# Patient Record
Sex: FEMALE | Race: WHITE | NOT HISPANIC OR LATINO | Employment: OTHER | URBAN - METROPOLITAN AREA
[De-identification: names, ages, dates, MRNs, and addresses within clinical notes are randomized per-mention and may not be internally consistent; named-entity substitution may affect disease eponyms.]

---

## 2017-01-13 ENCOUNTER — GENERIC CONVERSION - ENCOUNTER (OUTPATIENT)
Dept: OTHER | Facility: OTHER | Age: 62
End: 2017-01-13

## 2017-01-20 ENCOUNTER — ALLSCRIPTS OFFICE VISIT (OUTPATIENT)
Dept: OTHER | Facility: OTHER | Age: 62
End: 2017-01-20

## 2017-01-20 DIAGNOSIS — Z12.31 ENCOUNTER FOR SCREENING MAMMOGRAM FOR MALIGNANT NEOPLASM OF BREAST: ICD-10-CM

## 2017-01-23 ENCOUNTER — GENERIC CONVERSION - ENCOUNTER (OUTPATIENT)
Dept: OTHER | Facility: OTHER | Age: 62
End: 2017-01-23

## 2017-02-20 ENCOUNTER — APPOINTMENT (OUTPATIENT)
Dept: PHYSICAL THERAPY | Facility: CLINIC | Age: 62
End: 2017-02-20
Payer: MEDICARE

## 2017-02-20 PROCEDURE — 97140 MANUAL THERAPY 1/> REGIONS: CPT

## 2017-02-20 PROCEDURE — G8991 OTHER PT/OT GOAL STATUS: HCPCS

## 2017-02-20 PROCEDURE — 97162 PT EVAL MOD COMPLEX 30 MIN: CPT

## 2017-02-20 PROCEDURE — G8990 OTHER PT/OT CURRENT STATUS: HCPCS

## 2017-02-22 ENCOUNTER — APPOINTMENT (OUTPATIENT)
Dept: PHYSICAL THERAPY | Facility: CLINIC | Age: 62
End: 2017-02-22
Payer: MEDICARE

## 2017-02-22 PROCEDURE — 97150 GROUP THERAPEUTIC PROCEDURES: CPT

## 2017-02-22 PROCEDURE — 97140 MANUAL THERAPY 1/> REGIONS: CPT

## 2017-02-27 ENCOUNTER — APPOINTMENT (OUTPATIENT)
Dept: PHYSICAL THERAPY | Facility: CLINIC | Age: 62
End: 2017-02-27
Payer: MEDICARE

## 2017-02-27 PROCEDURE — 97110 THERAPEUTIC EXERCISES: CPT

## 2017-02-27 PROCEDURE — 97140 MANUAL THERAPY 1/> REGIONS: CPT

## 2017-03-01 ENCOUNTER — APPOINTMENT (OUTPATIENT)
Dept: PHYSICAL THERAPY | Facility: CLINIC | Age: 62
End: 2017-03-01
Payer: MEDICARE

## 2017-03-06 ENCOUNTER — APPOINTMENT (OUTPATIENT)
Dept: PHYSICAL THERAPY | Facility: CLINIC | Age: 62
End: 2017-03-06
Payer: MEDICARE

## 2017-03-06 PROCEDURE — 97150 GROUP THERAPEUTIC PROCEDURES: CPT

## 2017-03-06 PROCEDURE — 97140 MANUAL THERAPY 1/> REGIONS: CPT

## 2017-03-07 ENCOUNTER — ALLSCRIPTS OFFICE VISIT (OUTPATIENT)
Dept: OTHER | Facility: OTHER | Age: 62
End: 2017-03-07

## 2017-03-07 ENCOUNTER — APPOINTMENT (OUTPATIENT)
Dept: PHYSICAL THERAPY | Facility: CLINIC | Age: 62
End: 2017-03-07
Payer: MEDICARE

## 2017-03-07 PROCEDURE — 97140 MANUAL THERAPY 1/> REGIONS: CPT

## 2017-03-07 PROCEDURE — 97110 THERAPEUTIC EXERCISES: CPT

## 2017-03-13 ENCOUNTER — APPOINTMENT (OUTPATIENT)
Dept: PHYSICAL THERAPY | Facility: CLINIC | Age: 62
End: 2017-03-13
Payer: MEDICARE

## 2017-03-13 PROCEDURE — 97140 MANUAL THERAPY 1/> REGIONS: CPT

## 2017-03-13 PROCEDURE — 97150 GROUP THERAPEUTIC PROCEDURES: CPT

## 2017-03-15 ENCOUNTER — APPOINTMENT (OUTPATIENT)
Dept: PHYSICAL THERAPY | Facility: CLINIC | Age: 62
End: 2017-03-15
Payer: MEDICARE

## 2017-03-17 ENCOUNTER — APPOINTMENT (OUTPATIENT)
Dept: PHYSICAL THERAPY | Facility: CLINIC | Age: 62
End: 2017-03-17
Payer: MEDICARE

## 2017-03-20 ENCOUNTER — APPOINTMENT (OUTPATIENT)
Dept: PHYSICAL THERAPY | Facility: CLINIC | Age: 62
End: 2017-03-20
Payer: MEDICARE

## 2017-03-21 ENCOUNTER — GENERIC CONVERSION - ENCOUNTER (OUTPATIENT)
Dept: OTHER | Facility: OTHER | Age: 62
End: 2017-03-21

## 2017-03-23 ENCOUNTER — GENERIC CONVERSION - ENCOUNTER (OUTPATIENT)
Dept: OTHER | Facility: OTHER | Age: 62
End: 2017-03-23

## 2017-03-24 ENCOUNTER — APPOINTMENT (OUTPATIENT)
Dept: PHYSICAL THERAPY | Facility: CLINIC | Age: 62
End: 2017-03-24
Payer: MEDICARE

## 2017-03-27 ENCOUNTER — APPOINTMENT (OUTPATIENT)
Dept: PHYSICAL THERAPY | Facility: CLINIC | Age: 62
End: 2017-03-27
Payer: MEDICARE

## 2017-03-27 PROCEDURE — 97140 MANUAL THERAPY 1/> REGIONS: CPT

## 2017-03-27 PROCEDURE — 97150 GROUP THERAPEUTIC PROCEDURES: CPT

## 2017-03-28 ENCOUNTER — APPOINTMENT (OUTPATIENT)
Dept: PHYSICAL THERAPY | Facility: CLINIC | Age: 62
End: 2017-03-28
Payer: MEDICARE

## 2017-03-28 PROCEDURE — G8991 OTHER PT/OT GOAL STATUS: HCPCS

## 2017-03-28 PROCEDURE — 97164 PT RE-EVAL EST PLAN CARE: CPT

## 2017-03-28 PROCEDURE — 97140 MANUAL THERAPY 1/> REGIONS: CPT

## 2017-03-28 PROCEDURE — 97110 THERAPEUTIC EXERCISES: CPT

## 2017-03-28 PROCEDURE — G8990 OTHER PT/OT CURRENT STATUS: HCPCS

## 2017-03-30 ENCOUNTER — APPOINTMENT (OUTPATIENT)
Dept: PHYSICAL THERAPY | Facility: CLINIC | Age: 62
End: 2017-03-30
Payer: MEDICARE

## 2017-03-30 PROCEDURE — 97150 GROUP THERAPEUTIC PROCEDURES: CPT

## 2017-03-30 PROCEDURE — 97140 MANUAL THERAPY 1/> REGIONS: CPT

## 2017-04-03 ENCOUNTER — APPOINTMENT (OUTPATIENT)
Dept: PHYSICAL THERAPY | Facility: CLINIC | Age: 62
End: 2017-04-03
Payer: MEDICARE

## 2017-04-03 PROCEDURE — 97140 MANUAL THERAPY 1/> REGIONS: CPT

## 2017-04-03 PROCEDURE — 97150 GROUP THERAPEUTIC PROCEDURES: CPT

## 2017-04-05 ENCOUNTER — APPOINTMENT (OUTPATIENT)
Dept: PHYSICAL THERAPY | Facility: CLINIC | Age: 62
End: 2017-04-05
Payer: MEDICARE

## 2017-04-05 PROCEDURE — 97140 MANUAL THERAPY 1/> REGIONS: CPT

## 2017-04-05 PROCEDURE — 97110 THERAPEUTIC EXERCISES: CPT

## 2017-04-07 ENCOUNTER — APPOINTMENT (OUTPATIENT)
Dept: PHYSICAL THERAPY | Facility: CLINIC | Age: 62
End: 2017-04-07
Payer: MEDICARE

## 2017-04-11 ENCOUNTER — APPOINTMENT (OUTPATIENT)
Dept: PHYSICAL THERAPY | Facility: CLINIC | Age: 62
End: 2017-04-11
Payer: MEDICARE

## 2017-04-12 ENCOUNTER — APPOINTMENT (OUTPATIENT)
Dept: PHYSICAL THERAPY | Facility: CLINIC | Age: 62
End: 2017-04-12
Payer: MEDICARE

## 2017-04-12 PROCEDURE — 97140 MANUAL THERAPY 1/> REGIONS: CPT

## 2017-04-12 PROCEDURE — 97110 THERAPEUTIC EXERCISES: CPT

## 2017-04-14 ENCOUNTER — APPOINTMENT (OUTPATIENT)
Dept: PHYSICAL THERAPY | Facility: CLINIC | Age: 62
End: 2017-04-14
Payer: MEDICARE

## 2017-04-14 PROCEDURE — 97110 THERAPEUTIC EXERCISES: CPT

## 2017-04-14 PROCEDURE — 97140 MANUAL THERAPY 1/> REGIONS: CPT

## 2017-04-17 ENCOUNTER — APPOINTMENT (OUTPATIENT)
Dept: PHYSICAL THERAPY | Facility: CLINIC | Age: 62
End: 2017-04-17
Payer: MEDICARE

## 2017-04-18 ENCOUNTER — GENERIC CONVERSION - ENCOUNTER (OUTPATIENT)
Dept: OTHER | Facility: OTHER | Age: 62
End: 2017-04-18

## 2017-04-21 ENCOUNTER — APPOINTMENT (OUTPATIENT)
Dept: PHYSICAL THERAPY | Facility: CLINIC | Age: 62
End: 2017-04-21
Payer: MEDICARE

## 2017-04-21 ENCOUNTER — ALLSCRIPTS OFFICE VISIT (OUTPATIENT)
Dept: OTHER | Facility: OTHER | Age: 62
End: 2017-04-21

## 2017-04-25 ENCOUNTER — GENERIC CONVERSION - ENCOUNTER (OUTPATIENT)
Dept: OTHER | Facility: OTHER | Age: 62
End: 2017-04-25

## 2017-04-25 ENCOUNTER — APPOINTMENT (OUTPATIENT)
Dept: PHYSICAL THERAPY | Facility: CLINIC | Age: 62
End: 2017-04-25
Payer: MEDICARE

## 2017-05-01 ENCOUNTER — APPOINTMENT (OUTPATIENT)
Dept: PHYSICAL THERAPY | Facility: CLINIC | Age: 62
End: 2017-05-01
Payer: MEDICARE

## 2017-05-01 PROCEDURE — 97140 MANUAL THERAPY 1/> REGIONS: CPT

## 2017-05-01 PROCEDURE — 97150 GROUP THERAPEUTIC PROCEDURES: CPT

## 2017-05-03 ENCOUNTER — APPOINTMENT (OUTPATIENT)
Dept: PHYSICAL THERAPY | Facility: CLINIC | Age: 62
End: 2017-05-03
Payer: MEDICARE

## 2017-05-03 PROCEDURE — 97140 MANUAL THERAPY 1/> REGIONS: CPT

## 2017-05-03 PROCEDURE — 97110 THERAPEUTIC EXERCISES: CPT

## 2017-05-05 ENCOUNTER — APPOINTMENT (OUTPATIENT)
Dept: PHYSICAL THERAPY | Facility: CLINIC | Age: 62
End: 2017-05-05
Payer: MEDICARE

## 2017-05-05 PROCEDURE — 97110 THERAPEUTIC EXERCISES: CPT

## 2017-05-08 ENCOUNTER — APPOINTMENT (OUTPATIENT)
Dept: PHYSICAL THERAPY | Facility: CLINIC | Age: 62
End: 2017-05-08
Payer: MEDICARE

## 2017-05-08 PROCEDURE — 97150 GROUP THERAPEUTIC PROCEDURES: CPT

## 2017-05-10 ENCOUNTER — APPOINTMENT (OUTPATIENT)
Dept: PHYSICAL THERAPY | Facility: CLINIC | Age: 62
End: 2017-05-10
Payer: MEDICARE

## 2017-05-12 ENCOUNTER — APPOINTMENT (OUTPATIENT)
Dept: PHYSICAL THERAPY | Facility: CLINIC | Age: 62
End: 2017-05-12
Payer: MEDICARE

## 2017-05-12 PROCEDURE — 97140 MANUAL THERAPY 1/> REGIONS: CPT

## 2017-05-12 PROCEDURE — 97110 THERAPEUTIC EXERCISES: CPT

## 2017-05-15 ENCOUNTER — ALLSCRIPTS OFFICE VISIT (OUTPATIENT)
Dept: OTHER | Facility: OTHER | Age: 62
End: 2017-05-15

## 2017-05-17 ENCOUNTER — APPOINTMENT (OUTPATIENT)
Dept: PHYSICAL THERAPY | Facility: CLINIC | Age: 62
End: 2017-05-17
Payer: MEDICARE

## 2017-05-17 PROCEDURE — G8991 OTHER PT/OT GOAL STATUS: HCPCS

## 2017-05-17 PROCEDURE — 97164 PT RE-EVAL EST PLAN CARE: CPT

## 2017-05-17 PROCEDURE — G8990 OTHER PT/OT CURRENT STATUS: HCPCS

## 2017-05-17 PROCEDURE — 97140 MANUAL THERAPY 1/> REGIONS: CPT

## 2017-05-17 PROCEDURE — 97150 GROUP THERAPEUTIC PROCEDURES: CPT

## 2017-05-19 ENCOUNTER — APPOINTMENT (OUTPATIENT)
Dept: PHYSICAL THERAPY | Facility: CLINIC | Age: 62
End: 2017-05-19
Payer: MEDICARE

## 2017-05-22 ENCOUNTER — APPOINTMENT (OUTPATIENT)
Dept: PHYSICAL THERAPY | Facility: CLINIC | Age: 62
End: 2017-05-22
Payer: MEDICARE

## 2017-05-22 PROCEDURE — 97110 THERAPEUTIC EXERCISES: CPT

## 2017-05-24 ENCOUNTER — APPOINTMENT (OUTPATIENT)
Dept: PHYSICAL THERAPY | Facility: CLINIC | Age: 62
End: 2017-05-24
Payer: MEDICARE

## 2017-05-25 ENCOUNTER — APPOINTMENT (OUTPATIENT)
Dept: PHYSICAL THERAPY | Facility: CLINIC | Age: 62
End: 2017-05-25
Payer: MEDICARE

## 2017-05-25 PROCEDURE — 97110 THERAPEUTIC EXERCISES: CPT

## 2017-05-25 PROCEDURE — 97140 MANUAL THERAPY 1/> REGIONS: CPT

## 2017-05-26 ENCOUNTER — APPOINTMENT (OUTPATIENT)
Dept: PHYSICAL THERAPY | Facility: CLINIC | Age: 62
End: 2017-05-26
Payer: MEDICARE

## 2017-05-31 ENCOUNTER — APPOINTMENT (OUTPATIENT)
Dept: PHYSICAL THERAPY | Facility: CLINIC | Age: 62
End: 2017-05-31
Payer: MEDICARE

## 2017-06-05 ENCOUNTER — APPOINTMENT (OUTPATIENT)
Dept: PHYSICAL THERAPY | Facility: CLINIC | Age: 62
End: 2017-06-05
Payer: MEDICARE

## 2017-06-06 ENCOUNTER — APPOINTMENT (OUTPATIENT)
Dept: PHYSICAL THERAPY | Facility: CLINIC | Age: 62
End: 2017-06-06
Payer: MEDICARE

## 2017-06-14 ENCOUNTER — APPOINTMENT (OUTPATIENT)
Dept: PHYSICAL THERAPY | Facility: CLINIC | Age: 62
End: 2017-06-14
Payer: MEDICARE

## 2017-06-14 PROCEDURE — 97110 THERAPEUTIC EXERCISES: CPT

## 2017-06-14 PROCEDURE — 97150 GROUP THERAPEUTIC PROCEDURES: CPT

## 2017-06-16 ENCOUNTER — APPOINTMENT (OUTPATIENT)
Dept: PHYSICAL THERAPY | Facility: CLINIC | Age: 62
End: 2017-06-16
Payer: MEDICARE

## 2017-06-26 ENCOUNTER — APPOINTMENT (OUTPATIENT)
Dept: PHYSICAL THERAPY | Facility: CLINIC | Age: 62
End: 2017-06-26
Payer: MEDICARE

## 2017-06-26 PROCEDURE — 97110 THERAPEUTIC EXERCISES: CPT

## 2017-06-28 ENCOUNTER — APPOINTMENT (OUTPATIENT)
Dept: PHYSICAL THERAPY | Facility: CLINIC | Age: 62
End: 2017-06-28
Payer: MEDICARE

## 2017-06-30 ENCOUNTER — APPOINTMENT (OUTPATIENT)
Dept: PHYSICAL THERAPY | Facility: CLINIC | Age: 62
End: 2017-06-30
Payer: MEDICARE

## 2017-07-03 ENCOUNTER — APPOINTMENT (OUTPATIENT)
Dept: PHYSICAL THERAPY | Facility: CLINIC | Age: 62
End: 2017-07-03
Payer: MEDICARE

## 2017-07-03 PROCEDURE — 97150 GROUP THERAPEUTIC PROCEDURES: CPT

## 2017-07-05 ENCOUNTER — APPOINTMENT (OUTPATIENT)
Dept: PHYSICAL THERAPY | Facility: CLINIC | Age: 62
End: 2017-07-05
Payer: MEDICARE

## 2017-08-07 ENCOUNTER — ALLSCRIPTS OFFICE VISIT (OUTPATIENT)
Dept: OTHER | Facility: OTHER | Age: 62
End: 2017-08-07

## 2017-08-07 DIAGNOSIS — Z12.31 ENCOUNTER FOR SCREENING MAMMOGRAM FOR MALIGNANT NEOPLASM OF BREAST: ICD-10-CM

## 2017-09-11 ENCOUNTER — GENERIC CONVERSION - ENCOUNTER (OUTPATIENT)
Dept: OTHER | Facility: OTHER | Age: 62
End: 2017-09-11

## 2017-09-21 ENCOUNTER — ALLSCRIPTS OFFICE VISIT (OUTPATIENT)
Dept: OTHER | Facility: OTHER | Age: 62
End: 2017-09-21

## 2017-10-09 ENCOUNTER — GENERIC CONVERSION - ENCOUNTER (OUTPATIENT)
Dept: OTHER | Facility: OTHER | Age: 62
End: 2017-10-09

## 2017-11-01 ENCOUNTER — ALLSCRIPTS OFFICE VISIT (OUTPATIENT)
Dept: OTHER | Facility: OTHER | Age: 62
End: 2017-11-01

## 2017-11-01 DIAGNOSIS — C82.90 FOLLICULAR LYMPHOMA (HCC): ICD-10-CM

## 2017-11-01 DIAGNOSIS — E78.5 HYPERLIPIDEMIA: ICD-10-CM

## 2017-11-01 DIAGNOSIS — M54.2 CERVICALGIA: ICD-10-CM

## 2017-11-01 DIAGNOSIS — R31.9 HEMATURIA: ICD-10-CM

## 2017-11-02 NOTE — PROGRESS NOTES
Assessment  1  Hyperlipidemia (272 4) (E78 5)   2  Hematuria (599 70) (R31 9)   3  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804 91) (C82 90)    Plan  Acute pain due to trauma    · Naproxen 500 MG Oral Tablet; TAKE 1 TABLET BY MOUTH EVERY 12 HOURS AS  NEEDED  Cellulitis of forearm, right    · Cephalexin 500 MG Oral Tablet (Cephalexin Monohydrate)  Follicular lymphoma    · (1) CBC/PLT/DIFF; Status:Active; Requested for:01Nov2017; Hematuria    · (1) URINALYSIS (will reflex a microscopy if leukocytes, occult blood, protein or nitrites are  not within normal limits); Status:Active; Requested for:01Nov2017; Herniated lumbar intervertebral disc    · OxyMORphone HCl - 5 MG Oral Tablet; take 1 tablet every 4-6 hours as needed  for pain  Hyperlipidemia    · (1) COMPREHENSIVE METABOLIC PANEL; Status:Active; Requested for:01Nov2017;    · (1) LIPID PANEL, FASTING; Status:Active; Requested for:01Nov2017;    · (1) TSH WITH FT4 REFLEX; Status:Active; Requested for:01Nov2017;   Peripheral neuropathy    · Lyrica 75 MG Oral Capsule; TAKE 1 CAPSULE BY MOUTH 3 TIMES A DAY -MAY  CAUSE DIZZINESS -MAY CAUSEDROWSINESS    Chief Complaint  FOLLOW UP   Patient is here today for follow up of chronic conditions described in HPI  History of Present Illness  She is going to PT for rectal prolapse    has ongoing pain in the low back  She had neurosurgery in December of 2016  She did PT and is seeing chiropractics  She saw pain management and had injections  She is taking Lyrica with minimal benefit  She gets good results from oxymorphone  She has no side effects  She has a signed treatment agreement in the chart  There is no indication of misuse, abuse, or diversion  has scleroderma (likely CREST) and sees rheumatology on a regular basis  She has chronic arthritic pain in the small joints, especially the hands  She is taking Naproxen  She has chronic dry eyes and mouth and is suffering dental issues because of it            Review of Systems    Constitutional: No fever, no chills, feels well, no tiredness, no recent weight gain or weight loss  Eyes: No complaints of eye pain, no red eyes, no eyesight problems, no discharge, no dry eyes, no itching of eyes  ENT: no complaints of earache, no loss of hearing, no nose bleeds, no nasal discharge, no sore throat, no hoarseness  Cardiovascular: No complaints of slow heart rate, no fast heart rate, no chest pain, no palpitations, no leg claudication, no lower extremity edema  Respiratory: No complaints of shortness of breath, no wheezing, no cough, no SOB on exertion, no orthopnea, no PND  Gastrointestinal: No complaints of abdominal pain, no constipation, no nausea or vomiting, no diarrhea, no bloody stools  Genitourinary: No complaints of dysuria, no incontinence, no pelvic pain, no dysmenorrhea, no vaginal discharge or bleeding  Musculoskeletal: No complaints of arthralgias, no myalgias, no joint swelling or stiffness, no limb pain or swelling  Integumentary: No complaints of skin rash or lesions, no itching, no skin wounds, no breast pain or lump  Neurological: No complaints of headache, no confusion, no convulsions, no numbness, no dizziness or fainting, no tingling, no limb weakness, no difficulty walking  Psychiatric: Not suicidal, no sleep disturbance, no anxiety or depression, no change in personality, no emotional problems  Endocrine: No complaints of proptosis, no hot flashes, no muscle weakness, no deepening of the voice, no feelings of weakness  Hematologic/Lymphatic: No complaints of swollen glands, no swollen glands in the neck, does not bleed easily, does not bruise easily  Active Problems  1  Abdominal pain, RUQ (789 01) (R10 11)   2  Abnormal EKG (794 31) (R94 31)   3  Acute pain due to trauma (338 11) (G89 11)   4  Allergic rhinitis (477 9) (J30 9)   5  Bartholin gland cyst (616 2) (N75 0)   6  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)   7  Cellulitis of forearm, right (682 3) (L03 113)   8  Chest pain (786 50) (R07 9)   9  Dental disorder (525 9) (K08 9)   10  Depression (311) (F32 9)   11  Depression screen (V79 0) (Z13 89)   12  Encounter for removal of sutures (V58 32) (Z48 02)   13  Encounter for screening for malignant neoplasm of colon (V76 51) (Z12 11)   14  Encounter for screening mammogram for malignant neoplasm of breast (V76 12)    (Z12 31)   15  Esophageal reflux disease (530 81) (K21 9)   16  Eye drainage (379 93) (H57 8)   17  Eye dryness (375 15) (H04 129)   18  Fatigue (780 79) (R53 83)   19  Fecal soiling (787 62) (R15 1)   20  Fibromyalgia (729 1) (M79 7)   21  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513 74) (C82 90)   22  Hearing Loss (389 9)   23  Heel spur (726 73) (M77 30)   24  Hematuria (599 70) (R31 9)   25  Herniated lumbar intervertebral disc (722 10) (M51 26)   26  Hip pain, left (719 45) (M25 552)   27  Hyperlipidemia (272 4) (E78 5)   28  Insomnia (780 52) (G47 00)   29  Joint pain, knee (719 46) (M25 569)   30  Lichen planus (291 9) (L43 9)   31  Long term use of drug (V58 69) (Z79 899)   32  Low back pain (724 2) (M54 5)   33  Neck pain (723 1) (M54 2)   34  Neck Strain (847 0)   35  Need for influenza vaccination (V04 81) (Z23)   36  Need for prophylactic vaccination and inoculation against influenza (V04 81) (Z23)   37  Osteoarthritis of both knees (715 96) (M17 0)   38  Osteopenia (733 90) (M85 80)   39  Ovarian cyst (620 2) (N83 20)   40  Pain in joint of left shoulder (719 41) (M25 512)   41  Peripheral neuropathy (356 9) (G62 9)   42  Post concussion syndrome (310 2) (F07 81)   43  Scleroderma (710 1) (M34 9)   44  Spasm (781 0) (R25 2)    Past Medical History  1  History of Acute frontal sinusitis (461 1) (J01 10)   2  History of Cellulitis (682 9) (L03 90)   3  History of Cellulitis of foot (682 7) (L03 119)   4  History of Change in mental status (780 97) (R41 82)   5  History of PLAZA (dyspnea on exertion) (786 09) (R06 09)   6  Encounter for removal of sutures (V58 32) (Z48 02)   7  History of acute sinusitis (V12 69) (Z87 09)   8  History of acute sinusitis (V12 69) (Z87 09)   9  History of conjunctivitis (V12 49) (Z86 69)   10  History of folliculitis (X32 6) (M27 3)   11  History of insect bite (V15 59) (Z87 828)   12  History of urinary tract infection (V13 02) (Z87 440)   13  History of urinary tract infection (V13 02) (Z87 440)   14  History of Laceration (879 8)   15  History of Laceration Of Eyelid (870 8)   16  History of Nausea (787 02) (R11 0)   17  Need for prophylactic vaccination and inoculation against influenza (V04 81) (Z23)   18  History of Preop examination (V72 84) (Z01 818)   19  History of Skin rash (782 1) (R21)    Surgical History  1  History of Bladder Surgery   2  History of Hemorrhoidectomy   3  History of Hysterectomy   4  History of Nasal Septal Deviation Repair   5  History of Rotator Cuff Repair   6  History of Tonsillectomy    Family History  Mother    1  Family history of Arthritis (V17 7)   2  Family history of Diabetes Mellitus (V18 0)  Father    3  Family history of Coronary Artery Disease (V17 49)  Sister    4  Family history of Arthritis (V17 7)   5  Family history of Asthma (V17 5)   6  Family history of Crohn's Disease  Brother    7  Family history of Myocardial Ischemia    Social History   · Former smoker (B12 20) (M44 830)    Current Meds   1  Amphetamine-Dextroamphet ER 30 MG Oral Capsule Extended Release 24 Hour; TAKE   1 CAPSULE DAILY; Therapy: 16PMH0582 to (Evaluate:22Nov2017); Last Rx:23Oct2017 Ordered   2  Amphetamine-Dextroamphetamine 20 MG Oral Tablet; TAKE 2 TABLETS DAILY; Therapy: 62RMZ7904 to (Evaluate:22Nov2017); Last Rx:23Oct2017 Ordered   3  BuPROPion HCl ER (XL) 300 MG Oral Tablet Extended Release 24 Hour; take 1 tablet by   mouth every day; Therapy: 89BPR8460 to (Evaluate:10Dkc5121)  Requested for: 56Pwd4772; Last   Rx:79Fpb9874 Ordered   4   Cephalexin 500 MG Oral Tablet; TAKE 1 TABLET 3 TIMES DAILY; Therapy: 21VOS6649 to (Algis Rout)  Requested for: 29RCS0802; Last   Rx:22Xtv5674 Ordered   5  Fluticasone Propionate 50 MCG/ACT Nasal Suspension; USE 2 SPRAYS IN EACH   NOSTRIL ONCE DAILY; Therapy: 89Bvz3068 to (Last Rx:23Apr2015)  Requested for: 02Aam1386 Ordered   6  Lyrica 75 MG Oral Capsule; TAKE 1 CAPSULE BY MOUTH 3 TIMES A DAY -MAY CAUSE   DIZZINESS -MAY CAUSEDROWSINESS; Therapy: 64YMH5740 to (Evaluate:15Oct2017)  Requested for: 18Apr2017; Last   Rx:87Mxn3520 Ordered   7  Naproxen 500 MG Oral Tablet; TAKE 1 TABLET BY MOUTH EVERY 12 HOURS AS   NEEDED; Therapy: 87VGS5598 to (Evaluate:52Feu3825)  Requested for: 99Tnh3795; Last   Rx:92Seq5385 Ordered   8  OxyMORphone HCl - 5 MG Oral Tablet; take 1 tablet every 4-6 hours as needed for pain; Therapy: 96FPK4367 to (Last Rx:23Oct2017) Ordered   9  Plaquenil 200 MG Oral Tablet; Therapy: 02SXL7541 to Recorded   10  Protonix 40 MG Oral Tablet Delayed Release; Therapy: 02OBZ2573 to Recorded   11  Simvastatin 40 MG Oral Tablet; take 1 tablet by mouth every day; Therapy: 11RSN0419 to (Evaluate:36Cbv7803)  Requested for: 64Nnj2453; Last    Rx:19Sep2017 Ordered   12  TraZODone HCl - 150 MG Oral Tablet; TAKE 1 TABLET AT BEDTIME; Therapy: 33AXO5096 to (Evaluate:20Sep2017)  Requested for: 03Bmx1307; Last    Rx:73Tum9551 Ordered   13  Triamcinolone Acetonide 0 1 % External Ointment; APPLY AND GENTLY MASSAGE INTO    AFFECTED AREA(S) TWICE DAILY; Therapy: 12Gco0244 to (Last Rx:07Aug2017)  Requested for: 82Dhy3397 Ordered    Allergies  1  Bactrim TABS   2  Cymbalta CPEP    Vitals  Vital Signs    Recorded: 01HUE7848 08:07AM   Heart Rate 97   Respiration 14   Systolic 325   Diastolic 68   Height 5 ft 2 in   Weight 115 lb 4 oz   BMI Calculated 21 08   BSA Calculated 1 51   O2 Saturation 97     Physical Exam    Constitutional   General appearance: No acute distress, well appearing and well nourished      Pulmonary   Respiratory effort: No increased work of breathing or signs of respiratory distress  Auscultation of lungs: Clear to auscultation  Cardiovascular   Auscultation of heart: Normal rate and rhythm, normal S1 and S2, without murmurs  Examination of extremities for edema and/or varicosities: Normal     Abdomen   Abdomen: Non-tender, no masses  Liver and spleen: No hepatomegaly or splenomegaly           Signatures   Electronically signed by : Leni Chávez MD; Nov 1 2017  8:48AM EST                       (Author)

## 2017-11-30 ENCOUNTER — GENERIC CONVERSION - ENCOUNTER (OUTPATIENT)
Dept: FAMILY MEDICINE CLINIC | Facility: CLINIC | Age: 62
End: 2017-11-30

## 2017-12-04 ENCOUNTER — GENERIC CONVERSION - ENCOUNTER (OUTPATIENT)
Dept: FAMILY MEDICINE CLINIC | Facility: CLINIC | Age: 62
End: 2017-12-04

## 2018-01-09 ENCOUNTER — GENERIC CONVERSION - ENCOUNTER (OUTPATIENT)
Dept: OTHER | Facility: OTHER | Age: 63
End: 2018-01-09

## 2018-01-13 VITALS
RESPIRATION RATE: 15 BRPM | HEIGHT: 62 IN | OXYGEN SATURATION: 98 % | SYSTOLIC BLOOD PRESSURE: 116 MMHG | HEART RATE: 84 BPM | WEIGHT: 116.5 LBS | DIASTOLIC BLOOD PRESSURE: 60 MMHG | BODY MASS INDEX: 21.44 KG/M2

## 2018-01-14 VITALS
BODY MASS INDEX: 21.42 KG/M2 | WEIGHT: 116.38 LBS | SYSTOLIC BLOOD PRESSURE: 112 MMHG | HEART RATE: 99 BPM | OXYGEN SATURATION: 96 % | RESPIRATION RATE: 15 BRPM | DIASTOLIC BLOOD PRESSURE: 66 MMHG | HEIGHT: 62 IN

## 2018-01-14 VITALS
RESPIRATION RATE: 15 BRPM | WEIGHT: 114.5 LBS | DIASTOLIC BLOOD PRESSURE: 60 MMHG | BODY MASS INDEX: 21.07 KG/M2 | HEIGHT: 62 IN | HEART RATE: 89 BPM | OXYGEN SATURATION: 97 % | SYSTOLIC BLOOD PRESSURE: 118 MMHG

## 2018-01-14 VITALS
SYSTOLIC BLOOD PRESSURE: 110 MMHG | RESPIRATION RATE: 15 BRPM | BODY MASS INDEX: 21.39 KG/M2 | WEIGHT: 116.25 LBS | HEART RATE: 80 BPM | HEIGHT: 62 IN | DIASTOLIC BLOOD PRESSURE: 58 MMHG | OXYGEN SATURATION: 97 %

## 2018-01-14 VITALS
RESPIRATION RATE: 14 BRPM | HEART RATE: 97 BPM | HEIGHT: 62 IN | WEIGHT: 115.25 LBS | OXYGEN SATURATION: 97 % | SYSTOLIC BLOOD PRESSURE: 112 MMHG | BODY MASS INDEX: 21.21 KG/M2 | DIASTOLIC BLOOD PRESSURE: 68 MMHG

## 2018-01-14 VITALS
HEART RATE: 83 BPM | RESPIRATION RATE: 14 BRPM | SYSTOLIC BLOOD PRESSURE: 112 MMHG | DIASTOLIC BLOOD PRESSURE: 60 MMHG | OXYGEN SATURATION: 97 %

## 2018-01-15 VITALS
HEIGHT: 62 IN | SYSTOLIC BLOOD PRESSURE: 112 MMHG | WEIGHT: 116 LBS | BODY MASS INDEX: 21.35 KG/M2 | DIASTOLIC BLOOD PRESSURE: 66 MMHG | OXYGEN SATURATION: 97 % | HEART RATE: 77 BPM | RESPIRATION RATE: 15 BRPM

## 2018-01-15 NOTE — MISCELLANEOUS
Assessment    1  Low back pain (724 2) (M54 5)   2  Depression (311) (F32 9)    Plan  Depression    · Avoid alcoholic beverages ; Status:Complete;   Done: 46YAO5933   Ordered; For:Depression; Ordered By:Nati Stevenson;   · Begin or continue regular aerobic exercise  Gradually work up to at least 3 sessions of  30 minutes of exercise a week ; Status:Complete;   Done: 40GNM0245   Ordered; For:Depression; Ordered By:Nati Stevenson;   · Continue with our present treatment plan ; Status:Complete;   Done: 90YDA4081   Ordered; For:Depression; Ordered By:Nati Stevenson;   · Please bring all medicines, vitamins, and herbal supplements with you when you come  to the office ; Status:Complete;   Done: 08YDY6324   Ordered; For:Depression; Ordered By:Nati Stevenson;   · There are ways to decrease your stress and improve your sense of well-being  We  encourage you to keep active and exercise regularly  Make time to take care of yourself  and participate in activities that you enjoy  Stay connected to friends and family that can  support and comfort you  If at any time you have thoughts of harming yourself or  someone else, contact us immediately ; Status:Active; Requested for:31Nyv6778;    Ordered; For:Depression; Ordered By:Nati Stevenson;   · Call [phone] if: The symptoms are not better in 7 days ; Status:Complete;   Done:  69OGO9185   Ordered; For:Depression; Ordered By:Nati Stevenson;   · Call [phone] if: The symptoms seem worse ; Status:Complete;   Done:  40QIQ7386   Ordered; For:Depression; Ordered By:Nati Stevenson;   · Call [phone] if: You are having difficulty sleeping (insomnia) ; Status:Complete;    Done: 21WXZ5407   Ordered; For:Depression; Ordered By:Nati Stevenson;   · Call [phone] if: You are unable to eat a normal amount of food and you do not feel  hungry ; Status:Complete;   Done: 45LMJ8523   Ordered;   For:Depression; Ordered By:Nati Stevenson;   · Call [phone] if: You or your family members notice any confusion or difficulty with  memory ; Status:Complete;   Done: 61OSE5529   Ordered; For:Depression; Ordered By:Nicky Stevenson;   · Call [phone] if: Your depression is worse ; Status:Complete;   Done: 00RTF7738   Ordered; For:Depression; Ordered By:Nicky Stevenson;   · Call [phone] if: Your moods often change suddenly for no reason ;  Status:Complete;   Done: 89HPC5120   Ordered; For:Depression; Ordered By:Nicky Stevenson;   · Call [phone] if: Your symptoms return during treatment ; Status:Complete;    Done: 75UGY8570   Ordered; For:Depression; Ordered By:Nicky Stevenson;   · Call 911 if: You are considering suicide ; Status:Complete;   Done: 94ISY6838   Ordered; For:Depression; Ordered By:Nicky Stevenson;   · Call 911 if: You are thinking about harming yourself or someone else ; Status:Complete;    Done: 86FXF9949   Ordered; For:Depression; Ordered By:Nicky Stevenson;   · Call 911 if: Your muscles are rigid, tight, or stiff and you are running a high fever ;  Status:Complete;   Done: 64OWB0399   Ordered; For:Depression; Ordered By:Nicky Stevenson;   · Seek Immediate Medical Attention if: You get a rash ; Status:Complete;   Done:  70YLM3702   Ordered; For:Depression; Ordered By:Nicky Stevenson;   · Seek Immediate Medical Attention if: You notice the person is very agitated ;  Status:Complete;   Done: 13MQO4264   Ordered; For:Depression; Ordered By:Nicky Stevenson; Low back pain    · Pain Management Referral Other Physician Referral  Consult  Status: Active  Requested  for: 00YAX4731   Ordered;  For: Low back pain; Ordered By: Geovanna Keating Performed:  Due: 32RRR3446; Last Updated By: Lesli Alcantara; 2/2/2016 2:20:10 PM  are Referring to a non- Preferred Provider : Established Patient  Care Summary provided  : Yes    Chief Complaint  Chief Complaint Free Text Note Form: [address]      History of Present Illness  TCM Communication [facility]: The patient is being contacted for follow-up after hospitalization and NURSE CONTACTED OFFICE FOR APPT  Hospital records were not available  She was hospitalized at [facility]  The date of admission: 1/26/16, date of discharge: 1/27/16  Diagnosis: paresthesia  She was discharged to home  Communication performed and completed by   HPI: She had a rectal prolapse in January  She had an episode of paresthesia with HA  She was admitted to r/o CVA/TIA  She had CT head, MRI/MRA  The frontal microbleed seen on her last MRI (December) was not seen on her most recent study  Her medications have not changed  She has significant psycho-social stress  She is tearful in the office today  She would like to see pain management  She saw Angelica Avina in [address] [place] [address] [address] previously and would like to go back to him  She has a surgical consult for her rectal prolapse  Review of Systems  Complete-Female:   Constitutional: No fever, no chills, feels well, no tiredness, no recent weight gain or weight loss  Eyes: No complaints of eye pain, no red eyes, no eyesight problems, no discharge, no dry eyes, no itching of eyes  ENT: no complaints of earache, no loss of hearing, no nose bleeds, no nasal discharge, no sore throat, no hoarseness  Cardiovascular: No complaints of slow heart rate, no fast heart rate, no chest pain, no palpitations, no leg claudication, no lower extremity edema  Respiratory: No complaints of shortness of breath, no wheezing, no cough, no SOB on exertion, no orthopnea, no PND  Gastrointestinal: No complaints of abdominal pain, no constipation, no nausea or vomiting, no diarrhea, no bloody stools  Genitourinary: No complaints of dysuria, no incontinence, no pelvic pain, no dysmenorrhea, no vaginal discharge or bleeding  Musculoskeletal: as noted in HPI  Integumentary: No complaints of skin rash or lesions, no itching, no skin wounds, no breast pain or lump  Neurological: as noted in HPI  Psychiatric: Not suicidal, no sleep disturbance, no anxiety or depression, no change in personality, no emotional problems  Endocrine: No complaints of proptosis, no hot flashes, no muscle weakness, no deepening of the voice, no feelings of weakness  Hematologic/Lymphatic: No complaints of swollen glands, no swollen glands in the neck, does not bleed easily, does not bruise easily  Active Problems    1  Abdominal pain, RUQ (789 01) (R10 11)   2  Abnormal EKG (794 31) (R94 31)   3  Acute frontal sinusitis (461 1) (J01 10)   4  Acute pain due to trauma (338 11) (G89 11)   5  Allergic rhinitis (477 9) (J30 9)   6  Bartholin gland cyst (616 2) (N75 0)   7  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)   8  Cellulitis (682 9) (L03 90)   9  Change in mental status (780 97) (R41 82)   10  Chest pain (786 50) (R07 9)   11  Conjunctivitis (372 30) (H10 9)   12  Dental disorder (525 9) (K08 9)   13  Depression (311) (F32 9)   14  PLAZA (dyspnea on exertion) (786 09) (R06 09)   15  Encounter for removal of sutures (V58 32) (Z48 02)   16  Encounter for screening for malignant neoplasm of colon (V76 51) (Z12 11)   17  Esophageal reflux disease (530 81) (K21 9)   18  Eye drainage (379 93) (H57 8)   19  Eye dryness (375 15) (H04 129)   20  Fatigue (780 79) (R53 83)   21  Fibromyalgia (729 1) (M79 7)   22  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700 89) (C82 90)   23  Folliculitis (623 8) (J38 5)   24  Hearing Loss (389 9)   25  Heel spur (726 73) (M77 30)   26  Hematuria (599 70) (R31 9)   27  Herniated lumbar intervertebral disc (722 10) (M51 26)   28  Hyperlipidemia (272 4) (E78 5)   29  Insect bite (919 4,E906 4) (T14 8,W57  XXXA)   30  Insomnia (780 52) (G47 00)   31  Joint pain, knee (719 46) (M25 569)   32  Laceration (879 8) (T14 8)   33  Laceration Of Eyelid (870 8)   34  Lichen planus (045 8) (L43 9)   35  Long term use of drug (V58 69) (Z79 899)   36  Low back pain (724 2) (M54 5)   37  Nausea (787 02) (R11 0)   38  Neck pain (723 1) (M54 2)   39  Neck Strain (847 0)   40  Need for prophylactic vaccination and inoculation against influenza (V04 81) (Z23)   41  Osteoarthritis of both knees (715 96) (M17 0)   42  Osteopenia (733 90) (M85 80)   43  Ovarian cyst (620 2) (N83 20)   44  Pain in joint of left shoulder (719 41) (M25 512)   45  Peripheral neuropathy (356 9) (G62 9)   46  Post concussion syndrome (310 2) (F07 81)   47  Scleroderma (710 1) (M34 9)   48  Skin rash (782 1) (R21)   49  Spasm (781 0) (R25 2)   50  Urinary tract infection (599 0) (N39 0)   51  Urinary tract infection (599 0) (N39 0)    Past Medical History    1  Acute sinusitis (461 9) (J01 90)   2  Encounter for removal of sutures (V58 32) (Z48 02)   3  History of acute sinusitis (V12 69) (Z87 09)   4  History of acute sinusitis (V12 69) (Z87 09)   5  Need for prophylactic vaccination and inoculation against influenza (V04 81) (Z23)    Surgical History    1  History of Bladder Surgery   2  History of Hemorrhoidectomy   3  History of Hysterectomy   4  History of Nasal Septal Deviation Repair   5  History of Rotator Cuff Repair   6  History of Tonsillectomy    Family History    1  Family history of Arthritis (V17 7)   2  Family history of Diabetes Mellitus (V18 0)    3  Family history of Coronary Artery Disease (V17 49)    4  Family history of Arthritis (V17 7)   5  Family history of Asthma (V17 5)   6  Family history of Crohn's Disease    7  Family history of Myocardial Ischemia    Social History    · Former smoker (X87 06) (F32 337)    Current Meds   1  Amphetamine-Dextroamphet ER 30 MG Oral Capsule Extended Release 24 Hour; TAKE   1 CAPSULE DAILY; Therapy: 85JXY5261 to (Evaluate:92Ndi5697); Last Rx:12Jan2016 Ordered   2  Amphetamine-Dextroamphetamine 20 MG Oral Tablet; TAKE 2 TABLETS DAILY; Therapy: 07JWR5444 to (Evaluate:98Iqc7977); Last Rx:12Jan2016 Ordered   3   BuPROPion HCl ER (XL) 300 MG Oral Tablet Extended Release 24 Hour; take 1 tablet by   mouth every day; Therapy: 45VYK0374 to (Evaluate:90Zua7183)  Requested for: 58VWN3877; Last   Rx:94Mei9562 Ordered   4  Fluticasone Propionate 50 MCG/ACT Nasal Suspension; USE 2 SPRAYS IN EACH   NOSTRIL ONCE DAILY; Therapy: 27Fmx0721 to (Last Rx:19Kvw3877)  Requested for: 80Wry0651 Ordered   5  Librax 5-2 5 MG Oral Capsule; 2 capsules twice daily; Therapy: 42HLC1433 to Recorded   6  Lyrica 75 MG Oral Capsule; TAKE 1 CAPSULE BY MOUTH 3 TIMES A DAY -MAY CAUSE   DIZZINESS -MAY CAUSEDROWSINESS; Therapy: 39AAT0469 to (Evaluate:30Jan2016)  Requested for: 51GBP6282; Last   Rx:19Hwu8618 Ordered   7  NexIUM 40 MG Oral Capsule Delayed Release; TAKE 1 CAPSULE TWICE DAILY; Therapy: 96BGZ2445 to (Evaluate:10Mar2015)  Requested for: 70Xkx6590; Last   Rx:65Sgk4103 Ordered   8  OxyCODONE HCl - 5 MG Oral Tablet; TAKE 1 TABLET EVERY 4 TO 6 HOURS AS   NEEDED FOR PAIN;   Therapy: 21NBR0130 to (Evaluate:17Jan2016); Last Rx:12Jan2016 Ordered   9  Oxymorphone HCl - 5 MG Oral Tablet; take 1 tablet every 4-6 hours as needed for pain; Therapy: 21FAP7641 to (Last Rx:68Xzf5645) Ordered   10  Plaquenil 200 MG Oral Tablet; Therapy: 62EMT2802 to Recorded   11  PredniSONE 5 MG Oral Tablet; take 1 tablet by mouth every day; Therapy: 19NCI7756 to (Evaluate:36Hyc5829)  Requested for: 41VHD5516; Last    Rx:68Frp5763 Ordered   12  Simvastatin 40 MG Oral Tablet; take 1 tablet by mouth every day; Therapy: 92EGH7354 to (Evaluate:98Vhs7831)  Requested for: 05NQZ6611; Last    Rx:15Jun2015 Ordered   13  TraZODone HCl - 150 MG Oral Tablet; TAKE 1 TABLET AT BEDTIME; Therapy: 84SBP3310 to (Hunter Moreno)  Requested for: 06WJJ6156; Last    Rx:85Wof4085 Ordered    Allergies    1  Bactrim TABS   2   Cymbalta CPEP    Vitals  Signs [Data Includes: Current Encounter]   Recorded: 12HPC7539 01:27PM   Heart Rate: 93  Respiration: 12  Systolic: 581  Diastolic: 70  Height: 5 ft 2 in  Weight: 106 lb 8 oz  BMI Calculated: 19 48  BSA Calculated: 1 46  O2 Saturation: 98    Physical Exam    Constitutional   General appearance: No acute distress, well appearing and well nourished  Pulmonary   Respiratory effort: No increased work of breathing or signs of respiratory distress  Auscultation of lungs: Clear to auscultation  Cardiovascular   Auscultation of heart: Normal rate and rhythm, normal S1 and S2, without murmurs  Examination of extremities for edema and/or varicosities: Normal     Abdomen   Abdomen: Non-tender, no masses  Liver and spleen: No hepatomegaly or splenomegaly           Signatures   Electronically signed by : Anup Willis MD; Feb 2 2016  9:00PM EST                       (Author)

## 2018-01-18 NOTE — PROGRESS NOTES
Chief Complaint  PTS SISTER BROUGHT BACK OXYCODONE 5MG #100 tabs, medicine was destroyed      Active Problems     1  Abdominal pain, RUQ (789 01) (R10 11)   2  Abnormal EKG (794 31) (R94 31)   3  Acute pain due to trauma (338 11) (G89 11)   4  Allergic rhinitis (477 9) (J30 9)   5  Bartholin gland cyst (616 2) (N75 0)   6  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)   7  Chest pain (786 50) (R07 9)   8  Dental disorder (525 9) (K08 9)   9  Depression (311) (F32 9)   10  Encounter for removal of sutures (V58 32) (Z48 02)   11  Encounter for screening for malignant neoplasm of colon (V76 51) (Z12 11)   12  Encounter for screening mammogram for malignant neoplasm of breast (V76 12)    (Z12 31)   13  Esophageal reflux disease (530 81) (K21 9)   14  Eye drainage (379 93) (H57 8)   15  Eye dryness (375 15) (H04 129)   16  Fatigue (780 79) (R53 83)   17  Fibromyalgia (729 1) (M79 7)   18  Hearing Loss (389 9)   19  Heel spur (726 73) (M77 30)   20  Hematuria (599 70) (R31 9)   21  Herniated lumbar intervertebral disc (722 10) (M51 26)   22  Hip pain, left (719 45) (M25 552)   23  Hyperlipidemia (272 4) (E78 5)   24  Insomnia (780 52) (G47 00)   25  Joint pain, knee (719 46) (M25 569)   26  Lichen planus (286 9) (L43 9)   27  Long term use of drug (V58 69) (Z79 899)   28  Low back pain (724 2) (M54 5)   29  Neck pain (723 1) (M54 2)   30  Neck Strain (847 0)   31  Need for influenza vaccination (V04 81) (Z23)   32  Need for prophylactic vaccination and inoculation against influenza (V04 81) (Z23)   33  Osteoarthritis of both knees (715 96) (M17 0)   34  Osteopenia (733 90) (M85 80)   35  Ovarian cyst (620 2) (N83 20)   36  Pain in joint of left shoulder (719 41) (M25 512)   37  Peripheral neuropathy (356 9) (G62 9)   38  Post concussion syndrome (310 2) (F07 81)   39   Spasm (781 0) (R25 2)    Scleroderma (710 1) (V69 8)       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979 20) (W25 66)       Folliculitis (402 9) (Z05 0)       PLAZA (dyspnea on exertion) (786 09) (R06 09)       Change in mental status (780 97) (R41 82)       Preop examination (V72 84) (Z01 818)       Depression screen (V79 0) (Z13 89)          Current Meds   1  Amphetamine-Dextroamphet ER 30 MG Oral Capsule Extended Release 24 Hour; TAKE   1 CAPSULE DAILY; Therapy: 46IFL8734 to (Evaluate:21May2017); Last Rx:21Apr2017 Ordered   2  Amphetamine-Dextroamphetamine 20 MG Oral Tablet; TAKE 2 TABLETS DAILY; Therapy: 02VYU8782 to (Evaluate:21May2017); Last Rx:21Apr2017 Ordered   3  BuPROPion HCl ER (XL) 300 MG Oral Tablet Extended Release 24 Hour; take 1 tablet   by mouth every day; Therapy: 10CUQ4918 to (26 002790)  Requested for: 09Zxu3714; Last   Rx:52Xmq1602 Ordered   4  Fluticasone Propionate 50 MCG/ACT Nasal Suspension; USE 2 SPRAYS IN EACH   NOSTRIL ONCE DAILY; Therapy: 09Xku8680 to (Last Rx:23Apr2015)  Requested for: 23Apr2015 Ordered   5  Lyrica 75 MG Oral Capsule; TAKE 1 CAPSULE BY MOUTH 3 TIMES A DAY -MAY CAUSE   DIZZINESS -MAY CAUSEDROWSINESS; Therapy: 26SSZ6013 to (Evaluate:15Oct2017)  Requested for: 18Apr2017; Last   Rx:18Apr2017 Ordered   6  Naproxen 500 MG Oral Tablet; TAKE 1 TABLET EVERY 12 HOURS AS NEEDED; Therapy: 57ATA6984 to (Evaluate:89Syo9035)  Requested for: 37GPN1189; Last   Rx:08Mar2017 Ordered   7  Nucynta 50 MG Oral Tablet; 1 tablet every 6 hours as needed; Therapy: 15Upi5448 to (Last Rx:21Apr2017) Ordered   8  OxyCODONE HCl - 5 MG Oral Tablet; TAKE 1 TABLET EVERY 4 TO 6 HOURS AS   NEEDED FOR PAIN;   Therapy: 32BYY3400 to (Evaluate:40Yga2370); Last Rx:24Apr2017 Ordered   9  Plaquenil 200 MG Oral Tablet; Therapy: 92RNA1677 to Recorded   10  Protonix 40 MG Oral Tablet Delayed Release; Therapy: 39SCP5408 to Recorded   11  Silver Sulfadiazine 1 % External Cream; APPLY  AND RUB  IN A THIN FILM TO    AFFECTED AREAS TWICE DAILY  (AM AND PM); Therapy: 63DDV8556 to (Last Roselyn Mood)  Requested for: 24Oct2016 Ordered   12   Simvastatin 40 MG Oral Tablet; take 1 tablet by mouth every day; Therapy: 58WKK0146 to (Evaluate:35Hoh5285)  Requested for: 29VPF2565; Last    Rx:15Jun2015 Ordered   13  TraZODone HCl - 150 MG Oral Tablet; TAKE 1 TABLET AT BEDTIME; Therapy: 07QDM8035 to (Evaluate:11Mar2017)  Requested for: 56Som8360; Last    Rx:92Bbv4561 Ordered    Allergies    1  Bactrim TABS   2   Cymbalta CPEP    Future Appointments    Date/Time Provider Specialty Site   08/07/2017 10:00 AM Ruth Arceo MD [address]   Electronically signed by : Kimmie Dill MD; Jun 14 2017  4:19PM EST                       (Author)

## 2018-01-24 VITALS
RESPIRATION RATE: 14 BRPM | DIASTOLIC BLOOD PRESSURE: 66 MMHG | OXYGEN SATURATION: 97 % | HEART RATE: 88 BPM | SYSTOLIC BLOOD PRESSURE: 110 MMHG | BODY MASS INDEX: 20.98 KG/M2 | WEIGHT: 114 LBS | HEIGHT: 62 IN

## 2018-01-25 ENCOUNTER — TELEPHONE (OUTPATIENT)
Dept: FAMILY MEDICINE CLINIC | Facility: CLINIC | Age: 63
End: 2018-01-25

## 2018-01-25 DIAGNOSIS — N30.00 ACUTE CYSTITIS WITHOUT HEMATURIA: Primary | ICD-10-CM

## 2018-01-25 RX ORDER — AMOXICILLIN AND CLAVULANATE POTASSIUM 875; 125 MG/1; MG/1
1 TABLET, FILM COATED ORAL EVERY 12 HOURS SCHEDULED
Qty: 6 TABLET | Refills: 0 | Status: SHIPPED | OUTPATIENT
Start: 2018-01-25 | End: 2018-01-28

## 2018-01-26 ENCOUNTER — TELEPHONE (OUTPATIENT)
Dept: FAMILY MEDICINE CLINIC | Facility: CLINIC | Age: 63
End: 2018-01-26

## 2018-02-09 DIAGNOSIS — F51.01 PRIMARY INSOMNIA: Primary | ICD-10-CM

## 2018-02-09 RX ORDER — TRAZODONE HYDROCHLORIDE 150 MG/1
TABLET ORAL
Qty: 30 TABLET | Refills: 0 | Status: SHIPPED | OUTPATIENT
Start: 2018-02-09 | End: 2018-03-10 | Stop reason: SDUPTHER

## 2018-02-22 DIAGNOSIS — R32 URINARY INCONTINENCE, UNSPECIFIED TYPE: Primary | ICD-10-CM

## 2018-03-10 ENCOUNTER — HOSPITAL ENCOUNTER (OUTPATIENT)
Dept: ULTRASOUND IMAGING | Facility: HOSPITAL | Age: 63
Discharge: HOME | End: 2018-03-10
Attending: FAMILY MEDICINE
Payer: MEDICARE

## 2018-03-10 DIAGNOSIS — F51.01 PRIMARY INSOMNIA: ICD-10-CM

## 2018-03-10 DIAGNOSIS — R32 URINARY INCONTINENCE, UNSPECIFIED TYPE: ICD-10-CM

## 2018-03-10 PROCEDURE — 51798 US URINE CAPACITY MEASURE: CPT

## 2018-03-12 RX ORDER — TRAZODONE HYDROCHLORIDE 150 MG/1
TABLET ORAL
Qty: 30 TABLET | Refills: 0 | Status: SHIPPED | OUTPATIENT
Start: 2018-03-12 | End: 2018-04-09 | Stop reason: SDUPTHER

## 2018-03-30 ENCOUNTER — TELEPHONE (OUTPATIENT)
Dept: FAMILY MEDICINE CLINIC | Facility: CLINIC | Age: 63
End: 2018-03-30

## 2018-04-03 DIAGNOSIS — R52 PAIN: Primary | ICD-10-CM

## 2018-04-03 RX ORDER — OXYMORPHONE HYDROCHLORIDE 5 MG/1
5 TABLET ORAL EVERY 6 HOURS PRN
Qty: 120 TABLET | Refills: 0 | Status: SHIPPED | OUTPATIENT
Start: 2018-04-03 | End: 2018-04-06 | Stop reason: SDUPTHER

## 2018-04-03 RX ORDER — OXYMORPHONE HYDROCHLORIDE 5 MG/1
TABLET ORAL
COMMUNITY
Start: 2018-03-09 | End: 2018-04-03 | Stop reason: SDUPTHER

## 2018-04-06 DIAGNOSIS — M35.1 MCTD (MIXED CONNECTIVE TISSUE DISEASE) (HCC): Primary | ICD-10-CM

## 2018-04-06 DIAGNOSIS — R52 PAIN: ICD-10-CM

## 2018-04-06 RX ORDER — DEXTROAMPHETAMINE SACCHARATE, AMPHETAMINE ASPARTATE MONOHYDRATE, DEXTROAMPHETAMINE SULFATE AND AMPHETAMINE SULFATE 7.5; 7.5; 7.5; 7.5 MG/1; MG/1; MG/1; MG/1
30 CAPSULE, EXTENDED RELEASE ORAL DAILY
Qty: 30 CAPSULE | Refills: 0 | Status: SHIPPED | OUTPATIENT
Start: 2018-04-06 | End: 2018-05-01 | Stop reason: SDUPTHER

## 2018-04-06 RX ORDER — DEXTROAMPHETAMINE SACCHARATE, AMPHETAMINE ASPARTATE, DEXTROAMPHETAMINE SULFATE AND AMPHETAMINE SULFATE 5; 5; 5; 5 MG/1; MG/1; MG/1; MG/1
TABLET ORAL
COMMUNITY
Start: 2018-03-09 | End: 2018-04-06 | Stop reason: SDUPTHER

## 2018-04-06 RX ORDER — OXYMORPHONE HYDROCHLORIDE 5 MG/1
5 TABLET ORAL EVERY 6 HOURS PRN
Qty: 120 TABLET | Refills: 0 | Status: SHIPPED | OUTPATIENT
Start: 2018-04-06 | End: 2018-04-06 | Stop reason: SDUPTHER

## 2018-04-06 RX ORDER — DEXTROAMPHETAMINE SACCHARATE, AMPHETAMINE ASPARTATE, DEXTROAMPHETAMINE SULFATE AND AMPHETAMINE SULFATE 5; 5; 5; 5 MG/1; MG/1; MG/1; MG/1
20 TABLET ORAL
Qty: 60 TABLET | Refills: 0 | Status: SHIPPED | OUTPATIENT
Start: 2018-04-06 | End: 2018-05-01 | Stop reason: SDUPTHER

## 2018-04-06 RX ORDER — DEXTROAMPHETAMINE SACCHARATE, AMPHETAMINE ASPARTATE MONOHYDRATE, DEXTROAMPHETAMINE SULFATE AND AMPHETAMINE SULFATE 7.5; 7.5; 7.5; 7.5 MG/1; MG/1; MG/1; MG/1
1 CAPSULE, EXTENDED RELEASE ORAL DAILY
COMMUNITY
Start: 2012-11-23 | End: 2018-04-06 | Stop reason: SDUPTHER

## 2018-04-06 RX ORDER — OXYMORPHONE HYDROCHLORIDE 5 MG/1
5 TABLET ORAL EVERY 6 HOURS PRN
Qty: 120 TABLET | Refills: 0 | Status: SHIPPED | OUTPATIENT
Start: 2018-04-06 | End: 2018-05-01 | Stop reason: SDUPTHER

## 2018-04-09 DIAGNOSIS — F51.01 PRIMARY INSOMNIA: ICD-10-CM

## 2018-04-10 RX ORDER — TRAZODONE HYDROCHLORIDE 150 MG/1
TABLET ORAL
Qty: 30 TABLET | Refills: 5 | Status: SHIPPED | OUTPATIENT
Start: 2018-04-10 | End: 2018-09-21 | Stop reason: SDUPTHER

## 2018-05-01 ENCOUNTER — OFFICE VISIT (OUTPATIENT)
Dept: FAMILY MEDICINE CLINIC | Facility: CLINIC | Age: 63
End: 2018-05-01
Payer: MEDICARE

## 2018-05-01 VITALS
SYSTOLIC BLOOD PRESSURE: 100 MMHG | OXYGEN SATURATION: 97 % | HEIGHT: 62 IN | HEART RATE: 78 BPM | WEIGHT: 111.6 LBS | DIASTOLIC BLOOD PRESSURE: 58 MMHG | BODY MASS INDEX: 20.54 KG/M2 | RESPIRATION RATE: 14 BRPM

## 2018-05-01 DIAGNOSIS — Z12.39 SCREENING BREAST EXAMINATION: Primary | ICD-10-CM

## 2018-05-01 DIAGNOSIS — M35.1 MCTD (MIXED CONNECTIVE TISSUE DISEASE) (HCC): ICD-10-CM

## 2018-05-01 DIAGNOSIS — M34.9 SCLERODERMA (HCC): ICD-10-CM

## 2018-05-01 DIAGNOSIS — R20.0 BILATERAL HAND NUMBNESS: Primary | ICD-10-CM

## 2018-05-01 DIAGNOSIS — R52 PAIN: ICD-10-CM

## 2018-05-01 DIAGNOSIS — Z79.899 METHOTREXATE, LONG TERM, CURRENT USE: ICD-10-CM

## 2018-05-01 PROBLEM — Z79.631 METHOTREXATE, LONG TERM, CURRENT USE: Status: ACTIVE | Noted: 2018-05-01

## 2018-05-01 PROCEDURE — 99214 OFFICE O/P EST MOD 30 MIN: CPT | Performed by: FAMILY MEDICINE

## 2018-05-01 RX ORDER — FOLIC ACID 1 MG/1
TABLET ORAL DAILY
COMMUNITY
End: 2021-02-05 | Stop reason: SDUPTHER

## 2018-05-01 RX ORDER — LOTEPREDNOL ETABONATE 5 MG/G
GEL OPHTHALMIC
COMMUNITY
End: 2018-09-05 | Stop reason: HOSPADM

## 2018-05-01 RX ORDER — DEXTROAMPHETAMINE SACCHARATE, AMPHETAMINE ASPARTATE MONOHYDRATE, DEXTROAMPHETAMINE SULFATE AND AMPHETAMINE SULFATE 7.5; 7.5; 7.5; 7.5 MG/1; MG/1; MG/1; MG/1
30 CAPSULE, EXTENDED RELEASE ORAL DAILY
Qty: 30 CAPSULE | Refills: 0 | Status: SHIPPED | OUTPATIENT
Start: 2018-05-01 | End: 2018-06-01 | Stop reason: SDUPTHER

## 2018-05-01 RX ORDER — CHLORDIAZEPOXIDE HYDROCHLORIDE 5 MG/1
5 CAPSULE, GELATIN COATED ORAL 2 TIMES DAILY
COMMUNITY
Start: 2018-02-28 | End: 2021-02-05

## 2018-05-01 RX ORDER — MONTELUKAST SODIUM 10 MG/1
10 TABLET ORAL DAILY
COMMUNITY
Start: 2018-02-09 | End: 2018-07-27 | Stop reason: SDUPTHER

## 2018-05-01 RX ORDER — ESOMEPRAZOLE MAGNESIUM 40 MG/1
40 CAPSULE, DELAYED RELEASE ORAL
COMMUNITY
Start: 2018-01-29 | End: 2021-02-05 | Stop reason: SDUPTHER

## 2018-05-01 RX ORDER — SIMVASTATIN 40 MG
1 TABLET ORAL DAILY
COMMUNITY
Start: 2012-12-07 | End: 2018-07-27 | Stop reason: SDUPTHER

## 2018-05-01 RX ORDER — METHOTREXATE 2.5 MG/1
TABLET ORAL WEEKLY
Status: ON HOLD | COMMUNITY
End: 2018-10-04

## 2018-05-01 RX ORDER — HYDROXYCHLOROQUINE SULFATE 200 MG/1
200 TABLET, FILM COATED ORAL 2 TIMES DAILY WITH MEALS
COMMUNITY
Start: 2015-06-15 | End: 2021-02-05 | Stop reason: SDUPTHER

## 2018-05-01 RX ORDER — DEXTROAMPHETAMINE SACCHARATE, AMPHETAMINE ASPARTATE MONOHYDRATE, DEXTROAMPHETAMINE SULFATE AND AMPHETAMINE SULFATE 7.5; 7.5; 7.5; 7.5 MG/1; MG/1; MG/1; MG/1
30 CAPSULE, EXTENDED RELEASE ORAL DAILY
Qty: 30 CAPSULE | Refills: 0 | Status: SHIPPED | OUTPATIENT
Start: 2018-05-01 | End: 2018-05-01 | Stop reason: SDUPTHER

## 2018-05-01 RX ORDER — DEXTROAMPHETAMINE SACCHARATE, AMPHETAMINE ASPARTATE, DEXTROAMPHETAMINE SULFATE AND AMPHETAMINE SULFATE 5; 5; 5; 5 MG/1; MG/1; MG/1; MG/1
20 TABLET ORAL
Qty: 60 TABLET | Refills: 0 | Status: SHIPPED | OUTPATIENT
Start: 2018-05-01 | End: 2018-05-01 | Stop reason: SDUPTHER

## 2018-05-01 RX ORDER — OXYMORPHONE HYDROCHLORIDE 5 MG/1
5 TABLET ORAL EVERY 6 HOURS PRN
Qty: 120 TABLET | Refills: 0 | Status: SHIPPED | OUTPATIENT
Start: 2018-05-01 | End: 2018-06-04 | Stop reason: SDUPTHER

## 2018-05-01 RX ORDER — ASPIRIN 81 MG/1
81 TABLET ORAL DAILY
COMMUNITY
End: 2021-02-04

## 2018-05-01 RX ORDER — DEXTROAMPHETAMINE SACCHARATE, AMPHETAMINE ASPARTATE, DEXTROAMPHETAMINE SULFATE AND AMPHETAMINE SULFATE 5; 5; 5; 5 MG/1; MG/1; MG/1; MG/1
20 TABLET ORAL
Qty: 60 TABLET | Refills: 0 | Status: SHIPPED | OUTPATIENT
Start: 2018-05-01 | End: 2018-06-01 | Stop reason: SDUPTHER

## 2018-05-01 RX ORDER — BUPROPION HYDROCHLORIDE 300 MG/1
300 TABLET ORAL EVERY MORNING
COMMUNITY
End: 2018-11-14 | Stop reason: SDUPTHER

## 2018-05-01 RX ORDER — PREGABALIN 75 MG/1
CAPSULE ORAL 3 TIMES DAILY
COMMUNITY
Start: 2015-06-15 | End: 2021-02-04

## 2018-05-01 NOTE — PROGRESS NOTES
Assessment/Plan:    No problem-specific Assessment & Plan notes found for this encounter  Diagnoses and all orders for this visit:    Bilateral hand numbness  -     EMG 2 Limb Upper Extremity; Future    Pain  -     oxymorphone (OPANA) 5 MG tablet; Take 1 tablet (5 mg total) by mouth every 6 (six) hours as needed for moderate pain Max Daily Amount: 20 mg    Scleroderma (HCC)  -     CBC and differential; Standing  -     Comprehensive metabolic panel; Standing  -     CBC and differential  -     Comprehensive metabolic panel    Methotrexate, long term, current use    MCTD (mixed connective tissue disease) (HCC)  -     Discontinue: amphetamine-dextroamphetamine (ADDERALL XR) 30 MG 24 hr capsule; Take 1 capsule (30 mg total) by mouth daily Max Daily Amount: 30 mg  -     Discontinue: amphetamine-dextroamphetamine (ADDERALL) 20 mg tablet; Take 1 tablet (20 mg total) by mouth 2 (two) times a day Max Daily Amount: 40 mg  -     amphetamine-dextroamphetamine (ADDERALL XR) 30 MG 24 hr capsule; Take 1 capsule (30 mg total) by mouth daily Max Daily Amount: 30 mg  -     amphetamine-dextroamphetamine (ADDERALL) 20 mg tablet; Take 1 tablet (20 mg total) by mouth 2 (two) times a day Max Daily Amount: 40 mg    Other orders  -     buPROPion (WELLBUTRIN XL) 300 mg 24 hr tablet; Take 300 mg by mouth  -     chlordiazePOXIDE (LIBRIUM) 5 mg capsule;   -     esomeprazole (NexIUM) 40 MG capsule;   -     hydroxychloroquine (PLAQUENIL) 200 mg tablet; Take 200 mg by mouth  -     loteprednol etabonate (LOTEMAX) 0 5 % ophth gel; Lotemax 0 5 % eye gel drops  -     pregabalin (LYRICA) 75 mg capsule; 2 (two) times a day  Reported on 11/21/2016   -     montelukast (SINGULAIR) 10 mg tablet;   -     simvastatin (ZOCOR) 40 mg tablet; Take 1 tablet by mouth daily  -     methotrexate 2 5 MG tablet; Take by mouth once a week  -     folic acid (FOLVITE) 1 mg tablet; Take by mouth daily  -     aspirin (ECOTRIN LOW STRENGTH) 81 mg EC tablet;  Take 81 mg by mouth daily          Subjective:      Patient ID: Agnes Ashley is a 58 y o  female  She has scleroderma/CREST  She had recent EGD and dilation of esophagus  She is now on methotrexate through hr rheumatologist           The following portions of the patient's history were reviewed and updated as appropriate:   She  has a past medical history of Cancer ([facility] ); Cellulitis of foot; Change in mental status; Chronic fatigue; PLAZA (dyspnea on exertion); Fibromyalgia; Folliculitis; GERD (gastroesophageal reflux disease); and Systemic sclerosis ([facility] [address] )  She   Patient Active Problem List    Diagnosis Date Noted    Bilateral hand numbness 05/01/2018    Methotrexate, long term, current use 05/01/2018    Pain 05/01/2018    Scleroderma ([facility] ) 05/01/2018     She  has a past surgical history that includes Tonsillectomy; Back surgery; Rotator cuff repair; Hysterectomy; Bone marrow biopsy; Bladder surgery; Hemorrhoid surgery; and Nasal septum surgery  Her family history includes Arthritis in her mother and sister; Asthma in her sister; Coronary artery disease in her father; Crohn's disease in her sister; Diabetes in her mother; Other in her brother  She  reports that she has quit smoking  She does not have any smokeless tobacco history on file  She reports that she does not drink alcohol or use drugs    Current Outpatient Prescriptions   Medication Sig Dispense Refill    amphetamine-dextroamphetamine (ADDERALL XR) 30 MG 24 hr capsule Take 1 capsule (30 mg total) by mouth daily Max Daily Amount: 30 mg 30 capsule 0    amphetamine-dextroamphetamine (ADDERALL) 20 mg tablet Take 1 tablet (20 mg total) by mouth 2 (two) times a day Max Daily Amount: 40 mg 60 tablet 0    aspirin (ECOTRIN LOW STRENGTH) 81 mg EC tablet Take 81 mg by mouth daily      buPROPion (WELLBUTRIN XL) 300 mg 24 hr tablet Take 300 mg by mouth      chlordiazePOXIDE (LIBRIUM) 5 mg capsule       esomeprazole (NexIUM) 40 MG capsule       folic acid (FOLVITE) 1 mg tablet Take by mouth daily      hydroxychloroquine (PLAQUENIL) 200 mg tablet Take 200 mg by mouth      loteprednol etabonate (LOTEMAX) 0 5 % ophth gel Lotemax 0 5 % eye gel drops      methotrexate 2 5 MG tablet Take by mouth once a week      montelukast (SINGULAIR) 10 mg tablet       oxymorphone (OPANA) 5 MG tablet Take 1 tablet (5 mg total) by mouth every 6 (six) hours as needed for moderate pain Max Daily Amount: 20 mg 120 tablet 0    pregabalin (LYRICA) 75 mg capsule 2 (two) times a day  Reported on 11/21/2016       simvastatin (ZOCOR) 40 mg tablet Take 1 tablet by mouth daily      traZODone (DESYREL) 150 mg tablet TAKE 1 TABLET BY MOUTH DAILY AT BEDTIME 30 tablet 5     No current facility-administered medications for this visit  Current Outpatient Prescriptions on File Prior to Visit   Medication Sig    traZODone (DESYREL) 150 mg tablet TAKE 1 TABLET BY MOUTH DAILY AT BEDTIME    [DISCONTINUED] amphetamine-dextroamphetamine (ADDERALL XR) 30 MG 24 hr capsule Take 1 capsule (30 mg total) by mouth daily Max Daily Amount: 30 mg    [DISCONTINUED] amphetamine-dextroamphetamine (ADDERALL) 20 mg tablet Take 1 tablet (20 mg total) by mouth 2 (two) times a day Max Daily Amount: 40 mg    [DISCONTINUED] oxymorphone (OPANA) 5 MG tablet Take 1 tablet (5 mg total) by mouth every 6 (six) hours as needed for moderate pain Max Daily Amount: 20 mg     No current facility-administered medications on file prior to visit  She is allergic to duloxetine; duloxetine hcl; milnacipran; sildenafil; sulfamethoxazole-trimethoprim; and tedizolid       Review of Systems   All other systems reviewed and are negative  Objective:      /58 (BP Location: Right arm, Patient Position: Sitting, Cuff Size: Standard)   Pulse 78   Resp 14   Ht 5' 2" (1 575 m)   Wt 50 6 kg (111 lb 9 6 oz)   SpO2 97%   BMI 20 41 kg/m²          Physical Exam   Constitutional: She is oriented to person, place, and time   She appears well-developed and well-nourished  Neck: Normal range of motion  Neck supple  Cardiovascular: Normal rate, regular rhythm, normal heart sounds and intact distal pulses  Pulmonary/Chest: Effort normal and breath sounds normal    Abdominal: Soft  Bowel sounds are normal    Musculoskeletal: Normal range of motion  Neurological: She is alert and oriented to person, place, and time  She has normal reflexes  Skin: Skin is warm and dry  Psychiatric: She has a normal mood and affect  Her behavior is normal  Judgment and thought content normal    Nursing note and vitals reviewed

## 2018-05-03 ENCOUNTER — TRANSCRIBE ORDERS (OUTPATIENT)
Dept: NEUROLOGY | Facility: HOSPITAL | Age: 63
End: 2018-05-03

## 2018-05-08 ENCOUNTER — HOSPITAL ENCOUNTER (OUTPATIENT)
Dept: MAMMOGRAPHY | Facility: HOSPITAL | Age: 63
Discharge: HOME | End: 2018-05-08
Attending: FAMILY MEDICINE
Payer: MEDICARE

## 2018-05-08 DIAGNOSIS — Z12.39 SCREENING BREAST EXAMINATION: ICD-10-CM

## 2018-05-08 PROCEDURE — 77067 SCR MAMMO BI INCL CAD: CPT

## 2018-05-08 PROCEDURE — 77063 BREAST TOMOSYNTHESIS BI: CPT

## 2018-06-01 DIAGNOSIS — M35.1 MCTD (MIXED CONNECTIVE TISSUE DISEASE) (HCC): ICD-10-CM

## 2018-06-01 RX ORDER — DEXTROAMPHETAMINE SACCHARATE, AMPHETAMINE ASPARTATE, DEXTROAMPHETAMINE SULFATE AND AMPHETAMINE SULFATE 5; 5; 5; 5 MG/1; MG/1; MG/1; MG/1
20 TABLET ORAL
Qty: 60 TABLET | Refills: 0 | Status: SHIPPED | OUTPATIENT
Start: 2018-06-01 | End: 2018-06-25 | Stop reason: SDUPTHER

## 2018-06-01 RX ORDER — DEXTROAMPHETAMINE SACCHARATE, AMPHETAMINE ASPARTATE MONOHYDRATE, DEXTROAMPHETAMINE SULFATE AND AMPHETAMINE SULFATE 7.5; 7.5; 7.5; 7.5 MG/1; MG/1; MG/1; MG/1
30 CAPSULE, EXTENDED RELEASE ORAL DAILY
Qty: 30 CAPSULE | Refills: 0 | Status: SHIPPED | OUTPATIENT
Start: 2018-06-01 | End: 2018-06-25 | Stop reason: SDUPTHER

## 2018-06-04 DIAGNOSIS — R52 PAIN: ICD-10-CM

## 2018-06-04 RX ORDER — OXYMORPHONE HYDROCHLORIDE 5 MG/1
5 TABLET ORAL EVERY 6 HOURS PRN
Qty: 120 TABLET | Refills: 0 | Status: SHIPPED | OUTPATIENT
Start: 2018-06-04 | End: 2018-06-25 | Stop reason: SDUPTHER

## 2018-06-21 ENCOUNTER — TELEPHONE (OUTPATIENT)
Dept: FAMILY MEDICINE CLINIC | Facility: CLINIC | Age: 63
End: 2018-06-21

## 2018-06-21 DIAGNOSIS — J32.9 SINUSITIS, UNSPECIFIED CHRONICITY, UNSPECIFIED LOCATION: Primary | ICD-10-CM

## 2018-06-21 RX ORDER — AMOXICILLIN 500 MG/1
500 CAPSULE ORAL EVERY 8 HOURS SCHEDULED
Qty: 21 CAPSULE | Refills: 0 | Status: SHIPPED | OUTPATIENT
Start: 2018-06-21 | End: 2018-06-28

## 2018-06-25 ENCOUNTER — OFFICE VISIT (OUTPATIENT)
Dept: FAMILY MEDICINE CLINIC | Facility: CLINIC | Age: 63
End: 2018-06-25
Payer: MEDICARE

## 2018-06-25 VITALS
WEIGHT: 109.2 LBS | HEART RATE: 83 BPM | SYSTOLIC BLOOD PRESSURE: 114 MMHG | RESPIRATION RATE: 14 BRPM | BODY MASS INDEX: 20.09 KG/M2 | OXYGEN SATURATION: 98 % | DIASTOLIC BLOOD PRESSURE: 62 MMHG | HEIGHT: 62 IN

## 2018-06-25 DIAGNOSIS — R53.1 WEAKNESS: ICD-10-CM

## 2018-06-25 DIAGNOSIS — E04.1 THYROID NODULE: ICD-10-CM

## 2018-06-25 DIAGNOSIS — R52 PAIN: ICD-10-CM

## 2018-06-25 DIAGNOSIS — R21 RASH: ICD-10-CM

## 2018-06-25 DIAGNOSIS — M35.1 MCTD (MIXED CONNECTIVE TISSUE DISEASE) (HCC): ICD-10-CM

## 2018-06-25 DIAGNOSIS — Z01.818 PREOPERATIVE CLEARANCE: Primary | ICD-10-CM

## 2018-06-25 PROCEDURE — 99214 OFFICE O/P EST MOD 30 MIN: CPT | Performed by: FAMILY MEDICINE

## 2018-06-25 RX ORDER — DEXTROAMPHETAMINE SACCHARATE, AMPHETAMINE ASPARTATE MONOHYDRATE, DEXTROAMPHETAMINE SULFATE AND AMPHETAMINE SULFATE 7.5; 7.5; 7.5; 7.5 MG/1; MG/1; MG/1; MG/1
30 CAPSULE, EXTENDED RELEASE ORAL DAILY
Qty: 30 CAPSULE | Refills: 0 | Status: SHIPPED | OUTPATIENT
Start: 2018-06-25 | End: 2018-07-27 | Stop reason: SDUPTHER

## 2018-06-25 RX ORDER — NAPROXEN 500 MG/1
500 TABLET ORAL 2 TIMES DAILY WITH MEALS
COMMUNITY
Start: 2018-06-11 | End: 2018-10-05 | Stop reason: HOSPADM

## 2018-06-25 RX ORDER — PREDNISONE 1 MG/1
TABLET ORAL
COMMUNITY
Start: 2018-04-04 | End: 2019-01-14 | Stop reason: SDUPTHER

## 2018-06-25 RX ORDER — OXYMORPHONE HYDROCHLORIDE 5 MG/1
5 TABLET ORAL EVERY 6 HOURS PRN
Qty: 120 TABLET | Refills: 0 | Status: SHIPPED | OUTPATIENT
Start: 2018-06-25 | End: 2018-07-27 | Stop reason: SDUPTHER

## 2018-06-25 RX ORDER — DICYCLOMINE HYDROCHLORIDE 10 MG/1
20 CAPSULE ORAL 2 TIMES DAILY
COMMUNITY
End: 2021-07-27 | Stop reason: SDUPTHER

## 2018-06-25 RX ORDER — DEXTROAMPHETAMINE SACCHARATE, AMPHETAMINE ASPARTATE, DEXTROAMPHETAMINE SULFATE AND AMPHETAMINE SULFATE 5; 5; 5; 5 MG/1; MG/1; MG/1; MG/1
20 TABLET ORAL
Qty: 60 TABLET | Refills: 0 | Status: SHIPPED | OUTPATIENT
Start: 2018-06-25 | End: 2018-07-27 | Stop reason: SDUPTHER

## 2018-06-25 RX ORDER — TRIAMCINOLONE ACETONIDE 1 MG/G
CREAM TOPICAL 2 TIMES DAILY
Qty: 30 G | Refills: 0 | Status: SHIPPED | OUTPATIENT
Start: 2018-06-25 | End: 2018-10-05 | Stop reason: HOSPADM

## 2018-06-25 NOTE — PROGRESS NOTES
Assessment/Plan:    No problem-specific Assessment & Plan notes found for this encounter  Diagnoses and all orders for this visit:    Preoperative clearance    Other orders  -     dicyclomine (BENTYL) 10 mg capsule; Take 10 mg by mouth  -     naproxen (NAPROSYN) 500 mg tablet;   -     predniSONE 5 mg tablet; She is cleared for surgery pending the results of her EKG  Subjective:      Patient ID: Funmi Heath is a 58 y o  female With past medical history significant for scleroderma (likely crest syndrome, fibromyalgia, hyperlipidemia, follicular lymphoma, and bilateral carpal tunnel syndrome  She is scheduled for  Bilateral carpal tunnel release by Dr Vanessa Lorenoz  The 1st surgery is scheduled for July 10th (left side 7/10, right side 7/31)  She has never had issues with anesthesia  There is no family history of anesthetic reaction  She is very physically active  She can walk 4 city blocks without chest pain or shortness of breath  She can walk up 2  Flights of stairs without symptomatology  She goes to the gym regularly and cleans her house is without having cardiac symptoms  She has no history of abnormal bleeding  She had an EKG performed about 48 hr ago  Unfortunately I do not yet have the results  HPI    The following portions of the patient's history were reviewed and updated as appropriate:     She  has a past medical history of Cancer ([address] ); Cellulitis of foot; Change in mental status; Chronic fatigue; PLAZA (dyspnea on exertion); Fibromyalgia; Folliculitis; GERD (gastroesophageal reflux disease); and Systemic sclerosis ([address] )  She   Patient Active Problem List    Diagnosis Date Noted    Preoperative clearance 06/25/2018    Bilateral hand numbness 05/01/2018    Methotrexate, long term, current use 05/01/2018    Pain 05/01/2018    Scleroderma ([facility] [facility] ) 05/01/2018     She  has a past surgical history that includes Tonsillectomy; Back surgery;  Rotator cuff repair; Hysterectomy; Bone marrow biopsy; Bladder surgery; Hemorrhoid surgery; and Nasal septum surgery  Her family history includes Arthritis in her mother and sister; Asthma in her sister; Coronary artery disease in her father; Crohn's disease in her sister; Diabetes in her mother; Other in her brother  She  reports that she has quit smoking  She does not have any smokeless tobacco history on file  She reports that she does not drink alcohol or use drugs  Current Outpatient Prescriptions   Medication Sig Dispense Refill    amoxicillin (AMOXIL) 500 mg capsule Take 1 capsule (500 mg total) by mouth every 8 (eight) hours for 7 days 21 capsule 0    amphetamine-dextroamphetamine (ADDERALL XR) 30 MG 24 hr capsule Take 1 capsule (30 mg total) by mouth daily Max Daily Amount: 30 mg 30 capsule 0    amphetamine-dextroamphetamine (ADDERALL) 20 mg tablet Take 1 tablet (20 mg total) by mouth 2 (two) times a day Max Daily Amount: 40 mg 60 tablet 0    aspirin (ECOTRIN LOW STRENGTH) 81 mg EC tablet Take 81 mg by mouth daily      buPROPion (WELLBUTRIN XL) 300 mg 24 hr tablet Take 300 mg by mouth      chlordiazePOXIDE (LIBRIUM) 5 mg capsule Take 5 mg by mouth 2 (two) times a day        esomeprazole (NexIUM) 40 MG capsule Take 40 mg by mouth 2 (two) times a day before meals        folic acid (FOLVITE) 1 mg tablet Take by mouth daily      hydroxychloroquine (PLAQUENIL) 200 mg tablet Take 200 mg by mouth      loteprednol etabonate (LOTEMAX) 0 5 % ophth gel Lotemax 0 5 % eye gel drops      methotrexate 2 5 MG tablet Take by mouth once a week        montelukast (SINGULAIR) 10 mg tablet       naproxen (NAPROSYN) 500 mg tablet       oxymorphone (OPANA) 5 MG tablet Take 1 tablet (5 mg total) by mouth every 6 (six) hours as needed for moderate pain Max Daily Amount: 20 mg 120 tablet 0    predniSONE 5 mg tablet       pregabalin (LYRICA) 75 mg capsule 2 (two) times a day   Reported on 11/21/2016       simvastatin (ZOCOR) 40 mg tablet Take 1 tablet by mouth daily      traZODone (DESYREL) 150 mg tablet TAKE 1 TABLET BY MOUTH DAILY AT BEDTIME 30 tablet 5    dicyclomine (BENTYL) 10 mg capsule Take 10 mg by mouth       No current facility-administered medications for this visit  Current Outpatient Prescriptions on File Prior to Visit   Medication Sig    amoxicillin (AMOXIL) 500 mg capsule Take 1 capsule (500 mg total) by mouth every 8 (eight) hours for 7 days    amphetamine-dextroamphetamine (ADDERALL XR) 30 MG 24 hr capsule Take 1 capsule (30 mg total) by mouth daily Max Daily Amount: 30 mg    amphetamine-dextroamphetamine (ADDERALL) 20 mg tablet Take 1 tablet (20 mg total) by mouth 2 (two) times a day Max Daily Amount: 40 mg    aspirin (ECOTRIN LOW STRENGTH) 81 mg EC tablet Take 81 mg by mouth daily    buPROPion (WELLBUTRIN XL) 300 mg 24 hr tablet Take 300 mg by mouth    chlordiazePOXIDE (LIBRIUM) 5 mg capsule Take 5 mg by mouth 2 (two) times a day      esomeprazole (NexIUM) 40 MG capsule Take 40 mg by mouth 2 (two) times a day before meals      folic acid (FOLVITE) 1 mg tablet Take by mouth daily    hydroxychloroquine (PLAQUENIL) 200 mg tablet Take 200 mg by mouth    loteprednol etabonate (LOTEMAX) 0 5 % ophth gel Lotemax 0 5 % eye gel drops    methotrexate 2 5 MG tablet Take by mouth once a week      montelukast (SINGULAIR) 10 mg tablet     oxymorphone (OPANA) 5 MG tablet Take 1 tablet (5 mg total) by mouth every 6 (six) hours as needed for moderate pain Max Daily Amount: 20 mg    pregabalin (LYRICA) 75 mg capsule 2 (two) times a day  Reported on 11/21/2016     simvastatin (ZOCOR) 40 mg tablet Take 1 tablet by mouth daily    traZODone (DESYREL) 150 mg tablet TAKE 1 TABLET BY MOUTH DAILY AT BEDTIME     No current facility-administered medications on file prior to visit  She is allergic to duloxetine; duloxetine hcl; milnacipran; sildenafil; sulfamethoxazole-trimethoprim; and tedizolid       Review of Systems   All other systems reviewed and are negative  Objective:      /62 (BP Location: Right arm, Patient Position: Sitting, Cuff Size: Standard)   Pulse 83   Resp 14   Ht 5' 2" (1 575 m)   Wt 49 5 kg (109 lb 3 2 oz)   SpO2 98%   BMI 19 97 kg/m²          Physical Exam   Constitutional: She is oriented to person, place, and time  Neck: Normal range of motion  Neck supple  Cardiovascular: Normal rate, regular rhythm, normal heart sounds and intact distal pulses  Pulmonary/Chest: Effort normal and breath sounds normal    Abdominal: Soft  Bowel sounds are normal    Musculoskeletal: Normal range of motion  Neurological: She is alert and oriented to person, place, and time  She has normal reflexes  Skin: Skin is warm and dry  Psychiatric: She has a normal mood and affect  Her behavior is normal  Judgment and thought content normal    Nursing note and vitals reviewed

## 2018-07-03 ENCOUNTER — HOSPITAL ENCOUNTER (OUTPATIENT)
Dept: ULTRASOUND IMAGING | Facility: HOSPITAL | Age: 63
Discharge: HOME | End: 2018-07-03
Attending: FAMILY MEDICINE
Payer: MEDICARE

## 2018-07-03 DIAGNOSIS — R53.1 WEAKNESS: ICD-10-CM

## 2018-07-03 DIAGNOSIS — E04.1 THYROID NODULE: ICD-10-CM

## 2018-07-03 PROCEDURE — 76536 US EXAM OF HEAD AND NECK: CPT

## 2018-07-19 DIAGNOSIS — E04.1 THYROID NODULE: Primary | ICD-10-CM

## 2018-07-20 ENCOUNTER — TELEPHONE (OUTPATIENT)
Dept: FAMILY MEDICINE CLINIC | Facility: CLINIC | Age: 63
End: 2018-07-20

## 2018-07-20 DIAGNOSIS — J01.90 ACUTE SINUSITIS, RECURRENCE NOT SPECIFIED, UNSPECIFIED LOCATION: Primary | ICD-10-CM

## 2018-07-20 RX ORDER — AMOXICILLIN 500 MG/1
500 CAPSULE ORAL EVERY 8 HOURS SCHEDULED
Qty: 21 CAPSULE | Refills: 0 | Status: SHIPPED | OUTPATIENT
Start: 2018-07-20 | End: 2018-07-27

## 2018-07-26 RX ORDER — SODIUM FLUORIDE 5 MG/G
GEL, DENTIFRICE DENTAL
COMMUNITY
End: 2021-07-19 | Stop reason: ALTCHOICE

## 2018-07-26 RX ORDER — OFLOXACIN 3 MG/ML
SOLUTION/ DROPS OPHTHALMIC
COMMUNITY
End: 2018-09-05 | Stop reason: HOSPADM

## 2018-07-26 RX ORDER — ESOMEPRAZOLE MAGNESIUM 40 MG/1
CAPSULE, DELAYED RELEASE ORAL
COMMUNITY
End: 2018-07-27

## 2018-07-26 RX ORDER — CEPHALEXIN 250 MG/1
CAPSULE ORAL
COMMUNITY
Start: 2018-07-09 | End: 2018-07-27

## 2018-07-26 RX ORDER — METHOCARBAMOL 750 MG/1
TABLET, FILM COATED ORAL EVERY 4 HOURS
COMMUNITY
End: 2018-07-27

## 2018-07-26 RX ORDER — DICYCLOMINE HCL 20 MG
TABLET ORAL
COMMUNITY
Start: 2018-07-09 | End: 2018-07-27

## 2018-07-26 RX ORDER — FLUTICASONE PROPIONATE 50 MCG
SPRAY, SUSPENSION (ML) NASAL
COMMUNITY
End: 2018-11-28

## 2018-07-26 RX ORDER — LIDOCAINE AND PRILOCAINE 25; 25 MG/G; MG/G
CREAM TOPICAL
COMMUNITY
Start: 2018-07-09 | End: 2018-09-05 | Stop reason: HOSPADM

## 2018-07-26 RX ORDER — CYCLOSPORINE 0.5 MG/ML
1 EMULSION OPHTHALMIC
COMMUNITY
End: 2018-07-27

## 2018-07-26 RX ORDER — CYCLOSPORINE 0.5 MG/ML
EMULSION OPHTHALMIC
COMMUNITY
End: 2019-08-21

## 2018-07-26 RX ORDER — NALOXONE HYDROCHLORIDE 4 MG/.1ML
SPRAY NASAL
COMMUNITY
End: 2018-09-05 | Stop reason: HOSPADM

## 2018-07-26 RX ORDER — PANTOPRAZOLE SODIUM 40 MG/1
TABLET, DELAYED RELEASE ORAL
COMMUNITY
Start: 2017-03-07 | End: 2018-07-27

## 2018-07-27 ENCOUNTER — OFFICE VISIT (OUTPATIENT)
Dept: FAMILY MEDICINE CLINIC | Facility: CLINIC | Age: 63
End: 2018-07-27
Payer: MEDICARE

## 2018-07-27 VITALS
DIASTOLIC BLOOD PRESSURE: 58 MMHG | HEIGHT: 62 IN | HEART RATE: 74 BPM | WEIGHT: 110.4 LBS | RESPIRATION RATE: 15 BRPM | OXYGEN SATURATION: 97 % | SYSTOLIC BLOOD PRESSURE: 108 MMHG | BODY MASS INDEX: 20.32 KG/M2

## 2018-07-27 DIAGNOSIS — E78.5 HYPERLIPIDEMIA, UNSPECIFIED HYPERLIPIDEMIA TYPE: ICD-10-CM

## 2018-07-27 DIAGNOSIS — M34.9 SCLERODERMA (HCC): ICD-10-CM

## 2018-07-27 DIAGNOSIS — E04.1 THYROID NODULE: ICD-10-CM

## 2018-07-27 DIAGNOSIS — R52 PAIN: ICD-10-CM

## 2018-07-27 DIAGNOSIS — Z88.9 MULTIPLE ALLERGIES: Primary | ICD-10-CM

## 2018-07-27 DIAGNOSIS — M35.1 MCTD (MIXED CONNECTIVE TISSUE DISEASE) (HCC): ICD-10-CM

## 2018-07-27 PROCEDURE — 99214 OFFICE O/P EST MOD 30 MIN: CPT | Performed by: FAMILY MEDICINE

## 2018-07-27 RX ORDER — MONTELUKAST SODIUM 10 MG/1
10 TABLET ORAL DAILY
Qty: 30 TABLET | Refills: 5 | Status: SHIPPED | OUTPATIENT
Start: 2018-07-27 | End: 2019-08-21

## 2018-07-27 RX ORDER — DEXTROAMPHETAMINE SACCHARATE, AMPHETAMINE ASPARTATE, DEXTROAMPHETAMINE SULFATE AND AMPHETAMINE SULFATE 5; 5; 5; 5 MG/1; MG/1; MG/1; MG/1
20 TABLET ORAL
Qty: 60 TABLET | Refills: 0 | Status: SHIPPED | OUTPATIENT
Start: 2018-07-27 | End: 2018-08-27 | Stop reason: SDUPTHER

## 2018-07-27 RX ORDER — DEXTROAMPHETAMINE SACCHARATE, AMPHETAMINE ASPARTATE MONOHYDRATE, DEXTROAMPHETAMINE SULFATE AND AMPHETAMINE SULFATE 7.5; 7.5; 7.5; 7.5 MG/1; MG/1; MG/1; MG/1
30 CAPSULE, EXTENDED RELEASE ORAL DAILY
Qty: 30 CAPSULE | Refills: 0 | Status: SHIPPED | OUTPATIENT
Start: 2018-07-27 | End: 2018-08-27 | Stop reason: SDUPTHER

## 2018-07-27 RX ORDER — OXYMORPHONE HYDROCHLORIDE 5 MG/1
5 TABLET ORAL EVERY 6 HOURS PRN
Qty: 120 TABLET | Refills: 0 | Status: SHIPPED | OUTPATIENT
Start: 2018-07-27 | End: 2018-08-27 | Stop reason: SDUPTHER

## 2018-07-27 RX ORDER — SIMVASTATIN 40 MG
40 TABLET ORAL DAILY
Qty: 30 TABLET | Refills: 5 | Status: SHIPPED | OUTPATIENT
Start: 2018-07-27 | End: 2019-02-04 | Stop reason: SDUPTHER

## 2018-07-27 NOTE — PROGRESS NOTES
Assessment/Plan:    No problem-specific Assessment & Plan notes found for this encounter  Diagnoses and all orders for this visit:    Multiple allergies  -     montelukast (SINGULAIR) 10 mg tablet; Take 1 tablet (10 mg total) by mouth daily    Pain  -     oxymorphone (OPANA) 5 MG tablet; Take 1 tablet (5 mg total) by mouth every 6 (six) hours as needed for moderate pain Max Daily Amount: 20 mg    Hyperlipidemia, unspecified hyperlipidemia type  -     simvastatin (ZOCOR) 40 mg tablet; Take 1 tablet (40 mg total) by mouth daily    Scleroderma (HCC)    Thyroid nodule    Other orders  -     aspirin 81 MG tablet; Take 81 mg by mouth  -     cephalexin (KEFLEX) 250 mg capsule;   -     silver sulfadiazine (SILVADENE,SSD) 1 % cream; silver sulfadiazine 1 % topical cream  -     cycloSPORINE (RESTASIS) 0 05 % ophthalmic emulsion; Restasis 0 05 % eye drops in a dropperette  -     pantoprazole (PROTONIX) 40 mg tablet; Take by mouth  -     SODIUM FLUORIDE, DENTAL GEL, (PREVIDENT) 1 1 % GEL; PreviDent 5000 Dry Mouth 1 1 % gel  -     ofloxacin (OCUFLOX) 0 3 % ophthalmic solution; ofloxacin 0 3 % eye drops  -     Naloxone HCl (NARCAN) 4 MG/0 1ML LIQD; Narcan 4 mg/actuation nasal spray  -     methotrexate 2 5 mg tablet; Take 2 5 mg by mouth  -     methocarbamol (ROBAXIN) 750 mg tablet; every 4 (four) hours  -     psyllium (METAMUCIL) 0 52 g capsule; Metamucil  -     Carboxymethylcell-Hypromellose (GENTEAL OP); GenTeal Gel  -     lidocaine-prilocaine (EMLA) cream;   -     fluticasone (FLONASE) 50 mcg/act nasal spray; fluticasone 50 mcg/actuation nasal spray,suspension  -     esomeprazole (NexIUM) 40 MG capsule; esomeprazole magnesium 40 mg capsule,delayed release  -     dicyclomine (BENTYL) 20 mg tablet;   -     cycloSPORINE (RESTASIS) 0 05 % ophthalmic emulsion; Apply 1 drop to eye          Subjective:      Patient ID: Xiao Maguire is a 58 y o  female      She had a thyroid u/s showing a slightly greater than 1 cm thyroid nodule  TSH and free T4 were normal   Her mother had partial thyroidectomy, but the diagnosis is unclear  She has scleroderma and h/o follicular lymphoma  She is in remission  She had surgical removal but no chemotherapy or radiation  She c/o fatigue  She has fibromyalgia  Her H/H is normal    She has left carpal tunnel release scheduled  She is getting injections for her elbows  The following portions of the patient's history were reviewed and updated as appropriate: She  has a past medical history of Cancer ([facility] [address] ); Cellulitis of foot; Change in mental status; Chronic fatigue; PLAZA (dyspnea on exertion); Fibromyalgia; Folliculitis; GERD (gastroesophageal reflux disease); and Systemic sclerosis ([facility] [address] )       Review of Systems   HENT: Positive for mouth sores  Musculoskeletal: Positive for arthralgias and myalgias  All other systems reviewed and are negative  Objective:      /58 (BP Location: Left arm, Patient Position: Sitting, Cuff Size: Standard)   Pulse 74   Resp 15   Ht 5' 2" (1 575 m)   Wt 50 1 kg (110 lb 6 4 oz)   SpO2 97%   BMI 20 19 kg/m²          Physical Exam   Constitutional: She is oriented to person, place, and time  She appears well-developed and well-nourished  Neck: Normal range of motion  Neck supple  Cardiovascular: Normal rate, regular rhythm, normal heart sounds and intact distal pulses  Pulmonary/Chest: Effort normal and breath sounds normal    Abdominal: Soft  Bowel sounds are normal    Musculoskeletal: Normal range of motion  Neurological: She is alert and oriented to person, place, and time  She has normal reflexes  Skin: Skin is warm and dry  Psychiatric: She has a normal mood and affect  Her behavior is normal  Judgment and thought content normal    Nursing note and vitals reviewed

## 2018-08-06 ENCOUNTER — HOSPITAL ENCOUNTER (OUTPATIENT)
Dept: ULTRASOUND IMAGING | Facility: HOSPITAL | Age: 63
Discharge: HOME | End: 2018-08-06
Attending: FAMILY MEDICINE | Admitting: RADIOLOGY
Payer: MEDICARE

## 2018-08-06 VITALS
HEART RATE: 97 BPM | DIASTOLIC BLOOD PRESSURE: 61 MMHG | OXYGEN SATURATION: 98 % | SYSTOLIC BLOOD PRESSURE: 128 MMHG | RESPIRATION RATE: 18 BRPM

## 2018-08-06 DIAGNOSIS — E04.1 THYROID NODULE: ICD-10-CM

## 2018-08-06 PROCEDURE — 88172 CYTP DX EVAL FNA 1ST EA SITE: CPT | Performed by: PATHOLOGY

## 2018-08-06 PROCEDURE — 60100 BIOPSY OF THYROID: CPT | Performed by: RADIOLOGY

## 2018-08-06 PROCEDURE — 10022 HB FNA W/IMAGE: CPT

## 2018-08-06 PROCEDURE — 88173 CYTOPATH EVAL FNA REPORT: CPT | Performed by: PATHOLOGY

## 2018-08-06 PROCEDURE — 76942 ECHO GUIDE FOR BIOPSY: CPT | Performed by: RADIOLOGY

## 2018-08-06 RX ORDER — LIDOCAINE HYDROCHLORIDE 10 MG/ML
INJECTION, SOLUTION INFILTRATION; PERINEURAL CODE/TRAUMA/SEDATION MEDICATION
Status: COMPLETED | OUTPATIENT
Start: 2018-08-06 | End: 2018-08-06

## 2018-08-06 RX ADMIN — LIDOCAINE HYDROCHLORIDE 10 ML: 10 INJECTION, SOLUTION INFILTRATION; PERINEURAL at 09:42

## 2018-08-06 NOTE — DISCHARGE INSTRUCTIONS
I         POST BIOPSY    Care after your procedure:    1  Limit your activities for 24 hours after your biopsy  2  No driving day of biopsy  3  Return to your normal diet  Small sips of flat soda will help with mild nausea  4  Remove band-aid or dressing 24 hours after procedure  Contact Interventional Radiology at [phone] [place] PATIENTS: Contact Interventional Radiology at [phone]) Alisa Schaffer PATIENTS: Contact Interventional Radiology at [phone]) if:    1  Difficulty breathing, nausea or vomiting  2  Chills or fever above 101 degrees F      3  Pain at biopsy site not relieved by medication  4  Develop any redness, swelling, heat, unusual drainage, heavy bruising or bleeding from biopsy site

## 2018-08-06 NOTE — SEDATION DOCUMENTATION
PROCEDURE COMPLETED, PT TOLERATED WELL  APPROPRIATE SAFETY MEASURES MAINTAINED THROUGHOUT EXAM,  POST-BIOPSY TAKEN BY ULTRASOUND

## 2018-08-06 NOTE — DISCHARGE INSTR - LAB
POST BIOPSY    Care after your procedure:    1  Limit your activities for 24 hours after your biopsy  2  No driving day of biopsy  3  Return to your normal diet  Small sips of flat soda will help with mild nausea  4  Remove band-aid or dressing 24 hours after procedure  Contact Interventional Radiology at [phone] [place] PATIENTS: Contact Interventional Radiology at [phone]) David Washburn PATIENTS: Contact Interventional Radiology at [phone]) if:    1  Difficulty breathing, nausea or vomiting  2  Chills or fever above 101 degrees F      3  Pain at biopsy site not relieved by medication  4  Develop any redness, swelling, heat, unusual drainage, heavy bruising or bleeding from biopsy site

## 2018-08-10 ENCOUNTER — OFFICE VISIT (OUTPATIENT)
Dept: FAMILY MEDICINE CLINIC | Facility: CLINIC | Age: 63
End: 2018-08-10
Payer: MEDICARE

## 2018-08-10 VITALS
DIASTOLIC BLOOD PRESSURE: 64 MMHG | HEIGHT: 62 IN | HEART RATE: 81 BPM | RESPIRATION RATE: 15 BRPM | SYSTOLIC BLOOD PRESSURE: 116 MMHG | BODY MASS INDEX: 19.88 KG/M2 | OXYGEN SATURATION: 98 % | WEIGHT: 108 LBS

## 2018-08-10 DIAGNOSIS — E04.1 THYROID NODULE: Primary | ICD-10-CM

## 2018-08-10 PROBLEM — E07.9 THYROID CONDITION: Status: ACTIVE | Noted: 2018-08-10

## 2018-08-10 PROCEDURE — 99214 OFFICE O/P EST MOD 30 MIN: CPT | Performed by: FAMILY MEDICINE

## 2018-08-10 NOTE — PROGRESS NOTES
Assessment/Plan:    No problem-specific Assessment & Plan notes found for this encounter  Diagnoses and all orders for this visit:    Thyroid nodule          Subjective:      Patient ID: Leonard Del Cid is a 58 y o  female  She has scleroderma and as a consequence developed follicular lymphoma  The follicular lymphoma is in remission  On examination, she was found to have a thyroid nodule  This was found to be just above the 1 cm threshold for fine-needle biopsy  The fine needle biopsy pathology showed atypical cells of undetermined significance  The patient and I discussed the findings today and she is in favor of aggressive treatment  She would like to be referred to surgery for their opinion  I am fully in agreement with this  The following portions of the patient's history were reviewed and updated as appropriate:   She  has a past medical history of Cancer ([facility] [address] ); Cellulitis of foot; Change in mental status; Chronic fatigue; PLAZA (dyspnea on exertion); Fibromyalgia; Folliculitis; GERD (gastroesophageal reflux disease); and Systemic sclerosis ([address] )  She   Patient Active Problem List    Diagnosis Date Noted    Thyroid condition 08/10/2018    Preoperative clearance 06/25/2018    Weakness 06/25/2018    Thyroid nodule 06/25/2018    Bilateral hand numbness 05/01/2018    Methotrexate, long term, current use 05/01/2018    Pain 05/01/2018    Scleroderma ([facility] [address] ) 43/47/6514    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[facility] [address] ) 12/07/2012     She  has a past surgical history that includes Tonsillectomy; Back surgery; Rotator cuff repair; Hysterectomy; Bone marrow biopsy; Bladder surgery; Hemorrhoid surgery; and Nasal septum surgery  Her family history includes Arthritis in her mother and sister; Asthma in her sister; Coronary artery disease in her father; Crohn's disease in her sister; Diabetes in her mother; Other in her brother  She  reports that she has quit smoking   She does not have any smokeless tobacco history on file  She reports that she does not drink alcohol or use drugs    Current Outpatient Prescriptions   Medication Sig Dispense Refill    amphetamine-dextroamphetamine (ADDERALL XR) 30 MG 24 hr capsule Take 1 capsule (30 mg total) by mouth daily Max Daily Amount: 30 mg 30 capsule 0    amphetamine-dextroamphetamine (ADDERALL) 20 mg tablet Take 1 tablet (20 mg total) by mouth 2 (two) times a day Max Daily Amount: 40 mg 60 tablet 0    aspirin (ECOTRIN LOW STRENGTH) 81 mg EC tablet Take 81 mg by mouth daily      buPROPion (WELLBUTRIN XL) 300 mg 24 hr tablet Take 300 mg by mouth      Carboxymethylcell-Hypromellose (GENTEAL OP) GenTeal Gel      chlordiazePOXIDE (LIBRIUM) 5 mg capsule Take 5 mg by mouth 2 (two) times a day        cycloSPORINE (RESTASIS) 0 05 % ophthalmic emulsion Restasis 0 05 % eye drops in a dropperette      dicyclomine (BENTYL) 10 mg capsule Take 10 mg by mouth 2 (two) times a day        esomeprazole (NexIUM) 40 MG capsule Take 40 mg by mouth 2 (two) times a day before meals        fluticasone (FLONASE) 50 mcg/act nasal spray fluticasone 50 mcg/actuation nasal spray,suspension      folic acid (FOLVITE) 1 mg tablet Take by mouth daily      hydroxychloroquine (PLAQUENIL) 200 mg tablet Take 200 mg by mouth 2 (two) times a day with meals        lidocaine-prilocaine (EMLA) cream       loteprednol etabonate (LOTEMAX) 0 5 % ophth gel Lotemax 0 5 % eye gel drops      methotrexate 2 5 MG tablet Take by mouth once a week        montelukast (SINGULAIR) 10 mg tablet Take 1 tablet (10 mg total) by mouth daily 30 tablet 5    Naloxone HCl (NARCAN) 4 MG/0 1ML LIQD Narcan 4 mg/actuation nasal spray      naproxen (NAPROSYN) 500 mg tablet Take 500 mg by mouth 2 (two) times a day with meals        ofloxacin (OCUFLOX) 0 3 % ophthalmic solution ofloxacin 0 3 % eye drops      oxymorphone (OPANA) 5 MG tablet Take 1 tablet (5 mg total) by mouth every 6 (six) hours as needed for moderate pain Max Daily Amount: 20 mg 120 tablet 0    predniSONE 5 mg tablet       pregabalin (LYRICA) 75 mg capsule 2 (two) times a day  Reported on 11/21/2016       psyllium (METAMUCIL) 0 52 g capsule Metamucil      silver sulfadiazine (SILVADENE,SSD) 1 % cream silver sulfadiazine 1 % topical cream      simvastatin (ZOCOR) 40 mg tablet Take 1 tablet (40 mg total) by mouth daily 30 tablet 5    SODIUM FLUORIDE, DENTAL GEL, (PREVIDENT) 1 1 % GEL PreviDent 5000 Dry Mouth 1 1 % gel      traZODone (DESYREL) 150 mg tablet TAKE 1 TABLET BY MOUTH DAILY AT BEDTIME 30 tablet 5    triamcinolone (KENALOG) 0 1 % cream Apply topically 2 (two) times a day 30 g 0     No current facility-administered medications for this visit        Current Outpatient Prescriptions on File Prior to Visit   Medication Sig    amphetamine-dextroamphetamine (ADDERALL XR) 30 MG 24 hr capsule Take 1 capsule (30 mg total) by mouth daily Max Daily Amount: 30 mg    amphetamine-dextroamphetamine (ADDERALL) 20 mg tablet Take 1 tablet (20 mg total) by mouth 2 (two) times a day Max Daily Amount: 40 mg    aspirin (ECOTRIN LOW STRENGTH) 81 mg EC tablet Take 81 mg by mouth daily    buPROPion (WELLBUTRIN XL) 300 mg 24 hr tablet Take 300 mg by mouth    Carboxymethylcell-Hypromellose (GENTEAL OP) GenTeal Gel    chlordiazePOXIDE (LIBRIUM) 5 mg capsule Take 5 mg by mouth 2 (two) times a day      cycloSPORINE (RESTASIS) 0 05 % ophthalmic emulsion Restasis 0 05 % eye drops in a dropperette    dicyclomine (BENTYL) 10 mg capsule Take 10 mg by mouth 2 (two) times a day      esomeprazole (NexIUM) 40 MG capsule Take 40 mg by mouth 2 (two) times a day before meals      fluticasone (FLONASE) 50 mcg/act nasal spray fluticasone 50 mcg/actuation nasal spray,suspension    folic acid (FOLVITE) 1 mg tablet Take by mouth daily    hydroxychloroquine (PLAQUENIL) 200 mg tablet Take 200 mg by mouth 2 (two) times a day with meals      lidocaine-prilocaine (EMLA) cream     loteprednol etabonate (LOTEMAX) 0 5 % ophth gel Lotemax 0 5 % eye gel drops    methotrexate 2 5 MG tablet Take by mouth once a week      montelukast (SINGULAIR) 10 mg tablet Take 1 tablet (10 mg total) by mouth daily    Naloxone HCl (NARCAN) 4 MG/0 1ML LIQD Narcan 4 mg/actuation nasal spray    naproxen (NAPROSYN) 500 mg tablet Take 500 mg by mouth 2 (two) times a day with meals      ofloxacin (OCUFLOX) 0 3 % ophthalmic solution ofloxacin 0 3 % eye drops    oxymorphone (OPANA) 5 MG tablet Take 1 tablet (5 mg total) by mouth every 6 (six) hours as needed for moderate pain Max Daily Amount: 20 mg    predniSONE 5 mg tablet     pregabalin (LYRICA) 75 mg capsule 2 (two) times a day  Reported on 11/21/2016     psyllium (METAMUCIL) 0 52 g capsule Metamucil    silver sulfadiazine (SILVADENE,SSD) 1 % cream silver sulfadiazine 1 % topical cream    simvastatin (ZOCOR) 40 mg tablet Take 1 tablet (40 mg total) by mouth daily    SODIUM FLUORIDE, DENTAL GEL, (PREVIDENT) 1 1 % GEL PreviDent 5000 Dry Mouth 1 1 % gel    traZODone (DESYREL) 150 mg tablet TAKE 1 TABLET BY MOUTH DAILY AT BEDTIME    triamcinolone (KENALOG) 0 1 % cream Apply topically 2 (two) times a day     No current facility-administered medications on file prior to visit  She is allergic to duloxetine; duloxetine hcl; milnacipran; sildenafil; sulfamethoxazole-trimethoprim; and tedizolid       Review of Systems   All other systems reviewed and are negative  Objective:      /64 (BP Location: Left arm, Patient Position: Sitting, Cuff Size: Standard)   Pulse 81   Resp 15   Ht 5' 2" (1 575 m)   Wt 49 kg (108 lb)   SpO2 98%   BMI 19 75 kg/m²          Physical Exam   Constitutional: She is oriented to person, place, and time  She appears well-developed and well-nourished  Neck: Normal range of motion  Neck supple  Cardiovascular: Normal rate, regular rhythm, normal heart sounds and intact distal pulses      Pulmonary/Chest: Effort normal and breath sounds normal    Abdominal: Soft  Bowel sounds are normal    Musculoskeletal: Normal range of motion  Neurological: She is alert and oriented to person, place, and time  She has normal reflexes  Skin: Skin is warm and dry  Psychiatric: She has a normal mood and affect  Her behavior is normal  Judgment and thought content normal    Nursing note and vitals reviewed

## 2018-08-21 ENCOUNTER — TELEPHONE (OUTPATIENT)
Dept: FAMILY MEDICINE CLINIC | Facility: CLINIC | Age: 63
End: 2018-08-21

## 2018-08-27 DIAGNOSIS — M35.1 MCTD (MIXED CONNECTIVE TISSUE DISEASE) (HCC): ICD-10-CM

## 2018-08-27 DIAGNOSIS — R52 PAIN: ICD-10-CM

## 2018-08-27 RX ORDER — OXYMORPHONE HYDROCHLORIDE 5 MG/1
5 TABLET ORAL EVERY 6 HOURS PRN
Qty: 120 TABLET | Refills: 0 | Status: SHIPPED | OUTPATIENT
Start: 2018-08-27 | End: 2018-09-21 | Stop reason: SDUPTHER

## 2018-08-27 RX ORDER — DEXTROAMPHETAMINE SACCHARATE, AMPHETAMINE ASPARTATE, DEXTROAMPHETAMINE SULFATE AND AMPHETAMINE SULFATE 5; 5; 5; 5 MG/1; MG/1; MG/1; MG/1
20 TABLET ORAL
Qty: 60 TABLET | Refills: 0 | Status: SHIPPED | OUTPATIENT
Start: 2018-08-27 | End: 2018-09-21 | Stop reason: SDUPTHER

## 2018-08-27 RX ORDER — DEXTROAMPHETAMINE SACCHARATE, AMPHETAMINE ASPARTATE MONOHYDRATE, DEXTROAMPHETAMINE SULFATE AND AMPHETAMINE SULFATE 7.5; 7.5; 7.5; 7.5 MG/1; MG/1; MG/1; MG/1
30 CAPSULE, EXTENDED RELEASE ORAL DAILY
Qty: 30 CAPSULE | Refills: 0 | Status: SHIPPED | OUTPATIENT
Start: 2018-08-27 | End: 2018-09-21 | Stop reason: SDUPTHER

## 2018-09-05 ENCOUNTER — OFFICE VISIT (OUTPATIENT)
Dept: SURGICAL ONCOLOGY | Facility: HOSPITAL | Age: 63
End: 2018-09-05
Payer: MEDICARE

## 2018-09-05 VITALS
HEART RATE: 74 BPM | SYSTOLIC BLOOD PRESSURE: 130 MMHG | RESPIRATION RATE: 16 BRPM | WEIGHT: 106 LBS | DIASTOLIC BLOOD PRESSURE: 72 MMHG | BODY MASS INDEX: 20.01 KG/M2 | HEIGHT: 61 IN | TEMPERATURE: 98.1 F

## 2018-09-05 DIAGNOSIS — E04.1 THYROID NODULE: Primary | ICD-10-CM

## 2018-09-05 PROCEDURE — 99205 OFFICE O/P NEW HI 60 MIN: CPT | Performed by: SURGERY

## 2018-09-05 RX ORDER — LEVOTHYROXINE SODIUM 112 UG/1
112 TABLET ORAL DAILY
Qty: 30 TABLET | Refills: 3 | Status: SHIPPED | OUTPATIENT
Start: 2018-09-05 | End: 2018-11-28

## 2018-09-05 NOTE — LETTER
September 5, 2018     MD Brianna Leonard 39    Patient: Charly Winters   YOB: 1955   Date of Visit: 9/5/2018       Dear Dr Bruce Luna: Thank you for referring Anupam Michaels to me for evaluation  Below are my notes for this consultation  If you have questions, please do not hesitate to call me  I look forward to following your patient along with you  Sincerely,        Amador Bowman MD        CC: No Recipients  Amador Bowman MD  9/5/2018 10:31 AM  Incomplete               Surgical Oncology Follow Up       [address] [place] [address] SURGICAL ONCOLOGY [facility] [address]  [address]  [facility] [address]    Charly Winters  1955  [id number]  [address] [address]    Chief Complaint   Patient presents with   [facility] Consult     Patient is here for thyroid nodule consult  Assessment/Plan:    No problem-specific Assessment & Plan notes found for this encounter  Diagnoses and all orders for this visit:    Thyroid nodule  -     Case request operating room: LOBECTOMY THYROID; Standing  -     Basic metabolic panel; Future  -     APTT; Future  -     CBC and Platelet; Future  -     Protime-INR; Future  -     EKG 12 lead; Future  -     XR chest pa & lateral; Future  -     Case request operating room: LOBECTOMY THYROID    Other orders  -     Incentive spirometry; Standing  -     Insert and maintain IV line; Standing  -     Void On-Call to O R ; Standing  -     Place sequential compression device; Standing        Advance Care Planning/Advance Directives:  Discussed disease status, cancer treatment plans and/or cancer treatment goals with the patient  No history exists  History of Present Illness:   Patient is a 68-year-old woman with a left-sided thyroid nodule    This found on physical exam by   Dobash  No radiation exposure to the head or neck area  She has difficulty swallowing, this is attributable to Sjogren's disease, for which she undergoes esophageal dilatation on a periodic basis  No personal or family history of endocrine malignancies  Review of Systems   Constitutional: Negative  HENT: Negative  Eyes: Negative  Respiratory: Negative  Cardiovascular: Negative  Gastrointestinal: Negative  Endocrine: Negative for cold intolerance and heat intolerance  Genitourinary: Negative  Musculoskeletal: Negative  Skin: Negative  Allergic/Immunologic: Negative  Neurological: Negative  Hematological: Bruises/bleeds easily  Psychiatric/Behavioral: Negative  Patient Active Problem List   Diagnosis    Bilateral hand numbness    Methotrexate, long term, current use    Pain    Scleroderma ([facility] [address] )    Preoperative clearance    Weakness    Thyroid nodule    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[facility] [address] )    Thyroid condition     Past Medical History:   Diagnosis Date    Cancer ([facility] [address] )     non-Hodg      Cellulitis of foot     Last assessed 10/24/16    Change in mental status     Last assessed 12/01/15    Chronic fatigue     PLAZA (dyspnea on exertion)     Last assessed 12/01/15    Fibromyalgia     Folliculitis     Last assessed 10/06/14    GERD (gastroesophageal reflux disease)     Systemic sclerosis (HCC)      Past Surgical History:   Procedure Laterality Date    BACK SURGERY      BLADDER SURGERY      BONE MARROW BIOPSY      HEMORRHOID SURGERY      HYSTERECTOMY      NASAL SEPTUM SURGERY      ROTATOR CUFF REPAIR      TONSILLECTOMY       Family History   Problem Relation Age of Onset    Arthritis Mother     Diabetes Mother     Coronary artery disease Father     Arthritis Sister     Asthma Sister     Crohn's disease Sister     Other Brother         myocardial ischemia     Social History     Social History    Marital status:      Spouse name: N/A    Number of children: N/A    Years of education: N/A     Occupational History    Not on file       Social History Main Topics    Smoking status: Former Smoker    Smokeless tobacco: Not on file    Alcohol use No    Drug use: No    Sexual activity: Not on file     Other Topics Concern    Not on file     Social History Narrative    No narrative on file       Current Outpatient Prescriptions:     amphetamine-dextroamphetamine (ADDERALL XR) 30 MG 24 hr capsule, Take 1 capsule (30 mg total) by mouth daily Max Daily Amount: 30 mg, Disp: 30 capsule, Rfl: 0    amphetamine-dextroamphetamine (ADDERALL) 20 mg tablet, Take 1 tablet (20 mg total) by mouth 2 (two) times a day Max Daily Amount: 40 mg, Disp: 60 tablet, Rfl: 0    aspirin (ECOTRIN LOW STRENGTH) 81 mg EC tablet, Take 81 mg by mouth daily, Disp: , Rfl:     buPROPion (WELLBUTRIN XL) 300 mg 24 hr tablet, Take 300 mg by mouth, Disp: , Rfl:     chlordiazePOXIDE (LIBRIUM) 5 mg capsule, Take 5 mg by mouth 2 (two) times a day  , Disp: , Rfl:     cycloSPORINE (RESTASIS) 0 05 % ophthalmic emulsion, Restasis 0 05 % eye drops in a dropperette, Disp: , Rfl:     dicyclomine (BENTYL) 10 mg capsule, Take 10 mg by mouth 2 (two) times a day  , Disp: , Rfl:     esomeprazole (NexIUM) 40 MG capsule, Take 40 mg by mouth 2 (two) times a day before meals  , Disp: , Rfl:     fluticasone (FLONASE) 50 mcg/act nasal spray, fluticasone 50 mcg/actuation nasal spray,suspension, Disp: , Rfl:     folic acid (FOLVITE) 1 mg tablet, Take by mouth daily, Disp: , Rfl:     hydroxychloroquine (PLAQUENIL) 200 mg tablet, Take 200 mg by mouth 2 (two) times a day with meals  , Disp: , Rfl:     methotrexate 2 5 MG tablet, Take by mouth once a week  , Disp: , Rfl:     montelukast (SINGULAIR) 10 mg tablet, Take 1 tablet (10 mg total) by mouth daily, Disp: 30 tablet, Rfl: 5    naproxen (NAPROSYN) 500 mg tablet, Take 500 mg by mouth 2 (two) times a day with meals  , Disp: , Rfl:     oxymorphone (OPANA) 5 MG tablet, Take 1 tablet (5 mg total) by mouth every 6 (six) hours as needed for moderate pain Max Daily Amount: 20 mg, Disp: 120 tablet, Rfl: 0    predniSONE 5 mg tablet, , Disp: , Rfl:     pregabalin (LYRICA) 75 mg capsule, 2 (two) times a day  Reported on 11/21/2016 , Disp: , Rfl:     psyllium (METAMUCIL) 0 52 g capsule, Metamucil, Disp: , Rfl:     silver sulfadiazine (SILVADENE,SSD) 1 % cream, silver sulfadiazine 1 % topical cream, Disp: , Rfl:     simvastatin (ZOCOR) 40 mg tablet, Take 1 tablet (40 mg total) by mouth daily, Disp: 30 tablet, Rfl: 5    SODIUM FLUORIDE, DENTAL GEL, (PREVIDENT) 1 1 % GEL, PreviDent 5000 Dry Mouth 1 1 % gel, Disp: , Rfl:     traZODone (DESYREL) 150 mg tablet, TAKE 1 TABLET BY MOUTH DAILY AT BEDTIME, Disp: 30 tablet, Rfl: 5    triamcinolone (KENALOG) 0 1 % cream, Apply topically 2 (two) times a day, Disp: 30 g, Rfl: 0  Allergies   Allergen Reactions    Duloxetine Other (See Comments)     Mental psychosis    Revatio [Sildenafil] Other (See Comments)     mental status changes    Savella [Milnacipran] Other (See Comments)     Feeling out of it    Sulfamethoxazole-Trimethoprim Rash and GI Intolerance    Tedizolid Rash     Vitals:    09/05/18 0947   BP: 130/72   Pulse: 74   Resp: 16   Temp: 98 1 °F (36 7 °C)       Physical Exam   Constitutional: She is oriented to person, place, and time  She appears well-developed  HENT:   Head: Normocephalic and atraumatic  Right Ear: External ear normal    Left Ear: External ear normal    Eyes: Conjunctivae are normal  Pupils are equal, round, and reactive to light  Neck: Normal range of motion  Neck supple  No JVD present  No tracheal deviation present  No thyromegaly present  Cardiovascular: Normal rate, regular rhythm, normal heart sounds and intact distal pulses  Pulmonary/Chest: Effort normal and breath sounds normal  No stridor  Abdominal: Soft  Bowel sounds are normal    Musculoskeletal: Normal range of motion  Lymphadenopathy:     She has no cervical adenopathy  Neurological: She is alert and oriented to person, place, and time  Skin: Skin is warm and dry  Psychiatric: She has a normal mood and affect  Her behavior is normal  Judgment and thought content normal        Pathology:  Case Report   Non-gynecologic Cytology                          Case: [id number]                                   Authorizing Provider: Clara Burr MD         Collected:           08/06/2018 1129               Ordering Location:     [facility] Received:            08/06/2018 1151                                      Ultrasound                                                                    Pathologist:           Kishor Plasencia MD                                                                Specimens:   A) - Thyroid, Left                                                                                   B) - Thyroid, Left                                                                         Final Diagnosis   A,B  Thyroid, Left (ThinPrep and smear preparations): Atypia of Undetermined Significance ([place] Category III) - See note      Satisfactory for evaluation, scant cellularity      Note:  - Few sheets and clusters of cells with dispersed chromatin, few grooves interspersed with non-atypical follicular cells and scant colloid      (1) As reported in the [facility] for Reporting Thyroid Cytopathology*, this diagnostic category has demonstrated anywhere from 10-30% risk of malignancy being found in subsequent resections and/or FNA  This risk of malignancy is expected to change due to the usage of the surgical pathology diagnosis of non-invasive follicular thyroid neoplasm with papillary-like nuclear features (NIFTP)    The anticipated risk of malignancy secondary to NIFTP is 6-18%  The manual reports that the usual management following this diagnosis is repeat FNA, molecular testing, or lobectomy  Ultimately, clinical/imaging correlation for this patient is needed in arriving at the actual management plan      *The [place] System for Reporting Thyroid Cytopathology, Benja [address] Chata Lockett ), 2018 (2nd ed )      Electronically signed by Deanna Garcia MD on 8/8/2018 at  8:41 AM   Note   The treating physician has requested a sample(s) from the above thyroid nodule(s) be sent for analysis by  [facility] Gene Expression  (Afirma GEC), performed by Rachel Hess [place], [place])  [facility] only performs this test on specimen(s) receiving a cytologic diagnosis of [place] Category III or  IV  If such a diagnosis is rendered (above) specimen will be sent to [facility] [place], and a separate report with  results of Afirma GEC will follow directly from [place] (typically taking 14 days)  If a cytologic  interpretation other than [place] category III or IV is rendered, specimen will not be sent for Afirma GEC          Gross Description   A  Thyroid, Left, : 20ml  Bloody, received in CytoLyt     B  Thyroid, Left, : 6 slides received ( 3 diff quik / 3 alcohol fixed )            Clinical Information   History of thyroid nodule to left side      Additional Information   [facility]'s FDA approved ,  and ThinPrep Imaging System are utilized with strict adherence to the 's instruction manual to prepare gynecologic and non-gynecologic cytology specimens for the production of ThinPrep slides as well as for gynecologic ThinPrep imaging  These processes have been validated by our laboratory and/or by the       These tests were developed and their performance characteristics determined by [address] [place] [address] or [facility]  They may not be cleared or approved by the [place]  Food and Drug Administration  The FDA has determined that such clearance or approval is not necessary  These tests are used for clinical purposes   They should not be regarded as investigational or for research  This laboratory has been approved by [facility], designated as a high-complexity laboratory and is qualified to perform these tests  Afirma testing done is suspicious, BRAF positive    Labs:  none    Imaging  Us Guided Thyroid Biopsy    Result Date: 8/8/2018  Narrative: ULTRASOUND-GUIDED THYROID BIOPSY HISTORY: 58year-old with history of scleroderma  COMPARISON: Thyroid ultrasound from 7/3/2018 FINDINGS: Prior ultrasound images were reviewed  Thyroid not visible on any older studies The procedure was explained to the patient, including risks of hemorrhage, infection and local injury  Informed consent was freely obtained  The patient verbalized expressed understanding of the above risks and wished to proceed with the procedure  Final standard "time-out" procedure performed  PROCEDURE: The neck was prepped and draped in normal sterile fashion  Under real-time ultrasound guidance and local anesthesia, 5 passes with a 27 gauge needle were made through the left, posterior, mid  The patient tolerated the procedure well  Postprocedure instructions were provided for the patient  I asked the patient to call us with any questions, concerns, or acute problems  The patient expressed understanding of the above  Ultrasound was used to evaluate the nodule as a potential biopsy site  Static and real-time images of needle entry were obtained  Impression: Status post successful ultrasound-guided thyroid biopsy  Workstation performed: [id number]     I reviewed the above laboratory and imaging data  Discussion/Summary:  Left-sided thyroid nodule, suspicious on recent thyroid biopsy using a firm a testing  Treatment options discussed with patient  Recommendation is for left hemithyroidectomy  Rationale for this along with risks and benefits of surgery including infection, bleeding, parathyroid and recurrent nerve injury, discussed with patient    She understands the plan and wishes to proceed with surgery as outlined

## 2018-09-05 NOTE — PROGRESS NOTES
Surgical Oncology Follow Up       [address] [place] [address] SURGICAL ONCOLOGY [facility] [address]  [facility] [id number]    Jose Eduardo Elizabeth  1955  [id number]  [place] [address] [place] [facility] [id number]    Chief Complaint   Patient presents with   [facility] Consult     Patient is here for thyroid nodule consult  Assessment/Plan:    No problem-specific Assessment & Plan notes found for this encounter  Diagnoses and all orders for this visit:    Thyroid nodule  -     Case request operating room: THYROIDECTOMY; Standing  -     Basic metabolic panel; Future  -     APTT; Future  -     CBC and Platelet; Future  -     Protime-INR; Future  -     EKG 12 lead; Future  -     XR chest pa & lateral; Future  -     Case request operating room: TOTAL THYROIDECTOMY    Other orders  -     Incentive spirometry; Standing  -     Insert and maintain IV line; Standing  -     Void On-Call to O R ; Standing  -     Place sequential compression device; Standing        Advance Care Planning/Advance Directives:  Discussed disease status, cancer treatment plans and/or cancer treatment goals with the patient  No history exists  History of Present Illness:   Patient is a 78-year-old woman with a left-sided thyroid nodule  This found on physical exam by Dr Lana Davies  No radiation exposure to the head or neck area  She has difficulty swallowing, this is attributable to Sjogren's disease, for which she undergoes esophageal dilatation on a periodic basis  No personal or family history of endocrine malignancies  Review of Systems   Constitutional: Negative  HENT: Negative  Eyes: Negative  Respiratory: Negative  Cardiovascular: Negative  Gastrointestinal: Negative  Endocrine: Negative for cold intolerance and heat intolerance  Genitourinary: Negative  Musculoskeletal: Negative  Skin: Negative  Allergic/Immunologic: Negative  Neurological: Negative  Hematological: Bruises/bleeds easily  Psychiatric/Behavioral: Negative  Patient Active Problem List   Diagnosis    Bilateral hand numbness    Methotrexate, long term, current use    Pain    [facility]    Preoperative clearance    Weakness    Thyroid nodule    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[facility] )    Thyroid condition     Past Medical History:   Diagnosis Date    Cancer ([facility] )     non-Hodg   Cellulitis of foot     Last assessed 10/24/16    Change in mental status     Last assessed 12/01/15    Chronic fatigue     PLAZA (dyspnea on exertion)     Last assessed 12/01/15    Fibromyalgia     Folliculitis     Last assessed 10/06/14    GERD (gastroesophageal reflux disease)     Systemic sclerosis (HCC)      Past Surgical History:   Procedure Laterality Date    BACK SURGERY      BLADDER SURGERY      BONE MARROW BIOPSY      HEMORRHOID SURGERY      HYSTERECTOMY      NASAL SEPTUM SURGERY      ROTATOR CUFF REPAIR      TONSILLECTOMY       Family History   Problem Relation Age of Onset    Arthritis Mother     Diabetes Mother     Coronary artery disease Father     Arthritis Sister     Asthma Sister     Crohn's disease Sister     Other Brother         myocardial ischemia     Social History     Social History    Marital status:      Spouse name: N/A    Number of children: N/A    Years of education: N/A     Occupational History    Not on file       Social History Main Topics    Smoking status: Former Smoker    Smokeless tobacco: Not on file    Alcohol use No    Drug use: No    Sexual activity: Not on file     Other Topics Concern    Not on file     Social History Narrative    No narrative on file       Current Outpatient Prescriptions:     amphetamine-dextroamphetamine (ADDERALL XR) 30 MG 24 hr capsule, Take 1 capsule (30 mg total) by mouth daily Max Daily Amount: 30 mg, Disp: 30 capsule, Rfl: 0    amphetamine-dextroamphetamine (ADDERALL) 20 mg tablet, Take 1 tablet (20 mg total) by mouth 2 (two) times a day Max Daily Amount: 40 mg, Disp: 60 tablet, Rfl: 0    aspirin (ECOTRIN LOW STRENGTH) 81 mg EC tablet, Take 81 mg by mouth daily, Disp: , Rfl:     buPROPion (WELLBUTRIN XL) 300 mg 24 hr tablet, Take 300 mg by mouth, Disp: , Rfl:     chlordiazePOXIDE (LIBRIUM) 5 mg capsule, Take 5 mg by mouth 2 (two) times a day  , Disp: , Rfl:     cycloSPORINE (RESTASIS) 0 05 % ophthalmic emulsion, Restasis 0 05 % eye drops in a dropperette, Disp: , Rfl:     dicyclomine (BENTYL) 10 mg capsule, Take 10 mg by mouth 2 (two) times a day  , Disp: , Rfl:     esomeprazole (NexIUM) 40 MG capsule, Take 40 mg by mouth 2 (two) times a day before meals  , Disp: , Rfl:     fluticasone (FLONASE) 50 mcg/act nasal spray, fluticasone 50 mcg/actuation nasal spray,suspension, Disp: , Rfl:     folic acid (FOLVITE) 1 mg tablet, Take by mouth daily, Disp: , Rfl:     hydroxychloroquine (PLAQUENIL) 200 mg tablet, Take 200 mg by mouth 2 (two) times a day with meals  , Disp: , Rfl:     methotrexate 2 5 MG tablet, Take by mouth once a week  , Disp: , Rfl:     montelukast (SINGULAIR) 10 mg tablet, Take 1 tablet (10 mg total) by mouth daily, Disp: 30 tablet, Rfl: 5    naproxen (NAPROSYN) 500 mg tablet, Take 500 mg by mouth 2 (two) times a day with meals  , Disp: , Rfl:     oxymorphone (OPANA) 5 MG tablet, Take 1 tablet (5 mg total) by mouth every 6 (six) hours as needed for moderate pain Max Daily Amount: 20 mg, Disp: 120 tablet, Rfl: 0    predniSONE 5 mg tablet, , Disp: , Rfl:     pregabalin (LYRICA) 75 mg capsule, 2 (two) times a day   Reported on 11/21/2016 , Disp: , Rfl:     psyllium (METAMUCIL) 0 52 g capsule, Metamucil, Disp: , Rfl:     silver sulfadiazine (SILVADENE,SSD) 1 % cream, silver sulfadiazine 1 % topical cream, Disp: , Rfl:     simvastatin (ZOCOR) 40 mg tablet, Take 1 tablet (40 mg total) by mouth daily, Disp: 30 tablet, Rfl: 5    SODIUM FLUORIDE, DENTAL GEL, (PREVIDENT) 1 1 % GEL, PreviDent 5000 Dry Mouth 1 1 % gel, Disp: , Rfl:     traZODone (DESYREL) 150 mg tablet, TAKE 1 TABLET BY MOUTH DAILY AT BEDTIME, Disp: 30 tablet, Rfl: 5    triamcinolone (KENALOG) 0 1 % cream, Apply topically 2 (two) times a day, Disp: 30 g, Rfl: 0  Allergies   Allergen Reactions    Duloxetine Other (See Comments)     Mental psychosis    Revatio [Sildenafil] Other (See Comments)     mental status changes    Savella [Milnacipran] Other (See Comments)     Feeling out of it    Sulfamethoxazole-Trimethoprim Rash and GI Intolerance    Tedizolid Rash     Vitals:    09/05/18 0947   BP: 130/72   Pulse: 74   Resp: 16   Temp: 98 1 °F (36 7 °C)       Physical Exam   Constitutional: She is oriented to person, place, and time  She appears well-developed  HENT:   Head: Normocephalic and atraumatic  Right Ear: External ear normal    Left Ear: External ear normal    Eyes: Conjunctivae are normal  Pupils are equal, round, and reactive to light  Neck: Normal range of motion  Neck supple  No JVD present  No tracheal deviation present  No thyromegaly present  Palpable left thyroid nodule   Cardiovascular: Normal rate, regular rhythm, normal heart sounds and intact distal pulses  Pulmonary/Chest: Effort normal and breath sounds normal  No stridor  Abdominal: Soft  Bowel sounds are normal    Musculoskeletal: Normal range of motion  Lymphadenopathy:     She has no cervical adenopathy  Neurological: She is alert and oriented to person, place, and time  Skin: Skin is warm and dry  Psychiatric: She has a normal mood and affect   Her behavior is normal  Judgment and thought content normal        Pathology:  Case Report   Non-gynecologic Cytology                          Case: [id number]                                   Authorizing Provider: Gypsy Leo MD         Collected:           08/06/2018 1129             Ordering Location:     [facility] Received:            08/06/2018 1151                                      Ultrasound                                                                    Pathologist:           Abraham Deleon MD                                                                Specimens:   A) - Thyroid, Left                                                                                   B) - Thyroid, Left                                                                         Final Diagnosis   A,B  Thyroid, Left (ThinPrep and smear preparations): Atypia of Undetermined Significance (Sunshine Category III) - See note      Satisfactory for evaluation, scant cellularity      Note:  - Few sheets and clusters of cells with dispersed chromatin, few grooves interspersed with non-atypical follicular cells and scant colloid      (1) As reported in the [facility] for Reporting Thyroid Cytopathology*, this diagnostic category has demonstrated anywhere from 10-30% risk of malignancy being found in subsequent resections and/or FNA  This risk of malignancy is expected to change due to the usage of the surgical pathology diagnosis of non-invasive follicular thyroid neoplasm with papillary-like nuclear features (NIFTP)    The anticipated risk of malignancy secondary to NIFTP is 6-18%  The manual reports that the usual management following this diagnosis is repeat FNA, molecular testing, or lobectomy   Ultimately, clinical/imaging correlation for this patient is needed in arriving at the actual management plan      *The Sunshine System for Reporting Thyroid Cytopathology, Brandt Lucas, 2018 (2nd ed )      Electronically signed by Abraham Deleon MD on 8/8/2018 at  8:41 AM   Note   The treating physician has requested a sample(s) from the above thyroid nodule(s) be sent for analysis by  [facility] Gene Expression  ([facility] GE), performed by [facility] [place], [place])  [employer] only performs this test on specimen(s) receiving a cytologic diagnosis of [place] Category III or  IV  If such a diagnosis is rendered (above) specimen will be sent to [facility], and a separate report with  results of [facility] will follow directly from [employer] (typically taking 14 days)  If a cytologic  interpretation other than [place] category III or IV is rendered, specimen will not be sent for [facility] GEC          Gross Description   A  Thyroid, Left, : 20ml  Bloody, received in [facility]to[employer]yt     B  Thyroid, Left, : 6 slides received ( 3 diff quik / 3 alcohol fixed )            Clinical Information   History of thyroid nodule to left side      Additional Information   My COI's FDA approved ,  and ThinPrep Imaging System are utilized with strict adherence to the 's instruction manual to prepare gynecologic and non-gynecologic cytology specimens for the production of ThinPrep slides as well as for gynecologic ThinPrep imaging  These processes have been validated by our laboratory and/or by the       These tests were developed and their performance characteristics determined by [facility] or [address] [place], [place] [address]n [place]  They may not be cleared or approved by the [place]  Food and Drug Administration  The FDA has determined that such clearance or approval is not necessary  These tests are used for clinical purposes  They should not be regarded as investigational or for research  This laboratory has been approved by CLIA 88, designated as a high-complexity laboratory and is qualified to perform these tests  Afirma testing done is suspicious, BRAF positive    Labs:  none    Imaging  Us Guided Thyroid Biopsy    Result Date: 8/8/2018  Narrative: ULTRASOUND-GUIDED THYROID BIOPSY HISTORY: 58year-old with history of scleroderma  COMPARISON: Thyroid ultrasound from 7/3/2018 FINDINGS: Prior ultrasound images were reviewed    Thyroid not visible on any older studies The procedure was explained to the patient, including risks of hemorrhage, infection and local injury  Informed consent was freely obtained  The patient verbalized expressed understanding of the above risks and wished to proceed with the procedure  Final standard "time-out" procedure performed  PROCEDURE: The neck was prepped and draped in normal sterile fashion  Under real-time ultrasound guidance and local anesthesia, 5 passes with a 27 gauge needle were made through the left, posterior, mid  The patient tolerated the procedure well  Postprocedure instructions were provided for the patient  I asked the patient to call us with any questions, concerns, or acute problems  The patient expressed understanding of the above  Ultrasound was used to evaluate the nodule as a potential biopsy site  Static and real-time images of needle entry were obtained  Impression: Status post successful ultrasound-guided thyroid biopsy  Workstation performed: [id number]     I reviewed the above laboratory and imaging data  Discussion/Summary:  Left-sided thyroid nodule, suspicious on recent thyroid biopsy using a firm a testing  Treatment options discussed with patient  Recommendation is for left hemithyroidectomy, at the very least, versus total thyroidectomy  Rationale for this along with risks and benefits of surgery including infection, bleeding, parathyroid and recurrent nerve injury, discussed with patient  She understands the plan and wishes to proceed with surgery as outlined  Ultimately, she opted for total thyroidectomy given biopsy results, and her desire to avoid a potential 2nd operation  I believe this is a reasonable treatment plan for her  Will obtain preoperative cervical mapping to evaluate lymph nodes

## 2018-09-07 ENCOUNTER — HOSPITAL ENCOUNTER (OUTPATIENT)
Dept: ULTRASOUND IMAGING | Facility: HOSPITAL | Age: 63
Discharge: HOME | End: 2018-09-07
Attending: SURGERY
Payer: MEDICARE

## 2018-09-07 ENCOUNTER — HOSPITAL ENCOUNTER (OUTPATIENT)
Dept: NON INVASIVE DIAGNOSTICS | Facility: HOSPITAL | Age: 63
Discharge: HOME | End: 2018-09-07
Attending: SURGERY
Payer: MEDICARE

## 2018-09-07 ENCOUNTER — APPOINTMENT (OUTPATIENT)
Dept: LAB | Facility: HOSPITAL | Age: 63
End: 2018-09-07
Attending: SURGERY
Payer: MEDICARE

## 2018-09-07 ENCOUNTER — HOSPITAL ENCOUNTER (OUTPATIENT)
Dept: RADIOLOGY | Facility: HOSPITAL | Age: 63
Discharge: HOME | End: 2018-09-07
Attending: SURGERY
Payer: MEDICARE

## 2018-09-07 DIAGNOSIS — E04.1 THYROID NODULE: ICD-10-CM

## 2018-09-07 LAB
APTT PPP: 25 SECONDS (ref 24–36)
ATRIAL RATE: 72 BPM
INR PPP: 1.03 (ref 0.86–1.17)
P AXIS: 76 DEGREES
PR INTERVAL: 186 MS
PROTHROMBIN TIME: 13 SECONDS (ref 11.8–14.2)
QRS AXIS: -7 DEGREES
QRSD INTERVAL: 88 MS
QT INTERVAL: 362 MS
QTC INTERVAL: 396 MS
T WAVE AXIS: 66 DEGREES
VENTRICULAR RATE: 72 BPM

## 2018-09-07 PROCEDURE — 85730 THROMBOPLASTIN TIME PARTIAL: CPT

## 2018-09-07 PROCEDURE — 85610 PROTHROMBIN TIME: CPT

## 2018-09-07 PROCEDURE — 93010 ELECTROCARDIOGRAM REPORT: CPT | Performed by: INTERNAL MEDICINE

## 2018-09-07 PROCEDURE — 76536 US EXAM OF HEAD AND NECK: CPT

## 2018-09-07 PROCEDURE — 71046 X-RAY EXAM CHEST 2 VIEWS: CPT

## 2018-09-07 PROCEDURE — 93005 ELECTROCARDIOGRAM TRACING: CPT

## 2018-09-11 ENCOUNTER — TELEPHONE (OUTPATIENT)
Dept: FAMILY MEDICINE CLINIC | Facility: CLINIC | Age: 63
End: 2018-09-11

## 2018-09-21 ENCOUNTER — OFFICE VISIT (OUTPATIENT)
Dept: FAMILY MEDICINE CLINIC | Facility: CLINIC | Age: 63
End: 2018-09-21
Payer: MEDICARE

## 2018-09-21 VITALS
BODY MASS INDEX: 20.65 KG/M2 | SYSTOLIC BLOOD PRESSURE: 106 MMHG | OXYGEN SATURATION: 97 % | RESPIRATION RATE: 15 BRPM | HEIGHT: 61 IN | DIASTOLIC BLOOD PRESSURE: 60 MMHG | WEIGHT: 109.4 LBS | HEART RATE: 71 BPM

## 2018-09-21 DIAGNOSIS — M34.9 SCLERODERMA (HCC): ICD-10-CM

## 2018-09-21 DIAGNOSIS — Z15.01 BRCA GENE MUTATION POSITIVE: ICD-10-CM

## 2018-09-21 DIAGNOSIS — M35.1 MCTD (MIXED CONNECTIVE TISSUE DISEASE) (HCC): ICD-10-CM

## 2018-09-21 DIAGNOSIS — E04.1 THYROID NODULE: ICD-10-CM

## 2018-09-21 DIAGNOSIS — Z23 NEED FOR INFLUENZA VACCINATION: Primary | ICD-10-CM

## 2018-09-21 DIAGNOSIS — R52 PAIN: ICD-10-CM

## 2018-09-21 DIAGNOSIS — F51.01 PRIMARY INSOMNIA: ICD-10-CM

## 2018-09-21 DIAGNOSIS — Z15.09 BRCA GENE MUTATION POSITIVE: ICD-10-CM

## 2018-09-21 PROCEDURE — 90682 RIV4 VACC RECOMBINANT DNA IM: CPT

## 2018-09-21 PROCEDURE — G0008 ADMIN INFLUENZA VIRUS VAC: HCPCS

## 2018-09-21 PROCEDURE — 99214 OFFICE O/P EST MOD 30 MIN: CPT | Performed by: FAMILY MEDICINE

## 2018-09-21 RX ORDER — TRAZODONE HYDROCHLORIDE 150 MG/1
150 TABLET ORAL
Qty: 30 TABLET | Refills: 0 | Status: SHIPPED | OUTPATIENT
Start: 2018-09-21 | End: 2018-10-31 | Stop reason: SDUPTHER

## 2018-09-21 RX ORDER — DEXTROAMPHETAMINE SACCHARATE, AMPHETAMINE ASPARTATE MONOHYDRATE, DEXTROAMPHETAMINE SULFATE AND AMPHETAMINE SULFATE 7.5; 7.5; 7.5; 7.5 MG/1; MG/1; MG/1; MG/1
30 CAPSULE, EXTENDED RELEASE ORAL DAILY
Qty: 30 CAPSULE | Refills: 0 | Status: SHIPPED | OUTPATIENT
Start: 2018-09-21 | End: 2018-10-18 | Stop reason: SDUPTHER

## 2018-09-21 RX ORDER — DEXTROAMPHETAMINE SACCHARATE, AMPHETAMINE ASPARTATE, DEXTROAMPHETAMINE SULFATE AND AMPHETAMINE SULFATE 5; 5; 5; 5 MG/1; MG/1; MG/1; MG/1
20 TABLET ORAL
Qty: 60 TABLET | Refills: 0 | Status: SHIPPED | OUTPATIENT
Start: 2018-09-21 | End: 2018-10-18 | Stop reason: SDUPTHER

## 2018-09-21 RX ORDER — OXYMORPHONE HYDROCHLORIDE 5 MG/1
5 TABLET ORAL EVERY 6 HOURS PRN
Qty: 120 TABLET | Refills: 0 | Status: SHIPPED | OUTPATIENT
Start: 2018-09-21 | End: 2018-10-22 | Stop reason: SDUPTHER

## 2018-09-21 NOTE — PROGRESS NOTES
Assessment/Plan:    No problem-specific Assessment & Plan notes found for this encounter  Diagnoses and all orders for this visit:    Need for influenza vaccination  -     influenza vaccine, [id number], quadrivalent, recombinant, PF, 0 5 mL, for patients 18 yr+ (FLUBLOK)    Primary insomnia  -     traZODone (DESYREL) 150 mg tablet; Take 1 tablet (150 mg total) by mouth daily at bedtime    MCTD (mixed connective tissue disease) (HCC)    Pain  -     amphetamine-dextroamphetamine (ADDERALL XR) 30 MG 24 hr capsule; Take 1 capsule (30 mg total) by mouth daily Max Daily Amount: 30 mg  -     amphetamine-dextroamphetamine (ADDERALL) 20 mg tablet; Take 1 tablet (20 mg total) by mouth 2 (two) times a day Max Daily Amount: 40 mg  -     oxymorphone (OPANA) 5 MG tablet; Take 1 tablet (5 mg total) by mouth every 6 (six) hours as needed for moderate pain Max Daily Amount: 20 mg    BRCA gene mutation positive  -     Ambulatory referral to Genetics; Future    Thyroid nodule    Scleroderma ([facility] )        She is cleared for surgery (thyroidectomy)  Subjective:      Patient ID: José Miguel Addison is a 58 y o  female  She had a thyroid nodule and underwent FNA  She is scheduled for thyroidectomy  She has h/o scleroderma and follicular lymphoma  She interested in medical marijuana  She has chronic pain that is multifactorial   She has scleroderma and HNP  She would be an excellent candidate for medical marijuana  She has no CP or SOB  She has no cardiac history  She has no bleeding disorders  She has no issue with anesthesia and there is no family history of difficulty with anesthesia  She can easily walk 2 flights of stairs and 4 city blocks without CP or SOB  The following portions of the patient's history were reviewed and updated as appropriate:   She  has a past medical history of Cancer ([facility] ); Cellulitis of foot; Change in mental status; Chronic fatigue; PLAZA (dyspnea on exertion);  Fibromyalgia; Folliculitis; GERD (gastroesophageal reflux disease); and Systemic sclerosis ([facility] )  She   Patient Active Problem List    Diagnosis Date Noted    BRCA gene mutation positive 09/21/2018    Thyroid condition 08/10/2018    Preoperative clearance 06/25/2018    Weakness 06/25/2018    Thyroid nodule 06/25/2018    Bilateral hand numbness 05/01/2018    Methotrexate, long term, current use 05/01/2018    Pain 05/01/2018    Scleroderma ([facility] ) 11/57/6509    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[place] [address] ) 12/07/2012     She  has a past surgical history that includes Tonsillectomy; Back surgery; Rotator cuff repair; Hysterectomy; Bone marrow biopsy; Bladder surgery; Hemorrhoid surgery; and Nasal septum surgery  Her family history includes Arthritis in her mother and sister; Asthma in her sister; Coronary artery disease in her father; Crohn's disease in her sister; Diabetes in her mother; Other in her brother  She  reports that she has quit smoking  She does not have any smokeless tobacco history on file  She reports that she does not drink alcohol or use drugs    Current Outpatient Prescriptions   Medication Sig Dispense Refill    amphetamine-dextroamphetamine (ADDERALL XR) 30 MG 24 hr capsule Take 1 capsule (30 mg total) by mouth daily Max Daily Amount: 30 mg 30 capsule 0    amphetamine-dextroamphetamine (ADDERALL) 20 mg tablet Take 1 tablet (20 mg total) by mouth 2 (two) times a day Max Daily Amount: 40 mg 60 tablet 0    aspirin (ECOTRIN LOW STRENGTH) 81 mg EC tablet Take 81 mg by mouth daily      buPROPion (WELLBUTRIN XL) 300 mg 24 hr tablet Take 300 mg by mouth      chlordiazePOXIDE (LIBRIUM) 5 mg capsule Take 5 mg by mouth 2 (two) times a day        dicyclomine (BENTYL) 10 mg capsule Take 10 mg by mouth 2 (two) times a day        esomeprazole (NexIUM) 40 MG capsule Take 40 mg by mouth 2 (two) times a day before meals        fluticasone (FLONASE) 50 mcg/act nasal spray fluticasone 50 mcg/actuation nasal spray,suspension      folic acid (FOLVITE) 1 mg tablet Take by mouth daily      hydroxychloroquine (PLAQUENIL) 200 mg tablet Take 200 mg by mouth 2 (two) times a day with meals        montelukast (SINGULAIR) 10 mg tablet Take 1 tablet (10 mg total) by mouth daily 30 tablet 5    naproxen (NAPROSYN) 500 mg tablet Take 500 mg by mouth 2 (two) times a day with meals        oxymorphone (OPANA) 5 MG tablet Take 1 tablet (5 mg total) by mouth every 6 (six) hours as needed for moderate pain Max Daily Amount: 20 mg 120 tablet 0    predniSONE 5 mg tablet       pregabalin (LYRICA) 75 mg capsule 2 (two) times a day  Reported on 11/21/2016       psyllium (METAMUCIL) 0 52 g capsule Metamucil      simvastatin (ZOCOR) 40 mg tablet Take 1 tablet (40 mg total) by mouth daily 30 tablet 5    traZODone (DESYREL) 150 mg tablet Take 1 tablet (150 mg total) by mouth daily at bedtime 30 tablet 0    triamcinolone (KENALOG) 0 1 % cream Apply topically 2 (two) times a day 30 g 0    cycloSPORINE (RESTASIS) 0 05 % ophthalmic emulsion Restasis 0 05 % eye drops in a dropperette      levothyroxine 112 mcg tablet Take 1 tablet (112 mcg total) by mouth daily Start after surgery 30 tablet 3    methotrexate 2 5 MG tablet Take by mouth once a week        silver sulfadiazine (SILVADENE,SSD) 1 % cream silver sulfadiazine 1 % topical cream      SODIUM FLUORIDE, DENTAL GEL, (PREVIDENT) 1 1 % GEL PreviDent 5000 Dry Mouth 1 1 % gel       No current facility-administered medications for this visit        Current Outpatient Prescriptions on File Prior to Visit   Medication Sig    aspirin (ECOTRIN LOW STRENGTH) 81 mg EC tablet Take 81 mg by mouth daily    buPROPion (WELLBUTRIN XL) 300 mg 24 hr tablet Take 300 mg by mouth    chlordiazePOXIDE (LIBRIUM) 5 mg capsule Take 5 mg by mouth 2 (two) times a day      dicyclomine (BENTYL) 10 mg capsule Take 10 mg by mouth 2 (two) times a day      esomeprazole (NexIUM) 40 MG capsule Take 40 mg by mouth 2 (two) times a day before meals      fluticasone (FLONASE) 50 mcg/act nasal spray fluticasone 50 mcg/actuation nasal spray,suspension    folic acid (FOLVITE) 1 mg tablet Take by mouth daily    hydroxychloroquine (PLAQUENIL) 200 mg tablet Take 200 mg by mouth 2 (two) times a day with meals      montelukast (SINGULAIR) 10 mg tablet Take 1 tablet (10 mg total) by mouth daily    naproxen (NAPROSYN) 500 mg tablet Take 500 mg by mouth 2 (two) times a day with meals      predniSONE 5 mg tablet     pregabalin (LYRICA) 75 mg capsule 2 (two) times a day  Reported on 11/21/2016     psyllium (METAMUCIL) 0 52 g capsule Metamucil    simvastatin (ZOCOR) 40 mg tablet Take 1 tablet (40 mg total) by mouth daily    triamcinolone (KENALOG) 0 1 % cream Apply topically 2 (two) times a day    [DISCONTINUED] amphetamine-dextroamphetamine (ADDERALL XR) 30 MG 24 hr capsule Take 1 capsule (30 mg total) by mouth daily Max Daily Amount: 30 mg    [DISCONTINUED] amphetamine-dextroamphetamine (ADDERALL) 20 mg tablet Take 1 tablet (20 mg total) by mouth 2 (two) times a day Max Daily Amount: 40 mg    [DISCONTINUED] oxymorphone (OPANA) 5 MG tablet Take 1 tablet (5 mg total) by mouth every 6 (six) hours as needed for moderate pain Max Daily Amount: 20 mg    [DISCONTINUED] traZODone (DESYREL) 150 mg tablet TAKE 1 TABLET BY MOUTH DAILY AT BEDTIME    cycloSPORINE (RESTASIS) 0 05 % ophthalmic emulsion Restasis 0 05 % eye drops in a dropperette    levothyroxine 112 mcg tablet Take 1 tablet (112 mcg total) by mouth daily Start after surgery    methotrexate 2 5 MG tablet Take by mouth once a week      silver sulfadiazine (SILVADENE,SSD) 1 % cream silver sulfadiazine 1 % topical cream    SODIUM FLUORIDE, DENTAL GEL, (PREVIDENT) 1 1 % GEL PreviDent 5000 Dry Mouth 1 1 % gel     No current facility-administered medications on file prior to visit        She is allergic to duloxetine; revatio [sildenafil]; savella [milnacipran]; sulfamethoxazole-trimethoprim; and tedizolid       Review of Systems   All other systems reviewed and are negative  Objective:      /60 (BP Location: Left arm, Patient Position: Sitting, Cuff Size: Standard)   Pulse 71   Resp 15   Ht 5' 1" (1 549 m)   Wt 49 6 kg (109 lb 6 4 oz)   SpO2 97%   BMI 20 67 kg/m²          Physical Exam   Constitutional: She is oriented to person, place, and time  She appears well-developed and well-nourished  Neck: Normal range of motion  Neck supple  Cardiovascular: Normal rate, regular rhythm, normal heart sounds and intact distal pulses  Pulmonary/Chest: Effort normal and breath sounds normal    Abdominal: Soft  Bowel sounds are normal    Musculoskeletal: Normal range of motion  Neurological: She is alert and oriented to person, place, and time  She has normal reflexes  Skin: Skin is warm and dry  Psychiatric: She has a normal mood and affect  Her behavior is normal  Judgment and thought content normal    Nursing note and vitals reviewed

## 2018-09-28 RX ORDER — MULTIVITAMIN
1 TABLET ORAL DAILY
COMMUNITY

## 2018-09-28 NOTE — PRE-PROCEDURE INSTRUCTIONS
Pre-Surgery Instructions:   Medication Instructions    amphetamine-dextroamphetamine (ADDERALL XR) 30 MG 24 hr capsule Instructed patient per Anesthesia Guidelines   amphetamine-dextroamphetamine (ADDERALL) 20 mg tablet Instructed patient per Anesthesia Guidelines   aspirin (ECOTRIN LOW STRENGTH) 81 mg EC tablet Instructed patient per Anesthesia Guidelines   buPROPion (WELLBUTRIN XL) 300 mg 24 hr tablet Instructed patient per Anesthesia Guidelines   chlordiazePOXIDE (LIBRIUM) 5 mg capsule Instructed patient per Anesthesia Guidelines   cycloSPORINE (RESTASIS) 0 05 % ophthalmic emulsion Instructed patient per Anesthesia Guidelines   dicyclomine (BENTYL) 10 mg capsule Instructed patient per Anesthesia Guidelines   esomeprazole (NexIUM) 40 MG capsule Instructed patient per Anesthesia Guidelines   fluticasone (FLONASE) 50 mcg/act nasal spray Instructed patient per Anesthesia Guidelines   folic acid (FOLVITE) 1 mg tablet Instructed patient per Anesthesia Guidelines   hydroxychloroquine (PLAQUENIL) 200 mg tablet Patient was instructed to contact Physician for medication instruction   methotrexate 2 5 MG tablet Patient was instructed to contact Physician for medication instruction   montelukast (SINGULAIR) 10 mg tablet Instructed patient per Anesthesia Guidelines   Multiple Vitamin (MULTIVITAMIN) tablet Instructed patient per Anesthesia Guidelines   naproxen (NAPROSYN) 500 mg tablet Instructed patient per Anesthesia Guidelines   oxymorphone (OPANA) 5 MG tablet Instructed patient per Anesthesia Guidelines   predniSONE 5 mg tablet Instructed patient per Anesthesia Guidelines   pregabalin (LYRICA) 75 mg capsule Instructed patient per Anesthesia Guidelines   psyllium (METAMUCIL) 0 52 g capsule Instructed patient per Anesthesia Guidelines   silver sulfadiazine (SILVADENE,SSD) 1 % cream Instructed patient per Anesthesia Guidelines      simvastatin (ZOCOR) 40 mg tablet Instructed patient per Anesthesia Guidelines   SODIUM FLUORIDE, DENTAL GEL, (PREVIDENT) 1 1 % GEL Instructed patient per Anesthesia Guidelines   traZODone (DESYREL) 150 mg tablet Instructed patient per Anesthesia Guidelines   triamcinolone (KENALOG) 0 1 % cream Instructed patient per Anesthesia Guidelines  Spoke to pt  Medication list reviewed & instructed  As of 2 68 18 pt to stop folic acid, aspirin, MV, naproxen  Instructed on tylenol only  Pt will be calling prescribing MD, rheumatologist, to discuss methotrexate and plaquenil instructions  Pt denies assistance in doing this  Am DOS pt to take lyrica, prednisone, nexium, librium, dicyclomine, wellbutrin, simvastatin, oxymorphone, singulair with 1-2 sips of water  Pt stated she may not be able to take all with 1-2 sips, instructed at that point to hold simvastatin, singulair  Showering instructions reviewed at time of call  Pt reported she was told she will be staying over 1 night  Confirmed with hospital pharmacy we do not carry oxymorphone  Pt notified to bring in dose from home  All instructions verbally understood by patient  Callback number given  Alpha adrenergic antagonist Med Class     Continue to take this medication on your normal schedule  If this is an oral medication and you take it in the morning, then you may take this medicine with a sip of water  Antidepressant Med Class     Continue to take this medication on your normal schedule  If this is an oral medication and you take it in the morning, then you may take this medicine with a sip of water  Antiepileptic Med Class     Continue to take this medication on your normal schedule  If this is an oral medication and you take it in the morning, then you may take this medicine with a sip of water  Benzodiazepine antagonist Med Class     If this medication is needed please continue to take on your normal schedule    If you take it in the morning, then you may take this medicine with a sip of water  ASA Med Class: Aspirin     Should be discontinued at least one week prior to planned operation, unless specifically stated otherwise by surgical service  Your Surgeon may have patient stop taking aspirin up to a week before surgery if having intracranial, middle ear, posterior eye, spine surgery or prostate surgery  [Patients taking aspirin for coronary stents should be reviewed by an anesthesiologist in the optimization clinic  Please do not discontinue aspirin in patients with coronary stents unless given specific permission to do so by the cardiologist who prescribed medication ]   If your surgeon approves please continue to take this medication on your normal schedule  You may take this medication on the morning of your surgery with a sip of water  Leukotriene Inhibitor Med Class     Continue to take this medication on your normal schedule  If this is an oral medication and you take it in the morning, then you may take this medicine with a sip of water  Methotrexate Med Class     Continue to take this medication on your normal schedule  If this is an oral medication and you take it in the morning, then you may take this medicine with a sip of water  Methylphenidate Med Class     Continue this medication up to the evening before surgery/procedure, but do not take the morning of the day of surgery  NSAID Med Class     Stop taking this medication at least 3 days prior to surgery/procedure  Opioid Med Class     Continue to take this medication on your normal schedule  If this is an oral medication and you take it in the morning, then you may take this medicine with a sip of water  Statin Med Class     Continue to take this medication on your normal schedule  If this is an oral medication and you take it in the morning, then you may take this medicine with a sip of water  Steroids Med Class     Continue to take this medication on your normal schedule    If this is an oral medication and you take it in the morning, then you may take this medicine with a sip of water  Thyroxine Med Class     Continue to take this medication on your normal schedule  If this is an oral medication and you take it in the morning, then you may take this medicine with a sip of water

## 2018-10-03 ENCOUNTER — ANESTHESIA EVENT (OUTPATIENT)
Dept: PERIOP | Facility: HOSPITAL | Age: 63
End: 2018-10-03
Payer: MEDICARE

## 2018-10-04 ENCOUNTER — ANESTHESIA (OUTPATIENT)
Dept: PERIOP | Facility: HOSPITAL | Age: 63
End: 2018-10-04
Payer: MEDICARE

## 2018-10-04 ENCOUNTER — HOSPITAL ENCOUNTER (OUTPATIENT)
Facility: HOSPITAL | Age: 63
Discharge: HOME | End: 2018-10-05
Attending: SURGERY | Admitting: SURGERY
Payer: MEDICARE

## 2018-10-04 DIAGNOSIS — R52 PAIN: Primary | ICD-10-CM

## 2018-10-04 DIAGNOSIS — E04.1 THYROID NODULE: ICD-10-CM

## 2018-10-04 LAB
CALCIUM SERPL-MCNC: 8.2 MG/DL (ref 8.3–10.1)
CALCIUM SERPL-MCNC: 8.5 MG/DL (ref 8.3–10.1)
PLATELET # BLD AUTO: 209 THOUSANDS/UL (ref 149–390)
PMV BLD AUTO: 10.3 FL (ref 8.9–12.7)

## 2018-10-04 PROCEDURE — 88307 TISSUE EXAM BY PATHOLOGIST: CPT | Performed by: PATHOLOGY

## 2018-10-04 PROCEDURE — 60240 REMOVAL OF THYROID: CPT | Performed by: SURGERY

## 2018-10-04 PROCEDURE — 85049 AUTOMATED PLATELET COUNT: CPT | Performed by: SURGERY

## 2018-10-04 PROCEDURE — 82310 ASSAY OF CALCIUM: CPT | Performed by: SURGERY

## 2018-10-04 RX ORDER — MAGNESIUM HYDROXIDE 1200 MG/15ML
LIQUID ORAL AS NEEDED
Status: DISCONTINUED | OUTPATIENT
Start: 2018-10-04 | End: 2018-10-04 | Stop reason: HOSPADM

## 2018-10-04 RX ORDER — LEVOTHYROXINE SODIUM 112 UG/1
112 TABLET ORAL
Status: DISCONTINUED | OUTPATIENT
Start: 2018-10-05 | End: 2018-10-05 | Stop reason: HOSPADM

## 2018-10-04 RX ORDER — DICYCLOMINE HCL 20 MG
20 TABLET ORAL 2 TIMES DAILY
Status: DISCONTINUED | OUTPATIENT
Start: 2018-10-04 | End: 2018-10-05 | Stop reason: HOSPADM

## 2018-10-04 RX ORDER — OXYMORPHONE HYDROCHLORIDE 5 MG/1
5 TABLET ORAL EVERY 6 HOURS PRN
Status: DISCONTINUED | OUTPATIENT
Start: 2018-10-04 | End: 2018-10-04

## 2018-10-04 RX ORDER — ACETAMINOPHEN 325 MG/1
650 TABLET ORAL EVERY 6 HOURS PRN
Status: DISCONTINUED | OUTPATIENT
Start: 2018-10-04 | End: 2018-10-05 | Stop reason: HOSPADM

## 2018-10-04 RX ORDER — PROPOFOL 10 MG/ML
INJECTION, EMULSION INTRAVENOUS AS NEEDED
Status: DISCONTINUED | OUTPATIENT
Start: 2018-10-04 | End: 2018-10-04 | Stop reason: SURG

## 2018-10-04 RX ORDER — SUCCINYLCHOLINE CHLORIDE 20 MG/ML
INJECTION INTRAMUSCULAR; INTRAVENOUS AS NEEDED
Status: DISCONTINUED | OUTPATIENT
Start: 2018-10-04 | End: 2018-10-04 | Stop reason: SURG

## 2018-10-04 RX ORDER — HYDROXYCHLOROQUINE SULFATE 200 MG/1
200 TABLET, FILM COATED ORAL 2 TIMES DAILY WITH MEALS
Status: DISCONTINUED | OUTPATIENT
Start: 2018-10-04 | End: 2018-10-05 | Stop reason: HOSPADM

## 2018-10-04 RX ORDER — BUPROPION HYDROCHLORIDE 150 MG/1
300 TABLET ORAL EVERY MORNING
Status: DISCONTINUED | OUTPATIENT
Start: 2018-10-05 | End: 2018-10-05 | Stop reason: HOSPADM

## 2018-10-04 RX ORDER — OXYMORPHONE HYDROCHLORIDE 5 MG/1
5 TABLET ORAL EVERY 6 HOURS PRN
Status: DISCONTINUED | OUTPATIENT
Start: 2018-10-04 | End: 2018-10-05 | Stop reason: HOSPADM

## 2018-10-04 RX ORDER — FENTANYL CITRATE/PF 50 MCG/ML
50 SYRINGE (ML) INJECTION
Status: DISCONTINUED | OUTPATIENT
Start: 2018-10-04 | End: 2018-10-04 | Stop reason: HOSPADM

## 2018-10-04 RX ORDER — METOCLOPRAMIDE HYDROCHLORIDE 5 MG/ML
10 INJECTION INTRAMUSCULAR; INTRAVENOUS ONCE AS NEEDED
Status: DISCONTINUED | OUTPATIENT
Start: 2018-10-04 | End: 2018-10-04 | Stop reason: HOSPADM

## 2018-10-04 RX ORDER — PANTOPRAZOLE SODIUM 40 MG/1
40 TABLET, DELAYED RELEASE ORAL
Status: DISCONTINUED | OUTPATIENT
Start: 2018-10-04 | End: 2018-10-05 | Stop reason: HOSPADM

## 2018-10-04 RX ORDER — PREDNISONE 1 MG/1
5 TABLET ORAL DAILY
Status: DISCONTINUED | OUTPATIENT
Start: 2018-10-05 | End: 2018-10-05 | Stop reason: HOSPADM

## 2018-10-04 RX ORDER — ALBUTEROL SULFATE 2.5 MG/3ML
2.5 SOLUTION RESPIRATORY (INHALATION) ONCE AS NEEDED
Status: DISCONTINUED | OUTPATIENT
Start: 2018-10-04 | End: 2018-10-04 | Stop reason: HOSPADM

## 2018-10-04 RX ORDER — MIDAZOLAM HYDROCHLORIDE 1 MG/ML
INJECTION INTRAMUSCULAR; INTRAVENOUS AS NEEDED
Status: DISCONTINUED | OUTPATIENT
Start: 2018-10-04 | End: 2018-10-04 | Stop reason: SURG

## 2018-10-04 RX ORDER — HYDROMORPHONE HYDROCHLORIDE 2 MG/ML
INJECTION, SOLUTION INTRAMUSCULAR; INTRAVENOUS; SUBCUTANEOUS AS NEEDED
Status: DISCONTINUED | OUTPATIENT
Start: 2018-10-04 | End: 2018-10-04 | Stop reason: SURG

## 2018-10-04 RX ORDER — OXYCODONE HYDROCHLORIDE 5 MG/1
5 TABLET ORAL EVERY 4 HOURS PRN
Status: DISCONTINUED | OUTPATIENT
Start: 2018-10-04 | End: 2018-10-05 | Stop reason: HOSPADM

## 2018-10-04 RX ORDER — MINERAL OIL AND PETROLATUM 150; 830 MG/G; MG/G
OINTMENT OPHTHALMIC
Status: DISCONTINUED | OUTPATIENT
Start: 2018-10-04 | End: 2018-10-05 | Stop reason: HOSPADM

## 2018-10-04 RX ORDER — SODIUM CHLORIDE, SODIUM LACTATE, POTASSIUM CHLORIDE, CALCIUM CHLORIDE 600; 310; 30; 20 MG/100ML; MG/100ML; MG/100ML; MG/100ML
125 INJECTION, SOLUTION INTRAVENOUS CONTINUOUS
Status: DISCONTINUED | OUTPATIENT
Start: 2018-10-04 | End: 2018-10-05 | Stop reason: HOSPADM

## 2018-10-04 RX ORDER — LIDOCAINE HYDROCHLORIDE 10 MG/ML
INJECTION, SOLUTION INFILTRATION; PERINEURAL AS NEEDED
Status: DISCONTINUED | OUTPATIENT
Start: 2018-10-04 | End: 2018-10-04 | Stop reason: SURG

## 2018-10-04 RX ORDER — OXYCODONE HYDROCHLORIDE 5 MG/1
10 TABLET ORAL EVERY 4 HOURS PRN
Status: DISCONTINUED | OUTPATIENT
Start: 2018-10-04 | End: 2018-10-05 | Stop reason: HOSPADM

## 2018-10-04 RX ORDER — ONDANSETRON 2 MG/ML
4 INJECTION INTRAMUSCULAR; INTRAVENOUS EVERY 6 HOURS PRN
Status: DISCONTINUED | OUTPATIENT
Start: 2018-10-04 | End: 2018-10-05 | Stop reason: HOSPADM

## 2018-10-04 RX ORDER — HEPARIN SODIUM 5000 [USP'U]/ML
5000 INJECTION, SOLUTION INTRAVENOUS; SUBCUTANEOUS EVERY 8 HOURS SCHEDULED
Status: DISCONTINUED | OUTPATIENT
Start: 2018-10-04 | End: 2018-10-05 | Stop reason: HOSPADM

## 2018-10-04 RX ORDER — SODIUM CHLORIDE, SODIUM LACTATE, POTASSIUM CHLORIDE, CALCIUM CHLORIDE 600; 310; 30; 20 MG/100ML; MG/100ML; MG/100ML; MG/100ML
50 INJECTION, SOLUTION INTRAVENOUS CONTINUOUS
Status: DISCONTINUED | OUTPATIENT
Start: 2018-10-04 | End: 2018-10-05 | Stop reason: HOSPADM

## 2018-10-04 RX ORDER — MONTELUKAST SODIUM 10 MG/1
10 TABLET ORAL DAILY
Status: DISCONTINUED | OUTPATIENT
Start: 2018-10-05 | End: 2018-10-05 | Stop reason: HOSPADM

## 2018-10-04 RX ORDER — PROMETHAZINE HYDROCHLORIDE 25 MG/ML
12.5 INJECTION, SOLUTION INTRAMUSCULAR; INTRAVENOUS ONCE AS NEEDED
Status: DISCONTINUED | OUTPATIENT
Start: 2018-10-04 | End: 2018-10-04 | Stop reason: HOSPADM

## 2018-10-04 RX ORDER — FENTANYL CITRATE 50 UG/ML
INJECTION, SOLUTION INTRAMUSCULAR; INTRAVENOUS AS NEEDED
Status: DISCONTINUED | OUTPATIENT
Start: 2018-10-04 | End: 2018-10-04 | Stop reason: SURG

## 2018-10-04 RX ORDER — ONDANSETRON 2 MG/ML
INJECTION INTRAMUSCULAR; INTRAVENOUS AS NEEDED
Status: DISCONTINUED | OUTPATIENT
Start: 2018-10-04 | End: 2018-10-04 | Stop reason: SURG

## 2018-10-04 RX ORDER — ROCURONIUM BROMIDE 10 MG/ML
INJECTION, SOLUTION INTRAVENOUS AS NEEDED
Status: DISCONTINUED | OUTPATIENT
Start: 2018-10-04 | End: 2018-10-04 | Stop reason: SURG

## 2018-10-04 RX ORDER — ASPIRIN 81 MG/1
81 TABLET ORAL DAILY
Status: DISCONTINUED | OUTPATIENT
Start: 2018-10-05 | End: 2018-10-05 | Stop reason: HOSPADM

## 2018-10-04 RX ORDER — ONDANSETRON 2 MG/ML
4 INJECTION INTRAMUSCULAR; INTRAVENOUS ONCE AS NEEDED
Status: DISCONTINUED | OUTPATIENT
Start: 2018-10-04 | End: 2018-10-04 | Stop reason: HOSPADM

## 2018-10-04 RX ORDER — PRAVASTATIN SODIUM 80 MG/1
80 TABLET ORAL
Status: DISCONTINUED | OUTPATIENT
Start: 2018-10-04 | End: 2018-10-05 | Stop reason: HOSPADM

## 2018-10-04 RX ORDER — CHLORDIAZEPOXIDE HYDROCHLORIDE 5 MG/1
5 CAPSULE, GELATIN COATED ORAL 2 TIMES DAILY
Status: DISCONTINUED | OUTPATIENT
Start: 2018-10-04 | End: 2018-10-05 | Stop reason: HOSPADM

## 2018-10-04 RX ORDER — HYDROMORPHONE HCL/PF 1 MG/ML
0.4 SYRINGE (ML) INJECTION
Status: COMPLETED | OUTPATIENT
Start: 2018-10-04 | End: 2018-10-04

## 2018-10-04 RX ORDER — PREGABALIN 75 MG/1
75 CAPSULE ORAL 2 TIMES DAILY
Status: DISCONTINUED | OUTPATIENT
Start: 2018-10-04 | End: 2018-10-05 | Stop reason: HOSPADM

## 2018-10-04 RX ORDER — BUPIVACAINE HYDROCHLORIDE 2.5 MG/ML
INJECTION, SOLUTION INFILTRATION; PERINEURAL AS NEEDED
Status: DISCONTINUED | OUTPATIENT
Start: 2018-10-04 | End: 2018-10-04 | Stop reason: HOSPADM

## 2018-10-04 RX ORDER — GLYCOPYRROLATE 0.2 MG/ML
INJECTION INTRAMUSCULAR; INTRAVENOUS AS NEEDED
Status: DISCONTINUED | OUTPATIENT
Start: 2018-10-04 | End: 2018-10-04 | Stop reason: SURG

## 2018-10-04 RX ADMIN — SODIUM CHLORIDE, SODIUM LACTATE, POTASSIUM CHLORIDE, AND CALCIUM CHLORIDE 125 ML/HR: .6; .31; .03; .02 INJECTION, SOLUTION INTRAVENOUS at 12:58

## 2018-10-04 RX ADMIN — DICYCLOMINE HYDROCHLORIDE 20 MG: 20 TABLET ORAL at 17:30

## 2018-10-04 RX ADMIN — LIDOCAINE HYDROCHLORIDE 50 MG: 10 INJECTION, SOLUTION INFILTRATION; PERINEURAL at 10:36

## 2018-10-04 RX ADMIN — NEOSTIGMINE METHYLSULFATE 3 MG: 1 INJECTION, SOLUTION INTRAMUSCULAR; INTRAVENOUS; SUBCUTANEOUS at 10:40

## 2018-10-04 RX ADMIN — ONDANSETRON 4 MG: 2 INJECTION INTRAMUSCULAR; INTRAVENOUS at 10:23

## 2018-10-04 RX ADMIN — SODIUM CHLORIDE, SODIUM LACTATE, POTASSIUM CHLORIDE, AND CALCIUM CHLORIDE: .6; .31; .03; .02 INJECTION, SOLUTION INTRAVENOUS at 10:44

## 2018-10-04 RX ADMIN — CHLORDIAZEPOXIDE HYDROCHLORIDE 5 MG: 5 CAPSULE ORAL at 17:50

## 2018-10-04 RX ADMIN — PRAVASTATIN SODIUM 80 MG: 80 TABLET ORAL at 17:30

## 2018-10-04 RX ADMIN — HYDROMORPHONE HYDROCHLORIDE 0.2 MG: 2 INJECTION, SOLUTION INTRAMUSCULAR; INTRAVENOUS; SUBCUTANEOUS at 09:24

## 2018-10-04 RX ADMIN — TRAZODONE HYDROCHLORIDE 150 MG: 100 TABLET ORAL at 21:39

## 2018-10-04 RX ADMIN — HEPARIN SODIUM 5000 UNITS: 5000 INJECTION, SOLUTION INTRAVENOUS; SUBCUTANEOUS at 21:39

## 2018-10-04 RX ADMIN — GLYCOPYRROLATE 0.6 MG: 0.2 INJECTION, SOLUTION INTRAMUSCULAR; INTRAVENOUS at 10:40

## 2018-10-04 RX ADMIN — SODIUM CHLORIDE, SODIUM LACTATE, POTASSIUM CHLORIDE, AND CALCIUM CHLORIDE 125 ML/HR: .6; .31; .03; .02 INJECTION, SOLUTION INTRAVENOUS at 18:30

## 2018-10-04 RX ADMIN — MIDAZOLAM 2 MG: 1 INJECTION INTRAMUSCULAR; INTRAVENOUS at 08:14

## 2018-10-04 RX ADMIN — HYDROMORPHONE HYDROCHLORIDE 0.4 MG: 1 INJECTION, SOLUTION INTRAMUSCULAR; INTRAVENOUS; SUBCUTANEOUS at 12:33

## 2018-10-04 RX ADMIN — ROCURONIUM BROMIDE 20 MG: 10 INJECTION INTRAVENOUS at 08:41

## 2018-10-04 RX ADMIN — FENTANYL CITRATE 50 MCG: 50 INJECTION, SOLUTION INTRAMUSCULAR; INTRAVENOUS at 08:38

## 2018-10-04 RX ADMIN — PROPOFOL 120 MG: 10 INJECTION, EMULSION INTRAVENOUS at 08:15

## 2018-10-04 RX ADMIN — HYDROMORPHONE HYDROCHLORIDE 0.2 MG: 2 INJECTION, SOLUTION INTRAMUSCULAR; INTRAVENOUS; SUBCUTANEOUS at 09:42

## 2018-10-04 RX ADMIN — DEXTRAN 70 AND HYPROMELLOSE 2910 1 DROP: 1; 3 SOLUTION/ DROPS OPHTHALMIC at 17:24

## 2018-10-04 RX ADMIN — HYDROMORPHONE HYDROCHLORIDE 0.2 MG: 2 INJECTION, SOLUTION INTRAMUSCULAR; INTRAVENOUS; SUBCUTANEOUS at 09:37

## 2018-10-04 RX ADMIN — HYDROMORPHONE HYDROCHLORIDE 0.4 MG: 1 INJECTION, SOLUTION INTRAMUSCULAR; INTRAVENOUS; SUBCUTANEOUS at 12:08

## 2018-10-04 RX ADMIN — SODIUM CHLORIDE, SODIUM LACTATE, POTASSIUM CHLORIDE, AND CALCIUM CHLORIDE 125 ML/HR: .6; .31; .03; .02 INJECTION, SOLUTION INTRAVENOUS at 07:48

## 2018-10-04 RX ADMIN — HYDROMORPHONE HYDROCHLORIDE 0.2 MG: 2 INJECTION, SOLUTION INTRAMUSCULAR; INTRAVENOUS; SUBCUTANEOUS at 09:06

## 2018-10-04 RX ADMIN — HYDROMORPHONE HYDROCHLORIDE 0.2 MG: 2 INJECTION, SOLUTION INTRAMUSCULAR; INTRAVENOUS; SUBCUTANEOUS at 10:09

## 2018-10-04 RX ADMIN — HYDROMORPHONE HYDROCHLORIDE 0.4 MG: 1 INJECTION, SOLUTION INTRAMUSCULAR; INTRAVENOUS; SUBCUTANEOUS at 12:17

## 2018-10-04 RX ADMIN — HYDROXYCHLOROQUINE SULFATE 200 MG: 200 TABLET, FILM COATED ORAL at 17:50

## 2018-10-04 RX ADMIN — HYDROMORPHONE HYDROCHLORIDE 0.4 MG: 1 INJECTION, SOLUTION INTRAMUSCULAR; INTRAVENOUS; SUBCUTANEOUS at 12:01

## 2018-10-04 RX ADMIN — SUCCINYLCHOLINE CHLORIDE 100 MG: 20 INJECTION, SOLUTION INTRAMUSCULAR; INTRAVENOUS at 08:15

## 2018-10-04 RX ADMIN — DEXAMETHASONE SODIUM PHOSPHATE 10 MG: 10 INJECTION INTRAMUSCULAR; INTRAVENOUS at 08:18

## 2018-10-04 RX ADMIN — PANTOPRAZOLE SODIUM 40 MG: 40 TABLET, DELAYED RELEASE ORAL at 17:03

## 2018-10-04 RX ADMIN — FENTANYL CITRATE 50 MCG: 50 INJECTION, SOLUTION INTRAMUSCULAR; INTRAVENOUS at 08:15

## 2018-10-04 RX ADMIN — FENTANYL CITRATE 50 MCG: 50 INJECTION, SOLUTION INTRAMUSCULAR; INTRAVENOUS at 11:43

## 2018-10-04 RX ADMIN — LIDOCAINE HYDROCHLORIDE 50 MG: 10 INJECTION, SOLUTION INFILTRATION; PERINEURAL at 08:15

## 2018-10-04 RX ADMIN — DEXTRAN 70 AND HYPROMELLOSE 2910 1 DROP: 1; 3 SOLUTION/ DROPS OPHTHALMIC at 14:20

## 2018-10-04 RX ADMIN — FENTANYL CITRATE 50 MCG: 50 INJECTION, SOLUTION INTRAMUSCULAR; INTRAVENOUS at 11:23

## 2018-10-04 RX ADMIN — HYDROMORPHONE HYDROCHLORIDE 0.4 MG: 1 INJECTION, SOLUTION INTRAMUSCULAR; INTRAVENOUS; SUBCUTANEOUS at 11:53

## 2018-10-04 RX ADMIN — PREGABALIN 75 MG: 75 CAPSULE ORAL at 17:50

## 2018-10-04 RX ADMIN — PHENYLEPHRINE HYDROCHLORIDE 50 MCG/MIN: 10 INJECTION INTRAVENOUS at 08:42

## 2018-10-04 NOTE — ANESTHESIA PREPROCEDURE EVALUATION
Patient noted to have bilateral conjunctival injection and eye pain  Opthalmology consultation obtained       Taisha Ramos MD

## 2018-10-04 NOTE — PERIOPERATIVE NURSING NOTE
Palomo Fortune will consult with Dr Gill Moody from anesthesia to have patient evaluated with Kindra Mtz in PACU  Pt complains of pain a "10" burning in her eyes  Waiting room called to update family, family not there at this time

## 2018-10-04 NOTE — OP NOTE
OPERATIVE REPORT  PATIENT NAME: Todd Mae    :    MRN: [id number]  Pt Location: [facility] OR ROOM 07    SURGERY DATE: 10/4/2018    Surgeon(s) and Role:     * Jasmina Smith MD - Primary     * Dang Garcia MD - Assisting    Preop Diagnosis:  Thyroid nodule [E04 1]    Post-Op Diagnosis Codes: * Thyroid nodule [E04 1]    Procedure(s) (LRB):  TOTAL THYROIDECTOMY (N/A)    Specimen(s):  ID Type Source Tests Collected by Time Destination   1 : Stitch marks left Tissue Thyroid TISSUE EXAM Jasmina Smith MD 10/4/2018 0845        Estimated Blood Loss:   25 mL    Drains:       Anesthesia Type:   General    Operative Indications:  Thyroid nodule [E04 1]  bilateral    Operative Findings:  Bilateral thyroid nodules    Complications:   None    Procedure and Technique:  The patient was brought into the operating room and identified by a proper timeout  Following this she was intubated by the anesthesia team   She was then positioned with a shoulder roll behind the scapula and the head in the sniffing position  The neck was then prepped and draped in the usual fashion  Following this, the skin at and around the planned cervical incision site was anesthetized with lidocaine  Next, a 4 cm incision was made 2 fingerbreadths above the sternal notch  Cautery was used to dissect through the dermis and subcutaneous fat  The platysma was transected with cautery  We then created flaps superiorly and inferiorly underneath the platysma  Gelpi retractors were then placed for exposure  We then proceeded to look for the strap muscles  We were able to visualize strap muscles and divided them to expose the thyroid  We first focused on the left thyroid lobe  The strap muscles were mobilized them off the anterolateral aspect of the thyroid lobe  Using traction on the superior pole we were able to take down the superior pole vessels with Harmonic Scalpel  We rotated the gland anteromedially    We saw what appeared to be the parathyroid glands which were visualized and preserved by means of close capsular dissection using bipolar device  We visualized recurrent laryngeal nerve as we rolled the small lobe forward  The ligament of berry was then clamped and tied off with a 2-0 vicryl suture  The lobe was then freed from anterior surface of the trachea with cautery  Valsalva was applied in the operative Field inspected for bleeding  None was seen  Surgicel was placed in the operative field  We then focused on the right thyroid lobe  The strap muscles were mobilized them off the anterolateral aspect of the thyroid lobe  Using traction on the superior pole we were able to take down the superior pole vessels with Harmonic Scalpel  We rotated the gland anteromedially  We saw what appeared to be the parathyroid glands which were visualized and preserved by means of close capsular dissection using bipolar device  We visualized recurrent laryngeal nerve as we rolled the small lobe forward  The ligament of berry was then clamped and tied off with a 2-0 vicryl suture  The lobe was then freed from anterior surface of the trachea with cautery and the gland was then sent to pathology for processing  Valsalva was applied in the operative Field inspected for bleeding  None was seen  Surgicel was placed in the operative field  Once we were sure of hemostasis, closure was performed using 3-0 Vicryl to close the strap muscles in the midline, followed by running 3-0 Vicryl to close the platysma, followed by a 4-0 vicryl to close the dermis in interrupted fashion, followed by 5-0 Monocryl placed in running subcuticular fashion to close the skin  Benzoin and steristrips were applied to the incision, followed by gauze and a blue towel  The patient tolerated the procedure well without any difficulties     I was present for the entire procedure    Patient Disposition:  PACU     SIGNATURE: Gary Solitario MD  DATE: October 4, 2018  TIME: 11:07 AM

## 2018-10-04 NOTE — PERIOPERATIVE NURSING NOTE
Escalante lamp performed, no further orders  Dr Danielle Solis ok with patient going to floor at this time

## 2018-10-04 NOTE — PROGRESS NOTES
Patient with c/o of eyes burning bilaterally  Eye red , cold compress on eyes  Dr Kern Spine into see and examine patient

## 2018-10-04 NOTE — ANESTHESIA PREPROCEDURE EVALUATION
Patient examined with wood's lamp and bilateral fluorescein drops to the eyes  No evidence of corneal abrasions bilaterally       Christina Pereyra MD            Anesthesia Plan  ASA Score- 3     Anesthesia Type-     Plan Factors-    Induction-     Postoperative Plan-     Informed Consent-

## 2018-10-04 NOTE — ANESTHESIA POSTPROCEDURE EVALUATION
Post-Op Assessment Note      CV Status:  Stable    Mental Status:  Alert and somnolent    Hydration Status:  Euvolemic    PONV Controlled:  Controlled    Airway Patency:  Patent    Post Op Vitals Reviewed: Yes          Staff: CRNA, Anesthesiologist           BP   118/66   Temp 98 2 °F (36 8 °C) (10/04/18 1100)    Pulse 98 (10/04/18 1100)   Resp 12 (10/04/18 1100)    SpO2   98 on 2L

## 2018-10-04 NOTE — ANESTHESIA PREPROCEDURE EVALUATION
Review of Systems/Medical History  Patient summary reviewed  Chart reviewed  No history of anesthetic complications     Cardiovascular  EKG reviewed, PLAZA,   Comment: Normal sinus rhythm  Normal ECG  When compared with ECG of 01-MAY-2012 13:23,  No significant change was found  Confirmed by [facility] ([id number]) on 9/7/2018 8:36:30 AM    Specimen Collected: 09/07/18 07:42  Last Resulted: 09/07/18 08:36      ,  Pulmonary  Shortness of breath,        GI/Hepatic    GERD ,             Endo/Other  History of thyroid disease ,      GYN       Hematology   Musculoskeletal       Neurology   Psychology           Physical Exam    Airway    Mallampati score: II  TM Distance: >3 FB  Neck ROM: full     Dental   No notable dental hx     Cardiovascular  Cardiovascular exam normal    Pulmonary  Pulmonary exam normal Breath sounds clear to auscultation,     Other Findings        Anesthesia Plan  ASA Score- 2     Anesthesia Type- general with ASA Monitors  Additional Monitors:   Airway Plan: ETT  Plan Factors- Patient instructed to abstain from smoking on day of procedure       Induction- intravenous  Postoperative Plan- Plan for postoperative opioid use  Planned trial extubation    Informed Consent- Anesthetic plan and risks discussed with patient  I personally reviewed this patient with the CRNA  Discussed and agreed on the Anesthesia Plan with the CRNA             Lab Results   Component Value Date    WBC 5 76 09/07/2018    HGB 12 6 09/07/2018    HCT 38 9 09/07/2018    MCV 92 09/07/2018     09/07/2018     Lab Results   Component Value Date    CALCIUM 8 6 09/07/2018     09/07/2018    K 4 2 09/07/2018    CO2 33 (H) 09/07/2018     09/07/2018    BUN 23 09/07/2018    CREATININE 0 76 09/07/2018     Lab Results   Component Value Date    INR 1 03 09/07/2018    PROTIME 13 0 09/07/2018     Lab Results   Component Value Date    PTT 25 09/07/2018           I, Dr Gina Albarran, the attending physician, have personally seen and evaluated the patient prior to anesthetic care  I have reviewed the pre-anesthetic record, and other medical records if appropriate to the anesthetic care  If a CRNA is involved in the case, I have reviewed the CRNA assessment, if present, and agree  The patient is in a suitable condition to proceed with my formulated anesthetic plan

## 2018-10-04 NOTE — RESTORATIVE TECHNICIAN NOTE
Restorative Specialist Mobility Note       Activity: Ambulate in room, Ambulate in kennedy, Bathroom privileges, Dangle, Stand at bedside (Educated/encouraged pt to ambulate with assistance 3-4 x's/day   Pt callbell, phone/tray within reach )     Assistive Device: None    Rocío Caceres BS, Restorative Technician, [employer]

## 2018-10-04 NOTE — H&P (VIEW-ONLY)
Surgical Oncology Follow Up       [address] [place] [address] SURGICAL ONCOLOGY [facility] [address]  [place] [address]  [facility] [address]    Counts include 234 beds at the [facility]  1955  [id number]  [address] [address]    Chief Complaint   Patient presents with   Catalina Goddard Consult     Patient is here for thyroid nodule consult  Assessment/Plan:    No problem-specific Assessment & Plan notes found for this encounter  Diagnoses and all orders for this visit:    Thyroid nodule  -     Case request operating room: THYROIDECTOMY; Standing  -     Basic metabolic panel; Future  -     APTT; Future  -     CBC and Platelet; Future  -     Protime-INR; Future  -     EKG 12 lead; Future  -     XR chest pa & lateral; Future  -     Case request operating room: TOTAL THYROIDECTOMY    Other orders  -     Incentive spirometry; Standing  -     Insert and maintain IV line; Standing  -     Void On-Call to O R ; Standing  -     Place sequential compression device; Standing        Advance Care Planning/Advance Directives:  Discussed disease status, cancer treatment plans and/or cancer treatment goals with the patient  No history exists  History of Present Illness:   Patient is a 41-year-old woman with a left-sided thyroid nodule  This found on physical exam by Dr Windy Morgan  No radiation exposure to the head or neck area  She has difficulty swallowing, this is attributable to Sjogren's disease, for which she undergoes esophageal dilatation on a periodic basis  No personal or family history of endocrine malignancies  Review of Systems   Constitutional: Negative  HENT: Negative  Eyes: Negative  Respiratory: Negative  Cardiovascular: Negative  Gastrointestinal: Negative  Endocrine: Negative for cold intolerance and heat intolerance  Genitourinary: Negative  Musculoskeletal: Negative  Skin: Negative  Allergic/Immunologic: Negative  Neurological: Negative  Hematological: Bruises/bleeds easily  Psychiatric/Behavioral: Negative  Patient Active Problem List   Diagnosis    Bilateral hand numbness    Methotrexate, long term, current use    Pain    [facility]    Preoperative clearance    Weakness    Thyroid nodule    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[facility] )    Thyroid condition     Past Medical History:   Diagnosis Date    Cancer ([facility] )     non-Hodg   Cellulitis of foot     Last assessed 10/24/16    Change in mental status     Last assessed 12/01/15    Chronic fatigue     PLAZA (dyspnea on exertion)     Last assessed 12/01/15    Fibromyalgia     Folliculitis     Last assessed 10/06/14    GERD (gastroesophageal reflux disease)     Systemic sclerosis (HCC)      Past Surgical History:   Procedure Laterality Date    BACK SURGERY      BLADDER SURGERY      BONE MARROW BIOPSY      HEMORRHOID SURGERY      HYSTERECTOMY      NASAL SEPTUM SURGERY      ROTATOR CUFF REPAIR      TONSILLECTOMY       Family History   Problem Relation Age of Onset    Arthritis Mother     Diabetes Mother     Coronary artery disease Father     Arthritis Sister     Asthma Sister     Crohn's disease Sister     Other Brother         myocardial ischemia     Social History     Social History    Marital status:      Spouse name: N/A    Number of children: N/A    Years of education: N/A     Occupational History    Not on file       Social History Main Topics    Smoking status: Former Smoker    Smokeless tobacco: Not on file    Alcohol use No    Drug use: No    Sexual activity: Not on file     Other Topics Concern    Not on file     Social History Narrative    No narrative on file       Current Outpatient Prescriptions:     amphetamine-dextroamphetamine (ADDERALL XR) 30 MG 24 hr capsule, Take 1 capsule (30 mg total) by mouth daily Max Daily Amount: 30 mg, Disp: 30 capsule, Rfl: 0    amphetamine-dextroamphetamine (ADDERALL) 20 mg tablet, Take 1 tablet (20 mg total) by mouth 2 (two) times a day Max Daily Amount: 40 mg, Disp: 60 tablet, Rfl: 0    aspirin (ECOTRIN LOW STRENGTH) 81 mg EC tablet, Take 81 mg by mouth daily, Disp: , Rfl:     buPROPion (WELLBUTRIN XL) 300 mg 24 hr tablet, Take 300 mg by mouth, Disp: , Rfl:     chlordiazePOXIDE (LIBRIUM) 5 mg capsule, Take 5 mg by mouth 2 (two) times a day  , Disp: , Rfl:     cycloSPORINE (RESTASIS) 0 05 % ophthalmic emulsion, Restasis 0 05 % eye drops in a dropperette, Disp: , Rfl:     dicyclomine (BENTYL) 10 mg capsule, Take 10 mg by mouth 2 (two) times a day  , Disp: , Rfl:     esomeprazole (NexIUM) 40 MG capsule, Take 40 mg by mouth 2 (two) times a day before meals  , Disp: , Rfl:     fluticasone (FLONASE) 50 mcg/act nasal spray, fluticasone 50 mcg/actuation nasal spray,suspension, Disp: , Rfl:     folic acid (FOLVITE) 1 mg tablet, Take by mouth daily, Disp: , Rfl:     hydroxychloroquine (PLAQUENIL) 200 mg tablet, Take 200 mg by mouth 2 (two) times a day with meals  , Disp: , Rfl:     methotrexate 2 5 MG tablet, Take by mouth once a week  , Disp: , Rfl:     montelukast (SINGULAIR) 10 mg tablet, Take 1 tablet (10 mg total) by mouth daily, Disp: 30 tablet, Rfl: 5    naproxen (NAPROSYN) 500 mg tablet, Take 500 mg by mouth 2 (two) times a day with meals  , Disp: , Rfl:     oxymorphone (OPANA) 5 MG tablet, Take 1 tablet (5 mg total) by mouth every 6 (six) hours as needed for moderate pain Max Daily Amount: 20 mg, Disp: 120 tablet, Rfl: 0    predniSONE 5 mg tablet, , Disp: , Rfl:     pregabalin (LYRICA) 75 mg capsule, 2 (two) times a day   Reported on 11/21/2016 , Disp: , Rfl:     psyllium (METAMUCIL) 0 52 g capsule, Metamucil, Disp: , Rfl:     silver sulfadiazine (SILVADENE,SSD) 1 % cream, silver sulfadiazine 1 % topical cream, Disp: , Rfl:     simvastatin (ZOCOR) 40 mg tablet, Take 1 tablet (40 mg total) by mouth daily, Disp: 30 tablet, Rfl: 5    SODIUM FLUORIDE, DENTAL GEL, (PREVIDENT) 1 1 % GEL, PreviDent 5000 Dry Mouth 1 1 % gel, Disp: , Rfl:     traZODone (DESYREL) 150 mg tablet, TAKE 1 TABLET BY MOUTH DAILY AT BEDTIME, Disp: 30 tablet, Rfl: 5    triamcinolone (KENALOG) 0 1 % cream, Apply topically 2 (two) times a day, Disp: 30 g, Rfl: 0  Allergies   Allergen Reactions    Duloxetine Other (See Comments)     Mental psychosis    Revatio [Sildenafil] Other (See Comments)     mental status changes    Savella [Milnacipran] Other (See Comments)     Feeling out of it    Sulfamethoxazole-Trimethoprim Rash and GI Intolerance    Tedizolid Rash     Vitals:    09/05/18 0947   BP: 130/72   Pulse: 74   Resp: 16   Temp: 98 1 °F (36 7 °C)       Physical Exam   Constitutional: She is oriented to person, place, and time  She appears well-developed  HENT:   Head: Normocephalic and atraumatic  Right Ear: External ear normal    Left Ear: External ear normal    Eyes: Conjunctivae are normal  Pupils are equal, round, and reactive to light  Neck: Normal range of motion  Neck supple  No JVD present  No tracheal deviation present  No thyromegaly present  Palpable left thyroid nodule   Cardiovascular: Normal rate, regular rhythm, normal heart sounds and intact distal pulses  Pulmonary/Chest: Effort normal and breath sounds normal  No stridor  Abdominal: Soft  Bowel sounds are normal    Musculoskeletal: Normal range of motion  Lymphadenopathy:     She has no cervical adenopathy  Neurological: She is alert and oriented to person, place, and time  Skin: Skin is warm and dry  Psychiatric: She has a normal mood and affect   Her behavior is normal  Judgment and thought content normal        Pathology:  Case Report   Non-gynecologic Cytology                          Case: [id number]                                   Authorizing Provider: Caity Underwood MD         Collected:           08/06/2018 1129             Ordering Location:     [facility] Received:            08/06/2018 1151                                      Ultrasound                                                                    Pathologist:           Vanessa Prescott MD                                                                Specimens:   A) - Thyroid, Left                                                                                   B) - Thyroid, Left                                                                         Final Diagnosis   A,B  Thyroid, Left (ThinPrep and smear preparations): Atypia of Undetermined Significance ([place] Category III) - See note      Satisfactory for evaluation, scant cellularity      Note:  - Few sheets and clusters of cells with dispersed chromatin, few grooves interspersed with non-atypical follicular cells and scant colloid      (1) As reported in the [facility] for Reporting Thyroid Cytopathology*, this diagnostic category has demonstrated anywhere from 10-30% risk of malignancy being found in subsequent resections and/or FNA  This risk of malignancy is expected to change due to the usage of the surgical pathology diagnosis of non-invasive follicular thyroid neoplasm with papillary-like nuclear features (NIFTP)    The anticipated risk of malignancy secondary to NIFTP is 6-18%  The manual reports that the usual management following this diagnosis is repeat FNA, molecular testing, or lobectomy   Ultimately, clinical/imaging correlation for this patient is needed in arriving at the actual management plan      *The [place] System for Reporting Thyroid Cytopathology, Denzel Tang ), 2018 (2nd ed )      Electronically signed by Vanessa Prescott MD on 8/8/2018 at  8:41 AM   Note   The treating physician has requested a sample(s) from the above thyroid nodule(s) be sent for analysis by  [facility] Gene Expression  ([facility] GE), performed by [facility] [place], [place])  [employer] only performs this test on specimen(s) receiving a cytologic diagnosis of [place] Category III or  IV  If such a diagnosis is rendered (above) specimen will be sent to [facility], and a separate report with  results of [facility] will follow directly from [employer] (typically taking 14 days)  If a cytologic  interpretation other than [place] category III or IV is rendered, specimen will not be sent for [facility]          Gross Description   A  Thyroid, Left, : 20ml  Bloody, received in CytoLyt     B  Thyroid, Left, : 6 slides received ( 3 diff quik / 3 alcohol fixed )            Clinical Information   History of thyroid nodule to left side      Additional Information   [employer]'s FDA approved ,  and ThinPrep Imaging System are utilized with strict adherence to the 's instruction manual to prepare gynecologic and non-gynecologic cytology specimens for the production of ThinPrep slides as well as for gynecologic ThinPrep imaging  These processes have been validated by our laboratory and/or by the       These tests were developed and their performance characteristics determined by [facility] or [facility]  They may not be cleared or approved by the [place]  Food and Drug Administration  The FDA has determined that such clearance or approval is not necessary  These tests are used for clinical purposes  They should not be regarded as investigational or for research  This laboratory has been approved by CLIA 88, designated as a high-complexity laboratory and is qualified to perform these tests  Afirma testing done is suspicious, BRAF positive    Labs:  none    Imaging  Us Guided Thyroid Biopsy    Result Date: 8/8/2018  Narrative: ULTRASOUND-GUIDED THYROID BIOPSY HISTORY: 58year-old with history of scleroderma  COMPARISON: Thyroid ultrasound from 7/3/2018 FINDINGS: Prior ultrasound images were reviewed    Thyroid not visible on any older studies The procedure was explained to the patient, including risks of hemorrhage, infection and local injury  Informed consent was freely obtained  The patient verbalized expressed understanding of the above risks and wished to proceed with the procedure  Final standard "time-out" procedure performed  PROCEDURE: The neck was prepped and draped in normal sterile fashion  Under real-time ultrasound guidance and local anesthesia, 5 passes with a 27 gauge needle were made through the left, posterior, mid  The patient tolerated the procedure well  Postprocedure instructions were provided for the patient  I asked the patient to call us with any questions, concerns, or acute problems  The patient expressed understanding of the above  Ultrasound was used to evaluate the nodule as a potential biopsy site  Static and real-time images of needle entry were obtained  Impression: Status post successful ultrasound-guided thyroid biopsy  Workstation performed: [id number]     I reviewed the above laboratory and imaging data  Discussion/Summary:  Left-sided thyroid nodule, suspicious on recent thyroid biopsy using a firm a testing  Treatment options discussed with patient  Recommendation is for left hemithyroidectomy, at the very least, versus total thyroidectomy  Rationale for this along with risks and benefits of surgery including infection, bleeding, parathyroid and recurrent nerve injury, discussed with patient  She understands the plan and wishes to proceed with surgery as outlined  Ultimately, she opted for total thyroidectomy given biopsy results, and her desire to avoid a potential 2nd operation  I believe this is a reasonable treatment plan for her  Will obtain preoperative cervical mapping to evaluate lymph nodes

## 2018-10-05 VITALS
HEIGHT: 61 IN | HEART RATE: 81 BPM | TEMPERATURE: 98.6 F | WEIGHT: 108.47 LBS | BODY MASS INDEX: 20.48 KG/M2 | SYSTOLIC BLOOD PRESSURE: 120 MMHG | OXYGEN SATURATION: 97 % | RESPIRATION RATE: 14 BRPM | DIASTOLIC BLOOD PRESSURE: 65 MMHG

## 2018-10-05 LAB — CALCIUM SERPL-MCNC: 8 MG/DL (ref 8.3–10.1)

## 2018-10-05 PROCEDURE — 82310 ASSAY OF CALCIUM: CPT | Performed by: SURGERY

## 2018-10-05 RX ORDER — CALCIUM CARBONATE 200(500)MG
2 TABLET,CHEWABLE ORAL DAILY
Qty: 60 TABLET | Refills: 0
Start: 2018-10-05 | End: 2021-02-04

## 2018-10-05 RX ADMIN — ASPIRIN 81 MG: 81 TABLET, COATED ORAL at 09:57

## 2018-10-05 RX ADMIN — CHLORDIAZEPOXIDE HYDROCHLORIDE 5 MG: 5 CAPSULE ORAL at 09:57

## 2018-10-05 RX ADMIN — MONTELUKAST SODIUM 10 MG: 10 TABLET, FILM COATED ORAL at 09:57

## 2018-10-05 RX ADMIN — HEPARIN SODIUM 5000 UNITS: 5000 INJECTION, SOLUTION INTRAVENOUS; SUBCUTANEOUS at 06:01

## 2018-10-05 RX ADMIN — PREDNISONE 5 MG: 5 TABLET ORAL at 09:57

## 2018-10-05 RX ADMIN — HYDROXYCHLOROQUINE SULFATE 200 MG: 200 TABLET, FILM COATED ORAL at 07:33

## 2018-10-05 RX ADMIN — PANTOPRAZOLE SODIUM 40 MG: 40 TABLET, DELAYED RELEASE ORAL at 06:01

## 2018-10-05 RX ADMIN — OXYMORPHONE HYDROCHLORIDE 5 MG: 5 TABLET ORAL at 07:32

## 2018-10-05 RX ADMIN — PREGABALIN 75 MG: 75 CAPSULE ORAL at 09:57

## 2018-10-05 RX ADMIN — OXYMORPHONE HYDROCHLORIDE 5 MG: 5 TABLET ORAL at 02:24

## 2018-10-05 RX ADMIN — DICYCLOMINE HYDROCHLORIDE 20 MG: 20 TABLET ORAL at 09:58

## 2018-10-05 RX ADMIN — LEVOTHYROXINE SODIUM 112 MCG: 112 TABLET ORAL at 06:01

## 2018-10-05 RX ADMIN — DEXTRAN 70 AND HYPROMELLOSE 2910 1 DROP: 1; 3 SOLUTION/ DROPS OPHTHALMIC at 10:08

## 2018-10-05 NOTE — SOCIAL WORK
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, and while she is IP, her brother and sister and providing care  PTA pt was independent with ADL's and did not require the use of DME for ambulation  Pt reports no hx of VNA/STR services  Pt is able to drive and uses [facility] in [place]  Pt sees PCP Dr Zan Albrecht  Pt reports no hx of d/a rehabilitation or mental health issues  Pt's primary contact is he friend Mariella Esteves [phone]  Hernando Maguire will be providing pt with transportation home  CM reviewed d/c planning process including the following: identifying help at home, patient preference for d/c planning needs, Discharge Lounge, [facility] Meds to Bed program, availability of treatment team to discuss questions or concerns patient and/or family may have regarding understanding medications and recognizing signs and symptoms once discharged  CM also encouraged patient to follow up with all recommended appointments after discharge  Patient advised of importance for patient and family to participate in managing patients medical well being  Patient/caregiver received discharge checklist   Content reviewed  Patient/caregiver encouraged to participate in discharge plan of care prior to discharge home

## 2018-10-05 NOTE — PLAN OF CARE
GASTROINTESTINAL - ADULT     Minimal or absence of nausea and/or vomiting Adequate for Discharge     Maintains adequate nutritional intake Adequate for Discharge        GENITOURINARY - ADULT     Absence of urinary retention Adequate for Discharge        PAIN - ADULT     Verbalizes/displays adequate comfort level or baseline comfort level Adequate for Discharge        RESPIRATORY - ADULT     Achieves optimal ventilation and oxygenation Adequate for Discharge        SKIN/TISSUE INTEGRITY - ADULT     Incision(s), wounds(s) or drain site(s) healing without S/S of infection Adequate for Discharge            DISCHARGE PLANNING     Discharge to home or other facility with appropriate resources Progressing        Knowledge Deficit     Patient/family/caregiver demonstrates understanding of disease process, treatment plan, medications, and discharge instructions Progressing

## 2018-10-05 NOTE — CONSULTS
This 59-year-old woman is status post general anesthesia today  She reports that she had eye pain in both eyes on awakening from anesthesia  This has resolved  The patient has a history of Sjogren syndrome and is taking Plaquenil  She recently saw her regular ophthalmologist and no problems were detected  On examination with correction at near the visual acuity is 20/50 both eyes  Pupils are equal round and reactive to light with no afferent defect  The anterior segment examination is unremarkable with no corneal staining  Finger tensions are soft  There is a bright red reflex both eyes  Impression:  Sjogren's syndrome, dry eye  Corneal abrasion both eyes, resolved  Plan:  Continue lubrication drops and ointment as needed  I am available for consultation if symptoms arise while the patient is in-house

## 2018-10-05 NOTE — DISCHARGE SUMMARY
Discharge Summary - Surgical Oncology   Thexavi Ash 58 y o  female MRN: [id number]  Unit/Bed#: [facility] 846-78 Encounter: [id number]    Admission Date: 10/4/2018     Admitting Diagnosis: Thyroid nodule [E04 1]    HPI: Tameka Sams is a 13-year-old woman with a left-sided thyroid nodule  This found on physical exam by Dr Kristopher Saul  No radiation exposure to the head or neck area  She has difficulty swallowing, this is attributable to Sjogren's disease, for which she undergoes esophageal dilatation on a periodic basis  No personal or family history of endocrine malignancies  A fine needle aspiration revealed atypia [place] Category III    Procedures Performed: 10/4/18 Total thyroidectomy - Dr Bartolo Caballero Course: Patient has done well post operatively  She has some hoarseness of her voice  There is no perioral numbness or tingling  She is tolerating a house diet  Her pain has been controlled  Her anterior neck incision is clean and dry, no apparent hematoma, no edema  Patient was started on Synthroid  Her calcium levels were 8 5, 8 2, 8 0 post operatively  Patient will be discharged home today  She has pain medication at home for her chronic pain  She was already given a script for Synthroid which she filled  Patient will be sent home on Tums BID for now  Discharge instructions were given to the patient regarding any perioral numbness or tingling  Pathology pending    Complications: None    Discharge Diagnosis: Thyroid nodule    Discharge Date: 10/5/18    Condition at Discharge: Good     Discharge instructions/Information to patient and family:   See after visit summary for information provided to patient and family  Provisions for Follow-Up Care:  See after visit summary for information related to follow-up care and any pertinent home health orders  Disposition: Home    Planned Readmission: No    Discharge Statement   I spent 30 minutes discharging the patient   This time was spent on the day of discharge  I had direct contact with the patient on the day of discharge  Additional documentation is required if more than 30 minutes were spent on discharge  Discharge Medications:  See after visit summary for reconciled discharge medications provided to patient and family

## 2018-10-05 NOTE — CASE MANAGEMENT
Initial Clinical Review    Age/Sex: 58 y o  female    Surgery Date: 10/4/2018    Procedure: TOTAL THYROIDECTOMY (N/A)  Operative Findings:  Bilateral thyroid nodules  Anesthesia:  General     Admission Orders: Date/Time/Statement:  10/04/18 1207  Outpatient No Charge Bed      Transfer Service: Oncology-Surgical       Question: Admitting Physician Answer: Cindy Nelson              Vital Signs: /65 (BP Location: Left arm)   Pulse 81   Temp 98 6 °F (37 °C) (Oral)   Resp 14   Ht 5' 1" (1 549 m)   Wt 49 2 kg (108 lb 7 5 oz)   SpO2 97%   BMI 20 49 kg/m²       Progress note - 10/4 12:10 pm - Patient with c/o of eyes burning bilaterally  Eye red , cold compress on eyes  Labs - Calcium   - 8 6-8 5-8 2-8 0  Scheduled Meds:  Current Facility-Administered Medications:  acetaminophen 650 mg Oral Q6H PRN    artificial tear  Both Eyes HS PRN    aspirin 81 mg Oral Daily    buPROPion 300 mg Oral QAM    chlordiazePOXIDE 5 mg Oral BID    dextran 70-hypromellose 1 drop Both Eyes Q3H PRN    dicyclomine 20 mg Oral BID    heparin (porcine) 5,000 Units Subcutaneous Q8H [address]    hydroxychloroquine 200 mg Oral BID With Meals    levothyroxine 112 mcg Oral Early Morning    montelukast 10 mg Oral Daily    ondansetron 4 mg Intravenous Q6H PRN    oxyCODONE 10 mg Oral Q4H PRN    oxyCODONE 5 mg Oral Q4H PRN    oxymorphone 5 mg Oral Q6H PRN 10/5 x 2    pantoprazole 40 mg Oral BID AC    pravastatin 80 mg Oral Daily With Dinner    predniSONE 5 mg Oral Daily    pregabalin 75 mg Oral BID    traZODone 150 mg Oral HS      Continuous Infusions:  lactated ringers 125 mL/hr   lactated ringers 50 mL/hr           Nursing orders -  VS q 4 - up as tolerated - SCD's to le's- incentive spirometry - Diet regular house     Opthamology - S/p total thyroidectomy now complaining of post operative bilateral eye pain  Bilateral conjunctival injection noted   Rule out corneal abrasions - Now resolved -  continue lubrication drops & ointment

## 2018-10-05 NOTE — PLAN OF CARE
GASTROINTESTINAL - ADULT     Minimal or absence of nausea and/or vomiting Progressing     Maintains adequate nutritional intake Progressing        GENITOURINARY - ADULT     Absence of urinary retention Progressing        PAIN - ADULT     Verbalizes/displays adequate comfort level or baseline comfort level Progressing        RESPIRATORY - ADULT     Achieves optimal ventilation and oxygenation Progressing        SKIN/TISSUE INTEGRITY - ADULT     Incision(s), wounds(s) or drain site(s) healing without S/S of infection Progressing

## 2018-10-05 NOTE — PROGRESS NOTES
Progress Note - Surgical Oncology   German Smith 58 y o  female MRN: [id number]  [facility] Encounter: [id number]    Assessment/Plan:  58 y o  female s/p total thyroidectomy     PACU calcium was 8 5   8:00 p m  calcium was 8 2  Continue regular diet  Discontinue IV fluids  Follow-up a m  calcium  Out of bed/ambulate  Anticipate discharge home      Subjective/Objective     Subjective:  No acute events overnight  Patient states that her pain is well controlled  Tolerating her diet  Denies weakness, numbness/tingling  Denies fever chills, nausea vomiting, chest pain, shortness of breath  Objective:     Vitals: Blood pressure 120/65, pulse 81, temperature 98 6 °F (37 °C), temperature source Oral, resp  rate 14, height 5' 1" (1 549 m), weight 49 2 kg (108 lb 7 5 oz), SpO2 97 %  ,Body mass index is 20 49 kg/m²  I/O       10/03 0701 - 10/04 0700 10/04 0701 - 10/05 0700    P  O   440    I V  (mL/kg)  1787 5 (36 3)    Total Intake(mL/kg)  2227 5 (45 3)    Urine (mL/kg/hr)  3660 (3 1)    Stool  0    Blood  25    Total Output   3685    Net   -1457 5                Physical Exam:  GEN: NAD  HEENT: MMM  Surgical incision clean dry intact  No hematoma  CV: RRR  Lung: Normal effort  Ab: Soft, NT/ND  Extrem: No CCE  Neuro:  A+Ox3    Lab, Imaging and other studies:   Lab Results   Component Value Date    CALCIUM 8 2 (L) 10/04/2018     09/07/2018    K 4 2 09/07/2018    CO2 33 (H) 09/07/2018     09/07/2018    BUN 23 09/07/2018    CREATININE 0 76 09/07/2018     Lab Results   Component Value Date    WBC 5 76 09/07/2018    HGB 12 6 09/07/2018    HCT 38 9 09/07/2018    MCV 92 09/07/2018     10/04/2018       VTE Pharmacologic Prophylaxis: Heparin  VTE Mechanical Prophylaxis: sequential compression device

## 2018-10-17 ENCOUNTER — OFFICE VISIT (OUTPATIENT)
Dept: SURGICAL ONCOLOGY | Facility: HOSPITAL | Age: 63
End: 2018-10-17

## 2018-10-17 VITALS
TEMPERATURE: 98.5 F | RESPIRATION RATE: 16 BRPM | SYSTOLIC BLOOD PRESSURE: 121 MMHG | WEIGHT: 110 LBS | DIASTOLIC BLOOD PRESSURE: 88 MMHG | BODY MASS INDEX: 20.77 KG/M2 | HEART RATE: 87 BPM | HEIGHT: 61 IN

## 2018-10-17 DIAGNOSIS — C73 THYROID CANCER (HCC): Primary | ICD-10-CM

## 2018-10-17 PROBLEM — I73.00 RAYNAUD'S SYNDROME: Status: ACTIVE | Noted: 2018-10-17

## 2018-10-17 PROBLEM — E07.9 THYROID CONDITION: Status: RESOLVED | Noted: 2018-08-10 | Resolved: 2018-10-17

## 2018-10-17 PROBLEM — M34.0: Status: ACTIVE | Noted: 2018-05-01

## 2018-10-17 PROBLEM — E04.1 THYROID NODULE: Status: RESOLVED | Noted: 2018-06-25 | Resolved: 2018-10-17

## 2018-10-17 PROBLEM — R15.1 FECAL SOILING: Status: ACTIVE | Noted: 2017-08-07

## 2018-10-17 PROBLEM — L03.113 CELLULITIS OF FOREARM, RIGHT: Status: ACTIVE | Noted: 2017-09-21

## 2018-10-17 PROBLEM — K62.3 RECTAL PROLAPSE: Status: ACTIVE | Noted: 2018-10-17

## 2018-10-17 PROBLEM — L03.113 CELLULITIS OF FOREARM, RIGHT: Status: RESOLVED | Noted: 2017-09-21 | Resolved: 2018-10-17

## 2018-10-17 PROBLEM — M35.00 SJOGREN'S SYNDROME (HCC): Status: ACTIVE | Noted: 2018-10-17

## 2018-10-17 PROBLEM — R52 PAIN: Status: RESOLVED | Noted: 2018-05-01 | Resolved: 2018-10-17

## 2018-10-17 PROBLEM — R53.83 FATIGUE: Status: ACTIVE | Noted: 2018-06-25

## 2018-10-17 PROBLEM — M19.90 OSTEOARTHRITIS: Status: ACTIVE | Noted: 2018-10-17

## 2018-10-17 PROCEDURE — 99024 POSTOP FOLLOW-UP VISIT: CPT | Performed by: SURGERY

## 2018-10-17 RX ORDER — DICYCLOMINE HCL 20 MG
TABLET ORAL
Refills: 3 | COMMUNITY
Start: 2018-10-15 | End: 2018-11-28

## 2018-10-17 RX ORDER — NAPROXEN 500 MG/1
TABLET ORAL
Refills: 1 | COMMUNITY
Start: 2018-10-15 | End: 2021-07-19 | Stop reason: ALTCHOICE

## 2018-10-17 NOTE — LETTER
October 17, 2018     MD [address]anna [address]    Patient: Dennie Shavers   YOB: 1955   Date of Visit: 10/17/2018       Dear Dr Fabiana Negro: Thank you for referring Nico Yusuf to me for evaluation  Below are my notes for this consultation  If you have questions, please do not hesitate to call me  I look forward to following your patient along with you  Sincerely,        Farhat Simon MD        CC: No Recipients  Farhat Simon MD  10/17/2018  3:44 PM  Sign at close encounter     Surgical Oncology Follow Up       [place]  [address]  [facility] [address]    Dennie Shavers  1955  [id number]  [facility]  [address] [place] [id number]    Chief Complaint   Patient presents with    Post-op     Patient is here for post-op total thyroidectomy  Assessment/Plan:    No problem-specific Assessment & Plan notes found for this encounter  Diagnoses and all orders for this visit:    Thyroid cancer [facility])  -     Ambulatory referral to Endocrinology; Future  -     Thyroglobulin, Quant w/ AB ([place]); Future  -     Thyroid Panel With TSH; Future  -     US head neck lymph node mapping; Future    Other orders  -     dicyclomine (BENTYL) 20 mg tablet;   -     methotrexate 2 5 mg tablet;   -     naproxen (NAPROSYN) 500 mg tablet; Advance Care Planning/Advance Directives:  Discussed disease status, cancer treatment plans and/or cancer treatment goals with the patient  No history exists  History of Present Illness: Patient is here for postop check with no complaints to report  She is status post total thyroidectomy 2 weeks ago   -Interval History:  No issues or complaints to report  Review of Systems:  Review of Systems   Constitutional: Negative      HENT: Negative  Eyes: Negative  Respiratory: Negative  Cardiovascular: Negative  Gastrointestinal: Negative  Endocrine: Negative  Genitourinary: Negative  Musculoskeletal: Negative  Skin: Negative  Allergic/Immunologic: Negative  Neurological: Negative  Hematological: Negative  Psychiatric/Behavioral: Negative  Patient Active Problem List   Diagnosis    Bilateral hand numbness    Methotrexate, long term, current use    Scleroderma progressive (HCC)    Preoperative clearance    Fatigue    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HCC)    BRCA gene mutation positive    Allergic rhinitis    Bartholin gland cyst    Carpal tunnel syndrome of left wrist    Carpal tunnel syndrome of right wrist    Dental disorder    Depression    Esophageal reflux disease    Fecal soiling    Fibromyalgia    Hearing loss    Heel spur    Hematuria    Herniated lumbar intervertebral disc    Hyperlipidemia    Lichen planus    Lumbar disc herniation    Neck sprain and strain    Osteoarthritis    Osteoarthritis of both knees    Osteopenia    Ovarian cyst    Pain in joint of left shoulder    Peripheral neuropathy    Post concussion syndrome    Radiculopathy, lumbar region    Raynaud's syndrome    Rectal prolapse    [facility]ogren's syndrome [facility]    Spasm    Thyroid cancer ([facility] )     Past Medical History:   Diagnosis Date    ADHD     Cancer ([facility] )     non-Hodg      Cellulitis of foot     Last assessed 10/24/16    Change in mental status     Last assessed 12/01/15    Chronic fatigue     PLAZA (dyspnea on exertion)     Last assessed 12/01/15    Fibromyalgia     Folliculitis     Last assessed 10/06/14    GERD (gastroesophageal reflux disease)     Osteopenia     Raynaud's disease     Scleroderma ([facility]     Systemic sclerosis (HCC)      Past Surgical History:   Procedure Laterality Date    BACK SURGERY      BLADDER SURGERY      BONE MARROW BIOPSY      CARDIAC CATHETERIZATION      HEMORRHOID SURGERY      HYSTERECTOMY      KNEE SURGERY      NASAL SEPTUM SURGERY      ROTATOR CUFF REPAIR      THYROIDECTOMY N/A 10/4/2018    Procedure: TOTAL THYROIDECTOMY;  Surgeon: Mariela Silver MD;  [facility];  Service: Surgical Oncology    TONSILLECTOMY       Family History   Problem Relation Age of Onset    Arthritis Mother     Diabetes Mother     Coronary artery disease Father     Arthritis Sister     Asthma Sister     Crohn's disease Sister     Other Brother         myocardial ischemia     Social History     Social History    Marital status:      Spouse name: N/A    Number of children: N/A    Years of education: N/A     Occupational History    Not on file       Social History Main Topics    Smoking status: Former Smoker    Smokeless tobacco: Never Used      Comment: quit 40 years ago     Alcohol use No    Drug use: No    Sexual activity: Not on file     Other Topics Concern    Not on file     Social History Narrative    No narrative on file       Current Outpatient Prescriptions:     amphetamine-dextroamphetamine (ADDERALL XR) 30 MG 24 hr capsule, Take 1 capsule (30 mg total) by mouth daily Max Daily Amount: 30 mg (Patient taking differently: Take 30 mg by mouth every morning  ), Disp: 30 capsule, Rfl: 0    amphetamine-dextroamphetamine (ADDERALL) 20 mg tablet, Take 1 tablet (20 mg total) by mouth 2 (two) times a day Max Daily Amount: 40 mg (Patient taking differently: Take 20 mg by mouth every evening  ), Disp: 60 tablet, Rfl: 0    aspirin (ECOTRIN LOW STRENGTH) 81 mg EC tablet, Take 81 mg by mouth daily, Disp: , Rfl:     buPROPion (WELLBUTRIN XL) 300 mg 24 hr tablet, Take 300 mg by mouth every morning  , Disp: , Rfl:     calcium carbonate (TUMS) 500 mg chewable tablet, Chew 2 tablets (1,000 mg total) daily, Disp: 60 tablet, Rfl: 0    chlordiazePOXIDE (LIBRIUM) 5 mg capsule, Take 5 mg by mouth 2 (two) times a day  , Disp: , Rfl:     cycloSPORINE (RESTASIS) 0 05 % ophthalmic emulsion, Restasis 0 05 % eye drops in a dropperette, Disp: , Rfl:     dicyclomine (BENTYL) 10 mg capsule, Take 20 mg by mouth 2 (two) times a day  , Disp: , Rfl:     dicyclomine (BENTYL) 20 mg tablet, , Disp: , Rfl: 3    esomeprazole (NexIUM) 40 MG capsule, Take 40 mg by mouth 2 (two) times a day before meals  , Disp: , Rfl:     fluticasone (FLONASE) 50 mcg/act nasal spray, fluticasone 50 mcg/actuation nasal spray,suspension, Disp: , Rfl:     folic acid (FOLVITE) 1 mg tablet, Take by mouth daily, Disp: , Rfl:     hydroxychloroquine (PLAQUENIL) 200 mg tablet, Take 200 mg by mouth 2 (two) times a day with meals  , Disp: , Rfl:     levothyroxine 112 mcg tablet, Take 1 tablet (112 mcg total) by mouth daily Start after surgery, Disp: 30 tablet, Rfl: 3    methotrexate 2 5 mg tablet, , Disp: , Rfl: 5    montelukast (SINGULAIR) 10 mg tablet, Take 1 tablet (10 mg total) by mouth daily, Disp: 30 tablet, Rfl: 5    Multiple Vitamin (MULTIVITAMIN) tablet, Take 1 tablet by mouth daily, Disp: , Rfl:     naproxen (NAPROSYN) 500 mg tablet, , Disp: , Rfl: 1    oxymorphone (OPANA) 5 MG tablet, Take 1 tablet (5 mg total) by mouth every 6 (six) hours as needed for moderate pain Max Daily Amount: 20 mg (Patient taking differently: Take 5 mg by mouth every 8 (eight) hours as needed for moderate pain  ), Disp: 120 tablet, Rfl: 0    predniSONE 5 mg tablet, , Disp: , Rfl:     pregabalin (LYRICA) 75 mg capsule, 2 (two) times a day   Reported on 11/21/2016 , Disp: , Rfl:     psyllium (METAMUCIL) 0 52 g capsule, Metamucil, Disp: , Rfl:     simvastatin (ZOCOR) 40 mg tablet, Take 1 tablet (40 mg total) by mouth daily, Disp: 30 tablet, Rfl: 5    SODIUM FLUORIDE, DENTAL GEL, (PREVIDENT) 1 1 % GEL, PreviDent 5000 Dry Mouth 1 1 % gel, Disp: , Rfl:     traZODone (DESYREL) 150 mg tablet, Take 1 tablet (150 mg total) by mouth daily at bedtime, Disp: 30 tablet, Rfl: 0  Allergies   Allergen Reactions    Duloxetine Other (See Comments)     Mental psychosis    Revatio [Sildenafil] Other (See Comments)     mental status changes    Savella [Milnacipran] Other (See Comments)     Feeling out of it    Sulfamethoxazole-Trimethoprim Rash and GI Intolerance    Tedizolid Rash     Vitals:    10/17/18 1508   BP: 121/88   Pulse: 87   Resp: 16   Temp: 98 5 °F (36 9 °C)       Physical Exam   Constitutional: She appears well-developed and well-nourished  HENT:   Head: Normocephalic and atraumatic  Right Ear: External ear normal    Left Ear: External ear normal    Neck:   Incision c/d/i         Results:  Labs:  Case Report   Surgical Pathology Report                         Case: [id number]                                    Authorizing Provider: Derick Vela MD        Collected:           10/04/2018 0845               Ordering Location:     [address]      Received:            10/04/2018 [address] [place] [address] Operating Room                                                       Pathologist:           Baldomero Stanton MD                                                         Specimen:    Thyroid, Stitch marks left                                                                 Final Diagnosis   1  Specimen Identification:     - Procedure: Total thyroidectomy     - Lymph node sampling:  Not performed   2  Tumor     - Tumor focality:  Unifocal     - Dominant tumor characteristics (pT1b):      * tumor laterality:  Left lobe      * tumor size:  1 1 x 0 9 x 0 6 cm      * histologic type: Papillary carcinoma, classic and follicular variant      * margins:  Tumor extends to inked posterior surface and is less than 0 1 mm from inked anterior surface      * angioinvasion:  Not identified      * lymphatic invasion:  Not identified      * perineural invasion:  Not dentified      * extrathyroidal extension:  Not identified     - Additional tumor characteristics (for multifocal tumors): N/A   3   Regional lymph nodes (pNx): No lymph nodes submitted or found     - Extranodal extension: N/A   4  Additional pathologic findings:  Nodular adenomatous hyperplasia with cystic change  Focal Hurthle cell change  5  Ancillary studies:  Not performed   6  Clinical history:  Thyroid nodule    7  8th Ed AJCC Tumor Stage:  at least Stage I - pT1b, pNx         A  Thyroid, total thyroidectomy:  - Papillary thyroid carcinoma, classic and follicular variants  - See staging summary      This case was reviewed at the intradepartmental  conference              Electronically signed by Tera Craft MD on 10/9/2018 at  2:50 PM         Imaging  No results found  I reviewed the above laboratory and imaging data  Discussion/Summary:  Stage I thyroid cancer incidentally found  Patient doing well postoperatively  Plan on 6 month follow-up for surveillance with ultrasound and blood work  Will make referral to endocrinology team to discuss potential radioactive iodine therapy

## 2018-10-17 NOTE — PROGRESS NOTES
Surgical Oncology Follow Up       [address] [place] [address] SURGICAL ONCOLOGY [facility]  [address]  [place] [address]    Hill Donald  1955  [id number]  [facility]  [address] [address]    Chief Complaint   Patient presents with    Post-op     Patient is here for post-op total thyroidectomy  Assessment/Plan:    No problem-specific Assessment & Plan notes found for this encounter  Diagnoses and all orders for this visit:    Thyroid cancer [facility])  -     Ambulatory referral to Endocrinology; Future  -     Thyroglobulin, Quant w/ AB ([place]); Future  -     Thyroid Panel With TSH; Future  -     US head neck lymph node mapping; Future    Other orders  -     dicyclomine (BENTYL) 20 mg tablet;   -     methotrexate 2 5 mg tablet;   -     naproxen (NAPROSYN) 500 mg tablet; Advance Care Planning/Advance Directives:  Discussed disease status, cancer treatment plans and/or cancer treatment goals with the patient  No history exists  History of Present Illness: Patient is here for postop check with no complaints to report  She is status post total thyroidectomy 2 weeks ago   -Interval History:  No issues or complaints to report  Review of Systems:  Review of Systems   Constitutional: Negative  HENT: Negative  Eyes: Negative  Respiratory: Negative  Cardiovascular: Negative  Gastrointestinal: Negative  Endocrine: Negative  Genitourinary: Negative  Musculoskeletal: Negative  Skin: Negative  Allergic/Immunologic: Negative  Neurological: Negative  Hematological: Negative  Psychiatric/Behavioral: Negative          Patient Active Problem List   Diagnosis    Bilateral hand numbness    Methotrexate, long term, current use    Scleroderma progressive (HCC)    Preoperative clearance    Fatigue    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[facility] )    BRCA gene mutation positive    Allergic rhinitis    Bartholin gland cyst    Carpal tunnel syndrome of left wrist    Carpal tunnel syndrome of right wrist    Dental disorder    Depression    Esophageal reflux disease    Fecal soiling    Fibromyalgia    Hearing loss    Heel spur    Hematuria    Herniated lumbar intervertebral disc    Hyperlipidemia    Lichen planus    Lumbar disc herniation    Neck sprain and strain    Osteoarthritis    Osteoarthritis of both knees    Osteopenia    Ovarian cyst    Pain in joint of left shoulder    Peripheral neuropathy    Post concussion syndrome    Radiculopathy, lumbar region    Raynaud's syndrome    Rectal prolapse    Sjogren's syndrome ([facility] )    Spasm    Thyroid cancer ([facility] )     Past Medical History:   Diagnosis Date    ADHD     Cancer ([place] [address] )     non-Hodg      Cellulitis of foot     Last assessed 10/24/16    Change in mental status     Last assessed 12/01/15    Chronic fatigue     PLAZA (dyspnea on exertion)     Last assessed 12/01/15    Fibromyalgia     Folliculitis     Last assessed 10/06/14    GERD (gastroesophageal reflux disease)     Osteopenia     Raynaud's disease     Scleroderma ([place] [address] )     Systemic sclerosis (HCC)      Past Surgical History:   Procedure Laterality Date    BACK SURGERY      BLADDER SURGERY      BONE MARROW BIOPSY      CARDIAC CATHETERIZATION      HEMORRHOID SURGERY      HYSTERECTOMY      KNEE SURGERY      NASAL SEPTUM SURGERY      ROTATOR CUFF REPAIR      THYROIDECTOMY N/A 10/4/2018    Procedure: TOTAL THYROIDECTOMY;  Surgeon: Jose Hahn MD;  [facility];  Service: Surgical Oncology    TONSILLECTOMY       Family History   Problem Relation Age of Onset    Arthritis Mother     Diabetes Mother     Coronary artery disease Father    Sriram Pert Arthritis Sister     Asthma Sister     Crohn's disease Sister     Other Brother         myocardial ischemia     Social History     Social History    Marital status:      Spouse name: N/A    Number of children: N/A    Years of education: N/A     Occupational History    Not on file       Social History Main Topics    Smoking status: Former Smoker    Smokeless tobacco: Never Used      Comment: quit 40 years ago     Alcohol use No    Drug use: No    Sexual activity: Not on file     Other Topics Concern    Not on file     Social History Narrative    No narrative on file       Current Outpatient Prescriptions:     amphetamine-dextroamphetamine (ADDERALL XR) 30 MG 24 hr capsule, Take 1 capsule (30 mg total) by mouth daily Max Daily Amount: 30 mg (Patient taking differently: Take 30 mg by mouth every morning  ), Disp: 30 capsule, Rfl: 0    amphetamine-dextroamphetamine (ADDERALL) 20 mg tablet, Take 1 tablet (20 mg total) by mouth 2 (two) times a day Max Daily Amount: 40 mg (Patient taking differently: Take 20 mg by mouth every evening  ), Disp: 60 tablet, Rfl: 0    aspirin (ECOTRIN LOW STRENGTH) 81 mg EC tablet, Take 81 mg by mouth daily, Disp: , Rfl:     buPROPion (WELLBUTRIN XL) 300 mg 24 hr tablet, Take 300 mg by mouth every morning  , Disp: , Rfl:     calcium carbonate (TUMS) 500 mg chewable tablet, Chew 2 tablets (1,000 mg total) daily, Disp: 60 tablet, Rfl: 0    chlordiazePOXIDE (LIBRIUM) 5 mg capsule, Take 5 mg by mouth 2 (two) times a day  , Disp: , Rfl:     cycloSPORINE (RESTASIS) 0 05 % ophthalmic emulsion, Restasis 0 05 % eye drops in a dropperette, Disp: , Rfl:     dicyclomine (BENTYL) 10 mg capsule, Take 20 mg by mouth 2 (two) times a day  , Disp: , Rfl:     dicyclomine (BENTYL) 20 mg tablet, , Disp: , Rfl: 3    esomeprazole (NexIUM) 40 MG capsule, Take 40 mg by mouth 2 (two) times a day before meals  , Disp: , Rfl:     fluticasone (FLONASE) 50 mcg/act nasal spray, fluticasone 50 mcg/actuation nasal spray,suspension, Disp: , Rfl:     folic acid (FOLVITE) 1 mg tablet, Take by mouth daily, Disp: , Rfl:    hydroxychloroquine (PLAQUENIL) 200 mg tablet, Take 200 mg by mouth 2 (two) times a day with meals  , Disp: , Rfl:     levothyroxine 112 mcg tablet, Take 1 tablet (112 mcg total) by mouth daily Start after surgery, Disp: 30 tablet, Rfl: 3    methotrexate 2 5 mg tablet, , Disp: , Rfl: 5    montelukast (SINGULAIR) 10 mg tablet, Take 1 tablet (10 mg total) by mouth daily, Disp: 30 tablet, Rfl: 5    Multiple Vitamin (MULTIVITAMIN) tablet, Take 1 tablet by mouth daily, Disp: , Rfl:     naproxen (NAPROSYN) 500 mg tablet, , Disp: , Rfl: 1    oxymorphone (OPANA) 5 MG tablet, Take 1 tablet (5 mg total) by mouth every 6 (six) hours as needed for moderate pain Max Daily Amount: 20 mg (Patient taking differently: Take 5 mg by mouth every 8 (eight) hours as needed for moderate pain  ), Disp: 120 tablet, Rfl: 0    predniSONE 5 mg tablet, , Disp: , Rfl:     pregabalin (LYRICA) 75 mg capsule, 2 (two) times a day  Reported on 11/21/2016 , Disp: , Rfl:     psyllium (METAMUCIL) 0 52 g capsule, Metamucil, Disp: , Rfl:     simvastatin (ZOCOR) 40 mg tablet, Take 1 tablet (40 mg total) by mouth daily, Disp: 30 tablet, Rfl: 5    SODIUM FLUORIDE, DENTAL GEL, (PREVIDENT) 1 1 % GEL, PreviDent 5000 Dry Mouth 1 1 % gel, Disp: , Rfl:     traZODone (DESYREL) 150 mg tablet, Take 1 tablet (150 mg total) by mouth daily at bedtime, Disp: 30 tablet, Rfl: 0  Allergies   Allergen Reactions    Duloxetine Other (See Comments)     Mental psychosis    Revatio [Sildenafil] Other (See Comments)     mental status changes    Savella [Milnacipran] Other (See Comments)     Feeling out of it    Sulfamethoxazole-Trimethoprim Rash and GI Intolerance    Tedizolid Rash     Vitals:    10/17/18 1508   BP: 121/88   Pulse: 87   Resp: 16   Temp: 98 5 °F (36 9 °C)       Physical Exam   Constitutional: She appears well-developed and well-nourished  HENT:   Head: Normocephalic and atraumatic     Right Ear: External ear normal    Left Ear: External ear normal  Neck:   Incision c/d/i         Results:  Labs:  Case Report   Surgical Pathology Report                         Case: [id number]                                    Authorizing Provider: Rafael Fleischer, MD        Collected:           10/04/2018 0845               Ordering Location:     [address]      Received:            10/04/2018 [address] [place] [address] Operating Room                                                       Pathologist:           Baldomero Stanton MD                                                         Specimen:    Thyroid, Stitch marks left                                                                 Final Diagnosis   1  Specimen Identification:     - Procedure: Total thyroidectomy     - Lymph node sampling:  Not performed   2  Tumor     - Tumor focality:  Unifocal     - Dominant tumor characteristics (pT1b):      * tumor laterality:  Left lobe      * tumor size:  1 1 x 0 9 x 0 6 cm      * histologic type: Papillary carcinoma, classic and follicular variant      * margins:  Tumor extends to inked posterior surface and is less than 0 1 mm from inked anterior surface      * angioinvasion:  Not identified      * lymphatic invasion:  Not identified      * perineural invasion:  Not dentified      * extrathyroidal extension:  Not identified     - Additional tumor characteristics (for multifocal tumors): N/A   3  Regional lymph nodes (pNx): No lymph nodes submitted or found     - Extranodal extension: N/A   4  Additional pathologic findings:  Nodular adenomatous hyperplasia with cystic change  Focal Hurthle cell change  5  Ancillary studies:  Not performed   6  Clinical history:  Thyroid nodule    7  8th Ed AJCC Tumor Stage:  at least Stage I - pT1b, pNx         A  Thyroid, total thyroidectomy:  - Papillary thyroid carcinoma, classic and follicular variants     - See staging summary      This case was reviewed at the intradepartmental  conference              Electronically signed by Gregory Garrison MD on 10/9/2018 at  2:50 PM         Imaging  No results found  I reviewed the above laboratory and imaging data  Discussion/Summary:  Stage I thyroid cancer incidentally found  Patient doing well postoperatively  Plan on 6 month follow-up for surveillance with ultrasound and blood work  Will make referral to endocrinology team to discuss potential radioactive iodine therapy

## 2018-10-18 DIAGNOSIS — R52 PAIN: ICD-10-CM

## 2018-10-18 RX ORDER — DEXTROAMPHETAMINE SACCHARATE, AMPHETAMINE ASPARTATE MONOHYDRATE, DEXTROAMPHETAMINE SULFATE AND AMPHETAMINE SULFATE 7.5; 7.5; 7.5; 7.5 MG/1; MG/1; MG/1; MG/1
30 CAPSULE, EXTENDED RELEASE ORAL DAILY
Qty: 30 CAPSULE | Refills: 0 | Status: SHIPPED | OUTPATIENT
Start: 2018-10-18 | End: 2018-11-15 | Stop reason: SDUPTHER

## 2018-10-18 RX ORDER — DEXTROAMPHETAMINE SACCHARATE, AMPHETAMINE ASPARTATE, DEXTROAMPHETAMINE SULFATE AND AMPHETAMINE SULFATE 5; 5; 5; 5 MG/1; MG/1; MG/1; MG/1
20 TABLET ORAL
Qty: 60 TABLET | Refills: 0 | Status: SHIPPED | OUTPATIENT
Start: 2018-10-18 | End: 2018-11-15 | Stop reason: SDUPTHER

## 2018-10-19 ENCOUNTER — TELEPHONE (OUTPATIENT)
Dept: FAMILY MEDICINE CLINIC | Facility: CLINIC | Age: 63
End: 2018-10-19

## 2018-10-19 DIAGNOSIS — N30.00 ACUTE CYSTITIS WITHOUT HEMATURIA: Primary | ICD-10-CM

## 2018-10-22 DIAGNOSIS — R52 PAIN: ICD-10-CM

## 2018-10-22 RX ORDER — LEVOFLOXACIN 500 MG/1
500 TABLET, FILM COATED ORAL EVERY 24 HOURS
Qty: 7 TABLET | Refills: 0 | Status: SHIPPED | OUTPATIENT
Start: 2018-10-22 | End: 2018-10-29

## 2018-10-23 RX ORDER — OXYMORPHONE HYDROCHLORIDE 5 MG/1
5 TABLET ORAL EVERY 6 HOURS PRN
Qty: 120 TABLET | Refills: 0 | Status: SHIPPED | OUTPATIENT
Start: 2018-10-23 | End: 2018-11-15 | Stop reason: SDUPTHER

## 2018-10-31 DIAGNOSIS — F51.01 PRIMARY INSOMNIA: ICD-10-CM

## 2018-11-01 RX ORDER — TRAZODONE HYDROCHLORIDE 150 MG/1
150 TABLET ORAL
Qty: 30 TABLET | Refills: 0 | Status: SHIPPED | OUTPATIENT
Start: 2018-11-01 | End: 2018-12-04 | Stop reason: SDUPTHER

## 2018-11-02 ENCOUNTER — OFFICE VISIT (OUTPATIENT)
Dept: FAMILY MEDICINE CLINIC | Facility: CLINIC | Age: 63
End: 2018-11-02
Payer: MEDICARE

## 2018-11-02 VITALS
RESPIRATION RATE: 16 BRPM | OXYGEN SATURATION: 96 % | SYSTOLIC BLOOD PRESSURE: 92 MMHG | HEART RATE: 78 BPM | WEIGHT: 110 LBS | HEIGHT: 61 IN | DIASTOLIC BLOOD PRESSURE: 54 MMHG | BODY MASS INDEX: 20.77 KG/M2

## 2018-11-02 DIAGNOSIS — I73.00 RAYNAUD'S DISEASE WITHOUT GANGRENE: ICD-10-CM

## 2018-11-02 DIAGNOSIS — M35.00 SJOGREN'S SYNDROME, WITH UNSPECIFIED ORGAN INVOLVEMENT (HCC): ICD-10-CM

## 2018-11-02 DIAGNOSIS — C73 THYROID CANCER (HCC): Primary | ICD-10-CM

## 2018-11-02 PROCEDURE — 99214 OFFICE O/P EST MOD 30 MIN: CPT | Performed by: FAMILY MEDICINE

## 2018-11-02 NOTE — PROGRESS NOTES
Assessment/Plan:    No problem-specific Assessment & Plan notes found for this encounter  Diagnoses and all orders for this visit:    Thyroid cancer ([facility] )  -     TSH, 3rd generation; Future  -     T4, free; Future  -     Comprehensive metabolic panel; Future  -     CBC and differential; Future  -     Lipid panel; Future  -     PTH, intact; Future  -     Calcium, ionized; Future  -     Vitamin D 25 hydroxy; Future  -     Magnesium; Future  -     Phosphorus; Future    Sjogren's syndrome, with unspecified organ involvement ([facility] )  -     TSH, 3rd generation; Future  -     T4, free; Future  -     Comprehensive metabolic panel; Future  -     CBC and differential; Future  -     Lipid panel; Future  -     PTH, intact; Future  -     Calcium, ionized; Future  -     Vitamin D 25 hydroxy; Future  -     Magnesium; Future  -     Phosphorus; Future    Raynaud's disease without gangrene  -     TSH, 3rd generation; Future  -     T4, free; Future  -     Comprehensive metabolic panel; Future  -     CBC and differential; Future  -     Lipid panel; Future  -     PTH, intact; Future  -     Calcium, ionized; Future  -     Vitamin D 25 hydroxy; Future  -     Magnesium; Future  -     Phosphorus; Future          Subjective:      Patient ID: Zach Crane is a 58 y o  female  She is approved for medical marijuana  She has chronic pain related to her low back  She is s/p lumbar fusion  She is s/p total thyroidectomy for thyroid cancer  She is on Synthroid 112 mcg  She is awaiting endocrinology evaluation  She had episodes of hypocalcemia in the hospital   She is taking supplements  The following portions of the patient's history were reviewed and updated as appropriate:   She  has a past medical history of ADHD; Cancer ([facility] ); Cellulitis of foot; Change in mental status; Chronic fatigue; PLAZA (dyspnea on exertion); Fibromyalgia; Folliculitis; GERD (gastroesophageal reflux disease);  Osteopenia; Raynaud's disease; Scleroderma ([facility] ); and Systemic sclerosis ([facility] )  She   Patient Active Problem List    Diagnosis Date Noted    Osteoarthritis 10/17/2018    Raynaud's syndrome 10/17/2018    Rectal prolapse 10/17/2018    Sjogren's syndrome ([facility] ) 10/17/2018    [facility] 10/17/2018    BRCA gene mutation positive 09/21/2018    Preoperative clearance 06/25/2018    Fatigue 06/25/2018    Bilateral hand numbness 05/01/2018    Methotrexate, long term, current use 05/01/2018    Scleroderma progressive ([facility] ) 05/01/2018    Fecal soiling 08/07/2017    Post concussion syndrome 12/01/2015    Carpal tunnel syndrome of left wrist 09/14/2015    Carpal tunnel syndrome of right wrist 08/27/2015    Lumbar disc herniation 08/27/2015    Radiculopathy, lumbar region 08/27/2015    Herniated lumbar intervertebral disc 08/03/2015    Heel spur 06/03/2015    Dental disorder 06/02/2015    Fibromyalgia 02/27/2015    Bartholin gland cyst 07/17/2014    Ovarian cyst 07/17/2014    Neck sprain and strain 04/22/2014    Hematuria 07/84/0615    Lichen planus 63/54/7132    Osteoarthritis of both knees 08/13/2013    Pain in joint of left shoulder 15/97/5427    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[facility] ) 12/07/2012    Allergic rhinitis 12/07/2012    Depression 12/07/2012    Esophageal reflux disease 12/07/2012    Hyperlipidemia 12/07/2012    Osteopenia 12/07/2012    Peripheral neuropathy 12/07/2012    Spasm 12/07/2012    Hearing loss 11/23/2012     She  has a past surgical history that includes Tonsillectomy; Back surgery; Rotator cuff repair; Hysterectomy; Bone marrow biopsy; Bladder surgery; Hemorrhoid surgery; Nasal septum surgery; Knee surgery; Cardiac catheterization; and Thyroidectomy (N/A, 10/4/2018)  Her family history includes Arthritis in her mother and sister; Asthma in her sister; Coronary artery disease in her father; Crohn's disease in her sister; Diabetes in her mother; Other in her brother    She  reports that she has quit smoking  She has never used smokeless tobacco  She reports that she does not drink alcohol or use drugs    Current Outpatient Prescriptions   Medication Sig Dispense Refill    amphetamine-dextroamphetamine (ADDERALL XR) 30 MG 24 hr capsule Take 1 capsule (30 mg total) by mouth daily Max Daily Amount: 30 mg 30 capsule 0    amphetamine-dextroamphetamine (ADDERALL) 20 mg tablet Take 1 tablet (20 mg total) by mouth 2 (two) times a day Max Daily Amount: 40 mg 60 tablet 0    aspirin (ECOTRIN LOW STRENGTH) 81 mg EC tablet Take 81 mg by mouth daily      buPROPion (WELLBUTRIN XL) 300 mg 24 hr tablet Take 300 mg by mouth every morning        calcium carbonate (TUMS) 500 mg chewable tablet Chew 2 tablets (1,000 mg total) daily 60 tablet 0    chlordiazePOXIDE (LIBRIUM) 5 mg capsule Take 5 mg by mouth 2 (two) times a day        cycloSPORINE (RESTASIS) 0 05 % ophthalmic emulsion Restasis 0 05 % eye drops in a dropperette      dicyclomine (BENTYL) 10 mg capsule Take 20 mg by mouth 2 (two) times a day        esomeprazole (NexIUM) 40 MG capsule Take 40 mg by mouth 2 (two) times a day before meals        fluticasone (FLONASE) 50 mcg/act nasal spray fluticasone 50 mcg/actuation nasal spray,suspension      folic acid (FOLVITE) 1 mg tablet Take by mouth daily      hydroxychloroquine (PLAQUENIL) 200 mg tablet Take 200 mg by mouth 2 (two) times a day with meals        levothyroxine 112 mcg tablet Take 1 tablet (112 mcg total) by mouth daily Start after surgery 30 tablet 3    methotrexate 2 5 mg tablet   5    montelukast (SINGULAIR) 10 mg tablet Take 1 tablet (10 mg total) by mouth daily 30 tablet 5    Multiple Vitamin (MULTIVITAMIN) tablet Take 1 tablet by mouth daily      naproxen (NAPROSYN) 500 mg tablet   1    oxymorphone (OPANA) 5 MG tablet Take 1 tablet (5 mg total) by mouth every 6 (six) hours as needed for moderate pain Max Daily Amount: 20 mg 120 tablet 0    predniSONE 5 mg tablet       pregabalin (LYRICA) 75 mg capsule 2 (two) times a day  Reported on 11/21/2016       psyllium (METAMUCIL) 0 52 g capsule Metamucil      simvastatin (ZOCOR) 40 mg tablet Take 1 tablet (40 mg total) by mouth daily 30 tablet 5    SODIUM FLUORIDE, DENTAL GEL, (PREVIDENT) 1 1 % GEL PreviDent 5000 Dry Mouth 1 1 % gel      traZODone (DESYREL) 150 mg tablet TAKE 1 TABLET (150 MG TOTAL) BY MOUTH DAILY AT BEDTIME 30 tablet 0    amoxicillin-clavulanate (AUGMENTIN) 875-125 mg per tablet Take 1 tablet by mouth 2 (two) times a day for 10 days 20 tablet 1    dicyclomine (BENTYL) 20 mg tablet   3     No current facility-administered medications for this visit        Current Outpatient Prescriptions on File Prior to Visit   Medication Sig    amphetamine-dextroamphetamine (ADDERALL XR) 30 MG 24 hr capsule Take 1 capsule (30 mg total) by mouth daily Max Daily Amount: 30 mg    amphetamine-dextroamphetamine (ADDERALL) 20 mg tablet Take 1 tablet (20 mg total) by mouth 2 (two) times a day Max Daily Amount: 40 mg    aspirin (ECOTRIN LOW STRENGTH) 81 mg EC tablet Take 81 mg by mouth daily    buPROPion (WELLBUTRIN XL) 300 mg 24 hr tablet Take 300 mg by mouth every morning      calcium carbonate (TUMS) 500 mg chewable tablet Chew 2 tablets (1,000 mg total) daily    chlordiazePOXIDE (LIBRIUM) 5 mg capsule Take 5 mg by mouth 2 (two) times a day      cycloSPORINE (RESTASIS) 0 05 % ophthalmic emulsion Restasis 0 05 % eye drops in a dropperette    dicyclomine (BENTYL) 10 mg capsule Take 20 mg by mouth 2 (two) times a day      esomeprazole (NexIUM) 40 MG capsule Take 40 mg by mouth 2 (two) times a day before meals      fluticasone (FLONASE) 50 mcg/act nasal spray fluticasone 50 mcg/actuation nasal spray,suspension    folic acid (FOLVITE) 1 mg tablet Take by mouth daily    hydroxychloroquine (PLAQUENIL) 200 mg tablet Take 200 mg by mouth 2 (two) times a day with meals      levothyroxine 112 mcg tablet Take 1 tablet (112 mcg total) by mouth daily Start after surgery    methotrexate 2 5 mg tablet     montelukast (SINGULAIR) 10 mg tablet Take 1 tablet (10 mg total) by mouth daily    Multiple Vitamin (MULTIVITAMIN) tablet Take 1 tablet by mouth daily    naproxen (NAPROSYN) 500 mg tablet     oxymorphone (OPANA) 5 MG tablet Take 1 tablet (5 mg total) by mouth every 6 (six) hours as needed for moderate pain Max Daily Amount: 20 mg    predniSONE 5 mg tablet     pregabalin (LYRICA) 75 mg capsule 2 (two) times a day  Reported on 11/21/2016     psyllium (METAMUCIL) 0 52 g capsule Metamucil    simvastatin (ZOCOR) 40 mg tablet Take 1 tablet (40 mg total) by mouth daily    SODIUM FLUORIDE, DENTAL GEL, (PREVIDENT) 1 1 % GEL PreviDent 5000 Dry Mouth 1 1 % gel    traZODone (DESYREL) 150 mg tablet TAKE 1 TABLET (150 MG TOTAL) BY MOUTH DAILY AT BEDTIME    dicyclomine (BENTYL) 20 mg tablet      No current facility-administered medications on file prior to visit  She is allergic to duloxetine; revatio [sildenafil]; savella [milnacipran]; sulfamethoxazole-trimethoprim; and tedizolid       Review of Systems   All other systems reviewed and are negative  Objective:      BP 92/54 (BP Location: Left arm, Patient Position: Sitting, Cuff Size: Standard)   Pulse 78   Resp 16   Ht 5' 1" (1 549 m)   Wt 49 9 kg (110 lb)   SpO2 96%   BMI 20 78 kg/m²          Physical Exam   Constitutional: She is oriented to person, place, and time  She appears well-developed and well-nourished  Neck: Normal range of motion  Neck supple  Cardiovascular: Normal rate, regular rhythm, normal heart sounds and intact distal pulses  Pulmonary/Chest: Effort normal and breath sounds normal    Abdominal: Soft  Bowel sounds are normal    Musculoskeletal: Normal range of motion  Neurological: She is alert and oriented to person, place, and time  She has normal reflexes  Skin: Skin is warm and dry  Psychiatric: She has a normal mood and affect   Her behavior is normal  Judgment and thought content normal    Nursing note and vitals reviewed

## 2018-11-06 ENCOUNTER — APPOINTMENT (OUTPATIENT)
Dept: LAB | Facility: HOSPITAL | Age: 63
End: 2018-11-06
Attending: FAMILY MEDICINE
Payer: MEDICARE

## 2018-11-06 DIAGNOSIS — C73 THYROID CANCER (HCC): ICD-10-CM

## 2018-11-06 DIAGNOSIS — M35.00 SJOGREN'S SYNDROME, WITH UNSPECIFIED ORGAN INVOLVEMENT (HCC): ICD-10-CM

## 2018-11-06 DIAGNOSIS — I73.00 RAYNAUD'S DISEASE WITHOUT GANGRENE: ICD-10-CM

## 2018-11-06 LAB
25(OH)D3 SERPL-MCNC: 22.1 NG/ML (ref 30–100)
ALBUMIN SERPL BCP-MCNC: 4 G/DL (ref 3.5–5)
ALP SERPL-CCNC: 42 U/L (ref 46–116)
ALT SERPL W P-5'-P-CCNC: 28 U/L (ref 12–78)
ANION GAP SERPL CALCULATED.3IONS-SCNC: 6 MMOL/L (ref 4–13)
AST SERPL W P-5'-P-CCNC: 16 U/L (ref 5–45)
BASOPHILS # BLD AUTO: 0.08 THOUSANDS/ΜL (ref 0–0.1)
BASOPHILS NFR BLD AUTO: 1 % (ref 0–1)
BILIRUB SERPL-MCNC: 0.3 MG/DL (ref 0.2–1)
BUN SERPL-MCNC: 11 MG/DL (ref 5–25)
CA-I BLD-SCNC: 1.16 MMOL/L (ref 1.12–1.32)
CALCIUM SERPL-MCNC: 8.5 MG/DL (ref 8.3–10.1)
CHLORIDE SERPL-SCNC: 102 MMOL/L (ref 100–108)
CHOLEST SERPL-MCNC: 177 MG/DL (ref 50–200)
CO2 SERPL-SCNC: 33 MMOL/L (ref 21–32)
CREAT SERPL-MCNC: 0.77 MG/DL (ref 0.6–1.3)
EOSINOPHIL # BLD AUTO: 0.73 THOUSAND/ΜL (ref 0–0.61)
EOSINOPHIL NFR BLD AUTO: 10 % (ref 0–6)
ERYTHROCYTE [DISTWIDTH] IN BLOOD BY AUTOMATED COUNT: 13.5 % (ref 11.6–15.1)
GFR SERPL CREATININE-BSD FRML MDRD: 82 ML/MIN/1.73SQ M
GLUCOSE SERPL-MCNC: 64 MG/DL (ref 65–140)
HCT VFR BLD AUTO: 37.4 % (ref 34.8–46.1)
HDLC SERPL-MCNC: 68 MG/DL (ref 40–60)
HGB BLD-MCNC: 12 G/DL (ref 11.5–15.4)
IMM GRANULOCYTES # BLD AUTO: 0.03 THOUSAND/UL (ref 0–0.2)
IMM GRANULOCYTES NFR BLD AUTO: 0 % (ref 0–2)
LDLC SERPL CALC-MCNC: 74 MG/DL (ref 0–100)
LYMPHOCYTES # BLD AUTO: 3.33 THOUSANDS/ΜL (ref 0.6–4.47)
LYMPHOCYTES NFR BLD AUTO: 46 % (ref 14–44)
MAGNESIUM SERPL-MCNC: 1.9 MG/DL (ref 1.6–2.6)
MCH RBC QN AUTO: 29.3 PG (ref 26.8–34.3)
MCHC RBC AUTO-ENTMCNC: 32.1 G/DL (ref 31.4–37.4)
MCV RBC AUTO: 91 FL (ref 82–98)
MONOCYTES # BLD AUTO: 0.71 THOUSAND/ΜL (ref 0.17–1.22)
MONOCYTES NFR BLD AUTO: 10 % (ref 4–12)
NEUTROPHILS # BLD AUTO: 2.38 THOUSANDS/ΜL (ref 1.85–7.62)
NEUTS SEG NFR BLD AUTO: 33 % (ref 43–75)
NONHDLC SERPL-MCNC: 109 MG/DL
NRBC BLD AUTO-RTO: 0 /100 WBCS
PHOSPHATE SERPL-MCNC: 3.4 MG/DL (ref 2.3–4.1)
PLATELET # BLD AUTO: 228 THOUSANDS/UL (ref 149–390)
PMV BLD AUTO: 9.9 FL (ref 8.9–12.7)
POTASSIUM SERPL-SCNC: 4.2 MMOL/L (ref 3.5–5.3)
PROT SERPL-MCNC: 6.1 G/DL (ref 6.4–8.2)
PTH-INTACT SERPL-MCNC: 32.2 PG/ML (ref 18.4–80.1)
RBC # BLD AUTO: 4.1 MILLION/UL (ref 3.81–5.12)
SODIUM SERPL-SCNC: 141 MMOL/L (ref 136–145)
T4 FREE SERPL-MCNC: 0.96 NG/DL (ref 0.76–1.46)
TRIGL SERPL-MCNC: 177 MG/DL
TSH SERPL DL<=0.05 MIU/L-ACNC: 26.13 UIU/ML (ref 0.36–3.74)
WBC # BLD AUTO: 7.26 THOUSAND/UL (ref 4.31–10.16)

## 2018-11-06 PROCEDURE — 83735 ASSAY OF MAGNESIUM: CPT

## 2018-11-06 PROCEDURE — 80053 COMPREHEN METABOLIC PANEL: CPT

## 2018-11-06 PROCEDURE — 80061 LIPID PANEL: CPT

## 2018-11-06 PROCEDURE — 82330 ASSAY OF CALCIUM: CPT

## 2018-11-06 PROCEDURE — 84439 ASSAY OF FREE THYROXINE: CPT

## 2018-11-06 PROCEDURE — 36415 COLL VENOUS BLD VENIPUNCTURE: CPT

## 2018-11-06 PROCEDURE — 84100 ASSAY OF PHOSPHORUS: CPT

## 2018-11-06 PROCEDURE — 83970 ASSAY OF PARATHORMONE: CPT

## 2018-11-06 PROCEDURE — 85025 COMPLETE CBC W/AUTO DIFF WBC: CPT

## 2018-11-06 PROCEDURE — 84443 ASSAY THYROID STIM HORMONE: CPT

## 2018-11-06 PROCEDURE — 82306 VITAMIN D 25 HYDROXY: CPT

## 2018-11-08 ENCOUNTER — TELEPHONE (OUTPATIENT)
Dept: FAMILY MEDICINE CLINIC | Facility: CLINIC | Age: 63
End: 2018-11-08

## 2018-11-08 DIAGNOSIS — J02.0 STREP THROAT: Primary | ICD-10-CM

## 2018-11-08 RX ORDER — AMOXICILLIN AND CLAVULANATE POTASSIUM 875; 125 MG/1; MG/1
1 TABLET, FILM COATED ORAL 2 TIMES DAILY
Qty: 20 TABLET | Refills: 1 | Status: SHIPPED | OUTPATIENT
Start: 2018-11-08 | End: 2018-11-18

## 2018-11-08 NOTE — TELEPHONE ENCOUNTER
Rx Augmentin 875mg/125mg si tab po every 12 hours x 10 days #20 #1ref escribed to [facility]  Instruct patient to do warm salt water gargels 4 x daily x 2 weeks

## 2018-11-09 DIAGNOSIS — J02.9 SORE THROAT: Primary | ICD-10-CM

## 2018-11-09 NOTE — PROGRESS NOTES
Patient called requesting script for strep test be sent to lab as she's already there and "thinks she has strep "

## 2018-11-14 DIAGNOSIS — F32.A DEPRESSION, UNSPECIFIED DEPRESSION TYPE: Primary | ICD-10-CM

## 2018-11-14 RX ORDER — BUPROPION HYDROCHLORIDE 300 MG/1
TABLET ORAL
Qty: 90 TABLET | Refills: 2 | Status: SHIPPED | OUTPATIENT
Start: 2018-11-14 | End: 2019-08-07 | Stop reason: SDUPTHER

## 2018-11-15 DIAGNOSIS — R52 PAIN: ICD-10-CM

## 2018-11-16 RX ORDER — DEXTROAMPHETAMINE SACCHARATE, AMPHETAMINE ASPARTATE, DEXTROAMPHETAMINE SULFATE AND AMPHETAMINE SULFATE 5; 5; 5; 5 MG/1; MG/1; MG/1; MG/1
20 TABLET ORAL
Qty: 60 TABLET | Refills: 0 | Status: SHIPPED | OUTPATIENT
Start: 2018-11-16 | End: 2018-11-28

## 2018-11-16 RX ORDER — DEXTROAMPHETAMINE SACCHARATE, AMPHETAMINE ASPARTATE MONOHYDRATE, DEXTROAMPHETAMINE SULFATE AND AMPHETAMINE SULFATE 7.5; 7.5; 7.5; 7.5 MG/1; MG/1; MG/1; MG/1
30 CAPSULE, EXTENDED RELEASE ORAL DAILY
Qty: 30 CAPSULE | Refills: 0 | Status: SHIPPED | OUTPATIENT
Start: 2018-11-16 | End: 2018-11-28

## 2018-11-16 RX ORDER — OXYMORPHONE HYDROCHLORIDE 5 MG/1
5 TABLET ORAL EVERY 6 HOURS PRN
Qty: 120 TABLET | Refills: 0 | Status: SHIPPED | OUTPATIENT
Start: 2018-11-16 | End: 2018-12-17 | Stop reason: SDUPTHER

## 2018-11-28 ENCOUNTER — OFFICE VISIT (OUTPATIENT)
Dept: ENDOCRINOLOGY | Facility: CLINIC | Age: 63
End: 2018-11-28
Payer: MEDICARE

## 2018-11-28 VITALS
BODY MASS INDEX: 21.07 KG/M2 | DIASTOLIC BLOOD PRESSURE: 70 MMHG | SYSTOLIC BLOOD PRESSURE: 118 MMHG | WEIGHT: 111.6 LBS | HEIGHT: 61 IN | HEART RATE: 68 BPM

## 2018-11-28 DIAGNOSIS — E55.9 VITAMIN D DEFICIENCY: ICD-10-CM

## 2018-11-28 DIAGNOSIS — C73 THYROID CANCER (HCC): Primary | ICD-10-CM

## 2018-11-28 DIAGNOSIS — M85.80 OSTEOPENIA, UNSPECIFIED LOCATION: ICD-10-CM

## 2018-11-28 DIAGNOSIS — E89.0 POSTOPERATIVE HYPOTHYROIDISM: ICD-10-CM

## 2018-11-28 PROCEDURE — 99205 OFFICE O/P NEW HI 60 MIN: CPT | Performed by: INTERNAL MEDICINE

## 2018-11-28 RX ORDER — LEVOTHYROXINE SODIUM 137 UG/1
TABLET ORAL
Refills: 5 | COMMUNITY
Start: 2018-11-20 | End: 2019-08-13 | Stop reason: HOSPADM

## 2018-11-28 NOTE — ASSESSMENT & PLAN NOTE
Make sure to take calcium and vitamin-D supplementations-fall prevention    Being managed by rheumatologist

## 2018-11-28 NOTE — LETTER
November 28, 2018     MD [address]    Patient: Kacey Corona   YOB: 1955   Date of Visit: 11/28/2018       Dear Dr Jenny Aguilar: Thank you for referring Lawrence Pradhan to me for evaluation  Below are my notes for this consultation  If you have questions, please do not hesitate to call me  I look forward to following your patient along with you  Sincerely,        Cristóbal Bustillos MD        CC: Boy Galdamez MD  11/28/2018  2:27 PM  Sign at close encounter   Kacey Corona 58 y o  female MRN: [id number]    Encounter: [id number]      Assessment/Plan     Problem List Items Addressed This Visit     Osteopenia     Make sure to take calcium and vitamin-D supplementations-fall prevention  Being managed by rheumatologist         Thyroid cancer [facility]) - Primary     Status post total thyroidectomy-pathology reviewed with the patient  She does not meet criteria for radioactive iodine treatment so will hold off on that  Will monitor thyroglobulin/thyroglobulin antibody level  Repeat neck ultrasound in the next 6-8 months         Relevant Medications    levothyroxine 137 mcg tablet    Other Relevant Orders    Thyroglobulin w/ab Lab Collect    Vitamin D deficiency     Start vitamin D3 2000 international units daily         Postoperative hypothyroidism     Advised to take Synthroid in the morning on an empty stomach with water and wait at least 1 hr before breakfast   At least 4 hr before taking any calcium or multivitamin  Repeat thyroid function test after she has been on a consistent dose for at least 4 weeks    Goal TSH between 0 1-0 5         Relevant Medications    levothyroxine 137 mcg tablet    Other Relevant Orders    TSH, 3rd generation Lab Collect    T4, free Lab Collect        CC:  Thyroid cancer    History of Present Illness      HPI:  65-year-old woman referred here for evaluation of recently discovered thyroid cancer and postsurgical hypothyroidism  She underwent Total thyroidectomy for papillary thyroid cancer in October  She is not aware of any family history of thyroid cancer  No history of RT to neck  For postsurgical hypothyroidism she was initially on levothyroxine 112 mcg daily  Dose was increased to  Levothyroxine 137 mcg daily for the past week and 1/2   Takes with breakfast now     C/o dry skin and hair loss and myalgias and arthralgias  C/o fatigue      History of RA and sjograns and scleroderma   Review of Systems   Constitutional: Positive for fatigue  Negative for unexpected weight change  Eyes: Positive for visual disturbance  Cardiovascular: Negative for palpitations and leg swelling  Gastrointestinal: Negative for constipation, diarrhea, nausea and vomiting  Endocrine: Negative for polydipsia and polyuria  Musculoskeletal: Positive for arthralgias and myalgias  Skin: Negative for wound  All other systems reviewed and are negative  Historical Information   Past Medical History:   Diagnosis Date    ADHD     [facility] )     non-Hodg      Cellulitis of foot     Last assessed 10/24/16    Change in mental status     Last assessed 12/01/15    Chronic fatigue     PLAZA (dyspnea on exertion)     Last assessed 12/01/15    Fibromyalgia     Folliculitis     Last assessed 10/06/14    GERD (gastroesophageal reflux disease)     Osteopenia     Raynaud's disease     [facility]     Systemic sclerosis ([facility] )      Past Surgical History:   Procedure Laterality Date    BACK SURGERY      BLADDER SURGERY      BONE MARROW BIOPSY      CARDIAC CATHETERIZATION      HEMORRHOID SURGERY      HYSTERECTOMY      KNEE SURGERY      NASAL SEPTUM SURGERY      ROTATOR CUFF REPAIR      THYROIDECTOMY N/A 10/4/2018    Procedure: TOTAL THYROIDECTOMY;  Surgeon: Buddy Navarro MD;  Location: [facility];  Service: Surgical Oncology    TONSILLECTOMY       Social History   History   Alcohol Use No     History Drug Use No     History   Smoking Status    Former Smoker   Smokeless Tobacco    Never Used     Comment: quit 40 years ago      Family History:   Family History   Problem Relation Age of Onset    Arthritis Mother     Diabetes Mother     Coronary artery disease Father     Arthritis Sister     Asthma Sister     Crohn's disease Sister     Other Brother         myocardial ischemia       Meds/Allergies   Current Outpatient Prescriptions   Medication Sig Dispense Refill    aspirin (ECOTRIN LOW STRENGTH) 81 mg EC tablet Take 81 mg by mouth daily      buPROPion (WELLBUTRIN XL) 300 mg 24 hr tablet TAKE 1 TABLET BY MOUTH EVERY DAY 90 tablet 2    calcium carbonate (TUMS) 500 mg chewable tablet Chew 2 tablets (1,000 mg total) daily 60 tablet 0    chlordiazePOXIDE (LIBRIUM) 5 mg capsule Take 5 mg by mouth 2 (two) times a day        cycloSPORINE (RESTASIS) 0 05 % ophthalmic emulsion Restasis 0 05 % eye drops in a dropperette      dicyclomine (BENTYL) 10 mg capsule Take 20 mg by mouth 2 (two) times a day        esomeprazole (NexIUM) 40 MG capsule Take 40 mg by mouth 2 (two) times a day before meals        folic acid (FOLVITE) 1 mg tablet Take by mouth daily      hydroxychloroquine (PLAQUENIL) 200 mg tablet Take 200 mg by mouth 2 (two) times a day with meals        levothyroxine 137 mcg tablet   5    methotrexate 2 5 mg tablet   5    montelukast (SINGULAIR) 10 mg tablet Take 1 tablet (10 mg total) by mouth daily 30 tablet 5    Multiple Vitamin (MULTIVITAMIN) tablet Take 1 tablet by mouth daily      naproxen (NAPROSYN) 500 mg tablet   1    oxymorphone (OPANA) 5 MG tablet Take 1 tablet (5 mg total) by mouth every 6 (six) hours as needed for moderate pain Max Daily Amount: 20 mg 120 tablet 0    predniSONE 5 mg tablet       pregabalin (LYRICA) 75 mg capsule 2 (two) times a day   Reported on 11/21/2016       simvastatin (ZOCOR) 40 mg tablet Take 1 tablet (40 mg total) by mouth daily 30 tablet 5    traZODone (DESYREL) 150 mg tablet TAKE 1 TABLET (150 MG TOTAL) BY MOUTH DAILY AT BEDTIME 30 tablet 0    SODIUM FLUORIDE, DENTAL GEL, (PREVIDENT) 1 1 % GEL PreviDent 5000 Dry Mouth 1 1 % gel       No current facility-administered medications for this visit  Allergies   Allergen Reactions    Duloxetine Other (See Comments)     Mental psychosis    Revatio [Sildenafil] Other (See Comments)     mental status changes    Savella [Milnacipran] Other (See Comments)     Feeling out of it    Sulfamethoxazole-Trimethoprim Rash and GI Intolerance    Tedizolid Rash       Objective   Vitals: Blood pressure 118/70, pulse 68, height 5' 1" (1 549 m), weight 50 6 kg (111 lb 9 6 oz)  Physical Exam   Constitutional: She is oriented to person, place, and time  She appears well-developed and well-nourished  No distress  HENT:   Head: Normocephalic and atraumatic  Mouth/Throat: No oropharyngeal exudate  Eyes: Conjunctivae and EOM are normal  No scleral icterus  Neck: Normal range of motion  Neck supple  Anterior neck surgical scar-no masses   Cardiovascular: Normal rate, regular rhythm and normal heart sounds  No murmur heard  Pulmonary/Chest: Effort normal and breath sounds normal  No respiratory distress  She has no wheezes  She has no rales  Abdominal: Soft  Bowel sounds are normal  She exhibits no distension  There is no tenderness  There is no rebound  Musculoskeletal: Normal range of motion  She exhibits no edema or deformity  Lymphadenopathy:     She has no cervical adenopathy  Neurological: She is alert and oriented to person, place, and time  Skin: Skin is warm and dry  No rash noted  No erythema  No pallor  Psychiatric: She has a normal mood and affect  Her behavior is normal  Judgment and thought content normal        The history was obtained from the review of the chart, patient      Lab Results:   Lab Results   Component Value Date/Time    TSH 3RD [facility] 26 131 (H) 11/06/2018 02:53 PM    Free T4 0 96 11/06/2018 02:53 PM       11/12/18- tsh 5 7, free t4 1 2     Final Diagnosis   1  Specimen Identification:     - Procedure: Total thyroidectomy     - Lymph node sampling:  Not performed   2  Tumor     - Tumor focality:  Unifocal     - Dominant tumor characteristics (pT1b):      * tumor laterality:  Left lobe      * tumor size:  1 1 x 0 9 x 0 6 cm      * histologic type: Papillary carcinoma, classic and follicular variant      * margins:  Tumor extends to inked posterior surface and is less than 0 1 mm from inked anterior surface      * angioinvasion:  Not identified      * lymphatic invasion:  Not identified      * perineural invasion:  Not dentified      * extrathyroidal extension:  Not identified     - Additional tumor characteristics (for multifocal tumors): N/A   3  Regional lymph nodes (pNx): No lymph nodes submitted or found     - Extranodal extension: N/A   4  Additional pathologic findings:  Nodular adenomatous hyperplasia with cystic change  Focal Hurthle cell change  5  Ancillary studies:  Not performed   6  Clinical history:  Thyroid nodule    7  8th Ed AJCC Tumor Stage:  at least Stage I - pT1b, pNx         A  Thyroid, total thyroidectomy:  - Papillary thyroid carcinoma, classic and follicular variants  - See staging summary  Imaging Studies:  Results for orders placed during the hospital encounter of 09/07/18   US head neck lymph node mapping    Impression No evidence of recurrent or metastatic disease  Workstation performed: [id number]            I have personally reviewed pertinent reports  Portions of the record may have been created with voice recognition software  Occasional wrong word or "sound a like" substitutions may have occurred due to the inherent limitations of voice recognition software  Read the chart carefully and recognize, using context, where substitutions have occurred

## 2018-11-28 NOTE — PROGRESS NOTES
Zane Wilburn 58 y o  female MRN: [id number]    Encounter: [id number]      Assessment/Plan     Problem List Items Addressed This Visit     Osteopenia     Make sure to take calcium and vitamin-D supplementations-fall prevention  Being managed by rheumatologist         Thyroid cancer [facility]) - Primary     Status post total thyroidectomy-pathology reviewed with the patient  She does not meet criteria for radioactive iodine treatment so will hold off on that  Will monitor thyroglobulin/thyroglobulin antibody level  Repeat neck ultrasound in the next 6-8 months         Relevant Medications    levothyroxine 137 mcg tablet    Other Relevant Orders    Thyroglobulin w/ab Lab Collect    Vitamin D deficiency     Start vitamin D3 2000 international units daily         Postoperative hypothyroidism     Advised to take Synthroid in the morning on an empty stomach with water and wait at least 1 hr before breakfast   At least 4 hr before taking any calcium or multivitamin  Repeat thyroid function test after she has been on a consistent dose for at least 4 weeks  Goal TSH between 0 1-0 5         Relevant Medications    levothyroxine 137 mcg tablet    Other Relevant Orders    TSH, 3rd generation Lab Collect    T4, free Lab Collect        CC:  Thyroid cancer    History of Present Illness      HPI:  77-year-old woman referred here for evaluation of recently discovered thyroid cancer and postsurgical hypothyroidism  She underwent Total thyroidectomy for papillary thyroid cancer in October  She is not aware of any family history of thyroid cancer  No history of RT to neck  For postsurgical hypothyroidism she was initially on levothyroxine 112 mcg daily  Dose was increased to  Levothyroxine 137 mcg daily for the past week and 1/2   Takes with breakfast now     C/o dry skin and hair loss and myalgias and arthralgias  C/o fatigue      History of RA and sjograns and scleroderma           Review of Systems   Constitutional: Positive for fatigue  Negative for unexpected weight change  Eyes: Positive for visual disturbance  Cardiovascular: Negative for palpitations and leg swelling  Gastrointestinal: Negative for constipation, diarrhea, nausea and vomiting  Endocrine: Negative for polydipsia and polyuria  Musculoskeletal: Positive for arthralgias and myalgias  Skin: Negative for wound  All other systems reviewed and are negative  Historical Information   Past Medical History:   Diagnosis Date    ADHD     Cancer ([facility] )     non-Hodg      Cellulitis of foot     Last assessed 10/24/16    Change in mental status     Last assessed 12/01/15    Chronic fatigue     PLAZA (dyspnea on exertion)     Last assessed 12/01/15    Fibromyalgia     Folliculitis     Last assessed 10/06/14    GERD (gastroesophageal reflux disease)     Osteopenia     Raynaud's disease     Scleroderma ([facility] )     Systemic sclerosis (HCC)      Past Surgical History:   Procedure Laterality Date    BACK SURGERY      BLADDER SURGERY      BONE MARROW BIOPSY      CARDIAC CATHETERIZATION      HEMORRHOID SURGERY      HYSTERECTOMY      KNEE SURGERY      NASAL SEPTUM SURGERY      ROTATOR CUFF REPAIR      THYROIDECTOMY N/A 10/4/2018    Procedure: TOTAL THYROIDECTOMY;  Surgeon: Jorge Rose MD;  [facility];  Service: Surgical Oncology    TONSILLECTOMY       Social History   History   Alcohol Use No     History   Drug Use No     History   Smoking Status    Former Smoker   Smokeless Tobacco    Never Used     Comment: quit 40 years ago      Family History:   Family History   Problem Relation Age of Onset    Arthritis Mother     Diabetes Mother     Coronary artery disease Father     Arthritis Sister     Asthma Sister     Crohn's disease Sister     Other Brother         myocardial ischemia       Meds/Allergies   Current Outpatient Prescriptions   Medication Sig Dispense Refill    aspirin (ECOTRIN LOW STRENGTH) 81 mg EC tablet Take 81 mg by mouth daily      buPROPion (WELLBUTRIN XL) 300 mg 24 hr tablet TAKE 1 TABLET BY MOUTH EVERY DAY 90 tablet 2    calcium carbonate (TUMS) 500 mg chewable tablet Chew 2 tablets (1,000 mg total) daily 60 tablet 0    chlordiazePOXIDE (LIBRIUM) 5 mg capsule Take 5 mg by mouth 2 (two) times a day        cycloSPORINE (RESTASIS) 0 05 % ophthalmic emulsion Restasis 0 05 % eye drops in a dropperette      dicyclomine (BENTYL) 10 mg capsule Take 20 mg by mouth 2 (two) times a day        esomeprazole (NexIUM) 40 MG capsule Take 40 mg by mouth 2 (two) times a day before meals        folic acid (FOLVITE) 1 mg tablet Take by mouth daily      hydroxychloroquine (PLAQUENIL) 200 mg tablet Take 200 mg by mouth 2 (two) times a day with meals        levothyroxine 137 mcg tablet   5    methotrexate 2 5 mg tablet   5    montelukast (SINGULAIR) 10 mg tablet Take 1 tablet (10 mg total) by mouth daily 30 tablet 5    Multiple Vitamin (MULTIVITAMIN) tablet Take 1 tablet by mouth daily      naproxen (NAPROSYN) 500 mg tablet   1    oxymorphone (OPANA) 5 MG tablet Take 1 tablet (5 mg total) by mouth every 6 (six) hours as needed for moderate pain Max Daily Amount: 20 mg 120 tablet 0    predniSONE 5 mg tablet       pregabalin (LYRICA) 75 mg capsule 2 (two) times a day  Reported on 11/21/2016       simvastatin (ZOCOR) 40 mg tablet Take 1 tablet (40 mg total) by mouth daily 30 tablet 5    traZODone (DESYREL) 150 mg tablet TAKE 1 TABLET (150 MG TOTAL) BY MOUTH DAILY AT BEDTIME 30 tablet 0    SODIUM FLUORIDE, DENTAL GEL, (PREVIDENT) 1 1 % GEL PreviDent 5000 Dry Mouth 1 1 % gel       No current facility-administered medications for this visit        Allergies   Allergen Reactions    Duloxetine Other (See Comments)     Mental psychosis    Revatio [Sildenafil] Other (See Comments)     mental status changes    Savella [Milnacipran] Other (See Comments)     Feeling out of it    Sulfamethoxazole-Trimethoprim Rash and GI Intolerance  Tedizolid Rash       Objective   Vitals: Blood pressure 118/70, pulse 68, height 5' 1" (1 549 m), weight 50 6 kg (111 lb 9 6 oz)  Physical Exam   Constitutional: She is oriented to person, place, and time  She appears well-developed and well-nourished  No distress  HENT:   Head: Normocephalic and atraumatic  Mouth/Throat: No oropharyngeal exudate  Eyes: Conjunctivae and EOM are normal  No scleral icterus  Neck: Normal range of motion  Neck supple  Anterior neck surgical scar-no masses   Cardiovascular: Normal rate, regular rhythm and normal heart sounds  No murmur heard  Pulmonary/Chest: Effort normal and breath sounds normal  No respiratory distress  She has no wheezes  She has no rales  Abdominal: Soft  Bowel sounds are normal  She exhibits no distension  There is no tenderness  There is no rebound  Musculoskeletal: Normal range of motion  She exhibits no edema or deformity  Lymphadenopathy:     She has no cervical adenopathy  Neurological: She is alert and oriented to person, place, and time  Skin: Skin is warm and dry  No rash noted  No erythema  No pallor  Psychiatric: She has a normal mood and affect  Her behavior is normal  Judgment and thought content normal        The history was obtained from the review of the chart, patient  Lab Results:   Lab Results   Component Value Date/Time    TSH 3RD GENERATON 26 131 (H) 11/06/2018 02:53 PM    Free T4 0 96 11/06/2018 02:53 PM       11/12/18- tsh 5 7, free t4 1 2     Final Diagnosis   1  Specimen Identification:     - Procedure: Total thyroidectomy     - Lymph node sampling:  Not performed   2   Tumor     - Tumor focality:  Unifocal     - Dominant tumor characteristics (pT1b):      * tumor laterality:  Left lobe      * tumor size:  1 1 x 0 9 x 0 6 cm      * histologic type: Papillary carcinoma, classic and follicular variant      * margins:  Tumor extends to inked posterior surface and is less than 0 1 mm from inked anterior surface      * angioinvasion:  Not identified      * lymphatic invasion:  Not identified      * perineural invasion:  Not dentified      * extrathyroidal extension:  Not identified     - Additional tumor characteristics (for multifocal tumors): N/A   3  Regional lymph nodes (pNx): No lymph nodes submitted or found     - Extranodal extension: N/A   4  Additional pathologic findings:  Nodular adenomatous hyperplasia with cystic change  Focal Hurthle cell change  5  Ancillary studies:  Not performed   6  Clinical history:  Thyroid nodule    7  8th Ed AJCC Tumor Stage:  at least Stage I - pT1b, pNx         A  Thyroid, total thyroidectomy:  - Papillary thyroid carcinoma, classic and follicular variants  - See staging summary  Imaging Studies:  Results for orders placed during the hospital encounter of 09/07/18   US head neck lymph node mapping    Impression No evidence of recurrent or metastatic disease  Workstation performed: [id number]            I have personally reviewed pertinent reports  Portions of the record may have been created with voice recognition software  Occasional wrong word or "sound a like" substitutions may have occurred due to the inherent limitations of voice recognition software  Read the chart carefully and recognize, using context, where substitutions have occurred

## 2018-11-28 NOTE — PATIENT INSTRUCTIONS
Take Synthroid on an empty stomach in the morning with water 1 hr before breakfast  Take any calcium or multivitamin at least 4 hr later  Repeat blood test in 4 weeks    Start vitamin D3 2000 international units daily

## 2018-11-28 NOTE — ASSESSMENT & PLAN NOTE
Advised to take Synthroid in the morning on an empty stomach with water and wait at least 1 hr before breakfast   At least 4 hr before taking any calcium or multivitamin  Repeat thyroid function test after she has been on a consistent dose for at least 4 weeks    Goal TSH between 0 1-0 5

## 2018-11-28 NOTE — ASSESSMENT & PLAN NOTE
Status post total thyroidectomy-pathology reviewed with the patient  She does not meet criteria for radioactive iodine treatment so will hold off on that  Will monitor thyroglobulin/thyroglobulin antibody level    Repeat neck ultrasound in the next 6-8 months

## 2018-12-04 DIAGNOSIS — F51.01 PRIMARY INSOMNIA: ICD-10-CM

## 2018-12-04 RX ORDER — TRAZODONE HYDROCHLORIDE 150 MG/1
150 TABLET ORAL
Qty: 30 TABLET | Refills: 5 | Status: SHIPPED | OUTPATIENT
Start: 2018-12-04 | End: 2019-05-24 | Stop reason: SDUPTHER

## 2018-12-12 ENCOUNTER — TELEPHONE (OUTPATIENT)
Dept: FAMILY MEDICINE CLINIC | Facility: CLINIC | Age: 63
End: 2018-12-12

## 2018-12-12 DIAGNOSIS — F98.8 ATTENTION DEFICIT DISORDER, UNSPECIFIED HYPERACTIVITY PRESENCE: Primary | ICD-10-CM

## 2018-12-12 RX ORDER — DEXTROAMPHETAMINE SACCHARATE, AMPHETAMINE ASPARTATE MONOHYDRATE, DEXTROAMPHETAMINE SULFATE AND AMPHETAMINE SULFATE 7.5; 7.5; 7.5; 7.5 MG/1; MG/1; MG/1; MG/1
30 CAPSULE, EXTENDED RELEASE ORAL EVERY MORNING
Qty: 30 CAPSULE | Refills: 0 | Status: SHIPPED | OUTPATIENT
Start: 2018-12-12 | End: 2019-01-07 | Stop reason: SDUPTHER

## 2018-12-12 RX ORDER — DEXTROAMPHETAMINE SACCHARATE, AMPHETAMINE ASPARTATE, DEXTROAMPHETAMINE SULFATE AND AMPHETAMINE SULFATE 5; 5; 5; 5 MG/1; MG/1; MG/1; MG/1
20 TABLET ORAL
Qty: 60 TABLET | Refills: 0 | Status: SHIPPED | OUTPATIENT
Start: 2018-12-12 | End: 2019-01-07 | Stop reason: SDUPTHER

## 2018-12-17 DIAGNOSIS — R52 PAIN: ICD-10-CM

## 2018-12-19 ENCOUNTER — TELEPHONE (OUTPATIENT)
Dept: FAMILY MEDICINE CLINIC | Facility: CLINIC | Age: 63
End: 2018-12-19

## 2018-12-19 DIAGNOSIS — H10.9 CONJUNCTIVITIS, UNSPECIFIED CONJUNCTIVITIS TYPE, UNSPECIFIED LATERALITY: Primary | ICD-10-CM

## 2018-12-19 RX ORDER — OXYMORPHONE HYDROCHLORIDE 5 MG/1
5 TABLET ORAL EVERY 6 HOURS PRN
Qty: 120 TABLET | Refills: 0 | Status: SHIPPED | OUTPATIENT
Start: 2018-12-19 | End: 2019-01-16 | Stop reason: SDUPTHER

## 2018-12-19 NOTE — TELEPHONE ENCOUNTER
I Spoke with her and she said she is in between eye doctors, is unable to get into see Dr Karen Ha until January

## 2018-12-20 RX ORDER — OFLOXACIN 3 MG/ML
1 SOLUTION/ DROPS OPHTHALMIC 4 TIMES DAILY
Qty: 5 ML | Refills: 0 | Status: SHIPPED | OUTPATIENT
Start: 2018-12-20 | End: 2019-02-15

## 2018-12-21 ENCOUNTER — DOCTOR'S OFFICE (OUTPATIENT)
Dept: URBAN - NONMETROPOLITAN AREA CLINIC 1 | Facility: CLINIC | Age: 63
Setting detail: OPHTHALMOLOGY
End: 2018-12-21
Payer: COMMERCIAL

## 2018-12-21 DIAGNOSIS — H04.122: ICD-10-CM

## 2018-12-21 DIAGNOSIS — H04.123: ICD-10-CM

## 2018-12-21 DIAGNOSIS — H04.121: ICD-10-CM

## 2018-12-21 DIAGNOSIS — H40.033: ICD-10-CM

## 2018-12-21 PROCEDURE — 83861 MICROFLUID ANALY TEARS: CPT | Performed by: OPHTHALMOLOGY

## 2018-12-21 PROCEDURE — 92132 CPTRZD OPH DX IMG ANT SGM: CPT | Performed by: OPHTHALMOLOGY

## 2018-12-21 PROCEDURE — 99203 OFFICE O/P NEW LOW 30 MIN: CPT | Performed by: OPHTHALMOLOGY

## 2018-12-21 PROCEDURE — 92020 GONIOSCOPY: CPT | Performed by: OPHTHALMOLOGY

## 2018-12-21 ASSESSMENT — REFRACTION_MANIFEST
OS_VA2: 20/
OS_VA1: 20/25
OD_VA1: 20/25-1
OU_VA: 20/
OD_ADD: +2.50
OD_VA2: 20/
OS_SPHERE: +1.00
OS_VA3: 20/
OS_VA2: 20/
OD_CYLINDER: -0.75
OD_VA3: 20/
OD_VA3: 20/
OS_CYLINDER: -0.25
OS_ADD: +2.50
OS_AXIS: 090
OD_VA2: 20/
OS_VA3: 20/
OS_VA1: 20/
OU_VA: 20/
OD_VA1: 20/
OD_AXIS: 088
OD_SPHERE: +1.25

## 2018-12-21 ASSESSMENT — SPHEQUIV_DERIVED
OS_SPHEQUIV: 1.125
OS_SPHEQUIV: 0.875
OD_SPHEQUIV: 1.375
OD_SPHEQUIV: 0.875

## 2018-12-21 ASSESSMENT — LID EXAM ASSESSMENTS
OS_BLEPHARITIS: LLL LUL
OD_BLEPHARITIS: RLL RUL

## 2018-12-21 ASSESSMENT — REFRACTION_AUTOREFRACTION
OS_CYLINDER: -0.75
OD_CYLINDER: -0.75
OS_AXIS: 72
OD_AXIS: 95
OD_SPHERE: +1.75
OS_SPHERE: +1.50

## 2018-12-21 ASSESSMENT — REFRACTION_CURRENTRX
OS_AXIS: 030
OD_SPHERE: +0.25
OD_OVR_VA: 20/
OD_AXIS: 090
OS_CYLINDER: -0.25
OD_OVR_VA: 20/
OS_OVR_VA: 20/
OS_SPHERE: +0.25
OD_CYLINDER: -0.25
OS_OVR_VA: 20/
OD_ADD: +2.25
OS_VPRISM_DIRECTION: PROGS
OS_ADD: +2.25
OD_VPRISM_DIRECTION: PROGS
OS_OVR_VA: 20/
OD_OVR_VA: 20/

## 2018-12-21 ASSESSMENT — VISUAL ACUITY
OS_BCVA: 20/25-1
OD_BCVA: 20/40-2

## 2018-12-21 ASSESSMENT — TEAR BREAK UP TIME (TBUT)
OS_TBUT: T
OD_TBUT: T

## 2018-12-21 ASSESSMENT — CONFRONTATIONAL VISUAL FIELD TEST (CVF)
OS_FINDINGS: FULL
OD_FINDINGS: FULL

## 2018-12-21 ASSESSMENT — DRY EYES - PHYSICIAN NOTES
OS_GENERALCOMMENTS: DIFFUSE SPK
OD_GENERALCOMMENTS: DIFFUSE SPK

## 2019-01-07 DIAGNOSIS — F98.8 ATTENTION DEFICIT DISORDER, UNSPECIFIED HYPERACTIVITY PRESENCE: ICD-10-CM

## 2019-01-08 RX ORDER — DEXTROAMPHETAMINE SACCHARATE, AMPHETAMINE ASPARTATE, DEXTROAMPHETAMINE SULFATE AND AMPHETAMINE SULFATE 5; 5; 5; 5 MG/1; MG/1; MG/1; MG/1
20 TABLET ORAL
Qty: 60 TABLET | Refills: 0 | Status: SHIPPED | OUTPATIENT
Start: 2019-01-08 | End: 2019-02-04 | Stop reason: SDUPTHER

## 2019-01-08 RX ORDER — DEXTROAMPHETAMINE SACCHARATE, AMPHETAMINE ASPARTATE MONOHYDRATE, DEXTROAMPHETAMINE SULFATE AND AMPHETAMINE SULFATE 7.5; 7.5; 7.5; 7.5 MG/1; MG/1; MG/1; MG/1
30 CAPSULE, EXTENDED RELEASE ORAL EVERY MORNING
Qty: 30 CAPSULE | Refills: 0 | Status: SHIPPED | OUTPATIENT
Start: 2019-01-08 | End: 2019-02-04 | Stop reason: SDUPTHER

## 2019-01-08 NOTE — TELEPHONE ENCOUNTER
From: Todd Mae  Sent: 1/7/2019 2:37 PM EST  Subject: Medication Renewal Request    Todd Mae would like a refill of the following medications:     amphetamine-dextroamphetamine (ADDERALL) 20 mg tablet Rahda Tom MD]   Patient Comment: please call me to confirm     amphetamine-dextroamphetamine (ADDERALL XR) 30 MG 24 hr capsule Radha Tom MD]   Patient Comment: please call to confirm    Preferred pharmacy: [id number] : [phone]

## 2019-01-14 DIAGNOSIS — R52 PAIN: Primary | ICD-10-CM

## 2019-01-14 RX ORDER — PREDNISONE 1 MG/1
5 TABLET ORAL DAILY
Qty: 30 TABLET | Refills: 5 | Status: SHIPPED | OUTPATIENT
Start: 2019-01-14 | End: 2021-02-04

## 2019-01-16 DIAGNOSIS — R52 PAIN: ICD-10-CM

## 2019-01-16 RX ORDER — OXYMORPHONE HYDROCHLORIDE 5 MG/1
5 TABLET ORAL EVERY 6 HOURS PRN
Qty: 120 TABLET | Refills: 0 | Status: SHIPPED | OUTPATIENT
Start: 2019-01-16 | End: 2019-02-15 | Stop reason: SDUPTHER

## 2019-01-21 ENCOUNTER — TRANSCRIBE ORDERS (OUTPATIENT)
Dept: ADMINISTRATIVE | Facility: HOSPITAL | Age: 64
End: 2019-01-21

## 2019-01-21 DIAGNOSIS — R06.02 SOB (SHORTNESS OF BREATH): ICD-10-CM

## 2019-01-21 DIAGNOSIS — R13.19 OTHER DYSPHAGIA: ICD-10-CM

## 2019-01-21 DIAGNOSIS — C82.00 GRADE I FOLLICULAR SMALL CLEAVED CELL LYMPHOMA (HCC): ICD-10-CM

## 2019-01-21 DIAGNOSIS — M25.541 ARTHRALGIA OF BOTH HANDS: Primary | ICD-10-CM

## 2019-01-21 DIAGNOSIS — M85.80 OSTEOPENIA, UNSPECIFIED LOCATION: ICD-10-CM

## 2019-01-21 DIAGNOSIS — M25.542 ARTHRALGIA OF BOTH HANDS: Primary | ICD-10-CM

## 2019-01-21 DIAGNOSIS — I73.00 RAYNAUD'S DISEASE WITHOUT GANGRENE: ICD-10-CM

## 2019-01-21 DIAGNOSIS — R68.2 DRY MOUTH: ICD-10-CM

## 2019-01-28 ENCOUNTER — DOCTOR'S OFFICE (OUTPATIENT)
Dept: URBAN - NONMETROPOLITAN AREA CLINIC 1 | Facility: CLINIC | Age: 64
Setting detail: OPHTHALMOLOGY
End: 2019-01-28
Payer: COMMERCIAL

## 2019-01-28 DIAGNOSIS — Z79.891: ICD-10-CM

## 2019-01-28 DIAGNOSIS — H04.121: ICD-10-CM

## 2019-01-28 DIAGNOSIS — H04.122: ICD-10-CM

## 2019-01-28 DIAGNOSIS — H04.123: ICD-10-CM

## 2019-01-28 DIAGNOSIS — H40.033: ICD-10-CM

## 2019-01-28 PROCEDURE — 92014 COMPRE OPH EXAM EST PT 1/>: CPT | Performed by: OPHTHALMOLOGY

## 2019-01-28 PROCEDURE — 83861 MICROFLUID ANALY TEARS: CPT | Performed by: OPHTHALMOLOGY

## 2019-01-28 PROCEDURE — 92020 GONIOSCOPY: CPT | Performed by: OPHTHALMOLOGY

## 2019-01-28 PROCEDURE — 92132 CPTRZD OPH DX IMG ANT SGM: CPT | Performed by: OPHTHALMOLOGY

## 2019-01-28 ASSESSMENT — REFRACTION_CURRENTRX
OD_ADD: +2.25
OD_AXIS: 090
OS_ADD: +2.25
OS_OVR_VA: 20/
OD_OVR_VA: 20/
OS_OVR_VA: 20/
OS_OVR_VA: 20/
OS_VPRISM_DIRECTION: PROGS
OD_SPHERE: +0.25
OS_AXIS: 030
OD_VPRISM_DIRECTION: PROGS
OD_OVR_VA: 20/
OD_OVR_VA: 20/
OS_CYLINDER: -0.25
OS_SPHERE: +0.25
OD_CYLINDER: -0.25

## 2019-01-28 ASSESSMENT — REFRACTION_MANIFEST
OD_VA1: 20/
OS_VA1: 20/
OS_VA1: 20/25
OS_VA2: 20/
OD_VA2: 20/
OD_AXIS: 088
OD_VA2: 20/
OS_VA3: 20/
OU_VA: 20/
OS_AXIS: 090
OD_VA1: 20/25-1
OD_ADD: +2.50
OD_CYLINDER: -0.75
OD_VA3: 20/
OS_VA2: 20/
OD_VA3: 20/
OS_ADD: +2.50
OS_SPHERE: +1.00
OS_VA3: 20/
OU_VA: 20/
OD_SPHERE: +1.25
OS_CYLINDER: -0.25

## 2019-01-28 ASSESSMENT — LID EXAM ASSESSMENTS
OD_BLEPHARITIS: RLL RUL
OS_BLEPHARITIS: LLL LUL

## 2019-01-28 ASSESSMENT — SPHEQUIV_DERIVED
OD_SPHEQUIV: 0.875
OS_SPHEQUIV: 1.125
OS_SPHEQUIV: 0.875
OD_SPHEQUIV: 1.375

## 2019-01-28 ASSESSMENT — REFRACTION_AUTOREFRACTION
OS_CYLINDER: -0.75
OD_CYLINDER: -0.75
OD_AXIS: 95
OS_SPHERE: +1.50
OS_AXIS: 72
OD_SPHERE: +1.75

## 2019-01-28 ASSESSMENT — VISUAL ACUITY
OD_BCVA: 20/30-2
OS_BCVA: 20/25-2

## 2019-01-28 ASSESSMENT — CONFRONTATIONAL VISUAL FIELD TEST (CVF)
OD_FINDINGS: FULL
OS_FINDINGS: FULL

## 2019-01-28 ASSESSMENT — TEAR BREAK UP TIME (TBUT)
OD_TBUT: T
OS_TBUT: T

## 2019-01-28 ASSESSMENT — DRY EYES - PHYSICIAN NOTES
OS_GENERALCOMMENTS: DIFFUSE SPK
OD_GENERALCOMMENTS: DIFFUSE SPK

## 2019-01-29 ENCOUNTER — TRANSCRIBE ORDERS (OUTPATIENT)
Dept: ADMINISTRATIVE | Facility: HOSPITAL | Age: 64
End: 2019-01-29

## 2019-01-29 DIAGNOSIS — C82.05 FOLICLAR LYMPH GRADE I, NODES OF ING REGION AND LOWER LIMB (HCC): Primary | ICD-10-CM

## 2019-02-01 ENCOUNTER — HOSPITAL ENCOUNTER (OUTPATIENT)
Dept: RADIOLOGY | Facility: HOSPITAL | Age: 64
Discharge: HOME | End: 2019-02-01
Payer: MEDICARE

## 2019-02-01 ENCOUNTER — HOSPITAL ENCOUNTER (OUTPATIENT)
Dept: PULMONOLOGY | Facility: HOSPITAL | Age: 64
Discharge: HOME | End: 2019-02-01
Payer: MEDICARE

## 2019-02-01 DIAGNOSIS — M25.541 ARTHRALGIA OF BOTH HANDS: ICD-10-CM

## 2019-02-01 DIAGNOSIS — R06.02 SOB (SHORTNESS OF BREATH): ICD-10-CM

## 2019-02-01 DIAGNOSIS — M25.542 ARTHRALGIA OF BOTH HANDS: ICD-10-CM

## 2019-02-01 DIAGNOSIS — R13.19 OTHER DYSPHAGIA: ICD-10-CM

## 2019-02-01 DIAGNOSIS — R68.2 DRY MOUTH: ICD-10-CM

## 2019-02-01 DIAGNOSIS — C82.00 GRADE I FOLLICULAR SMALL CLEAVED CELL LYMPHOMA (HCC): ICD-10-CM

## 2019-02-01 DIAGNOSIS — I73.00 RAYNAUD'S DISEASE WITHOUT GANGRENE: ICD-10-CM

## 2019-02-01 PROCEDURE — 94060 EVALUATION OF WHEEZING: CPT | Performed by: INTERNAL MEDICINE

## 2019-02-01 PROCEDURE — 94010 BREATHING CAPACITY TEST: CPT

## 2019-02-01 PROCEDURE — 94726 PLETHYSMOGRAPHY LUNG VOLUMES: CPT | Performed by: INTERNAL MEDICINE

## 2019-02-01 PROCEDURE — 94729 DIFFUSING CAPACITY: CPT

## 2019-02-01 PROCEDURE — 94760 N-INVAS EAR/PLS OXIMETRY 1: CPT

## 2019-02-01 PROCEDURE — 74220 X-RAY XM ESOPHAGUS 1CNTRST: CPT

## 2019-02-01 PROCEDURE — 94729 DIFFUSING CAPACITY: CPT | Performed by: INTERNAL MEDICINE

## 2019-02-01 PROCEDURE — 73130 X-RAY EXAM OF HAND: CPT

## 2019-02-01 PROCEDURE — 94060 EVALUATION OF WHEEZING: CPT

## 2019-02-01 RX ORDER — ALBUTEROL SULFATE 2.5 MG/3ML
2.5 SOLUTION RESPIRATORY (INHALATION) ONCE AS NEEDED
Status: COMPLETED | OUTPATIENT
Start: 2019-02-01 | End: 2019-02-01

## 2019-02-01 RX ADMIN — ALBUTEROL SULFATE 2.5 MG: 2.5 SOLUTION RESPIRATORY (INHALATION) at 12:12

## 2019-02-04 DIAGNOSIS — E78.5 HYPERLIPIDEMIA, UNSPECIFIED HYPERLIPIDEMIA TYPE: ICD-10-CM

## 2019-02-04 DIAGNOSIS — F98.8 ATTENTION DEFICIT DISORDER, UNSPECIFIED HYPERACTIVITY PRESENCE: ICD-10-CM

## 2019-02-04 RX ORDER — DEXTROAMPHETAMINE SACCHARATE, AMPHETAMINE ASPARTATE, DEXTROAMPHETAMINE SULFATE AND AMPHETAMINE SULFATE 5; 5; 5; 5 MG/1; MG/1; MG/1; MG/1
20 TABLET ORAL
Qty: 60 TABLET | Refills: 0 | Status: SHIPPED | OUTPATIENT
Start: 2019-02-04 | End: 2019-03-01 | Stop reason: SDUPTHER

## 2019-02-04 RX ORDER — DEXTROAMPHETAMINE SACCHARATE, AMPHETAMINE ASPARTATE MONOHYDRATE, DEXTROAMPHETAMINE SULFATE AND AMPHETAMINE SULFATE 7.5; 7.5; 7.5; 7.5 MG/1; MG/1; MG/1; MG/1
30 CAPSULE, EXTENDED RELEASE ORAL EVERY MORNING
Qty: 30 CAPSULE | Refills: 0 | Status: SHIPPED | OUTPATIENT
Start: 2019-02-04 | End: 2019-03-01 | Stop reason: SDUPTHER

## 2019-02-04 RX ORDER — SIMVASTATIN 40 MG
TABLET ORAL
Qty: 90 TABLET | Refills: 1 | Status: SHIPPED | OUTPATIENT
Start: 2019-02-04 | End: 2019-08-07 | Stop reason: SDUPTHER

## 2019-02-08 ENCOUNTER — HOSPITAL ENCOUNTER (OUTPATIENT)
Dept: CT IMAGING | Facility: HOSPITAL | Age: 64
Discharge: HOME | End: 2019-02-08
Payer: MEDICARE

## 2019-02-08 ENCOUNTER — APPOINTMENT (OUTPATIENT)
Dept: LAB | Facility: HOSPITAL | Age: 64
End: 2019-02-08
Attending: FAMILY MEDICINE
Payer: MEDICARE

## 2019-02-08 DIAGNOSIS — C82.05 FOLICLAR LYMPH GRADE I, NODES OF ING REGION AND LOWER LIMB (HCC): ICD-10-CM

## 2019-02-08 LAB
ALBUMIN SERPL BCP-MCNC: 3.6 G/DL (ref 3.5–5)
ALP SERPL-CCNC: 52 U/L (ref 46–116)
ALT SERPL W P-5'-P-CCNC: 25 U/L (ref 12–78)
ANION GAP SERPL CALCULATED.3IONS-SCNC: 6 MMOL/L (ref 4–13)
AST SERPL W P-5'-P-CCNC: 15 U/L (ref 5–45)
BILIRUB SERPL-MCNC: 0.3 MG/DL (ref 0.2–1)
BUN SERPL-MCNC: 23 MG/DL (ref 5–25)
CALCIUM SERPL-MCNC: 8.5 MG/DL (ref 8.3–10.1)
CHLORIDE SERPL-SCNC: 105 MMOL/L (ref 100–108)
CO2 SERPL-SCNC: 29 MMOL/L (ref 21–32)
CREAT SERPL-MCNC: 0.84 MG/DL (ref 0.6–1.3)
GFR SERPL CREATININE-BSD FRML MDRD: 74 ML/MIN/1.73SQ M
GLUCOSE P FAST SERPL-MCNC: 99 MG/DL (ref 65–99)
POTASSIUM SERPL-SCNC: 4.2 MMOL/L (ref 3.5–5.3)
PROT SERPL-MCNC: 5.8 G/DL (ref 6.4–8.2)
SODIUM SERPL-SCNC: 140 MMOL/L (ref 136–145)

## 2019-02-08 PROCEDURE — 74177 CT ABD & PELVIS W/CONTRAST: CPT

## 2019-02-08 PROCEDURE — 80053 COMPREHEN METABOLIC PANEL: CPT | Performed by: FAMILY MEDICINE

## 2019-02-08 PROCEDURE — 71260 CT THORAX DX C+: CPT

## 2019-02-08 PROCEDURE — 36415 COLL VENOUS BLD VENIPUNCTURE: CPT | Performed by: FAMILY MEDICINE

## 2019-02-08 PROCEDURE — 70491 CT SOFT TISSUE NECK W/DYE: CPT

## 2019-02-08 RX ADMIN — IOHEXOL 100 ML: 350 INJECTION, SOLUTION INTRAVENOUS at 12:25

## 2019-02-08 RX ADMIN — IOHEXOL 50 ML: 240 INJECTION, SOLUTION INTRATHECAL; INTRAVASCULAR; INTRAVENOUS; ORAL at 12:26

## 2019-02-11 DIAGNOSIS — M35.1 MCTD (MIXED CONNECTIVE TISSUE DISEASE) (HCC): Primary | ICD-10-CM

## 2019-02-11 NOTE — TELEPHONE ENCOUNTER
Asking for Clotrimazole for rash  States Methotrexate is 2 5mg 5x weekly   Prescribed by Dr Fernando Vargas but changing Rheumatologists

## 2019-02-15 ENCOUNTER — OFFICE VISIT (OUTPATIENT)
Dept: FAMILY MEDICINE CLINIC | Facility: CLINIC | Age: 64
End: 2019-02-15
Payer: MEDICARE

## 2019-02-15 VITALS
DIASTOLIC BLOOD PRESSURE: 54 MMHG | BODY MASS INDEX: 20.92 KG/M2 | RESPIRATION RATE: 16 BRPM | HEART RATE: 97 BPM | SYSTOLIC BLOOD PRESSURE: 100 MMHG | OXYGEN SATURATION: 95 % | HEIGHT: 61 IN | WEIGHT: 110.8 LBS

## 2019-02-15 DIAGNOSIS — Z23 ENCOUNTER FOR ADMINISTRATION OF VACCINE: Primary | ICD-10-CM

## 2019-02-15 DIAGNOSIS — C73 THYROID CANCER (HCC): ICD-10-CM

## 2019-02-15 DIAGNOSIS — M34.0 SCLERODERMA PROGRESSIVE (HCC): ICD-10-CM

## 2019-02-15 DIAGNOSIS — E89.0 POSTOPERATIVE HYPOTHYROIDISM: ICD-10-CM

## 2019-02-15 DIAGNOSIS — R52 PAIN: ICD-10-CM

## 2019-02-15 DIAGNOSIS — C82.90 FOLLICULAR LYMPHOMA, UNSPECIFIED FOLLICULAR LYMPHOMA TYPE, UNSPECIFIED BODY REGION (HCC): ICD-10-CM

## 2019-02-15 DIAGNOSIS — Z13.6 SCREENING FOR ISCHEMIC HEART DISEASE: ICD-10-CM

## 2019-02-15 DIAGNOSIS — K04.7 DENTAL INFECTION: ICD-10-CM

## 2019-02-15 DIAGNOSIS — M35.00 SJOGREN'S SYNDROME, WITH UNSPECIFIED ORGAN INVOLVEMENT (HCC): Primary | ICD-10-CM

## 2019-02-15 DIAGNOSIS — Z11.59 ENCOUNTER FOR HEPATITIS C SCREENING TEST FOR LOW RISK PATIENT: ICD-10-CM

## 2019-02-15 PROBLEM — Z00.00 MEDICARE ANNUAL WELLNESS VISIT, SUBSEQUENT: Status: ACTIVE | Noted: 2019-02-15

## 2019-02-15 PROCEDURE — G0009 ADMIN PNEUMOCOCCAL VACCINE: HCPCS

## 2019-02-15 PROCEDURE — 1124F ACP DISCUSS-NO DSCNMKR DOCD: CPT | Performed by: FAMILY MEDICINE

## 2019-02-15 PROCEDURE — G0439 PPPS, SUBSEQ VISIT: HCPCS | Performed by: FAMILY MEDICINE

## 2019-02-15 PROCEDURE — 90670 PCV13 VACCINE IM: CPT

## 2019-02-15 PROCEDURE — 99214 OFFICE O/P EST MOD 30 MIN: CPT | Performed by: FAMILY MEDICINE

## 2019-02-15 RX ORDER — OXYMORPHONE HYDROCHLORIDE 5 MG/1
5 TABLET ORAL EVERY 6 HOURS PRN
Qty: 120 TABLET | Refills: 0 | Status: SHIPPED | OUTPATIENT
Start: 2019-02-15 | End: 2019-03-18 | Stop reason: SDUPTHER

## 2019-02-15 RX ORDER — MORPHINE SULFATE 15 MG/1
15 TABLET, FILM COATED, EXTENDED RELEASE ORAL 2 TIMES DAILY
Qty: 60 TABLET | Refills: 0 | Status: SHIPPED | OUTPATIENT
Start: 2019-02-15 | End: 2019-08-21

## 2019-02-15 RX ORDER — AMOXICILLIN 500 MG/1
500 CAPSULE ORAL EVERY 8 HOURS SCHEDULED
Qty: 21 CAPSULE | Refills: 0 | Status: SHIPPED | OUTPATIENT
Start: 2019-02-15 | End: 2019-02-22 | Stop reason: SDUPTHER

## 2019-02-15 NOTE — PROGRESS NOTES
Assessment and Plan:  Problem List Items Addressed This Visit     None        Health Maintenance Due   Topic Date Due    Hepatitis C Screening  1955    Medicare Annual Wellness Visit (AWV)  1955    PAP SMEAR  04/25/1975    Pneumococcal PPSV23 Highest Risk Adult (2 of 3 - PCV13) 01/01/2007    CRC Screening: Colonoscopy  04/18/2019         HPI:  Patient Active Problem List   Diagnosis    Bilateral hand numbness    Methotrexate, long term, current use    Scleroderma progressive ([address] )    Preoperative clearance    Fatigue    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HCC)    BRCA gene mutation positive    Allergic rhinitis    Bartholin gland cyst    Carpal tunnel syndrome of left wrist    Carpal tunnel syndrome of right wrist    Dental disorder    Depression    Esophageal reflux disease    Fecal soiling    Fibromyalgia    Hearing loss    Heel spur    Hematuria    Herniated lumbar intervertebral disc    Hyperlipidemia    Lichen planus    Lumbar disc herniation    Neck sprain and strain    Osteoarthritis    Osteoarthritis of both knees    Osteopenia    Ovarian cyst    Pain in joint of left shoulder    Peripheral neuropathy    Post concussion syndrome    Radiculopathy, lumbar region    Raynaud's syndrome    Rectal prolapse    Sjogren's syndrome ([address] )    Spasm    Thyroid cancer ([address] )    Vitamin D deficiency    Postoperative hypothyroidism    SOB (shortness of breath)     Past Medical History:   Diagnosis Date    ADHD     Cancer ([address] )     non-Hodg      Cellulitis of foot     Last assessed 10/24/16    Change in mental status     Last assessed 12/01/15    Chronic fatigue     PLAZA (dyspnea on exertion)     Last assessed 12/01/15    Fibromyalgia     Folliculitis     Last assessed 10/06/14    GERD (gastroesophageal reflux disease)     Osteopenia     Raynaud's disease     Scleroderma ([address]     Systemic sclerosis (HCC)      Past Surgical History:   Procedure Laterality Date    BACK SURGERY      BLADDER SURGERY      BONE MARROW BIOPSY      CARDIAC CATHETERIZATION      HEMORRHOID SURGERY      HYSTERECTOMY      KNEE SURGERY      NASAL SEPTUM SURGERY      ROTATOR CUFF REPAIR      THYROIDECTOMY N/A 10/4/2018    Procedure: TOTAL THYROIDECTOMY;  Surgeon: Monica Short MD;  [facility];  Service: Surgical Oncology    TONSILLECTOMY       Family History   Problem Relation Age of Onset    Arthritis Mother     Diabetes Mother     Coronary artery disease Father     Arthritis Sister     Asthma Sister     Crohn's disease Sister     Other Brother         myocardial ischemia     Social History     Tobacco Use   Smoking Status Former Smoker   Smokeless Tobacco Never Used   Tobacco Comment    quit 40 years ago      Social History     Substance and Sexual Activity   Alcohol Use No      Social History     Substance and Sexual Activity   Drug Use No         Current Outpatient Medications   Medication Sig Dispense Refill    amphetamine-dextroamphetamine (ADDERALL XR) 30 MG 24 hr capsule Take 1 capsule (30 mg total) by mouth every morning Max Daily Amount: 30 mg 30 capsule 0    amphetamine-dextroamphetamine (ADDERALL) 20 mg tablet Take 1 tablet (20 mg total) by mouth 2 (two) times a day Max Daily Amount: 40 mg 60 tablet 0    aspirin (ECOTRIN LOW STRENGTH) 81 mg EC tablet Take 81 mg by mouth daily      buPROPion (WELLBUTRIN XL) 300 mg 24 hr tablet TAKE 1 TABLET BY MOUTH EVERY DAY 90 tablet 2    calcium carbonate (TUMS) 500 mg chewable tablet Chew 2 tablets (1,000 mg total) daily 60 tablet 0    chlordiazePOXIDE (LIBRIUM) 5 mg capsule Take 5 mg by mouth 2 (two) times a day        cycloSPORINE (RESTASIS) 0 05 % ophthalmic emulsion Restasis 0 05 % eye drops in a dropperette      dicyclomine (BENTYL) 10 mg capsule Take 20 mg by mouth 2 (two) times a day        esomeprazole (NexIUM) 40 MG capsule Take 40 mg by mouth 2 (two) times a day before meals        folic acid (FOLVITE) 1 mg tablet Take by mouth daily      hydroxychloroquine (PLAQUENIL) 200 mg tablet Take 200 mg by mouth 2 (two) times a day with meals        levothyroxine 137 mcg tablet   5    methotrexate 2 5 mg tablet As directed 12 tablet 5    montelukast (SINGULAIR) 10 mg tablet Take 1 tablet (10 mg total) by mouth daily 30 tablet 5    Multiple Vitamin (MULTIVITAMIN) tablet Take 1 tablet by mouth daily      naproxen (NAPROSYN) 500 mg tablet   1    NON FORMULARY Take by mouth      NON FORMULARY Take by mouth      oxymorphone (OPANA) 5 MG tablet Take 1 tablet (5 mg total) by mouth every 6 (six) hours as needed for moderate pain Max Daily Amount: 20 mg 120 tablet 0    predniSONE 5 mg tablet Take 1 tablet (5 mg total) by mouth daily 30 tablet 5    pregabalin (LYRICA) 75 mg capsule 2 (two) times a day  Reported on 11/21/2016       simvastatin (ZOCOR) 40 mg tablet TAKE 1 TABLET BY MOUTH EVERY DAY 90 tablet 1    SODIUM FLUORIDE, DENTAL GEL, (PREVIDENT) 1 1 % GEL PreviDent 5000 Dry Mouth 1 1 % gel      traZODone (DESYREL) 150 mg tablet Take 1 tablet (150 mg total) by mouth daily at bedtime 30 tablet 5     No current facility-administered medications for this visit        Allergies   Allergen Reactions    Duloxetine Other (See Comments)     Mental psychosis    Revatio [Sildenafil] Other (See Comments)     mental status changes    Savella [Milnacipran] Other (See Comments)     Feeling out of it    Sulfamethoxazole-Trimethoprim Rash and GI Intolerance    Tedizolid Rash     Immunization History   Administered Date(s) Administered    INFLUENZA 01/06/2006, 10/22/2013, 11/10/2014, 10/18/2016, 10/04/2017, 09/21/2018    Influenza Quadrivalent Preservative Free 3 years and older IM 10/18/2016    Influenza, recombinant, quadrivalent,injectable, preservative free 09/21/2018    Pneumococcal Polysaccharide PPV23 01/01/2006    Tdap 10/01/2013       Patient Care Team:  Marco Bailey MD as PCP - Mary Dexter MD as PCP - Endocrinology (Endocrinology)    Medicare Screening Tests and Risk Assessments:  Valarie Diaz is here for her Subsequent Wellness visit  Last Medicare Wellness visit information reviewed, patient interviewed and updates made to the record today  Health Risk Assessment:  Patient rates overall health as good  Patient feels that their physical health rating is Slightly worse  Eyesight was rated as Same  Hearing was rated as Slightly worse  Patient feels that their emotional and mental health rating is Same  Pain experienced by patient in the last 7 days has been A lot  Patient's pain rating has been 8/10  Patient states that she has experienced no weight loss or gain in last 6 months  Emotional/Mental Health:  Patient has been feeling nervous/anxious  PHQ-9 Depression Screening:    Frequency of the following problems over the past two weeks:      1  Little interest or pleasure in doing things: 1 - several days      2  Feeling down, depressed, or hopeless: 0 - not at all      6  Feeling bad about yourself - or that you are a failure or have let yourself or your family down: 0 - not at all      9  Thoughts that you would be better off dead, or of hurting yourself in some way: 0 - not at all  PHQ-2 Score: 1          Broken Bones/Falls: Fall Risk Assessment:    In the past year, patient has experienced: History of falling in past year     Number of falls: 1          Bladder/Bowel:  Patient has leaked urine accidently in the last six months  Patient reports no loss of bowel control  Immunizations:  Patient has had a flu vaccination within the last year  Patient has not received a pneumonia shot  Home Safety:  Patient does not have trouble with stairs inside or outside of their home  Patient currently reports that there are no safety hazards present in home, working smoke alarms, working carbon monoxide detectors        Preventative Screenings:   Breast cancer screening performed, no colon cancer screen completed, cholesterol screen completed, glaucoma eye exam completed,     Nutrition:  Current diet: Limited junk food and Regular with servings of the following:    Medications:  Patient is currently taking over-the-counter supplements  Patient is able to manage medications  Lifestyle Choices:  Patient reports no tobacco use  Patient has smoked or used tobacco in the past   Patient has stopped her tobacco use  Patient reports no alcohol use  Patient drives a vehicle  Patient wears seat belt  Activities of Daily Living:  Can get out of bed by his or her self, able to dress self, able to make own meals, able to do own shopping, able to bathe self, can do own laundry/housekeeping, can manage own money, pay bills and track expenses    Previous Hospitalizations:  Hospitalization or ED visit in past 12 months  Number of hospitalizations within the last year: 1-2        Advanced Directives:  Patient has not decided on power of   Patient has not completed advanced directive  Preventative Screening/Counseling:      Cardiovascular:      General: Risks and Benefits Discussed     Due for Labs/Analytes/Optional EKG: Lipid Panel          Diabetes:      General: Risks and Benefits Discussed and Screening Current          Colorectal Cancer:      General: Screening Current          Breast Cancer:      General: Screening Current          Cervical Cancer:      General: Screening Current          Osteoporosis:      General: Screening Current          AAA:      General: Screening Not Indicated          Glaucoma:      General: Screening Current          HIV:      General: Screening Not Indicated          Hepatitis C:      General: Risks and Benefits Discussed      Counseling: has received general HCV counseling        Advanced Directives:   Patient has living will for healthcare, has durable POA for healthcare, patient has an advanced directive  Information on ACP and/or AD provided  5 wishes given   End of life assessment reviewed with patient  Provider agrees with end of life decisions   Immunizations:      Influenza: Risks & Benefits Discussed, Influenza UTD This Year and Influenza Recommended Annually      Pneumococcal: Pneumococcal Due Today      Shingrix: Shingrix Vaccine Needed Today and Risks & Benefits Discussed      Hepatitis B (Low risk patients): Series Not Indicated      Zostavax: Vaccine Status Unknown      TD: Vaccine Status Unknown      TDAP: Vaccine Status Unknown            No exam data present    Physical Exam:  Review of Systems   Gastrointestinal: Negative for bowel incontinence  Psychiatric/Behavioral: The patient is nervous/anxious  There were no vitals filed for this visit  There is no height or weight on file to calculate BMI      Physical Exam

## 2019-02-15 NOTE — PROGRESS NOTES
Assessment/Plan:    Sjogren's syndrome ([facility] )  Continue current plan per rheumatology  Scleroderma progressive ([facility] )  Continue current plan per rheumatology  Diagnoses and all orders for this visit:    Sjogren's syndrome, with unspecified organ involvement (HCC)    Pain  -     oxymorphone (OPANA) 5 MG tablet; Take 1 tablet (5 mg total) by mouth every 6 (six) hours as needed for moderate painMax Daily Amount: 20 mg    Thyroid cancer (HCC)    Postoperative hypothyroidism    Scleroderma progressive (HCC)    Follicular lymphoma, unspecified follicular lymphoma type, unspecified body region [facility])    Dental infection  -     amoxicillin (AMOXIL) 500 mg capsule; Take 1 capsule (500 mg total) by mouth every 8 (eight) hours for 7 days    Other orders  -     NON FORMULARY; Take by mouth          Subjective:      Patient ID: Jordyn Prakash is a 58 y o  female  She is seeing a new rheumatologist at [address] [place] [address]  This new provider thinks that she has non-Hodgkin's lymphoma  She is now being seen by a new hem-onc at [facility]  He did lab work and CT scans  She sent her labs to her previous heme-onc, and he does not believe she has lymphoma  She has chronic pain related to trauma and her rheumatologic disease  She is considering medical marijuana  She believes she has an infected tooth  She has lost a number of teeth due to Sjogren's  The following portions of the patient's history were reviewed and updated as appropriate:   She  has a past medical history of ADHD, Cancer ([facility] ), Cellulitis of foot, Change in mental status, Chronic fatigue, PLAZA (dyspnea on exertion), Fibromyalgia, Folliculitis, GERD (gastroesophageal reflux disease), Osteopenia, Raynaud's disease, Scleroderma ([facility] ), and Systemic sclerosis ([facility] )    She   Patient Active Problem List    Diagnosis Date Noted    SOB (shortness of breath)     Vitamin D deficiency 11/28/2018    Postoperative hypothyroidism 11/28/2018    Osteoarthritis 10/17/2018    Raynaud's syndrome 10/17/2018    Rectal prolapse 10/17/2018    [facility] 10/17/2018    Thyroid cancer ([facility] ) 10/17/2018    BRCA gene mutation positive 09/21/2018    Preoperative clearance 06/25/2018    Fatigue 06/25/2018    Bilateral hand numbness 05/01/2018    Methotrexate, long term, current use 05/01/2018    Scleroderma progressive ([facility] ) 05/01/2018    Fecal soiling 08/07/2017    Post concussion syndrome 12/01/2015    Carpal tunnel syndrome of left wrist 09/14/2015    Carpal tunnel syndrome of right wrist 08/27/2015    Lumbar disc herniation 08/27/2015    Radiculopathy, lumbar region 08/27/2015    Herniated lumbar intervertebral disc 08/03/2015    Heel spur 06/03/2015    Dental disorder 06/02/2015    Fibromyalgia 02/27/2015    Bartholin gland cyst 07/17/2014    Ovarian cyst 07/17/2014    Neck sprain and strain 04/22/2014    Hematuria 92/88/7587    Lichen planus 61/44/6198    Osteoarthritis of both knees 08/13/2013    Pain in joint of left shoulder 63/19/2541    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[facility] ) 12/07/2012    Allergic rhinitis 12/07/2012    Depression 12/07/2012    Esophageal reflux disease 12/07/2012    Hyperlipidemia 12/07/2012    Osteopenia 12/07/2012    Peripheral neuropathy 12/07/2012    Spasm 12/07/2012    Hearing loss 11/23/2012     She  has a past surgical history that includes Tonsillectomy; Back surgery; Rotator cuff repair; Hysterectomy; Bone marrow biopsy; Bladder surgery; Hemorrhoid surgery; Nasal septum surgery; Knee surgery; Cardiac catheterization; and Thyroidectomy (N/A, 10/4/2018)  Her family history includes Arthritis in her mother and sister; Asthma in her sister; Coronary artery disease in her father; Crohn's disease in her sister; Diabetes in her mother; Other in her brother  She  reports that she has quit smoking  She has never used smokeless tobacco  She reports that she does not drink alcohol or use drugs    Current Outpatient Medications   Medication Sig Dispense Refill    amphetamine-dextroamphetamine (ADDERALL XR) 30 MG 24 hr capsule Take 1 capsule (30 mg total) by mouth every morning Max Daily Amount: 30 mg 30 capsule 0    amphetamine-dextroamphetamine (ADDERALL) 20 mg tablet Take 1 tablet (20 mg total) by mouth 2 (two) times a day Max Daily Amount: 40 mg 60 tablet 0    aspirin (ECOTRIN LOW STRENGTH) 81 mg EC tablet Take 81 mg by mouth daily      buPROPion (WELLBUTRIN XL) 300 mg 24 hr tablet TAKE 1 TABLET BY MOUTH EVERY DAY 90 tablet 2    calcium carbonate (TUMS) 500 mg chewable tablet Chew 2 tablets (1,000 mg total) daily 60 tablet 0    chlordiazePOXIDE (LIBRIUM) 5 mg capsule Take 5 mg by mouth 2 (two) times a day        cycloSPORINE (RESTASIS) 0 05 % ophthalmic emulsion Restasis 0 05 % eye drops in a dropperette      dicyclomine (BENTYL) 10 mg capsule Take 20 mg by mouth 2 (two) times a day        esomeprazole (NexIUM) 40 MG capsule Take 40 mg by mouth 2 (two) times a day before meals        folic acid (FOLVITE) 1 mg tablet Take by mouth daily      hydroxychloroquine (PLAQUENIL) 200 mg tablet Take 200 mg by mouth 2 (two) times a day with meals        levothyroxine 137 mcg tablet   5    methotrexate 2 5 mg tablet As directed 12 tablet 5    montelukast (SINGULAIR) 10 mg tablet Take 1 tablet (10 mg total) by mouth daily 30 tablet 5    Multiple Vitamin (MULTIVITAMIN) tablet Take 1 tablet by mouth daily      naproxen (NAPROSYN) 500 mg tablet   1    NON FORMULARY Take by mouth      oxymorphone (OPANA) 5 MG tablet Take 1 tablet (5 mg total) by mouth every 6 (six) hours as needed for moderate painMax Daily Amount: 20 mg 120 tablet 0    predniSONE 5 mg tablet Take 1 tablet (5 mg total) by mouth daily 30 tablet 5    pregabalin (LYRICA) 75 mg capsule 2 (two) times a day   Reported on 11/21/2016       simvastatin (ZOCOR) 40 mg tablet TAKE 1 TABLET BY MOUTH EVERY DAY 90 tablet 1    SODIUM FLUORIDE, DENTAL GEL, (PREVIDENT) 1 1 % GEL PreviDent 5000 Dry Mouth 1 1 % gel      traZODone (DESYREL) 150 mg tablet Take 1 tablet (150 mg total) by mouth daily at bedtime 30 tablet 5    amoxicillin (AMOXIL) 500 mg capsule Take 1 capsule (500 mg total) by mouth every 8 (eight) hours for 7 days 21 capsule 0    NON FORMULARY Take by mouth       No current facility-administered medications for this visit  Current Outpatient Medications on File Prior to Visit   Medication Sig    amphetamine-dextroamphetamine (ADDERALL XR) 30 MG 24 hr capsule Take 1 capsule (30 mg total) by mouth every morning Max Daily Amount: 30 mg    amphetamine-dextroamphetamine (ADDERALL) 20 mg tablet Take 1 tablet (20 mg total) by mouth 2 (two) times a day Max Daily Amount: 40 mg    aspirin (ECOTRIN LOW STRENGTH) 81 mg EC tablet Take 81 mg by mouth daily    buPROPion (WELLBUTRIN XL) 300 mg 24 hr tablet TAKE 1 TABLET BY MOUTH EVERY DAY    calcium carbonate (TUMS) 500 mg chewable tablet Chew 2 tablets (1,000 mg total) daily    chlordiazePOXIDE (LIBRIUM) 5 mg capsule Take 5 mg by mouth 2 (two) times a day      cycloSPORINE (RESTASIS) 0 05 % ophthalmic emulsion Restasis 0 05 % eye drops in a dropperette    dicyclomine (BENTYL) 10 mg capsule Take 20 mg by mouth 2 (two) times a day      esomeprazole (NexIUM) 40 MG capsule Take 40 mg by mouth 2 (two) times a day before meals      folic acid (FOLVITE) 1 mg tablet Take by mouth daily    hydroxychloroquine (PLAQUENIL) 200 mg tablet Take 200 mg by mouth 2 (two) times a day with meals      levothyroxine 137 mcg tablet     methotrexate 2 5 mg tablet As directed    montelukast (SINGULAIR) 10 mg tablet Take 1 tablet (10 mg total) by mouth daily    Multiple Vitamin (MULTIVITAMIN) tablet Take 1 tablet by mouth daily    naproxen (NAPROSYN) 500 mg tablet     NON FORMULARY Take by mouth    predniSONE 5 mg tablet Take 1 tablet (5 mg total) by mouth daily    pregabalin (LYRICA) 75 mg capsule 2 (two) times a day  Reported on 11/21/2016     simvastatin (ZOCOR) 40 mg tablet TAKE 1 TABLET BY MOUTH EVERY DAY    SODIUM FLUORIDE, DENTAL GEL, (PREVIDENT) 1 1 % GEL PreviDent 5000 Dry Mouth 1 1 % gel    traZODone (DESYREL) 150 mg tablet Take 1 tablet (150 mg total) by mouth daily at bedtime    [DISCONTINUED] oxymorphone (OPANA) 5 MG tablet Take 1 tablet (5 mg total) by mouth every 6 (six) hours as needed for moderate pain Max Daily Amount: 20 mg    NON FORMULARY Take by mouth    [DISCONTINUED] levothyroxine (SYNTHROID) 137 mcg tablet     [DISCONTINUED] levothyroxine 112 mcg tablet     [DISCONTINUED] NON FORMULARY     [DISCONTINUED] ofloxacin (OCUFLOX) 0 3 % ophthalmic solution Administer 1 drop to both eyes 4 (four) times a day     No current facility-administered medications on file prior to visit  She is allergic to duloxetine; revatio [sildenafil]; savella [milnacipran]; sulfamethoxazole-trimethoprim; and tedizolid       Review of Systems   All other systems reviewed and are negative  Objective:      /54 (BP Location: Left arm, Patient Position: Sitting, Cuff Size: Standard)   Pulse 97   Resp 16   Ht 5' 1" (1 549 m)   Wt 50 3 kg (110 lb 12 8 oz)   SpO2 95%   BMI 20 94 kg/m²          Physical Exam   Constitutional: She is oriented to person, place, and time  She appears well-developed and well-nourished  Neck: Normal range of motion  Neck supple  Cardiovascular: Normal rate, regular rhythm, normal heart sounds and intact distal pulses  Pulmonary/Chest: Effort normal and breath sounds normal    Abdominal: Soft  Bowel sounds are normal    Musculoskeletal: Normal range of motion  Neurological: She is alert and oriented to person, place, and time  Skin: Skin is warm and dry  Capillary refill takes less than 2 seconds  Psychiatric: She has a normal mood and affect  Her behavior is normal  Judgment and thought content normal    Nursing note and vitals reviewed

## 2019-02-19 ENCOUNTER — TRANSCRIBE ORDERS (OUTPATIENT)
Dept: ADMINISTRATIVE | Facility: HOSPITAL | Age: 64
End: 2019-02-19

## 2019-02-19 ENCOUNTER — HOSPITAL ENCOUNTER (OUTPATIENT)
Dept: BONE DENSITY | Facility: HOSPITAL | Age: 64
Discharge: HOME | End: 2019-02-19
Payer: MEDICARE

## 2019-02-19 ENCOUNTER — APPOINTMENT (OUTPATIENT)
Dept: LAB | Facility: HOSPITAL | Age: 64
End: 2019-02-19
Attending: FAMILY MEDICINE
Payer: MEDICARE

## 2019-02-19 DIAGNOSIS — Z13.6 SCREENING FOR ISCHEMIC HEART DISEASE: ICD-10-CM

## 2019-02-19 DIAGNOSIS — N95.1 SYMPTOMATIC MENOPAUSAL OR FEMALE CLIMACTERIC STATES: Primary | ICD-10-CM

## 2019-02-19 DIAGNOSIS — M85.80 OSTEOPENIA, UNSPECIFIED LOCATION: ICD-10-CM

## 2019-02-19 DIAGNOSIS — Z11.59 ENCOUNTER FOR HEPATITIS C SCREENING TEST FOR LOW RISK PATIENT: ICD-10-CM

## 2019-02-19 DIAGNOSIS — N95.1 SYMPTOMATIC MENOPAUSAL OR FEMALE CLIMACTERIC STATES: ICD-10-CM

## 2019-02-19 LAB
CHOLEST SERPL-MCNC: 149 MG/DL (ref 50–200)
ESTRADIOL SERPL-MCNC: 27 PG/ML
FSH SERPL-ACNC: 18.9 MIU/ML
HDLC SERPL-MCNC: 63 MG/DL (ref 40–60)
LDLC SERPL CALC-MCNC: 68 MG/DL (ref 0–100)
NONHDLC SERPL-MCNC: 86 MG/DL
TRIGL SERPL-MCNC: 88 MG/DL

## 2019-02-19 PROCEDURE — 84402 ASSAY OF FREE TESTOSTERONE: CPT

## 2019-02-19 PROCEDURE — 77080 DXA BONE DENSITY AXIAL: CPT

## 2019-02-19 PROCEDURE — 83001 ASSAY OF GONADOTROPIN (FSH): CPT

## 2019-02-19 PROCEDURE — 82670 ASSAY OF TOTAL ESTRADIOL: CPT

## 2019-02-19 PROCEDURE — 80061 LIPID PANEL: CPT

## 2019-02-19 PROCEDURE — 36415 COLL VENOUS BLD VENIPUNCTURE: CPT

## 2019-02-19 PROCEDURE — 86803 HEPATITIS C AB TEST: CPT

## 2019-02-19 PROCEDURE — 84403 ASSAY OF TOTAL TESTOSTERONE: CPT

## 2019-02-20 LAB
HCV AB SER QL: NORMAL
TESTOST FREE SERPL-MCNC: 6.5 PG/ML (ref 0–4.2)
TESTOST SERPL-MCNC: 736 NG/DL (ref 3–41)

## 2019-02-22 DIAGNOSIS — K04.7 DENTAL INFECTION: ICD-10-CM

## 2019-02-23 RX ORDER — AMOXICILLIN 500 MG/1
500 CAPSULE ORAL EVERY 8 HOURS SCHEDULED
Qty: 21 CAPSULE | Refills: 0 | Status: SHIPPED | OUTPATIENT
Start: 2019-02-23 | End: 2019-02-24 | Stop reason: SDUPTHER

## 2019-02-24 DIAGNOSIS — K04.7 DENTAL INFECTION: ICD-10-CM

## 2019-02-24 RX ORDER — AMOXICILLIN 500 MG/1
500 CAPSULE ORAL EVERY 8 HOURS SCHEDULED
Qty: 21 CAPSULE | Refills: 0 | Status: SHIPPED | OUTPATIENT
Start: 2019-02-24 | End: 2019-02-27 | Stop reason: SDUPTHER

## 2019-02-27 DIAGNOSIS — K04.7 DENTAL INFECTION: ICD-10-CM

## 2019-02-27 RX ORDER — AMOXICILLIN 500 MG/1
500 CAPSULE ORAL EVERY 8 HOURS SCHEDULED
Qty: 21 CAPSULE | Refills: 0 | Status: SHIPPED | OUTPATIENT
Start: 2019-02-27 | End: 2019-03-07 | Stop reason: SDUPTHER

## 2019-03-01 DIAGNOSIS — F98.8 ATTENTION DEFICIT DISORDER, UNSPECIFIED HYPERACTIVITY PRESENCE: ICD-10-CM

## 2019-03-03 RX ORDER — DEXTROAMPHETAMINE SACCHARATE, AMPHETAMINE ASPARTATE, DEXTROAMPHETAMINE SULFATE AND AMPHETAMINE SULFATE 5; 5; 5; 5 MG/1; MG/1; MG/1; MG/1
20 TABLET ORAL
Qty: 60 TABLET | Refills: 0 | Status: SHIPPED | OUTPATIENT
Start: 2019-03-03 | End: 2019-03-28 | Stop reason: SDUPTHER

## 2019-03-03 RX ORDER — DEXTROAMPHETAMINE SACCHARATE, AMPHETAMINE ASPARTATE MONOHYDRATE, DEXTROAMPHETAMINE SULFATE AND AMPHETAMINE SULFATE 7.5; 7.5; 7.5; 7.5 MG/1; MG/1; MG/1; MG/1
30 CAPSULE, EXTENDED RELEASE ORAL EVERY MORNING
Qty: 30 CAPSULE | Refills: 0 | Status: SHIPPED | OUTPATIENT
Start: 2019-03-03 | End: 2019-03-28 | Stop reason: SDUPTHER

## 2019-03-07 DIAGNOSIS — K04.7 DENTAL INFECTION: ICD-10-CM

## 2019-03-07 RX ORDER — AMOXICILLIN 500 MG/1
500 CAPSULE ORAL EVERY 8 HOURS SCHEDULED
Qty: 21 CAPSULE | Refills: 0 | Status: SHIPPED | OUTPATIENT
Start: 2019-03-07 | End: 2019-03-14

## 2019-03-08 ENCOUNTER — TELEPHONE (OUTPATIENT)
Dept: FAMILY MEDICINE CLINIC | Facility: CLINIC | Age: 64
End: 2019-03-08

## 2019-03-18 DIAGNOSIS — K04.7 DENTAL INFECTION: Primary | ICD-10-CM

## 2019-03-18 DIAGNOSIS — R52 PAIN: ICD-10-CM

## 2019-03-18 RX ORDER — OXYMORPHONE HYDROCHLORIDE 5 MG/1
5 TABLET ORAL EVERY 6 HOURS PRN
Qty: 120 TABLET | Refills: 0 | Status: SHIPPED | OUTPATIENT
Start: 2019-03-18 | End: 2019-04-18 | Stop reason: SDUPTHER

## 2019-03-18 RX ORDER — AMOXICILLIN 500 MG/1
500 CAPSULE ORAL EVERY 8 HOURS SCHEDULED
Qty: 21 CAPSULE | Refills: 0 | Status: SHIPPED | OUTPATIENT
Start: 2019-03-18 | End: 2019-03-25

## 2019-03-28 DIAGNOSIS — F98.8 ATTENTION DEFICIT DISORDER, UNSPECIFIED HYPERACTIVITY PRESENCE: ICD-10-CM

## 2019-03-29 RX ORDER — DEXTROAMPHETAMINE SACCHARATE, AMPHETAMINE ASPARTATE, DEXTROAMPHETAMINE SULFATE AND AMPHETAMINE SULFATE 5; 5; 5; 5 MG/1; MG/1; MG/1; MG/1
20 TABLET ORAL
Qty: 60 TABLET | Refills: 0 | Status: SHIPPED | OUTPATIENT
Start: 2019-03-29 | End: 2019-04-24 | Stop reason: SDUPTHER

## 2019-03-29 RX ORDER — DEXTROAMPHETAMINE SACCHARATE, AMPHETAMINE ASPARTATE MONOHYDRATE, DEXTROAMPHETAMINE SULFATE AND AMPHETAMINE SULFATE 7.5; 7.5; 7.5; 7.5 MG/1; MG/1; MG/1; MG/1
30 CAPSULE, EXTENDED RELEASE ORAL EVERY MORNING
Qty: 30 CAPSULE | Refills: 0 | Status: SHIPPED | OUTPATIENT
Start: 2019-03-29 | End: 2019-04-24 | Stop reason: SDUPTHER

## 2019-04-09 ENCOUNTER — APPOINTMENT (OUTPATIENT)
Dept: PHYSICAL THERAPY | Facility: CLINIC | Age: 64
End: 2019-04-09
Payer: MEDICARE

## 2019-04-16 ENCOUNTER — APPOINTMENT (OUTPATIENT)
Dept: PHYSICAL THERAPY | Facility: CLINIC | Age: 64
End: 2019-04-16
Payer: MEDICARE

## 2019-04-17 ENCOUNTER — EVALUATION (OUTPATIENT)
Dept: PHYSICAL THERAPY | Facility: CLINIC | Age: 64
End: 2019-04-17
Payer: MEDICARE

## 2019-04-17 DIAGNOSIS — G56.01 CARPAL TUNNEL SYNDROME OF RIGHT WRIST: Primary | ICD-10-CM

## 2019-04-17 DIAGNOSIS — G56.02 CARPAL TUNNEL SYNDROME OF LEFT WRIST: ICD-10-CM

## 2019-04-17 PROCEDURE — 97535 SELF CARE MNGMENT TRAINING: CPT | Performed by: PHYSICAL THERAPIST

## 2019-04-17 PROCEDURE — 97110 THERAPEUTIC EXERCISES: CPT | Performed by: PHYSICAL THERAPIST

## 2019-04-17 PROCEDURE — 97162 PT EVAL MOD COMPLEX 30 MIN: CPT | Performed by: PHYSICAL THERAPIST

## 2019-04-18 DIAGNOSIS — R52 PAIN: ICD-10-CM

## 2019-04-19 RX ORDER — OXYMORPHONE HYDROCHLORIDE 5 MG/1
5 TABLET ORAL EVERY 6 HOURS PRN
Qty: 120 TABLET | Refills: 0 | Status: SHIPPED | OUTPATIENT
Start: 2019-04-19 | End: 2019-05-15 | Stop reason: SDUPTHER

## 2019-04-22 ENCOUNTER — OFFICE VISIT (OUTPATIENT)
Dept: PHYSICAL THERAPY | Facility: CLINIC | Age: 64
End: 2019-04-22
Payer: MEDICARE

## 2019-04-22 DIAGNOSIS — G56.01 CARPAL TUNNEL SYNDROME OF RIGHT WRIST: Primary | ICD-10-CM

## 2019-04-22 PROCEDURE — 97110 THERAPEUTIC EXERCISES: CPT | Performed by: PHYSICAL THERAPIST

## 2019-04-22 PROCEDURE — 97140 MANUAL THERAPY 1/> REGIONS: CPT | Performed by: PHYSICAL THERAPIST

## 2019-04-23 ENCOUNTER — OFFICE VISIT (OUTPATIENT)
Dept: PHYSICAL THERAPY | Facility: CLINIC | Age: 64
End: 2019-04-23
Payer: MEDICARE

## 2019-04-23 DIAGNOSIS — G56.01 CARPAL TUNNEL SYNDROME OF RIGHT WRIST: Primary | ICD-10-CM

## 2019-04-23 PROCEDURE — 97110 THERAPEUTIC EXERCISES: CPT

## 2019-04-23 PROCEDURE — 97140 MANUAL THERAPY 1/> REGIONS: CPT

## 2019-04-24 DIAGNOSIS — F98.8 ATTENTION DEFICIT DISORDER, UNSPECIFIED HYPERACTIVITY PRESENCE: ICD-10-CM

## 2019-04-24 RX ORDER — DEXTROAMPHETAMINE SACCHARATE, AMPHETAMINE ASPARTATE, DEXTROAMPHETAMINE SULFATE AND AMPHETAMINE SULFATE 5; 5; 5; 5 MG/1; MG/1; MG/1; MG/1
20 TABLET ORAL
Qty: 60 TABLET | Refills: 0 | Status: SHIPPED | OUTPATIENT
Start: 2019-04-24 | End: 2019-05-15 | Stop reason: SDUPTHER

## 2019-04-24 RX ORDER — DEXTROAMPHETAMINE SACCHARATE, AMPHETAMINE ASPARTATE MONOHYDRATE, DEXTROAMPHETAMINE SULFATE AND AMPHETAMINE SULFATE 7.5; 7.5; 7.5; 7.5 MG/1; MG/1; MG/1; MG/1
30 CAPSULE, EXTENDED RELEASE ORAL EVERY MORNING
Qty: 30 CAPSULE | Refills: 0 | Status: SHIPPED | OUTPATIENT
Start: 2019-04-24 | End: 2019-05-15 | Stop reason: SDUPTHER

## 2019-04-29 ENCOUNTER — APPOINTMENT (OUTPATIENT)
Dept: PHYSICAL THERAPY | Facility: CLINIC | Age: 64
End: 2019-04-29
Payer: MEDICARE

## 2019-04-30 ENCOUNTER — TRANSCRIBE ORDERS (OUTPATIENT)
Dept: PHYSICAL THERAPY | Facility: CLINIC | Age: 64
End: 2019-04-30

## 2019-04-30 DIAGNOSIS — G56.01 CARPAL TUNNEL SYNDROME ON RIGHT: Primary | ICD-10-CM

## 2019-05-15 DIAGNOSIS — F98.8 ATTENTION DEFICIT DISORDER, UNSPECIFIED HYPERACTIVITY PRESENCE: ICD-10-CM

## 2019-05-15 DIAGNOSIS — R52 PAIN: ICD-10-CM

## 2019-05-15 RX ORDER — DEXTROAMPHETAMINE SACCHARATE, AMPHETAMINE ASPARTATE, DEXTROAMPHETAMINE SULFATE AND AMPHETAMINE SULFATE 5; 5; 5; 5 MG/1; MG/1; MG/1; MG/1
20 TABLET ORAL
Qty: 60 TABLET | Refills: 0 | Status: SHIPPED | OUTPATIENT
Start: 2019-05-15 | End: 2019-06-12 | Stop reason: SDUPTHER

## 2019-05-15 RX ORDER — OXYMORPHONE HYDROCHLORIDE 5 MG/1
5 TABLET ORAL EVERY 6 HOURS PRN
Qty: 120 TABLET | Refills: 0 | Status: SHIPPED | OUTPATIENT
Start: 2019-05-15 | End: 2019-06-12 | Stop reason: SDUPTHER

## 2019-05-15 RX ORDER — DEXTROAMPHETAMINE SACCHARATE, AMPHETAMINE ASPARTATE MONOHYDRATE, DEXTROAMPHETAMINE SULFATE AND AMPHETAMINE SULFATE 7.5; 7.5; 7.5; 7.5 MG/1; MG/1; MG/1; MG/1
30 CAPSULE, EXTENDED RELEASE ORAL EVERY MORNING
Qty: 30 CAPSULE | Refills: 0 | Status: SHIPPED | OUTPATIENT
Start: 2019-05-15 | End: 2019-06-12 | Stop reason: SDUPTHER

## 2019-05-24 DIAGNOSIS — F51.01 PRIMARY INSOMNIA: ICD-10-CM

## 2019-05-25 RX ORDER — TRAZODONE HYDROCHLORIDE 150 MG/1
150 TABLET ORAL
Qty: 30 TABLET | Refills: 5 | Status: SHIPPED | OUTPATIENT
Start: 2019-05-25 | End: 2019-12-13 | Stop reason: SDUPTHER

## 2019-06-12 DIAGNOSIS — R52 PAIN: ICD-10-CM

## 2019-06-12 DIAGNOSIS — F98.8 ATTENTION DEFICIT DISORDER, UNSPECIFIED HYPERACTIVITY PRESENCE: ICD-10-CM

## 2019-06-12 RX ORDER — DEXTROAMPHETAMINE SACCHARATE, AMPHETAMINE ASPARTATE, DEXTROAMPHETAMINE SULFATE AND AMPHETAMINE SULFATE 5; 5; 5; 5 MG/1; MG/1; MG/1; MG/1
20 TABLET ORAL
Qty: 60 TABLET | Refills: 0 | Status: SHIPPED | OUTPATIENT
Start: 2019-06-12 | End: 2019-07-11 | Stop reason: SDUPTHER

## 2019-06-12 RX ORDER — OXYMORPHONE HYDROCHLORIDE 5 MG/1
5 TABLET ORAL EVERY 6 HOURS PRN
Qty: 120 TABLET | Refills: 0 | Status: SHIPPED | OUTPATIENT
Start: 2019-06-12 | End: 2019-07-11 | Stop reason: SDUPTHER

## 2019-06-12 RX ORDER — DEXTROAMPHETAMINE SACCHARATE, AMPHETAMINE ASPARTATE MONOHYDRATE, DEXTROAMPHETAMINE SULFATE AND AMPHETAMINE SULFATE 7.5; 7.5; 7.5; 7.5 MG/1; MG/1; MG/1; MG/1
30 CAPSULE, EXTENDED RELEASE ORAL EVERY MORNING
Qty: 30 CAPSULE | Refills: 0 | Status: SHIPPED | OUTPATIENT
Start: 2019-06-12 | End: 2019-07-11 | Stop reason: SDUPTHER

## 2019-06-27 DIAGNOSIS — M35.1 MCTD (MIXED CONNECTIVE TISSUE DISEASE) (HCC): ICD-10-CM

## 2019-06-28 ENCOUNTER — TELEPHONE (OUTPATIENT)
Dept: FAMILY MEDICINE CLINIC | Facility: CLINIC | Age: 64
End: 2019-06-28

## 2019-07-08 ENCOUNTER — TELEPHONE (OUTPATIENT)
Dept: FAMILY MEDICINE CLINIC | Facility: CLINIC | Age: 64
End: 2019-07-08

## 2019-07-08 DIAGNOSIS — N30.00 ACUTE CYSTITIS WITHOUT HEMATURIA: Primary | ICD-10-CM

## 2019-07-08 RX ORDER — AMOXICILLIN AND CLAVULANATE POTASSIUM 875; 125 MG/1; MG/1
1 TABLET, FILM COATED ORAL EVERY 12 HOURS SCHEDULED
Qty: 6 TABLET | Refills: 0 | Status: SHIPPED | OUTPATIENT
Start: 2019-07-08 | End: 2019-07-11

## 2019-07-11 DIAGNOSIS — F98.8 ATTENTION DEFICIT DISORDER, UNSPECIFIED HYPERACTIVITY PRESENCE: ICD-10-CM

## 2019-07-11 DIAGNOSIS — R52 PAIN: ICD-10-CM

## 2019-07-11 RX ORDER — DEXTROAMPHETAMINE SACCHARATE, AMPHETAMINE ASPARTATE MONOHYDRATE, DEXTROAMPHETAMINE SULFATE AND AMPHETAMINE SULFATE 7.5; 7.5; 7.5; 7.5 MG/1; MG/1; MG/1; MG/1
30 CAPSULE, EXTENDED RELEASE ORAL EVERY MORNING
Qty: 30 CAPSULE | Refills: 0 | Status: SHIPPED | OUTPATIENT
Start: 2019-07-11 | End: 2019-08-05 | Stop reason: SDUPTHER

## 2019-07-11 RX ORDER — DEXTROAMPHETAMINE SACCHARATE, AMPHETAMINE ASPARTATE, DEXTROAMPHETAMINE SULFATE AND AMPHETAMINE SULFATE 5; 5; 5; 5 MG/1; MG/1; MG/1; MG/1
20 TABLET ORAL
Qty: 60 TABLET | Refills: 0 | Status: SHIPPED | OUTPATIENT
Start: 2019-07-11 | End: 2019-08-05 | Stop reason: SDUPTHER

## 2019-07-11 RX ORDER — OXYMORPHONE HYDROCHLORIDE 5 MG/1
5 TABLET ORAL EVERY 6 HOURS PRN
Qty: 120 TABLET | Refills: 0 | Status: SHIPPED | OUTPATIENT
Start: 2019-07-11 | End: 2019-08-26 | Stop reason: SDUPTHER

## 2019-07-22 ENCOUNTER — APPOINTMENT (OUTPATIENT)
Dept: LAB | Facility: HOSPITAL | Age: 64
End: 2019-07-22
Payer: MEDICARE

## 2019-07-22 ENCOUNTER — TRANSCRIBE ORDERS (OUTPATIENT)
Dept: ADMINISTRATIVE | Facility: HOSPITAL | Age: 64
End: 2019-07-22

## 2019-07-22 DIAGNOSIS — R79.89 ELEVATED TSH: Primary | ICD-10-CM

## 2019-07-22 DIAGNOSIS — R79.89 ELEVATED TSH: ICD-10-CM

## 2019-07-22 LAB
BASOPHILS # BLD AUTO: 0.1 THOUSANDS/ΜL (ref 0–0.1)
BASOPHILS NFR BLD AUTO: 2 % (ref 0–1)
EOSINOPHIL # BLD AUTO: 0.46 THOUSAND/ΜL (ref 0–0.61)
EOSINOPHIL NFR BLD AUTO: 8 % (ref 0–6)
ERYTHROCYTE [DISTWIDTH] IN BLOOD BY AUTOMATED COUNT: 15.6 % (ref 11.6–15.1)
HCT VFR BLD AUTO: 33.2 % (ref 34.8–46.1)
HGB BLD-MCNC: 10.6 G/DL (ref 11.5–15.4)
IMM GRANULOCYTES # BLD AUTO: 0.01 THOUSAND/UL (ref 0–0.2)
IMM GRANULOCYTES NFR BLD AUTO: 0 % (ref 0–2)
LYMPHOCYTES # BLD AUTO: 2.14 THOUSANDS/ΜL (ref 0.6–4.47)
LYMPHOCYTES NFR BLD AUTO: 38 % (ref 14–44)
MCH RBC QN AUTO: 29.3 PG (ref 26.8–34.3)
MCHC RBC AUTO-ENTMCNC: 31.9 G/DL (ref 31.4–37.4)
MCV RBC AUTO: 92 FL (ref 82–98)
MONOCYTES # BLD AUTO: 0.57 THOUSAND/ΜL (ref 0.17–1.22)
MONOCYTES NFR BLD AUTO: 10 % (ref 4–12)
NEUTROPHILS # BLD AUTO: 2.35 THOUSANDS/ΜL (ref 1.85–7.62)
NEUTS SEG NFR BLD AUTO: 42 % (ref 43–75)
NRBC BLD AUTO-RTO: 0 /100 WBCS
PLATELET # BLD AUTO: 226 THOUSANDS/UL (ref 149–390)
PMV BLD AUTO: 9.8 FL (ref 8.9–12.7)
RBC # BLD AUTO: 3.62 MILLION/UL (ref 3.81–5.12)
TSH SERPL DL<=0.05 MIU/L-ACNC: 44.15 UIU/ML (ref 0.36–3.74)
WBC # BLD AUTO: 5.63 THOUSAND/UL (ref 4.31–10.16)

## 2019-07-22 PROCEDURE — 84443 ASSAY THYROID STIM HORMONE: CPT

## 2019-07-22 PROCEDURE — 36415 COLL VENOUS BLD VENIPUNCTURE: CPT

## 2019-07-22 PROCEDURE — 85025 COMPLETE CBC W/AUTO DIFF WBC: CPT | Performed by: FAMILY MEDICINE

## 2019-08-05 DIAGNOSIS — F98.8 ATTENTION DEFICIT DISORDER, UNSPECIFIED HYPERACTIVITY PRESENCE: ICD-10-CM

## 2019-08-05 RX ORDER — DEXTROAMPHETAMINE SACCHARATE, AMPHETAMINE ASPARTATE, DEXTROAMPHETAMINE SULFATE AND AMPHETAMINE SULFATE 5; 5; 5; 5 MG/1; MG/1; MG/1; MG/1
20 TABLET ORAL
Qty: 60 TABLET | Refills: 0 | Status: SHIPPED | OUTPATIENT
Start: 2019-08-05 | End: 2019-08-30 | Stop reason: SDUPTHER

## 2019-08-05 RX ORDER — DEXTROAMPHETAMINE SACCHARATE, AMPHETAMINE ASPARTATE MONOHYDRATE, DEXTROAMPHETAMINE SULFATE AND AMPHETAMINE SULFATE 7.5; 7.5; 7.5; 7.5 MG/1; MG/1; MG/1; MG/1
30 CAPSULE, EXTENDED RELEASE ORAL EVERY MORNING
Qty: 30 CAPSULE | Refills: 0 | Status: SHIPPED | OUTPATIENT
Start: 2019-08-05 | End: 2019-08-30 | Stop reason: SDUPTHER

## 2019-08-06 ENCOUNTER — HOSPITAL ENCOUNTER (EMERGENCY)
Facility: HOSPITAL | Age: 64
Discharge: HOME | End: 2019-08-06
Attending: EMERGENCY MEDICINE | Admitting: EMERGENCY MEDICINE
Payer: MEDICARE

## 2019-08-06 ENCOUNTER — APPOINTMENT (EMERGENCY)
Dept: CT IMAGING | Facility: HOSPITAL | Age: 64
End: 2019-08-06
Payer: MEDICARE

## 2019-08-06 VITALS
SYSTOLIC BLOOD PRESSURE: 92 MMHG | BODY MASS INDEX: 20.15 KG/M2 | HEIGHT: 61 IN | HEART RATE: 73 BPM | RESPIRATION RATE: 16 BRPM | WEIGHT: 106.7 LBS | TEMPERATURE: 97.3 F | DIASTOLIC BLOOD PRESSURE: 49 MMHG | OXYGEN SATURATION: 100 %

## 2019-08-06 DIAGNOSIS — K52.9 COLITIS: ICD-10-CM

## 2019-08-06 DIAGNOSIS — K92.2 ACUTE LOWER GI BLEEDING: Primary | ICD-10-CM

## 2019-08-06 LAB
ALBUMIN SERPL BCP-MCNC: 4.1 G/DL (ref 3.5–5)
ALP SERPL-CCNC: 51 U/L (ref 46–116)
ALT SERPL W P-5'-P-CCNC: 31 U/L (ref 12–78)
ANION GAP SERPL CALCULATED.3IONS-SCNC: 6 MMOL/L (ref 4–13)
APTT PPP: 27 SECONDS (ref 23–37)
AST SERPL W P-5'-P-CCNC: 25 U/L (ref 5–45)
BASOPHILS # BLD AUTO: 0.08 THOUSANDS/ΜL (ref 0–0.1)
BASOPHILS NFR BLD AUTO: 1 % (ref 0–1)
BILIRUB SERPL-MCNC: 0.5 MG/DL (ref 0.2–1)
BUN SERPL-MCNC: 19 MG/DL (ref 5–25)
CALCIUM SERPL-MCNC: 8.8 MG/DL (ref 8.3–10.1)
CHLORIDE SERPL-SCNC: 100 MMOL/L (ref 100–108)
CK MB SERPL-MCNC: 10.7 NG/ML (ref 0–5)
CK MB SERPL-MCNC: 2 % (ref 0–2.5)
CK SERPL-CCNC: 530 U/L (ref 26–192)
CO2 SERPL-SCNC: 32 MMOL/L (ref 21–32)
CREAT SERPL-MCNC: 1.02 MG/DL (ref 0.6–1.3)
EOSINOPHIL # BLD AUTO: 0.3 THOUSAND/ΜL (ref 0–0.61)
EOSINOPHIL NFR BLD AUTO: 3 % (ref 0–6)
ERYTHROCYTE [DISTWIDTH] IN BLOOD BY AUTOMATED COUNT: 15.5 % (ref 11.6–15.1)
GFR SERPL CREATININE-BSD FRML MDRD: 58 ML/MIN/1.73SQ M
GLUCOSE SERPL-MCNC: 79 MG/DL (ref 65–140)
HCT VFR BLD AUTO: 33.6 % (ref 34.8–46.1)
HGB BLD-MCNC: 10.7 G/DL (ref 11.5–15.4)
IMM GRANULOCYTES # BLD AUTO: 0.03 THOUSAND/UL (ref 0–0.2)
IMM GRANULOCYTES NFR BLD AUTO: 0 % (ref 0–2)
INR PPP: 1.1 (ref 0.84–1.19)
LACTATE SERPL-SCNC: 0.5 MMOL/L (ref 0.5–2)
LIPASE SERPL-CCNC: 103 U/L (ref 73–393)
LYMPHOCYTES # BLD AUTO: 1.31 THOUSANDS/ΜL (ref 0.6–4.47)
LYMPHOCYTES NFR BLD AUTO: 14 % (ref 14–44)
MAGNESIUM SERPL-MCNC: 2.1 MG/DL (ref 1.6–2.6)
MCH RBC QN AUTO: 28.8 PG (ref 26.8–34.3)
MCHC RBC AUTO-ENTMCNC: 31.8 G/DL (ref 31.4–37.4)
MCV RBC AUTO: 90 FL (ref 82–98)
MONOCYTES # BLD AUTO: 0.77 THOUSAND/ΜL (ref 0.17–1.22)
MONOCYTES NFR BLD AUTO: 9 % (ref 4–12)
NEUTROPHILS # BLD AUTO: 6.59 THOUSANDS/ΜL (ref 1.85–7.62)
NEUTS SEG NFR BLD AUTO: 73 % (ref 43–75)
NRBC BLD AUTO-RTO: 0 /100 WBCS
PLATELET # BLD AUTO: 227 THOUSANDS/UL (ref 149–390)
PMV BLD AUTO: 9.9 FL (ref 8.9–12.7)
POTASSIUM SERPL-SCNC: 4.2 MMOL/L (ref 3.5–5.3)
PROT SERPL-MCNC: 6.1 G/DL (ref 6.4–8.2)
PROTHROMBIN TIME: 14.2 SECONDS (ref 11.6–14.5)
RBC # BLD AUTO: 3.72 MILLION/UL (ref 3.81–5.12)
SODIUM SERPL-SCNC: 138 MMOL/L (ref 136–145)
WBC # BLD AUTO: 9.08 THOUSAND/UL (ref 4.31–10.16)

## 2019-08-06 PROCEDURE — 83690 ASSAY OF LIPASE: CPT | Performed by: EMERGENCY MEDICINE

## 2019-08-06 PROCEDURE — 83735 ASSAY OF MAGNESIUM: CPT | Performed by: EMERGENCY MEDICINE

## 2019-08-06 PROCEDURE — 83605 ASSAY OF LACTIC ACID: CPT | Performed by: EMERGENCY MEDICINE

## 2019-08-06 PROCEDURE — 80053 COMPREHEN METABOLIC PANEL: CPT | Performed by: EMERGENCY MEDICINE

## 2019-08-06 PROCEDURE — 99284 EMERGENCY DEPT VISIT MOD MDM: CPT | Performed by: EMERGENCY MEDICINE

## 2019-08-06 PROCEDURE — 82550 ASSAY OF CK (CPK): CPT | Performed by: EMERGENCY MEDICINE

## 2019-08-06 PROCEDURE — 96360 HYDRATION IV INFUSION INIT: CPT

## 2019-08-06 PROCEDURE — 82272 OCCULT BLD FECES 1-3 TESTS: CPT

## 2019-08-06 PROCEDURE — 85025 COMPLETE CBC W/AUTO DIFF WBC: CPT | Performed by: EMERGENCY MEDICINE

## 2019-08-06 PROCEDURE — 82553 CREATINE MB FRACTION: CPT | Performed by: EMERGENCY MEDICINE

## 2019-08-06 PROCEDURE — 85610 PROTHROMBIN TIME: CPT | Performed by: EMERGENCY MEDICINE

## 2019-08-06 PROCEDURE — 99285 EMERGENCY DEPT VISIT HI MDM: CPT

## 2019-08-06 PROCEDURE — 85730 THROMBOPLASTIN TIME PARTIAL: CPT | Performed by: EMERGENCY MEDICINE

## 2019-08-06 PROCEDURE — 74177 CT ABD & PELVIS W/CONTRAST: CPT

## 2019-08-06 RX ORDER — METRONIDAZOLE 500 MG/1
500 TABLET ORAL EVERY 8 HOURS SCHEDULED
Qty: 21 TABLET | Refills: 0 | Status: SHIPPED | OUTPATIENT
Start: 2019-08-06 | End: 2019-08-13

## 2019-08-06 RX ORDER — METRONIDAZOLE 500 MG/1
500 TABLET ORAL ONCE
Status: COMPLETED | OUTPATIENT
Start: 2019-08-06 | End: 2019-08-06

## 2019-08-06 RX ORDER — CIPROFLOXACIN 500 MG/1
500 TABLET, FILM COATED ORAL ONCE
Status: COMPLETED | OUTPATIENT
Start: 2019-08-06 | End: 2019-08-06

## 2019-08-06 RX ORDER — CIPROFLOXACIN 500 MG/1
500 TABLET, FILM COATED ORAL EVERY 12 HOURS SCHEDULED
Qty: 14 TABLET | Refills: 0 | Status: SHIPPED | OUTPATIENT
Start: 2019-08-06 | End: 2019-08-13

## 2019-08-06 RX ADMIN — CIPROFLOXACIN 500 MG: 500 TABLET, FILM COATED ORAL at 23:43

## 2019-08-06 RX ADMIN — METRONIDAZOLE 500 MG: 500 TABLET, FILM COATED ORAL at 23:43

## 2019-08-06 RX ADMIN — SODIUM CHLORIDE 1000 ML: 0.9 INJECTION, SOLUTION INTRAVENOUS at 21:41

## 2019-08-06 RX ADMIN — IOHEXOL 100 ML: 350 INJECTION, SOLUTION INTRAVENOUS at 22:26

## 2019-08-07 DIAGNOSIS — E78.5 HYPERLIPIDEMIA, UNSPECIFIED HYPERLIPIDEMIA TYPE: ICD-10-CM

## 2019-08-07 DIAGNOSIS — F32.A DEPRESSION, UNSPECIFIED DEPRESSION TYPE: ICD-10-CM

## 2019-08-07 RX ORDER — BUPROPION HYDROCHLORIDE 300 MG/1
TABLET ORAL
Qty: 90 TABLET | Refills: 2 | Status: SHIPPED | OUTPATIENT
Start: 2019-08-07 | End: 2020-01-30

## 2019-08-07 RX ORDER — SIMVASTATIN 40 MG
TABLET ORAL
Qty: 90 TABLET | Refills: 1 | Status: SHIPPED | OUTPATIENT
Start: 2019-08-07 | End: 2020-01-14

## 2019-08-07 NOTE — ED PROVIDER NOTES
History  Chief Complaint   Patient presents with    Rectal Bleeding     started having lower back pain this morning, then had abdominal pain, and cramping  Went to the bathrrom and had a little bit of blood  This evening when she sat down only blood was coming out in the toilet     Patient is a 60-year-old female with a history of fibromyalgia, progressive scleroderma, GERD, ADHD coming in today with bright red blood per rectum times 2-3 episodes tonight  Patient states she was feeling well yesterday  Today she has had several episodes of diarrhea  There is a nonbloody and without any melena  She has had no increase in NSAID use  She reports that she had several episodes of bright red blood per rectum  She has never had this before  Her last colonoscopy she states was 1 month ago and normal   Patient has been tolerating p o  Well  In review of Gastroenterology evaluation patient does have a history of esophageal dysphagia and obstruction and planning for esophageal dilatation Gastroenterology  It was noted patient also has a endoscopy on 03/28/2019 which did reveal peptic stricture in the midesophagus and was dilated at that time  There is reports of possible colonic polyps and colonoscopy  Patient is supposed to be taking her medications as prescribed  History provided by:  Patient  Rectal Bleeding - Minor   Quality:  Bright red  Amount:   Moderate  Timing:  Intermittent  Chronicity:  New  Context: defecation, diarrhea and spontaneously    Context: not anal fissures, not anal penetration, not constipation, not foreign body, not hemorrhoids, not rectal injury and not rectal pain    Similar prior episodes: no    Relieved by:  Nothing  Worsened by:  Nothing  Ineffective treatments:  None tried  Associated symptoms: no abdominal pain, no dizziness, no epistaxis, no fever, no hematemesis, no light-headedness, no loss of consciousness, no recent illness and no vomiting    Risk factors: hx of IBD    Risk factors: no anticoagulant use, no hx of colorectal cancer, no hx of colorectal surgery, no liver disease, no NSAID use and no steroid use        Prior to Admission Medications   Prescriptions Last Dose Informant Patient Reported? Taking?    Multiple Vitamin (MULTIVITAMIN) tablet   Yes Yes   Sig: Take 1 tablet by mouth daily   NON FORMULARY   Yes No   Sig: Take by mouth   NON FORMULARY   Yes No   Sig: Take by mouth   SODIUM FLUORIDE, DENTAL GEL, (PREVIDENT) 1 1 % GEL Not Taking at Unknown time  Yes No   Sig: PreviDent 5000 Dry Mouth 1 1 % gel   amphetamine-dextroamphetamine (ADDERALL XR) 30 MG 24 hr capsule   No Yes   Sig: Take 1 capsule (30 mg total) by mouth every morningMax Daily Amount: 30 mg   amphetamine-dextroamphetamine (ADDERALL) 20 mg tablet   No Yes   Sig: Take 1 tablet (20 mg total) by mouth 2 (two) times a dayMax Daily Amount: 40 mg   aspirin (ECOTRIN LOW STRENGTH) 81 mg EC tablet Not Taking at Unknown time  Yes No   Sig: Take 81 mg by mouth daily   buPROPion (WELLBUTRIN XL) 300 mg 24 hr tablet   No Yes   Sig: TAKE 1 TABLET BY MOUTH EVERY DAY   calcium carbonate (TUMS) 500 mg chewable tablet   No Yes   Sig: Chew 2 tablets (1,000 mg total) daily   chlordiazePOXIDE (LIBRIUM) 5 mg capsule   Yes Yes   Sig: Take 5 mg by mouth 2 (two) times a day     cycloSPORINE (RESTASIS) 0 05 % ophthalmic emulsion   Yes No   Sig: Restasis 0 05 % eye drops in a dropperette   dicyclomine (BENTYL) 10 mg capsule   Yes Yes   Sig: Take 20 mg by mouth 2 (two) times a day     esomeprazole (NexIUM) 40 MG capsule   Yes Yes   Sig: Take 40 mg by mouth 2 (two) times a day before meals     folic acid (FOLVITE) 1 mg tablet   Yes Yes   Sig: Take by mouth daily   hydroxychloroquine (PLAQUENIL) 200 mg tablet   Yes Yes   Sig: Take 200 mg by mouth 2 (two) times a day with meals     levothyroxine 137 mcg tablet   Yes Yes   methotrexate 2 5 mg tablet   No Yes   Sig: AS DIRECTED   montelukast (SINGULAIR) 10 mg tablet Not Taking at Unknown time  No No   Sig: Take 1 tablet (10 mg total) by mouth daily   Patient not taking: Reported on 8/6/2019   morphine (MS CONTIN) 15 mg 12 hr tablet   No No   Sig: Take 1 tablet (15 mg total) by mouth 2 (two) times a dayMax Daily Amount: 30 mg   naproxen (NAPROSYN) 500 mg tablet   Yes Yes   oxymorphone (OPANA) 5 MG tablet   No Yes   Sig: Take 1 tablet (5 mg total) by mouth every 6 (six) hours as needed for moderate painMax Daily Amount: 20 mg   predniSONE 5 mg tablet   No Yes   Sig: Take 1 tablet (5 mg total) by mouth daily   pregabalin (LYRICA) 75 mg capsule   Yes Yes   Sig: 3 (three) times a day    simvastatin (ZOCOR) 40 mg tablet   No Yes   Sig: TAKE 1 TABLET BY MOUTH EVERY DAY   traZODone (DESYREL) 150 mg tablet   No Yes   Sig: TAKE 1 TABLET (150 MG TOTAL) BY MOUTH DAILY AT BEDTIME      Facility-Administered Medications: None       Past Medical History:   Diagnosis Date    ADHD     Cancer ([facility] )     non-Hodg      Cellulitis of foot     Last assessed 10/24/16    Change in mental status     Last assessed 12/01/15    Chronic fatigue     PLAZA (dyspnea on exertion)     Last assessed 12/01/15    Fibromyalgia     Folliculitis     Last assessed 10/06/14    GERD (gastroesophageal reflux disease)     Osteopenia     Raynaud's disease     Scleroderma ([facility] )     Systemic sclerosis (HCC)        Past Surgical History:   Procedure Laterality Date    BACK SURGERY      BLADDER SURGERY      BONE MARROW BIOPSY      CARDIAC CATHETERIZATION      HEMORRHOID SURGERY      HYSTERECTOMY      KNEE SURGERY      NASAL SEPTUM SURGERY      ROTATOR CUFF REPAIR      THYROIDECTOMY N/A 10/4/2018    Procedure: TOTAL THYROIDECTOMY;  Surgeon: Tray Perez MD;  [facility]: [facility];  Service: Surgical Oncology    TONSILLECTOMY         Family History   Problem Relation Age of Onset    Arthritis Mother     Diabetes Mother     Coronary artery disease Father    [facility] Arthritis Sister     Asthma Sister     Crohn's disease Sister     Other Brother         myocardial ischemia     I have reviewed and agree with the history as documented  Social History     Tobacco Use    Smoking status: Former Smoker    Smokeless tobacco: Never Used    Tobacco comment: quit 40 years ago    Substance Use Topics    Alcohol use: No    Drug use: No        Review of Systems   Constitutional: Negative for diaphoresis and fever  HENT: Negative for ear pain, nosebleeds and sore throat  Eyes: Negative for visual disturbance  Respiratory: Negative for chest tightness and shortness of breath  Cardiovascular: Negative for chest pain and palpitations  Gastrointestinal: Positive for hematochezia  Negative for abdominal pain, hematemesis, nausea and vomiting  Genitourinary: Negative for difficulty urinating and dysuria  Musculoskeletal: Negative for back pain and neck pain  Skin: Negative for rash  Neurological: Negative for dizziness, loss of consciousness, weakness and light-headedness  Psychiatric/Behavioral: Negative for confusion  All other systems reviewed and are negative  Physical Exam  Physical Exam   Constitutional: She is oriented to person, place, and time  She appears well-developed and well-nourished  No distress  HENT:   Head: Normocephalic and atraumatic  Mouth/Throat: Oropharynx is clear and moist    Patient maintaining airway and secretions   Eyes: Pupils are equal, round, and reactive to light  Conjunctivae and EOM are normal    Neck: Normal range of motion  Neck supple  Cardiovascular: Normal rate, regular rhythm, normal heart sounds and intact distal pulses  No murmur heard  Pulmonary/Chest: Effort normal and breath sounds normal  No stridor  No respiratory distress  Abdominal: Soft  Bowel sounds are normal  She exhibits no distension  There is no tenderness  Genitourinary: Rectal exam shows external hemorrhoid (non thrombosed  no active bleeding ) and guaiac positive stool   Rectal exam shows no internal hemorrhoid, no fissure, no mass, no tenderness and anal tone normal    Musculoskeletal: Normal range of motion  She exhibits no edema  Neurological: She is alert and oriented to person, place, and time  No cranial nerve deficit  No facial asymmetry  No slurred speech  No ataxia   Skin: Skin is warm  Capillary refill takes less than 2 seconds  She is not diaphoretic  Nursing note and vitals reviewed        Vital Signs  ED Triage Vitals [08/06/19 2117]   Temperature Pulse Respirations Blood Pressure SpO2   (!) 97 3 °F (36 3 °C) 75 16 141/63 99 %      Temp Source Heart Rate Source Patient Position - Orthostatic VS BP Location FiO2 (%)   Temporal Monitor Sitting Right arm --      Pain Score       5           Vitals:    08/06/19 2145 08/06/19 2200 08/06/19 2230 08/06/19 2332   BP: 101/55 101/56 (!) 106/46 (!) 92/49   Pulse: 72 67 71 73   Patient Position - Orthostatic VS: Lying Lying Lying Lying         Visual Acuity      ED Medications  Medications   sodium chloride 0 9 % bolus 1,000 mL (0 mL Intravenous Stopped 8/6/19 2244)   iohexol (OMNIPAQUE) 350 MG/ML injection (SINGLE-DOSE) 100 mL (100 mL Intravenous Given 8/6/19 2226)   metroNIDAZOLE (FLAGYL) tablet 500 mg (500 mg Oral Given 8/6/19 2343)   ciprofloxacin (CIPRO) tablet 500 mg (500 mg Oral Given 8/6/19 2343)       Diagnostic Studies  Results Reviewed     Procedure Component Value Units Date/Time    CKMB [[id number]]  (Abnormal) Collected:  08/06/19 2141    Lab Status:  Final result Specimen:  Blood from Arm, Right Updated:  08/06/19 2229     CK-MB Index 2 0 %      CK-MB 10 7 ng/mL     Magnesium [[id number]]  (Normal) Collected:  08/06/19 2141    Lab Status:  Final result Specimen:  Blood from Arm, Right Updated:  08/06/19 2225     Magnesium 2 1 mg/dL     Lipase [[id number]]  (Normal) Collected:  08/06/19 2141    Lab Status:  Final result Specimen:  Blood from Arm, Right Updated:  08/06/19 2225     Lipase 103 u/L     CK Total with Reflex CKMB [[id number]] (Abnormal) Collected:  08/06/19 2141    Lab Status:  Final result Specimen:  Blood from Arm, Right Updated:  08/06/19 2224     Total  U/L     Lactic acid, plasma x2 [[id number]]  (Normal) Collected:  08/06/19 2141    Lab Status:  Final result Specimen:  Blood from Arm, Right Updated:  08/06/19 2211     LACTIC ACID 0 5 mmol/L     Narrative:       Result may be elevated if tourniquet was used during collection      Comprehensive metabolic panel [[id number]]  (Abnormal) Collected:  08/06/19 2141    Lab Status:  Final result Specimen:  Blood from Arm, Right Updated:  08/06/19 2208     Sodium 138 mmol/L      Potassium 4 2 mmol/L      Chloride 100 mmol/L      CO2 32 mmol/L      ANION GAP 6 mmol/L      BUN 19 mg/dL      Creatinine 1 02 mg/dL      Glucose 79 mg/dL      Calcium 8 8 mg/dL      AST 25 U/L      ALT 31 U/L      Alkaline Phosphatase 51 U/L      Total Protein 6 1 g/dL      Albumin 4 1 g/dL      Total Bilirubin 0 50 mg/dL      eGFR 58 ml/min/1 73sq m     Narrative:       [facility] guidelines for Chronic Kidney Disease (CKD):     Stage 1 with normal or high GFR (GFR > 90 mL/min/1 73 square meters)    Stage 2 Mild CKD (GFR = 60-89 mL/min/1 73 square meters)    Stage 3A Moderate CKD (GFR = 45-59 mL/min/1 73 square meters)    Stage 3B Moderate CKD (GFR = 30-44 mL/min/1 73 square meters)    Stage 4 Severe CKD (GFR = 15-29 mL/min/1 73 square meters)    Stage 5 End Stage CKD (GFR <15 mL/min/1 73 square meters)  Note: GFR calculation is accurate only with a steady state creatinine    Protime-INR [[id number]]  (Normal) Collected:  08/06/19 2141    Lab Status:  Final result Specimen:  Blood from Arm, Right Updated:  08/06/19 2201     Protime 14 2 seconds      INR 1 10    APTT [[id number]]  (Normal) Collected:  08/06/19 2141    Lab Status:  Final result Specimen:  Blood from Arm, Right Updated:  08/06/19 2201     PTT 27 seconds     CBC and differential [[id number]]  (Abnormal) Collected: 08/06/19 2141    Lab Status:  Final result Specimen:  Blood from Arm, Right Updated:  08/06/19 2151     WBC 9 08 Thousand/uL      RBC 3 72 Million/uL      Hemoglobin 10 7 g/dL      Hematocrit 33 6 %      MCV 90 fL      MCH 28 8 pg      MCHC 31 8 g/dL      RDW 15 5 %      MPV 9 9 fL      Platelets 373 Thousands/uL      nRBC 0 /100 WBCs      Neutrophils Relative 73 %      Immat GRANS % 0 %      Lymphocytes Relative 14 %      Monocytes Relative 9 %      Eosinophils Relative 3 %      Basophils Relative 1 %      Neutrophils Absolute 6 59 Thousands/µL      Immature Grans Absolute 0 03 Thousand/uL      Lymphocytes Absolute 1 31 Thousands/µL      Monocytes Absolute 0 77 Thousand/µL      Eosinophils Absolute 0 30 Thousand/µL      Basophils Absolute 0 08 Thousands/µL                  CT abdomen pelvis with contrast   Final Result by Reid Reyes MD (08/06 5254)               Inflammatory or infectious colitis involving the left colon and rectum  Significant amount stool in the right colon and distal ileum may represent constipation  No evidence of bowel obstruction or mass  Workstation performed: [id number]                    Procedures  Procedures       ED Course  ED Course as of Aug 06 2346   Tue Aug 06, 2019   2124 Patient is a 71year old female with onset of 2 separate episodes of BRBPR that was painless  Patient has never had this before  She had a colonoscopy last month that was clear  On exam patient is nontoxic appearing in no distress  Positive heme check  Will also obtain labs and CT    Patient agreeable      Differential diagnosis includes but not limited to:  AAA, mesenteric ischemia, pancreatitis, cholecystitis, choledocholithiasis, hepatitis, bowel obstruction, ileus, gastroenteritis, colitis, malignancy, AAA, perforation, toxicologic poisoning, renal infarct, acute kidney injury, splenic infarct, splenic injury, nephrolithiasis, UTI, diverticulitis, diverticulosis, internal hemorrhoid, external hemorrhoid intra-abdominal hematoma    Portions of the record may have been created with voice recognition software  Occasional wrong word or "sound a like" substitutions may have occurred due to the inherent limitations of voice recognition software  Read the chart carefully and recognize, using context, where substitutions have occurred  2223 Patient's labs stable  Patient with a hemoglobin of 10 and stable for her  No evidence of end-organ damage  Pending CT results  2318 CT reveals inflammation on throughout the left colon and rectum  Will start antibiotics here and discharged home with return to ER instructions  Patient is afebrile, no evidence of sepsis  She has no leukocytosis, lactic acid, end-organ damage, fevers or abnormal vital signs  Will give 1st dose of Cipro and Flagyl  2323 Patient updated on plan and she is agreeable  No repeat rectal bleeding throughout stay  On read exam she remains non peritoneal   Alert oriented  She understands to take antibiotics with return to ER instructions  Will DC home                                    MDM  Number of Diagnoses or Management Options     Amount and/or Complexity of Data Reviewed  Clinical lab tests: ordered and reviewed  Tests in the radiology section of CPT®: reviewed and ordered  Tests in the medicine section of CPT®: ordered and reviewed  Independent visualization of images, tracings, or specimens: yes        Disposition  Final diagnoses:   Acute lower GI bleeding   Colitis     Time reflects when diagnosis was documented in both MDM as applicable and the Disposition within this note     Time User Action Codes Description Comment    8/6/2019 11:19 PM Briseyda Reynaga Add [K92 2] Acute lower GI bleeding     8/6/2019 11:19 PM Bryan Razo Add [K52 9] Colitis       ED Disposition     ED Disposition Condition Date/Time Comment    Discharge Stable Tue Aug 6, 2019 11:19 PM Myra Vale discharge to home/self care  Follow-up Information     Follow up With Specialties Details Why Contact Info    Dallas Washburn MD Family Medicine Schedule an appointment as soon as possible for a visit in 1 day  [facility] [phone]            Patient's Medications   Discharge Prescriptions    CIPROFLOXACIN (CIPRO) 500 MG TABLET    Take 1 tablet (500 mg total) by mouth every 12 (twelve) hours for 7 days       Start Date: 8/6/2019  End Date: 8/13/2019       Order Dose: 500 mg       Quantity: 14 tablet    Refills: 0    METRONIDAZOLE (FLAGYL) 500 MG TABLET    Take 1 tablet (500 mg total) by mouth every 8 (eight) hours for 7 days       Start Date: 8/6/2019  End Date: 8/13/2019       Order Dose: 500 mg       Quantity: 21 tablet    Refills: 0     No discharge procedures on file      ED Provider  Electronically Signed by           Dolly Carpenter DO  08/06/19 0594

## 2019-08-07 NOTE — DISCHARGE INSTRUCTIONS
Make sure you finish full course of antibiotics  While taking antibiotics, make sure you take lactobacillus or after the acidophilus to help prevent yeast infection  Call your gastroenterologist and her family doctor for close follow-up

## 2019-08-12 ENCOUNTER — APPOINTMENT (EMERGENCY)
Dept: RADIOLOGY | Facility: HOSPITAL | Age: 64
End: 2019-08-12
Payer: MEDICARE

## 2019-08-12 ENCOUNTER — HOSPITAL ENCOUNTER (OUTPATIENT)
Facility: HOSPITAL | Age: 64
Setting detail: OBSERVATION
Discharge: HOME | End: 2019-08-13
Attending: EMERGENCY MEDICINE | Admitting: FAMILY MEDICINE
Payer: MEDICARE

## 2019-08-12 ENCOUNTER — APPOINTMENT (EMERGENCY)
Dept: CT IMAGING | Facility: HOSPITAL | Age: 64
End: 2019-08-12
Payer: MEDICARE

## 2019-08-12 DIAGNOSIS — C73 THYROID CANCER (HCC): ICD-10-CM

## 2019-08-12 DIAGNOSIS — R07.9 CHEST PAIN: Primary | ICD-10-CM

## 2019-08-12 PROBLEM — G89.29 CHRONIC PAIN: Status: ACTIVE | Noted: 2019-08-12

## 2019-08-12 LAB
ALBUMIN SERPL BCP-MCNC: 3.7 G/DL (ref 3.5–5)
ALP SERPL-CCNC: 38 U/L (ref 46–116)
ALT SERPL W P-5'-P-CCNC: 38 U/L (ref 12–78)
ANION GAP SERPL CALCULATED.3IONS-SCNC: 4 MMOL/L (ref 4–13)
APTT PPP: 27 SECONDS (ref 23–37)
AST SERPL W P-5'-P-CCNC: 25 U/L (ref 5–45)
BASOPHILS # BLD AUTO: 0.04 THOUSANDS/ΜL (ref 0–0.1)
BASOPHILS NFR BLD AUTO: 1 % (ref 0–1)
BILIRUB SERPL-MCNC: 0.4 MG/DL (ref 0.2–1)
BUN SERPL-MCNC: 15 MG/DL (ref 5–25)
CALCIUM SERPL-MCNC: 8.5 MG/DL (ref 8.3–10.1)
CHLORIDE SERPL-SCNC: 103 MMOL/L (ref 100–108)
CO2 SERPL-SCNC: 33 MMOL/L (ref 21–32)
CREAT SERPL-MCNC: 0.88 MG/DL (ref 0.6–1.3)
EOSINOPHIL # BLD AUTO: 0.19 THOUSAND/ΜL (ref 0–0.61)
EOSINOPHIL NFR BLD AUTO: 4 % (ref 0–6)
ERYTHROCYTE [DISTWIDTH] IN BLOOD BY AUTOMATED COUNT: 16.1 % (ref 11.6–15.1)
GFR SERPL CREATININE-BSD FRML MDRD: 69 ML/MIN/1.73SQ M
GLUCOSE SERPL-MCNC: 88 MG/DL (ref 65–140)
HCT VFR BLD AUTO: 31 % (ref 34.8–46.1)
HGB BLD-MCNC: 10 G/DL (ref 11.5–15.4)
IMM GRANULOCYTES # BLD AUTO: 0.02 THOUSAND/UL (ref 0–0.2)
IMM GRANULOCYTES NFR BLD AUTO: 0 % (ref 0–2)
INR PPP: 1.1 (ref 0.84–1.19)
LIPASE SERPL-CCNC: 110 U/L (ref 73–393)
LYMPHOCYTES # BLD AUTO: 1.46 THOUSANDS/ΜL (ref 0.6–4.47)
LYMPHOCYTES NFR BLD AUTO: 29 % (ref 14–44)
MAGNESIUM SERPL-MCNC: 2 MG/DL (ref 1.6–2.6)
MCH RBC QN AUTO: 29.2 PG (ref 26.8–34.3)
MCHC RBC AUTO-ENTMCNC: 32.3 G/DL (ref 31.4–37.4)
MCV RBC AUTO: 90 FL (ref 82–98)
MONOCYTES # BLD AUTO: 0.49 THOUSAND/ΜL (ref 0.17–1.22)
MONOCYTES NFR BLD AUTO: 10 % (ref 4–12)
NEUTROPHILS # BLD AUTO: 2.82 THOUSANDS/ΜL (ref 1.85–7.62)
NEUTS SEG NFR BLD AUTO: 56 % (ref 43–75)
NRBC BLD AUTO-RTO: 0 /100 WBCS
PLATELET # BLD AUTO: 256 THOUSANDS/UL (ref 149–390)
PMV BLD AUTO: 10 FL (ref 8.9–12.7)
POTASSIUM SERPL-SCNC: 3.6 MMOL/L (ref 3.5–5.3)
PROT SERPL-MCNC: 5.7 G/DL (ref 6.4–8.2)
PROTHROMBIN TIME: 14.2 SECONDS (ref 11.6–14.5)
RBC # BLD AUTO: 3.43 MILLION/UL (ref 3.81–5.12)
SODIUM SERPL-SCNC: 140 MMOL/L (ref 136–145)
WBC # BLD AUTO: 5.02 THOUSAND/UL (ref 4.31–10.16)

## 2019-08-12 PROCEDURE — 83735 ASSAY OF MAGNESIUM: CPT | Performed by: EMERGENCY MEDICINE

## 2019-08-12 PROCEDURE — 84443 ASSAY THYROID STIM HORMONE: CPT | Performed by: NURSE PRACTITIONER

## 2019-08-12 PROCEDURE — 99285 EMERGENCY DEPT VISIT HI MDM: CPT | Performed by: EMERGENCY MEDICINE

## 2019-08-12 PROCEDURE — 80053 COMPREHEN METABOLIC PANEL: CPT | Performed by: EMERGENCY MEDICINE

## 2019-08-12 PROCEDURE — 96374 THER/PROPH/DIAG INJ IV PUSH: CPT

## 2019-08-12 PROCEDURE — 70450 CT HEAD/BRAIN W/O DYE: CPT

## 2019-08-12 PROCEDURE — 99285 EMERGENCY DEPT VISIT HI MDM: CPT

## 2019-08-12 PROCEDURE — 85025 COMPLETE CBC W/AUTO DIFF WBC: CPT | Performed by: EMERGENCY MEDICINE

## 2019-08-12 PROCEDURE — 93005 ELECTROCARDIOGRAM TRACING: CPT

## 2019-08-12 PROCEDURE — 1124F ACP DISCUSS-NO DSCNMKR DOCD: CPT | Performed by: INTERNAL MEDICINE

## 2019-08-12 PROCEDURE — 83690 ASSAY OF LIPASE: CPT | Performed by: EMERGENCY MEDICINE

## 2019-08-12 PROCEDURE — 99220 PR INITIAL OBSERVATION CARE/DAY 70 MINUTES: CPT | Performed by: NURSE PRACTITIONER

## 2019-08-12 PROCEDURE — 71046 X-RAY EXAM CHEST 2 VIEWS: CPT

## 2019-08-12 PROCEDURE — 85730 THROMBOPLASTIN TIME PARTIAL: CPT | Performed by: EMERGENCY MEDICINE

## 2019-08-12 PROCEDURE — 96375 TX/PRO/DX INJ NEW DRUG ADDON: CPT

## 2019-08-12 PROCEDURE — 85610 PROTHROMBIN TIME: CPT | Performed by: EMERGENCY MEDICINE

## 2019-08-12 RX ORDER — BUPROPION HYDROCHLORIDE 150 MG/1
300 TABLET ORAL DAILY
Status: DISCONTINUED | OUTPATIENT
Start: 2019-08-13 | End: 2019-08-13 | Stop reason: HOSPADM

## 2019-08-12 RX ORDER — PREDNISONE 1 MG/1
5 TABLET ORAL DAILY
Status: DISCONTINUED | OUTPATIENT
Start: 2019-08-13 | End: 2019-08-13 | Stop reason: HOSPADM

## 2019-08-12 RX ORDER — HYDROXYCHLOROQUINE SULFATE 200 MG/1
200 TABLET, FILM COATED ORAL 2 TIMES DAILY WITH MEALS
Status: DISCONTINUED | OUTPATIENT
Start: 2019-08-13 | End: 2019-08-13 | Stop reason: HOSPADM

## 2019-08-12 RX ORDER — LORAZEPAM 2 MG/ML
1 INJECTION INTRAMUSCULAR ONCE
Status: COMPLETED | OUTPATIENT
Start: 2019-08-12 | End: 2019-08-12

## 2019-08-12 RX ORDER — ASPIRIN 81 MG/1
81 TABLET ORAL DAILY
Status: DISCONTINUED | OUTPATIENT
Start: 2019-08-13 | End: 2019-08-13 | Stop reason: HOSPADM

## 2019-08-12 RX ORDER — KETOROLAC TROMETHAMINE 30 MG/ML
15 INJECTION, SOLUTION INTRAMUSCULAR; INTRAVENOUS ONCE
Status: COMPLETED | OUTPATIENT
Start: 2019-08-12 | End: 2019-08-12

## 2019-08-12 RX ORDER — MORPHINE SULFATE 4 MG/ML
4 INJECTION, SOLUTION INTRAMUSCULAR; INTRAVENOUS ONCE
Status: COMPLETED | OUTPATIENT
Start: 2019-08-12 | End: 2019-08-12

## 2019-08-12 RX ORDER — POLYVINYL ALCOHOL 14 MG/ML
1 SOLUTION/ DROPS OPHTHALMIC EVERY 12 HOURS SCHEDULED
Status: DISCONTINUED | OUTPATIENT
Start: 2019-08-12 | End: 2019-08-13

## 2019-08-12 RX ORDER — CHLORDIAZEPOXIDE HYDROCHLORIDE 5 MG/1
5 CAPSULE, GELATIN COATED ORAL 2 TIMES DAILY
Status: DISCONTINUED | OUTPATIENT
Start: 2019-08-13 | End: 2019-08-13 | Stop reason: HOSPADM

## 2019-08-12 RX ORDER — PREGABALIN 75 MG/1
75 CAPSULE ORAL 2 TIMES DAILY
Status: DISCONTINUED | OUTPATIENT
Start: 2019-08-13 | End: 2019-08-13 | Stop reason: HOSPADM

## 2019-08-12 RX ORDER — PRAVASTATIN SODIUM 40 MG
80 TABLET ORAL
Status: DISCONTINUED | OUTPATIENT
Start: 2019-08-13 | End: 2019-08-13 | Stop reason: HOSPADM

## 2019-08-12 RX ORDER — DEXTROAMPHETAMINE SACCHARATE, AMPHETAMINE ASPARTATE, DEXTROAMPHETAMINE SULFATE AND AMPHETAMINE SULFATE 2.5; 2.5; 2.5; 2.5 MG/1; MG/1; MG/1; MG/1
20 TABLET ORAL
Status: DISCONTINUED | OUTPATIENT
Start: 2019-08-13 | End: 2019-08-13 | Stop reason: HOSPADM

## 2019-08-12 RX ORDER — ASPIRIN 325 MG
325 TABLET ORAL ONCE
Status: COMPLETED | OUTPATIENT
Start: 2019-08-12 | End: 2019-08-12

## 2019-08-12 RX ORDER — ONDANSETRON 2 MG/ML
4 INJECTION INTRAMUSCULAR; INTRAVENOUS ONCE
Status: COMPLETED | OUTPATIENT
Start: 2019-08-12 | End: 2019-08-12

## 2019-08-12 RX ORDER — TRAZODONE HYDROCHLORIDE 50 MG/1
150 TABLET ORAL
Status: DISCONTINUED | OUTPATIENT
Start: 2019-08-12 | End: 2019-08-13 | Stop reason: HOSPADM

## 2019-08-12 RX ADMIN — TRAZODONE HYDROCHLORIDE 150 MG: 50 TABLET ORAL at 23:57

## 2019-08-12 RX ADMIN — LORAZEPAM 1 MG: 2 INJECTION INTRAMUSCULAR; INTRAVENOUS at 21:37

## 2019-08-12 RX ADMIN — MORPHINE SULFATE 4 MG: 4 INJECTION INTRAVENOUS at 19:13

## 2019-08-12 RX ADMIN — ASPIRIN 325 MG ORAL TABLET 325 MG: 325 PILL ORAL at 19:15

## 2019-08-12 RX ADMIN — KETOROLAC TROMETHAMINE 15 MG: 30 INJECTION, SOLUTION INTRAMUSCULAR; INTRAVENOUS at 21:37

## 2019-08-12 RX ADMIN — NITROGLYCERIN 1 INCH: 20 OINTMENT TOPICAL at 19:10

## 2019-08-12 RX ADMIN — ONDANSETRON 4 MG: 2 INJECTION INTRAMUSCULAR; INTRAVENOUS at 19:11

## 2019-08-13 ENCOUNTER — APPOINTMENT (OUTPATIENT)
Dept: NON INVASIVE DIAGNOSTICS | Facility: HOSPITAL | Age: 64
End: 2019-08-13
Payer: MEDICARE

## 2019-08-13 VITALS
SYSTOLIC BLOOD PRESSURE: 108 MMHG | WEIGHT: 106.26 LBS | TEMPERATURE: 98 F | DIASTOLIC BLOOD PRESSURE: 55 MMHG | OXYGEN SATURATION: 100 % | HEART RATE: 71 BPM | BODY MASS INDEX: 20.06 KG/M2 | RESPIRATION RATE: 16 BRPM | HEIGHT: 61 IN

## 2019-08-13 PROBLEM — E87.6 HYPOKALEMIA: Status: ACTIVE | Noted: 2019-08-13

## 2019-08-13 PROBLEM — R79.89 ELEVATED TSH: Status: ACTIVE | Noted: 2019-08-13

## 2019-08-13 LAB
ANION GAP SERPL CALCULATED.3IONS-SCNC: 3 MMOL/L (ref 4–13)
ATRIAL RATE: 61 BPM
ATRIAL RATE: 64 BPM
ATRIAL RATE: 65 BPM
ATRIAL RATE: 69 BPM
BILIRUB UR QL STRIP: NEGATIVE
BUN SERPL-MCNC: 14 MG/DL (ref 5–25)
CALCIUM SERPL-MCNC: 8.1 MG/DL (ref 8.3–10.1)
CHLORIDE SERPL-SCNC: 105 MMOL/L (ref 100–108)
CHOLEST SERPL-MCNC: 141 MG/DL (ref 50–200)
CLARITY UR: CLEAR
CO2 SERPL-SCNC: 34 MMOL/L (ref 21–32)
COLOR UR: YELLOW
CREAT SERPL-MCNC: 0.83 MG/DL (ref 0.6–1.3)
ERYTHROCYTE [DISTWIDTH] IN BLOOD BY AUTOMATED COUNT: 16.2 % (ref 11.6–15.1)
GFR SERPL CREATININE-BSD FRML MDRD: 74 ML/MIN/1.73SQ M
GLUCOSE SERPL-MCNC: 99 MG/DL (ref 65–140)
GLUCOSE UR STRIP-MCNC: NEGATIVE MG/DL
HCT VFR BLD AUTO: 29.7 % (ref 34.8–46.1)
HDLC SERPL-MCNC: 68 MG/DL (ref 40–60)
HGB BLD-MCNC: 9.4 G/DL (ref 11.5–15.4)
HGB UR QL STRIP.AUTO: NEGATIVE
KETONES UR STRIP-MCNC: NEGATIVE MG/DL
LDLC SERPL CALC-MCNC: 64 MG/DL (ref 0–100)
LEUKOCYTE ESTERASE UR QL STRIP: NEGATIVE
MAGNESIUM SERPL-MCNC: 2.1 MG/DL (ref 1.6–2.6)
MCH RBC QN AUTO: 28.9 PG (ref 26.8–34.3)
MCHC RBC AUTO-ENTMCNC: 31.6 G/DL (ref 31.4–37.4)
MCV RBC AUTO: 91 FL (ref 82–98)
NITRITE UR QL STRIP: NEGATIVE
P AXIS: 66 DEGREES
P AXIS: 67 DEGREES
P AXIS: 69 DEGREES
P AXIS: 73 DEGREES
PH UR STRIP.AUTO: 6.5 [PH]
PHOSPHATE SERPL-MCNC: 4 MG/DL (ref 2.3–4.1)
PLATELET # BLD AUTO: 216 THOUSANDS/UL (ref 149–390)
PLATELET # BLD AUTO: 225 THOUSANDS/UL (ref 149–390)
PMV BLD AUTO: 10 FL (ref 8.9–12.7)
PMV BLD AUTO: 9.8 FL (ref 8.9–12.7)
POTASSIUM SERPL-SCNC: 3.2 MMOL/L (ref 3.5–5.3)
PR INTERVAL: 196 MS
PR INTERVAL: 198 MS
PR INTERVAL: 206 MS
PR INTERVAL: 208 MS
PROT UR STRIP-MCNC: NEGATIVE MG/DL
QRS AXIS: -38 DEGREES
QRS AXIS: -40 DEGREES
QRS AXIS: -44 DEGREES
QRS AXIS: -52 DEGREES
QRSD INTERVAL: 90 MS
QRSD INTERVAL: 92 MS
QRSD INTERVAL: 92 MS
QRSD INTERVAL: 94 MS
QT INTERVAL: 384 MS
QT INTERVAL: 412 MS
QT INTERVAL: 420 MS
QT INTERVAL: 434 MS
QTC INTERVAL: 411 MS
QTC INTERVAL: 425 MS
QTC INTERVAL: 436 MS
QTC INTERVAL: 436 MS
RBC # BLD AUTO: 3.25 MILLION/UL (ref 3.81–5.12)
SODIUM SERPL-SCNC: 142 MMOL/L (ref 136–145)
SP GR UR STRIP.AUTO: 1.01 (ref 1–1.03)
T WAVE AXIS: 52 DEGREES
T WAVE AXIS: 56 DEGREES
T WAVE AXIS: 59 DEGREES
T WAVE AXIS: 69 DEGREES
TRIGL SERPL-MCNC: 45 MG/DL
TROPONIN I SERPL-MCNC: <0.02 NG/ML
TSH SERPL DL<=0.05 MIU/L-ACNC: 48.75 UIU/ML (ref 0.36–3.74)
UROBILINOGEN UR QL STRIP.AUTO: 0.2 E.U./DL
VENTRICULAR RATE: 61 BPM
VENTRICULAR RATE: 64 BPM
VENTRICULAR RATE: 65 BPM
VENTRICULAR RATE: 69 BPM
WBC # BLD AUTO: 4.21 THOUSAND/UL (ref 4.31–10.16)

## 2019-08-13 PROCEDURE — 84100 ASSAY OF PHOSPHORUS: CPT | Performed by: NURSE PRACTITIONER

## 2019-08-13 PROCEDURE — 93005 ELECTROCARDIOGRAM TRACING: CPT

## 2019-08-13 PROCEDURE — 93010 ELECTROCARDIOGRAM REPORT: CPT | Performed by: INTERNAL MEDICINE

## 2019-08-13 PROCEDURE — 94664 DEMO&/EVAL PT USE INHALER: CPT

## 2019-08-13 PROCEDURE — 85049 AUTOMATED PLATELET COUNT: CPT | Performed by: NURSE PRACTITIONER

## 2019-08-13 PROCEDURE — 93306 TTE W/DOPPLER COMPLETE: CPT

## 2019-08-13 PROCEDURE — 83735 ASSAY OF MAGNESIUM: CPT | Performed by: NURSE PRACTITIONER

## 2019-08-13 PROCEDURE — 99214 OFFICE O/P EST MOD 30 MIN: CPT | Performed by: INTERNAL MEDICINE

## 2019-08-13 PROCEDURE — 80048 BASIC METABOLIC PNL TOTAL CA: CPT | Performed by: NURSE PRACTITIONER

## 2019-08-13 PROCEDURE — 93306 TTE W/DOPPLER COMPLETE: CPT | Performed by: INTERNAL MEDICINE

## 2019-08-13 PROCEDURE — 84484 ASSAY OF TROPONIN QUANT: CPT | Performed by: NURSE PRACTITIONER

## 2019-08-13 PROCEDURE — 85027 COMPLETE CBC AUTOMATED: CPT | Performed by: NURSE PRACTITIONER

## 2019-08-13 PROCEDURE — 94760 N-INVAS EAR/PLS OXIMETRY 1: CPT

## 2019-08-13 PROCEDURE — 99217 PR OBSERVATION CARE DISCHARGE MANAGEMENT: CPT | Performed by: PHYSICIAN ASSISTANT

## 2019-08-13 PROCEDURE — 84484 ASSAY OF TROPONIN QUANT: CPT | Performed by: PHYSICIAN ASSISTANT

## 2019-08-13 PROCEDURE — 80061 LIPID PANEL: CPT | Performed by: NURSE PRACTITIONER

## 2019-08-13 PROCEDURE — 81003 URINALYSIS AUTO W/O SCOPE: CPT | Performed by: NURSE PRACTITIONER

## 2019-08-13 RX ORDER — OXYMORPHONE HYDROCHLORIDE 5 MG/1
5 TABLET ORAL EVERY 6 HOURS PRN
Status: DISCONTINUED | OUTPATIENT
Start: 2019-08-13 | End: 2019-08-13 | Stop reason: CLARIF

## 2019-08-13 RX ORDER — LEVOTHYROXINE SODIUM 0.15 MG/1
150 TABLET ORAL
Qty: 30 TABLET | Refills: 0 | Status: SHIPPED | OUTPATIENT
Start: 2019-08-14 | End: 2019-09-03 | Stop reason: SDUPTHER

## 2019-08-13 RX ORDER — KETOROLAC TROMETHAMINE 30 MG/ML
15 INJECTION, SOLUTION INTRAMUSCULAR; INTRAVENOUS ONCE
Status: COMPLETED | OUTPATIENT
Start: 2019-08-13 | End: 2019-08-13

## 2019-08-13 RX ORDER — MORPHINE SULFATE 15 MG/1
15 TABLET ORAL EVERY 6 HOURS PRN
Status: DISCONTINUED | OUTPATIENT
Start: 2019-08-13 | End: 2019-08-13 | Stop reason: HOSPADM

## 2019-08-13 RX ORDER — POTASSIUM CHLORIDE 20 MEQ/1
40 TABLET, EXTENDED RELEASE ORAL 2 TIMES DAILY WITH MEALS
Status: DISCONTINUED | OUTPATIENT
Start: 2019-08-13 | End: 2019-08-13 | Stop reason: HOSPADM

## 2019-08-13 RX ORDER — LEVOTHYROXINE SODIUM 0.15 MG/1
150 TABLET ORAL
Status: DISCONTINUED | OUTPATIENT
Start: 2019-08-14 | End: 2019-08-13 | Stop reason: HOSPADM

## 2019-08-13 RX ADMIN — ENOXAPARIN SODIUM 40 MG: 40 INJECTION SUBCUTANEOUS at 08:31

## 2019-08-13 RX ADMIN — PREGABALIN 75 MG: 75 CAPSULE ORAL at 08:31

## 2019-08-13 RX ADMIN — LEVOTHYROXINE SODIUM 137 MCG: 112 TABLET ORAL at 05:25

## 2019-08-13 RX ADMIN — BUPROPION HYDROCHLORIDE 300 MG: 150 TABLET, FILM COATED, EXTENDED RELEASE ORAL at 08:31

## 2019-08-13 RX ADMIN — HYDROXYCHLOROQUINE SULFATE 200 MG: 200 TABLET, FILM COATED ORAL at 08:23

## 2019-08-13 RX ADMIN — POTASSIUM CHLORIDE 40 MEQ: 20 TABLET, EXTENDED RELEASE ORAL at 08:31

## 2019-08-13 RX ADMIN — CHLORDIAZEPOXIDE HYDROCHLORIDE 5 MG: 5 CAPSULE ORAL at 08:32

## 2019-08-13 RX ADMIN — SODIUM CHLORIDE 500 ML: 0.9 INJECTION, SOLUTION INTRAVENOUS at 11:53

## 2019-08-13 RX ADMIN — KETOROLAC TROMETHAMINE 15 MG: 30 INJECTION, SOLUTION INTRAMUSCULAR; INTRAVENOUS at 14:37

## 2019-08-13 RX ADMIN — PREDNISONE 5 MG: 5 TABLET ORAL at 08:32

## 2019-08-13 RX ADMIN — MORPHINE SULFATE 15 MG: 15 TABLET ORAL at 12:00

## 2019-08-13 RX ADMIN — GLYCERIN 1 DROP: .002; .002; .01 SOLUTION/ DROPS OPHTHALMIC at 08:32

## 2019-08-13 RX ADMIN — ASPIRIN 81 MG: 81 TABLET, COATED ORAL at 08:32

## 2019-08-13 NOTE — ASSESSMENT & PLAN NOTE
Atypical chest pain-not relieved with nitro  EKG normal sinus rhythm  Trend troponin with EKGs for minimum of 3  Admit to med surge with telemetry  Monitor per protocol  Cardiac stress diet ordered  Echo ordered

## 2019-08-13 NOTE — ED PROVIDER NOTES
History  Chief Complaint   Patient presents with    Chest Pain     Pt states 1 hour ago she started with chest pain  pt took 2 full strenght ASA and 5mg of oxymorphone po to help pain  pt recieved 1 nitro form EMS with no change in pain  also c/o headache x3 days      HPI    Pt presents from home c/o left sided cp, radiates to her scapula and neck, intermittent, no alleviating/aggravating factors and currently present  PT does have a history of cervical DJD, and she does take chronic narcotic pain meds which did help with her pain  Pt was given Nitro by EMS with no improvement  PT states the pain is nearly resolved at this time  Pt o/w denies any symptoms  PT does have a frontal headache as well  Pt denies fevers, cough, sob, n/v/d/c, abd pain, dysuria, focal def or syncope  Prior to Admission Medications   Prescriptions Last Dose Informant Patient Reported? Taking?    Multiple Vitamin (MULTIVITAMIN) tablet   Yes No   Sig: Take 1 tablet by mouth daily   NON FORMULARY   Yes No   Sig: Take by mouth   NON FORMULARY   Yes No   Sig: Take by mouth   SODIUM FLUORIDE, DENTAL GEL, (PREVIDENT) 1 1 % GEL   Yes No   Sig: PreviDent 5000 Dry Mouth 1 1 % gel   amphetamine-dextroamphetamine (ADDERALL XR) 30 MG 24 hr capsule   No No   Sig: Take 1 capsule (30 mg total) by mouth every morningMax Daily Amount: 30 mg   amphetamine-dextroamphetamine (ADDERALL) 20 mg tablet   No No   Sig: Take 1 tablet (20 mg total) by mouth 2 (two) times a dayMax Daily Amount: 40 mg   aspirin (ECOTRIN LOW STRENGTH) 81 mg EC tablet   Yes No   Sig: Take 81 mg by mouth daily   buPROPion (WELLBUTRIN XL) 300 mg 24 hr tablet   No No   Sig: TAKE 1 TABLET BY MOUTH EVERY DAY   calcium carbonate (TUMS) 500 mg chewable tablet   No No   Sig: Chew 2 tablets (1,000 mg total) daily   chlordiazePOXIDE (LIBRIUM) 5 mg capsule   Yes No   Sig: Take 5 mg by mouth 2 (two) times a day     ciprofloxacin (CIPRO) 500 mg tablet   No No   Sig: Take 1 tablet (500 mg total) by mouth every 12 (twelve) hours for 7 days   cycloSPORINE (RESTASIS) 0 05 % ophthalmic emulsion   Yes No   Sig: Restasis 0 05 % eye drops in a dropperette   dicyclomine (BENTYL) 10 mg capsule   Yes No   Sig: Take 20 mg by mouth 2 (two) times a day     esomeprazole (NexIUM) 40 MG capsule   Yes No   Sig: Take 40 mg by mouth 2 (two) times a day before meals     folic acid (FOLVITE) 1 mg tablet   Yes No   Sig: Take by mouth daily   hydroxychloroquine (PLAQUENIL) 200 mg tablet   Yes No   Sig: Take 200 mg by mouth 2 (two) times a day with meals     levothyroxine 137 mcg tablet   Yes No   methotrexate 2 5 mg tablet   No No   Sig: AS DIRECTED   metroNIDAZOLE (FLAGYL) 500 mg tablet   No No   Sig: Take 1 tablet (500 mg total) by mouth every 8 (eight) hours for 7 days   montelukast (SINGULAIR) 10 mg tablet   No No   Sig: Take 1 tablet (10 mg total) by mouth daily   Patient not taking: Reported on 8/6/2019   morphine (MS CONTIN) 15 mg 12 hr tablet   No No   Sig: Take 1 tablet (15 mg total) by mouth 2 (two) times a dayMax Daily Amount: 30 mg   naproxen (NAPROSYN) 500 mg tablet   Yes No   oxymorphone (OPANA) 5 MG tablet   No No   Sig: Take 1 tablet (5 mg total) by mouth every 6 (six) hours as needed for moderate painMax Daily Amount: 20 mg   predniSONE 5 mg tablet   No No   Sig: Take 1 tablet (5 mg total) by mouth daily   pregabalin (LYRICA) 75 mg capsule   Yes No   Sig: 3 (three) times a day    simvastatin (ZOCOR) 40 mg tablet   No No   Sig: TAKE 1 TABLET BY MOUTH EVERY DAY   traZODone (DESYREL) 150 mg tablet   No No   Sig: TAKE 1 TABLET (150 MG TOTAL) BY MOUTH DAILY AT BEDTIME      Facility-Administered Medications: None       Past Medical History:   Diagnosis Date    ADHD     Cancer (HCC)     non-Hodg      Cellulitis of foot     Last assessed 10/24/16    Change in mental status     Last assessed 12/01/15    Chronic fatigue     PLAZA (dyspnea on exertion)     Last assessed 12/01/15    Fibromyalgia     Folliculitis Last assessed 10/06/14    GERD (gastroesophageal reflux disease)     Osteopenia     Raynaud's disease     Scleroderma ([facility] [address] )     Systemic sclerosis (HCC)        Past Surgical History:   Procedure Laterality Date    BACK SURGERY      BLADDER SURGERY      BONE MARROW BIOPSY      CARDIAC CATHETERIZATION      HEMORRHOID SURGERY      HYSTERECTOMY      KNEE SURGERY      NASAL SEPTUM SURGERY      ROTATOR CUFF REPAIR      THYROIDECTOMY N/A 10/4/2018    Procedure: TOTAL THYROIDECTOMY;  Surgeon: Jem Anderson MD;  Location: [facility];  Service: Surgical Oncology    TONSILLECTOMY         Family History   Problem Relation Age of Onset    Arthritis Mother     Diabetes Mother     Coronary artery disease Father     Arthritis Sister     Asthma Sister     Crohn's disease Sister     Other Brother         myocardial ischemia     I have reviewed and agree with the history as documented  Social History     Tobacco Use    Smoking status: Former Smoker    Smokeless tobacco: Never Used    Tobacco comment: quit 40 years ago    Substance Use Topics    Alcohol use: No    Drug use: No        Review of Systems   Constitutional: Negative for activity change, appetite change, diaphoresis, fatigue and fever  HENT: Negative for congestion, facial swelling, mouth sores and trouble swallowing  Eyes: Negative for photophobia, discharge and visual disturbance  Respiratory: Negative for apnea, cough, shortness of breath and wheezing  Cardiovascular: Positive for chest pain  Negative for leg swelling  Gastrointestinal: Negative for abdominal pain, constipation, diarrhea, nausea and vomiting  Endocrine: Negative for heat intolerance and polydipsia  Genitourinary: Negative for dysuria, flank pain, frequency and hematuria  Musculoskeletal: Positive for neck pain  Negative for back pain, gait problem and myalgias  Skin: Negative for rash and wound     Allergic/Immunologic: Negative for immunocompromised state    Neurological: Positive for headaches  Negative for dizziness, syncope, weakness and light-headedness  Hematological: Negative for adenopathy  Psychiatric/Behavioral: Negative for agitation, confusion and self-injury  The patient is not nervous/anxious  Physical Exam  Physical Exam   Constitutional: She is oriented to person, place, and time  She appears well-developed and well-nourished  No distress  HENT:   Head: Normocephalic and atraumatic  Right Ear: External ear normal    Left Ear: External ear normal    Nose: Nose normal    Mouth/Throat: Oropharynx is clear and moist  No oropharyngeal exudate  Eyes: Pupils are equal, round, and reactive to light  Conjunctivae and EOM are normal  Right eye exhibits no discharge  Left eye exhibits no discharge  No scleral icterus  Neck: Normal range of motion  Neck supple  No JVD present  No tracheal deviation present  No thyromegaly present  Cardiovascular: Normal rate, regular rhythm and normal heart sounds  No murmur heard  Pulmonary/Chest: Effort normal and breath sounds normal  No stridor  No respiratory distress  She has no wheezes  She has no rales  She exhibits no tenderness  Abdominal: Soft  Bowel sounds are normal  She exhibits no distension and no mass  There is no tenderness  There is no rebound and no guarding  Musculoskeletal: Normal range of motion  She exhibits tenderness  She exhibits no edema or deformity  Lymphadenopathy:     She has no cervical adenopathy  Neurological: She is alert and oriented to person, place, and time  She has normal reflexes  She displays normal reflexes  No cranial nerve deficit  She exhibits normal muscle tone  Coordination normal    Skin: Skin is warm and dry  No rash noted  She is not diaphoretic  No erythema  No pallor  Psychiatric: She has a normal mood and affect  Her behavior is normal  Judgment and thought content normal    Nursing note and vitals reviewed        Vital Signs  ED Triage Vitals [08/12/19 1853]   Temperature Pulse Respirations Blood Pressure SpO2   99 3 °F (37 4 °C) 76 18 116/68 98 %      Temp Source Heart Rate Source Patient Position - Orthostatic VS BP Location FiO2 (%)   Temporal Monitor Sitting Right arm --      Pain Score       2           Vitals:    08/12/19 1853 08/12/19 2030   BP: 116/68 104/57   Pulse: 76 69   Patient Position - Orthostatic VS: Sitting Lying         Visual Acuity      ED Medications  Medications   ketorolac (TORADOL) injection 15 mg (has no administration in time range)   LORazepam (ATIVAN) 2 mg/mL injection 1 mg (has no administration in time range)   ondansetron (ZOFRAN) injection 4 mg (4 mg Intravenous Given 8/12/19 1911)   morphine (PF) 4 mg/mL injection 4 mg (4 mg Intravenous Given 8/12/19 1913)   nitroglycerin (NITRO-BID) 2 % TD ointment 1 inch (1 inch Topical Given 8/12/19 1910)   aspirin tablet 325 mg (325 mg Oral Given 8/12/19 1915)       Diagnostic Studies  Results Reviewed     Procedure Component Value Units Date/Time    Comprehensive metabolic panel [[id number]]  (Abnormal) Collected:  08/12/19 1916    Lab Status:  Final result Specimen:  Blood from Arm, Right Updated:  08/12/19 1937     Sodium 140 mmol/L      Potassium 3 6 mmol/L      Chloride 103 mmol/L      CO2 33 mmol/L      ANION GAP 4 mmol/L      BUN 15 mg/dL      Creatinine 0 88 mg/dL      Glucose 88 mg/dL      Calcium 8 5 mg/dL      AST 25 U/L      ALT 38 U/L      Alkaline Phosphatase 38 U/L      Total Protein 5 7 g/dL      Albumin 3 7 g/dL      Total Bilirubin 0 40 mg/dL      eGFR 69 ml/min/1 73sq m     Narrative:       [place] guidelines for Chronic Kidney Disease (CKD):     Stage 1 with normal or high GFR (GFR > 90 mL/min/1 73 square meters)    Stage 2 Mild CKD (GFR = 60-89 mL/min/1 73 square meters)    Stage 3A Moderate CKD (GFR = 45-59 mL/min/1 73 square meters)    Stage 3B Moderate CKD (GFR = 30-44 mL/min/1 73 square meters)    Stage 4 Severe CKD (GFR = 15-29 mL/min/1 73 square meters)    Stage 5 End Stage CKD (GFR <15 mL/min/1 73 square meters)  Note: GFR calculation is accurate only with a steady state creatinine    Magnesium [[id number]]  (Normal) Collected:  08/12/19 1916    Lab Status:  Final result Specimen:  Blood from Arm, Right Updated:  08/12/19 1931     Magnesium 2 0 mg/dL     Lipase [[id number]]  (Normal) Collected:  08/12/19 1916    Lab Status:  Final result Specimen:  Blood from Arm, Right Updated:  08/12/19 1931     Lipase 110 u/L     Protime-INR [[id number]]  (Normal) Collected:  08/12/19 1916    Lab Status:  Final result Specimen:  Blood from Arm, Right Updated:  08/12/19 1930     Protime 14 2 seconds      INR 1 10    APTT [[id number]]  (Normal) Collected:  08/12/19 1916    Lab Status:  Final result Specimen:  Blood from Arm, Right Updated:  08/12/19 1930     PTT 27 seconds     CBC and differential [[id number]]  (Abnormal) Collected:  08/12/19 1916    Lab Status:  Final result Specimen:  Blood from Arm, Right Updated:  08/12/19 1921     WBC 5 02 Thousand/uL      RBC 3 43 Million/uL      Hemoglobin 10 0 g/dL      Hematocrit 31 0 %      MCV 90 fL      MCH 29 2 pg      MCHC 32 3 g/dL      RDW 16 1 %      MPV 10 0 fL      Platelets 693 Thousands/uL      nRBC 0 /100 WBCs      Neutrophils Relative 56 %      Immat GRANS % 0 %      Lymphocytes Relative 29 %      Monocytes Relative 10 %      Eosinophils Relative 4 %      Basophils Relative 1 %      Neutrophils Absolute 2 82 Thousands/µL      Immature Grans Absolute 0 02 Thousand/uL      Lymphocytes Absolute 1 46 Thousands/µL      Monocytes Absolute 0 49 Thousand/µL      Eosinophils Absolute 0 19 Thousand/µL      Basophils Absolute 0 04 Thousands/µL     UA w Reflex to Microscopic w Reflex to Culture [[id number]]     Lab Status:  No result Specimen:  Urine              EKG: normal sinus rhythm, no acute ischemia        CT head without contrast   Final Result by Paola Moody MD (08/12 2110)      No acute intracranial abnormality  Workstation performed: [id number]         XR chest 2 views    (Results Pending)              Procedures  Procedures       ED Course         HEART Risk Score      Most Recent Value   History  0 Filed at: 08/12/2019 2115   ECG  0 Filed at: 08/12/2019 2115   Age  2 Filed at: 08/12/2019 2115   Risk Factors  1 Filed at: 08/12/2019 2115   Troponin  0 Filed at: 08/12/2019 2115   Heart Score Risk Calculator   History  0 Filed at: 08/12/2019 2115   ECG  0 Filed at: 08/12/2019 2115   Age  2 Filed at: 08/12/2019 2115   Risk Factors  1 Filed at: 08/12/2019 2115   Troponin  0 Filed at: 08/12/2019 2115   HEART Score  3 Filed at: 08/12/2019 2115   HEART Score  3 Filed at: 08/12/2019 2115                            MDM  Number of Diagnoses or Management Options  Chest pain:   Diagnosis management comments: IMP: musc pain versus GERD, anxiety  Consider electrolyte abnormality, medication side affect  Doubt acs, pe, dissection, tamponade, cva, bacteremia, surgical abd process  Consider migraine/tension/cluster headache  Plan: cardiac labs, ekg, cxr, give ivf, aspirin, nitro and iv narcotic pain meds prn   - ekg no acute ischemia  - cxr no acute  - labs no acute  - ct brain no acute  - Pt with chest pain and several risk factors  Will admit to medicine           Amount and/or Complexity of Data Reviewed  Clinical lab tests: ordered and reviewed  Tests in the radiology section of CPT®: ordered and reviewed  Tests in the medicine section of CPT®: ordered and reviewed  Decide to obtain previous medical records or to obtain history from someone other than the patient: yes  Obtain history from someone other than the patient: yes (EMS)  Review and summarize past medical records: yes  Discuss the patient with other providers: yes (On-call hospitalist)  Independent visualization of images, tracings, or specimens: yes    Risk of Complications, Morbidity, and/or Mortality  Presenting problems: high  Diagnostic procedures: high  Management options: high    Patient Progress  Patient progress: stable      Disposition  Final diagnoses:   Chest pain     Time reflects when diagnosis was documented in both MDM as applicable and the Disposition within this note     Time User Action Codes Description Comment    8/12/2019  9:04 PM Ada Greenhouse Add [R07 89] Atypical chest pain     8/12/2019  9:04 PM Ada Greenhouse Remove [R07 89] Atypical chest pain     8/12/2019  9:04 PM Ada Greenhouse Add [R07 9] Chest pain       ED Disposition     ED Disposition Condition Date/Time Comment    Admit Stable Mon Aug 12, 2019  9:04 PM Case was discussed with Dr Jonathan Jefferson and the patient's admission status was agreed to be Admission Status: observation status to the service of Dr Jonathan Jefferson  Follow-up Information    None         Patient's Medications   Discharge Prescriptions    No medications on file     No discharge procedures on file      ED Provider  Electronically Signed by           Laure Bueno DO  08/12/19 9068

## 2019-08-13 NOTE — PROGRESS NOTES
Patient would like to speak to the manager of the floor  Her mother was a patient within the last two weeks  She and her family were extremely impressed by the compassionate, respectful, polite, and knowledgeable staff on the 4th floor

## 2019-08-13 NOTE — H&P
H&P- Day Malone 1955, 71 y o  female MRN: [id number]    Unit/Bed#: [facility] Encounter: [id number]    Primary Care Provider: Quan Maxwell MD   Date and time admitted to hospital: 8/12/2019  6:44 PM        * Chest pain  Assessment & Plan  Atypical chest pain-not relieved with nitro  EKG normal sinus rhythm  Trend troponin with EKGs for minimum of 3  Admit to [facility] with telemetry  Monitor per protocol  Cardiac stress diet ordered  Echo ordered    Chronic pain  Assessment & Plan  Continue prior to admission medications  Provide supportive care    Postoperative hypothyroidism  Assessment & Plan  Continue prior to admission levothyroxine  TSH ordered    Esophageal reflux disease  Assessment & Plan  Continue prior to admission PPI        VTE Prophylaxis: Enoxaparin (Lovenox)  / sequential compression device   Code Status: FULL  POLST: POLST is not applicable to this patient    Anticipated Length of Stay:  Patient will be admitted on an Observation basis with an anticipated length of stay of  Less than 2 midnights  Justification for Hospital Stay:  Chest pain -atypical    Total Time for Visit, including Counseling / Coordination of Care: 45 minutes  Greater than 50% of this total time spent on direct patient counseling and coordination of care  Chief Complaint:   Chest pain    History of Present Illness:    Day Malone is a 71 y o  female who presented to the emergency room for evaluation of mid sternal chest pain neck pain trapezius muscle bilateral pain  Patient has a significant past medical history including several musculoskeletal issues including systemic sclerosis, scleroderma, fibromyalgia  Patient has been taking chronic pain medicines for some period time she also reports trying to decrease her medications over the past couple weeks and since then has been experiencing additional pain    All additional past medical history includes a nauseous disease osteopenia dyspneic on exertion chronic fatigue and ADHD  Patient admits to neck pain upper bilateral trapezius and midsternal chest pain/tightness, patient denies headache vision changes trauma, denies shortness of breath denies abdominal pain denies nausea vomiting diarrhea constipation, denies dysuria urgency or frequency, denies melena or hematochezia  Images and labs were collected emergency room-see below  Patient was admitted by emergency room doctor  Review of Systems:    Review of Systems   Cardiovascular: Positive for chest pain  Musculoskeletal: Positive for arthralgias, gait problem and neck pain  Mid chest tightness, bilateral trapezius tightness   All other systems reviewed and are negative  Past Medical and Surgical History:     Past Medical History:   Diagnosis Date    ADHD     Cancer ([facility] [address] )     non-Hodg   Cellulitis of foot     Last assessed 10/24/16    Change in mental status     Last assessed 12/01/15    Chronic fatigue     PLAZA (dyspnea on exertion)     Last assessed 12/01/15    Fibromyalgia     Folliculitis     Last assessed 10/06/14    GERD (gastroesophageal reflux disease)     Osteopenia     Raynaud's disease     Scleroderma ([facility] )     Systemic sclerosis ([facility] )        Past Surgical History:   Procedure Laterality Date    BACK SURGERY      BLADDER SURGERY      BONE MARROW BIOPSY      CARDIAC CATHETERIZATION      HEMORRHOID SURGERY      HYSTERECTOMY      KNEE SURGERY      NASAL SEPTUM SURGERY      ROTATOR CUFF REPAIR      THYROIDECTOMY N/A 10/4/2018    Procedure: TOTAL THYROIDECTOMY;  Surgeon: Yang Ren MD;  Location: [facility];  Service: Surgical Oncology    TONSILLECTOMY         Meds/Allergies:    Prior to Admission medications    Medication Sig Start Date End Date Taking?  Authorizing Provider   amphetamine-dextroamphetamine (ADDERALL XR) 30 MG 24 hr capsule Take 1 capsule (30 mg total) by mouth every morningMax Daily Amount: 30 mg 8/5/19   JANKI Evans amphetamine-dextroamphetamine (ADDERALL) 20 mg tablet Take 1 tablet (20 mg total) by mouth 2 (two) times a dayMax Daily Amount: 40 mg 8/5/19   JANKI Carey   aspirin (ECOTRIN LOW STRENGTH) 81 mg EC tablet Take 81 mg by mouth daily    Historical Provider, MD   buPROPion (WELLBUTRIN XL) 300 mg 24 hr tablet TAKE 1 TABLET BY MOUTH EVERY DAY 8/7/19   Inderjit Hobbs MD   calcium carbonate (TUMS) 500 mg chewable tablet Chew 2 tablets (1,000 mg total) daily 10/5/18   Jumana Ag PA-C   chlordiazePOXIDE (LIBRIUM) 5 mg capsule Take 5 mg by mouth 2 (two) times a day   2/28/18   Historical Provider, MD   ciprofloxacin (CIPRO) 500 mg tablet Take 1 tablet (500 mg total) by mouth every 12 (twelve) hours for 7 days 8/6/19 8/13/19  Heather Reynaga DO   cycloSPORINE (RESTASIS) 0 05 % ophthalmic emulsion Restasis 0 05 % eye drops in a dropperette    Historical Provider, MD   dicyclomine (BENTYL) 10 mg capsule Take 20 mg by mouth 2 (two) times a day      Historical Provider, MD   esomeprazole (NexIUM) 40 MG capsule Take 40 mg by mouth 2 (two) times a day before meals   1/29/18   Historical Provider, MD   folic acid (FOLVITE) 1 mg tablet Take by mouth daily    Historical Provider, MD   hydroxychloroquine (PLAQUENIL) 200 mg tablet Take 200 mg by mouth 2 (two) times a day with meals   6/15/15   Historical Provider, MD   levothyroxine 137 mcg tablet  11/20/18   Historical Provider, MD   methotrexate 2 5 mg tablet AS DIRECTED 6/28/19   Inderjit Hobbs MD   metroNIDAZOLE (FLAGYL) 500 mg tablet Take 1 tablet (500 mg total) by mouth every 8 (eight) hours for 7 days 8/6/19 8/13/19  Heather Reynaga DO   montelukast (SINGULAIR) 10 mg tablet Take 1 tablet (10 mg total) by mouth daily  Patient not taking: Reported on 8/6/2019 7/27/18   Inderjit Hobbs MD   morphine (MS CONTIN) 15 mg 12 hr tablet Take 1 tablet (15 mg total) by mouth 2 (two) times a dayMax Daily Amount: 30 mg 2/15/19   Inderjit Hobbs MD   Multiple Vitamin (MULTIVITAMIN) tablet Take 1 tablet by mouth daily    Historical Provider, MD   naproxen (NAPROSYN) 500 mg tablet  10/15/18   Historical Provider, MD   NON FORMULARY Take by mouth    Historical Provider, MD   NON FORMULARY Take by mouth    Historical Provider, MD   oxymorphone (OPANA) 5 MG tablet Take 1 tablet (5 mg total) by mouth every 6 (six) hours as needed for moderate painMax Daily Amount: 20 mg 7/11/19   Johanna Stoddard MD   predniSONE 5 mg tablet Take 1 tablet (5 mg total) by mouth daily 1/14/19   Johanna Stoddard MD   pregabalin (LYRICA) 75 mg capsule 3 (three) times a day  6/15/15   Historical Provider, MD   simvastatin (ZOCOR) 40 mg tablet TAKE 1 TABLET BY MOUTH EVERY DAY 8/7/19   Johanna Stoddard MD   SODIUM FLUORIDE, DENTAL GEL, (PREVIDENT) 1 1 % GEL PreviDent 5000 Dry Mouth 1 1 % gel    Historical Provider, MD   traZODone (DESYREL) 150 mg tablet TAKE 1 TABLET (150 MG TOTAL) BY MOUTH DAILY AT BEDTIME 5/25/19   Johanna Stoddard MD     I have reviewed home medications with patient personally  Allergies:    Allergies   Allergen Reactions    Duloxetine Other (See Comments)     Mental psychosis    Revatio [Sildenafil] Other (See Comments)     mental status changes    Savella [Milnacipran] Other (See Comments)     Feeling out of it    Sulfamethoxazole-Trimethoprim Rash and GI Intolerance    Tedizolid Rash       Social History:     Marital Status:    Occupation:  Noncontributory  Patient Pre-hospital Living Situation:  Independent  Patient Pre-hospital Level of Mobility:  Limited  Patient Pre-hospital Diet Restrictions:  Denies  Substance Use History:   Social History     Substance and Sexual Activity   Alcohol Use No     Social History     Tobacco Use   Smoking Status Former Smoker   Smokeless Tobacco Never Used   Tobacco Comment    quit 40 years ago      Social History     Substance and Sexual Activity   Drug Use No       Family History:    Family History   Problem Relation Age of Onset  Arthritis Mother     Diabetes Mother     Coronary artery disease Father     Arthritis Sister     Asthma Sister     Crohn's disease Sister     Other Brother         myocardial ischemia       Physical Exam:     Vitals:   Blood Pressure: 115/56 (08/12/19 2130)  Pulse: 74 (08/12/19 2130)  Temperature: 99 3 °F (37 4 °C) (08/12/19 1853)  Temp Source: Temporal (08/12/19 1853)  Respirations: 18 (08/12/19 2130)  Height: 5' 1" (154 9 cm) (08/12/19 1853)  Weight - Scale: 50 9 kg (112 lb 3 4 oz) (08/12/19 1853)  SpO2: 93 % (08/12/19 2130)    Physical Exam   Constitutional: She is oriented to person, place, and time  She appears well-developed and well-nourished  HENT:   Head: Normocephalic and atraumatic  Eyes: Pupils are equal, round, and reactive to light  EOM are normal  Right eye exhibits no discharge  Left eye exhibits no discharge  No scleral icterus  Neck: Normal range of motion  Neck supple  No JVD present  No tracheal deviation present  No thyromegaly present  Cardiovascular: Normal rate, regular rhythm, normal heart sounds and intact distal pulses  Exam reveals no gallop and no friction rub  No murmur heard  Pulmonary/Chest: Effort normal and breath sounds normal  No stridor  No respiratory distress  Abdominal: Soft  Bowel sounds are normal  She exhibits no distension  There is no tenderness  There is no guarding  Musculoskeletal: She exhibits no edema, tenderness or deformity  Right shoulder: She exhibits spasm  Neurological: She is alert and oriented to person, place, and time  She displays normal reflexes  No cranial nerve deficit  Coordination normal    Skin: Skin is warm and dry  Capillary refill takes less than 2 seconds  No rash noted  No erythema  No pallor  Psychiatric: She has a normal mood and affect  Her behavior is normal  Judgment and thought content normal        Additional Data:     Lab Results: I have personally reviewed pertinent reports        Results from last 7 days   Lab Units 08/12/19 1916   WBC Thousand/uL 5 02   HEMOGLOBIN g/dL 10 0*   HEMATOCRIT % 31 0*   PLATELETS Thousands/uL 256   NEUTROS PCT % 56   LYMPHS PCT % 29   MONOS PCT % 10   EOS PCT % 4     Results from last 7 days   Lab Units 08/12/19 1916   POTASSIUM mmol/L 3 6   CHLORIDE mmol/L 103   CO2 mmol/L 33*   BUN mg/dL 15   CREATININE mg/dL 0 88   CALCIUM mg/dL 8 5   ALK PHOS U/L 38*   ALT U/L 38   AST U/L 25     Results from last 7 days   Lab Units 08/12/19  1916   INR  1 10       Imaging: I have personally reviewed pertinent reports  Ct Head Without Contrast    Result Date: 8/12/2019  Narrative: CT BRAIN - WITHOUT CONTRAST INDICATION:   Headache  COMPARISON:  12/11/2015  TECHNIQUE:  CT examination of the brain was performed  In addition to axial images, coronal 2D reformatted images were created and submitted for interpretation  Radiation dose length product (DLP) for this visit:  911 72 mGy-cm   This examination, like all CT scans performed in the [facility], was performed utilizing techniques to minimize radiation dose exposure, including the use of iterative  reconstruction and automated exposure control  IMAGE QUALITY:  Diagnostic  FINDINGS: PARENCHYMA:  No intracranial mass, mass effect or midline shift  No CT signs of acute infarction  No acute parenchymal hemorrhage  VENTRICLES AND EXTRA-AXIAL SPACES:  Normal for the patient's age  VISUALIZED ORBITS AND PARANASAL SINUSES:  Unremarkable  CALVARIUM AND EXTRACRANIAL SOFT TISSUES:  Normal      Impression: No acute intracranial abnormality  Workstation performed: [id number]     Ct Abdomen Pelvis With Contrast    Result Date: 8/6/2019  Narrative: CT ABDOMEN AND PELVIS WITH IV CONTRAST INDICATION: Abdominal pain, rectal bleeding and diarrhea  COMPARISON:  2/8/2019  TECHNIQUE:  CT examination of the abdomen and pelvis was performed   Axial, sagittal, and coronal 2D reformatted images were created from the source data and submitted for interpretation  Radiation dose length product (DLP) for this visit:  627 39 mGy-cm   This examination, like all CT scans performed in the [facility], was performed utilizing techniques to minimize radiation dose exposure, including the use of iterative  reconstruction and automated exposure control  IV Contrast:  100 mL of iohexol (OMNIPAQUE) Enteric Contrast:  Enteric contrast was not administered  FINDINGS: ABDOMEN LOWER CHEST:  Clear lung bases  LIVER/BILIARY TREE:  Stable hypoattenuating subcentimeter lesions throughout the liver, likely to represent cysts  GALLBLADDER:  No calcified gallstones  No pericholecystic inflammatory change  SPLEEN:  Unremarkable  PANCREAS:  Unremarkable  ADRENAL GLANDS:  Unremarkable  KIDNEYS/URETERS:  No pyelonephritis or obstructive uropathy  STOMACH AND BOWEL:  Mild wall thickening throughout the gastric fundus, similar appearance to the prior exam possibly due to underdistention versus mild chronic gastritis  Mild wall thickening in the distal rectum, not well evaluated in the absence of contrast distention  Mild wall thickening throughout the left colon and the distal transverse colon  Stool and gas distending the right colon  Fecal material in the distal  ileum suggesting a patulous ileocecal valve or mild ileus  No evidence of diverticulitis or bowel obstruction  APPENDIX:  Normal appendix  ABDOMINOPELVIC CAVITY:  No ascites or free intraperitoneal air  No lymphadenopathy  VESSELS:  No abdominal aortic aneurysm or dissection  PELVIS REPRODUCTIVE ORGANS:  Prior hysterectomy  URINARY BLADDER:  No wall thickening or mass  ABDOMINAL WALL/INGUINAL REGIONS:  Unremarkable  OSSEOUS STRUCTURES: Chronic L5 fracture  No acute fracture or destructive osseous lesion  Impression: Inflammatory or infectious colitis involving the left colon and rectum  Significant amount stool in the right colon and distal ileum may represent constipation    No evidence of bowel obstruction or mass  Workstation performed: [id number]       EKG, Pathology, and Other Studies Reviewed on Admission:   · EKG:  Reviewed normal sinus rhythm    Epic / Care Everywhere Records Reviewed: Yes     ** Please Note: This note has been constructed using a voice recognition system   **

## 2019-08-13 NOTE — DISCHARGE SUMMARY
Discharge- Jarrell Lino 1955, 71 y o  female MRN: [id number]    Unit/Bed#: 408-01 Encounter: [id number]    Primary Care Provider: Gilmer Bennett MD   Date and time admitted to hospital: 8/12/2019  6:44 PM        * Chest pain  Assessment & Plan  Atypical chest pain-not relieved with nitro  EKG normal sinus rhythm  Troponin negative x 3 sets  Patient denied recurrent chest pain  The patient was noted to be hypotensive with a blood pressure of 80/64  This was likely due to the nitroglycerin paste  This resolved with IV fluids  Cardiology consulted and felt the patient's chest pain was atypical and given an negative echocardiogram the patient can have an outpatient stress test    The patient was discharged home with an order for an outpatient stress test and instructed to follow-up with Cardiology regarding the results  Esophageal reflux disease  Assessment & Plan  Continue prior to admission PPI    Postoperative hypothyroidism  Assessment & Plan  TSH elevated at 48 748  The patient states she is compliant with her levothyroxine  The patient's levothyroxine was increased from 137 mcg to 150 mcg on discharge  Outpatient follow-up with PCP for repeat TSH in 4-6 weeks  Elevated TSH  Assessment & Plan  TSH elevated at 48 748  The patient states she is compliant with her levothyroxine  The patient's levothyroxine was increased from 137 mcg to 150 mcg on discharge  Outpatient follow-up with PCP for repeat TSH in 4-6 weeks  Chronic pain  Assessment & Plan  Continue prior to admission medications  Continue outpatient follow-up with pain management  Hypokalemia  Assessment & Plan  The patients potassium was noted to be 3 2  The patient received PO potassium supplementation         Discharging Physician / Practitioner: Kaycee Tubbs PA-C  PCP: Gilmer Bennett MD  Admission Date:   Admission Orders (From admission, onward)     Ordered        08/12/19 2106  Place in Observation (expected length of stay for this patient is less than two midnights)  Once                   Discharge Date: 08/13/19    Resolved Problems  Date Reviewed: 8/13/2019    None          Consultations During Hospital Stay:  · Cardiology    Procedures Performed:   · none    Significant Findings / Test Results:   Xr Chest 2 Views    Result Date: 8/13/2019  Impression: No acute cardiopulmonary disease findings or significant change from prior  Workstation performed: [id number]     Ct Head Without Contrast    Result Date: 8/12/2019  · Impression: No acute intracranial abnormality  Workstation performed: [id number]     Troponin <0 02    Incidental Findings:   · none     Test Results Pending at Discharge (will require follow up):   · none     Outpatient Tests Requested:  · Stress test-outpatient follow-up with Cardiology regarding results    Complications:  none    Reason for Admission: chest pain    Hospital Course:     Oliver Solitario is a 71 y o  female patient who originally presented to the hospital on 8/12/2019 due to sternal chest pain neck pain trapezius muscle bilateral pain  Patient has a significant past medical history including several musculoskeletal issues including systemic sclerosis, scleroderma, fibromyalgia  Patient has been taking chronic pain medicines for some period time she also reports trying to decrease her medications over the past couple weeks and since then has been experiencing additional pain  All additional past medical history includes a nauseous disease osteopenia dyspneic on exertion chronic fatigue and ADHD  Patient admits to neck pain upper bilateral trapezius and midsternal chest pain/tightness, patient denies headache vision changes trauma, denies shortness of breath denies abdominal pain denies nausea vomiting diarrhea constipation, denies dysuria urgency or frequency, denies melena or hematochezia      Please see above list of diagnoses and related plan for additional information       Condition at Discharge: good     Discharge Day Visit / Exam:     Subjective:    Vitals: Blood Pressure: 108/55 (08/13/19 1516)  Pulse: 71 (08/13/19 1516)  Temperature: 98 °F (36 7 °C) (08/13/19 1516)  Temp Source: Oral (08/13/19 1516)  Respirations: 16 (08/13/19 1516)  Height: 5' 1" (154 9 cm) (08/12/19 2251)  Weight - Scale: 48 2 kg (106 lb 4 2 oz) (08/12/19 2251)  SpO2: 100 % (08/13/19 1516)  Exam:   Physical Exam   Constitutional: She is oriented to person, place, and time  She appears well-developed and well-nourished  HENT:   Head: Normocephalic and atraumatic  Cardiovascular: Normal rate, regular rhythm and normal heart sounds  Pulmonary/Chest: Effort normal and breath sounds normal  She has no wheezes  She has no rales  Abdominal: Soft  Bowel sounds are normal  There is no tenderness  There is no guarding  Neurological: She is alert and oriented to person, place, and time  No cranial nerve deficit  Skin: Skin is warm and dry  Nursing note and vitals reviewed  Discharge instructions/Information to patient and family:   See after visit summary for information provided to patient and family  Provisions for Follow-Up Care:  See after visit summary for information related to follow-up care and any pertinent home health orders  Disposition:     Home    For Discharges to [facility]:   · Not Applicable to this Patient - Not Applicable to this Patient    Planned Readmission: no     Discharge Statement:  I spent 20 minutes discharging the patient  This time was spent on the day of discharge  I had direct contact with the patient on the day of discharge  Greater than 50% of the total time was spent examining patient, answering all patient questions, arranging and discussing plan of care with patient as well as directly providing post-discharge instructions  Additional time then spent on discharge activities      Discharge Medications:  See after visit summary for reconciled discharge medications provided to patient and family        ** Please Note: This note has been constructed using a voice recognition system **

## 2019-08-13 NOTE — SOCIAL WORK
Discussed with patient preferences on discharge;understanding how to manage health at home; purpose of taking medications; importance of follow up care/appointments; and symptoms to watch out for once discharged home  Pt is being dc'd home on this date with outpatient follow up

## 2019-08-13 NOTE — NURSING NOTE
Instructions reviewed with patient, who verbalized understanding  Patient in no acute distress at time of d/c

## 2019-08-13 NOTE — ED NOTES
Patient transported to CT via [facility] \[facility] [place] [facility]\""  08/12/19 4349

## 2019-08-13 NOTE — ASSESSMENT & PLAN NOTE
TSH elevated at 48 748  The patient states she is compliant with her levothyroxine  The patient's levothyroxine was increased from 137 mcg to 150 mcg on discharge  Outpatient follow-up with PCP for repeat TSH in 4-6 weeks

## 2019-08-13 NOTE — CONSULTS
Consultation - Cardiology   Carrie Hernandez 71 y o  female MRN: [id number]  Unit/Bed#: 633-99 Encounter: [id number]  19  11:54 AM      Assessment:  Principal Problem:    Chest pain  Active Problems:    Esophageal reflux disease    Postoperative hypothyroidism    Chronic pain    Elevated TSH    Hypokalemia    Chief Complaint   Patient presents with    Chest Pain     Pt states 1 hour ago she started with chest pain  pt took 2 full strenght ASA and 5mg of oxymorphone po to help pain  pt recieved 1 nitro form EMS with no change in pain  also c/o headache x3 days      Admitting diagnosis:  Chest pain [R07 9]      Impression:    76-year-old with possibly hyperlipidemia, no other cardiac risk factors, premature coronary artery disease in her mother who was also smoker, apparently underwent coronary angiography or 10 years ago that did not show any significant obstructive CAD  Plan:    Chest pain:  Atypical, if echocardiogram is unremarkable, can perform an outpatient treadmill stress test   She is able to exercise and has had a stress test before  Hypotension:  Likely secondary to nitroglycerin paste  She is asymptomatic, remote nitroglycerin paste and give fluids  Family history of premature vascular disease:  She is on a statin this can be continued      If echo is unremarkable, she can be discharged with outpatient stress test     History of Present Illness   Physician Requesting Consult: Ria Perrin MD   Reason for Consult / Principal Problem:  Chest pain  HPI: Carrie Hernandez is a 76-year-old without a history of hypertension or diabetes, probably mild dyslipidemia-currently on statin-more for her family history of premature coronary artery disease, family history of premature coronary artery disease-mom in her 46s who was a smoker, dad  in his 62s of'massive heart attack', both brothers with coronary artery disease although not premature in onset-both nonsmokers, because of the family history, underwent coronary angiography over 10 years ago at [facility] apparently did not show any significant obstructive CAD but because of the family history was initiated on a statin  She also has scleroderma/systemic sclerosis, fibromyalgia, about a 1 pack year smoking history and currently hypothyroidism that is being treated  She was admitted with an episode of chest pains-burning chest discomfort in the sternal region, slightly to the left, not associated with exertion, not relieved by rest, lasted about 2 hours, improved with nitroglycerin when she came to the ER  The day before, had an episode of vomiting and severe retching  ECG showed sinus rhythm without any acute ischemic changes  Troponins were negative  She has been hemodynamically stable except for hypotension this morning-while on nitroglycerin paste  Inpatient consult to Cardiology  Consult performed by: Nevin Cardona MD  Consult ordered by: Kristen Swift PA-C          Review of Systems:  feels ill and fatigued  Review of Systems   Constitutional: Negative  HENT: Negative  Eyes: Negative  Respiratory: Negative  Cardiovascular: Positive for chest pain  Negative for palpitations and leg swelling  Endocrine: Negative  Musculoskeletal: Negative  Neurological: Negative  Hematological: Negative  Historical Information   Past Medical History:   Diagnosis Date    ADHD     Cancer ([facility] [address] )     non-Hodg      Cellulitis of foot     Last assessed 10/24/16    Change in mental status     Last assessed 12/01/15    Chronic fatigue     PLAZA (dyspnea on exertion)     Last assessed 12/01/15    Fibromyalgia     Folliculitis     Last assessed 10/06/14    GERD (gastroesophageal reflux disease)     Osteopenia     Raynaud's disease     [facility]     Systemic sclerosis (HCC)      Past Surgical History:   Procedure Laterality Date    BACK SURGERY      BLADDER SURGERY      BONE MARROW BIOPSY      CARDIAC CATHETERIZATION      HEMORRHOID SURGERY      HYSTERECTOMY      KNEE SURGERY      NASAL SEPTUM SURGERY      ROTATOR CUFF REPAIR      THYROIDECTOMY N/A 10/4/2018    Procedure: TOTAL THYROIDECTOMY;  Surgeon: Gibson Geiger MD;  Location: [facility];  Service: Surgical Oncology    TONSILLECTOMY       Social History     Substance and Sexual Activity   Alcohol Use Never    Alcohol/week: 0 0 standard drinks    Frequency: Never    Drinks per session: Patient refused    Binge frequency: Never     Social History     Substance and Sexual Activity   Drug Use Never     Social History     Tobacco Use   Smoking Status Former Smoker   Smokeless Tobacco Never Used   Tobacco Comment    quit 40 years ago      Family History:   Family History   Problem Relation Age of Onset    Arthritis Mother     Diabetes Mother     Coronary artery disease Father     Arthritis Sister     Asthma Sister     Crohn's disease Sister     Other Brother         myocardial ischemia     No family history of premature CAD or Sudden Cardiac Death    Meds/Allergies     Current Facility-Administered Medications:     amphetamine-dextroamphetamine (ADDERALL) tablet 20 mg, 20 mg, Oral, BID (AM & Afternoon), Vera Y Swank, CRNP    aspirin (ECOTRIN LOW STRENGTH) EC tablet 81 mg, 81 mg, Oral, Daily, Vera Y Swank, CRNP, 81 mg at 08/13/19 9438    buPROPion (WELLBUTRIN XL) 24 hr tablet 300 mg, 300 mg, Oral, Daily, Vera Y Swank, CRNP, 300 mg at 08/13/19 0831    chlordiazePOXIDE (LIBRIUM) capsule 5 mg, 5 mg, Oral, BID, Vera Y Swank, CRNP, 5 mg at 08/13/19 2897    enoxaparin (LOVENOX) subcutaneous injection 40 mg, 40 mg, Subcutaneous, Daily, Vera Y Swank, CRNP, 40 mg at 08/13/19 0831    glycerin-hypromellose- (ARTIFICIAL TEARS) ophthalmic solution 1 drop, 1 drop, Both Eyes, Q12H [facility], Nicola Romo MD, 1 drop at 08/13/19 5229    hydroxychloroquine (PLAQUENIL) tablet 200 mg, 200 mg, Oral, BID With Meals, Migue Yarbrough, CRNP, 200 mg at 08/13/19 0823    [START ON 8/14/2019] levothyroxine tablet 150 mcg, 150 mcg, Oral, Early Morning, Kristen Swift PA-C    morphine (MSIR) IR tablet 15 mg, 15 mg, Oral, Q6H PRN, Vera Y Swank, CRNP    potassium chloride (K-DUR,KLOR-CON) CR tablet 40 mEq, 40 mEq, Oral, BID With Meals, Kwasi Valentin DO, 40 mEq at 08/13/19 0831    pravastatin (PRAVACHOL) tablet 80 mg, 80 mg, Oral, Daily With Dinner, Vera Y Swank, CRNP    predniSONE tablet 5 mg, 5 mg, Oral, Daily, Vera Y Swank, CRNP, 5 mg at 08/13/19 3872    pregabalin (LYRICA) capsule 75 mg, 75 mg, Oral, BID, Vera Y Swank, CRNP, 75 mg at 08/13/19 0831    sodium chloride 0 9 % bolus 500 mL, 500 mL, Intravenous, Once, Kristen Swift PA-C, Last Rate: 250 mL/hr at 08/13/19 1153, 500 mL at 08/13/19 1153    traZODone (DESYREL) tablet 150 mg, 150 mg, Oral, HS, Vera Y Swank, CRNP, 150 mg at 08/12/19 5008  Allergies   Allergen Reactions    Duloxetine Other (See Comments)     Mental psychosis    Revatio [Sildenafil] Other (See Comments)     mental status changes    Savella [Milnacipran] Other (See Comments)     Feeling out of it    Sulfamethoxazole-Trimethoprim Rash and GI Intolerance    Tedizolid Rash       Objective   Vitals: Blood pressure (!) 80/45, pulse 76, temperature 97 5 °F (36 4 °C), temperature source Temporal, resp  rate 18, height 5' 1" (1 549 m), weight 48 2 kg (106 lb 4 2 oz), SpO2 97 %  , Body mass index is 20 08 kg/m² ,   Orthostatic Blood Pressures      Most Recent Value   Blood Pressure  (!) 80/45 filed at 08/13/2019 1055   Patient Position - Orthostatic VS  Lying filed at 08/13/2019 1055            Intake/Output Summary (Last 24 hours) at 8/13/2019 1154  Last data filed at 8/12/2019 3167  Gross per 24 hour   Intake    Output 100 ml   Net -100 ml       Weight (last 2 days)     Date/Time   Weight    08/12/19 2251   48 2 (106 26)    08/12/19 1853   50 9 (112 21)              Invasive Devices     Peripheral Intravenous Line            Peripheral IV 08/12/19 Right Antecubital less than 1 day                  Physical Exam:  GEN: Mica Love appears well, alert and oriented x 3, pleasant and cooperative   HEENT: pupils equal, round, and reactive to light; extraocular muscles intact  NECK: supple, no carotid bruits or JVD  HEART: regular rhythm, normal S1 and S2, no murmurs, no clicks, gallops or rubs   LUNGS: clear to auscultation bilaterally; no wheezes or rhonchi, no rales  ABDOMEN/GI: normal bowel sounds, soft, no tenderness, no distention  EXTREMITIES/Musculoskeltal: peripheral pulses normal; no clubbing, cyanosis, or edema  NEURO: no focal motor findings   SKIN: normal without suspicious lesions on exposed skin      Lab Results:   Admission on 08/12/2019   Component Date Value    WBC 08/12/2019 5 02     RBC 08/12/2019 3 43*    Hemoglobin 08/12/2019 10 0*    Hematocrit 08/12/2019 31 0*    MCV 08/12/2019 90     MCH 08/12/2019 29 2     MCHC 08/12/2019 32 3     RDW 08/12/2019 16 1*    MPV 08/12/2019 10 0     Platelets 59/66/9068 256     nRBC 08/12/2019 0     Neutrophils Relative 08/12/2019 56     Immat GRANS % 08/12/2019 0     Lymphocytes Relative 08/12/2019 29     Monocytes Relative 08/12/2019 10     Eosinophils Relative 08/12/2019 4     Basophils Relative 08/12/2019 1     Neutrophils Absolute 08/12/2019 2 82     Immature Grans Absolute 08/12/2019 0 02     Lymphocytes Absolute 08/12/2019 1 46     Monocytes Absolute 08/12/2019 0 49     Eosinophils Absolute 08/12/2019 0 19     Basophils Absolute 08/12/2019 0 04     Sodium 08/12/2019 140     Potassium 08/12/2019 3 6     Chloride 08/12/2019 103     CO2 08/12/2019 33*    ANION GAP 08/12/2019 4     BUN 08/12/2019 15     Creatinine 08/12/2019 0 88     Glucose 08/12/2019 88     Calcium 08/12/2019 8 5     AST 08/12/2019 25     ALT 08/12/2019 38     Alkaline Phosphatase 08/12/2019 38*    Total Protein 08/12/2019 5 7*    Albumin 08/12/2019 3  7     Total Bilirubin 08/12/2019 0 40     eGFR 08/12/2019 69     Magnesium 08/12/2019 2 0     Protime 08/12/2019 14 2     INR 08/12/2019 1 10     PTT 08/12/2019 27     Lipase 08/12/2019 110     Color, UA 08/13/2019 Yellow     Clarity, UA 08/13/2019 Clear     Specific Gravity, UA 08/13/2019 1 015     pH, UA 08/13/2019 6 5     Leukocytes, UA 08/13/2019 Negative     Nitrite, UA 08/13/2019 Negative     Protein, UA 08/13/2019 Negative     Glucose, UA 08/13/2019 Negative     Ketones, UA 08/13/2019 Negative     Urobilinogen, UA 08/13/2019 0 2     Bilirubin, UA 08/13/2019 Negative     Blood, UA 08/13/2019 Negative     TSH 3RD GENERATON 08/12/2019 48 748*    Platelets 20/22/2541 225     MPV 08/13/2019 10 0     Troponin I 08/13/2019 <0 02     Troponin I 08/13/2019 <0 02     Sodium 08/13/2019 142     Potassium 08/13/2019 3 2*    Chloride 08/13/2019 105     CO2 08/13/2019 34*    ANION GAP 08/13/2019 3*    BUN 08/13/2019 14     Creatinine 08/13/2019 0 83     Glucose 08/13/2019 99     Calcium 08/13/2019 8 1*    eGFR 08/13/2019 74     Magnesium 08/13/2019 2 1     Phosphorus 08/13/2019 4 0     WBC 08/13/2019 4 21*    RBC 08/13/2019 3 25*    Hemoglobin 08/13/2019 9 4*    Hematocrit 08/13/2019 29 7*    MCV 08/13/2019 91     MCH 08/13/2019 28 9     MCHC 08/13/2019 31 6     RDW 08/13/2019 16 2*    Platelets 95/08/1058 216     MPV 08/13/2019 9 8     Cholesterol 08/13/2019 141     Triglycerides 08/13/2019 45     HDL, Direct 08/13/2019 68*    LDL Calculated 08/13/2019 64     Troponin I 08/13/2019 <0 02          Imaging: I have personally reviewed pertinent reports  EKG/Telemetry:  No events    Counseling / Coordination of Care  Total floor / unit time spent today 35 minutes  Greater than 50% of total time was spent with the patient and / or family counseling and / or coordination of care  We will continue to follow with the patient throughout their hospitalization   Thank you for allowing us to participate in their care     Ryan Mccullough MD

## 2019-08-13 NOTE — ASSESSMENT & PLAN NOTE
Atypical chest pain-not relieved with nitro  EKG normal sinus rhythm  Troponin negative x 3 sets  Patient denied recurrent chest pain  The patient was noted to be hypotensive with a blood pressure of 80/64  This was likely due to the nitroglycerin paste  This resolved with IV fluids  Cardiology consulted and felt the patient's chest pain was atypical and given an negative echocardiogram the patient can have an outpatient stress test    The patient was discharged home with an order for an outpatient stress test and instructed to follow-up with Cardiology regarding the results

## 2019-08-13 NOTE — PLAN OF CARE
Problem: Potential for Falls  Goal: Patient will remain free of falls  Description  INTERVENTIONS:  - Assess patient frequently for physical needs  -  Identify cognitive and physical deficits and behaviors that affect risk of falls    -  [place] fall precautions as indicated by assessment    - Educate patient/family on patient safety including physical limitations  - Instruct patient to call for assistance with activity based on assessment  - Modify environment to reduce risk of injury  - Consider OT/PT consult to assist with strengthening/mobility   Outcome: Adequate for Discharge     Problem: PAIN - ADULT  Goal: Verbalizes/displays adequate comfort level or baseline comfort level  Description  Interventions:  - Encourage patient to monitor pain and request assistance  - Assess pain using appropriate pain scale (0-10 pain scale)  - Administer analgesics based on type and severity of pain and evaluate response  - Implement non-pharmacological measures as appropriate and evaluate response  - Notify physician/advanced practitioner if interventions unsuccessful or patient reports new pain   Outcome: Adequate for Discharge     Problem: INFECTION - ADULT  Goal: Absence or prevention of progression during hospitalization  Description  INTERVENTIONS:  - Assess and monitor for signs and symptoms of infection  - Monitor lab/diagnostic results  - Monitor all insertion sites, i e  indwelling lines  - [place] appropriate cooling/warming therapies per order  - Administer medications as ordered  - Instruct and encourage patient and family to use good hand hygiene technique   Outcome: Adequate for Discharge     Problem: SAFETY ADULT  Goal: Maintain or return to baseline ADL function  Description  INTERVENTIONS:  -  Assess patient's ability to carry out ADLs; assess patient's baseline for ADL function and identify physical deficits which impact ability to perform ADLs (bathing, care of mouth/teeth, toileting, grooming, dressing, etc )  - Assess/evaluate cause of self-care deficits   - Assess range of motion  - Assess patient's mobility; develop plan if impaired  - Assess patient's need for assistive devices and provide as appropriate  - Encourage maximum independence but intervene and supervise when necessary  ¯ Involve family in performance of ADLs  ¯ Assess for home care needs following discharge   ¯ Request OT consult to assist with ADL evaluation and planning for discharge  ¯ Provide patient education as appropriate  Outcome: Adequate for Discharge  Goal: Maintain or return mobility status to optimal level  Description  INTERVENTIONS:  - Assess patient's baseline mobility status (ambulation, transfers, stairs, etc )    - Identify cognitive and physical deficits and behaviors that affect mobility  - Identify mobility aids required to assist with transfers and/or ambulation (none)  - [place] fall precautions as indicated by assessment  - Record patient progress and toleration of activity level on Mobility SBAR; progress patient to next Phase/Stage  - Instruct patient to call for assistance with activity based on assessment  - Request Rehabilitation consult to assist with strengthening/weightbearing, etc    Outcome: Adequate for Discharge     Problem: DISCHARGE PLANNING  Goal: Discharge to home or other facility with appropriate resources  Description  INTERVENTIONS:  - Identify barriers to discharge w/patient and caregiver  - Arrange for needed discharge resources and transportation as appropriate  - Identify discharge learning needs (meds)  - Refer to Case Management Department for coordinating discharge planning if the patient needs post-hospital services based on physician/advanced practitioner order or complex needs related to functional status, cognitive ability, or social support system   Outcome: Adequate for Discharge     Problem: Knowledge Deficit  Goal: Patient/family/caregiver demonstrates understanding of disease process, treatment plan, medications, and discharge instructions  Description  Complete learning assessment and assess knowledge base  Interventions:  - Provide teaching at level of understanding  - Provide teaching via preferred learning methods  Outcome: Adequate for Discharge     Problem: CARDIOVASCULAR - ADULT  Goal: Maintains optimal cardiac output and hemodynamic stability  Description  INTERVENTIONS:  - Monitor I/O, vital signs and rhythm  - Monitor for S/S and trends of decreased cardiac output i e  bleeding, hypotension  - Administer and titrate ordered vasoactive medications to optimize hemodynamic stability  - Assess quality of pulses, skin color and temperature  - Assess for signs of decreased coronary artery perfusion - ex   Angina  - Instruct patient to report change in severity of symptoms  Outcome: Adequate for Discharge  Goal: Absence of cardiac dysrhythmias or at baseline rhythm  Description  INTERVENTIONS:  - Continuous cardiac monitoring, monitor vital signs, obtain 12 lead EKG if indicated  - Administer antiarrhythmic and heart rate control medications as ordered  - Monitor electrolytes and administer replacement therapy as ordered  Outcome: Adequate for Discharge

## 2019-08-13 NOTE — UTILIZATION REVIEW
Initial Clinical Review    Admission: Date/Time/Statement:   OBS  ORDER     8/12 @  2106     Orders Placed This Encounter   Procedures    Place in Observation (expected length of stay for this patient is less than two midnights)     Standing Status:   Standing     Number of Occurrences:   1     Order Specific Question:   Admitting Physician     Answer:   Marisabel Mas [[id number]]     Order Specific Question:   Level of Care     Answer:   Med Surg [[id number]]     ED Arrival Information     Expected Arrival Acuity Means of Arrival Escorted By Service Admission Type    8/12/2019 8/12/2019 18:43 Urgent [place] [id number] [facility] Hospitalist Urgent    Arrival Complaint    Chest Pain        Chief Complaint   Patient presents with    Chest Pain     Pt states 1 hour ago she started with chest pain  pt took 2 full strenght ASA and 5mg of oxymorphone po to help pain  pt recieved 1 nitro form EMS with no change in pain  also c/o headache x3 days      Assessment/Plan: Chest pain  Assessment & Plan  Atypical chest pain-not relieved with nitro  EKG normal sinus rhythm  Trend troponin with EKGs for minimum of 3  Admit to [facility] with telemetry  Monitor per protocol  Cardiac stress diet ordered  Echo ordered     Chronic pain  Assessment & Plan  Continue prior to admission medications  Provide supportive care     Postoperative hypothyroidism  Assessment & Plan  Continue prior to admission levothyroxine  TSH ordered     Esophageal reflux disease  Assessment & Plan  Continue prior to admission PPI  Anticipated Length of Stay:  Patient will be admitted on an Observation basis with an anticipated length of stay of  Less than 2 midnights  Justification for Hospital Stay:  Chest pain -atypical     Joanne Mcdowell is a 71 y o  female who presented to the emergency room for evaluation of mid sternal chest pain neck pain trapezius muscle bilateral pain    Patient has a significant past medical history including several musculoskeletal issues including systemic sclerosis, scleroderma, fibromyalgia  Patient has been taking chronic pain medicines for some period time she also reports trying to decrease her medications over the past couple weeks and since then has been experiencing additional pain  All additional past medical history includes a nauseous disease osteopenia dyspneic on exertion chronic fatigue and ADHD  Patient admits to neck pain upper bilateral trapezius and midsternal chest pain/tightness, patient denies headache vision changes trauma, denies shortness of breath denies abdominal pain denies nausea vomiting diarrhea constipation, denies dysuria urgency or frequency, denies melena or hematochezia  Images and labs were collected emergency room-see below    Patient was admitted by emergency room doctor          ED Triage Vitals [08/12/19 1853]   Temperature Pulse Respirations Blood Pressure SpO2   99 3 °F (37 4 °C) 76 18 116/68 98 %      Temp Source Heart Rate Source Patient Position - Orthostatic VS BP Location FiO2 (%)   Temporal Monitor Sitting Right arm --      Pain Score       2        Wt Readings from Last 1 Encounters:   08/12/19 48 2 kg (106 lb 4 2 oz)     Additional Vital Signs:   /13/19 07:45:13  97 5 °F (36 4 °C)  66  18  80/48Abnormal   59  96 %       08/13/19 0126              None (Room air)     08/13/19 0124    68  18      93 %       08/12/19 22:51:20  97 8 °F (36 6 °C)  62  18  123/71  88  95 %       08/12/19 2200    69  18  102/55    94 %  None (Room air)  Lying   08/12/19 2130    74  18  115/56    93 %  None (Room air)  Lying   08/12/19 2030    69  18  104/57    97 %  None (Room air)  Lying   08/12/19 1853  99 3 °F (37 4 °C)  76  18  116/68    98 %  None (Room air)  Sitting         Pertinent Labs/Diagnostic Test Results:   Results from last 7 days   Lab Units 08/13/19  0354 08/13/19  0037 08/12/19  1916 08/06/19  2141   WBC Thousand/uL 4 21*  --  5 02 9 08   HEMOGLOBIN g/dL 9 4*  --  10 0* 10 7*   HEMATOCRIT % 29 7*  --  31 0* 33 6*   PLATELETS Thousands/uL 216 225 256 227   NEUTROS ABS Thousands/µL  --   --  2 82 6 59         Results from last 7 days   Lab Units 08/13/19  0354 08/12/19 1916 08/06/19  2141   SODIUM mmol/L 142 140 138   POTASSIUM mmol/L 3 2* 3 6 4 2   CHLORIDE mmol/L 105 103 100   CO2 mmol/L 34* 33* 32   ANION GAP mmol/L 3* 4 6   BUN mg/dL 14 15 19   CREATININE mg/dL 0 83 0 88 1 02   EGFR ml/min/1 73sq m 74 69 58   CALCIUM mg/dL 8 1* 8 5 8 8   MAGNESIUM mg/dL 2 1 2 0 2 1   PHOSPHORUS mg/dL 4 0  --   --      Results from last 7 days   Lab Units 08/12/19 1916 08/06/19  2141   AST U/L 25 25   ALT U/L 38 31   ALK PHOS U/L 38* 51   TOTAL PROTEIN g/dL 5 7* 6 1*   ALBUMIN g/dL 3 7 4 1   TOTAL BILIRUBIN mg/dL 0 40 0 50         Results from last 7 days   Lab Units 08/13/19  0354 08/12/19 1916 08/06/19  2141   GLUCOSE RANDOM mg/dL 99 88 79           Results from last 7 days   Lab Units 08/06/19  2141   CK TOTAL U/L 530*   CK MB INDEX % 2 0   CK MB ng/mL 10 7*     Results from last 7 days   Lab Units 08/13/19  0943 08/13/19  0354 08/13/19  0037   TROPONIN I ng/mL <0 02 <0 02 <0 02         Results from last 7 days   Lab Units 08/12/19 1916 08/06/19  2141   PROTIME seconds 14 2 14 2   INR  1 10 1 10   PTT seconds 27 27     Results from last 7 days   Lab Units 08/12/19  2300   TSH 3RD GENERATON uIU/mL 48 748*         Results from last 7 days   Lab Units 08/06/19  2141   LACTIC ACID mmol/L 0 5           Results from last 7 days   Lab Units 08/12/19 1916 08/06/19  2141   LIPASE u/L 110 103             Results from last 7 days   Lab Units 08/13/19  0044   CLARITY UA  Clear   COLOR UA  Yellow   SPEC GRAV UA  1 015   PH UA  6 5   GLUCOSE UA mg/dl Negative   KETONES UA mg/dl Negative   BLOOD UA  Negative   PROTEIN UA mg/dl Negative   NITRITE UA  Negative   BILIRUBIN UA  Negative   UROBILINOGEN UA E U /dl 0 2   LEUKOCYTES UA  Negative         ED Treatment:   Medication Administration from 08/12/2019 1843 to 08/12/2019 2233       Date/Time Order Dose Route Action Action by Comments     08/12/2019 1911 ondansetron (ZOFRAN) injection 4 mg 4 mg Intravenous Given Satinder Rivera RN      08/12/2019 1913 morphine (PF) 4 mg/mL injection 4 mg 4 mg Intravenous Given Satinder Rivera RN      08/12/2019 1910 nitroglycerin (NITRO-BID) 2 % TD ointment 1 inch 1 inch Topical Given Satinder Rivera RN      08/12/2019 1915 aspirin tablet 325 mg 325 mg Oral Given Satinder Rivera RN      08/12/2019 2137 ketorolac (TORADOL) injection 15 mg 15 mg Intravenous Given Ene Kenny RN      08/12/2019 2137 LORazepam (ATIVAN) 2 mg/mL injection 1 mg 1 mg Intravenous Given Lillian Cortez RN         Past Medical History:   Diagnosis Date    ADHD     Cancer ([facility] )     non-Hodg      Cellulitis of foot     Last assessed 10/24/16    Change in mental status     Last assessed 12/01/15    Chronic fatigue     PLAZA (dyspnea on exertion)     Last assessed 12/01/15    Fibromyalgia     Folliculitis     Last assessed 10/06/14    GERD (gastroesophageal reflux disease)     Osteopenia     Raynaud's disease     Scleroderma ([facility] )     Systemic sclerosis (HCC)      Present on Admission:   Chronic pain   Esophageal reflux disease   Postoperative hypothyroidism      Admitting Diagnosis: Chest pain [R07 9]  Age/Sex: 71 y o  female  Admission Orders:    Current Facility-Administered Medications:  amphetamine-dextroamphetamine 20 mg Oral BID (AM & Afternoon) JANKI Whitaker   aspirin 81 mg Oral Daily Vera Y Swank, CROLIVER   buPROPion 300 mg Oral Daily Vera Y Swank, CRNP   chlordiazePOXIDE 5 mg Oral BID Vera Y Swank, CROLIVER   enoxaparin 40 mg Subcutaneous Daily Vera Y Swank, JANKI   glycerin-hypromellose- 1 drop Both Eyes Q12H [facility] Bakari Klein MD   hydroxychloroquine 200 mg Oral BID With Meals JANKI Whitaker   levothyroxine 137 mcg Oral Early Morning JANKI Whitaker morphine 15 mg Oral Q6H PRN Virgil Goodwin, JANKI   potassium chloride 40 mEq Oral BID With Meals [employer], DO   pravastatin 80 mg Oral Daily With Dinner JANKI Bustos   predniSONE 5 mg Oral Daily Vera Y Swank, CRNP   pregabalin 75 mg Oral BID Vera Y Swank, CRNP   traZODone 150 mg Oral HS JANKI Bustos       IP CONSULT TO CASE MANAGEMENT  IP CONSULT TO CARDIOLOGY     Tele  2 DE    Network Utilization Review Department  Phone: [phone]; Fax [phone]  [email or web address]  ATTENTION: Please call with any questions or concerns to [phone]  and carefully listen to the prompts so that you are directed to the right person  Send all requests for admission clinical reviews, approved or denied determinations and any other requests to fax [phone]   All voicemails are confidential

## 2019-08-13 NOTE — RESPIRATORY THERAPY NOTE
RT Protocol Note  Nimesh Wayne 71 y o  female MRN: [id number]  Unit/Bed#: 408-01 Encounter: [id number]    Assessment    Principal Problem:    Chest pain  Active Problems:    Esophageal reflux disease    Postoperative hypothyroidism    Chronic pain      Home Pulmonary Medications:  None       Past Medical History:   Diagnosis Date    ADHD     Cancer ([facility] )     non-Hodg      Cellulitis of foot     Last assessed 10/24/16    Change in mental status     Last assessed 12/01/15    Chronic fatigue     PLAZA (dyspnea on exertion)     Last assessed 12/01/15    Fibromyalgia     Folliculitis     Last assessed 10/06/14    GERD (gastroesophageal reflux disease)     Osteopenia     Raynaud's disease     Scleroderma ([place] [address] )     Systemic sclerosis ([facility])      Social History     Socioeconomic History    Marital status:      Spouse name: None    Number of children: None    Years of education: None    Highest education level: None   Occupational History    None   Social Needs    Financial resource strain: None    Food insecurity:     Worry: None     Inability: None    Transportation needs:     Medical: None     Non-medical: None   Tobacco Use    Smoking status: Former Smoker    Smokeless tobacco: Never Used    Tobacco comment: quit 40 years ago    Substance and Sexual Activity    Alcohol use: Never     Alcohol/week: 0 0 standard drinks     Frequency: Never     Drinks per session: Patient refused     Binge frequency: Never    Drug use: Never    Sexual activity: None   Lifestyle    Physical activity:     Days per week: None     Minutes per session: None    Stress: None   Relationships    Social connections:     Talks on phone: None     Gets together: None     Attends Confucianism service: None     Active member of club or organization: None     Attends meetings of clubs or organizations: None     Relationship status: None    Intimate partner violence:     Fear of current or ex partner: None     Emotionally abused: None     Physically abused: None     Forced sexual activity: None   Other Topics Concern    None   Social History Narrative    None       Subjective         Objective    Physical Exam:   Assessment Type: Assess only  General Appearance: Alert, Awake  Respiratory Pattern: Normal  Chest Assessment: Chest expansion symmetrical  Bilateral Breath Sounds: Diminished  Cough: None  O2 Device: none    Vitals:  Blood pressure 123/71, pulse 68, temperature 97 8 °F (36 6 °C), resp  rate 18, height 5' 1" (1 549 m), weight 48 2 kg (106 lb 4 2 oz), SpO2 93 %  Imaging and other studies: I have personally reviewed pertinent reports        O2 Device: none     Plan    Respiratory Plan: Discontinue Protocol

## 2019-08-13 NOTE — PLAN OF CARE
Problem: DISCHARGE PLANNING - CARE MANAGEMENT  Goal: Discharge to post-acute care or home with appropriate resources  Description  INTERVENTIONS:  - Conduct assessment to determine patient/family and health care team treatment goals, and need for post-acute services based on payer coverage, community resources, and patient preferences, and barriers to discharge  - Address psychosocial, clinical, and financial barriers to discharge as identified in assessment in conjunction with the patient/family and health care team  - Arrange appropriate level of post-acute services according to patient's   needs and preference and payer coverage in collaboration with the physician and health care team  - Communicate with and update the patient/family, physician, and health care team regarding progress on the discharge plan  - Arrange appropriate transportation to post-acute venues    Pt's goal is to return   Outcome: Progressing

## 2019-08-13 NOTE — SOCIAL WORK
Chart reviewed by case management, assessment was completd at the bedside, pt is independent, she lives with her mother and cares for her, pt's mother is in a snf for rehab at present, pt lives in a 2 story home with 4 steps outside and 13 steps and a stair glide inside, br on 2nd floor only, pt drives and is independent, pt does wear glasses and she states that she is able  to read her medication labels, pt has a rx plan at [facility] in  [place], pt has a hx of depression, pt has no dme equipment and declines the need for hhc, pt was seen by cardio and she needs an echo, cm will continue to  follow, possible d c today as discussed at care coordination rounds today, pt aidee need outpt stress test upon d/c if the echo today is ok, pt was made aware that she is here as an obs status and obs booklet was given, d/c hjome when stable, family will transport, Patient/caregiver received discharge checklist   Content reviewed  Patient/caregiver encouraged to participate in discharge plan of care prior to discharge home  CM reviewed d/c planning process including the following: identifying help at home, patient preference for d/c planning needs, availability of treatment team to discuss questions or concerns patient and/or family may have regarding understanding medications and recognizing signs and symptoms once discharged  CM also encouraged patient to follow up with all recommended appointments after discharge  Patient advised of importance for patient and family to participate in managing patients medical well being

## 2019-08-14 ENCOUNTER — TRANSITIONAL CARE MANAGEMENT (OUTPATIENT)
Dept: FAMILY MEDICINE CLINIC | Facility: CLINIC | Age: 64
End: 2019-08-14

## 2019-08-15 ENCOUNTER — HOSPITAL ENCOUNTER (OUTPATIENT)
Dept: NUCLEAR MEDICINE | Facility: HOSPITAL | Age: 64
Discharge: HOME | End: 2019-08-15
Payer: MEDICARE

## 2019-08-15 DIAGNOSIS — R07.9 CHEST PAIN: ICD-10-CM

## 2019-08-15 PROCEDURE — 78452 HT MUSCLE IMAGE SPECT MULT: CPT | Performed by: INTERNAL MEDICINE

## 2019-08-15 PROCEDURE — A9502 TC99M TETROFOSMIN: HCPCS

## 2019-08-15 PROCEDURE — 93018 CV STRESS TEST I&R ONLY: CPT | Performed by: INTERNAL MEDICINE

## 2019-08-15 PROCEDURE — 93017 CV STRESS TEST TRACING ONLY: CPT

## 2019-08-15 PROCEDURE — 78452 HT MUSCLE IMAGE SPECT MULT: CPT

## 2019-08-15 PROCEDURE — 93016 CV STRESS TEST SUPVJ ONLY: CPT | Performed by: INTERNAL MEDICINE

## 2019-08-16 LAB
CHEST PAIN STATEMENT: NORMAL
MAX DIASTOLIC BP: 64 MMHG
MAX HEART RATE: 155 BPM
MAX PREDICTED HEART RATE: 155 BPM
MAX. SYSTOLIC BP: 150 MMHG
PROTOCOL NAME: NORMAL
REASON FOR TERMINATION: NORMAL
TARGET HR FORMULA: NORMAL
TEST INDICATION: NORMAL
TIME IN EXERCISE PHASE: NORMAL

## 2019-08-21 ENCOUNTER — OFFICE VISIT (OUTPATIENT)
Dept: FAMILY MEDICINE CLINIC | Facility: CLINIC | Age: 64
End: 2019-08-21
Payer: MEDICARE

## 2019-08-21 VITALS
HEIGHT: 61 IN | BODY MASS INDEX: 19.63 KG/M2 | DIASTOLIC BLOOD PRESSURE: 58 MMHG | RESPIRATION RATE: 16 BRPM | WEIGHT: 104 LBS | HEART RATE: 84 BPM | OXYGEN SATURATION: 98 % | SYSTOLIC BLOOD PRESSURE: 104 MMHG

## 2019-08-21 DIAGNOSIS — K62.3 RECTAL PROLAPSE: Primary | ICD-10-CM

## 2019-08-21 DIAGNOSIS — M35.00 SJOGREN'S SYNDROME, WITH UNSPECIFIED ORGAN INVOLVEMENT (HCC): ICD-10-CM

## 2019-08-21 DIAGNOSIS — C82.90 FOLLICULAR LYMPHOMA, UNSPECIFIED FOLLICULAR LYMPHOMA TYPE, UNSPECIFIED BODY REGION (HCC): ICD-10-CM

## 2019-08-21 DIAGNOSIS — C73 THYROID CANCER (HCC): ICD-10-CM

## 2019-08-21 DIAGNOSIS — M34.0 SCLERODERMA PROGRESSIVE (HCC): ICD-10-CM

## 2019-08-21 PROCEDURE — 99496 TRANSJ CARE MGMT HIGH F2F 7D: CPT | Performed by: FAMILY MEDICINE

## 2019-08-21 RX ORDER — DICYCLOMINE HCL 20 MG
TABLET ORAL
Refills: 3 | COMMUNITY
Start: 2019-07-02 | End: 2019-11-12

## 2019-08-21 RX ORDER — AMOXICILLIN 500 MG/1
CAPSULE ORAL
COMMUNITY
Start: 2019-02-28 | End: 2019-09-30 | Stop reason: ALTCHOICE

## 2019-08-21 RX ORDER — CHLORDIAZEPOXIDE HYDROCHLORIDE AND CLIDINIUM BROMIDE 5; 2.5 MG/1; MG/1
1 CAPSULE ORAL 2 TIMES DAILY
COMMUNITY
Start: 2012-12-07 | End: 2022-04-14 | Stop reason: ALTCHOICE

## 2019-08-21 RX ORDER — PREGABALIN 75 MG/1
CAPSULE ORAL
COMMUNITY
Start: 2019-08-07 | End: 2021-02-04

## 2019-08-21 NOTE — PROGRESS NOTES
Assessment/Plan:     Sjogren's syndrome (HCC)  Stable  Continue current  Thyroid cancer ([facility] )  Stable  Continue current  Postoperative hypothyroidism  Synthroid 150 mcg  Recheck TSH in 4-6 weeks  Diagnoses and all orders for this visit:    Rectal prolapse    Thyroid cancer ([facility] )    Sjogren's syndrome, with unspecified organ involvement ([facility] )    Scleroderma progressive ([place] [address] )    Follicular lymphoma, unspecified follicular lymphoma type, unspecified body region [facility])    Other orders  -     dicyclomine (BENTYL) 20 mg tablet  -     psyllium (METAMUCIL) 0 52 g capsule; Take by mouth  -     chlordiazepoxide-clidinium (LIBRAX) 5-2 5 mg per capsule; Take by mouth  -     amoxicillin (AMOXIL) 500 mg capsule  -     pregabalin (LYRICA) 75 mg capsule         Subjective:     Patient ID: Agnes Ashley is a 71 y o  female  She presented to the ER with rectal bleeding  She was found to have colitis on CT scan  The bleeding was brisk but resolved spontaneously  She has no recurrence  She was admitted to the hospital with CP  She had a normal TTE and serial troponins  Her CP was not relieved with nitroglycerine  She was seen by cardiology and an outpatient stress test was recommended  She is seeing rheumatology for her scleroderma  She has h/o follicular lymphoma and thyroid cancer  She is hypothyroid (s/p total thyroidectomy)  Her Synthroid was increased during her hospital admission  Review of Systems   All other systems reviewed and are negative  Objective:     Physical Exam   Constitutional: She is oriented to person, place, and time  She appears well-developed and well-nourished  Neck: Normal range of motion  Neck supple  Cardiovascular: Normal rate, regular rhythm, normal heart sounds and intact distal pulses  Pulmonary/Chest: Effort normal and breath sounds normal    Abdominal: Soft  Bowel sounds are normal    Musculoskeletal: Normal range of motion     Neurological: She is alert and oriented to person, place, and time  Skin: Skin is warm and dry  Capillary refill takes less than 2 seconds  Psychiatric: She has a normal mood and affect  Her behavior is normal  Judgment and thought content normal    Nursing note and vitals reviewed  Vitals:    08/21/19 1231   BP: 104/58   Pulse: 84   Resp: 16   SpO2: 98%   Weight: 47 2 kg (104 lb)   Height: 5' 1" (1 549 m)       Transitional Care Management Review:  Onel Doyle is a 71 y o  female here for TCM follow up  During the TCM phone call patient stated:    TCM Call (since 7/21/2019)     Date and time call was made  8/14/2019 10:50 AM    Patient was hospitialized at  [address]    Date of Admission  08/12/19    Date of discharge  08/13/19    Diagnosis  Chest Pain    Current Symptoms  None      TCM Call (since 7/21/2019)     Post hospital issues  None    Should patient be enrolled in anticoag monitoring? No    Scheduled for follow up?   Yes    Did you obtain your prescribed medications  Yes    Do you need help managing your prescriptions or medications  No    Is transportation to your appointment needed  No    Living Arrangements  Alone    Are you recieving any outpatient services  No    Are you recieving home care services  No    Are you using any community resources  No    Current waiver services  No    Have you fallen in the last 12 months  No    Interperter language line needed  No          Buddy Lawler MD

## 2019-08-26 DIAGNOSIS — R52 PAIN: ICD-10-CM

## 2019-08-26 RX ORDER — OXYMORPHONE HYDROCHLORIDE 5 MG/1
5 TABLET ORAL EVERY 6 HOURS PRN
Qty: 120 TABLET | Refills: 0 | Status: SHIPPED | OUTPATIENT
Start: 2019-08-26 | End: 2019-10-01 | Stop reason: SDUPTHER

## 2019-08-30 DIAGNOSIS — F98.8 ATTENTION DEFICIT DISORDER, UNSPECIFIED HYPERACTIVITY PRESENCE: ICD-10-CM

## 2019-09-02 DIAGNOSIS — F98.8 ATTENTION DEFICIT DISORDER, UNSPECIFIED HYPERACTIVITY PRESENCE: ICD-10-CM

## 2019-09-03 DIAGNOSIS — C73 THYROID CANCER (HCC): ICD-10-CM

## 2019-09-03 RX ORDER — DEXTROAMPHETAMINE SACCHARATE, AMPHETAMINE ASPARTATE, DEXTROAMPHETAMINE SULFATE AND AMPHETAMINE SULFATE 5; 5; 5; 5 MG/1; MG/1; MG/1; MG/1
20 TABLET ORAL
Qty: 60 TABLET | Refills: 0 | Status: SHIPPED | OUTPATIENT
Start: 2019-09-03 | End: 2019-09-30 | Stop reason: SDUPTHER

## 2019-09-03 RX ORDER — LEVOTHYROXINE SODIUM 0.15 MG/1
150 TABLET ORAL
Qty: 30 TABLET | Refills: 0 | Status: SHIPPED | OUTPATIENT
Start: 2019-09-03 | End: 2019-11-09 | Stop reason: SDUPTHER

## 2019-09-03 RX ORDER — DEXTROAMPHETAMINE SACCHARATE, AMPHETAMINE ASPARTATE, DEXTROAMPHETAMINE SULFATE AND AMPHETAMINE SULFATE 5; 5; 5; 5 MG/1; MG/1; MG/1; MG/1
20 TABLET ORAL
Qty: 60 TABLET | Refills: 0 | Status: SHIPPED | OUTPATIENT
Start: 2019-09-03 | End: 2019-10-01 | Stop reason: SDUPTHER

## 2019-09-03 RX ORDER — DEXTROAMPHETAMINE SACCHARATE, AMPHETAMINE ASPARTATE MONOHYDRATE, DEXTROAMPHETAMINE SULFATE AND AMPHETAMINE SULFATE 7.5; 7.5; 7.5; 7.5 MG/1; MG/1; MG/1; MG/1
30 CAPSULE, EXTENDED RELEASE ORAL EVERY MORNING
Qty: 30 CAPSULE | Refills: 0 | Status: SHIPPED | OUTPATIENT
Start: 2019-09-03 | End: 2019-10-01 | Stop reason: SDUPTHER

## 2019-09-03 RX ORDER — DEXTROAMPHETAMINE SACCHARATE, AMPHETAMINE ASPARTATE MONOHYDRATE, DEXTROAMPHETAMINE SULFATE AND AMPHETAMINE SULFATE 7.5; 7.5; 7.5; 7.5 MG/1; MG/1; MG/1; MG/1
30 CAPSULE, EXTENDED RELEASE ORAL EVERY MORNING
Qty: 30 CAPSULE | Refills: 0 | Status: SHIPPED | OUTPATIENT
Start: 2019-09-03 | End: 2019-09-30 | Stop reason: SDUPTHER

## 2019-09-04 ENCOUNTER — TELEPHONE (OUTPATIENT)
Dept: FAMILY MEDICINE CLINIC | Facility: CLINIC | Age: 64
End: 2019-09-04

## 2019-09-20 ENCOUNTER — TELEPHONE (OUTPATIENT)
Dept: FAMILY MEDICINE CLINIC | Facility: CLINIC | Age: 64
End: 2019-09-20

## 2019-09-20 DIAGNOSIS — F41.8 SITUATIONAL ANXIETY: Primary | ICD-10-CM

## 2019-09-20 RX ORDER — HYDROXYZINE 50 MG/1
50 TABLET, FILM COATED ORAL 3 TIMES DAILY PRN
Qty: 90 TABLET | Refills: 0 | Status: SHIPPED | OUTPATIENT
Start: 2019-09-20 | End: 2021-02-04

## 2019-09-20 NOTE — TELEPHONE ENCOUNTER
Pt called to state that her mother passed and needs something to calm her nerves   Was wondering if something could be called into [facility] in [place]

## 2019-09-23 RX ORDER — ALPRAZOLAM 0.25 MG/1
0.25 TABLET ORAL 3 TIMES DAILY PRN
Qty: 30 TABLET | Refills: 0 | Status: SHIPPED | OUTPATIENT
Start: 2019-09-23 | End: 2021-02-05 | Stop reason: SDUPTHER

## 2019-09-27 DIAGNOSIS — R52 PAIN: ICD-10-CM

## 2019-09-27 DIAGNOSIS — F98.8 ATTENTION DEFICIT DISORDER, UNSPECIFIED HYPERACTIVITY PRESENCE: ICD-10-CM

## 2019-09-27 RX ORDER — DEXTROAMPHETAMINE SACCHARATE, AMPHETAMINE ASPARTATE MONOHYDRATE, DEXTROAMPHETAMINE SULFATE AND AMPHETAMINE SULFATE 7.5; 7.5; 7.5; 7.5 MG/1; MG/1; MG/1; MG/1
30 CAPSULE, EXTENDED RELEASE ORAL EVERY MORNING
Qty: 30 CAPSULE | Refills: 0 | OUTPATIENT
Start: 2019-09-27

## 2019-09-27 RX ORDER — OXYMORPHONE HYDROCHLORIDE 5 MG/1
5 TABLET ORAL EVERY 6 HOURS PRN
Qty: 120 TABLET | Refills: 0 | OUTPATIENT
Start: 2019-09-27

## 2019-09-27 RX ORDER — DEXTROAMPHETAMINE SACCHARATE, AMPHETAMINE ASPARTATE, DEXTROAMPHETAMINE SULFATE AND AMPHETAMINE SULFATE 5; 5; 5; 5 MG/1; MG/1; MG/1; MG/1
20 TABLET ORAL
Qty: 60 TABLET | Refills: 0 | OUTPATIENT
Start: 2019-09-27

## 2019-09-27 NOTE — TELEPHONE ENCOUNTER
Patient does not have a Narcotic Agreement on file; however, I noticed she also receives controlled substances from another provider and per [facility]'s last note under media, she is receiving Medical Marijuana  Let me know how you would like to proceed

## 2019-09-30 ENCOUNTER — TELEPHONE (OUTPATIENT)
Dept: FAMILY MEDICINE CLINIC | Facility: CLINIC | Age: 64
End: 2019-09-30

## 2019-09-30 DIAGNOSIS — F98.8 ATTENTION DEFICIT DISORDER, UNSPECIFIED HYPERACTIVITY PRESENCE: ICD-10-CM

## 2019-09-30 DIAGNOSIS — K04.7 TOOTH ABSCESS: Primary | ICD-10-CM

## 2019-09-30 RX ORDER — DEXTROAMPHETAMINE SACCHARATE, AMPHETAMINE ASPARTATE MONOHYDRATE, DEXTROAMPHETAMINE SULFATE AND AMPHETAMINE SULFATE 7.5; 7.5; 7.5; 7.5 MG/1; MG/1; MG/1; MG/1
30 CAPSULE, EXTENDED RELEASE ORAL EVERY MORNING
Qty: 30 CAPSULE | Refills: 0 | Status: SHIPPED | OUTPATIENT
Start: 2019-09-30 | End: 2019-10-25 | Stop reason: SDUPTHER

## 2019-09-30 RX ORDER — DEXTROAMPHETAMINE SACCHARATE, AMPHETAMINE ASPARTATE, DEXTROAMPHETAMINE SULFATE AND AMPHETAMINE SULFATE 5; 5; 5; 5 MG/1; MG/1; MG/1; MG/1
20 TABLET ORAL
Qty: 60 TABLET | Refills: 0 | Status: SHIPPED | OUTPATIENT
Start: 2019-09-30 | End: 2019-11-21 | Stop reason: SDUPTHER

## 2019-09-30 RX ORDER — CLINDAMYCIN HYDROCHLORIDE 300 MG/1
300 CAPSULE ORAL 4 TIMES DAILY
Qty: 40 CAPSULE | Refills: 0 | Status: SHIPPED | OUTPATIENT
Start: 2019-09-30 | End: 2019-10-10

## 2019-09-30 NOTE — TELEPHONE ENCOUNTER
Rx Clindamycin 300 mg si cap po 4 x daily x 10 days #40 0ref escribed to [facility]  Instruct patient to do warm salt water gargles 4 x daily x 2 weeks and to schedule appointment with Dentist asap

## 2019-10-01 DIAGNOSIS — F98.8 ATTENTION DEFICIT DISORDER, UNSPECIFIED HYPERACTIVITY PRESENCE: ICD-10-CM

## 2019-10-01 DIAGNOSIS — R52 PAIN: ICD-10-CM

## 2019-10-01 RX ORDER — DEXTROAMPHETAMINE SACCHARATE, AMPHETAMINE ASPARTATE, DEXTROAMPHETAMINE SULFATE AND AMPHETAMINE SULFATE 5; 5; 5; 5 MG/1; MG/1; MG/1; MG/1
20 TABLET ORAL
Qty: 60 TABLET | Refills: 0 | Status: SHIPPED | OUTPATIENT
Start: 2019-10-01 | End: 2019-11-21 | Stop reason: SDUPTHER

## 2019-10-01 RX ORDER — OXYMORPHONE HYDROCHLORIDE 5 MG/1
5 TABLET ORAL EVERY 6 HOURS PRN
Qty: 120 TABLET | Refills: 0 | Status: SHIPPED | OUTPATIENT
Start: 2019-10-01 | End: 2019-11-06 | Stop reason: SDUPTHER

## 2019-10-01 RX ORDER — DEXTROAMPHETAMINE SACCHARATE, AMPHETAMINE ASPARTATE MONOHYDRATE, DEXTROAMPHETAMINE SULFATE AND AMPHETAMINE SULFATE 7.5; 7.5; 7.5; 7.5 MG/1; MG/1; MG/1; MG/1
30 CAPSULE, EXTENDED RELEASE ORAL EVERY MORNING
Qty: 30 CAPSULE | Refills: 0 | Status: SHIPPED | OUTPATIENT
Start: 2019-10-01 | End: 2019-11-21 | Stop reason: SDUPTHER

## 2019-10-25 DIAGNOSIS — B37.41 YEAST CYSTITIS: Primary | ICD-10-CM

## 2019-10-25 DIAGNOSIS — F98.8 ATTENTION DEFICIT DISORDER, UNSPECIFIED HYPERACTIVITY PRESENCE: ICD-10-CM

## 2019-10-25 RX ORDER — FLUCONAZOLE 150 MG/1
150 TABLET ORAL ONCE
Qty: 1 TABLET | Refills: 0 | Status: SHIPPED | OUTPATIENT
Start: 2019-10-25 | End: 2019-10-25

## 2019-10-25 RX ORDER — DEXTROAMPHETAMINE SACCHARATE, AMPHETAMINE ASPARTATE MONOHYDRATE, DEXTROAMPHETAMINE SULFATE AND AMPHETAMINE SULFATE 7.5; 7.5; 7.5; 7.5 MG/1; MG/1; MG/1; MG/1
30 CAPSULE, EXTENDED RELEASE ORAL EVERY MORNING
Qty: 30 CAPSULE | Refills: 0 | Status: SHIPPED | OUTPATIENT
Start: 2019-10-25 | End: 2019-11-21 | Stop reason: SDUPTHER

## 2019-11-06 DIAGNOSIS — R52 PAIN: ICD-10-CM

## 2019-11-07 RX ORDER — OXYMORPHONE HYDROCHLORIDE 5 MG/1
5 TABLET ORAL EVERY 6 HOURS PRN
Qty: 120 TABLET | Refills: 0 | Status: SHIPPED | OUTPATIENT
Start: 2019-11-07 | End: 2019-12-05 | Stop reason: SDUPTHER

## 2019-11-09 DIAGNOSIS — C73 THYROID CANCER (HCC): ICD-10-CM

## 2019-11-11 RX ORDER — LEVOTHYROXINE SODIUM 0.15 MG/1
150 TABLET ORAL
Qty: 30 TABLET | Refills: 0 | Status: SHIPPED | OUTPATIENT
Start: 2019-11-11 | End: 2019-12-07 | Stop reason: SDUPTHER

## 2019-11-12 ENCOUNTER — OFFICE VISIT (OUTPATIENT)
Dept: FAMILY MEDICINE CLINIC | Facility: CLINIC | Age: 64
End: 2019-11-12
Payer: MEDICARE

## 2019-11-12 VITALS
SYSTOLIC BLOOD PRESSURE: 112 MMHG | OXYGEN SATURATION: 98 % | HEIGHT: 61 IN | WEIGHT: 107 LBS | BODY MASS INDEX: 20.2 KG/M2 | HEART RATE: 85 BPM | DIASTOLIC BLOOD PRESSURE: 64 MMHG

## 2019-11-12 DIAGNOSIS — Z23 NEED FOR IMMUNIZATION AGAINST INFLUENZA: Primary | ICD-10-CM

## 2019-11-12 DIAGNOSIS — R30.0 DYSURIA: ICD-10-CM

## 2019-11-12 DIAGNOSIS — B37.9 YEAST INFECTION: ICD-10-CM

## 2019-11-12 DIAGNOSIS — M34.0 SCLERODERMA PROGRESSIVE (HCC): Primary | ICD-10-CM

## 2019-11-12 DIAGNOSIS — C82.90 FOLLICULAR LYMPHOMA, UNSPECIFIED FOLLICULAR LYMPHOMA TYPE, UNSPECIFIED BODY REGION (HCC): ICD-10-CM

## 2019-11-12 DIAGNOSIS — M54.16 LUMBAR RADICULITIS: ICD-10-CM

## 2019-11-12 DIAGNOSIS — F32.9 REACTIVE DEPRESSION: ICD-10-CM

## 2019-11-12 DIAGNOSIS — I73.00 RAYNAUD'S DISEASE WITHOUT GANGRENE: ICD-10-CM

## 2019-11-12 PROCEDURE — G0008 ADMIN INFLUENZA VIRUS VAC: HCPCS

## 2019-11-12 PROCEDURE — 90662 IIV NO PRSV INCREASED AG IM: CPT

## 2019-11-12 PROCEDURE — 99213 OFFICE O/P EST LOW 20 MIN: CPT | Performed by: FAMILY MEDICINE

## 2019-11-12 RX ORDER — FLUCONAZOLE 150 MG/1
150 TABLET ORAL ONCE
Qty: 2 TABLET | Refills: 5 | Status: SHIPPED | OUTPATIENT
Start: 2019-11-12 | End: 2019-11-12

## 2019-11-12 RX ORDER — LISINOPRIL 10 MG/1
TABLET ORAL
Refills: 0 | COMMUNITY
Start: 2019-09-30 | End: 2021-02-04

## 2019-11-12 RX ORDER — ESCITALOPRAM OXALATE 10 MG/1
10 TABLET ORAL DAILY
Qty: 30 TABLET | Refills: 5 | Status: SHIPPED | OUTPATIENT
Start: 2019-11-12 | End: 2020-01-30

## 2019-11-12 RX ORDER — FLUCONAZOLE 150 MG/1
TABLET ORAL
Refills: 0 | COMMUNITY
Start: 2019-10-25 | End: 2019-11-12 | Stop reason: SDUPTHER

## 2019-11-12 NOTE — PROGRESS NOTES
Assessment/Plan:    No problem-specific Assessment & Plan notes found for this encounter  Diagnoses and all orders for this visit:    Scleroderma progressive ([facility] [address] )  -     CBC and differential; Future  -     Comprehensive metabolic panel; Future  -     Sedimentation rate, automated; Future  -     C-reactive protein; Future  -     TSH, 3rd generation; Future    Follicular lymphoma, unspecified follicular lymphoma type, unspecified body region ([facility] [address] )  -     CBC and differential; Future  -     Comprehensive metabolic panel; Future  -     Sedimentation rate, automated; Future  -     C-reactive protein; Future  -     TSH, 3rd generation; Future    Raynaud's disease without gangrene  -     CBC and differential; Future  -     Comprehensive metabolic panel; Future  -     Sedimentation rate, automated; Future  -     C-reactive protein; Future  -     TSH, 3rd generation; Future    Yeast infection  -     fluconazole (DIFLUCAN) 150 mg tablet; Take 1 tablet (150 mg total) by mouth once for 1 dose    Dysuria  -     UA/M w/rflx Culture, Routine    Lumbar radiculitis  -     MRI lumbar spine w contrast; Future    Reactive depression  -     escitalopram (LEXAPRO) 10 mg tablet; Take 1 tablet (10 mg total) by mouth daily    Other orders  -     nystatin (MYCOSTATIN) 500,000 units/5 mL suspension  -     lisinopril (ZESTRIL) 10 mg tablet  -     Discontinue: fluconazole (DIFLUCAN) 150 mg tablet          Subjective:      Patient ID: Chandni Gray is a 71 y o  female  She doesn't feel well  She lost her mother recently and she is quite depressed  She is tearful in the office today  She has a lump on the back of her head  She has a h/o follicular lymphoma  She c/o weakness and fatigue  She has fibromyalgia as well  "I'm in a fog "     She has some stress within the family regarding her mother's estate  She has no fever or chills  She has no trauma        The following portions of the patient's history were reviewed and updated as appropriate:   She  has a past medical history of ADHD, Cancer ([address] ), Cellulitis of foot, Change in mental status, Chronic fatigue, PLAZA (dyspnea on exertion), Fibromyalgia, Folliculitis, GERD (gastroesophageal reflux disease), Osteopenia, Raynaud's disease, Scleroderma ([address] ), and Systemic sclerosis ([facility] [address] )    She   Patient Active Problem List    Diagnosis Date Noted    Elevated TSH 08/13/2019    Hypokalemia 08/13/2019    Chronic pain 08/12/2019    Medicare annual wellness visit, subsequent 02/15/2019    SOB (shortness of breath)     Vitamin D deficiency 11/28/2018    Postoperative hypothyroidism 11/28/2018    Osteoarthritis 10/17/2018    Raynaud's syndrome 10/17/2018    Rectal prolapse 10/17/2018    Sjogren's syndrome ([facility] [address] ) 10/17/2018    Thyroid cancer ([facility] [address] ) 10/17/2018    BRCA gene mutation positive 09/21/2018    Preoperative clearance 06/25/2018    Fatigue 06/25/2018    Bilateral hand numbness 05/01/2018    Methotrexate, long term, current use 05/01/2018    Scleroderma progressive ([facility] [address] ) 05/01/2018    Fecal soiling 08/07/2017    Post concussion syndrome 12/01/2015    Carpal tunnel syndrome of left wrist 09/14/2015    Carpal tunnel syndrome of right wrist 08/27/2015    Lumbar disc herniation 08/27/2015    Radiculopathy, lumbar region 08/27/2015    Herniated lumbar intervertebral disc 08/03/2015    Heel spur 06/03/2015    Dental disorder 06/02/2015    Fibromyalgia 02/27/2015    Bartholin gland cyst 07/17/2014    Ovarian cyst 07/17/2014    Neck sprain and strain 04/22/2014    Hematuria 27/21/4642    Lichen planus 76/93/7159    Osteoarthritis of both knees 08/13/2013    Chest pain 05/14/2013    Pain in joint of left shoulder 20/94/7594    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[address] ) 12/07/2012    Allergic rhinitis 12/07/2012    Depression 12/07/2012    Esophageal reflux disease 12/07/2012    Hyperlipidemia 12/07/2012    Osteopenia 12/07/2012    Peripheral neuropathy 12/07/2012    Spasm 12/07/2012    Hearing loss 11/23/2012     She  has a past surgical history that includes Tonsillectomy; Back surgery; Rotator cuff repair; Hysterectomy; Bone marrow biopsy; Bladder surgery; Hemorrhoid surgery; Nasal septum surgery; Knee surgery; Cardiac catheterization; Thyroidectomy (N/A, 10/4/2018); and Total thyroidectomy  Her family history includes Arthritis in her mother and sister; Asthma in her sister; Coronary artery disease in her father; Crohn's disease in her sister; Diabetes in her mother; Other in her brother  She  reports that she has quit smoking  She has never used smokeless tobacco  She reports that she does not drink alcohol or use drugs    Current Outpatient Medications   Medication Sig Dispense Refill    ALPRAZolam (XANAX) 0 25 mg tablet Take 1 tablet (0 25 mg total) by mouth 3 (three) times a day as needed for anxiety 30 tablet 0    amphetamine-dextroamphetamine (ADDERALL XR) 30 MG 24 hr capsule Take 1 capsule (30 mg total) by mouth every morningMax Daily Amount: 30 mg 30 capsule 0    amphetamine-dextroamphetamine (ADDERALL XR) 30 MG 24 hr capsule Take 1 capsule (30 mg total) by mouth every morningMax Daily Amount: 30 mg 30 capsule 0    amphetamine-dextroamphetamine (ADDERALL) 20 mg tablet Take 1 tablet (20 mg total) by mouth 2 (two) times a dayMax Daily Amount: 40 mg 60 tablet 0    amphetamine-dextroamphetamine (ADDERALL) 20 mg tablet Take 1 tablet (20 mg total) by mouth 2 (two) times a dayMax Daily Amount: 40 mg 60 tablet 0    aspirin (ECOTRIN LOW STRENGTH) 81 mg EC tablet Take 81 mg by mouth daily      buPROPion (WELLBUTRIN XL) 300 mg 24 hr tablet TAKE 1 TABLET BY MOUTH EVERY DAY 90 tablet 2    calcium carbonate (TUMS) 500 mg chewable tablet Chew 2 tablets (1,000 mg total) daily 60 tablet 0    chlordiazePOXIDE (LIBRIUM) 5 mg capsule Take 5 mg by mouth 2 (two) times a day        chlordiazepoxide-clidinium (LIBRAX) 5-2 5 mg per capsule Take by mouth      dicyclomine (BENTYL) 10 mg capsule Take 20 mg by mouth 2 (two) times a day        escitalopram (LEXAPRO) 10 mg tablet Take 1 tablet (10 mg total) by mouth daily 30 tablet 5    esomeprazole (NexIUM) 40 MG capsule Take 40 mg by mouth 2 (two) times a day before meals        fluconazole (DIFLUCAN) 150 mg tablet Take 1 tablet (150 mg total) by mouth once for 1 dose 2 tablet 5    folic acid (FOLVITE) 1 mg tablet Take by mouth daily      hydroxychloroquine (PLAQUENIL) 200 mg tablet Take 200 mg by mouth 2 (two) times a day with meals        hydrOXYzine HCL (ATARAX) 50 mg tablet Take 1 tablet (50 mg total) by mouth 3 (three) times a day as needed for anxiety 90 tablet 0    levothyroxine 150 mcg tablet TAKE 1 TABLET (150 MCG TOTAL) BY MOUTH DAILY IN THE EARLY MORNING 30 tablet 0    lisinopril (ZESTRIL) 10 mg tablet   0    methotrexate 2 5 mg tablet AS DIRECTED 12 tablet 5    Multiple Vitamin (MULTIVITAMIN) tablet Take 1 tablet by mouth daily      naproxen (NAPROSYN) 500 mg tablet   1    nystatin (MYCOSTATIN) 500,000 units/5 mL suspension   1    oxymorphone (OPANA) 5 MG tablet Take 1 tablet (5 mg total) by mouth every 6 (six) hours as needed for moderate painMax Daily Amount: 20 mg 120 tablet 0    predniSONE 5 mg tablet Take 1 tablet (5 mg total) by mouth daily 30 tablet 5    pregabalin (LYRICA) 75 mg capsule 3 (three) times a day       pregabalin (LYRICA) 75 mg capsule       psyllium (METAMUCIL) 0 52 g capsule Take by mouth      simvastatin (ZOCOR) 40 mg tablet TAKE 1 TABLET BY MOUTH EVERY DAY 90 tablet 1    SODIUM FLUORIDE, DENTAL GEL, (PREVIDENT) 1 1 % GEL PreviDent 5000 Dry Mouth 1 1 % gel      traZODone (DESYREL) 150 mg tablet TAKE 1 TABLET (150 MG TOTAL) BY MOUTH DAILY AT BEDTIME 30 tablet 5     No current facility-administered medications for this visit        Current Outpatient Medications on File Prior to Visit   Medication Sig    ALPRAZolam (XANAX) 0 25 mg tablet Take 1 tablet (0 25 mg total) by mouth 3 (three) times a day as needed for anxiety    amphetamine-dextroamphetamine (ADDERALL XR) 30 MG 24 hr capsule Take 1 capsule (30 mg total) by mouth every morningMax Daily Amount: 30 mg    amphetamine-dextroamphetamine (ADDERALL XR) 30 MG 24 hr capsule Take 1 capsule (30 mg total) by mouth every morningMax Daily Amount: 30 mg    amphetamine-dextroamphetamine (ADDERALL) 20 mg tablet Take 1 tablet (20 mg total) by mouth 2 (two) times a dayMax Daily Amount: 40 mg    amphetamine-dextroamphetamine (ADDERALL) 20 mg tablet Take 1 tablet (20 mg total) by mouth 2 (two) times a dayMax Daily Amount: 40 mg    aspirin (ECOTRIN LOW STRENGTH) 81 mg EC tablet Take 81 mg by mouth daily    buPROPion (WELLBUTRIN XL) 300 mg 24 hr tablet TAKE 1 TABLET BY MOUTH EVERY DAY    calcium carbonate (TUMS) 500 mg chewable tablet Chew 2 tablets (1,000 mg total) daily    chlordiazePOXIDE (LIBRIUM) 5 mg capsule Take 5 mg by mouth 2 (two) times a day      chlordiazepoxide-clidinium (LIBRAX) 5-2 5 mg per capsule Take by mouth    dicyclomine (BENTYL) 10 mg capsule Take 20 mg by mouth 2 (two) times a day      esomeprazole (NexIUM) 40 MG capsule Take 40 mg by mouth 2 (two) times a day before meals      folic acid (FOLVITE) 1 mg tablet Take by mouth daily    hydroxychloroquine (PLAQUENIL) 200 mg tablet Take 200 mg by mouth 2 (two) times a day with meals      hydrOXYzine HCL (ATARAX) 50 mg tablet Take 1 tablet (50 mg total) by mouth 3 (three) times a day as needed for anxiety    levothyroxine 150 mcg tablet TAKE 1 TABLET (150 MCG TOTAL) BY MOUTH DAILY IN THE EARLY MORNING    lisinopril (ZESTRIL) 10 mg tablet     methotrexate 2 5 mg tablet AS DIRECTED    Multiple Vitamin (MULTIVITAMIN) tablet Take 1 tablet by mouth daily    naproxen (NAPROSYN) 500 mg tablet     nystatin (MYCOSTATIN) 500,000 units/5 mL suspension     oxymorphone (OPANA) 5 MG tablet Take 1 tablet (5 mg total) by mouth every 6 (six) hours as needed for moderate painMax Daily Amount: 20 mg    predniSONE 5 mg tablet Take 1 tablet (5 mg total) by mouth daily    pregabalin (LYRICA) 75 mg capsule 3 (three) times a day     pregabalin (LYRICA) 75 mg capsule     psyllium (METAMUCIL) 0 52 g capsule Take by mouth    simvastatin (ZOCOR) 40 mg tablet TAKE 1 TABLET BY MOUTH EVERY DAY    SODIUM FLUORIDE, DENTAL GEL, (PREVIDENT) 1 1 % GEL PreviDent 5000 Dry Mouth 1 1 % gel    traZODone (DESYREL) 150 mg tablet TAKE 1 TABLET (150 MG TOTAL) BY MOUTH DAILY AT BEDTIME    [DISCONTINUED] dicyclomine (BENTYL) 20 mg tablet     [DISCONTINUED] fluconazole (DIFLUCAN) 150 mg tablet      No current facility-administered medications on file prior to visit  She is allergic to duloxetine; methotrexate; revatio [sildenafil]; savella [milnacipran]; sulfamethoxazole-trimethoprim; and tedizolid       Review of Systems   All other systems reviewed and are negative  Objective:      /64   Pulse 85   Ht 5' 1" (1 549 m)   Wt 48 5 kg (107 lb)   SpO2 98%   BMI 20 22 kg/m²          Physical Exam   Constitutional: She is oriented to person, place, and time  She appears well-developed and well-nourished  Neck: Normal range of motion  Neck supple  Cardiovascular: Normal rate, regular rhythm, normal heart sounds and intact distal pulses  Pulmonary/Chest: Effort normal and breath sounds normal    Abdominal: Soft  Bowel sounds are normal    Musculoskeletal: Normal range of motion  Neurological: She is alert and oriented to person, place, and time  Skin: Skin is warm and dry  Capillary refill takes less than 2 seconds  Psychiatric: She has a normal mood and affect  Her behavior is normal  Judgment and thought content normal    Nursing note and vitals reviewed

## 2019-11-15 ENCOUNTER — APPOINTMENT (OUTPATIENT)
Dept: LAB | Facility: HOSPITAL | Age: 64
End: 2019-11-15
Attending: FAMILY MEDICINE
Payer: MEDICARE

## 2019-11-15 DIAGNOSIS — M34.0 SCLERODERMA PROGRESSIVE (HCC): ICD-10-CM

## 2019-11-15 DIAGNOSIS — I73.00 RAYNAUD'S DISEASE WITHOUT GANGRENE: Primary | ICD-10-CM

## 2019-11-15 DIAGNOSIS — C82.90 FOLLICULAR LYMPHOMA, UNSPECIFIED FOLLICULAR LYMPHOMA TYPE, UNSPECIFIED BODY REGION (HCC): ICD-10-CM

## 2019-11-15 LAB
ALBUMIN SERPL BCP-MCNC: 4 G/DL (ref 3.5–5)
ALP SERPL-CCNC: 43 U/L (ref 46–116)
ALT SERPL W P-5'-P-CCNC: 41 U/L (ref 12–78)
ANION GAP SERPL CALCULATED.3IONS-SCNC: 3 MMOL/L (ref 4–13)
AST SERPL W P-5'-P-CCNC: 22 U/L (ref 5–45)
BASOPHILS # BLD AUTO: 0.07 THOUSANDS/ΜL (ref 0–0.1)
BASOPHILS NFR BLD AUTO: 1 % (ref 0–1)
BILIRUB SERPL-MCNC: 0.4 MG/DL (ref 0.2–1)
BILIRUB UR QL STRIP: NEGATIVE
BUN SERPL-MCNC: 19 MG/DL (ref 5–25)
CALCIUM SERPL-MCNC: 8.7 MG/DL (ref 8.3–10.1)
CHLORIDE SERPL-SCNC: 101 MMOL/L (ref 100–108)
CLARITY UR: CLEAR
CO2 SERPL-SCNC: 30 MMOL/L (ref 21–32)
COLOR UR: YELLOW
CREAT SERPL-MCNC: 0.78 MG/DL (ref 0.6–1.3)
CRP SERPL QL: 2.3 MG/L
EOSINOPHIL # BLD AUTO: 0.15 THOUSAND/ΜL (ref 0–0.61)
EOSINOPHIL NFR BLD AUTO: 3 % (ref 0–6)
ERYTHROCYTE [DISTWIDTH] IN BLOOD BY AUTOMATED COUNT: 16.6 % (ref 11.6–15.1)
ERYTHROCYTE [SEDIMENTATION RATE] IN BLOOD: 3 MM/HOUR (ref 0–20)
GFR SERPL CREATININE-BSD FRML MDRD: 80 ML/MIN/1.73SQ M
GLUCOSE SERPL-MCNC: 110 MG/DL (ref 65–140)
GLUCOSE UR STRIP-MCNC: NEGATIVE MG/DL
HCT VFR BLD AUTO: 34.5 % (ref 34.8–46.1)
HGB BLD-MCNC: 10.8 G/DL (ref 11.5–15.4)
HGB UR QL STRIP.AUTO: NEGATIVE
IMM GRANULOCYTES # BLD AUTO: 0.02 THOUSAND/UL (ref 0–0.2)
IMM GRANULOCYTES NFR BLD AUTO: 0 % (ref 0–2)
KETONES UR STRIP-MCNC: NEGATIVE MG/DL
LEUKOCYTE ESTERASE UR QL STRIP: NEGATIVE
LYMPHOCYTES # BLD AUTO: 0.99 THOUSANDS/ΜL (ref 0.6–4.47)
LYMPHOCYTES NFR BLD AUTO: 19 % (ref 14–44)
MCH RBC QN AUTO: 28.1 PG (ref 26.8–34.3)
MCHC RBC AUTO-ENTMCNC: 31.3 G/DL (ref 31.4–37.4)
MCV RBC AUTO: 90 FL (ref 82–98)
MONOCYTES # BLD AUTO: 0.5 THOUSAND/ΜL (ref 0.17–1.22)
MONOCYTES NFR BLD AUTO: 10 % (ref 4–12)
NEUTROPHILS # BLD AUTO: 3.53 THOUSANDS/ΜL (ref 1.85–7.62)
NEUTS SEG NFR BLD AUTO: 67 % (ref 43–75)
NITRITE UR QL STRIP: NEGATIVE
NRBC BLD AUTO-RTO: 0 /100 WBCS
PH UR STRIP.AUTO: 5.5 [PH]
PLATELET # BLD AUTO: 232 THOUSANDS/UL (ref 149–390)
PMV BLD AUTO: 10 FL (ref 8.9–12.7)
POTASSIUM SERPL-SCNC: 4.7 MMOL/L (ref 3.5–5.3)
PROT SERPL-MCNC: 6.1 G/DL (ref 6.4–8.2)
PROT UR STRIP-MCNC: NEGATIVE MG/DL
RBC # BLD AUTO: 3.84 MILLION/UL (ref 3.81–5.12)
SODIUM SERPL-SCNC: 134 MMOL/L (ref 136–145)
SP GR UR STRIP.AUTO: 1.01 (ref 1–1.03)
TSH SERPL DL<=0.05 MIU/L-ACNC: 3.48 UIU/ML (ref 0.36–3.74)
UROBILINOGEN UR QL STRIP.AUTO: 0.2 E.U./DL
WBC # BLD AUTO: 5.26 THOUSAND/UL (ref 4.31–10.16)

## 2019-11-15 PROCEDURE — 86140 C-REACTIVE PROTEIN: CPT

## 2019-11-15 PROCEDURE — 80053 COMPREHEN METABOLIC PANEL: CPT

## 2019-11-15 PROCEDURE — 36415 COLL VENOUS BLD VENIPUNCTURE: CPT

## 2019-11-15 PROCEDURE — 81003 URINALYSIS AUTO W/O SCOPE: CPT

## 2019-11-15 PROCEDURE — 84443 ASSAY THYROID STIM HORMONE: CPT

## 2019-11-15 PROCEDURE — 85652 RBC SED RATE AUTOMATED: CPT

## 2019-11-15 PROCEDURE — 85025 COMPLETE CBC W/AUTO DIFF WBC: CPT

## 2019-11-21 ENCOUNTER — OFFICE VISIT (OUTPATIENT)
Dept: FAMILY MEDICINE CLINIC | Facility: CLINIC | Age: 64
End: 2019-11-21
Payer: MEDICARE

## 2019-11-21 VITALS
HEART RATE: 84 BPM | BODY MASS INDEX: 19.63 KG/M2 | HEIGHT: 61 IN | OXYGEN SATURATION: 98 % | SYSTOLIC BLOOD PRESSURE: 110 MMHG | WEIGHT: 104 LBS | DIASTOLIC BLOOD PRESSURE: 64 MMHG

## 2019-11-21 DIAGNOSIS — M35.00 SJOGREN'S SYNDROME, WITH UNSPECIFIED ORGAN INVOLVEMENT (HCC): ICD-10-CM

## 2019-11-21 DIAGNOSIS — F98.8 ATTENTION DEFICIT DISORDER, UNSPECIFIED HYPERACTIVITY PRESENCE: ICD-10-CM

## 2019-11-21 DIAGNOSIS — J30.0 VASOMOTOR RHINITIS: ICD-10-CM

## 2019-11-21 DIAGNOSIS — Z00.00 MEDICARE ANNUAL WELLNESS VISIT, SUBSEQUENT: Primary | ICD-10-CM

## 2019-11-21 PROCEDURE — G0439 PPPS, SUBSEQ VISIT: HCPCS | Performed by: FAMILY MEDICINE

## 2019-11-21 RX ORDER — ESOMEPRAZOLE MAGNESIUM 40 MG/1
40 CAPSULE, DELAYED RELEASE ORAL
COMMUNITY
End: 2021-02-04

## 2019-11-21 RX ORDER — DEXTROAMPHETAMINE SACCHARATE, AMPHETAMINE ASPARTATE MONOHYDRATE, DEXTROAMPHETAMINE SULFATE AND AMPHETAMINE SULFATE 7.5; 7.5; 7.5; 7.5 MG/1; MG/1; MG/1; MG/1
30 CAPSULE, EXTENDED RELEASE ORAL EVERY MORNING
Qty: 30 CAPSULE | Refills: 0 | Status: SHIPPED | OUTPATIENT
Start: 2019-11-21 | End: 2019-12-18 | Stop reason: SDUPTHER

## 2019-11-21 RX ORDER — IPRATROPIUM BROMIDE 21 UG/1
2 SPRAY, METERED NASAL EVERY 12 HOURS
Qty: 30 ML | Refills: 5 | Status: SHIPPED | OUTPATIENT
Start: 2019-11-21 | End: 2021-02-04

## 2019-11-21 RX ORDER — ASPIRIN 81 MG/1
81 TABLET ORAL
COMMUNITY
End: 2021-02-04

## 2019-11-21 RX ORDER — DEXTROAMPHETAMINE SACCHARATE, AMPHETAMINE ASPARTATE MONOHYDRATE, DEXTROAMPHETAMINE SULFATE AND AMPHETAMINE SULFATE 7.5; 7.5; 7.5; 7.5 MG/1; MG/1; MG/1; MG/1
30 CAPSULE, EXTENDED RELEASE ORAL EVERY MORNING
Qty: 30 CAPSULE | Refills: 0 | Status: SHIPPED | OUTPATIENT
Start: 2019-11-21 | End: 2019-11-21 | Stop reason: SDUPTHER

## 2019-11-21 NOTE — PROGRESS NOTES
Assessment and Plan:     Problem List Items Addressed This Visit        Digestive    Sjogren's syndrome [facility]) - Primary           Preventive health issues were discussed with patient, and age appropriate screening tests were ordered as noted in patient's After Visit Summary  Personalized health advice and appropriate referrals for health education or preventive services given if needed, as noted in patient's After Visit Summary  History of Present Illness:     Patient presents for Welcome to Medicare visit  Patient Care Team:  Becky Salas MD as PCP - Iliana Daigle MD as PCP - Endocrinology (Endocrinology)     Review of Systems:     Review of Systems   All other systems reviewed and are negative       Problem List:     Patient Active Problem List   Diagnosis    Bilateral hand numbness    Methotrexate, long term, current use    Scleroderma progressive (HCC)    Preoperative clearance    Fatigue    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HCC)    BRCA gene mutation positive    Allergic rhinitis    Bartholin gland cyst    Carpal tunnel syndrome of left wrist    Carpal tunnel syndrome of right wrist    Chest pain    Dental disorder    Depression    Esophageal reflux disease    Fecal soiling    Fibromyalgia    Hearing loss    Heel spur    Hematuria    Herniated lumbar intervertebral disc    Hyperlipidemia    Lichen planus    Lumbar disc herniation    Neck sprain and strain    Osteoarthritis    Osteoarthritis of both knees    Osteopenia    Ovarian cyst    Pain in joint of left shoulder    Peripheral neuropathy    Post concussion syndrome    Radiculopathy, lumbar region    Raynaud's syndrome    Rectal prolapse    Sjogren's syndrome ([address] )    Spasm    Thyroid cancer ([facility] [address] )    Vitamin D deficiency    Postoperative hypothyroidism    SOB (shortness of breath)    Medicare annual wellness visit, subsequent    Chronic pain    Elevated TSH    Hypokalemia      Past Medical and Surgical History:     Past Medical History:   Diagnosis Date    ADHD     Cancer ([facility] )     non-Hodg      Cellulitis of foot     Last assessed 10/24/16    Change in mental status     Last assessed 12/01/15    Chronic fatigue     PLAZA (dyspnea on exertion)     Last assessed 12/01/15    Fibromyalgia     Folliculitis     Last assessed 10/06/14    GERD (gastroesophageal reflux disease)     Osteopenia     Raynaud's disease     Scleroderma ([facility] )     Systemic sclerosis (HCC)      Past Surgical History:   Procedure Laterality Date    BACK SURGERY      BLADDER SURGERY      BONE MARROW BIOPSY      CARDIAC CATHETERIZATION      HEMORRHOID SURGERY      HYSTERECTOMY      KNEE SURGERY      NASAL SEPTUM SURGERY      ROTATOR CUFF REPAIR      THYROIDECTOMY N/A 10/4/2018    Procedure: TOTAL THYROIDECTOMY;  Surgeon: José Miguel Rosario MD;  Location: [facility];  Service: Surgical Oncology    TONSILLECTOMY      TOTAL THYROIDECTOMY        Family History:     Family History   Problem Relation Age of Onset    Arthritis Mother     Diabetes Mother     Coronary artery disease Father     Arthritis Sister     Asthma Sister     Crohn's disease Sister     Other Brother         myocardial ischemia      Social History:     Social History     Socioeconomic History    Marital status:      Spouse name: Not on file    Number of children: Not on file    Years of education: Not on file    Highest education level: Not on file   Occupational History    Not on file   Social Needs    Financial resource strain: Not on file    Food insecurity:     Worry: Not on file     Inability: Not on file    Transportation needs:     Medical: Not on file     Non-medical: Not on file   Tobacco Use    Smoking status: Former Smoker    Smokeless tobacco: Never Used    Tobacco comment: quit 40 years ago    Substance and Sexual Activity    Alcohol use: Never     Alcohol/week: 0 0 standard drinks     Frequency: Never     Drinks per session: Patient refused     Binge frequency: Never    Drug use: Never    Sexual activity: Not on file   Lifestyle    Physical activity:     Days per week: Not on file     Minutes per session: Not on file    Stress: Not on file   Relationships    Social connections:     Talks on phone: Not on file     Gets together: Not on file     Attends Sabianism service: Not on file     Active member of club or organization: Not on file     Attends meetings of clubs or organizations: Not on file     Relationship status: Not on file    Intimate partner violence:     Fear of current or ex partner: Not on file     Emotionally abused: Not on file     Physically abused: Not on file     Forced sexual activity: Not on file   Other Topics Concern    Not on file   Social History Narrative    Not on file      Medications and Allergies:     Current Outpatient Medications   Medication Sig Dispense Refill    ALPRAZolam (XANAX) 0 25 mg tablet Take 1 tablet (0 25 mg total) by mouth 3 (three) times a day as needed for anxiety 30 tablet 0    amphetamine-dextroamphetamine (ADDERALL XR) 30 MG 24 hr capsule Take 1 capsule (30 mg total) by mouth every morningMax Daily Amount: 30 mg 30 capsule 0    amphetamine-dextroamphetamine (ADDERALL XR) 30 MG 24 hr capsule Take 1 capsule (30 mg total) by mouth every morningMax Daily Amount: 30 mg 30 capsule 0    amphetamine-dextroamphetamine (ADDERALL) 20 mg tablet Take 1 tablet (20 mg total) by mouth 2 (two) times a dayMax Daily Amount: 40 mg 60 tablet 0    amphetamine-dextroamphetamine (ADDERALL) 20 mg tablet Take 1 tablet (20 mg total) by mouth 2 (two) times a dayMax Daily Amount: 40 mg 60 tablet 0    aspirin (ASPIRIN 81) 81 mg EC tablet Take 81 mg by mouth      aspirin (ECOTRIN LOW STRENGTH) 81 mg EC tablet Take 81 mg by mouth daily      buPROPion (WELLBUTRIN XL) 300 mg 24 hr tablet TAKE 1 TABLET BY MOUTH EVERY DAY 90 tablet 2    calcium carbonate (TUMS) 500 mg chewable tablet Chew 2 tablets (1,000 mg total) daily 60 tablet 0    calcium-vitamin D (OSCAL) 250-125 MG-UNIT per tablet Take by mouth      chlordiazePOXIDE (LIBRIUM) 5 mg capsule Take 5 mg by mouth 2 (two) times a day        chlordiazepoxide-clidinium (LIBRAX) 5-2 5 mg per capsule Take by mouth      dicyclomine (BENTYL) 10 mg capsule Take 20 mg by mouth 2 (two) times a day        Docusate Sodium 100 MG/5ML ENEM Take 250 mg by mouth      escitalopram (LEXAPRO) 10 mg tablet Take 1 tablet (10 mg total) by mouth daily 30 tablet 5    esomeprazole (NexIUM) 40 MG capsule Take 40 mg by mouth 2 (two) times a day before meals        esomeprazole (NexIUM) 40 MG capsule Take 40 mg by mouth      folic acid (FOLVITE) 1 mg tablet Take by mouth daily      hydroxychloroquine (PLAQUENIL) 200 mg tablet Take 200 mg by mouth 2 (two) times a day with meals        hydrOXYzine HCL (ATARAX) 50 mg tablet Take 1 tablet (50 mg total) by mouth 3 (three) times a day as needed for anxiety 90 tablet 0    levothyroxine 150 mcg tablet TAKE 1 TABLET (150 MCG TOTAL) BY MOUTH DAILY IN THE EARLY MORNING 30 tablet 0    lisinopril (ZESTRIL) 10 mg tablet   0    methotrexate 2 5 mg tablet AS DIRECTED 12 tablet 5    Multiple Vitamin (MULTIVITAMIN) tablet Take 1 tablet by mouth daily      naproxen (NAPROSYN) 500 mg tablet   1    nystatin (MYCOSTATIN) 500,000 units/5 mL suspension   1    oxymorphone (OPANA) 5 MG tablet Take 1 tablet (5 mg total) by mouth every 6 (six) hours as needed for moderate painMax Daily Amount: 20 mg 120 tablet 0    predniSONE 5 mg tablet Take 1 tablet (5 mg total) by mouth daily 30 tablet 5    pregabalin (LYRICA) 75 mg capsule 3 (three) times a day       pregabalin (LYRICA) 75 mg capsule       psyllium (METAMUCIL) 0 52 g capsule Take by mouth      simvastatin (ZOCOR) 40 mg tablet TAKE 1 TABLET BY MOUTH EVERY DAY 90 tablet 1    SODIUM FLUORIDE, DENTAL GEL, (PREVIDENT) 1 1 % GEL PreviDent 5000 Dry Mouth 1 1 % gel      traZODone (DESYREL) 150 mg tablet TAKE 1 TABLET (150 MG TOTAL) BY MOUTH DAILY AT BEDTIME 30 tablet 5     No current facility-administered medications for this visit  Allergies   Allergen Reactions    Duloxetine Other (See Comments)     Mental psychosis    Methotrexate     Revatio [Sildenafil] Other (See Comments)     mental status changes    Savella [Milnacipran] Other (See Comments)     Feeling out of it    Sulfamethoxazole-Trimethoprim Rash and GI Intolerance    Tedizolid Rash      Immunizations:     Immunization History   Administered Date(s) Administered    INFLUENZA 01/06/2006, 10/22/2013, 11/10/2014, 10/18/2016, 10/04/2017, 09/21/2018    Influenza Quadrivalent Preservative Free 3 years and older IM 10/18/2016    Influenza, high dose seasonal 0 5 mL 11/12/2019    Influenza, recombinant, quadrivalent,injectable, preservative free 09/21/2018    Pneumococcal Conjugate 13-Valent 02/15/2019    Pneumococcal Polysaccharide PPV23 01/01/2006    Tdap 10/01/2013      Health Maintenance:         Topic Date Due    Cervical Cancer Screening  04/25/1975    MAMMOGRAM  05/08/2020    CRC Screening: Colonoscopy  06/04/2024    Hepatitis C Screening  Completed     There are no preventive care reminders to display for this patient  Medicare Screening Tests and Risk Assessments:     Last Medicare Wellness visit information reviewed, patient interviewed and updates made to the record today  Health Risk Assessment:   Patient rates overall health as good  Patient feels that their physical health rating is slightly worse  Eyesight was rated as slightly worse  Hearing was rated as same  Patient feels that their emotional and mental health rating is slightly worse  Pain experienced in the last 7 days has been some  Patient's pain rating has been 3/10  Patient states that she has experienced no weight loss or gain in last 6 months  Depression Screening:   PHQ-2 Score: 2  PHQ-9 Score: 7      Fall Risk Screening:    In the past year, patient has experienced: no history of falling in past year      Urinary Incontinence Screening:   Patient has leaked urine accidently in the last six months  Home Safety:  Patient does not have trouble with stairs inside or outside of their home  Patient has working smoke alarms and has no working carbon monoxide detector  Home safety hazards include: none  Nutrition:   Current diet is Regular  Medications:   Patient is currently taking over-the-counter supplements  OTC medications include: see medication list  Patient is able to manage medications  Activities of Daily Living (ADLs)/Instrumental Activities of Daily Living (IADLs):   Walk and transfer into and out of bed and chair?: Yes  Dress and groom yourself?: Yes    Bathe or shower yourself?: Yes    Feed yourself?  Yes  Do your laundry/housekeeping?: Yes  Manage your money, pay your bills and track your expenses?: Yes  Make your own meals?: Yes    Do your own shopping?: Yes    Previous Hospitalizations:   Any hospitalizations or ED visits within the last 12 months?: Yes    How many hospitalizations have you had in the last year?: 1-2    Advance Care Planning:   Living will: No      PREVENTIVE SCREENINGS      Cardiovascular Screening:    General: Screening Not Indicated and History Lipid Disorder      Diabetes Screening:     General: Screening Current      Colorectal Cancer Screening:     General: Screening Current      Breast Cancer Screening:     General: Screening Current      Cervical Cancer Screening:    General: Screening Not Indicated      Abdominal Aortic Aneurysm (AAA) Screening:        General: Screening Not Indicated      Lung Cancer Screening:     General: Screening Not Indicated      Hepatitis C Screening:    General: Screening Current    No exam data present     Physical Exam:     /64   Pulse 84   Ht 5' 1" (1 549 m)   Wt 47 2 kg (104 lb)   SpO2 98%   BMI 19 65 kg/m²     Physical Exam   Constitutional: She is oriented to person, place, and time  She appears well-developed and well-nourished  Neck: Normal range of motion  Neck supple  Cardiovascular: Normal rate, regular rhythm, normal heart sounds and intact distal pulses  Pulmonary/Chest: Effort normal and breath sounds normal    Abdominal: Soft  Bowel sounds are normal    Musculoskeletal: Normal range of motion  Neurological: She is alert and oriented to person, place, and time  Skin: Skin is warm and dry  Capillary refill takes less than 2 seconds  Psychiatric: She has a normal mood and affect  Her behavior is normal  Judgment and thought content normal    Nursing note and vitals reviewed        Geovanna Mayer MD

## 2019-11-21 NOTE — PROGRESS NOTES
Assessment/Plan:    No problem-specific Assessment & Plan notes found for this encounter  Diagnoses and all orders for this visit:    Sjogren's syndrome, with unspecified organ involvement ([facility] [address] )    Other orders  -     aspirin (ASPIRIN 81) 81 mg EC tablet; Take 81 mg by mouth  -     calcium-vitamin D (OSCAL) 250-125 MG-UNIT per tablet; Take by mouth  -     esomeprazole (NexIUM) 40 MG capsule; Take 40 mg by mouth  -     Docusate Sodium 100 MG/5ML ENEM; Take 250 mg by mouth          Subjective:      Patient ID: Katey Calderon is a 71 y o  female  She is mourning the lost of her mother  She has some conflict with her family regarding the estate  She is on Wellbutrin XL and just started on Lexapro  She has noticed a little benefit  She has no HI/SI  The following portions of the patient's history were reviewed and updated as appropriate:   She  has a past medical history of ADHD, Cancer ([facility] ), Cellulitis of foot, Change in mental status, Chronic fatigue, PLAZA (dyspnea on exertion), Fibromyalgia, Folliculitis, GERD (gastroesophageal reflux disease), Osteopenia, Raynaud's disease, Scleroderma ([address] ), and Systemic sclerosis ([facility] [address] )    She   Patient Active Problem List    Diagnosis Date Noted    Elevated TSH 08/13/2019    Hypokalemia 08/13/2019    Chronic pain 08/12/2019    Medicare annual wellness visit, subsequent 02/15/2019    SOB (shortness of breath)     Vitamin D deficiency 11/28/2018    Postoperative hypothyroidism 11/28/2018    Osteoarthritis 10/17/2018    Raynaud's syndrome 10/17/2018    Rectal prolapse 10/17/2018    Sjogren's syndrome ([address] ) 10/17/2018    Thyroid cancer ([facility] ) 10/17/2018    BRCA gene mutation positive 09/21/2018    Preoperative clearance 06/25/2018    Fatigue 06/25/2018    Bilateral hand numbness 05/01/2018    Methotrexate, long term, current use 05/01/2018    Scleroderma progressive ([address] ) 05/01/2018    Fecal soiling 08/07/2017    Post concussion syndrome 12/01/2015  Carpal tunnel syndrome of left wrist 09/14/2015    Carpal tunnel syndrome of right wrist 08/27/2015    Lumbar disc herniation 08/27/2015    Radiculopathy, lumbar region 08/27/2015    Herniated lumbar intervertebral disc 08/03/2015    Heel spur 06/03/2015    Dental disorder 06/02/2015    Fibromyalgia 02/27/2015    Bartholin gland cyst 07/17/2014    Ovarian cyst 07/17/2014    Neck sprain and strain 04/22/2014    Hematuria 95/90/7690    Lichen planus 17/04/7957    Osteoarthritis of both knees 08/13/2013    Chest pain 05/14/2013    Pain in joint of left shoulder 60/25/8172    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[facility] [address] ) 12/07/2012    Allergic rhinitis 12/07/2012    Depression 12/07/2012    Esophageal reflux disease 12/07/2012    Hyperlipidemia 12/07/2012    Osteopenia 12/07/2012    Peripheral neuropathy 12/07/2012    Spasm 12/07/2012    Hearing loss 11/23/2012     She  has a past surgical history that includes Tonsillectomy; Back surgery; Rotator cuff repair; Hysterectomy; Bone marrow biopsy; Bladder surgery; Hemorrhoid surgery; Nasal septum surgery; Knee surgery; Cardiac catheterization; Thyroidectomy (N/A, 10/4/2018); and Total thyroidectomy  Her family history includes Arthritis in her mother and sister; Asthma in her sister; Coronary artery disease in her father; Crohn's disease in her sister; Diabetes in her mother; Other in her brother  She  reports that she has quit smoking  She has never used smokeless tobacco  She reports that she does not drink alcohol or use drugs    Current Outpatient Medications   Medication Sig Dispense Refill    ALPRAZolam (XANAX) 0 25 mg tablet Take 1 tablet (0 25 mg total) by mouth 3 (three) times a day as needed for anxiety 30 tablet 0    amphetamine-dextroamphetamine (ADDERALL XR) 30 MG 24 hr capsule Take 1 capsule (30 mg total) by mouth every morningMax Daily Amount: 30 mg 30 capsule 0    amphetamine-dextroamphetamine (ADDERALL XR) 30 MG 24 hr capsule Take 1 capsule (30 mg total) by mouth every morningMax Daily Amount: 30 mg 30 capsule 0    amphetamine-dextroamphetamine (ADDERALL) 20 mg tablet Take 1 tablet (20 mg total) by mouth 2 (two) times a dayMax Daily Amount: 40 mg 60 tablet 0    amphetamine-dextroamphetamine (ADDERALL) 20 mg tablet Take 1 tablet (20 mg total) by mouth 2 (two) times a dayMax Daily Amount: 40 mg 60 tablet 0    aspirin (ASPIRIN 81) 81 mg EC tablet Take 81 mg by mouth      aspirin (ECOTRIN LOW STRENGTH) 81 mg EC tablet Take 81 mg by mouth daily      buPROPion (WELLBUTRIN XL) 300 mg 24 hr tablet TAKE 1 TABLET BY MOUTH EVERY DAY 90 tablet 2    calcium carbonate (TUMS) 500 mg chewable tablet Chew 2 tablets (1,000 mg total) daily 60 tablet 0    calcium-vitamin D (OSCAL) 250-125 MG-UNIT per tablet Take by mouth      chlordiazePOXIDE (LIBRIUM) 5 mg capsule Take 5 mg by mouth 2 (two) times a day        chlordiazepoxide-clidinium (LIBRAX) 5-2 5 mg per capsule Take by mouth      dicyclomine (BENTYL) 10 mg capsule Take 20 mg by mouth 2 (two) times a day        Docusate Sodium 100 MG/5ML ENEM Take 250 mg by mouth      escitalopram (LEXAPRO) 10 mg tablet Take 1 tablet (10 mg total) by mouth daily 30 tablet 5    esomeprazole (NexIUM) 40 MG capsule Take 40 mg by mouth 2 (two) times a day before meals        esomeprazole (NexIUM) 40 MG capsule Take 40 mg by mouth      folic acid (FOLVITE) 1 mg tablet Take by mouth daily      hydroxychloroquine (PLAQUENIL) 200 mg tablet Take 200 mg by mouth 2 (two) times a day with meals        hydrOXYzine HCL (ATARAX) 50 mg tablet Take 1 tablet (50 mg total) by mouth 3 (three) times a day as needed for anxiety 90 tablet 0    levothyroxine 150 mcg tablet TAKE 1 TABLET (150 MCG TOTAL) BY MOUTH DAILY IN THE EARLY MORNING 30 tablet 0    lisinopril (ZESTRIL) 10 mg tablet   0    methotrexate 2 5 mg tablet AS DIRECTED 12 tablet 5    Multiple Vitamin (MULTIVITAMIN) tablet Take 1 tablet by mouth daily      naproxen (NAPROSYN) 500 mg tablet   1    nystatin (MYCOSTATIN) 500,000 units/5 mL suspension   1    oxymorphone (OPANA) 5 MG tablet Take 1 tablet (5 mg total) by mouth every 6 (six) hours as needed for moderate painMax Daily Amount: 20 mg 120 tablet 0    predniSONE 5 mg tablet Take 1 tablet (5 mg total) by mouth daily 30 tablet 5    pregabalin (LYRICA) 75 mg capsule 3 (three) times a day       pregabalin (LYRICA) 75 mg capsule       psyllium (METAMUCIL) 0 52 g capsule Take by mouth      simvastatin (ZOCOR) 40 mg tablet TAKE 1 TABLET BY MOUTH EVERY DAY 90 tablet 1    SODIUM FLUORIDE, DENTAL GEL, (PREVIDENT) 1 1 % GEL PreviDent 5000 Dry Mouth 1 1 % gel      traZODone (DESYREL) 150 mg tablet TAKE 1 TABLET (150 MG TOTAL) BY MOUTH DAILY AT BEDTIME 30 tablet 5     No current facility-administered medications for this visit        Current Outpatient Medications on File Prior to Visit   Medication Sig    ALPRAZolam (XANAX) 0 25 mg tablet Take 1 tablet (0 25 mg total) by mouth 3 (three) times a day as needed for anxiety    amphetamine-dextroamphetamine (ADDERALL XR) 30 MG 24 hr capsule Take 1 capsule (30 mg total) by mouth every morningMax Daily Amount: 30 mg    amphetamine-dextroamphetamine (ADDERALL XR) 30 MG 24 hr capsule Take 1 capsule (30 mg total) by mouth every morningMax Daily Amount: 30 mg    amphetamine-dextroamphetamine (ADDERALL) 20 mg tablet Take 1 tablet (20 mg total) by mouth 2 (two) times a dayMax Daily Amount: 40 mg    amphetamine-dextroamphetamine (ADDERALL) 20 mg tablet Take 1 tablet (20 mg total) by mouth 2 (two) times a dayMax Daily Amount: 40 mg    aspirin (ASPIRIN 81) 81 mg EC tablet Take 81 mg by mouth    aspirin (ECOTRIN LOW STRENGTH) 81 mg EC tablet Take 81 mg by mouth daily    buPROPion (WELLBUTRIN XL) 300 mg 24 hr tablet TAKE 1 TABLET BY MOUTH EVERY DAY    calcium carbonate (TUMS) 500 mg chewable tablet Chew 2 tablets (1,000 mg total) daily    calcium-vitamin D (OSCAL) 250-125 MG-UNIT per tablet Take by mouth    chlordiazePOXIDE (LIBRIUM) 5 mg capsule Take 5 mg by mouth 2 (two) times a day      chlordiazepoxide-clidinium (LIBRAX) 5-2 5 mg per capsule Take by mouth    dicyclomine (BENTYL) 10 mg capsule Take 20 mg by mouth 2 (two) times a day      Docusate Sodium 100 MG/5ML ENEM Take 250 mg by mouth    escitalopram (LEXAPRO) 10 mg tablet Take 1 tablet (10 mg total) by mouth daily    esomeprazole (NexIUM) 40 MG capsule Take 40 mg by mouth 2 (two) times a day before meals      esomeprazole (NexIUM) 40 MG capsule Take 40 mg by mouth    folic acid (FOLVITE) 1 mg tablet Take by mouth daily    hydroxychloroquine (PLAQUENIL) 200 mg tablet Take 200 mg by mouth 2 (two) times a day with meals      hydrOXYzine HCL (ATARAX) 50 mg tablet Take 1 tablet (50 mg total) by mouth 3 (three) times a day as needed for anxiety    levothyroxine 150 mcg tablet TAKE 1 TABLET (150 MCG TOTAL) BY MOUTH DAILY IN THE EARLY MORNING    lisinopril (ZESTRIL) 10 mg tablet     methotrexate 2 5 mg tablet AS DIRECTED    Multiple Vitamin (MULTIVITAMIN) tablet Take 1 tablet by mouth daily    naproxen (NAPROSYN) 500 mg tablet     nystatin (MYCOSTATIN) 500,000 units/5 mL suspension     oxymorphone (OPANA) 5 MG tablet Take 1 tablet (5 mg total) by mouth every 6 (six) hours as needed for moderate painMax Daily Amount: 20 mg    predniSONE 5 mg tablet Take 1 tablet (5 mg total) by mouth daily    pregabalin (LYRICA) 75 mg capsule 3 (three) times a day     pregabalin (LYRICA) 75 mg capsule     psyllium (METAMUCIL) 0 52 g capsule Take by mouth    simvastatin (ZOCOR) 40 mg tablet TAKE 1 TABLET BY MOUTH EVERY DAY    SODIUM FLUORIDE, DENTAL GEL, (PREVIDENT) 1 1 % GEL PreviDent 5000 Dry Mouth 1 1 % gel    traZODone (DESYREL) 150 mg tablet TAKE 1 TABLET (150 MG TOTAL) BY MOUTH DAILY AT BEDTIME     No current facility-administered medications on file prior to visit        She is allergic to duloxetine; methotrexate; revatio [sildenafil]; savella [milnacipran]; sulfamethoxazole-trimethoprim; and tedizolid       Review of Systems   All other systems reviewed and are negative  Objective:      /64   Pulse 84   Ht 5' 1" (1 549 m)   Wt 47 2 kg (104 lb)   SpO2 98%   BMI 19 65 kg/m²          Physical Exam   Constitutional: She is oriented to person, place, and time  She appears well-developed and well-nourished  Neck: Normal range of motion  Neck supple  Cardiovascular: Normal rate, regular rhythm, normal heart sounds and intact distal pulses  Pulmonary/Chest: Effort normal and breath sounds normal    Abdominal: Soft  Bowel sounds are normal    Musculoskeletal: Normal range of motion  Neurological: She is alert and oriented to person, place, and time  Skin: Skin is warm and dry  Capillary refill takes less than 2 seconds  Psychiatric: She has a normal mood and affect  Her behavior is normal  Judgment and thought content normal    Nursing note and vitals reviewed

## 2019-11-21 NOTE — PATIENT INSTRUCTIONS
Medicare Preventive Visit Patient Instructions  Thank you for completing your Welcome to Medicare Visit or Medicare Annual Wellness Visit today  Your next wellness visit will be due in one year (11/21/2020)  The screening/preventive services that you may require over the next 5-10 years are detailed below  Some tests may not apply to you based off risk factors and/or age  Screening tests ordered at today's visit but not completed yet may show as past due  Also, please note that scanned in results may not display below  Preventive Screenings:  Service Recommendations Previous Testing/Comments   Colorectal Cancer Screening  * Colonoscopy    * Fecal Occult Blood Test (FOBT)/Fecal Immunochemical Test (FIT)  * Fecal DNA/Cologuard Test  * Flexible Sigmoidoscopy Age: 54-65 years old   Colonoscopy: every 10 years (may be performed more frequently if at higher risk)  OR  FOBT/FIT: every 1 year  OR  Cologuard: every 3 years  OR  Sigmoidoscopy: every 5 years  Screening may be recommended earlier than age 48 if at higher risk for colorectal cancer  Also, an individualized decision between you and your healthcare provider will decide whether screening between the ages of 74-80 would be appropriate  Colonoscopy: 06/04/2019  FOBT/FIT: Not on file  Cologuard: Not on file  Sigmoidoscopy: Not on file    Screening Current     Breast Cancer Screening Age: 36 years old  Frequency: every 1-2 years  Not required if history of left and right mastectomy Mammogram: 05/08/2018    Screening Current   Cervical Cancer Screening Between the ages of 21-29, pap smear recommended once every 3 years  Between the ages of 33-67, can perform pap smear with HPV co-testing every 5 years     Recommendations may differ for women with a history of total hysterectomy, cervical cancer, or abnormal pap smears in past  Pap Smear: Not on file    Screening Not Indicated   Hepatitis C Screening Once for adults born between 1945 and 1965  More frequently in patients at high risk for Hepatitis C Hep C Antibody: 02/19/2019    Screening Current   Diabetes Screening 1-2 times per year if you're at risk for diabetes or have pre-diabetes Fasting glucose: 99 mg/dL   A1C: No results in last 5 years    Screening Current   Cholesterol Screening Once every 5 years if you don't have a lipid disorder  May order more often based on risk factors  Lipid panel: 08/13/2019    Screening Not Indicated  History Lipid Disorder     Other Preventive Screenings Covered by Medicare:  1  Abdominal Aortic Aneurysm (AAA) Screening: covered once if your at risk  You're considered to be at risk if you have a family history of AAA  2  Lung Cancer Screening: covers low dose CT scan once per year if you meet all of the following conditions: (1) Age 50-69; (2) No signs or symptoms of lung cancer; (3) Current smoker or have quit smoking within the last 15 years; (4) You have a tobacco smoking history of at least 30 pack years (packs per day multiplied by number of years you smoked); (5) You get a written order from a healthcare provider  3  Glaucoma Screening: covered annually if you're considered high risk: (1) You have diabetes OR (2) Family history of glaucoma OR (3)  aged 48 and older OR (3)  American aged 72 and older  3  Osteoporosis Screening: covered every 2 years if you meet one of the following conditions: (1) You're estrogen deficient and at risk for osteoporosis based off medical history and other findings; (2) Have a vertebral abnormality; (3) On glucocorticoid therapy for more than 3 months; (4) Have primary hyperparathyroidism; (5) On osteoporosis medications and need to assess response to drug therapy  · Last bone density test (DXA Scan): 02/19/2019   5  HIV Screening: covered annually if you're between the age of 15-65  Also covered annually if you are younger than 13 and older than 72 with risk factors for HIV infection   For pregnant patients, it is covered up to 3 times per pregnancy  Immunizations:  Immunization Recommendations   Influenza Vaccine Annual influenza vaccination during flu season is recommended for all persons aged >= 6 months who do not have contraindications   Pneumococcal Vaccine (Prevnar and Pneumovax)  * Prevnar = PCV13  * Pneumovax = PPSV23   Adults 25-60 years old: 1-3 doses may be recommended based on certain risk factors  Adults 72 years old: Prevnar (PCV13) vaccine recommended followed by Pneumovax (PPSV23) vaccine  If already received PPSV23 since turning 65, then PCV13 recommended at least one year after PPSV23 dose  Hepatitis B Vaccine 3 dose series if at intermediate or high risk (ex: diabetes, end stage renal disease, liver disease)   Tetanus (Td) Vaccine - COST NOT COVERED BY MEDICARE PART B Following completion of primary series, a booster dose should be given every 10 years to maintain immunity against tetanus  Td may also be given as tetanus wound prophylaxis  Tdap Vaccine - COST NOT COVERED BY MEDICARE PART B Recommended at least once for all adults  For pregnant patients, recommended with each pregnancy  Shingles Vaccine (Shingrix) - COST NOT COVERED BY MEDICARE PART B  2 shot series recommended in those aged 48 and above     Health Maintenance Due:      Topic Date Due    Cervical Cancer Screening  04/25/1975    MAMMOGRAM  05/08/2020    CRC Screening: Colonoscopy  06/04/2024    Hepatitis C Screening  Completed     Immunizations Due:  There are no preventive care reminders to display for this patient  Advance Directives   What are advance directives? Advance directives are legal documents that state your wishes and plans for medical care  These plans are made ahead of time in case you lose your ability to make decisions for yourself  Advance directives can apply to any medical decision, such as the treatments you want, and if you want to donate organs  What are the types of advance directives?   There are many types of advance directives, and each state has rules about how to use them  You may choose a combination of any of the following:  · Living will: This is a written record of the treatment you want  You can also choose which treatments you do not want, which to limit, and which to stop at a certain time  This includes surgery, medicine, IV fluid, and tube feedings  · Durable power of  for healthcare [facility]): This is a written record that states who you want to make healthcare choices for you when you are unable to make them for yourself  This person, called a proxy, is usually a family member or a friend  You may choose more than 1 proxy  · Do not resuscitate (DNR) order:  A DNR order is used in case your heart stops beating or you stop breathing  It is a request not to have certain forms of treatment, such as CPR  A DNR order may be included in other types of advance directives  · Medical directive: This covers the care that you want if you are in a coma, near death, or unable to make decisions for yourself  You can list the treatments you want for each condition  Treatment may include pain medicine, surgery, blood transfusions, dialysis, IV or tube feedings, and a ventilator (breathing machine)  · Values history: This document has questions about your views, beliefs, and how you feel and think about life  This information can help others choose the care that you would choose  Why are advance directives important? An advance directive helps you control your care  Although spoken wishes may be used, it is better to have your wishes written down  Spoken wishes can be misunderstood, or not followed  Treatments may be given even if you do not want them  An advance directive may make it easier for your family to make difficult choices about your care  Urinary Incontinence   Urinary incontinence (UI)  is when you lose control of your bladder   UI develops because your bladder cannot store or empty urine properly  The 3 most common types of UI are stress incontinence, urge incontinence, or both  Medicines:   · May be given to help strengthen your bladder control  Report any side effects of medication to your healthcare provider  Do pelvic muscle exercises often:  Your pelvic muscles help you stop urinating  Squeeze these muscles tight for 5 seconds, then relax for 5 seconds  Gradually work up to squeezing for 10 seconds  Do 3 sets of 15 repetitions a day, or as directed  This will help strengthen your pelvic muscles and improve bladder control  Train your bladder:  Go to the bathroom at set times, such as every 2 hours, even if you do not feel the urge to go  You can also try to hold your urine when you feel the urge to go  For example, hold your urine for 5 minutes when you feel the urge to go  As that becomes easier, hold your urine for 10 minutes  Self-care:   · Keep a UI record  Write down how often you leak urine and how much you leak  Make a note of what you were doing when you leaked urine  · Drink liquids as directed  You may need to limit the amount of liquid you drink to help control your urine leakage  Do not drink any liquid right before you go to bed  Limit or do not have drinks that contain caffeine or alcohol  · Prevent constipation  Eat a variety of high-fiber foods  Good examples are high-fiber cereals, beans, vegetables, and whole-grain breads  Walking is the best way to trigger your intestines to have a bowel movement  · Exercise regularly and maintain a healthy weight  Weight loss and exercise will decrease pressure on your bladder and help you control your leakage  · Use a catheter as directed  to help empty your bladder  A catheter is a tiny, plastic tube that is put into your bladder to drain your urine  · Go to behavior therapy as directed  Behavior therapy may be used to help you learn to control your urge to urinate         © Copyright [employer] 2018 Information is for End User's use only and may not be sold, redistributed or otherwise used for commercial purposes   All illustrations and images included in CareNotes® are the copyrighted property of A D A M , Inc  or Kimo Esparza

## 2019-11-22 RX ORDER — DEXTROAMPHETAMINE SACCHARATE, AMPHETAMINE ASPARTATE, DEXTROAMPHETAMINE SULFATE AND AMPHETAMINE SULFATE 5; 5; 5; 5 MG/1; MG/1; MG/1; MG/1
20 TABLET ORAL
Qty: 60 TABLET | Refills: 0 | Status: SHIPPED | OUTPATIENT
Start: 2019-11-22 | End: 2019-12-18 | Stop reason: SDUPTHER

## 2019-12-05 DIAGNOSIS — R52 PAIN: ICD-10-CM

## 2019-12-05 RX ORDER — OXYMORPHONE HYDROCHLORIDE 5 MG/1
5 TABLET ORAL EVERY 6 HOURS PRN
Qty: 120 TABLET | Refills: 0 | Status: SHIPPED | OUTPATIENT
Start: 2019-12-05 | End: 2020-01-14 | Stop reason: SDUPTHER

## 2019-12-07 DIAGNOSIS — C73 THYROID CANCER (HCC): ICD-10-CM

## 2019-12-09 RX ORDER — LEVOTHYROXINE SODIUM 0.15 MG/1
150 TABLET ORAL
Qty: 30 TABLET | Refills: 0 | Status: SHIPPED | OUTPATIENT
Start: 2019-12-09 | End: 2020-01-02

## 2019-12-13 DIAGNOSIS — F51.01 PRIMARY INSOMNIA: ICD-10-CM

## 2019-12-13 RX ORDER — TRAZODONE HYDROCHLORIDE 150 MG/1
150 TABLET ORAL
Qty: 30 TABLET | Refills: 5 | Status: SHIPPED | OUTPATIENT
Start: 2019-12-13 | End: 2019-12-13 | Stop reason: SDUPTHER

## 2019-12-13 RX ORDER — TRAZODONE HYDROCHLORIDE 150 MG/1
150 TABLET ORAL
Qty: 30 TABLET | Refills: 5 | Status: SHIPPED | OUTPATIENT
Start: 2019-12-13 | End: 2020-12-06

## 2019-12-18 ENCOUNTER — TELEPHONE (OUTPATIENT)
Dept: GASTROENTEROLOGY | Facility: CLINIC | Age: 64
End: 2019-12-18

## 2019-12-18 DIAGNOSIS — F98.8 ATTENTION DEFICIT DISORDER, UNSPECIFIED HYPERACTIVITY PRESENCE: ICD-10-CM

## 2019-12-18 NOTE — TELEPHONE ENCOUNTER
Called patient to offer 2/26/20 @ 3:30     ----- Message from Wilmer Ashley MD sent at 12/18/2019  8:17 AM EST -----  Can you schedule this patient to see me in the office? Lorna Leblanc if it is in the next few months       Thanks

## 2019-12-19 ENCOUNTER — TELEPHONE (OUTPATIENT)
Dept: GASTROENTEROLOGY | Facility: CLINIC | Age: 64
End: 2019-12-19

## 2019-12-19 RX ORDER — DEXTROAMPHETAMINE SACCHARATE, AMPHETAMINE ASPARTATE, DEXTROAMPHETAMINE SULFATE AND AMPHETAMINE SULFATE 5; 5; 5; 5 MG/1; MG/1; MG/1; MG/1
20 TABLET ORAL
Qty: 60 TABLET | Refills: 0 | Status: SHIPPED | OUTPATIENT
Start: 2019-12-19 | End: 2020-01-14 | Stop reason: SDUPTHER

## 2019-12-19 RX ORDER — DEXTROAMPHETAMINE SACCHARATE, AMPHETAMINE ASPARTATE MONOHYDRATE, DEXTROAMPHETAMINE SULFATE AND AMPHETAMINE SULFATE 7.5; 7.5; 7.5; 7.5 MG/1; MG/1; MG/1; MG/1
30 CAPSULE, EXTENDED RELEASE ORAL EVERY MORNING
Qty: 30 CAPSULE | Refills: 0 | Status: SHIPPED | OUTPATIENT
Start: 2019-12-19 | End: 2020-01-14 | Stop reason: SDUPTHER

## 2019-12-19 NOTE — TELEPHONE ENCOUNTER
Patient scheduled  ----- Message from Gareth Joseph MD sent at 12/18/2019  8:17 AM EST -----  Can you schedule this patient to see me in the office? Kateryna Albarran if it is in the next few months       Thanks

## 2019-12-23 ENCOUNTER — TELEPHONE (OUTPATIENT)
Dept: FAMILY MEDICINE CLINIC | Facility: CLINIC | Age: 64
End: 2019-12-23

## 2019-12-23 DIAGNOSIS — B37.0 THRUSH: Primary | ICD-10-CM

## 2020-01-02 ENCOUNTER — TELEPHONE (OUTPATIENT)
Dept: FAMILY MEDICINE CLINIC | Facility: CLINIC | Age: 65
End: 2020-01-02

## 2020-01-02 DIAGNOSIS — J11.1 INFLUENZA: Primary | ICD-10-CM

## 2020-01-02 DIAGNOSIS — C73 THYROID CANCER (HCC): ICD-10-CM

## 2020-01-02 RX ORDER — OSELTAMIVIR PHOSPHATE 75 MG/1
75 CAPSULE ORAL EVERY 12 HOURS SCHEDULED
Qty: 10 CAPSULE | Refills: 0 | Status: SHIPPED | OUTPATIENT
Start: 2020-01-02 | End: 2020-01-07

## 2020-01-02 RX ORDER — LEVOTHYROXINE SODIUM 0.15 MG/1
150 TABLET ORAL
Qty: 30 TABLET | Refills: 0 | Status: SHIPPED | OUTPATIENT
Start: 2020-01-02 | End: 2020-01-13

## 2020-01-11 DIAGNOSIS — C73 THYROID CANCER (HCC): ICD-10-CM

## 2020-01-13 RX ORDER — LEVOTHYROXINE SODIUM 0.15 MG/1
150 TABLET ORAL
Qty: 30 TABLET | Refills: 0 | Status: SHIPPED | OUTPATIENT
Start: 2020-01-13 | End: 2020-03-09

## 2020-01-14 ENCOUNTER — OFFICE VISIT (OUTPATIENT)
Dept: FAMILY MEDICINE CLINIC | Facility: CLINIC | Age: 65
End: 2020-01-14
Payer: MEDICARE

## 2020-01-14 VITALS
WEIGHT: 104 LBS | SYSTOLIC BLOOD PRESSURE: 116 MMHG | DIASTOLIC BLOOD PRESSURE: 68 MMHG | HEART RATE: 101 BPM | HEIGHT: 61 IN | OXYGEN SATURATION: 96 % | BODY MASS INDEX: 19.63 KG/M2

## 2020-01-14 DIAGNOSIS — M35.00 SJOGREN'S SYNDROME, WITH UNSPECIFIED ORGAN INVOLVEMENT (HCC): ICD-10-CM

## 2020-01-14 DIAGNOSIS — C73 THYROID CANCER (HCC): ICD-10-CM

## 2020-01-14 DIAGNOSIS — D64.9 ANEMIA, UNSPECIFIED TYPE: ICD-10-CM

## 2020-01-14 DIAGNOSIS — R52 PAIN: ICD-10-CM

## 2020-01-14 DIAGNOSIS — F98.8 ATTENTION DEFICIT DISORDER, UNSPECIFIED HYPERACTIVITY PRESENCE: ICD-10-CM

## 2020-01-14 DIAGNOSIS — G72.89 NECROTIZING MYOPATHY: Primary | ICD-10-CM

## 2020-01-14 DIAGNOSIS — M34.0 SCLERODERMA PROGRESSIVE (HCC): ICD-10-CM

## 2020-01-14 DIAGNOSIS — N30.00 ACUTE CYSTITIS WITHOUT HEMATURIA: Primary | ICD-10-CM

## 2020-01-14 DIAGNOSIS — C82.90 FOLLICULAR LYMPHOMA, UNSPECIFIED FOLLICULAR LYMPHOMA TYPE, UNSPECIFIED BODY REGION (HCC): ICD-10-CM

## 2020-01-14 DIAGNOSIS — N30.01 ACUTE CYSTITIS WITH HEMATURIA: ICD-10-CM

## 2020-01-14 DIAGNOSIS — Z12.31 BREAST CANCER SCREENING BY MAMMOGRAM: ICD-10-CM

## 2020-01-14 LAB
SL AMB  POCT GLUCOSE, UA: ABNORMAL
SL AMB LEUKOCYTE ESTERASE,UA: ABNORMAL
SL AMB POCT BILIRUBIN,UA: ABNORMAL
SL AMB POCT BLOOD,UA: ABNORMAL
SL AMB POCT CLARITY,UA: CLEAR
SL AMB POCT COLOR,UA: YELLOW
SL AMB POCT KETONES,UA: 15
SL AMB POCT NITRITE,UA: ABNORMAL
SL AMB POCT PH,UA: 5
SL AMB POCT SPECIFIC GRAVITY,UA: 1.01
SL AMB POCT URINE PROTEIN: 30
SL AMB POCT UROBILINOGEN: NORMAL

## 2020-01-14 PROCEDURE — 81002 URINALYSIS NONAUTO W/O SCOPE: CPT

## 2020-01-14 PROCEDURE — 99214 OFFICE O/P EST MOD 30 MIN: CPT | Performed by: FAMILY MEDICINE

## 2020-01-14 RX ORDER — OXYMORPHONE HYDROCHLORIDE 5 MG/1
5 TABLET ORAL EVERY 6 HOURS PRN
Qty: 120 TABLET | Refills: 0 | Status: SHIPPED | OUTPATIENT
Start: 2020-01-14 | End: 2020-02-17 | Stop reason: SDUPTHER

## 2020-01-14 RX ORDER — DEXTROAMPHETAMINE SACCHARATE, AMPHETAMINE ASPARTATE, DEXTROAMPHETAMINE SULFATE AND AMPHETAMINE SULFATE 5; 5; 5; 5 MG/1; MG/1; MG/1; MG/1
20 TABLET ORAL
Qty: 60 TABLET | Refills: 0 | Status: SHIPPED | OUTPATIENT
Start: 2020-01-14 | End: 2020-02-21 | Stop reason: SDUPTHER

## 2020-01-14 RX ORDER — FLUCONAZOLE 150 MG/1
TABLET ORAL
Refills: 5 | COMMUNITY
Start: 2019-12-07 | End: 2021-01-22 | Stop reason: SDUPTHER

## 2020-01-14 RX ORDER — DEXTROAMPHETAMINE SACCHARATE, AMPHETAMINE ASPARTATE MONOHYDRATE, DEXTROAMPHETAMINE SULFATE AND AMPHETAMINE SULFATE 7.5; 7.5; 7.5; 7.5 MG/1; MG/1; MG/1; MG/1
30 CAPSULE, EXTENDED RELEASE ORAL EVERY MORNING
Qty: 30 CAPSULE | Refills: 0 | Status: SHIPPED | OUTPATIENT
Start: 2020-01-14 | End: 2020-02-11 | Stop reason: SDUPTHER

## 2020-01-14 RX ORDER — HYDROCODONE BITARTRATE AND ACETAMINOPHEN 5; 325 MG/1; MG/1
TABLET ORAL
Refills: 0 | COMMUNITY
Start: 2019-11-25 | End: 2020-02-11 | Stop reason: SDUPTHER

## 2020-01-14 NOTE — PROGRESS NOTES
Assessment/Plan:    Sjogren's syndrome ([facility] [address] )  Continue current  Plan per rheumatology  Thyroid cancer ([facility] [address] )  Stable  Continue current  Scleroderma progressive ([facility] [address] )  Continue current  Plan per rheumatology  Diagnoses and all orders for this visit:    Necrotizing myopathy    Sjogren's syndrome, with unspecified organ involvement ([facility] [address] )    Thyroid cancer ([facility] [address] )    Scleroderma progressive ([facility] [address] )    Follicular lymphoma, unspecified follicular lymphoma type, unspecified body region [facility])    Breast cancer screening by mammogram  -     Mammo screening bilateral w cad; Future    Acute cystitis with hematuria  -     UA (URINE) with reflex to Scope    Other orders  -     fluconazole (DIFLUCAN) 150 mg tablet  -     HYDROcodone-acetaminophen (NORCO) 5-325 mg per tablet          Subjective:      Patient ID: Yolanda Sharma is a 71 y o  female  She has a past medical history significant for autoimmune disease  Specifically, she was 1st diagnosed with Sjogren syndrome and later with scleroderma  She has a history of follicular lymphoma and thyroid cancer  She has had multiple complications from her Sjogren's syndrome including multiple dental issues  She has had multiple episodes of esophagitis and esophageal stricture  She has also had rectal prolapse that was unrelated to her above-listed conditions  Most recently, 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  She has chronic pain related to both her autoimmune disorder and failed back syndrome  She has had a surgery on her low back and her cervical spine  She is on multiple medications, including a pain regimen  She is prescribed Opana with good results  She is assigned treatment agreement in the chart  I have queried the [facility] prescription drug monitoring program to sure that we are the only provider writing for this medication    She did receive a small prescription for pain medication after her surgical procedure (muscle biopsy)  There has never been any concern for misuse, abuse, or diversion  She and I have discussed the long-term risks and benefits of this medication and she and I are both in agreement that her case the benefits outweigh the risks  I am treating her with Adderall off-label for the fatigue associated with her autoimmune disease  Her insurance would not cover Provigil or Nuvigil  It has worked well to this date  She is up-to-date on flu shot and Pneumovax  I wrote for mammogram today  She is status post total hysterectomy  She does not have a cervix  The following portions of the patient's history were reviewed and updated as appropriate:   She  has a past medical history of ADHD, Cancer ([address] ), Cellulitis of foot, Change in mental status, Chronic fatigue, PLAZA (dyspnea on exertion), Fibromyalgia, Folliculitis, GERD (gastroesophageal reflux disease), Osteopenia, Raynaud's disease, Scleroderma ([address] ), and Systemic sclerosis ([address] )    She   Patient Active Problem List    Diagnosis Date Noted    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    Vasomotor rhinitis 11/21/2019    Elevated TSH 08/13/2019    Hypokalemia 08/13/2019    Chronic pain 08/12/2019    Medicare annual wellness visit, subsequent 02/15/2019    SOB (shortness of breath)     Vitamin D deficiency 11/28/2018    Postoperative hypothyroidism 11/28/2018    Osteoarthritis 10/17/2018    Raynaud's syndrome 10/17/2018    Rectal prolapse 10/17/2018    Sjogren's syndrome ([address] ) 10/17/2018    Thyroid cancer ([facility] [address] ) 10/17/2018    BRCA gene mutation positive 09/21/2018    Preoperative clearance 06/25/2018    Fatigue 06/25/2018    Bilateral hand numbness 05/01/2018    Methotrexate, long term, current use 05/01/2018    Scleroderma progressive ([address] ) 05/01/2018    Fecal soiling 08/07/2017    Post concussion syndrome 12/01/2015    Carpal tunnel syndrome of left wrist 09/14/2015    Carpal tunnel syndrome of right wrist 08/27/2015    Lumbar disc herniation 08/27/2015    Radiculopathy, lumbar region 08/27/2015    Herniated lumbar intervertebral disc 08/03/2015    Heel spur 06/03/2015    Dental disorder 06/02/2015    Fibromyalgia 02/27/2015    Bartholin gland cyst 07/17/2014    Ovarian cyst 07/17/2014    Neck sprain and strain 04/22/2014    Hematuria 00/82/3489    Lichen planus 60/20/0735    Osteoarthritis of both knees 08/13/2013    Chest pain 05/14/2013    Pain in joint of left shoulder 29/86/5580    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[facility] [address] ) 12/07/2012    Allergic rhinitis 12/07/2012    Depression 12/07/2012    Esophageal reflux disease 12/07/2012    Hyperlipidemia 12/07/2012    Osteopenia 12/07/2012    Peripheral neuropathy 12/07/2012    Spasm 12/07/2012    Hearing loss 11/23/2012     She  has a past surgical history that includes Tonsillectomy; Back surgery; Rotator cuff repair; Hysterectomy; Bone marrow biopsy; Bladder surgery; Hemorrhoid surgery; Nasal septum surgery; Knee surgery; Cardiac catheterization; Thyroidectomy (N/A, 10/4/2018); and Total thyroidectomy  Her family history includes Arthritis in her mother and sister; Asthma in her sister; Coronary artery disease in her father; Crohn's disease in her sister; Diabetes in her mother; Other in her brother  She  reports that she has quit smoking  She has never used smokeless tobacco  She reports that she does not drink alcohol or use drugs    Current Outpatient Medications   Medication Sig Dispense Refill    ALPRAZolam (XANAX) 0 25 mg tablet Take 1 tablet (0 25 mg total) by mouth 3 (three) times a day as needed for anxiety 30 tablet 0    amphetamine-dextroamphetamine (ADDERALL XR) 30 MG 24 hr capsule Take 1 capsule (30 mg total) by mouth every morningMax Daily Amount: 30 mg 30 capsule 0    amphetamine-dextroamphetamine (ADDERALL) 20 mg tablet Take 1 tablet (20 mg total) by mouth 2 (two) times a dayMax Daily Amount: 40 mg 60 tablet 0    aspirin (ASPIRIN 81) 81 mg EC tablet Take 81 mg by mouth      aspirin (ECOTRIN LOW STRENGTH) 81 mg EC tablet Take 81 mg by mouth daily      buPROPion (WELLBUTRIN XL) 300 mg 24 hr tablet TAKE 1 TABLET BY MOUTH EVERY DAY 90 tablet 2    calcium carbonate (TUMS) 500 mg chewable tablet Chew 2 tablets (1,000 mg total) daily 60 tablet 0    calcium-vitamin D (OSCAL) 250-125 MG-UNIT per tablet Take by mouth      chlordiazePOXIDE (LIBRIUM) 5 mg capsule Take 5 mg by mouth 2 (two) times a day        chlordiazepoxide-clidinium (LIBRAX) 5-2 5 mg per capsule Take by mouth      dicyclomine (BENTYL) 10 mg capsule Take 20 mg by mouth 2 (two) times a day        Docusate Sodium 100 MG/5ML ENEM Take 250 mg by mouth      escitalopram (LEXAPRO) 10 mg tablet Take 1 tablet (10 mg total) by mouth daily 30 tablet 5    esomeprazole (NexIUM) 40 MG capsule Take 40 mg by mouth 2 (two) times a day before meals        esomeprazole (NexIUM) 40 MG capsule Take 40 mg by mouth      fluconazole (DIFLUCAN) 150 mg tablet   5    folic acid (FOLVITE) 1 mg tablet Take by mouth daily      HYDROcodone-acetaminophen (NORCO) 5-325 mg per tablet   0    hydroxychloroquine (PLAQUENIL) 200 mg tablet Take 200 mg by mouth 2 (two) times a day with meals        hydrOXYzine HCL (ATARAX) 50 mg tablet Take 1 tablet (50 mg total) by mouth 3 (three) times a day as needed for anxiety 90 tablet 0    ipratropium (ATROVENT) 0 03 % nasal spray 2 sprays into each nostril every 12 (twelve) hours 30 mL 5    levothyroxine 150 mcg tablet TAKE 1 TABLET (150 MCG TOTAL) BY MOUTH DAILY IN THE EARLY MORNING 30 tablet 0    lisinopril (ZESTRIL) 10 mg tablet   0    methotrexate 2 5 mg tablet AS DIRECTED 12 tablet 5    Multiple Vitamin (MULTIVITAMIN) tablet Take 1 tablet by mouth daily      naproxen (NAPROSYN) 500 mg tablet   1    nystatin (MYCOSTATIN) 500,000 units/5 mL suspension Take 5 mL (500,000 Units total) by mouth 3 (three) times a day 473 mL 1    oxymorphone (OPANA) 5 MG tablet Take 1 tablet (5 mg total) by mouth every 6 (six) hours as needed for moderate painMax Daily Amount: 20 mg 120 tablet 0    predniSONE 5 mg tablet Take 1 tablet (5 mg total) by mouth daily 30 tablet 5    pregabalin (LYRICA) 75 mg capsule 3 (three) times a day       pregabalin (LYRICA) 75 mg capsule       psyllium (METAMUCIL) 0 52 g capsule Take by mouth      SODIUM FLUORIDE, DENTAL GEL, (PREVIDENT) 1 1 % GEL PreviDent 5000 Dry Mouth 1 1 % gel      traZODone (DESYREL) 150 mg tablet Take 1 tablet (150 mg total) by mouth daily at bedtime 30 tablet 5     No current facility-administered medications for this visit        Current Outpatient Medications on File Prior to Visit   Medication Sig    ALPRAZolam (XANAX) 0 25 mg tablet Take 1 tablet (0 25 mg total) by mouth 3 (three) times a day as needed for anxiety    amphetamine-dextroamphetamine (ADDERALL XR) 30 MG 24 hr capsule Take 1 capsule (30 mg total) by mouth every morningMax Daily Amount: 30 mg    amphetamine-dextroamphetamine (ADDERALL) 20 mg tablet Take 1 tablet (20 mg total) by mouth 2 (two) times a dayMax Daily Amount: 40 mg    aspirin (ASPIRIN 81) 81 mg EC tablet Take 81 mg by mouth    aspirin (ECOTRIN LOW STRENGTH) 81 mg EC tablet Take 81 mg by mouth daily    buPROPion (WELLBUTRIN XL) 300 mg 24 hr tablet TAKE 1 TABLET BY MOUTH EVERY DAY    calcium carbonate (TUMS) 500 mg chewable tablet Chew 2 tablets (1,000 mg total) daily    calcium-vitamin D (OSCAL) 250-125 MG-UNIT per tablet Take by mouth    chlordiazePOXIDE (LIBRIUM) 5 mg capsule Take 5 mg by mouth 2 (two) times a day      chlordiazepoxide-clidinium (LIBRAX) 5-2 5 mg per capsule Take by mouth    dicyclomine (BENTYL) 10 mg capsule Take 20 mg by mouth 2 (two) times a day      Docusate Sodium 100 MG/5ML ENEM Take 250 mg by mouth    escitalopram (LEXAPRO) 10 mg tablet Take 1 tablet (10 mg total) by mouth daily    esomeprazole (NexIUM) 40 MG capsule Take 40 mg by mouth 2 (two) times a day before meals      esomeprazole (NexIUM) 40 MG capsule Take 40 mg by mouth    fluconazole (DIFLUCAN) 150 mg tablet     folic acid (FOLVITE) 1 mg tablet Take by mouth daily    HYDROcodone-acetaminophen (NORCO) 5-325 mg per tablet     hydroxychloroquine (PLAQUENIL) 200 mg tablet Take 200 mg by mouth 2 (two) times a day with meals      hydrOXYzine HCL (ATARAX) 50 mg tablet Take 1 tablet (50 mg total) by mouth 3 (three) times a day as needed for anxiety    ipratropium (ATROVENT) 0 03 % nasal spray 2 sprays into each nostril every 12 (twelve) hours    levothyroxine 150 mcg tablet TAKE 1 TABLET (150 MCG TOTAL) BY MOUTH DAILY IN THE EARLY MORNING    lisinopril (ZESTRIL) 10 mg tablet     methotrexate 2 5 mg tablet AS DIRECTED    Multiple Vitamin (MULTIVITAMIN) tablet Take 1 tablet by mouth daily    naproxen (NAPROSYN) 500 mg tablet     nystatin (MYCOSTATIN) 500,000 units/5 mL suspension Take 5 mL (500,000 Units total) by mouth 3 (three) times a day    oxymorphone (OPANA) 5 MG tablet Take 1 tablet (5 mg total) by mouth every 6 (six) hours as needed for moderate painMax Daily Amount: 20 mg    predniSONE 5 mg tablet Take 1 tablet (5 mg total) by mouth daily    pregabalin (LYRICA) 75 mg capsule 3 (three) times a day     pregabalin (LYRICA) 75 mg capsule     psyllium (METAMUCIL) 0 52 g capsule Take by mouth    SODIUM FLUORIDE, DENTAL GEL, (PREVIDENT) 1 1 % GEL PreviDent 5000 Dry Mouth 1 1 % gel    traZODone (DESYREL) 150 mg tablet Take 1 tablet (150 mg total) by mouth daily at bedtime    [DISCONTINUED] simvastatin (ZOCOR) 40 mg tablet TAKE 1 TABLET BY MOUTH EVERY DAY     No current facility-administered medications on file prior to visit  She is allergic to duloxetine; methotrexate; revatio [sildenafil]; savella [milnacipran]; sulfamethoxazole-trimethoprim; and tedizolid       Review of Systems   All other systems reviewed and are negative          Objective:      /68   Pulse 101   Ht 5' 1" (1 549 m)   Wt 47 2 kg (104 lb) SpO2 96%   BMI 19 65 kg/m²          Physical Exam   Constitutional: She is oriented to person, place, and time  She appears well-developed and well-nourished  Neck: Normal range of motion  Neck supple  Cardiovascular: Normal rate, regular rhythm, normal heart sounds and intact distal pulses  Pulmonary/Chest: Effort normal and breath sounds normal    Abdominal: Soft  Bowel sounds are normal    Musculoskeletal: Normal range of motion  Neurological: She is alert and oriented to person, place, and time  Skin: Skin is warm and dry  Capillary refill takes less than 2 seconds  Psychiatric: She has a normal mood and affect  Her behavior is normal  Judgment and thought content normal    Nursing note and vitals reviewed

## 2020-01-17 ENCOUNTER — DOCTOR'S OFFICE (OUTPATIENT)
Dept: URBAN - NONMETROPOLITAN AREA CLINIC 1 | Facility: CLINIC | Age: 65
Setting detail: OPHTHALMOLOGY
End: 2020-01-17
Payer: COMMERCIAL

## 2020-01-17 ENCOUNTER — RX ONLY (RX ONLY)
Age: 65
End: 2020-01-17

## 2020-01-17 DIAGNOSIS — H40.033: ICD-10-CM

## 2020-01-17 DIAGNOSIS — H04.123: ICD-10-CM

## 2020-01-17 DIAGNOSIS — H04.122: ICD-10-CM

## 2020-01-17 DIAGNOSIS — H04.121: ICD-10-CM

## 2020-01-17 PROCEDURE — 92020 GONIOSCOPY: CPT | Performed by: OPHTHALMOLOGY

## 2020-01-17 PROCEDURE — 92014 COMPRE OPH EXAM EST PT 1/>: CPT | Performed by: OPHTHALMOLOGY

## 2020-01-17 PROCEDURE — 92132 CPTRZD OPH DX IMG ANT SGM: CPT | Performed by: OPHTHALMOLOGY

## 2020-01-17 PROCEDURE — 83861 MICROFLUID ANALY TEARS: CPT | Performed by: OPHTHALMOLOGY

## 2020-01-17 ASSESSMENT — REFRACTION_CURRENTRX
OS_ADD: +2.25
OD_ADD: +2.25
OS_SPHERE: +0.25
OS_VPRISM_DIRECTION: PROGS
OD_SPHERE: +0.25
OD_AXIS: 090
OS_AXIS: 030
OD_OVR_VA: 20/
OS_CYLINDER: -0.25
OD_CYLINDER: -0.25
OD_VPRISM_DIRECTION: PROGS
OS_OVR_VA: 20/

## 2020-01-17 ASSESSMENT — LID EXAM ASSESSMENTS
OS_BLEPHARITIS: LLL LUL
OD_BLEPHARITIS: RLL RUL

## 2020-01-17 ASSESSMENT — VISUAL ACUITY
OS_BCVA: 20/40
OD_BCVA: 20/60

## 2020-01-17 ASSESSMENT — REFRACTION_AUTOREFRACTION
OD_AXIS: 95
OD_CYLINDER: -0.75
OD_SPHERE: +1.75
OS_CYLINDER: -0.75
OS_SPHERE: +1.50
OS_AXIS: 72

## 2020-01-17 ASSESSMENT — REFRACTION_MANIFEST
OS_VA1: 20/25
OS_VA2: 20/
OS_CYLINDER: -0.25
OD_AXIS: 088
OD_ADD: +2.50
OD_CYLINDER: -0.75
OS_AXIS: 090
OU_VA: 20/
OD_SPHERE: +1.25
OD_VA3: 20/
OS_VA3: 20/
OD_VA1: 20/25-1
OS_SPHERE: +1.00
OS_ADD: +2.50
OD_VA2: 20/

## 2020-01-17 ASSESSMENT — SPHEQUIV_DERIVED
OD_SPHEQUIV: 0.875
OS_SPHEQUIV: 1.125
OS_SPHEQUIV: 0.875
OD_SPHEQUIV: 1.375

## 2020-01-17 ASSESSMENT — CONFRONTATIONAL VISUAL FIELD TEST (CVF)
OD_FINDINGS: FULL
OS_FINDINGS: FULL

## 2020-01-17 ASSESSMENT — DRY EYES - PHYSICIAN NOTES
OS_GENERALCOMMENTS: DIFFUSE SPK
OD_GENERALCOMMENTS: DIFFUSE SPK

## 2020-01-17 ASSESSMENT — TEAR BREAK UP TIME (TBUT)
OS_TBUT: T
OD_TBUT: T

## 2020-01-20 ENCOUNTER — DOCTOR'S OFFICE (OUTPATIENT)
Dept: URBAN - NONMETROPOLITAN AREA CLINIC 1 | Facility: CLINIC | Age: 65
Setting detail: OPHTHALMOLOGY
End: 2020-01-20
Payer: COMMERCIAL

## 2020-01-20 DIAGNOSIS — H40.033: ICD-10-CM

## 2020-01-20 PROCEDURE — 76512 OPH US DX B-SCAN: CPT | Performed by: OPHTHALMOLOGY

## 2020-01-20 PROCEDURE — 92014 COMPRE OPH EXAM EST PT 1/>: CPT | Performed by: OPHTHALMOLOGY

## 2020-01-20 ASSESSMENT — REFRACTION_MANIFEST
OS_AXIS: 090
OD_VA1: 20/25-1
OS_ADD: +2.50
OS_CYLINDER: -0.25
OD_VA2: 20/
OS_VA3: 20/
OD_CYLINDER: -0.75
OD_ADD: +2.50
OD_VA3: 20/
OS_VA2: 20/
OD_SPHERE: +1.25
OU_VA: 20/
OD_AXIS: 088
OS_SPHERE: +1.00
OS_VA1: 20/25

## 2020-01-20 ASSESSMENT — REFRACTION_AUTOREFRACTION
OS_CYLINDER: -0.75
OS_SPHERE: +1.50
OD_AXIS: 95
OD_SPHERE: +1.75
OD_CYLINDER: -0.75
OS_AXIS: 72

## 2020-01-20 ASSESSMENT — REFRACTION_CURRENTRX
OD_CYLINDER: -0.25
OD_VPRISM_DIRECTION: PROGS
OD_ADD: +2.25
OS_ADD: +2.25
OS_VPRISM_DIRECTION: PROGS
OS_SPHERE: +0.25
OS_AXIS: 030
OS_OVR_VA: 20/
OS_CYLINDER: -0.25
OD_AXIS: 090
OD_OVR_VA: 20/
OD_SPHERE: +0.25

## 2020-01-20 ASSESSMENT — DRY EYES - PHYSICIAN NOTES
OS_GENERALCOMMENTS: DIFFUSE SPK
OD_GENERALCOMMENTS: DIFFUSE SPK

## 2020-01-20 ASSESSMENT — TEAR BREAK UP TIME (TBUT)
OS_TBUT: T
OD_TBUT: T

## 2020-01-20 ASSESSMENT — LID EXAM ASSESSMENTS
OD_BLEPHARITIS: RLL RUL
OS_BLEPHARITIS: LLL LUL

## 2020-01-20 ASSESSMENT — SPHEQUIV_DERIVED
OD_SPHEQUIV: 1.375
OS_SPHEQUIV: 0.875
OS_SPHEQUIV: 1.125
OD_SPHEQUIV: 0.875

## 2020-01-20 ASSESSMENT — VISUAL ACUITY
OS_BCVA: 20/30-2
OD_BCVA: 20/50+1

## 2020-01-20 ASSESSMENT — CONFRONTATIONAL VISUAL FIELD TEST (CVF)
OS_FINDINGS: FULL
OD_FINDINGS: FULL

## 2020-01-21 ENCOUNTER — TELEPHONE (OUTPATIENT)
Dept: BEHAVIORAL/MENTAL HEALTH CLINIC | Facility: CLINIC | Age: 65
End: 2020-01-21

## 2020-01-21 NOTE — TELEPHONE ENCOUNTER
Client had contacted [facility] staff and reported that she was canc[facility] She asked that this therapist contact her to reschedule appointment  Attempt was made to contact the Client, left a message for her to contact this therapist at the [facility]   [phone]

## 2020-01-22 ENCOUNTER — AMBUL SURGICAL CARE (OUTPATIENT)
Dept: URBAN - METROPOLITAN AREA SURGERY 29 | Facility: SURGERY | Age: 65
Setting detail: OPHTHALMOLOGY
End: 2020-01-22
Payer: COMMERCIAL

## 2020-01-22 DIAGNOSIS — H40.031: ICD-10-CM

## 2020-01-22 PROCEDURE — G8907 PT DOC NO EVENTS ON DISCHARG: HCPCS | Performed by: OPHTHALMOLOGY

## 2020-01-22 PROCEDURE — G8918 PT W/O PREOP ORDER IV AB PRO: HCPCS | Performed by: OPHTHALMOLOGY

## 2020-01-22 PROCEDURE — 66761 REVISION OF IRIS: CPT | Performed by: CLINIC/CENTER

## 2020-01-22 PROCEDURE — 66761 REVISION OF IRIS: CPT | Performed by: OPHTHALMOLOGY

## 2020-01-22 PROCEDURE — G8907 PT DOC NO EVENTS ON DISCHARG: HCPCS | Performed by: CLINIC/CENTER

## 2020-01-22 PROCEDURE — G8918 PT W/O PREOP ORDER IV AB PRO: HCPCS | Performed by: CLINIC/CENTER

## 2020-01-24 ENCOUNTER — AMBUL SURGICAL CARE (OUTPATIENT)
Dept: URBAN - METROPOLITAN AREA SURGERY 29 | Facility: SURGERY | Age: 65
Setting detail: OPHTHALMOLOGY
End: 2020-01-24
Payer: COMMERCIAL

## 2020-01-24 ENCOUNTER — TELEPHONE (OUTPATIENT)
Dept: FAMILY MEDICINE CLINIC | Facility: CLINIC | Age: 65
End: 2020-01-24

## 2020-01-24 DIAGNOSIS — H40.032: ICD-10-CM

## 2020-01-24 DIAGNOSIS — K00.9 DENTAL ANOMALY: Primary | ICD-10-CM

## 2020-01-24 PROCEDURE — 66761 REVISION OF IRIS: CPT | Performed by: OPHTHALMOLOGY

## 2020-01-24 PROCEDURE — G8918 PT W/O PREOP ORDER IV AB PRO: HCPCS | Performed by: OPHTHALMOLOGY

## 2020-01-24 PROCEDURE — G8918 PT W/O PREOP ORDER IV AB PRO: HCPCS | Performed by: CLINIC/CENTER

## 2020-01-24 PROCEDURE — G8907 PT DOC NO EVENTS ON DISCHARG: HCPCS | Performed by: CLINIC/CENTER

## 2020-01-24 PROCEDURE — G8907 PT DOC NO EVENTS ON DISCHARG: HCPCS | Performed by: OPHTHALMOLOGY

## 2020-01-24 PROCEDURE — 66761 REVISION OF IRIS: CPT | Performed by: CLINIC/CENTER

## 2020-01-24 RX ORDER — AMOXICILLIN 500 MG/1
CAPSULE ORAL
Qty: 4 CAPSULE | Refills: 5 | Status: SHIPPED | OUTPATIENT
Start: 2020-01-24 | End: 2020-01-25

## 2020-01-30 DIAGNOSIS — F32.A DEPRESSION, UNSPECIFIED DEPRESSION TYPE: ICD-10-CM

## 2020-01-30 DIAGNOSIS — F32.9 REACTIVE DEPRESSION: ICD-10-CM

## 2020-01-30 RX ORDER — ESCITALOPRAM OXALATE 10 MG/1
10 TABLET ORAL DAILY
Qty: 90 TABLET | Refills: 0 | Status: SHIPPED | OUTPATIENT
Start: 2020-01-30 | End: 2020-04-02

## 2020-01-30 RX ORDER — BUPROPION HYDROCHLORIDE 300 MG/1
TABLET ORAL
Qty: 90 TABLET | Refills: 0 | Status: SHIPPED | OUTPATIENT
Start: 2020-01-30 | End: 2020-11-11 | Stop reason: SDUPTHER

## 2020-02-11 DIAGNOSIS — G72.89 NECROTIZING MYOPATHY: Primary | ICD-10-CM

## 2020-02-11 DIAGNOSIS — F98.8 ATTENTION DEFICIT DISORDER, UNSPECIFIED HYPERACTIVITY PRESENCE: ICD-10-CM

## 2020-02-11 RX ORDER — HYDROCODONE BITARTRATE AND ACETAMINOPHEN 5; 325 MG/1; MG/1
1 TABLET ORAL EVERY 6 HOURS PRN
Qty: 120 TABLET | Refills: 0 | Status: SHIPPED | OUTPATIENT
Start: 2020-02-11 | End: 2020-06-12 | Stop reason: SDUPTHER

## 2020-02-11 RX ORDER — DEXTROAMPHETAMINE SACCHARATE, AMPHETAMINE ASPARTATE MONOHYDRATE, DEXTROAMPHETAMINE SULFATE AND AMPHETAMINE SULFATE 7.5; 7.5; 7.5; 7.5 MG/1; MG/1; MG/1; MG/1
30 CAPSULE, EXTENDED RELEASE ORAL EVERY MORNING
Qty: 30 CAPSULE | Refills: 0 | Status: SHIPPED | OUTPATIENT
Start: 2020-02-11 | End: 2020-03-13 | Stop reason: SDUPTHER

## 2020-02-12 DIAGNOSIS — R52 PAIN: ICD-10-CM

## 2020-02-12 RX ORDER — OXYMORPHONE HYDROCHLORIDE 5 MG/1
5 TABLET ORAL EVERY 6 HOURS PRN
Qty: 120 TABLET | Refills: 0 | OUTPATIENT
Start: 2020-02-12

## 2020-02-13 ENCOUNTER — DOCTOR'S OFFICE (OUTPATIENT)
Dept: URBAN - NONMETROPOLITAN AREA CLINIC 1 | Facility: CLINIC | Age: 65
Setting detail: OPHTHALMOLOGY
End: 2020-02-13
Payer: COMMERCIAL

## 2020-02-13 DIAGNOSIS — H40.033: ICD-10-CM

## 2020-02-13 PROCEDURE — 92014 COMPRE OPH EXAM EST PT 1/>: CPT | Performed by: OPHTHALMOLOGY

## 2020-02-13 ASSESSMENT — REFRACTION_AUTOREFRACTION
OD_SPHERE: +1.75
OS_SPHERE: +1.50
OS_AXIS: 72
OS_CYLINDER: -0.75
OD_AXIS: 95
OD_CYLINDER: -0.75

## 2020-02-13 ASSESSMENT — LID EXAM ASSESSMENTS
OD_BLEPHARITIS: RLL RUL
OS_BLEPHARITIS: LLL LUL

## 2020-02-13 ASSESSMENT — REFRACTION_MANIFEST
OD_VA1: 20/25-1
OS_SPHERE: +1.00
OS_AXIS: 090
OS_ADD: +2.50
OD_ADD: +2.50
OD_AXIS: 088
OS_VA1: 20/25
OD_SPHERE: +1.25
OD_CYLINDER: -0.75
OS_CYLINDER: -0.25

## 2020-02-13 ASSESSMENT — REFRACTION_CURRENTRX
OS_OVR_VA: 20/
OS_CYLINDER: -0.25
OD_VPRISM_DIRECTION: PROGS
OS_AXIS: 030
OD_SPHERE: +0.25
OD_CYLINDER: -0.25
OS_SPHERE: +0.25
OS_VPRISM_DIRECTION: PROGS
OD_ADD: +2.25
OS_ADD: +2.25
OD_OVR_VA: 20/
OD_AXIS: 090

## 2020-02-13 ASSESSMENT — SPHEQUIV_DERIVED
OS_SPHEQUIV: 0.875
OS_SPHEQUIV: 1.125
OD_SPHEQUIV: 1.375
OD_SPHEQUIV: 0.875

## 2020-02-13 ASSESSMENT — TEAR BREAK UP TIME (TBUT)
OS_TBUT: T
OD_TBUT: T

## 2020-02-13 ASSESSMENT — DRY EYES - PHYSICIAN NOTES
OS_GENERALCOMMENTS: DIFFUSE SPK
OD_GENERALCOMMENTS: DIFFUSE SPK

## 2020-02-13 ASSESSMENT — CONFRONTATIONAL VISUAL FIELD TEST (CVF)
OS_FINDINGS: FULL
OD_FINDINGS: FULL

## 2020-02-13 ASSESSMENT — VISUAL ACUITY
OD_BCVA: 20/30
OS_BCVA: 20/25

## 2020-02-14 DIAGNOSIS — R52 PAIN: ICD-10-CM

## 2020-02-14 RX ORDER — OXYMORPHONE HYDROCHLORIDE 5 MG/1
5 TABLET ORAL EVERY 6 HOURS PRN
Qty: 120 TABLET | Refills: 0 | Status: CANCELLED | OUTPATIENT
Start: 2020-02-14

## 2020-02-17 DIAGNOSIS — R52 PAIN: ICD-10-CM

## 2020-02-17 RX ORDER — OXYMORPHONE HYDROCHLORIDE 5 MG/1
5 TABLET ORAL EVERY 6 HOURS PRN
Qty: 120 TABLET | Refills: 0 | Status: SHIPPED | OUTPATIENT
Start: 2020-02-17 | End: 2020-03-16 | Stop reason: SDUPTHER

## 2020-02-21 DIAGNOSIS — F98.8 ATTENTION DEFICIT DISORDER, UNSPECIFIED HYPERACTIVITY PRESENCE: ICD-10-CM

## 2020-02-24 RX ORDER — DEXTROAMPHETAMINE SACCHARATE, AMPHETAMINE ASPARTATE, DEXTROAMPHETAMINE SULFATE AND AMPHETAMINE SULFATE 5; 5; 5; 5 MG/1; MG/1; MG/1; MG/1
20 TABLET ORAL
Qty: 60 TABLET | Refills: 0 | Status: SHIPPED | OUTPATIENT
Start: 2020-02-24 | End: 2020-03-13 | Stop reason: SDUPTHER

## 2020-03-06 DIAGNOSIS — C73 THYROID CANCER (HCC): ICD-10-CM

## 2020-03-09 RX ORDER — LEVOTHYROXINE SODIUM 0.15 MG/1
150 TABLET ORAL
Qty: 30 TABLET | Refills: 0 | Status: SHIPPED | OUTPATIENT
Start: 2020-03-09 | End: 2020-04-02

## 2020-03-13 DIAGNOSIS — F98.8 ATTENTION DEFICIT DISORDER, UNSPECIFIED HYPERACTIVITY PRESENCE: ICD-10-CM

## 2020-03-15 RX ORDER — DEXTROAMPHETAMINE SACCHARATE, AMPHETAMINE ASPARTATE MONOHYDRATE, DEXTROAMPHETAMINE SULFATE AND AMPHETAMINE SULFATE 7.5; 7.5; 7.5; 7.5 MG/1; MG/1; MG/1; MG/1
30 CAPSULE, EXTENDED RELEASE ORAL EVERY MORNING
Qty: 30 CAPSULE | Refills: 0 | Status: SHIPPED | OUTPATIENT
Start: 2020-03-15 | End: 2020-04-16 | Stop reason: SDUPTHER

## 2020-03-15 RX ORDER — DEXTROAMPHETAMINE SACCHARATE, AMPHETAMINE ASPARTATE, DEXTROAMPHETAMINE SULFATE AND AMPHETAMINE SULFATE 5; 5; 5; 5 MG/1; MG/1; MG/1; MG/1
20 TABLET ORAL
Qty: 60 TABLET | Refills: 0 | Status: SHIPPED | OUTPATIENT
Start: 2020-03-15 | End: 2020-04-16 | Stop reason: SDUPTHER

## 2020-03-16 DIAGNOSIS — R52 PAIN: ICD-10-CM

## 2020-03-16 RX ORDER — OXYMORPHONE HYDROCHLORIDE 5 MG/1
5 TABLET ORAL EVERY 6 HOURS PRN
Qty: 120 TABLET | Refills: 0 | Status: SHIPPED | OUTPATIENT
Start: 2020-03-16 | End: 2020-04-16 | Stop reason: SDUPTHER

## 2020-04-02 DIAGNOSIS — F32.9 REACTIVE DEPRESSION: ICD-10-CM

## 2020-04-02 DIAGNOSIS — C73 THYROID CANCER (HCC): ICD-10-CM

## 2020-04-02 RX ORDER — ESCITALOPRAM OXALATE 10 MG/1
10 TABLET ORAL DAILY
Qty: 90 TABLET | Refills: 1 | Status: SHIPPED | OUTPATIENT
Start: 2020-04-02 | End: 2021-02-05

## 2020-04-02 RX ORDER — LEVOTHYROXINE SODIUM 0.15 MG/1
150 TABLET ORAL
Qty: 30 TABLET | Refills: 0 | Status: SHIPPED | OUTPATIENT
Start: 2020-04-02 | End: 2020-05-19

## 2020-04-15 ENCOUNTER — TELEMEDICINE (OUTPATIENT)
Dept: FAMILY MEDICINE CLINIC | Facility: CLINIC | Age: 65
End: 2020-04-15
Payer: MEDICARE

## 2020-04-15 DIAGNOSIS — F98.8 ATTENTION DEFICIT DISORDER, UNSPECIFIED HYPERACTIVITY PRESENCE: ICD-10-CM

## 2020-04-15 DIAGNOSIS — R52 PAIN: ICD-10-CM

## 2020-04-15 PROCEDURE — 99212 OFFICE O/P EST SF 10 MIN: CPT | Performed by: FAMILY MEDICINE

## 2020-04-16 RX ORDER — OXYMORPHONE HYDROCHLORIDE 5 MG/1
5 TABLET ORAL EVERY 6 HOURS PRN
Qty: 120 TABLET | Refills: 0 | Status: SHIPPED | OUTPATIENT
Start: 2020-04-16 | End: 2020-05-13 | Stop reason: SDUPTHER

## 2020-04-16 RX ORDER — DEXTROAMPHETAMINE SACCHARATE, AMPHETAMINE ASPARTATE, DEXTROAMPHETAMINE SULFATE AND AMPHETAMINE SULFATE 5; 5; 5; 5 MG/1; MG/1; MG/1; MG/1
20 TABLET ORAL
Qty: 60 TABLET | Refills: 0 | Status: SHIPPED | OUTPATIENT
Start: 2020-04-16 | End: 2020-05-13 | Stop reason: SDUPTHER

## 2020-04-16 RX ORDER — DEXTROAMPHETAMINE SACCHARATE, AMPHETAMINE ASPARTATE MONOHYDRATE, DEXTROAMPHETAMINE SULFATE AND AMPHETAMINE SULFATE 7.5; 7.5; 7.5; 7.5 MG/1; MG/1; MG/1; MG/1
30 CAPSULE, EXTENDED RELEASE ORAL EVERY MORNING
Qty: 30 CAPSULE | Refills: 0 | Status: SHIPPED | OUTPATIENT
Start: 2020-04-16 | End: 2020-05-13 | Stop reason: SDUPTHER

## 2020-05-13 DIAGNOSIS — F98.8 ATTENTION DEFICIT DISORDER, UNSPECIFIED HYPERACTIVITY PRESENCE: ICD-10-CM

## 2020-05-13 DIAGNOSIS — R52 PAIN: ICD-10-CM

## 2020-05-13 RX ORDER — OXYMORPHONE HYDROCHLORIDE 5 MG/1
5 TABLET ORAL EVERY 6 HOURS PRN
Qty: 120 TABLET | Refills: 0 | Status: SHIPPED | OUTPATIENT
Start: 2020-05-13 | End: 2020-06-13 | Stop reason: SDUPTHER

## 2020-05-13 RX ORDER — DEXTROAMPHETAMINE SACCHARATE, AMPHETAMINE ASPARTATE, DEXTROAMPHETAMINE SULFATE AND AMPHETAMINE SULFATE 5; 5; 5; 5 MG/1; MG/1; MG/1; MG/1
20 TABLET ORAL
Qty: 60 TABLET | Refills: 0 | Status: SHIPPED | OUTPATIENT
Start: 2020-05-13 | End: 2020-06-12 | Stop reason: SDUPTHER

## 2020-05-13 RX ORDER — DEXTROAMPHETAMINE SACCHARATE, AMPHETAMINE ASPARTATE MONOHYDRATE, DEXTROAMPHETAMINE SULFATE AND AMPHETAMINE SULFATE 7.5; 7.5; 7.5; 7.5 MG/1; MG/1; MG/1; MG/1
30 CAPSULE, EXTENDED RELEASE ORAL EVERY MORNING
Qty: 30 CAPSULE | Refills: 0 | Status: SHIPPED | OUTPATIENT
Start: 2020-05-13 | End: 2020-06-12 | Stop reason: SDUPTHER

## 2020-05-15 DIAGNOSIS — C73 THYROID CANCER (HCC): ICD-10-CM

## 2020-05-19 RX ORDER — LEVOTHYROXINE SODIUM 0.15 MG/1
150 TABLET ORAL
Qty: 30 TABLET | Refills: 5 | Status: SHIPPED | OUTPATIENT
Start: 2020-05-19 | End: 2020-12-22

## 2020-05-28 ENCOUNTER — TELEPHONE (OUTPATIENT)
Dept: FAMILY MEDICINE CLINIC | Facility: CLINIC | Age: 65
End: 2020-05-28

## 2020-05-28 DIAGNOSIS — G72.89 NECROTIZING MYOPATHY: Primary | ICD-10-CM

## 2020-05-29 ENCOUNTER — TELEMEDICINE (OUTPATIENT)
Dept: FAMILY MEDICINE CLINIC | Facility: CLINIC | Age: 65
End: 2020-05-29
Payer: MEDICARE

## 2020-05-29 DIAGNOSIS — R59.1 LAD (LYMPHADENOPATHY): Primary | ICD-10-CM

## 2020-05-29 PROCEDURE — 99214 OFFICE O/P EST MOD 30 MIN: CPT | Performed by: FAMILY MEDICINE

## 2020-06-12 DIAGNOSIS — F98.8 ATTENTION DEFICIT DISORDER, UNSPECIFIED HYPERACTIVITY PRESENCE: ICD-10-CM

## 2020-06-12 DIAGNOSIS — G72.89 NECROTIZING MYOPATHY: ICD-10-CM

## 2020-06-12 RX ORDER — DEXTROAMPHETAMINE SACCHARATE, AMPHETAMINE ASPARTATE, DEXTROAMPHETAMINE SULFATE AND AMPHETAMINE SULFATE 5; 5; 5; 5 MG/1; MG/1; MG/1; MG/1
20 TABLET ORAL
Qty: 60 TABLET | Refills: 0 | Status: SHIPPED | OUTPATIENT
Start: 2020-06-12 | End: 2020-07-20 | Stop reason: SDUPTHER

## 2020-06-12 RX ORDER — HYDROCODONE BITARTRATE AND ACETAMINOPHEN 5; 325 MG/1; MG/1
1 TABLET ORAL EVERY 6 HOURS PRN
Qty: 120 TABLET | Refills: 0 | Status: SHIPPED | OUTPATIENT
Start: 2020-06-12 | End: 2021-02-04

## 2020-06-12 RX ORDER — DEXTROAMPHETAMINE SACCHARATE, AMPHETAMINE ASPARTATE MONOHYDRATE, DEXTROAMPHETAMINE SULFATE AND AMPHETAMINE SULFATE 7.5; 7.5; 7.5; 7.5 MG/1; MG/1; MG/1; MG/1
30 CAPSULE, EXTENDED RELEASE ORAL EVERY MORNING
Qty: 30 CAPSULE | Refills: 0 | Status: SHIPPED | OUTPATIENT
Start: 2020-06-12 | End: 2020-07-20 | Stop reason: SDUPTHER

## 2020-06-13 DIAGNOSIS — R52 PAIN: ICD-10-CM

## 2020-06-13 RX ORDER — OXYMORPHONE HYDROCHLORIDE 5 MG/1
5 TABLET ORAL EVERY 6 HOURS PRN
Qty: 120 TABLET | Refills: 0 | Status: SHIPPED | OUTPATIENT
Start: 2020-06-13 | End: 2020-07-20 | Stop reason: SDUPTHER

## 2020-06-29 ENCOUNTER — TRANSITIONAL CARE MANAGEMENT (OUTPATIENT)
Dept: FAMILY MEDICINE CLINIC | Facility: CLINIC | Age: 65
End: 2020-06-29

## 2020-07-01 ENCOUNTER — TELEMEDICINE (OUTPATIENT)
Dept: FAMILY MEDICINE CLINIC | Facility: CLINIC | Age: 65
End: 2020-07-01
Payer: MEDICARE

## 2020-07-01 DIAGNOSIS — D72.829 LEUKOCYTOSIS, UNSPECIFIED TYPE: ICD-10-CM

## 2020-07-01 DIAGNOSIS — S32.9XXS CLOSED NONDISPLACED FRACTURE OF PELVIS, UNSPECIFIED PART OF PELVIS, SEQUELA: ICD-10-CM

## 2020-07-01 DIAGNOSIS — M35.00 SJOGREN'S SYNDROME, WITH UNSPECIFIED ORGAN INVOLVEMENT (HCC): Primary | ICD-10-CM

## 2020-07-01 PROCEDURE — 4040F PNEUMOC VAC/ADMIN/RCVD: CPT | Performed by: FAMILY MEDICINE

## 2020-07-01 PROCEDURE — 1036F TOBACCO NON-USER: CPT | Performed by: FAMILY MEDICINE

## 2020-07-01 PROCEDURE — 99213 OFFICE O/P EST LOW 20 MIN: CPT | Performed by: FAMILY MEDICINE

## 2020-07-01 PROCEDURE — 1160F RVW MEDS BY RX/DR IN RCRD: CPT | Performed by: FAMILY MEDICINE

## 2020-07-16 NOTE — PROGRESS NOTES
Virtual Regular Visit      Assessment/Plan:    Problem List Items Addressed This Visit        Other    Sjogren's syndrome ([facility] [address] ) - Primary    Relevant Orders    ABO/Rh    Ambulatory Referral to Home Health    Comprehensive metabolic panel    CBC (Includes Diff/Plt) With Pathologist Review      Other Visit Diagnoses     Closed nondisplaced fracture of pelvis, unspecified part of pelvis, sequela        Relevant Orders    Ambulatory Referral to Home Health    Comprehensive metabolic panel    CBC (Includes Diff/Plt) With Pathologist Review    Leukocytosis, unspecified type        Relevant Orders    Comprehensive metabolic panel    CBC (Includes Diff/Plt) With Pathologist Review               Reason for visit is No chief complaint on file  Encounter provider Jayjay Mcallister MD    Provider located at [address] [place] [address] [address]      Recent Visits  No visits were found meeting these conditions  Showing recent visits within past 7 days and meeting all other requirements     Future Appointments  No visits were found meeting these conditions  Showing future appointments within next 150 days and meeting all other requirements        The patient was identified by name and date of birth  Arun Phelan was informed that this is a telemedicine visit and that the visit is being conducted through [employer] and patient was informed that this is not a secure, HIPAA-complaint platform  She agrees to proceed     My office door was closed  No one else was in the room  She acknowledged consent and understanding of privacy and security of the video platform  The patient has agreed to participate and understands they can discontinue the visit at any time  Patient is aware this is a billable service  Subjective  Arun Phelan is a 76 y o  female    HPI she has increasing pain from her pelvis fracture    She is getting physical therapy and is ambulating around the house in a much more efficient fashion  During her hospitalization she had a [place] count [place] [id number]  She is quite concerned about this  Repeat blood testing including manual differential have been normal   She has no fevers or chills  She has no weight loss or lymphadenopathy  She has Sjogren syndrome  She continues to have issues with her dentition  She is waiting to get in with dental clinic at [facility]  [id number] is certainly been detrimental to this  She sees a rheumatologist and gastroenterologist   She is doing well overall  Past Medical History:   Diagnosis Date    ADHD     Cancer ([facility] [address] )     non-Hodg      Cellulitis of foot     Last assessed 10/24/16    Change in mental status     Last assessed 12/01/15    Chronic fatigue     PLAZA (dyspnea on exertion)     Last assessed 12/01/15    Fibromyalgia     Folliculitis     Last assessed 10/06/14    GERD (gastroesophageal reflux disease)     Osteopenia     Raynaud's disease     [facility]cleroderm[facility] )     Systemic sclerosis (HCC)        Past Surgical History:   Procedure Laterality Date    BACK SURGERY      BLADDER SURGERY      BONE MARROW BIOPSY      CARDIAC CATHETERIZATION      HEMORRHOID SURGERY      HYSTERECTOMY      KNEE SURGERY      NASAL SEPTUM SURGERY      ROTATOR CUFF REPAIR      THYROIDECTOMY N/A 10/4/2018    Procedure: TOTAL THYROIDECTOMY;  Surgeon: Patti Jain MD;  [facility];  Service: Surgical Oncology    TONSILLECTOMY      TOTAL THYROIDECTOMY         Current Outpatient Medications   Medication Sig Dispense Refill    ALPRAZolam (XANAX) 0 25 mg tablet Take 1 tablet (0 25 mg total) by mouth 3 (three) times a day as needed for anxiety 30 tablet 0    amphetamine-dextroamphetamine (ADDERALL XR) 30 MG 24 hr capsule Take 1 capsule (30 mg total) by mouth every morningMax Daily Amount: 30 mg 30 capsule 0    amphetamine-dextroamphetamine (ADDERALL) 20 mg tablet Take 1 tablet (20 mg total) by mouth 2 (two) times a dayMax Daily Amount: 40 mg 60 tablet 0    aspirin (ASPIRIN 81) 81 mg EC tablet Take 81 mg by mouth      aspirin (ECOTRIN LOW STRENGTH) 81 mg EC tablet Take 81 mg by mouth daily      buPROPion (WELLBUTRIN XL) 300 mg 24 hr tablet TAKE 1 TABLET BY MOUTH EVERY DAY 90 tablet 0    calcium carbonate (TUMS) 500 mg chewable tablet Chew 2 tablets (1,000 mg total) daily 60 tablet 0    calcium-vitamin D (OSCAL) 250-125 MG-UNIT per tablet Take by mouth      chlordiazePOXIDE (LIBRIUM) 5 mg capsule Take 5 mg by mouth 2 (two) times a day        chlordiazepoxide-clidinium (LIBRAX) 5-2 5 mg per capsule Take by mouth      dicyclomine (BENTYL) 10 mg capsule Take 20 mg by mouth 2 (two) times a day        Docusate Sodium 100 MG/5ML ENEM Take 250 mg by mouth      escitalopram (LEXAPRO) 10 mg tablet TAKE 1 TABLET (10 MG TOTAL) BY MOUTH DAILY 90 tablet 1    esomeprazole (NexIUM) 40 MG capsule Take 40 mg by mouth 2 (two) times a day before meals        esomeprazole (NexIUM) 40 MG capsule Take 40 mg by mouth      fluconazole (DIFLUCAN) 150 mg tablet   5    folic acid (FOLVITE) 1 mg tablet Take by mouth daily      HYDROcodone-acetaminophen (NORCO) 5-325 mg per tablet Take 1 tablet by mouth every 6 (six) hours as needed for painMax Daily Amount: 4 tablets 120 tablet 0    hydroxychloroquine (PLAQUENIL) 200 mg tablet Take 200 mg by mouth 2 (two) times a day with meals        hydrOXYzine HCL (ATARAX) 50 mg tablet Take 1 tablet (50 mg total) by mouth 3 (three) times a day as needed for anxiety 90 tablet 0    ipratropium (ATROVENT) 0 03 % nasal spray 2 sprays into each nostril every 12 (twelve) hours 30 mL 5    levothyroxine 150 mcg tablet TAKE 1 TABLET (150 MCG TOTAL) BY MOUTH DAILY IN THE EARLY MORNING 30 tablet 5    lisinopril (ZESTRIL) 10 mg tablet   0    methotrexate 2 5 mg tablet AS DIRECTED 12 tablet 5    Multiple Vitamin (MULTIVITAMIN) tablet Take 1 tablet by mouth daily      naproxen (NAPROSYN) 500 mg tablet   1    nystatin (MYCOSTATIN) 500,000 units/5 mL suspension Take 5 mL (500,000 Units total) by mouth 3 (three) times a day 473 mL 1    oxymorphone (OPANA) 5 MG tablet Take 1 tablet (5 mg total) by mouth every 6 (six) hours as needed for moderate painMax Daily Amount: 20 mg 120 tablet 0    predniSONE 5 mg tablet Take 1 tablet (5 mg total) by mouth daily 30 tablet 5    pregabalin (LYRICA) 75 mg capsule 3 (three) times a day       pregabalin (LYRICA) 75 mg capsule       psyllium (METAMUCIL) 0 52 g capsule Take by mouth      SODIUM FLUORIDE, DENTAL GEL, (PREVIDENT) 1 1 % GEL PreviDent 5000 Dry Mouth 1 1 % gel      traZODone (DESYREL) 150 mg tablet Take 1 tablet (150 mg total) by mouth daily at bedtime 30 tablet 5     No current facility-administered medications for this visit  Allergies   Allergen Reactions    Duloxetine Other (See Comments)     Mental psychosis    Methotrexate     Revatio [Sildenafil] Other (See Comments)     mental status changes    Savella [Milnacipran] Other (See Comments)     Feeling out of it    Sulfamethoxazole-Trimethoprim Rash and GI Intolerance    Tedizolid Rash       Review of Systems    Video Exam    There were no vitals filed for this visit  Physical Exam     I spent 15 minutes directly with the patient during this visit      VIRTUAL VISIT [facility] acknowledges that she has consented to an online visit or consultation  She understands that the online visit is based solely on information provided by her, and that, in the absence of a face-to-face physical evaluation by the physician, the diagnosis she receives is both limited and provisional in terms of accuracy and completeness  This is not intended to replace a full medical face-to-face evaluation by the physician  Kendrick Gallardo understands and accepts these terms

## 2020-07-20 DIAGNOSIS — R52 PAIN: ICD-10-CM

## 2020-07-20 DIAGNOSIS — F98.8 ATTENTION DEFICIT DISORDER, UNSPECIFIED HYPERACTIVITY PRESENCE: ICD-10-CM

## 2020-07-20 RX ORDER — DEXTROAMPHETAMINE SACCHARATE, AMPHETAMINE ASPARTATE, DEXTROAMPHETAMINE SULFATE AND AMPHETAMINE SULFATE 5; 5; 5; 5 MG/1; MG/1; MG/1; MG/1
20 TABLET ORAL
Qty: 60 TABLET | Refills: 0 | Status: SHIPPED | OUTPATIENT
Start: 2020-07-20 | End: 2020-08-17 | Stop reason: SDUPTHER

## 2020-07-20 RX ORDER — OXYMORPHONE HYDROCHLORIDE 5 MG/1
5 TABLET ORAL EVERY 6 HOURS PRN
Qty: 120 TABLET | Refills: 0 | Status: SHIPPED | OUTPATIENT
Start: 2020-07-20 | End: 2020-08-17 | Stop reason: SDUPTHER

## 2020-07-20 RX ORDER — DEXTROAMPHETAMINE SACCHARATE, AMPHETAMINE ASPARTATE MONOHYDRATE, DEXTROAMPHETAMINE SULFATE AND AMPHETAMINE SULFATE 7.5; 7.5; 7.5; 7.5 MG/1; MG/1; MG/1; MG/1
30 CAPSULE, EXTENDED RELEASE ORAL EVERY MORNING
Qty: 30 CAPSULE | Refills: 0 | Status: SHIPPED | OUTPATIENT
Start: 2020-07-20 | End: 2020-08-17 | Stop reason: SDUPTHER

## 2020-07-27 ENCOUNTER — TELEPHONE (OUTPATIENT)
Dept: FAMILY MEDICINE CLINIC | Facility: CLINIC | Age: 65
End: 2020-07-27

## 2020-07-31 LAB — EXT SARS-COV-2: NEGATIVE

## 2020-08-17 DIAGNOSIS — F98.8 ATTENTION DEFICIT DISORDER, UNSPECIFIED HYPERACTIVITY PRESENCE: ICD-10-CM

## 2020-08-17 DIAGNOSIS — R52 PAIN: ICD-10-CM

## 2020-08-17 RX ORDER — OXYMORPHONE HYDROCHLORIDE 5 MG/1
5 TABLET ORAL EVERY 6 HOURS PRN
Qty: 120 TABLET | Refills: 0 | Status: SHIPPED | OUTPATIENT
Start: 2020-08-17 | End: 2020-09-17 | Stop reason: SDUPTHER

## 2020-08-17 RX ORDER — DEXTROAMPHETAMINE SACCHARATE, AMPHETAMINE ASPARTATE, DEXTROAMPHETAMINE SULFATE AND AMPHETAMINE SULFATE 5; 5; 5; 5 MG/1; MG/1; MG/1; MG/1
20 TABLET ORAL
Qty: 60 TABLET | Refills: 0 | Status: SHIPPED | OUTPATIENT
Start: 2020-08-17 | End: 2020-09-17 | Stop reason: SDUPTHER

## 2020-08-17 RX ORDER — DEXTROAMPHETAMINE SACCHARATE, AMPHETAMINE ASPARTATE MONOHYDRATE, DEXTROAMPHETAMINE SULFATE AND AMPHETAMINE SULFATE 7.5; 7.5; 7.5; 7.5 MG/1; MG/1; MG/1; MG/1
30 CAPSULE, EXTENDED RELEASE ORAL EVERY MORNING
Qty: 30 CAPSULE | Refills: 0 | Status: SHIPPED | OUTPATIENT
Start: 2020-08-17 | End: 2020-09-17 | Stop reason: SDUPTHER

## 2020-08-25 ENCOUNTER — TELEPHONE (OUTPATIENT)
Dept: FAMILY MEDICINE CLINIC | Facility: CLINIC | Age: 65
End: 2020-08-25

## 2020-08-25 DIAGNOSIS — M81.0 OSTEOPOROSIS, UNSPECIFIED OSTEOPOROSIS TYPE, UNSPECIFIED PATHOLOGICAL FRACTURE PRESENCE: Primary | ICD-10-CM

## 2020-08-25 DIAGNOSIS — M85.89 OSTEOPENIA OF MULTIPLE SITES: Primary | ICD-10-CM

## 2020-08-25 DIAGNOSIS — M85.80 OSTEOPENIA, UNSPECIFIED LOCATION: Primary | ICD-10-CM

## 2020-09-02 ENCOUNTER — TRANSCRIBE ORDERS (OUTPATIENT)
Dept: ADMINISTRATIVE | Facility: HOSPITAL | Age: 65
End: 2020-09-02

## 2020-09-02 DIAGNOSIS — Z12.31 ENCOUNTER FOR SCREENING MAMMOGRAM FOR MALIGNANT NEOPLASM OF BREAST: Primary | ICD-10-CM

## 2020-09-04 ENCOUNTER — HOSPITAL ENCOUNTER (OUTPATIENT)
Dept: MAMMOGRAPHY | Facility: HOSPITAL | Age: 65
Discharge: HOME | End: 2020-09-04
Attending: FAMILY MEDICINE
Payer: MEDICARE

## 2020-09-04 VITALS — WEIGHT: 104 LBS | HEIGHT: 61 IN | BODY MASS INDEX: 19.63 KG/M2

## 2020-09-04 DIAGNOSIS — Z12.31 ENCOUNTER FOR SCREENING MAMMOGRAM FOR MALIGNANT NEOPLASM OF BREAST: ICD-10-CM

## 2020-09-04 PROCEDURE — 77063 BREAST TOMOSYNTHESIS BI: CPT

## 2020-09-04 PROCEDURE — 77067 SCR MAMMO BI INCL CAD: CPT

## 2020-09-08 ENCOUNTER — OFFICE VISIT (OUTPATIENT)
Dept: OBGYN CLINIC | Facility: CLINIC | Age: 65
End: 2020-09-08
Payer: MEDICARE

## 2020-09-08 VITALS
BODY MASS INDEX: 19.63 KG/M2 | DIASTOLIC BLOOD PRESSURE: 72 MMHG | HEART RATE: 85 BPM | TEMPERATURE: 97.3 F | WEIGHT: 104 LBS | SYSTOLIC BLOOD PRESSURE: 124 MMHG | HEIGHT: 61 IN

## 2020-09-08 DIAGNOSIS — S46.011A TRAUMATIC COMPLETE TEAR OF RIGHT ROTATOR CUFF, INITIAL ENCOUNTER: Primary | ICD-10-CM

## 2020-09-08 DIAGNOSIS — M24.111 LABRAL TEAR OF SHOULDER, DEGENERATIVE, RIGHT: ICD-10-CM

## 2020-09-08 PROCEDURE — 99204 OFFICE O/P NEW MOD 45 MIN: CPT | Performed by: ORTHOPAEDIC SURGERY

## 2020-09-08 RX ORDER — CHLORHEXIDINE GLUCONATE 4 G/100ML
SOLUTION TOPICAL DAILY PRN
Status: CANCELLED | OUTPATIENT
Start: 2020-09-08

## 2020-09-08 NOTE — PATIENT INSTRUCTIONS
Due to patient's symptoms the fact that she got good relief with the previous surgery by Dr Doni Bonner she declined orthopedic surgery [facility] and prefers to have Dr Doni Bonner perform surgery on her right shoulder to alleviate her symptoms      Pre-Surgery Instructions:   Medication Instructions    ALPRAZolam (XANAX) 0 25 mg tablet per anesthesia guidelines     amphetamine-dextroamphetamine (ADDERALL XR) 30 MG 24 hr capsule per anesthesia guidelines     amphetamine-dextroamphetamine (ADDERALL) 20 mg tablet per anesthesia guidelines     buPROPion (WELLBUTRIN XL) 300 mg 24 hr tablet per anesthesia guidelines     calcium-vitamin D (OSCAL) 250-125 MG-UNIT per tablet per anesthesia guidelines     chlordiazePOXIDE (LIBRIUM) 5 mg capsule per anesthesia guidelines     chlordiazepoxide-clidinium (LIBRAX) 5-2 5 mg per capsule per anesthesia guidelines     dicyclomine (BENTYL) 10 mg capsule per anesthesia guidelines     Docusate Sodium 100 MG/5ML ENEM per anesthesia guidelines     escitalopram (LEXAPRO) 10 mg tablet per anesthesia guidelines     esomeprazole (NexIUM) 40 MG capsule per anesthesia guidelines     folic acid (FOLVITE) 1 mg tablet per anesthesia guidelines     hydroxychloroquine (PLAQUENIL) 200 mg tablet per anesthesia guidelines     ipratropium (ATROVENT) 0 03 % nasal spray per anesthesia guidelines     levothyroxine 150 mcg tablet per anesthesia guidelines     lisinopril (ZESTRIL) 10 mg tablet per anesthesia guidelines     methotrexate 2 5 mg tablet per anesthesia guidelines     Multiple Vitamin (MULTIVITAMIN) tablet per anesthesia guidelines     naproxen (NAPROSYN) 500 mg tablet per anesthesia guidelines     oxymorphone (OPANA) 5 MG tablet per anesthesia guidelines     pregabalin (LYRICA) 75 mg capsule per anesthesia guidelines     psyllium (METAMUCIL) 0 52 g capsule per anesthesia guidelines     SODIUM FLUORIDE, DENTAL GEL, (PREVIDENT) 1 1 % GEL per anesthesia guidelines     traZODone (DESYREL) 150 mg tablet per anesthesia guidelines

## 2020-09-08 NOTE — PROGRESS NOTES
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 which is follow-up to [facility] metall[facility]  Three workup and exam and MRI she was found to have a full-thickness rotator cuff tear of the right side with 1 7 mm retraction as well as labral tearing and biceps fraying  She is unsure of the exact mechanism of injury  She does report a fall off a ladder in June 9th 2020 reports that she did not have any known significant ecchymosis about the right shoulder or significant right arm pain at that point time  She is right-hand dominant  She is not working  She denies numbness or tingling in the right upper extremity  He denies neck pain  She feels she maintains good motion but has some weakness and pain      Review of Systems  Review of systems negative unless otherwise specified in HPI    Past Medical History  Past Medical History:   Diagnosis Date    ADHD     Cellulitis of foot     Last assessed 10/24/16    Change in mental status     Last assessed 12/01/15    Chronic fatigue     PLAZA (dyspnea on exertion)     Last assessed 12/01/15    Fibromyalgia     Folliculitis     Last assessed 10/06/14    GERD (gastroesophageal reflux disease)     Non Hodgkin's lymphoma ([address] )     Osteopenia     Raynaud's disease     Scleroderma ([address] )     Systemic sclerosis (HCC)     Thyroid cancer ([facility] [address] ) 10/04/2018     Past Surgical History  Past Surgical History:   Procedure Laterality Date    BACK SURGERY      BLADDER SURGERY      BONE MARROW BIOPSY      CARDIAC CATHETERIZATION      HEMORRHOID SURGERY      HYSTERECTOMY  02/04/2004    KNEE SURGERY      NASAL SEPTUM SURGERY      ROTATOR CUFF REPAIR      THYROIDECTOMY N/A 10/4/2018    Procedure: TOTAL THYROIDECTOMY;  Surgeon: Amisha Lieberman MD;  [facility];  Service: Surgical Oncology    TONSILLECTOMY      TOTAL THYROIDECTOMY       Current Medications  Current Outpatient Medications on File Prior to Visit   Medication Sig Dispense Refill    ALPRAZolam (XANAX) 0 25 mg tablet Take 1 tablet (0 25 mg total) by mouth 3 (three) times a day as needed for anxiety 30 tablet 0    amphetamine-dextroamphetamine (ADDERALL XR) 30 MG 24 hr capsule Take 1 capsule (30 mg total) by mouth every morningMax Daily Amount: 30 mg 30 capsule 0    amphetamine-dextroamphetamine (ADDERALL) 20 mg tablet Take 1 tablet (20 mg total) by mouth 2 (two) times a dayMax Daily Amount: 40 mg 60 tablet 0    buPROPion (WELLBUTRIN XL) 300 mg 24 hr tablet TAKE 1 TABLET BY MOUTH EVERY DAY 90 tablet 0    calcium-vitamin D (OSCAL) 250-125 MG-UNIT per tablet Take by mouth      chlordiazePOXIDE (LIBRIUM) 5 mg capsule Take 5 mg by mouth 2 (two) times a day        chlordiazepoxide-clidinium (LIBRAX) 5-2 5 mg per capsule Take by mouth      dicyclomine (BENTYL) 10 mg capsule Take 20 mg by mouth 2 (two) times a day        Docusate Sodium 100 MG/5ML ENEM Take 250 mg by mouth      escitalopram (LEXAPRO) 10 mg tablet TAKE 1 TABLET (10 MG TOTAL) BY MOUTH DAILY 90 tablet 1    esomeprazole (NexIUM) 40 MG capsule Take 40 mg by mouth      folic acid (FOLVITE) 1 mg tablet Take by mouth daily      hydroxychloroquine (PLAQUENIL) 200 mg tablet Take 200 mg by mouth 2 (two) times a day with meals        ipratropium (ATROVENT) 0 03 % nasal spray 2 sprays into each nostril every 12 (twelve) hours 30 mL 5    levothyroxine 150 mcg tablet TAKE 1 TABLET (150 MCG TOTAL) BY MOUTH DAILY IN THE EARLY MORNING 30 tablet 5    lisinopril (ZESTRIL) 10 mg tablet   0    methotrexate 2 5 mg tablet AS DIRECTED 12 tablet 5    Multiple Vitamin (MULTIVITAMIN) tablet Take 1 tablet by mouth daily      naproxen (NAPROSYN) 500 mg tablet   1    oxymorphone (OPANA) 5 MG tablet Take 1 tablet (5 mg total) by mouth every 6 (six) hours as needed for moderate painMax Daily Amount: 20 mg 120 tablet 0    pregabalin (LYRICA) 75 mg capsule 3 (three) times a day       psyllium (METAMUCIL) 0 52 g capsule Take by mouth      SODIUM FLUORIDE, DENTAL GEL, (PREVIDENT) 1 1 % GEL PreviDent 5000 Dry Mouth 1 1 % gel      traZODone (DESYREL) 150 mg tablet Take 1 tablet (150 mg total) by mouth daily at bedtime 30 tablet 5    aspirin (ASPIRIN 81) 81 mg EC tablet Take 81 mg by mouth      aspirin (ECOTRIN LOW STRENGTH) 81 mg EC tablet Take 81 mg by mouth daily      calcium carbonate (TUMS) 500 mg chewable tablet Chew 2 tablets (1,000 mg total) daily 60 tablet 0    esomeprazole (NexIUM) 40 MG capsule Take 40 mg by mouth 2 (two) times a day before meals        fluconazole (DIFLUCAN) 150 mg tablet   5    HYDROcodone-acetaminophen (NORCO) 5-325 mg per tablet Take 1 tablet by mouth every 6 (six) hours as needed for painMax Daily Amount: 4 tablets 120 tablet 0    hydrOXYzine HCL (ATARAX) 50 mg tablet Take 1 tablet (50 mg total) by mouth 3 (three) times a day as needed for anxiety 90 tablet 0    nystatin (MYCOSTATIN) 500,000 units/5 mL suspension Take 5 mL (500,000 Units total) by mouth 3 (three) times a day 473 mL 1    predniSONE 5 mg tablet Take 1 tablet (5 mg total) by mouth daily 30 tablet 5    pregabalin (LYRICA) 75 mg capsule        No current facility-administered medications on file prior to visit  Recent Labs [facility])  0   Lab Value Date/Time    HCT 34 5 (L) 11/15/2019 1348    HGB 10 8 (L) 11/15/2019 1348    WBC 5 26 11/15/2019 1348    INR 1 10 08/12/2019 1916    ESR 3 11/15/2019 1348    CRP 2 3 11/15/2019 1348     Physical exam  Body mass index is 19 65 kg/m²  · General: Awake, Alert, Oriented, mildly nervous  · Eyes: Pupils equal, round and reactive to light  · Heart: regular rate and rhythm  · Lungs: No audible wheezing  · Abdomen: soft  right Shoulder  · No active ecchymosis about the right shoulder    She maintains full forward flexion and abduction  She is limited with her internal rotation to her thumb behind the back level approximately L1  There is weakness with resisted external rotation and supraspinatus empty can test   There is also weakness with subscap lift-off test   There is a reproducible pain with resisted supination and elbow flexion over the anterior aspect of the shoulder and biceps tendon region  Positive Neer's test   Radial pulse 4/4  Light touch sensation intact  Procedure  None    Imaging  MRI from [facility] notes full-thickness tearing of rotator cuff as well as labral tear and biceps fraying with intramuscular hematoma  1  Traumatic complete tear of right rotator cuff, initial encounter    2  Labral tear of shoulder, degenerative, right      Assessment:  right shoulder rotator cuff tear with labral tear and biceps frame in the disease known as body inclusive myositis that is follow-up through [facility]  Plan:  Due to patient's symptoms the fact that she got good relief with the previous surgery by Dr Roberto Sim she declined orthopedic surgery [facility] and prefers to have Dr Roberto Sim perform surgery on her right shoulder to alleviate her symptoms  The patient has failed non operative measures and has opted for surgical intervention  Risks and benefits of the treatment options and surgery were discussed in detail with the patient by Dr Roberto Sim  The risks of surgery including infection, bleeding, injury to nerves, injury to the vessels, excess scar tissue formation, risk of failure of the procedure, the possible need for further surgery, and potential risk of loss of limb and life  After weighing all the treatment options available, the patient has opted for surgical intervention and informed consent was obtained  We will schedule the patient to be seen back postoperatively  This note was created with voice recognition software

## 2020-09-08 NOTE — H&P
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 which is follow-up to [facility]  Three workup and exam and MRI she was found to have a full-thickness rotator cuff tear of the right side with 1 7 mm retraction as well as labral tearing and biceps fraying  She is unsure of the exact mechanism of injury  She does report a fall off a ladder in June 9th 2020 reports that she did not have any known significant ecchymosis about the right shoulder or significant right arm pain at that point time  She is right-hand dominant  She is not working  She denies numbness or tingling in the right upper extremity  He denies neck pain  She feels she maintains good motion but has some weakness and pain      Review of Systems  Review of systems negative unless otherwise specified in HPI    Past Medical History  Past Medical History:   Diagnosis Date    ADHD     Cellulitis of foot     Last assessed 10/24/16    Change in mental status     Last assessed 12/01/15    Chronic fatigue     PLAZA (dyspnea on exertion)     Last assessed 12/01/15    Fibromyalgia     Folliculitis     Last assessed 10/06/14    GERD (gastroesophageal reflux disease)     Non Hodgkin's lymphoma ([address] )     Osteopenia     Raynaud's disease     Scleroderma ([address] )     Systemic sclerosis (HCC)     Thyroid cancer ([facility] ) 10/04/2018     Past Surgical History  Past Surgical History:   Procedure Laterality Date    BACK SURGERY      BLADDER SURGERY      BONE MARROW BIOPSY      CARDIAC CATHETERIZATION      HEMORRHOID SURGERY      HYSTERECTOMY  02/04/2004    KNEE SURGERY      NASAL SEPTUM SURGERY      ROTATOR CUFF REPAIR      THYROIDECTOMY N/A 10/4/2018    Procedure: TOTAL THYROIDECTOMY;  Surgeon: Natividad Johnson MD;  [facility];  Service: Surgical Oncology    TONSILLECTOMY      TOTAL THYROIDECTOMY       Current Medications  Current Outpatient Medications on File Prior to Visit   Medication Sig Dispense Refill    ALPRAZolam (XANAX) 0 25 mg tablet Take 1 tablet (0 25 mg total) by mouth 3 (three) times a day as needed for anxiety 30 tablet 0    amphetamine-dextroamphetamine (ADDERALL XR) 30 MG 24 hr capsule Take 1 capsule (30 mg total) by mouth every morningMax Daily Amount: 30 mg 30 capsule 0    amphetamine-dextroamphetamine (ADDERALL) 20 mg tablet Take 1 tablet (20 mg total) by mouth 2 (two) times a dayMax Daily Amount: 40 mg 60 tablet 0    buPROPion (WELLBUTRIN XL) 300 mg 24 hr tablet TAKE 1 TABLET BY MOUTH EVERY DAY 90 tablet 0    calcium-vitamin D (OSCAL) 250-125 MG-UNIT per tablet Take by mouth      chlordiazePOXIDE (LIBRIUM) 5 mg capsule Take 5 mg by mouth 2 (two) times a day        chlordiazepoxide-clidinium (LIBRAX) 5-2 5 mg per capsule Take by mouth      dicyclomine (BENTYL) 10 mg capsule Take 20 mg by mouth 2 (two) times a day        Docusate Sodium 100 MG/5ML ENEM Take 250 mg by mouth      escitalopram (LEXAPRO) 10 mg tablet TAKE 1 TABLET (10 MG TOTAL) BY MOUTH DAILY 90 tablet 1    esomeprazole (NexIUM) 40 MG capsule Take 40 mg by mouth      folic acid (FOLVITE) 1 mg tablet Take by mouth daily      hydroxychloroquine (PLAQUENIL) 200 mg tablet Take 200 mg by mouth 2 (two) times a day with meals        ipratropium (ATROVENT) 0 03 % nasal spray 2 sprays into each nostril every 12 (twelve) hours 30 mL 5    levothyroxine 150 mcg tablet TAKE 1 TABLET (150 MCG TOTAL) BY MOUTH DAILY IN THE EARLY MORNING 30 tablet 5    lisinopril (ZESTRIL) 10 mg tablet   0    methotrexate 2 5 mg tablet AS DIRECTED 12 tablet 5    Multiple Vitamin (MULTIVITAMIN) tablet Take 1 tablet by mouth daily      naproxen (NAPROSYN) 500 mg tablet   1    oxymorphone (OPANA) 5 MG tablet Take 1 tablet (5 mg total) by mouth every 6 (six) hours as needed for moderate painMax Daily Amount: 20 mg 120 tablet 0    pregabalin (LYRICA) 75 mg capsule 3 (three) times a day       psyllium (METAMUCIL) 0 52 g capsule Take by mouth      SODIUM FLUORIDE, DENTAL GEL, (PREVIDENT) 1 1 % GEL PreviDent 5000 Dry Mouth 1 1 % gel      traZODone (DESYREL) 150 mg tablet Take 1 tablet (150 mg total) by mouth daily at bedtime 30 tablet 5    aspirin (ASPIRIN 81) 81 mg EC tablet Take 81 mg by mouth      aspirin (ECOTRIN LOW STRENGTH) 81 mg EC tablet Take 81 mg by mouth daily      calcium carbonate (TUMS) 500 mg chewable tablet Chew 2 tablets (1,000 mg total) daily 60 tablet 0    esomeprazole (NexIUM) 40 MG capsule Take 40 mg by mouth 2 (two) times a day before meals        fluconazole (DIFLUCAN) 150 mg tablet   5    HYDROcodone-acetaminophen (NORCO) 5-325 mg per tablet Take 1 tablet by mouth every 6 (six) hours as needed for painMax Daily Amount: 4 tablets 120 tablet 0    hydrOXYzine HCL (ATARAX) 50 mg tablet Take 1 tablet (50 mg total) by mouth 3 (three) times a day as needed for anxiety 90 tablet 0    nystatin (MYCOSTATIN) 500,000 units/5 mL suspension Take 5 mL (500,000 Units total) by mouth 3 (three) times a day 473 mL 1    predniSONE 5 mg tablet Take 1 tablet (5 mg total) by mouth daily 30 tablet 5    pregabalin (LYRICA) 75 mg capsule        No current facility-administered medications on file prior to visit  Recent Labs [facility])  0   Lab Value Date/Time    HCT 34 5 (L) 11/15/2019 1348    HGB 10 8 (L) 11/15/2019 1348    WBC 5 26 11/15/2019 1348    INR 1 10 08/12/2019 1916    ESR 3 11/15/2019 1348    CRP 2 3 11/15/2019 1348     Physical exam  Body mass index is 19 65 kg/m²  · General: Awake, Alert, Oriented, mildly nervous  · Eyes: Pupils equal, round and reactive to light  · Heart: regular rate and rhythm  · Lungs: No audible wheezing  · Abdomen: soft  right Shoulder  · No active ecchymosis about the right shoulder    She maintains full forward flexion and abduction  She is limited with her internal rotation to her thumb behind the back level approximately L1  There is weakness with resisted external rotation and supraspinatus empty can test   There is also weakness with subscap lift-off test   There is a reproducible pain with resisted supination and elbow flexion over the anterior aspect of the shoulder and biceps tendon region  Positive Neer's test   Radial pulse 4/4  Light touch sensation intact  Procedure  None    Imaging  MRI from [facility] notes full-thickness tearing of rotator cuff as well as labral tear and biceps fraying with intramuscular hematoma  1  Traumatic complete tear of right rotator cuff, initial encounter    2  Labral tear of shoulder, degenerative, right      Assessment:  right shoulder rotator cuff tear with labral tear and biceps frame in the disease known as body inclusive myositis that is follow-up through [facility]  Plan:  Due to patient's symptoms the fact that she got good relief with the previous surgery by Dr Luke Parada she declined orthopedic surgery [facility] and prefers to have Dr Luke Parada perform surgery on her right shoulder to alleviate her symptoms  The patient has failed non operative measures and has opted for surgical intervention  Risks and benefits of the treatment options and surgery were discussed in detail with the patient by Dr Luke Parada  The risks of surgery including infection, bleeding, injury to nerves, injury to the vessels, excess scar tissue formation, risk of failure of the procedure, the possible need for further surgery, and potential risk of loss of limb and life  After weighing all the treatment options available, the patient has opted for surgical intervention and informed consent was obtained  We will schedule the patient to be seen back postoperatively  This note was created with voice recognition software

## 2020-09-11 ENCOUNTER — OFFICE VISIT (OUTPATIENT)
Dept: OBGYN CLINIC | Facility: CLINIC | Age: 65
End: 2020-09-11
Payer: MEDICARE

## 2020-09-11 VITALS — TEMPERATURE: 97.3 F | HEIGHT: 61 IN | BODY MASS INDEX: 22.66 KG/M2 | WEIGHT: 120 LBS

## 2020-09-11 DIAGNOSIS — S46.011A TRAUMATIC COMPLETE TEAR OF RIGHT ROTATOR CUFF, INITIAL ENCOUNTER: Primary | ICD-10-CM

## 2020-09-11 PROCEDURE — 99213 OFFICE O/P EST LOW 20 MIN: CPT | Performed by: ORTHOPAEDIC SURGERY

## 2020-09-11 NOTE — PROGRESS NOTES
Chief Complaint  Right shoulder pain    History Of Presenting Illness  Ial Barreto 1955 presents with right shoulder pain with the cuff tear diagnosed by MRI [facility]   Patient was scheduled for right shoulder surgery next week    Patient comes in and she has got infected tooth which is going to be pulled on September 16th      Current Medications  Current Outpatient Medications   Medication Sig Dispense Refill    ALPRAZolam (XANAX) 0 25 mg tablet Take 1 tablet (0 25 mg total) by mouth 3 (three) times a day as needed for anxiety 30 tablet 0    amphetamine-dextroamphetamine (ADDERALL XR) 30 MG 24 hr capsule Take 1 capsule (30 mg total) by mouth every morningMax Daily Amount: 30 mg 30 capsule 0    amphetamine-dextroamphetamine (ADDERALL) 20 mg tablet Take 1 tablet (20 mg total) by mouth 2 (two) times a dayMax Daily Amount: 40 mg 60 tablet 0    buPROPion (WELLBUTRIN XL) 300 mg 24 hr tablet TAKE 1 TABLET BY MOUTH EVERY DAY 90 tablet 0    calcium-vitamin D (OSCAL) 250-125 MG-UNIT per tablet Take by mouth      chlordiazePOXIDE (LIBRIUM) 5 mg capsule Take 5 mg by mouth 2 (two) times a day        chlordiazepoxide-clidinium (LIBRAX) 5-2 5 mg per capsule Take by mouth      dicyclomine (BENTYL) 10 mg capsule Take 20 mg by mouth 2 (two) times a day        Docusate Sodium 100 MG/5ML ENEM Take 250 mg by mouth      escitalopram (LEXAPRO) 10 mg tablet TAKE 1 TABLET (10 MG TOTAL) BY MOUTH DAILY 90 tablet 1    esomeprazole (NexIUM) 40 MG capsule Take 40 mg by mouth      folic acid (FOLVITE) 1 mg tablet Take by mouth daily      hydroxychloroquine (PLAQUENIL) 200 mg tablet Take 200 mg by mouth 2 (two) times a day with meals        ipratropium (ATROVENT) 0 03 % nasal spray 2 sprays into each nostril every 12 (twelve) hours 30 mL 5    levothyroxine 150 mcg tablet TAKE 1 TABLET (150 MCG TOTAL) BY MOUTH DAILY IN THE EARLY MORNING 30 tablet 5    lisinopril (ZESTRIL) 10 mg tablet   0    methotrexate 2 5 mg tablet AS DIRECTED 12 tablet 5    Multiple Vitamin (MULTIVITAMIN) tablet Take 1 tablet by mouth daily      naproxen (NAPROSYN) 500 mg tablet   1    oxymorphone (OPANA) 5 MG tablet Take 1 tablet (5 mg total) by mouth every 6 (six) hours as needed for moderate painMax Daily Amount: 20 mg 120 tablet 0    pregabalin (LYRICA) 75 mg capsule 3 (three) times a day       psyllium (METAMUCIL) 0 52 g capsule Take by mouth      SODIUM FLUORIDE, DENTAL GEL, (PREVIDENT) 1 1 % GEL PreviDent 5000 Dry Mouth 1 1 % gel      traZODone (DESYREL) 150 mg tablet Take 1 tablet (150 mg total) by mouth daily at bedtime 30 tablet 5    aspirin (ASPIRIN 81) 81 mg EC tablet Take 81 mg by mouth      aspirin (ECOTRIN LOW STRENGTH) 81 mg EC tablet Take 81 mg by mouth daily      calcium carbonate (TUMS) 500 mg chewable tablet Chew 2 tablets (1,000 mg total) daily 60 tablet 0    esomeprazole (NexIUM) 40 MG capsule Take 40 mg by mouth 2 (two) times a day before meals        fluconazole (DIFLUCAN) 150 mg tablet   5    HYDROcodone-acetaminophen (NORCO) 5-325 mg per tablet Take 1 tablet by mouth every 6 (six) hours as needed for painMax Daily Amount: 4 tablets 120 tablet 0    hydrOXYzine HCL (ATARAX) 50 mg tablet Take 1 tablet (50 mg total) by mouth 3 (three) times a day as needed for anxiety 90 tablet 0    nystatin (MYCOSTATIN) 500,000 units/5 mL suspension Take 5 mL (500,000 Units total) by mouth 3 (three) times a day 473 mL 1    predniSONE 5 mg tablet Take 1 tablet (5 mg total) by mouth daily 30 tablet 5    pregabalin (LYRICA) 75 mg capsule        No current facility-administered medications for this visit          Current Problems    Active Problems:   Patient Active Problem List    Diagnosis Date Noted    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    Vasomotor rhinitis 11/21/2019    Elevated TSH 08/13/2019    Hypokalemia 08/13/2019    Chronic pain 08/12/2019    Medicare annual wellness visit, subsequent 02/15/2019    SOB (shortness of breath)     Vitamin D deficiency 11/28/2018    Postoperative hypothyroidism 11/28/2018    Osteoarthritis 10/17/2018    Raynaud's syndrome 10/17/2018    Rectal prolapse 10/17/2018    [facility] 10/17/2018    Thyroid cancer ([facility] ) 10/17/2018    BRCA gene mutation positive 09/21/2018    Preoperative clearance 06/25/2018    Fatigue 06/25/2018    Bilateral hand numbness 05/01/2018    Methotrexate, long term, current use 05/01/2018    Scleroderma progressive ([facility] ) 05/01/2018    Fecal soiling 08/07/2017    Post concussion syndrome 12/01/2015    Carpal tunnel syndrome of left wrist 09/14/2015    Carpal tunnel syndrome of right wrist 08/27/2015    Lumbar disc herniation 08/27/2015    Radiculopathy, lumbar region 08/27/2015    Herniated lumbar intervertebral disc 08/03/2015    Heel spur 06/03/2015    Dental disorder 06/02/2015    Fibromyalgia 02/27/2015    Bartholin gland cyst 07/17/2014    Ovarian cyst 07/17/2014    Neck sprain and strain 04/22/2014    Hematuria 14/49/5096    Lichen planus 27/92/5722    Osteoarthritis of both knees 08/13/2013    Chest pain 05/14/2013    Pain in joint of left shoulder 75/74/2890    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    Allergic rhinitis 12/07/2012    Depression 12/07/2012    Esophageal reflux disease 12/07/2012    Hyperlipidemia 12/07/2012    Osteopenia 12/07/2012    Peripheral neuropathy 12/07/2012    Spasm 12/07/2012    Hearing loss 11/23/2012         Review of Systems:    General: negative for - chills, fatigue, fever,  weight gain or weight loss  Psychological: negative for - anxiety, behavioral disorder, concentration difficulties  Ophthalmic: negative for - blurry vision, decreased vision, double vision,      Past Medical History:   Past Medical History:   Diagnosis Date    ADHD     Cellulitis of foot     Last assessed 10/24/16    Change in mental status     Last assessed 12/01/15    Chronic fatigue     PLAZA (dyspnea on exertion)     Last assessed 12/01/15    Fibromyalgia     Folliculitis     Last assessed 10/06/14    GERD (gastroesophageal reflux disease)     Non Hodgkin's lymphoma (HCC)     Osteopenia     Raynaud's disease     Scleroderma (HCC)     Systemic sclerosis (HCC)     Thyroid cancer ([facility] ) 10/04/2018       Past Surgical History:   Past Surgical History:   Procedure Laterality Date    BACK SURGERY      BLADDER SURGERY      BONE MARROW BIOPSY      CARDIAC CATHETERIZATION      HEMORRHOID SURGERY      HYSTERECTOMY  02/04/2004    KNEE SURGERY      NASAL SEPTUM SURGERY      ROTATOR CUFF REPAIR      THYROIDECTOMY N/A 10/4/2018    Procedure: TOTAL THYROIDECTOMY;  Surgeon: Mariela Silver MD;  [facility];  Service: Surgical Oncology    TONSILLECTOMY      TOTAL THYROIDECTOMY         Family History:  Family history reviewed and non-contributory  Family History   Problem Relation Age of Onset    Arthritis Mother     Diabetes Mother     Coronary artery disease Father     Arthritis Sister     Asthma Sister     Crohn's disease Sister     Other Brother         myocardial ischemia    No Known Problems Daughter     No Known Problems Maternal Grandmother     No Known Problems Maternal Grandfather     No Known Problems Paternal Grandmother     No Known Problems Paternal Grandfather     No Known Problems Sister     Ovarian cancer Maternal Aunt     No Known Problems Maternal Aunt     No Known Problems Maternal Aunt     No Known Problems Maternal Aunt     No Known Problems Maternal Aunt     No Known Problems Maternal Aunt     No Known Problems Paternal Aunt        Social History:  Social History     Socioeconomic History    Marital status:      Spouse name: Not on file    Number of children: Not on file    Years of education: Not on file    Highest education level: Not on file   Occupational History    Not on file   Social Needs    Financial resource strain: Not on file   Gibran-Saranya insecurity     Worry: Not on file     Inability: Not on file    Transportation needs     Medical: Not on file     Non-medical: Not on file   Tobacco Use    Smoking status: Former Smoker    Smokeless tobacco: Never Used    Tobacco comment: quit 40 years ago    Substance and Sexual Activity    Alcohol use: Never     Alcohol/week: 0 0 standard drinks     Frequency: Never     Drinks per session: Patient refused     Binge frequency: Never    Drug use: Never    Sexual activity: Not on file   Lifestyle    Physical activity     Days per week: Not on file     Minutes per session: Not on file    Stress: Not on file   Relationships    Social connections     Talks on phone: Not on file     Gets together: Not on file     Attends [employer] service: Not on file     Active member of club or organization: Not on file     Attends meetings of clubs or organizations: Not on file     Relationship status: Not on file    Intimate partner violence     Fear of current or ex partner: Not on file     Emotionally abused: Not on file     Physically abused: Not on file     Forced sexual activity: Not on file   Other Topics Concern    Not on file   Social History Narrative    Not on file       Allergies:    Allergies   Allergen Reactions    Duloxetine Other (See Comments)     Mental psychosis    Methotrexate     Revatio [Sildenafil] Other (See Comments)     mental status changes    Savella [Milnacipran] Other (See Comments)     Feeling out of it    Sulfamethoxazole-Trimethoprim Rash and GI Intolerance    Tedizolid Rash           Physical ExaminationTemp (!) 97 3 °F (36 3 °C)   Ht 5' 1" (1 549 m)   Wt 54 4 kg (120 lb)   BMI 22 67 kg/m²   Gen: Alert and oriented to person, place, time  HEENT: EOMI, eyes clear, moist mucus membranes, hearing intact      Orthopedic Exam  Right shoulder exam is unchanged cuff weakness persists excellent range of motion  MRI confirms complete cuff tear          Impression  Right rotator cuff tear with infected tooth        Plan    Patient will get a tooth pulled on September 16th will hold off surgery till the end of the month or beginning of October  All patients questions answered  Rescheduled surgery    Becca May MD        Portions of the record may have been created with voice recognition software  Occasional wrong word or "sound a like" substitutions may have occurred due to the inherent limitations of voice recognition software  Read the chart carefully and recognize, using context, where substitutions have occurred

## 2020-09-17 DIAGNOSIS — F98.8 ATTENTION DEFICIT DISORDER, UNSPECIFIED HYPERACTIVITY PRESENCE: ICD-10-CM

## 2020-09-17 DIAGNOSIS — R52 PAIN: ICD-10-CM

## 2020-09-17 RX ORDER — DEXTROAMPHETAMINE SACCHARATE, AMPHETAMINE ASPARTATE, DEXTROAMPHETAMINE SULFATE AND AMPHETAMINE SULFATE 5; 5; 5; 5 MG/1; MG/1; MG/1; MG/1
20 TABLET ORAL
Qty: 60 TABLET | Refills: 0 | Status: SHIPPED | OUTPATIENT
Start: 2020-09-17 | End: 2020-10-16 | Stop reason: SDUPTHER

## 2020-09-17 RX ORDER — DEXTROAMPHETAMINE SACCHARATE, AMPHETAMINE ASPARTATE MONOHYDRATE, DEXTROAMPHETAMINE SULFATE AND AMPHETAMINE SULFATE 7.5; 7.5; 7.5; 7.5 MG/1; MG/1; MG/1; MG/1
30 CAPSULE, EXTENDED RELEASE ORAL EVERY MORNING
Qty: 30 CAPSULE | Refills: 0 | Status: SHIPPED | OUTPATIENT
Start: 2020-09-17 | End: 2020-10-16 | Stop reason: SDUPTHER

## 2020-09-17 RX ORDER — OXYMORPHONE HYDROCHLORIDE 5 MG/1
5 TABLET ORAL EVERY 6 HOURS PRN
Qty: 120 TABLET | Refills: 0 | Status: SHIPPED | OUTPATIENT
Start: 2020-09-17 | End: 2020-10-16 | Stop reason: SDUPTHER

## 2020-09-18 DIAGNOSIS — R52 PAIN: ICD-10-CM

## 2020-09-18 DIAGNOSIS — F98.8 ATTENTION DEFICIT DISORDER, UNSPECIFIED HYPERACTIVITY PRESENCE: ICD-10-CM

## 2020-09-18 RX ORDER — OXYMORPHONE HYDROCHLORIDE 5 MG/1
5 TABLET ORAL EVERY 6 HOURS PRN
Qty: 120 TABLET | Refills: 0 | OUTPATIENT
Start: 2020-09-18

## 2020-09-18 RX ORDER — DEXTROAMPHETAMINE SACCHARATE, AMPHETAMINE ASPARTATE, DEXTROAMPHETAMINE SULFATE AND AMPHETAMINE SULFATE 5; 5; 5; 5 MG/1; MG/1; MG/1; MG/1
20 TABLET ORAL
Qty: 60 TABLET | Refills: 0 | OUTPATIENT
Start: 2020-09-18

## 2020-09-18 RX ORDER — DEXTROAMPHETAMINE SACCHARATE, AMPHETAMINE ASPARTATE MONOHYDRATE, DEXTROAMPHETAMINE SULFATE AND AMPHETAMINE SULFATE 7.5; 7.5; 7.5; 7.5 MG/1; MG/1; MG/1; MG/1
30 CAPSULE, EXTENDED RELEASE ORAL EVERY MORNING
Qty: 30 CAPSULE | Refills: 0 | OUTPATIENT
Start: 2020-09-18

## 2020-09-29 ENCOUNTER — TELEPHONE (OUTPATIENT)
Dept: FAMILY MEDICINE CLINIC | Facility: CLINIC | Age: 65
End: 2020-09-29

## 2020-09-29 DIAGNOSIS — B37.9 YEAST INFECTION: Primary | ICD-10-CM

## 2020-09-29 RX ORDER — FLUCONAZOLE 150 MG/1
150 TABLET ORAL ONCE
Qty: 2 TABLET | Refills: 1 | Status: SHIPPED | OUTPATIENT
Start: 2020-09-29 | End: 2020-09-29

## 2020-09-30 ENCOUNTER — TELEPHONE (OUTPATIENT)
Dept: OBGYN CLINIC | Facility: HOSPITAL | Age: 65
End: 2020-09-30

## 2020-10-01 ENCOUNTER — TELEPHONE (OUTPATIENT)
Dept: OBGYN CLINIC | Facility: CLINIC | Age: 65
End: 2020-10-01

## 2020-10-01 ENCOUNTER — TELEPHONE (OUTPATIENT)
Dept: FAMILY MEDICINE CLINIC | Facility: CLINIC | Age: 65
End: 2020-10-01

## 2020-10-02 ENCOUNTER — TELEPHONE (OUTPATIENT)
Dept: LAB | Facility: HOSPITAL | Age: 65
End: 2020-10-02

## 2020-10-02 DIAGNOSIS — R19.7 DIARRHEA, UNSPECIFIED TYPE: Primary | ICD-10-CM

## 2020-10-09 DIAGNOSIS — R27.0 ATAXIA: Primary | ICD-10-CM

## 2020-10-12 DIAGNOSIS — G72.89 NECROTIZING MYOPATHY: Primary | ICD-10-CM

## 2020-10-13 ENCOUNTER — DOCTOR'S OFFICE (OUTPATIENT)
Dept: URBAN - NONMETROPOLITAN AREA CLINIC 1 | Facility: CLINIC | Age: 65
Setting detail: OPHTHALMOLOGY
End: 2020-10-13
Payer: COMMERCIAL

## 2020-10-13 ENCOUNTER — TELEPHONE (OUTPATIENT)
Dept: OBGYN CLINIC | Facility: HOSPITAL | Age: 65
End: 2020-10-13

## 2020-10-13 DIAGNOSIS — H02.433: ICD-10-CM

## 2020-10-13 PROBLEM — H40.033: Status: ACTIVE | Noted: 2018-12-21

## 2020-10-13 PROBLEM — H25.01: Status: ACTIVE | Noted: 2018-12-21

## 2020-10-13 PROBLEM — H04.121 DRY EYE; RIGHT EYE, LEFT EYE: Status: ACTIVE | Noted: 2019-01-28

## 2020-10-13 PROBLEM — Z79.891: Status: ACTIVE | Noted: 2018-12-21

## 2020-10-13 PROBLEM — H01.00 BLEPHARITIS: Status: ACTIVE | Noted: 2018-12-21

## 2020-10-13 PROBLEM — H04.122 DRY EYE; RIGHT EYE, LEFT EYE: Status: ACTIVE | Noted: 2019-01-28

## 2020-10-13 PROCEDURE — 92014 COMPRE OPH EXAM EST PT 1/>: CPT | Performed by: OPHTHALMOLOGY

## 2020-10-13 ASSESSMENT — SPHEQUIV_DERIVED
OS_SPHEQUIV: 2.125
OD_SPHEQUIV: 2.125
OD_SPHEQUIV: 0.875
OS_SPHEQUIV: 0.875

## 2020-10-13 ASSESSMENT — KERATOMETRY
OD_AXISANGLE_DEGREES: 037
OD_K2POWER_DIOPTERS: 42.50
OS_K1POWER_DIOPTERS: 41.00
OS_AXISANGLE_DEGREES: 002
OS_K2POWER_DIOPTERS: 41.75
OD_K1POWER_DIOPTERS: 42.25

## 2020-10-13 ASSESSMENT — REFRACTION_AUTOREFRACTION
OD_SPHERE: +3.00
OS_CYLINDER: -1.25
OD_CYLINDER: -1.75
OD_AXIS: 096
OS_AXIS: 080
OS_SPHERE: +2.75

## 2020-10-13 ASSESSMENT — REFRACTION_CURRENTRX
OS_VPRISM_DIRECTION: PROGS
OS_AXIS: 030
OS_SPHERE: +0.25
OD_OVR_VA: 20/
OS_ADD: +2.25
OD_AXIS: 090
OD_VPRISM_DIRECTION: PROGS
OS_CYLINDER: -0.25
OS_OVR_VA: 20/
OD_SPHERE: +0.25
OD_CYLINDER: -0.25
OD_ADD: +2.25

## 2020-10-13 ASSESSMENT — SUPERFICIAL PUNCTATE KERATITIS (SPK)
OS_SPK: ABSENT
OD_SPK: ABSENT

## 2020-10-13 ASSESSMENT — REFRACTION_MANIFEST
OD_CYLINDER: -0.75
OS_AXIS: 090
OD_AXIS: 088
OD_SPHERE: +1.25
OS_CYLINDER: -0.25
OS_SPHERE: +1.00
OS_ADD: +2.50
OD_ADD: +2.50
OD_VA1: 20/25-1
OS_VA1: 20/25

## 2020-10-13 ASSESSMENT — TEAR BREAK UP TIME (TBUT)
OD_TBUT: ABSENT
OS_TBUT: ABSENT

## 2020-10-13 ASSESSMENT — CONFRONTATIONAL VISUAL FIELD TEST (CVF)
OD_FINDINGS: FULL
OS_FINDINGS: FULL

## 2020-10-13 ASSESSMENT — AXIALLENGTH_DERIVED
OD_AL: 23.1844
OD_AL: 23.6644
OS_AL: 23.5446
OS_AL: 24.0399

## 2020-10-13 ASSESSMENT — VISUAL ACUITY
OD_BCVA: 20/60
OS_BCVA: 20/40

## 2020-10-13 ASSESSMENT — LID POSITION - PTOSIS
OD_PTOSIS: 1+
OS_PTOSIS: 1+

## 2020-10-15 ENCOUNTER — TELEPHONE (OUTPATIENT)
Dept: OBGYN CLINIC | Facility: CLINIC | Age: 65
End: 2020-10-15

## 2020-10-16 ENCOUNTER — HOSPITAL ENCOUNTER (OUTPATIENT)
Dept: NON INVASIVE DIAGNOSTICS | Facility: HOSPITAL | Age: 65
Discharge: HOME | End: 2020-10-16
Attending: ORTHOPAEDIC SURGERY
Payer: MEDICARE

## 2020-10-16 ENCOUNTER — HOSPITAL ENCOUNTER (OUTPATIENT)
Dept: MRI IMAGING | Facility: HOSPITAL | Age: 65
Discharge: HOME | End: 2020-10-16
Attending: FAMILY MEDICINE
Payer: MEDICARE

## 2020-10-16 ENCOUNTER — TELEMEDICINE (OUTPATIENT)
Dept: FAMILY MEDICINE CLINIC | Facility: CLINIC | Age: 65
End: 2020-10-16

## 2020-10-16 ENCOUNTER — APPOINTMENT (OUTPATIENT)
Dept: LAB | Facility: HOSPITAL | Age: 65
End: 2020-10-16
Attending: ORTHOPAEDIC SURGERY
Payer: MEDICARE

## 2020-10-16 DIAGNOSIS — F98.8 ATTENTION DEFICIT DISORDER, UNSPECIFIED HYPERACTIVITY PRESENCE: ICD-10-CM

## 2020-10-16 DIAGNOSIS — Z01.812 PRE-OPERATIVE LABORATORY EXAMINATION: ICD-10-CM

## 2020-10-16 DIAGNOSIS — R52 PAIN: Primary | ICD-10-CM

## 2020-10-16 DIAGNOSIS — R52 PAIN: ICD-10-CM

## 2020-10-16 DIAGNOSIS — S46.011A TRAUMATIC COMPLETE TEAR OF RIGHT ROTATOR CUFF, INITIAL ENCOUNTER: ICD-10-CM

## 2020-10-16 DIAGNOSIS — G72.89 NECROTIZING MYOPATHY: ICD-10-CM

## 2020-10-16 DIAGNOSIS — R27.0 ATAXIA: ICD-10-CM

## 2020-10-16 DIAGNOSIS — E06.0 ACUTE THYROIDITIS: ICD-10-CM

## 2020-10-16 DIAGNOSIS — E07.89 OTHER SPECIFIED DISORDERS OF THYROID: ICD-10-CM

## 2020-10-16 LAB
ALBUMIN SERPL BCP-MCNC: 3.8 G/DL (ref 3.5–5)
ALP SERPL-CCNC: 79 U/L (ref 46–116)
ALT SERPL W P-5'-P-CCNC: 24 U/L (ref 12–78)
ANION GAP SERPL CALCULATED.3IONS-SCNC: 6 MMOL/L (ref 4–13)
APTT PPP: 25 SECONDS (ref 23–37)
AST SERPL W P-5'-P-CCNC: 12 U/L (ref 5–45)
BASOPHILS # BLD AUTO: 0.05 THOUSANDS/ΜL (ref 0–0.1)
BASOPHILS NFR BLD AUTO: 1 % (ref 0–1)
BILIRUB SERPL-MCNC: 0.3 MG/DL (ref 0.2–1)
BUN SERPL-MCNC: 25 MG/DL (ref 5–25)
CALCIUM SERPL-MCNC: 8.6 MG/DL (ref 8.3–10.1)
CHLORIDE SERPL-SCNC: 103 MMOL/L (ref 100–108)
CK SERPL-CCNC: 122 U/L (ref 26–192)
CO2 SERPL-SCNC: 28 MMOL/L (ref 21–32)
CREAT SERPL-MCNC: 0.7 MG/DL (ref 0.6–1.3)
EOSINOPHIL # BLD AUTO: 0.07 THOUSAND/ΜL (ref 0–0.61)
EOSINOPHIL NFR BLD AUTO: 1 % (ref 0–6)
ERYTHROCYTE [DISTWIDTH] IN BLOOD BY AUTOMATED COUNT: 17.2 % (ref 11.6–15.1)
GFR SERPL CREATININE-BSD FRML MDRD: 91 ML/MIN/1.73SQ M
GLUCOSE SERPL-MCNC: 121 MG/DL (ref 65–140)
HCT VFR BLD AUTO: 36 % (ref 34.8–46.1)
HGB BLD-MCNC: 11 G/DL (ref 11.5–15.4)
IMM GRANULOCYTES # BLD AUTO: 0.09 THOUSAND/UL (ref 0–0.2)
IMM GRANULOCYTES NFR BLD AUTO: 1 % (ref 0–2)
INR PPP: 1 (ref 0.84–1.19)
LYMPHOCYTES # BLD AUTO: 0.73 THOUSANDS/ΜL (ref 0.6–4.47)
LYMPHOCYTES NFR BLD AUTO: 9 % (ref 14–44)
MCH RBC QN AUTO: 27 PG (ref 26.8–34.3)
MCHC RBC AUTO-ENTMCNC: 30.6 G/DL (ref 31.4–37.4)
MCV RBC AUTO: 88 FL (ref 82–98)
MONOCYTES # BLD AUTO: 0.4 THOUSAND/ΜL (ref 0.17–1.22)
MONOCYTES NFR BLD AUTO: 5 % (ref 4–12)
NEUTROPHILS # BLD AUTO: 6.4 THOUSANDS/ΜL (ref 1.85–7.62)
NEUTS SEG NFR BLD AUTO: 83 % (ref 43–75)
NRBC BLD AUTO-RTO: 0 /100 WBCS
PLATELET # BLD AUTO: 272 THOUSANDS/UL (ref 149–390)
PMV BLD AUTO: 9.5 FL (ref 8.9–12.7)
POTASSIUM SERPL-SCNC: 4.6 MMOL/L (ref 3.5–5.3)
PROT SERPL-MCNC: 6 G/DL (ref 6.4–8.2)
PROTHROMBIN TIME: 13 SECONDS (ref 11.6–14.5)
RBC # BLD AUTO: 4.08 MILLION/UL (ref 3.81–5.12)
SODIUM SERPL-SCNC: 137 MMOL/L (ref 136–145)
WBC # BLD AUTO: 7.74 THOUSAND/UL (ref 4.31–10.16)

## 2020-10-16 PROCEDURE — 85730 THROMBOPLASTIN TIME PARTIAL: CPT

## 2020-10-16 PROCEDURE — 85025 COMPLETE CBC W/AUTO DIFF WBC: CPT

## 2020-10-16 PROCEDURE — 36415 COLL VENOUS BLD VENIPUNCTURE: CPT

## 2020-10-16 PROCEDURE — G1004 CDSM NDSC: HCPCS

## 2020-10-16 PROCEDURE — 80053 COMPREHEN METABOLIC PANEL: CPT

## 2020-10-16 PROCEDURE — 82550 ASSAY OF CK (CPK): CPT

## 2020-10-16 PROCEDURE — 93005 ELECTROCARDIOGRAM TRACING: CPT

## 2020-10-16 PROCEDURE — 85610 PROTHROMBIN TIME: CPT

## 2020-10-16 PROCEDURE — 70551 MRI BRAIN STEM W/O DYE: CPT

## 2020-10-16 RX ORDER — DEXTROAMPHETAMINE SACCHARATE, AMPHETAMINE ASPARTATE, DEXTROAMPHETAMINE SULFATE AND AMPHETAMINE SULFATE 5; 5; 5; 5 MG/1; MG/1; MG/1; MG/1
20 TABLET ORAL
Qty: 60 TABLET | Refills: 0 | Status: SHIPPED | OUTPATIENT
Start: 2020-10-16 | End: 2020-11-18 | Stop reason: SDUPTHER

## 2020-10-16 RX ORDER — DEXTROAMPHETAMINE SACCHARATE, AMPHETAMINE ASPARTATE MONOHYDRATE, DEXTROAMPHETAMINE SULFATE AND AMPHETAMINE SULFATE 7.5; 7.5; 7.5; 7.5 MG/1; MG/1; MG/1; MG/1
30 CAPSULE, EXTENDED RELEASE ORAL EVERY MORNING
Qty: 30 CAPSULE | Refills: 0 | Status: SHIPPED | OUTPATIENT
Start: 2020-10-16 | End: 2020-11-18 | Stop reason: SDUPTHER

## 2020-10-16 RX ORDER — OXYMORPHONE HYDROCHLORIDE 5 MG/1
5 TABLET ORAL EVERY 6 HOURS PRN
Qty: 120 TABLET | Refills: 0 | Status: SHIPPED | OUTPATIENT
Start: 2020-10-16 | End: 2020-11-18 | Stop reason: SDUPTHER

## 2020-10-19 LAB
ATRIAL RATE: 71 BPM
P AXIS: 67 DEGREES
PR INTERVAL: 182 MS
QRS AXIS: -24 DEGREES
QRSD INTERVAL: 86 MS
QT INTERVAL: 382 MS
QTC INTERVAL: 415 MS
T WAVE AXIS: 39 DEGREES
VENTRICULAR RATE: 71 BPM

## 2020-10-19 PROCEDURE — 93010 ELECTROCARDIOGRAM REPORT: CPT | Performed by: INTERNAL MEDICINE

## 2020-10-21 ENCOUNTER — OFFICE VISIT (OUTPATIENT)
Dept: OBGYN CLINIC | Facility: CLINIC | Age: 65
End: 2020-10-21
Payer: MEDICARE

## 2020-10-21 VITALS
TEMPERATURE: 98.2 F | WEIGHT: 122.4 LBS | SYSTOLIC BLOOD PRESSURE: 96 MMHG | DIASTOLIC BLOOD PRESSURE: 69 MMHG | BODY MASS INDEX: 23.11 KG/M2 | HEART RATE: 88 BPM | HEIGHT: 61 IN

## 2020-10-21 DIAGNOSIS — S46.011D TRAUMATIC COMPLETE TEAR OF RIGHT ROTATOR CUFF, SUBSEQUENT ENCOUNTER: Primary | ICD-10-CM

## 2020-10-21 PROCEDURE — 99213 OFFICE O/P EST LOW 20 MIN: CPT | Performed by: ORTHOPAEDIC SURGERY

## 2020-11-02 ENCOUNTER — TELEPHONE (OUTPATIENT)
Dept: FAMILY MEDICINE CLINIC | Facility: CLINIC | Age: 65
End: 2020-11-02

## 2020-11-10 DIAGNOSIS — F32.A DEPRESSION, UNSPECIFIED DEPRESSION TYPE: ICD-10-CM

## 2020-11-11 DIAGNOSIS — F32.A DEPRESSION, UNSPECIFIED DEPRESSION TYPE: ICD-10-CM

## 2020-11-11 RX ORDER — BUPROPION HYDROCHLORIDE 300 MG/1
TABLET ORAL
Qty: 90 TABLET | Refills: 0 | Status: SHIPPED | OUTPATIENT
Start: 2020-11-11 | End: 2021-02-05 | Stop reason: SDUPTHER

## 2020-11-11 RX ORDER — BUPROPION HYDROCHLORIDE 300 MG/1
300 TABLET ORAL DAILY
Qty: 90 TABLET | Refills: 3 | Status: SHIPPED | OUTPATIENT
Start: 2020-11-11 | End: 2021-02-04

## 2020-11-18 DIAGNOSIS — R52 PAIN: ICD-10-CM

## 2020-11-18 DIAGNOSIS — F98.8 ATTENTION DEFICIT DISORDER, UNSPECIFIED HYPERACTIVITY PRESENCE: ICD-10-CM

## 2020-11-19 RX ORDER — OXYMORPHONE HYDROCHLORIDE 5 MG/1
5 TABLET ORAL EVERY 6 HOURS PRN
Qty: 120 TABLET | Refills: 0 | Status: SHIPPED | OUTPATIENT
Start: 2020-11-19 | End: 2020-12-21 | Stop reason: SDUPTHER

## 2020-11-19 RX ORDER — DEXTROAMPHETAMINE SACCHARATE, AMPHETAMINE ASPARTATE, DEXTROAMPHETAMINE SULFATE AND AMPHETAMINE SULFATE 5; 5; 5; 5 MG/1; MG/1; MG/1; MG/1
20 TABLET ORAL
Qty: 60 TABLET | Refills: 0 | Status: SHIPPED | OUTPATIENT
Start: 2020-11-19 | End: 2020-12-21 | Stop reason: SDUPTHER

## 2020-11-19 RX ORDER — DEXTROAMPHETAMINE SACCHARATE, AMPHETAMINE ASPARTATE MONOHYDRATE, DEXTROAMPHETAMINE SULFATE AND AMPHETAMINE SULFATE 7.5; 7.5; 7.5; 7.5 MG/1; MG/1; MG/1; MG/1
30 CAPSULE, EXTENDED RELEASE ORAL EVERY MORNING
Qty: 30 CAPSULE | Refills: 0 | Status: SHIPPED | OUTPATIENT
Start: 2020-11-19 | End: 2020-12-21 | Stop reason: SDUPTHER

## 2020-12-03 ENCOUNTER — TELEPHONE (OUTPATIENT)
Dept: FAMILY MEDICINE CLINIC | Facility: CLINIC | Age: 65
End: 2020-12-03

## 2020-12-05 DIAGNOSIS — F51.01 PRIMARY INSOMNIA: ICD-10-CM

## 2020-12-06 RX ORDER — TRAZODONE HYDROCHLORIDE 150 MG/1
150 TABLET ORAL
Qty: 30 TABLET | Refills: 5 | Status: SHIPPED | OUTPATIENT
Start: 2020-12-06 | End: 2021-01-04 | Stop reason: SDUPTHER

## 2020-12-21 DIAGNOSIS — F98.8 ATTENTION DEFICIT DISORDER, UNSPECIFIED HYPERACTIVITY PRESENCE: ICD-10-CM

## 2020-12-21 DIAGNOSIS — N30.00 ACUTE CYSTITIS WITHOUT HEMATURIA: Primary | ICD-10-CM

## 2020-12-21 DIAGNOSIS — R52 PAIN: ICD-10-CM

## 2020-12-21 DIAGNOSIS — C73 THYROID CANCER (HCC): ICD-10-CM

## 2020-12-21 RX ORDER — DEXTROAMPHETAMINE SACCHARATE, AMPHETAMINE ASPARTATE, DEXTROAMPHETAMINE SULFATE AND AMPHETAMINE SULFATE 5; 5; 5; 5 MG/1; MG/1; MG/1; MG/1
20 TABLET ORAL
Qty: 60 TABLET | Refills: 0 | Status: SHIPPED | OUTPATIENT
Start: 2020-12-21 | End: 2021-01-22 | Stop reason: SDUPTHER

## 2020-12-21 RX ORDER — OXYMORPHONE HYDROCHLORIDE 5 MG/1
5 TABLET ORAL EVERY 6 HOURS PRN
Qty: 120 TABLET | Refills: 0 | Status: SHIPPED | OUTPATIENT
Start: 2020-12-21 | End: 2021-01-22 | Stop reason: SDUPTHER

## 2020-12-21 RX ORDER — DEXTROAMPHETAMINE SACCHARATE, AMPHETAMINE ASPARTATE MONOHYDRATE, DEXTROAMPHETAMINE SULFATE AND AMPHETAMINE SULFATE 7.5; 7.5; 7.5; 7.5 MG/1; MG/1; MG/1; MG/1
30 CAPSULE, EXTENDED RELEASE ORAL EVERY MORNING
Qty: 30 CAPSULE | Refills: 0 | Status: SHIPPED | OUTPATIENT
Start: 2020-12-21 | End: 2021-01-22 | Stop reason: SDUPTHER

## 2020-12-22 RX ORDER — LEVOTHYROXINE SODIUM 0.15 MG/1
150 TABLET ORAL
Qty: 30 TABLET | Refills: 5 | Status: SHIPPED | OUTPATIENT
Start: 2020-12-22 | End: 2021-02-17

## 2020-12-30 ENCOUNTER — TELEPHONE (OUTPATIENT)
Dept: CARDIOLOGY CLINIC | Facility: HOSPITAL | Age: 65
End: 2020-12-30

## 2021-01-04 DIAGNOSIS — F51.01 PRIMARY INSOMNIA: ICD-10-CM

## 2021-01-04 RX ORDER — TRAZODONE HYDROCHLORIDE 150 MG/1
150 TABLET ORAL
Qty: 30 TABLET | Refills: 5 | Status: SHIPPED | OUTPATIENT
Start: 2021-01-04 | End: 2021-02-05 | Stop reason: SDUPTHER

## 2021-01-22 DIAGNOSIS — R52 PAIN: ICD-10-CM

## 2021-01-22 DIAGNOSIS — M19.90 OSTEOARTHRITIS, UNSPECIFIED OSTEOARTHRITIS TYPE, UNSPECIFIED SITE: ICD-10-CM

## 2021-01-22 DIAGNOSIS — B37.9 YEAST INFECTION: Primary | ICD-10-CM

## 2021-01-22 DIAGNOSIS — F98.8 ATTENTION DEFICIT DISORDER, UNSPECIFIED HYPERACTIVITY PRESENCE: ICD-10-CM

## 2021-01-22 DIAGNOSIS — M54.16 RADICULOPATHY, LUMBAR REGION: ICD-10-CM

## 2021-01-22 RX ORDER — FLUCONAZOLE 150 MG/1
150 TABLET ORAL DAILY
Qty: 1 TABLET | Refills: 5 | Status: SHIPPED | OUTPATIENT
Start: 2021-01-22 | End: 2021-01-23

## 2021-01-22 RX ORDER — OXYMORPHONE HYDROCHLORIDE 5 MG/1
5 TABLET ORAL EVERY 6 HOURS PRN
Qty: 120 TABLET | Refills: 0 | Status: SHIPPED | OUTPATIENT
Start: 2021-01-22 | End: 2021-02-23 | Stop reason: SDUPTHER

## 2021-01-22 RX ORDER — DEXTROAMPHETAMINE SACCHARATE, AMPHETAMINE ASPARTATE, DEXTROAMPHETAMINE SULFATE AND AMPHETAMINE SULFATE 5; 5; 5; 5 MG/1; MG/1; MG/1; MG/1
20 TABLET ORAL
Qty: 60 TABLET | Refills: 0 | Status: SHIPPED | OUTPATIENT
Start: 2021-01-22 | End: 2021-02-05 | Stop reason: SDUPTHER

## 2021-01-22 RX ORDER — DEXTROAMPHETAMINE SACCHARATE, AMPHETAMINE ASPARTATE MONOHYDRATE, DEXTROAMPHETAMINE SULFATE AND AMPHETAMINE SULFATE 7.5; 7.5; 7.5; 7.5 MG/1; MG/1; MG/1; MG/1
30 CAPSULE, EXTENDED RELEASE ORAL EVERY MORNING
Qty: 30 CAPSULE | Refills: 0 | Status: SHIPPED | OUTPATIENT
Start: 2021-01-22 | End: 2021-02-05 | Stop reason: SDUPTHER

## 2021-02-04 RX ORDER — PANTOPRAZOLE SODIUM 40 MG/1
TABLET, DELAYED RELEASE ORAL
COMMUNITY
End: 2021-07-19 | Stop reason: ALTCHOICE

## 2021-02-04 RX ORDER — DICYCLOMINE HCL 20 MG
TABLET ORAL
COMMUNITY
Start: 2020-11-05 | End: 2021-07-19 | Stop reason: ALTCHOICE

## 2021-02-04 RX ORDER — SIMVASTATIN 40 MG
TABLET ORAL
COMMUNITY
End: 2021-07-21 | Stop reason: ALTCHOICE

## 2021-02-04 RX ORDER — NALOXONE HYDROCHLORIDE 4 MG/.1ML
SPRAY NASAL
COMMUNITY
End: 2021-07-19 | Stop reason: ALTCHOICE

## 2021-02-05 ENCOUNTER — OFFICE VISIT (OUTPATIENT)
Dept: FAMILY MEDICINE CLINIC | Facility: CLINIC | Age: 66
End: 2021-02-05
Payer: MEDICARE

## 2021-02-05 DIAGNOSIS — F32.A DEPRESSION, UNSPECIFIED DEPRESSION TYPE: ICD-10-CM

## 2021-02-05 DIAGNOSIS — F41.8 SITUATIONAL ANXIETY: ICD-10-CM

## 2021-02-05 DIAGNOSIS — C82.90 FOLLICULAR LYMPHOMA, UNSPECIFIED FOLLICULAR LYMPHOMA TYPE, UNSPECIFIED BODY REGION (HCC): ICD-10-CM

## 2021-02-05 DIAGNOSIS — F32.9 REACTIVE DEPRESSION: ICD-10-CM

## 2021-02-05 DIAGNOSIS — M33.20 POLYMYOSITIS (HCC): ICD-10-CM

## 2021-02-05 DIAGNOSIS — F51.01 PRIMARY INSOMNIA: ICD-10-CM

## 2021-02-05 DIAGNOSIS — J43.9 PULMONARY EMPHYSEMA, UNSPECIFIED EMPHYSEMA TYPE (HCC): ICD-10-CM

## 2021-02-05 DIAGNOSIS — F98.8 ATTENTION DEFICIT DISORDER, UNSPECIFIED HYPERACTIVITY PRESENCE: ICD-10-CM

## 2021-02-05 DIAGNOSIS — E78.2 MODERATE MIXED HYPERLIPIDEMIA NOT REQUIRING STATIN THERAPY: Primary | ICD-10-CM

## 2021-02-05 PROCEDURE — G0438 PPPS, INITIAL VISIT: HCPCS | Performed by: FAMILY MEDICINE

## 2021-02-05 PROCEDURE — 1123F ACP DISCUSS/DSCN MKR DOCD: CPT | Performed by: FAMILY MEDICINE

## 2021-02-05 PROCEDURE — 99214 OFFICE O/P EST MOD 30 MIN: CPT | Performed by: FAMILY MEDICINE

## 2021-02-05 RX ORDER — CHLORDIAZEPOXIDE HYDROCHLORIDE 5 MG/1
5 CAPSULE, GELATIN COATED ORAL 2 TIMES DAILY
Qty: 180 CAPSULE | Refills: 3 | Status: SHIPPED | OUTPATIENT
Start: 2021-02-05 | End: 2021-12-03 | Stop reason: ALTCHOICE

## 2021-02-05 RX ORDER — FOLIC ACID 1 MG/1
1 TABLET ORAL DAILY
Qty: 30 TABLET | Refills: 5 | Status: SHIPPED | OUTPATIENT
Start: 2021-02-05 | End: 2021-02-05 | Stop reason: SDUPTHER

## 2021-02-05 RX ORDER — CHLORDIAZEPOXIDE HYDROCHLORIDE 5 MG/1
CAPSULE, GELATIN COATED ORAL
Qty: 100 CAPSULE | Refills: 3 | Status: SHIPPED | OUTPATIENT
Start: 2021-02-05 | End: 2021-02-05 | Stop reason: SDUPTHER

## 2021-02-05 RX ORDER — ALPRAZOLAM 0.25 MG/1
0.25 TABLET ORAL 3 TIMES DAILY PRN
Qty: 30 TABLET | Refills: 0 | Status: SHIPPED | OUTPATIENT
Start: 2021-02-05 | End: 2021-12-23

## 2021-02-05 RX ORDER — HYDROXYCHLOROQUINE SULFATE 200 MG/1
200 TABLET, FILM COATED ORAL 2 TIMES DAILY WITH MEALS
Qty: 30 TABLET | Refills: 5 | Status: SHIPPED | OUTPATIENT
Start: 2021-02-05 | End: 2021-02-05 | Stop reason: SDUPTHER

## 2021-02-05 RX ORDER — BUPROPION HYDROCHLORIDE 300 MG/1
300 TABLET ORAL DAILY
Qty: 90 TABLET | Refills: 3 | Status: SHIPPED | OUTPATIENT
Start: 2021-02-05 | End: 2021-07-27 | Stop reason: SDUPTHER

## 2021-02-05 RX ORDER — DEXTROAMPHETAMINE SACCHARATE, AMPHETAMINE ASPARTATE, DEXTROAMPHETAMINE SULFATE AND AMPHETAMINE SULFATE 5; 5; 5; 5 MG/1; MG/1; MG/1; MG/1
20 TABLET ORAL
Qty: 60 TABLET | Refills: 0 | Status: SHIPPED | OUTPATIENT
Start: 2021-02-05 | End: 2021-02-23 | Stop reason: SDUPTHER

## 2021-02-05 RX ORDER — ALPRAZOLAM 0.25 MG/1
0.25 TABLET ORAL 3 TIMES DAILY PRN
Qty: 30 TABLET | Refills: 0 | Status: SHIPPED | OUTPATIENT
Start: 2021-02-05 | End: 2021-02-05 | Stop reason: SDUPTHER

## 2021-02-05 RX ORDER — BUPROPION HYDROCHLORIDE 300 MG/1
TABLET ORAL
Qty: 90 TABLET | Refills: 0 | Status: SHIPPED | OUTPATIENT
Start: 2021-02-05 | End: 2021-02-05

## 2021-02-05 RX ORDER — BUPROPION HYDROCHLORIDE 300 MG/1
300 TABLET ORAL DAILY
Qty: 90 TABLET | Refills: 3 | Status: SHIPPED | OUTPATIENT
Start: 2021-02-05 | End: 2021-02-05 | Stop reason: SDUPTHER

## 2021-02-05 RX ORDER — ESCITALOPRAM OXALATE 10 MG/1
10 TABLET ORAL DAILY
Qty: 90 TABLET | Refills: 1 | Status: SHIPPED | OUTPATIENT
Start: 2021-02-05 | End: 2021-02-05 | Stop reason: SDUPTHER

## 2021-02-05 RX ORDER — FOLIC ACID 1 MG/1
1 TABLET ORAL 2 TIMES DAILY
Qty: 180 TABLET | Refills: 3 | Status: SHIPPED | OUTPATIENT
Start: 2021-02-05 | End: 2022-05-04 | Stop reason: SDUPTHER

## 2021-02-05 RX ORDER — BUPROPION HYDROCHLORIDE 300 MG/1
TABLET ORAL
Qty: 90 TABLET | Refills: 3 | Status: SHIPPED | OUTPATIENT
Start: 2021-02-05 | End: 2021-02-05

## 2021-02-05 RX ORDER — FOLIC ACID 1 MG/1
TABLET ORAL
Qty: 180 TABLET | Refills: 0 | Status: SHIPPED | OUTPATIENT
Start: 2021-02-05 | End: 2021-02-05

## 2021-02-05 RX ORDER — TRAZODONE HYDROCHLORIDE 150 MG/1
150 TABLET ORAL
Qty: 90 TABLET | Refills: 3 | Status: SHIPPED | OUTPATIENT
Start: 2021-02-05 | End: 2022-04-14

## 2021-02-05 RX ORDER — TRAZODONE HYDROCHLORIDE 150 MG/1
150 TABLET ORAL
Qty: 30 TABLET | Refills: 5 | Status: SHIPPED | OUTPATIENT
Start: 2021-02-05 | End: 2021-02-05 | Stop reason: SDUPTHER

## 2021-02-05 RX ORDER — ESOMEPRAZOLE MAGNESIUM 40 MG/1
40 CAPSULE, DELAYED RELEASE ORAL
Qty: 180 CAPSULE | Refills: 3 | Status: SHIPPED | OUTPATIENT
Start: 2021-02-05 | End: 2021-07-27 | Stop reason: SDUPTHER

## 2021-02-05 RX ORDER — ESOMEPRAZOLE MAGNESIUM 40 MG/1
40 CAPSULE, DELAYED RELEASE ORAL
Qty: 90 CAPSULE | Refills: 3 | Status: SHIPPED | OUTPATIENT
Start: 2021-02-05 | End: 2021-02-05 | Stop reason: SDUPTHER

## 2021-02-05 RX ORDER — ESCITALOPRAM OXALATE 10 MG/1
10 TABLET ORAL DAILY
Qty: 90 TABLET | Refills: 3 | Status: SHIPPED | OUTPATIENT
Start: 2021-02-05 | End: 2021-11-03

## 2021-02-05 RX ORDER — HYDROXYCHLOROQUINE SULFATE 200 MG/1
200 TABLET, FILM COATED ORAL 2 TIMES DAILY WITH MEALS
Qty: 180 TABLET | Refills: 3 | Status: SHIPPED | OUTPATIENT
Start: 2021-02-05 | End: 2022-01-31 | Stop reason: SDUPTHER

## 2021-02-05 RX ORDER — DEXTROAMPHETAMINE SACCHARATE, AMPHETAMINE ASPARTATE MONOHYDRATE, DEXTROAMPHETAMINE SULFATE AND AMPHETAMINE SULFATE 7.5; 7.5; 7.5; 7.5 MG/1; MG/1; MG/1; MG/1
30 CAPSULE, EXTENDED RELEASE ORAL EVERY MORNING
Qty: 30 CAPSULE | Refills: 0 | Status: SHIPPED | OUTPATIENT
Start: 2021-02-05 | End: 2021-02-23 | Stop reason: SDUPTHER

## 2021-02-05 NOTE — PROGRESS NOTES
Assessment and Plan:     Problem List Items Addressed This Visit     None           Preventive health issues were discussed with patient, and age appropriate screening tests were ordered as noted in patient's After Visit Summary  Personalized health advice and appropriate referrals for health education or preventive services given if needed, as noted in patient's After Visit Summary       History of Present Illness:     Patient presents for Medicare Annual Wellness visit    Patient Care Team:  Tobi Guerra MD as PCP - Anabella Nation MD as PCP - Endocrinology (Endocrinology)     Problem List:     Patient Active Problem List   Diagnosis    Bilateral hand numbness    Methotrexate, long term, current use    Scleroderma progressive ([facility] [address] )    Preoperative clearance    Fatigue    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[facility] [address] )    BRCA gene mutation positive    Allergic rhinitis    Bartholin gland cyst    Carpal tunnel syndrome of left wrist    Carpal tunnel syndrome of right wrist    Chest pain    Dental disorder    Depression    Esophageal reflux disease    Fecal soiling    Fibromyalgia    Hearing loss    Heel spur    Hematuria    Herniated lumbar intervertebral disc    Hyperlipidemia    Lichen planus    Lumbar disc herniation    Neck sprain and strain    Osteoarthritis    Osteoarthritis of both knees    Osteopenia    Ovarian cyst    Pain in joint of left shoulder    Peripheral neuropathy    Post concussion syndrome    Radiculopathy, lumbar region    Raynaud's syndrome    Rectal prolapse    Sjogren's syndrome ([facility] [address] [facility]    Spasm    Thyroid cancer ([facility] [address] )    Vitamin D deficiency    Postoperative hypothyroidism    SOB (shortness of breath)    Medicare annual wellness visit, subsequent    Chronic pain    Elevated TSH    Hypokalemia    Vasomotor rhinitis    Necrotizing myopathy      Past Medical and Surgical History:     Past Medical History:   Diagnosis Date    ADHD     Cellulitis of foot Last assessed 10/24/16    Change in mental status     Last assessed 12/01/15    Chronic fatigue     PLAZA (dyspnea on exertion)     Last assessed 12/01/15    Fibromyalgia     Folliculitis     Last assessed 10/06/14    GERD (gastroesophageal reflux disease)     Non Hodgkin's lymphoma (HCC)     Osteopenia     Raynaud's disease     Scleroderma (HCC)     Systemic sclerosis (HCC)     Thyroid cancer ([address] ) 10/04/2018     Past Surgical History:   Procedure Laterality Date    BACK SURGERY      BLADDER SURGERY      BONE MARROW BIOPSY      CARDIAC CATHETERIZATION      HEMORRHOID SURGERY      HYSTERECTOMY  02/04/2004    KNEE SURGERY      NASAL SEPTUM SURGERY      ROTATOR CUFF REPAIR      THYROIDECTOMY N/A 10/4/2018    Procedure: TOTAL THYROIDECTOMY;  Surgeon: Lana James MD;  Location: [facility];  Service: Surgical Oncology    TONSILLECTOMY      TOTAL THYROIDECTOMY        Family History:     Family History   Problem Relation Age of Onset    Arthritis Mother     Diabetes Mother     Coronary artery disease Father     Arthritis Sister     Asthma Sister     Crohn's disease Sister     Other Brother         myocardial ischemia    No Known Problems Daughter     No Known Problems Maternal Grandmother     No Known Problems Maternal Grandfather     No Known Problems Paternal Grandmother     No Known Problems Paternal Grandfather     No Known Problems Sister     Ovarian cancer Maternal Aunt     No Known Problems Maternal Aunt     No Known Problems Maternal Aunt     No Known Problems Maternal Aunt     No Known Problems Maternal Aunt     No Known Problems Maternal Aunt     No Known Problems Paternal Aunt       Social History:        Social History     Socioeconomic History    Marital status:      Spouse name: Not on file    Number of children: Not on file    Years of education: Not on file    Highest education level: Not on file   Occupational History    Not on file   Social Needs  Financial resource strain: Not on file    Food insecurity     Worry: Not on file     Inability: Not on file   [employer] needs     Medical: Not on file     Non-medical: Not on file   Tobacco Use    Smoking status: Former Smoker    Smokeless tobacco: Never Used    Tobacco comment: quit 40 years ago    Substance and Sexual Activity    Alcohol use: Never     Alcohol/week: 0 0 standard drinks     Frequency: Never     Drinks per session: Patient refused     Binge frequency: Never    Drug use: Never    Sexual activity: Not on file   Lifestyle    Physical activity     Days per week: Not on file     Minutes per session: Not on file    Stress: Not on file   Relationships    Social connections     Talks on phone: Not on file     Gets together: Not on file     Attends Hoahaoism service: Not on file     Active member of club or organization: Not on file     Attends meetings of clubs or organizations: Not on file     Relationship status: Not on file    Intimate partner violence     Fear of current or ex partner: Not on file     Emotionally abused: Not on file     Physically abused: Not on file     Forced sexual activity: Not on file   Other Topics Concern    Not on file   Social History Narrative    Not on file      Medications and Allergies:     Current Outpatient Medications   Medication Sig Dispense Refill    ALPRAZolam (XANAX) 0 25 mg tablet Take 1 tablet (0 25 mg total) by mouth 3 (three) times a day as needed for anxiety 30 tablet 0    amphetamine-dextroamphetamine (ADDERALL XR) 30 MG 24 hr capsule Take 1 capsule (30 mg total) by mouth every morningMax Daily Amount: 30 mg 30 capsule 0    amphetamine-dextroamphetamine (ADDERALL) 20 mg tablet Take 1 tablet (20 mg total) by mouth 2 (two) times a dayMax Daily Amount: 40 mg 60 tablet 0    buPROPion (WELLBUTRIN XL) 300 mg 24 hr tablet TAKE 1 TABLET BY MOUTH EVERY DAY 90 tablet 0    calcium-vitamin D (OSCAL) 250-125 MG-UNIT per tablet Take by mouth  chlordiazePOXIDE (LIBRIUM) 5 mg capsule Take 5 mg by mouth 2 (two) times a day        chlordiazepoxide-clidinium (LIBRAX) 5-2 5 mg per capsule Take by mouth      dicyclomine (BENTYL) 10 mg capsule Take 20 mg by mouth 2 (two) times a day        dicyclomine (BENTYL) 20 mg tablet       Docusate Sodium 100 MG/5ML ENEM Take 250 mg by mouth      escitalopram (LEXAPRO) 10 mg tablet TAKE 1 TABLET (10 MG TOTAL) BY MOUTH DAILY 90 tablet 1    esomeprazole (NexIUM) 40 MG capsule Take 40 mg by mouth 2 (two) times a day before meals        folic acid (FOLVITE) 1 mg tablet Take by mouth daily      hydroxychloroquine (PLAQUENIL) 200 mg tablet Take 200 mg by mouth 2 (two) times a day with meals        levothyroxine 150 mcg tablet TAKE 1 TABLET (150 MCG TOTAL) BY MOUTH DAILY IN THE EARLY MORNING 30 tablet 5    methotrexate 2 5 mg tablet AS DIRECTED 12 tablet 5    Multiple Vitamin (MULTIVITAMIN) tablet Take 1 tablet by mouth daily      naloxone (Narcan) 4 mg/0 1 mL nasal spray Narcan 4 mg/actuation nasal spray      naproxen (NAPROSYN) 500 mg tablet   1    oxymorphone (OPANA) 5 MG tablet Take 1 tablet (5 mg total) by mouth every 6 (six) hours as needed for moderate painMax Daily Amount: 20 mg 120 tablet 0    pantoprazole (PROTONIX) 40 mg tablet pantoprazole 40 mg tablet,delayed release      simvastatin (ZOCOR) 40 mg tablet simvastatin 40 mg tablet      SODIUM FLUORIDE, DENTAL GEL, (PREVIDENT) 1 1 % GEL PreviDent 5000 Dry Mouth 1 1 % gel      traZODone (DESYREL) 150 mg tablet Take 1 tablet (150 mg total) by mouth daily at bedtime 30 tablet 5     No current facility-administered medications for this visit        Allergies   Allergen Reactions    Duloxetine Other (See Comments)     Mental psychosis    Revatio [Sildenafil] Other (See Comments)     mental status changes    Savella [Milnacipran] Other (See Comments)     Feeling out of it    Sulfamethoxazole-Trimethoprim Rash and GI Intolerance    Tedizolid Rash Immunizations:     Immunization History   Administered Date(s) Administered    INFLUENZA 01/06/2006, 10/22/2013, 11/10/2014, 10/18/2016, 10/04/2017, 09/21/2018    Influenza Quadrivalent Preservative Free 3 years and older IM 10/18/2016    Influenza, high dose seasonal 0 7 mL 11/12/2019    Influenza, recombinant, quadrivalent,injectable, preservative free 09/21/2018    Pneumococcal Conjugate 13-Valent 02/15/2019    Pneumococcal Polysaccharide PPV23 01/01/2006, 10/29/2020    Tdap 10/01/2013      Health Maintenance:         Topic Date Due    MAMMOGRAM  09/04/2022    Cervical Cancer Screening  12/08/2023    Colonoscopy Surveillance  06/04/2024    Colorectal Cancer Screening  06/04/2029    Hepatitis C Screening  Completed         Topic Date Due    Influenza Vaccine (1) 09/01/2020      Medicare Health Risk Assessment:     There were no vitals taken for this visit  Elgin Ferrara is here for her Subsequent Wellness visit  Health Risk Assessment:   Patient rates overall health as good  Patient feels that their physical health rating is slightly worse  Eyesight was rated as slightly worse  Hearing was rated as same  Patient feels that their emotional and mental health rating is slightly worse  Pain experienced in the last 7 days has been some  Patient's pain rating has been 3/10  Patient states that she has experienced no weight loss or gain in last 6 months  Depression Screening:   PHQ-2 Score: 1  PHQ-9 Score: 4      Fall Risk Screening: In the past year, patient has experienced: no history of falling in past year      Urinary Incontinence Screening:   Patient has leaked urine accidently in the last six months  Home Safety:  Patient does not have trouble with stairs inside or outside of their home  Patient has working smoke alarms and has working carbon monoxide detector  Home safety hazards include: none  Nutrition:   Current diet is Regular       Medications:   Patient is currently taking over-the-counter supplements  OTC medications include: see medication list  Patient is able to manage medications  Activities of Daily Living (ADLs)/Instrumental Activities of Daily Living (IADLs):   Walk and transfer into and out of bed and chair?: Yes  Dress and groom yourself?: Yes    Bathe or shower yourself?: Yes    Feed yourself? Yes  Do your laundry/housekeeping?: Yes  Manage your money, pay your bills and track your expenses?: Yes  Make your own meals?: Yes    Do your own shopping?: Yes    Previous Hospitalizations:   Any hospitalizations or ED visits within the last 12 months?: No      Advance Care Planning:   Living will: Yes    Durable POA for healthcare:  Yes    Advanced directive: Yes      Cognitive Screening:   Provider or family/friend/caregiver concerned regarding cognition?: No    PREVENTIVE SCREENINGS      Cardiovascular Screening:    General: Screening Not Indicated and History Lipid Disorder      Diabetes Screening:     General: Screening Current      Colorectal Cancer Screening:     General: Screening Current      Breast Cancer Screening:     General: Screening Current      Cervical Cancer Screening:    General: Screening Not Indicated      Osteoporosis Screening:    General: Screening Not Indicated and History Osteoporosis      Abdominal Aortic Aneurysm (AAA) Screening:        General: Screening Not Indicated      Lung Cancer Screening:     General: Screening Not Indicated      Hepatitis C Screening:    General: Screening Current      Shereen Ponce MD

## 2021-02-05 NOTE — PROGRESS NOTES
Assessment/Plan:    No problem-specific Assessment & Plan notes found for this encounter  Diagnoses and all orders for this visit:    Moderate mixed hyperlipidemia not requiring statin therapy  -     Lipid panel; Future    Situational anxiety  -     Ambulatory referral to Podiatry; Future  -     Ambulatory referral to Dermatology; Future  -     Discontinue: ALPRAZolam (XANAX) 0 25 mg tablet; Take 1 tablet (0 25 mg total) by mouth 3 (three) times a day as needed for anxiety  -     Discontinue: folic acid (FOLVITE) 1 mg tablet; Take 1 tablet (1 mg total) by mouth daily  -     Discontinue: esomeprazole (NexIUM) 40 MG capsule; Take 1 capsule (40 mg total) by mouth 2 (two) times a day before meals  -     Discontinue: hydroxychloroquine (PLAQUENIL) 200 mg tablet; Take 1 tablet (200 mg total) by mouth 2 (two) times a day with meals  -     CBC and differential; Future  -     Lipid panel; Future  -     Comprehensive metabolic panel; Future  -     TSH, 3rd generation; Future  -     Sedimentation rate, automated; Future    Attention deficit disorder, unspecified hyperactivity presence  -     Ambulatory referral to Podiatry; Future  -     Ambulatory referral to Dermatology; Future  -     amphetamine-dextroamphetamine (ADDERALL XR) 30 MG 24 hr capsule; Take 1 capsule (30 mg total) by mouth every morningMax Daily Amount: 30 mg  -     amphetamine-dextroamphetamine (ADDERALL) 20 mg tablet; Take 1 tablet (20 mg total) by mouth 2 (two) times a dayMax Daily Amount: 40 mg  -     Discontinue: folic acid (FOLVITE) 1 mg tablet; Take 1 tablet (1 mg total) by mouth daily  -     Discontinue: esomeprazole (NexIUM) 40 MG capsule; Take 1 capsule (40 mg total) by mouth 2 (two) times a day before meals  -     Discontinue: hydroxychloroquine (PLAQUENIL) 200 mg tablet; Take 1 tablet (200 mg total) by mouth 2 (two) times a day with meals  -     CBC and differential; Future  -     Lipid panel;  Future  -     Comprehensive metabolic panel; Future  -     TSH, 3rd generation; Future  -     Sedimentation rate, automated; Future    Primary insomnia  -     Ambulatory referral to Podiatry; Future  -     Ambulatory referral to Dermatology; Future  -     Discontinue: traZODone (DESYREL) 150 mg tablet; Take 1 tablet (150 mg total) by mouth daily at bedtime  -     Discontinue: folic acid (FOLVITE) 1 mg tablet; Take 1 tablet (1 mg total) by mouth daily  -     Discontinue: esomeprazole (NexIUM) 40 MG capsule; Take 1 capsule (40 mg total) by mouth 2 (two) times a day before meals  -     Discontinue: hydroxychloroquine (PLAQUENIL) 200 mg tablet; Take 1 tablet (200 mg total) by mouth 2 (two) times a day with meals  -     CBC and differential; Future  -     Lipid panel; Future  -     Comprehensive metabolic panel; Future  -     TSH, 3rd generation; Future  -     Sedimentation rate, automated; Future    Depression, unspecified depression type  -     Ambulatory referral to Podiatry; Future  -     Ambulatory referral to Dermatology; Future  -     Discontinue: folic acid (FOLVITE) 1 mg tablet; Take 1 tablet (1 mg total) by mouth daily  -     Discontinue: esomeprazole (NexIUM) 40 MG capsule; Take 1 capsule (40 mg total) by mouth 2 (two) times a day before meals  -     Discontinue: hydroxychloroquine (PLAQUENIL) 200 mg tablet; Take 1 tablet (200 mg total) by mouth 2 (two) times a day with meals  -     Discontinue: buPROPion (WELLBUTRIN XL) 300 mg 24 hr tablet; Take 1 tablet (300 mg total) by mouth daily  -     CBC and differential; Future  -     Lipid panel; Future  -     Comprehensive metabolic panel; Future  -     TSH, 3rd generation; Future  -     Sedimentation rate, automated; Future    Follicular lymphoma, unspecified follicular lymphoma type, unspecified body region [facility])  -     Ambulatory referral to Podiatry; Future  -     Ambulatory referral to Dermatology; Future  -     Discontinue: folic acid (FOLVITE) 1 mg tablet;  Take 1 tablet (1 mg total) by mouth daily  - Discontinue: esomeprazole (NexIUM) 40 MG capsule; Take 1 capsule (40 mg total) by mouth 2 (two) times a day before meals  -     Discontinue: hydroxychloroquine (PLAQUENIL) 200 mg tablet; Take 1 tablet (200 mg total) by mouth 2 (two) times a day with meals  -     CBC and differential; Future  -     Lipid panel; Future  -     Comprehensive metabolic panel; Future  -     TSH, 3rd generation; Future  -     Sedimentation rate, automated; Future    Polymyositis ([place] [id number] )  -     Ambulatory referral to Podiatry; Future  -     Ambulatory referral to Dermatology; Future  -     Discontinue: folic acid (FOLVITE) 1 mg tablet; Take 1 tablet (1 mg total) by mouth daily  -     Discontinue: esomeprazole (NexIUM) 40 MG capsule; Take 1 capsule (40 mg total) by mouth 2 (two) times a day before meals  -     Discontinue: hydroxychloroquine (PLAQUENIL) 200 mg tablet; Take 1 tablet (200 mg total) by mouth 2 (two) times a day with meals  -     CBC and differential; Future  -     Lipid panel; Future  -     Comprehensive metabolic panel; Future  -     TSH, 3rd generation; Future  -     Sedimentation rate, automated; Future    Pulmonary emphysema, unspecified emphysema type ([place] [address] )  -     Ambulatory referral to Podiatry; Future  -     Ambulatory referral to Dermatology; Future  -     Discontinue: folic acid (FOLVITE) 1 mg tablet; Take 1 tablet (1 mg total) by mouth daily  -     Discontinue: esomeprazole (NexIUM) 40 MG capsule; Take 1 capsule (40 mg total) by mouth 2 (two) times a day before meals  -     Discontinue: hydroxychloroquine (PLAQUENIL) 200 mg tablet; Take 1 tablet (200 mg total) by mouth 2 (two) times a day with meals  -     CBC and differential; Future  -     Lipid panel; Future  -     Comprehensive metabolic panel; Future  -     TSH, 3rd generation; Future  -     Sedimentation rate, automated;  Future    Other orders  -     dicyclomine (BENTYL) 20 mg tablet  -     simvastatin (ZOCOR) 40 mg tablet; simvastatin 40 mg tablet  - pantoprazole (PROTONIX) 40 mg tablet; pantoprazole 40 mg tablet,delayed release  -     naloxone (Narcan) 4 mg/0 1 mL nasal spray; Narcan 4 mg/actuation nasal spray          Subjective:      Patient ID: Doni Hoff is a 76 y o  female  1   She has a on the sides of her face  She is not sure how long it has been there  It is not terribly itchy  2   She has some lower ext wounds that are slow to heal  She was told they were venous (?) in nature  Her feet and ankles are swollen  She saw a new cardiologist yesterday  She was hoping to get a heart cath  It sounds like she is getting a CT heart /calcium score  3   She has chronic pain related to sclreroderma and RA  She is doing home work outs  She shoveled some snow  4   She [facility] endocrinology at [facility]  She has excessive fatigue  She is having a great deal of myalgia  The following portions of the patient's history were reviewed and updated as appropriate:   She  has a past medical history of ADHD, Cellulitis of foot, Change in mental status, Chronic fatigue, PLAZA (dyspnea on exertion), Fibromyalgia, Folliculitis, GERD (gastroesophageal reflux disease), Non Hodgkin's lymphoma ([address] ), Osteopenia, Raynaud's disease, Scleroderma ([address] ), Systemic sclerosis ([address] ), and Thyroid cancer ([facility] [address] ) (10/04/2018)    She   Patient Active Problem List    Diagnosis Date Noted    Pulmonary emphysema ([address] ) 02/05/2021    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    Vasomotor rhinitis 11/21/2019    Elevated TSH 08/13/2019    Hypokalemia 08/13/2019    Chronic pain 08/12/2019    Medicare annual wellness visit, subsequent 02/15/2019    SOB (shortness of breath)     Vitamin D deficiency 11/28/2018    Postoperative hypothyroidism 11/28/2018    Osteoarthritis 10/17/2018    Raynaud's syndrome 10/17/2018    Rectal prolapse 10/17/2018    Sjogren's syndrome ([address] ) 10/17/2018    Thyroid cancer ([address] ) 10/17/2018    BRCA gene mutation positive 09/21/2018    Preoperative clearance 06/25/2018    Fatigue 06/25/2018    Bilateral hand numbness 05/01/2018    Methotrexate, long term, current use 05/01/2018    Scleroderma progressive ([facility] [address] ) 05/01/2018    Fecal soiling 08/07/2017    Post concussion syndrome 12/01/2015    Carpal tunnel syndrome of left wrist 09/14/2015    Carpal tunnel syndrome of right wrist 08/27/2015    Lumbar disc herniation 08/27/2015    Radiculopathy, lumbar region 08/27/2015    Herniated lumbar intervertebral disc 08/03/2015    Heel spur 06/03/2015    Dental disorder 06/02/2015    Fibromyalgia 02/27/2015    Bartholin gland cyst 07/17/2014    Ovarian cyst 07/17/2014    Neck sprain and strain 04/22/2014    Hematuria 64/42/1852    Lichen planus 01/45/4040    Osteoarthritis of both knees 08/13/2013    Chest pain 05/14/2013    Pain in joint of left shoulder 42/11/8080    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[facility] [address] ) 12/07/2012    Allergic rhinitis 12/07/2012    Depression 12/07/2012    Esophageal reflux disease 12/07/2012    Hyperlipidemia 12/07/2012    Osteopenia 12/07/2012    Peripheral neuropathy 12/07/2012    Spasm 12/07/2012    Hearing loss 11/23/2012     She  has a past surgical history that includes Tonsillectomy; Back surgery; Rotator cuff repair; Bone marrow biopsy; Bladder surgery; Hemorrhoid surgery; Nasal septum surgery; Knee surgery; Cardiac catheterization; Thyroidectomy (N/A, 10/4/2018); Total thyroidectomy; and Hysterectomy (02/04/2004)  Her family history includes Arthritis in her mother and sister; Asthma in her sister; Coronary artery disease in her father; Crohn's disease in her sister; Diabetes in her mother; No Known Problems in her daughter, maternal aunt, maternal aunt, maternal aunt, maternal aunt, maternal aunt, maternal grandfather, maternal grandmother, paternal aunt, paternal grandfather, paternal grandmother, and sister; Other in her brother; Ovarian cancer in her maternal aunt  She  reports that she has quit smoking   She has never used smokeless tobacco  She reports that she does not drink alcohol or use drugs    Current Outpatient Medications   Medication Sig Dispense Refill    ALPRAZolam (XANAX) 0 25 mg tablet Take 1 tablet (0 25 mg total) by mouth 3 (three) times a day as needed for anxiety 30 tablet 0    amphetamine-dextroamphetamine (ADDERALL XR) 30 MG 24 hr capsule Take 1 capsule (30 mg total) by mouth every morningMax Daily Amount: 30 mg 30 capsule 0    amphetamine-dextroamphetamine (ADDERALL) 20 mg tablet Take 1 tablet (20 mg total) by mouth 2 (two) times a dayMax Daily Amount: 40 mg 60 tablet 0    buPROPion (WELLBUTRIN XL) 300 mg 24 hr tablet Take 1 tablet (300 mg total) by mouth daily 90 tablet 3    calcium-vitamin D (OSCAL) 250-125 MG-UNIT per tablet Take by mouth      chlordiazePOXIDE (LIBRIUM) 5 mg capsule Take 1 capsule (5 mg total) by mouth 2 (two) times a day 180 capsule 3    chlordiazepoxide-clidinium (LIBRAX) 5-2 5 mg per capsule Take by mouth      dicyclomine (BENTYL) 10 mg capsule Take 20 mg by mouth 2 (two) times a day        dicyclomine (BENTYL) 20 mg tablet       Docusate Sodium 100 MG/5ML ENEM Take 250 mg by mouth      escitalopram (LEXAPRO) 10 mg tablet Take 1 tablet (10 mg total) by mouth daily 90 tablet 3    esomeprazole (NexIUM) 40 MG capsule Take 1 capsule (40 mg total) by mouth 2 (two) times a day before meals 180 capsule 3    folic acid (FOLVITE) 1 mg tablet Take 1 tablet (1 mg total) by mouth 2 (two) times a day 180 tablet 3    hydroxychloroquine (PLAQUENIL) 200 mg tablet Take 1 tablet (200 mg total) by mouth 2 (two) times a day with meals 180 tablet 3    levothyroxine 150 mcg tablet TAKE 1 TABLET (150 MCG TOTAL) BY MOUTH DAILY IN THE EARLY MORNING 30 tablet 5    methotrexate 2 5 mg tablet AS DIRECTED 12 tablet 5    Multiple Vitamin (MULTIVITAMIN) tablet Take 1 tablet by mouth daily      naloxone (Narcan) 4 mg/0 1 mL nasal spray Narcan 4 mg/actuation nasal spray      naproxen (NAPROSYN) 500 mg tablet   1    oxymorphone (OPANA) 5 MG tablet Take 1 tablet (5 mg total) by mouth every 6 (six) hours as needed for moderate painMax Daily Amount: 20 mg 120 tablet 0    pantoprazole (PROTONIX) 40 mg tablet pantoprazole 40 mg tablet,delayed release      simvastatin (ZOCOR) 40 mg tablet simvastatin 40 mg tablet      SODIUM FLUORIDE, DENTAL GEL, (PREVIDENT) 1 1 % GEL PreviDent 5000 Dry Mouth 1 1 % gel      traZODone (DESYREL) 150 mg tablet Take 1 tablet (150 mg total) by mouth daily at bedtime 90 tablet 3     No current facility-administered medications for this visit        Current Outpatient Medications on File Prior to Visit   Medication Sig    calcium-vitamin D (OSCAL) 250-125 MG-UNIT per tablet Take by mouth    chlordiazepoxide-clidinium (LIBRAX) 5-2 5 mg per capsule Take by mouth    dicyclomine (BENTYL) 10 mg capsule Take 20 mg by mouth 2 (two) times a day      dicyclomine (BENTYL) 20 mg tablet     Docusate Sodium 100 MG/5ML ENEM Take 250 mg by mouth    levothyroxine 150 mcg tablet TAKE 1 TABLET (150 MCG TOTAL) BY MOUTH DAILY IN THE EARLY MORNING    methotrexate 2 5 mg tablet AS DIRECTED    Multiple Vitamin (MULTIVITAMIN) tablet Take 1 tablet by mouth daily    naloxone (Narcan) 4 mg/0 1 mL nasal spray Narcan 4 mg/actuation nasal spray    naproxen (NAPROSYN) 500 mg tablet     oxymorphone (OPANA) 5 MG tablet Take 1 tablet (5 mg total) by mouth every 6 (six) hours as needed for moderate painMax Daily Amount: 20 mg    pantoprazole (PROTONIX) 40 mg tablet pantoprazole 40 mg tablet,delayed release    simvastatin (ZOCOR) 40 mg tablet simvastatin 40 mg tablet    SODIUM FLUORIDE, DENTAL GEL, (PREVIDENT) 1 1 % GEL PreviDent 5000 Dry Mouth 1 1 % gel    [DISCONTINUED] ALPRAZolam (XANAX) 0 25 mg tablet Take 1 tablet (0 25 mg total) by mouth 3 (three) times a day as needed for anxiety    [DISCONTINUED] amphetamine-dextroamphetamine (ADDERALL XR) 30 MG 24 hr capsule Take 1 capsule (30 mg total) by mouth every morningMax Daily Amount: 30 mg    [DISCONTINUED] amphetamine-dextroamphetamine (ADDERALL) 20 mg tablet Take 1 tablet (20 mg total) by mouth 2 (two) times a dayMax Daily Amount: 40 mg    [DISCONTINUED] buPROPion (WELLBUTRIN XL) 300 mg 24 hr tablet TAKE 1 TABLET BY MOUTH EVERY DAY    [DISCONTINUED] chlordiazePOXIDE (LIBRIUM) 5 mg capsule Take 5 mg by mouth 2 (two) times a day      [DISCONTINUED] escitalopram (LEXAPRO) 10 mg tablet TAKE 1 TABLET (10 MG TOTAL) BY MOUTH DAILY    [DISCONTINUED] esomeprazole (NexIUM) 40 MG capsule Take 40 mg by mouth 2 (two) times a day before meals      [DISCONTINUED] folic acid (FOLVITE) 1 mg tablet Take by mouth daily    [DISCONTINUED] hydroxychloroquine (PLAQUENIL) 200 mg tablet Take 200 mg by mouth 2 (two) times a day with meals      [DISCONTINUED] traZODone (DESYREL) 150 mg tablet Take 1 tablet (150 mg total) by mouth daily at bedtime     No current facility-administered medications on file prior to visit  She is allergic to duloxetine; revatio [sildenafil]; savella [milnacipran]; sulfamethoxazole-trimethoprim; and tedizolid       Review of Systems   All other systems reviewed and are negative  Objective: There were no vitals taken for this visit  Physical Exam  Constitutional:       Appearance: Normal appearance  She is normal weight  Musculoskeletal: Normal range of motion  Neurological:      General: No focal deficit present  Mental Status: She is alert and oriented to person, place, and time  Mental status is at baseline  Psychiatric:         Mood and Affect: Mood normal          Behavior: Behavior normal          Thought Content:  Thought content normal          Judgment: Judgment normal

## 2021-02-09 ENCOUNTER — TELEPHONE (OUTPATIENT)
Dept: FAMILY MEDICINE CLINIC | Facility: CLINIC | Age: 66
End: 2021-02-09

## 2021-02-17 ENCOUNTER — TELEPHONE (OUTPATIENT)
Dept: FAMILY MEDICINE CLINIC | Facility: CLINIC | Age: 66
End: 2021-02-17

## 2021-02-17 DIAGNOSIS — E03.9 HYPOTHYROIDISM, UNSPECIFIED TYPE: Primary | ICD-10-CM

## 2021-02-17 RX ORDER — LEVOTHYROXINE SODIUM 175 UG/1
175 TABLET ORAL
Qty: 30 TABLET | Refills: 5 | Status: SHIPPED | OUTPATIENT
Start: 2021-02-17 | End: 2021-06-15 | Stop reason: SDUPTHER

## 2021-02-23 DIAGNOSIS — F98.8 ATTENTION DEFICIT DISORDER, UNSPECIFIED HYPERACTIVITY PRESENCE: ICD-10-CM

## 2021-02-23 DIAGNOSIS — M19.90 OSTEOARTHRITIS, UNSPECIFIED OSTEOARTHRITIS TYPE, UNSPECIFIED SITE: ICD-10-CM

## 2021-02-23 DIAGNOSIS — M54.16 RADICULOPATHY, LUMBAR REGION: ICD-10-CM

## 2021-02-23 DIAGNOSIS — R52 PAIN: ICD-10-CM

## 2021-02-23 RX ORDER — DEXTROAMPHETAMINE SACCHARATE, AMPHETAMINE ASPARTATE, DEXTROAMPHETAMINE SULFATE AND AMPHETAMINE SULFATE 5; 5; 5; 5 MG/1; MG/1; MG/1; MG/1
20 TABLET ORAL
Qty: 60 TABLET | Refills: 0 | Status: SHIPPED | OUTPATIENT
Start: 2021-02-23 | End: 2021-03-22 | Stop reason: SDUPTHER

## 2021-02-23 RX ORDER — DEXTROAMPHETAMINE SACCHARATE, AMPHETAMINE ASPARTATE MONOHYDRATE, DEXTROAMPHETAMINE SULFATE AND AMPHETAMINE SULFATE 7.5; 7.5; 7.5; 7.5 MG/1; MG/1; MG/1; MG/1
30 CAPSULE, EXTENDED RELEASE ORAL EVERY MORNING
Qty: 30 CAPSULE | Refills: 0 | Status: SHIPPED | OUTPATIENT
Start: 2021-02-23 | End: 2021-03-22 | Stop reason: SDUPTHER

## 2021-02-23 RX ORDER — OXYMORPHONE HYDROCHLORIDE 5 MG/1
5 TABLET ORAL EVERY 6 HOURS PRN
Qty: 120 TABLET | Refills: 0 | Status: SHIPPED | OUTPATIENT
Start: 2021-02-23 | End: 2021-03-22 | Stop reason: SDUPTHER

## 2021-03-10 DIAGNOSIS — Z23 ENCOUNTER FOR IMMUNIZATION: ICD-10-CM

## 2021-03-22 DIAGNOSIS — M19.90 OSTEOARTHRITIS, UNSPECIFIED OSTEOARTHRITIS TYPE, UNSPECIFIED SITE: ICD-10-CM

## 2021-03-22 DIAGNOSIS — R52 PAIN: ICD-10-CM

## 2021-03-22 DIAGNOSIS — F98.8 ATTENTION DEFICIT DISORDER, UNSPECIFIED HYPERACTIVITY PRESENCE: ICD-10-CM

## 2021-03-22 DIAGNOSIS — M54.16 RADICULOPATHY, LUMBAR REGION: ICD-10-CM

## 2021-03-22 RX ORDER — DEXTROAMPHETAMINE SACCHARATE, AMPHETAMINE ASPARTATE, DEXTROAMPHETAMINE SULFATE AND AMPHETAMINE SULFATE 5; 5; 5; 5 MG/1; MG/1; MG/1; MG/1
20 TABLET ORAL
Qty: 60 TABLET | Refills: 0 | Status: SHIPPED | OUTPATIENT
Start: 2021-03-22 | End: 2021-04-19 | Stop reason: SDUPTHER

## 2021-03-22 RX ORDER — DEXTROAMPHETAMINE SACCHARATE, AMPHETAMINE ASPARTATE MONOHYDRATE, DEXTROAMPHETAMINE SULFATE AND AMPHETAMINE SULFATE 7.5; 7.5; 7.5; 7.5 MG/1; MG/1; MG/1; MG/1
30 CAPSULE, EXTENDED RELEASE ORAL EVERY MORNING
Qty: 30 CAPSULE | Refills: 0 | Status: SHIPPED | OUTPATIENT
Start: 2021-03-22 | End: 2021-04-19 | Stop reason: SDUPTHER

## 2021-03-22 RX ORDER — OXYMORPHONE HYDROCHLORIDE 5 MG/1
5 TABLET ORAL EVERY 6 HOURS PRN
Qty: 120 TABLET | Refills: 0 | Status: SHIPPED | OUTPATIENT
Start: 2021-03-22 | End: 2021-04-19 | Stop reason: SDUPTHER

## 2021-04-15 DIAGNOSIS — K12.1 MOUTH ULCER: Primary | ICD-10-CM

## 2021-04-19 DIAGNOSIS — F98.8 ATTENTION DEFICIT DISORDER, UNSPECIFIED HYPERACTIVITY PRESENCE: ICD-10-CM

## 2021-04-19 DIAGNOSIS — M54.16 RADICULOPATHY, LUMBAR REGION: ICD-10-CM

## 2021-04-19 DIAGNOSIS — M19.90 OSTEOARTHRITIS, UNSPECIFIED OSTEOARTHRITIS TYPE, UNSPECIFIED SITE: ICD-10-CM

## 2021-04-19 DIAGNOSIS — R52 PAIN: ICD-10-CM

## 2021-04-20 RX ORDER — DEXTROAMPHETAMINE SACCHARATE, AMPHETAMINE ASPARTATE MONOHYDRATE, DEXTROAMPHETAMINE SULFATE AND AMPHETAMINE SULFATE 7.5; 7.5; 7.5; 7.5 MG/1; MG/1; MG/1; MG/1
30 CAPSULE, EXTENDED RELEASE ORAL EVERY MORNING
Qty: 30 CAPSULE | Refills: 0 | Status: SHIPPED | OUTPATIENT
Start: 2021-04-20 | End: 2021-05-13 | Stop reason: SDUPTHER

## 2021-04-20 RX ORDER — OXYMORPHONE HYDROCHLORIDE 5 MG/1
5 TABLET ORAL EVERY 6 HOURS PRN
Qty: 120 TABLET | Refills: 0 | Status: SHIPPED | OUTPATIENT
Start: 2021-04-20 | End: 2021-05-13 | Stop reason: SDUPTHER

## 2021-04-20 RX ORDER — DEXTROAMPHETAMINE SACCHARATE, AMPHETAMINE ASPARTATE, DEXTROAMPHETAMINE SULFATE AND AMPHETAMINE SULFATE 5; 5; 5; 5 MG/1; MG/1; MG/1; MG/1
20 TABLET ORAL
Qty: 60 TABLET | Refills: 0 | Status: SHIPPED | OUTPATIENT
Start: 2021-04-20 | End: 2021-05-13 | Stop reason: SDUPTHER

## 2021-05-13 DIAGNOSIS — R52 PAIN: ICD-10-CM

## 2021-05-13 DIAGNOSIS — M19.90 OSTEOARTHRITIS, UNSPECIFIED OSTEOARTHRITIS TYPE, UNSPECIFIED SITE: ICD-10-CM

## 2021-05-13 DIAGNOSIS — F98.8 ATTENTION DEFICIT DISORDER, UNSPECIFIED HYPERACTIVITY PRESENCE: ICD-10-CM

## 2021-05-13 DIAGNOSIS — M54.16 RADICULOPATHY, LUMBAR REGION: ICD-10-CM

## 2021-05-13 RX ORDER — DEXTROAMPHETAMINE SACCHARATE, AMPHETAMINE ASPARTATE, DEXTROAMPHETAMINE SULFATE AND AMPHETAMINE SULFATE 5; 5; 5; 5 MG/1; MG/1; MG/1; MG/1
20 TABLET ORAL
Qty: 60 TABLET | Refills: 0 | Status: SHIPPED | OUTPATIENT
Start: 2021-05-13 | End: 2021-06-15 | Stop reason: SDUPTHER

## 2021-05-13 RX ORDER — OXYMORPHONE HYDROCHLORIDE 5 MG/1
5 TABLET ORAL EVERY 6 HOURS PRN
Qty: 120 TABLET | Refills: 0 | Status: SHIPPED | OUTPATIENT
Start: 2021-05-13 | End: 2021-06-15 | Stop reason: SDUPTHER

## 2021-05-13 RX ORDER — DEXTROAMPHETAMINE SACCHARATE, AMPHETAMINE ASPARTATE MONOHYDRATE, DEXTROAMPHETAMINE SULFATE AND AMPHETAMINE SULFATE 7.5; 7.5; 7.5; 7.5 MG/1; MG/1; MG/1; MG/1
30 CAPSULE, EXTENDED RELEASE ORAL EVERY MORNING
Qty: 30 CAPSULE | Refills: 0 | Status: SHIPPED | OUTPATIENT
Start: 2021-05-13 | End: 2021-06-15 | Stop reason: SDUPTHER

## 2021-05-26 ENCOUNTER — TELEPHONE (OUTPATIENT)
Dept: FAMILY MEDICINE CLINIC | Facility: CLINIC | Age: 66
End: 2021-05-26

## 2021-05-26 DIAGNOSIS — M54.16 RADICULOPATHY, LUMBAR REGION: Primary | ICD-10-CM

## 2021-05-26 NOTE — TELEPHONE ENCOUNTER
Pt called asking for referral [place] physical [place] [facility] [place] strength, gait and muscle issues

## 2021-06-03 ENCOUNTER — EVALUATION (OUTPATIENT)
Dept: PHYSICAL THERAPY | Facility: CLINIC | Age: 66
End: 2021-06-03
Payer: MEDICARE

## 2021-06-03 DIAGNOSIS — M54.16 RADICULOPATHY, LUMBAR REGION: Primary | ICD-10-CM

## 2021-06-03 PROCEDURE — 97162 PT EVAL MOD COMPLEX 30 MIN: CPT | Performed by: PHYSICAL THERAPIST

## 2021-06-03 PROCEDURE — 97110 THERAPEUTIC EXERCISES: CPT | Performed by: PHYSICAL THERAPIST

## 2021-06-03 NOTE — PROGRESS NOTES
PT Evaluation     Today's date: 6/3/2021  Patient name: Luann Ash  : 1955  MRN: [id number]  Referring provider: Kiah Burris MD  Dx:   Encounter Diagnosis     ICD-10-CM    1  Radiculopathy, lumbar region  M54 16                   Assessment  Assessment details: Patient is a 78year old female who presents to PT with c/o limited balance/gait, weakness and increased pain t/o LB, wrists and right knee  PT examination shows limitations including weakness, limited balance/gait, decreased aerobic conditioning, limited rom and increased pain limiting functional ability  Patient would benefit from PT intervention including exercises for rom, strength and stability, functional training, manual therapy, HEP, postural training, aerobic conditioning, balance/gait training and pain relieving modalities in order to maximize functional independence  Impairments: abnormal muscle tone, abnormal or restricted ROM, activity intolerance, impaired balance, impaired physical strength, lacks appropriate home exercise program and pain with function    Goals  ST  Initiate HEP  2  Patient to report decreased pain at worst by 50% in 6 weeks    LT  Patient to report decreased pain at worst by 75% in 12 weeks  2  Patient to increase LE and wrist strength to 5/5 in 12 weeks  3  Patient to improve core and lumbar stability to [facility] in 12 weeks  4   Patient to return to normal functional and recreational activity in 12 weeks    Plan  Patient would benefit from: PT eval and skilled physical therapy  Planned modality interventions: cryotherapy and thermotherapy: hydrocollator packs  Planned therapy interventions: abdominal trunk stabilization, IADL retraining, ADL retraining, joint mobilization, manual therapy, massage, balance, neuromuscular re-education, patient education, postural training, strengthening, stretching, therapeutic activities, therapeutic exercise, therapeutic training, functional ROM exercises, flexibility, graded activity, graded exercise and home exercise program  Frequency: 3x week  Duration in visits: 12  Duration in weeks: 4  Treatment plan discussed with: patient        Subjective Evaluation    History of Present Illness  Mechanism of injury: Patient presents to PT with c/o limited balance and gait, weakness and increased pain in low back  Patient suffered pubic and pelvis fractures last  and July respectively for which she was hospitalized for over 2 weeks each time  Patient reports she has workout equipment at home and has been consistently exercising  Patient reports she continues with weakness in UE and LE limiting functional ability  Patient also reports limited balance and gait that she would like to work on in PT  Patient is now referred to oppt  Pain  At best pain ratin  At worst pain rating: 10  Relieving factors: medications  Progression: worsening    Patient Goals  Patient goals for therapy: decreased pain          Objective     Concurrent Complaints  Negative for night pain, disturbed sleep, bladder dysfunction, bowel dysfunction, saddle (S4) numbness, cardiac problem, kidney problem, gallbladder problem, stomach problem, ulcer, appendix problem, spleen problem, pancreas problem, history of cancer, history of trauma and infection    Palpation   Left   Muscle spasm in the lumbar paraspinals  Tenderness of the lumbar paraspinals  Right   Muscle spasm in the lumbar paraspinals  Tenderness of the lumbar paraspinals       Neurological Testing     Sensation     Lumbar   Left   Intact: light touch    Right   Intact: light touch    Active Range of Motion     Lumbar   Normal active range of motion    Strength/Myotome Testing     Left Wrist/Hand   Wrist extension: 4  Wrist flexion: 4  Radial deviation: 4  Ulnar deviation: 4    Right Wrist/Hand   Wrist extension: 4  Wrist flexion: 4  Radial deviation: 4  Ulnar deviation: 4    Left Hip   Planes of Motion   Flexion: 4  Abduction: 4+  Adduction: 4+    Right Hip   Planes of Motion   Flexion: 4  Abduction: 4+  Adduction: 4+    Left Knee   Flexion: 4  Extension: 4    Right Knee   Flexion: 4  Extension: 4    Left Ankle/Foot   Dorsiflexion: 4+    Right Ankle/Foot   Dorsiflexion: 4+    Additional Strength Details  Slightly limited  strength noted bilaterally    General Comments:    Lower quarter screen   Hips: unremarkable  Knees: unremarkable  Foot/ankle: unremarkable             Precautions: Hx of cancer                Manuals 6/3/21       LE Stretch                                Neuro Re-Ed         MTP/LTP        PPT        PPT with march        PPT with heel slides        Stance on foam        Tandem Stance        SLS        Side Stepping                                Ther Ex        SRC L3 10 min       UBE        Treadmill        TR/HR        Standing SLR 3-way        Digiflex        Wrist ROM        Supine SLR                                        Ther Activity                        Gait Training                        Modalities

## 2021-06-07 ENCOUNTER — OFFICE VISIT (OUTPATIENT)
Dept: PHYSICAL THERAPY | Facility: CLINIC | Age: 66
End: 2021-06-07
Payer: MEDICARE

## 2021-06-07 DIAGNOSIS — M54.16 RADICULOPATHY, LUMBAR REGION: Primary | ICD-10-CM

## 2021-06-07 PROCEDURE — 97112 NEUROMUSCULAR REEDUCATION: CPT | Performed by: PHYSICAL THERAPIST

## 2021-06-07 PROCEDURE — 97110 THERAPEUTIC EXERCISES: CPT | Performed by: PHYSICAL THERAPIST

## 2021-06-07 NOTE — PROGRESS NOTES
Daily Note     Today's date: 2021  Patient name: Bernarda Mata  : 1955  MRN: [id number]  Referring provider: Liliane Iniguez MD  Dx:   Encounter Diagnosis     ICD-10-CM    1  Radiculopathy, lumbar region  M54 16                   Subjective: Patient states "My hands are what bothers me the most"  Objective: See treatment diary below  Manuals 6/3/21 6/7/21      LE Stretch                                Neuro Re-Ed         MTP/LTP  GTB 2x10      PPT        PPT with march        PPT with heel slides        Stance on foam        Tandem Stance  2x20" each      SLS  3x20" B/L      Side Stepping                                Ther Ex        SRC L3 10 min L3 10 min      UBE  5 min      Treadmill        TR/HR  2x10      Standing SLR 3-way        Digiflex        Wrist ROM        Supine SLR                                        Ther Activity                        Gait Training                        Modalities                            Assessment: Tolerated treatment well  Patient exhibited good technique with therapeutic exercises  Patient with some fatigue noted with exercise today  Continue to progress as tolerated  Plan: Continue per plan of care        Precautions: Hx of cancer

## 2021-06-09 ENCOUNTER — APPOINTMENT (OUTPATIENT)
Dept: PHYSICAL THERAPY | Facility: CLINIC | Age: 66
End: 2021-06-09
Payer: MEDICARE

## 2021-06-11 ENCOUNTER — OFFICE VISIT (OUTPATIENT)
Dept: PHYSICAL THERAPY | Facility: CLINIC | Age: 66
End: 2021-06-11
Payer: MEDICARE

## 2021-06-11 DIAGNOSIS — M54.16 RADICULOPATHY, LUMBAR REGION: Primary | ICD-10-CM

## 2021-06-11 PROCEDURE — 97110 THERAPEUTIC EXERCISES: CPT

## 2021-06-11 NOTE — PROGRESS NOTES
Daily Note     Today's date: 2021  Patient name: Mortimer Buster  : 1955  MRN: [id number]  Referring provider: Nestora Rubinstein, MD  Dx:   Encounter Diagnosis     ICD-10-CM    1  Radiculopathy, lumbar region  M54 16                   Subjective: Patient reports decreased strength noted overall, and would like to gain strength back in order to do more activities at home  Objective: See treatment diary below      Assessment: Tolerated treatment well  Patient exhibited good technique with therapeutic exercises      Plan: Continue per plan of care  Precautions: Hx of cancer    Manuals 6/3/21 6/7/21 6/11/21     LE Stretch                                Neuro Re-Ed         MTP/LTP  GTB 2x10      PPT        PPT with march        PPT with heel slides        Stance on foam        Tandem Stance  2x20" each      SLS  3x20" B/L      Side Stepping                                Ther Ex        SRC L3 10 min L3 10 min      UBE  5 min      Treadmill   3  1mph  1 mile     TR/HR  2x10      Standing SLR 3-way        Digiflex        Wrist ROM        Supine SLR        Hip machine   Abduction 45# 2x10  Adduction 45# 2x10     Leg press   30# 3x10     Leg ext/curl machine   20# 2x10 each             Ther Activity                        Gait Training                        Modalities

## 2021-06-14 ENCOUNTER — OFFICE VISIT (OUTPATIENT)
Dept: PHYSICAL THERAPY | Facility: CLINIC | Age: 66
End: 2021-06-14
Payer: MEDICARE

## 2021-06-14 DIAGNOSIS — M54.16 RADICULOPATHY, LUMBAR REGION: Primary | ICD-10-CM

## 2021-06-14 PROCEDURE — 97112 NEUROMUSCULAR REEDUCATION: CPT

## 2021-06-14 PROCEDURE — 97110 THERAPEUTIC EXERCISES: CPT

## 2021-06-14 NOTE — PROGRESS NOTES
Daily Note     Today's date: 2021  Patient name: Yara Mcrae  : 1955  MRN: [id number]  Referring provider: Robert Woo MD  Dx:   Encounter Diagnosis     ICD-10-CM    1  Radiculopathy, lumbar region  M54 16                   Subjective: "im doing better"        Objective: See treatment diary below      Assessment: Tolerated treatment well  Patient exhibited good technique with therapeutic exercises    Able to tolerate additional  UE therex without incidence  Plan: Continue per plan of care  Precautions: Hx of cancer    Manuals 6/3/21 6/7/21 6/11/21 2021    LE Stretch                                Neuro Re-Ed         MTP/LTP  GTB 2x10  GTB  2x10    PPT        PPT with march        PPT with heel slides        Stance on foam        Tandem Stance  2x20" each  2x20 "     SLS  3x20" B/L  3x20"      Side Stepping                                Ther Ex        SRC L3 10 min L3 10 min      UBE  5 min  5    Treadmill   3  1mph  1 mile 3 1   1 5 mile  3 0 incline    TR/HR  2x10  2x10    Standing SLR 3-way        Digiflex        Wrist ROM        Supine SLR        Hip machine   Abduction 45# 2x10  Adduction 45# 2x10     Leg press   30# 3x10     Leg ext/curl machine   20# 2x10 each     Torso machine    30#  2x10    Chest Press Machine    10#  10x    Tricep machine    12 5#  x10    Bicep Curl machine    12 5#  x10    Rower machine    20#   x12            Therex        Modalities

## 2021-06-15 DIAGNOSIS — M19.90 OSTEOARTHRITIS, UNSPECIFIED OSTEOARTHRITIS TYPE, UNSPECIFIED SITE: ICD-10-CM

## 2021-06-15 DIAGNOSIS — R52 PAIN: ICD-10-CM

## 2021-06-15 DIAGNOSIS — F98.8 ATTENTION DEFICIT DISORDER, UNSPECIFIED HYPERACTIVITY PRESENCE: ICD-10-CM

## 2021-06-15 DIAGNOSIS — M54.16 RADICULOPATHY, LUMBAR REGION: ICD-10-CM

## 2021-06-15 DIAGNOSIS — E03.9 HYPOTHYROIDISM, UNSPECIFIED TYPE: ICD-10-CM

## 2021-06-15 RX ORDER — DEXTROAMPHETAMINE SACCHARATE, AMPHETAMINE ASPARTATE MONOHYDRATE, DEXTROAMPHETAMINE SULFATE AND AMPHETAMINE SULFATE 7.5; 7.5; 7.5; 7.5 MG/1; MG/1; MG/1; MG/1
30 CAPSULE, EXTENDED RELEASE ORAL EVERY MORNING
Qty: 30 CAPSULE | Refills: 0 | Status: SHIPPED | OUTPATIENT
Start: 2021-06-15 | End: 2021-07-13 | Stop reason: SDUPTHER

## 2021-06-15 RX ORDER — LEVOTHYROXINE SODIUM 175 UG/1
175 TABLET ORAL
Qty: 30 TABLET | Refills: 5 | Status: SHIPPED | OUTPATIENT
Start: 2021-06-15 | End: 2022-01-04

## 2021-06-15 RX ORDER — OXYMORPHONE HYDROCHLORIDE 5 MG/1
5 TABLET ORAL EVERY 6 HOURS PRN
Qty: 120 TABLET | Refills: 0 | Status: SHIPPED | OUTPATIENT
Start: 2021-06-15 | End: 2021-07-13 | Stop reason: SDUPTHER

## 2021-06-15 RX ORDER — DEXTROAMPHETAMINE SACCHARATE, AMPHETAMINE ASPARTATE, DEXTROAMPHETAMINE SULFATE AND AMPHETAMINE SULFATE 5; 5; 5; 5 MG/1; MG/1; MG/1; MG/1
20 TABLET ORAL
Qty: 60 TABLET | Refills: 0 | Status: SHIPPED | OUTPATIENT
Start: 2021-06-15 | End: 2021-07-13 | Stop reason: SDUPTHER

## 2021-06-16 ENCOUNTER — APPOINTMENT (OUTPATIENT)
Dept: PHYSICAL THERAPY | Facility: CLINIC | Age: 66
End: 2021-06-16
Payer: MEDICARE

## 2021-06-18 ENCOUNTER — OFFICE VISIT (OUTPATIENT)
Dept: PHYSICAL THERAPY | Facility: CLINIC | Age: 66
End: 2021-06-18
Payer: MEDICARE

## 2021-06-18 DIAGNOSIS — M54.16 RADICULOPATHY, LUMBAR REGION: Primary | ICD-10-CM

## 2021-06-18 PROCEDURE — 97110 THERAPEUTIC EXERCISES: CPT

## 2021-06-18 NOTE — PROGRESS NOTES
Daily Note     Today's date: 2021  Patient name: Socorro Prescott  : 1955  MRN: [id number]  Referring provider: Luisana Leonardo MD  Dx:   Encounter Diagnosis     ICD-10-CM    1  Radiculopathy, lumbar region  M54 16                   Subjective: "I feel great today "      Objective: See treatment diary below      Assessment: Tolerated treatment well  Patient exhibited good technique with therapeutic exercises      Plan: Continue per plan of care  Precautions: Hx of cancer    Manuals 6/3/21 6/7/21 6/11/21 2021 6/18/21   LE Stretch                                Neuro Re-Ed         MTP/LTP  GTB 2x10  GTB  2x10    PPT        PPT with march        PPT with heel slides        Stance on foam        Tandem Stance  2x20" each  2x20 "  2x20"   SLS  3x20" B/L  3x20"   3x20"   Side Stepping                                Ther Ex        SRC L3 10 min L3 10 min      UBE  5 min  5    Treadmill   3  1mph  1 mile 3 1   1 5 mile  3 0 incline    TR/HR  2x10  2x10 2x10   Standing SLR 3-way        Digiflex        Wrist ROM        Supine SLR        Hip machine   Abduction 45# 2x10  Adduction 45# 2x10  45# 2x10 each   Leg press   30# 3x10  50# 3x10   Leg ext/curl machine   20# 2x10 each  20# 2x10 each   Torso machine    30#  2x10    Chest Press Machine    10#  10x    Tricep machine    12 5#  x10    Bicep Curl machine    12 5#  x10    Seated row    20#   x12    Elliptical      10 min l2   Therex        Modalities

## 2021-06-21 ENCOUNTER — APPOINTMENT (OUTPATIENT)
Dept: PHYSICAL THERAPY | Facility: CLINIC | Age: 66
End: 2021-06-21
Payer: MEDICARE

## 2021-06-23 ENCOUNTER — OFFICE VISIT (OUTPATIENT)
Dept: PHYSICAL THERAPY | Facility: CLINIC | Age: 66
End: 2021-06-23
Payer: MEDICARE

## 2021-06-23 DIAGNOSIS — M54.16 RADICULOPATHY, LUMBAR REGION: Primary | ICD-10-CM

## 2021-06-23 PROCEDURE — 97110 THERAPEUTIC EXERCISES: CPT

## 2021-06-23 NOTE — PROGRESS NOTES
Daily Note     Today's date: 2021  Patient name: Jeanne Tran  : 1955  MRN: [id number]  Referring provider: Stas Hurst MD  Dx:   Encounter Diagnosis     ICD-10-CM    1  Radiculopathy, lumbar region  M54 16                   Subjective: Patient reports bilateral hands are very sore today, core strength is weak  Objective: See treatment diary below      Assessment: Tolerated treatment well  Patient required VC's for proper performance  Plan: Continue per plan of care  Precautions: Hx of cancer    Manuals 21   LE Stretch                                Neuro Re-Ed         MTP/LTP  GTB 2x10  GTB  2x10    PPT        PPT with march        PPT with heel slides        Stance on foam        Tandem Stance  2x20" each  2x20 "  2x20"   SLS  3x20" B/L  3x20"   3x20"   Side Stepping                                Ther Ex        SRC  L3 10 min      UBE 5 min 5 min  5    Treadmill 3 1 mph 1 mile  3  1mph  1 mile 3 1   1 5 mile  3 0 incline    TR/HR 2x10 2x10  2x10 2x10   Core:  Dead bugs  Crunches  Penguins  Reverse crunch   bridges 2x10 each        Digiflex        Wrist ROM        Supine SLR        Hip machine 45# 2x10 each  Abduction 45# 2x10  Adduction 45# 2x10  45# 2x10 each   Leg press 30# 3x10  30# 3x10  50# 3x10   Leg ext/curl machine 20# 2x10 each  20# 2x10 each  20# 2x10 each   Torso machine    30#  2x10    Chest Press Machine    10#  10x    Tricep machine    12 5#  x10    Bicep Curl machine    12 5#  x10    Seated row    20#   x12    Elliptical  5 min    10 min l2   Therex        Modalities

## 2021-06-25 ENCOUNTER — OFFICE VISIT (OUTPATIENT)
Dept: PHYSICAL THERAPY | Facility: CLINIC | Age: 66
End: 2021-06-25
Payer: MEDICARE

## 2021-06-25 DIAGNOSIS — M54.16 RADICULOPATHY, LUMBAR REGION: Primary | ICD-10-CM

## 2021-06-25 PROCEDURE — 97110 THERAPEUTIC EXERCISES: CPT

## 2021-06-25 NOTE — PROGRESS NOTES
Daily Note     Today's date: 2021  Patient name: Vicky Ortega  : 1955  MRN: [id number]  Referring provider: Theo Olea MD  Dx:   Encounter Diagnosis     ICD-10-CM    1  Radiculopathy, lumbar region  M54 16                   Subjective:    "My wrist is so sore"        Objective: See treatment diary below      Assessment: Tolerated treatment well  Patient exhibited good technique with therapeutic exercises    Patient continues to report significant improvement in strength and functional abilities since IE  She does c/o wrist pain with activity      Plan: Continue per plan of care  Precautions: Hx of cancer    Manuals 21   LE Stretch                                Neuro Re-Ed         MTP/LTP  GTB 2x10  GTB  2x10    PPT        PPT with march        PPT with heel slides        Stance on foam        Tandem Stance    2x20 "  2x20"   SLS    3x20"   3x20"   Side Stepping                                Ther Ex        SRC        UBE 5 min 5 min  5    Treadmill 3 1 mph 1 mile   3  5mph    1 mile 3  1mph  1 mile 3 1   1 5 mile  3 0 incline    TR/HR 2x10 2x10  2x10 2x10   Core:  Dead bugs  Crunches  Penguins  Reverse crunch   bridges 2x10 each  2x10  each      Digiflex        Wrist ROM        Supine SLR        Hip machine 45# 2x10 each  Abduction 45# 2x10  Adduction 45# 2x10  45# 2x10 each   Leg press 30# 3x10  30# 3x10  50# 3x10   Leg ext/curl machine 20# 2x10 each  20# 2x10 each  20# 2x10 each   Torso machine  30#  2x10  30#  2x10    Chest Press Machine  20#    2x10  10#  10x    Tricep machine  12,5#   2x10    12  5#  x10    Bicep Curl machine  10#  2x10    12  5#  x10    Seated row  20#  x10  20#   x12    Elliptical  5 min 5    10 min l2   scaption  3#  x10        Modalities

## 2021-06-28 ENCOUNTER — OFFICE VISIT (OUTPATIENT)
Dept: PHYSICAL THERAPY | Facility: CLINIC | Age: 66
End: 2021-06-28
Payer: MEDICARE

## 2021-06-28 DIAGNOSIS — M54.16 RADICULOPATHY, LUMBAR REGION: Primary | ICD-10-CM

## 2021-06-28 PROCEDURE — 97110 THERAPEUTIC EXERCISES: CPT

## 2021-06-28 NOTE — PROGRESS NOTES
Daily Note     Today's date: 2021  Patient name: Vicky Ortega  : 1955  MRN: [id number]  Referring provider: Theo Olea MD  Dx:   Encounter Diagnosis     ICD-10-CM    1  Radiculopathy, lumbar region  M54 16                   Subjective: Patient reports having a very bad day and is full of pain all over  Objective: See treatment diary below      Assessment: Tolerated treatment fair  Patient tolerated gentle self stretching well, and was able to perform some strengthening ther ex after bike and stretching  Plan: Continue per plan of care  Precautions: Hx of cancer    Manuals 21   LE Stretch                                Neuro Re-Ed         MTP/LTP  GTB 2x10 GTB 2x10 GTB  2x10    PPT        PPT with march        PPT with heel slides        Stance on foam        Tandem Stance   2x20" 2x20 "  2x20"   SLS   3x20" 3x20"   3x20"   Side Stepping   10x10'        Self stretching:  Piriformis  Hamstrings,  IT, 10 min                     Ther Ex        SRC   10 min l1      UBE 5 min 5 min  5    Treadmill 3 1 mph 1 mile   3  5mph    1 mile  3 1   1 5 mile  3 0 incline    TR/HR 2x10 2x10  2x10 2x10   Core:  Dead bugs  Crunches  Penguins  Reverse crunch   bridges 2x10 each  2x10  each Bridges 2x10     Digiflex        Wrist ROM        Supine SLR        Hip machine 45# 2x10 each  Abduction 45# 2x10  Adduction 45# 2x10  45# 2x10 each   Leg press 30# 3x10  30# 3x10  50# 3x10   Leg ext/curl machine 20# 2x10 each  20# 2x10 each  20# 2x10 each   Torso machine  30#  2x10  30#  2x10    Chest Press Machine  20#    2x10  10#  10x    Tricep machine  12,5#   2x10    12  5#  x10    Bicep Curl machine  10#  2x10    12  5#  x10    Seated row  20#  x10  20#   x12    Elliptical  5 min 5    10 min l2   scaption  3#  x10        Modalities

## 2021-06-30 ENCOUNTER — OFFICE VISIT (OUTPATIENT)
Dept: PHYSICAL THERAPY | Facility: CLINIC | Age: 66
End: 2021-06-30
Payer: MEDICARE

## 2021-06-30 DIAGNOSIS — M54.16 RADICULOPATHY, LUMBAR REGION: Primary | ICD-10-CM

## 2021-06-30 PROCEDURE — 97110 THERAPEUTIC EXERCISES: CPT

## 2021-06-30 NOTE — PROGRESS NOTES
Daily Note     Today's date: 2021  Patient name: Luann Ash  : 1955  MRN: [id number]  Referring provider: Kiah Burris MD  Dx:   Encounter Diagnosis     ICD-10-CM    1  Radiculopathy, lumbar region  M54 16                   Subjective: Patient reports feeling much better today with improved strength today  Objective: See treatment diary below      Assessment: Tolerated treatment well  Patient exhibited good technique with therapeutic exercises      Plan: Continue per plan of care  Precautions: Hx of cancer    Manuals 21   LE Stretch                                Neuro Re-Ed         MTP/LTP  GTB 2x10 GTB 2x10     PPT        PPT with march        PPT with heel slides        Stance on foam        Tandem Stance   2x20" 2x20 "  2x20"   SLS   3x20" 3x20"   3x20"   Side Stepping   10x10'        Self stretching:  Piriformis  Hamstrings,  IT, 10 min                     Ther Ex        SRC   10 min l1      UBE 5 min 5 min      Treadmill 3 1 mph 1 mile   3  5mph    1 mile  3 1   1 mile  3 0 incline    TR/HR 2x10 2x10  2x10 2x10   Core:  Dead bugs  Crunches  Penguins  Reverse crunch   bridges 2x10 each  2x10  each Bridges 2x10     Digiflex        Wrist ROM        Supine SLR        Hip machine 45# 2x10 each  Abduction 45# 2x10  Adduction 45# 2x10 45# 2x10    45# 2x10 45# 2x10 each   Leg press 30# 3x10  30# 3x10 30# 3x10 50# 3x10   Leg ext/curl machine 20# 2x10 each  20# 2x10 each 20# 2x10 each 20# 2x10 each   Torso machine  30#  2x10  30#  2x10    Chest Press Machine  20#    2x10  10#  10x    Tricep machine  12,5#   2x10    12  5#  x10    Bicep Curl machine  10#  2x10    12  5#  x10    Seated row  20#  x10  20#   x12    Elliptical  5 min 5   10 min l2 10 min l2   scaption  3#  x10        Modalities

## 2021-07-02 ENCOUNTER — APPOINTMENT (OUTPATIENT)
Dept: PHYSICAL THERAPY | Facility: CLINIC | Age: 66
End: 2021-07-02
Payer: MEDICARE

## 2021-07-07 ENCOUNTER — APPOINTMENT (OUTPATIENT)
Dept: PHYSICAL THERAPY | Facility: CLINIC | Age: 66
End: 2021-07-07
Payer: MEDICARE

## 2021-07-09 ENCOUNTER — APPOINTMENT (OUTPATIENT)
Dept: PHYSICAL THERAPY | Facility: CLINIC | Age: 66
End: 2021-07-09
Payer: MEDICARE

## 2021-07-10 ENCOUNTER — NURSE TRIAGE (OUTPATIENT)
Dept: OTHER | Facility: OTHER | Age: 66
End: 2021-07-10

## 2021-07-10 NOTE — TELEPHONE ENCOUNTER
Reason for Disposition   [1] White patches that stick to tongue or inner cheek AND [2] can be wiped off    Answer Assessment - Initial Assessment Questions  1  SYMPTOM: "What's the main symptom you're concerned about?" (e g , dry mouth  chapped lips, lump)      White matches on inner cheeks     2  ONSET: "When did the symptoms  start?"   a week ago    3  PAIN: "Is there any pain?" If Yes, ask: "How bad is it?" (Scale: 1-10; mild, moderate, severe)    No    4  CAUSE: "What do you think is causing the symptoms?"    Oral thrush     5  OTHER SYMPTOMS: "Do you have any other symptoms?" (e g , fever, sore throat, toothache, swelling)   Denies    6  PREGNANCY: "Is there any chance you are pregnant?" "When was your last menstrual period?"     n/a    Protocols used: [facility]    Patient requesting if an antifungal medication can be prescribed  On call provider notified  Provider will send oral  nystatin to patients preferred pharmacy  Pt is to call for an appt during normal business hours if symptoms do not resolve with treatment  Patient made aware and verbalized understanding

## 2021-07-10 NOTE — TELEPHONE ENCOUNTER
Regarding: Requesting diflucan be sent to the pharmacy for a oral yeast infection   ----- Message from Yoni Mathews sent at 7/10/2021 12:51 PM EDT -----  "I have a really bad oral yeast infection  I would like to know if Diflucan can be sent to the pharmacy  "

## 2021-07-12 ENCOUNTER — APPOINTMENT (OUTPATIENT)
Dept: PHYSICAL THERAPY | Facility: CLINIC | Age: 66
End: 2021-07-12
Payer: MEDICARE

## 2021-07-13 DIAGNOSIS — M54.16 RADICULOPATHY, LUMBAR REGION: ICD-10-CM

## 2021-07-13 DIAGNOSIS — F41.8 SITUATIONAL ANXIETY: ICD-10-CM

## 2021-07-13 DIAGNOSIS — M19.90 OSTEOARTHRITIS, UNSPECIFIED OSTEOARTHRITIS TYPE, UNSPECIFIED SITE: ICD-10-CM

## 2021-07-13 DIAGNOSIS — F98.8 ATTENTION DEFICIT DISORDER, UNSPECIFIED HYPERACTIVITY PRESENCE: ICD-10-CM

## 2021-07-13 DIAGNOSIS — R52 PAIN: ICD-10-CM

## 2021-07-13 RX ORDER — OXYMORPHONE HYDROCHLORIDE 5 MG/1
5 TABLET ORAL EVERY 6 HOURS PRN
Qty: 120 TABLET | Refills: 0 | Status: SHIPPED | OUTPATIENT
Start: 2021-07-13 | End: 2021-08-09 | Stop reason: SDUPTHER

## 2021-07-13 RX ORDER — DEXTROAMPHETAMINE SACCHARATE, AMPHETAMINE ASPARTATE, DEXTROAMPHETAMINE SULFATE AND AMPHETAMINE SULFATE 5; 5; 5; 5 MG/1; MG/1; MG/1; MG/1
20 TABLET ORAL
Qty: 60 TABLET | Refills: 0 | Status: SHIPPED | OUTPATIENT
Start: 2021-07-13 | End: 2021-08-09 | Stop reason: SDUPTHER

## 2021-07-13 RX ORDER — DEXTROAMPHETAMINE SACCHARATE, AMPHETAMINE ASPARTATE MONOHYDRATE, DEXTROAMPHETAMINE SULFATE AND AMPHETAMINE SULFATE 7.5; 7.5; 7.5; 7.5 MG/1; MG/1; MG/1; MG/1
30 CAPSULE, EXTENDED RELEASE ORAL EVERY MORNING
Qty: 30 CAPSULE | Refills: 0 | Status: SHIPPED | OUTPATIENT
Start: 2021-07-13 | End: 2021-08-09 | Stop reason: SDUPTHER

## 2021-07-15 ENCOUNTER — APPOINTMENT (EMERGENCY)
Dept: CT IMAGING | Facility: HOSPITAL | Age: 66
End: 2021-07-15
Payer: MEDICARE

## 2021-07-15 ENCOUNTER — APPOINTMENT (EMERGENCY)
Dept: RADIOLOGY | Facility: HOSPITAL | Age: 66
End: 2021-07-15
Payer: MEDICARE

## 2021-07-15 ENCOUNTER — HOSPITAL ENCOUNTER (EMERGENCY)
Facility: HOSPITAL | Age: 66
Discharge: HOME | End: 2021-07-15
Attending: EMERGENCY MEDICINE | Admitting: EMERGENCY MEDICINE
Payer: MEDICARE

## 2021-07-15 VITALS
WEIGHT: 125 LBS | RESPIRATION RATE: 18 BRPM | HEIGHT: 61 IN | OXYGEN SATURATION: 98 % | SYSTOLIC BLOOD PRESSURE: 119 MMHG | HEART RATE: 77 BPM | DIASTOLIC BLOOD PRESSURE: 56 MMHG | TEMPERATURE: 98.6 F | BODY MASS INDEX: 23.6 KG/M2

## 2021-07-15 DIAGNOSIS — R10.13 EPIGASTRIC PAIN: Primary | ICD-10-CM

## 2021-07-15 LAB
ALBUMIN SERPL BCP-MCNC: 3.6 G/DL (ref 3.5–5)
ALP SERPL-CCNC: 64 U/L (ref 46–116)
ALT SERPL W P-5'-P-CCNC: 27 U/L (ref 12–78)
AST SERPL W P-5'-P-CCNC: 15 U/L (ref 5–45)
BASOPHILS # BLD AUTO: 0.06 THOUSANDS/ΜL (ref 0–0.1)
BASOPHILS NFR BLD AUTO: 1 % (ref 0–1)
BILIRUB DIRECT SERPL-MCNC: 0.11 MG/DL (ref 0–0.2)
BILIRUB SERPL-MCNC: 0.44 MG/DL (ref 0.2–1)
EOSINOPHIL # BLD AUTO: 0.08 THOUSAND/ΜL (ref 0–0.61)
EOSINOPHIL NFR BLD AUTO: 1 % (ref 0–6)
ERYTHROCYTE [DISTWIDTH] IN BLOOD BY AUTOMATED COUNT: 17.1 % (ref 11.6–15.1)
HCT VFR BLD AUTO: 35.2 % (ref 34.8–46.1)
HGB BLD-MCNC: 10.7 G/DL (ref 11.5–15.4)
IMM GRANULOCYTES # BLD AUTO: 0.03 THOUSAND/UL (ref 0–0.2)
IMM GRANULOCYTES NFR BLD AUTO: 0 % (ref 0–2)
LIPASE SERPL-CCNC: 98 U/L (ref 73–393)
LYMPHOCYTES # BLD AUTO: 0.79 THOUSANDS/ΜL (ref 0.6–4.47)
LYMPHOCYTES NFR BLD AUTO: 11 % (ref 14–44)
MCH RBC QN AUTO: 25.5 PG (ref 26.8–34.3)
MCHC RBC AUTO-ENTMCNC: 30.4 G/DL (ref 31.4–37.4)
MCV RBC AUTO: 84 FL (ref 82–98)
MONOCYTES # BLD AUTO: 0.4 THOUSAND/ΜL (ref 0.17–1.22)
MONOCYTES NFR BLD AUTO: 6 % (ref 4–12)
NEUTROPHILS # BLD AUTO: 5.56 THOUSANDS/ΜL (ref 1.85–7.62)
NEUTS SEG NFR BLD AUTO: 81 % (ref 43–75)
NRBC BLD AUTO-RTO: 0 /100 WBCS
PLATELET # BLD AUTO: 324 THOUSANDS/UL (ref 149–390)
PMV BLD AUTO: 9.6 FL (ref 8.9–12.7)
PROT SERPL-MCNC: 6.3 G/DL (ref 6.4–8.2)
RBC # BLD AUTO: 4.2 MILLION/UL (ref 3.81–5.12)
TROPONIN I SERPL-MCNC: <0.02 NG/ML
WBC # BLD AUTO: 6.92 THOUSAND/UL (ref 4.31–10.16)

## 2021-07-15 PROCEDURE — 71046 X-RAY EXAM CHEST 2 VIEWS: CPT

## 2021-07-15 PROCEDURE — 36415 COLL VENOUS BLD VENIPUNCTURE: CPT | Performed by: EMERGENCY MEDICINE

## 2021-07-15 PROCEDURE — 83690 ASSAY OF LIPASE: CPT | Performed by: EMERGENCY MEDICINE

## 2021-07-15 PROCEDURE — 99285 EMERGENCY DEPT VISIT HI MDM: CPT

## 2021-07-15 PROCEDURE — G1004 CDSM NDSC: HCPCS

## 2021-07-15 PROCEDURE — 99284 EMERGENCY DEPT VISIT MOD MDM: CPT | Performed by: EMERGENCY MEDICINE

## 2021-07-15 PROCEDURE — 74176 CT ABD & PELVIS W/O CONTRAST: CPT

## 2021-07-15 PROCEDURE — 93005 ELECTROCARDIOGRAM TRACING: CPT

## 2021-07-15 PROCEDURE — 85025 COMPLETE CBC W/AUTO DIFF WBC: CPT | Performed by: EMERGENCY MEDICINE

## 2021-07-15 PROCEDURE — 84484 ASSAY OF TROPONIN QUANT: CPT | Performed by: EMERGENCY MEDICINE

## 2021-07-15 PROCEDURE — 80076 HEPATIC FUNCTION PANEL: CPT | Performed by: EMERGENCY MEDICINE

## 2021-07-15 NOTE — ED PROVIDER NOTES
History  Chief Complaint   Patient presents with    Abdominal Pain     pt reports abd pain x3 days with nausea; states she was seen last week for EGD and gastro told her to come to ED to be eval     HPI  69-year-old female with past medical history significant for hyperlipidemia presents to the emergency department today for evaluation of epigastric discomfort x3 days  Patient reports gradual onset of symptoms  The occurred at rest   She reports pain in the epigastric region without radiation  She denies vomiting but reports somewhat nauseous  No fevers  No lower abdominal tenderness  No urinary complaints  No shortness of breath  She reports that she has been seen by her GI doctor in the past and had a recent EGD which demonstrated esophageal diverticuli  She reports that she spoke with her GI provider about her symptoms admits for referred to the emergency department for evaluation of possible gallbladder issues or abscess per her notes  She reports no loose stools or constipation  No history of similar symptoms in the past   Pain is present at this time  She reports that last night she also felt diaphoretic for a period of several minutes while resting in bed  She reports she has not felt this way in the past   She denies chest pain at that time  Prior to Admission Medications   Prescriptions Last Dose Informant Patient Reported? Taking?    ALPRAZolam (XANAX) 0 25 mg tablet   No No   Sig: Take 1 tablet (0 25 mg total) by mouth 3 (three) times a day as needed for anxiety   Docusate Sodium 100 MG/5ML ENEM   Yes No   Sig: Take 250 mg by mouth   Multiple Vitamin (MULTIVITAMIN) tablet   Yes No   Sig: Take 1 tablet by mouth daily   al mag oxide-diphenhydramine-lidocaine viscous (MAGIC MOUTHWASH) 1:1:1 suspension   No No   Sig: Swish and spit 10 mL every 4 (four) hours as needed for mouth pain or discomfort   amphetamine-dextroamphetamine (ADDERALL XR) 30 MG 24 hr capsule   No No   Sig: Take 1 capsule (30 mg total) by mouth every morningMax Daily Amount: 30 mg   amphetamine-dextroamphetamine (ADDERALL) 20 mg tablet   No No   Sig: Take 1 tablet (20 mg total) by mouth 2 (two) times a dayMax Daily Amount: 40 mg   buPROPion (WELLBUTRIN XL) 300 mg 24 hr tablet   No No   Sig: Take 1 tablet (300 mg total) by mouth daily   calcium-vitamin D (OSCAL) 250-125 MG-UNIT per tablet   Yes No   Sig: Take by mouth   chlordiazePOXIDE (LIBRIUM) 5 mg capsule   No No   Sig: Take 1 capsule (5 mg total) by mouth 2 (two) times a day   chlordiazepoxide-clidinium (LIBRAX) 5-2 5 mg per capsule   Yes No   Sig: Take by mouth   dicyclomine (BENTYL) 10 mg capsule   Yes No   Sig: Take 20 mg by mouth 2 (two) times a day     escitalopram (LEXAPRO) 10 mg tablet   No No   Sig: Take 1 tablet (10 mg total) by mouth daily   esomeprazole (NexIUM) 40 MG capsule   No No   Sig: Take 1 capsule (40 mg total) by mouth 2 (two) times a day before meals   folic acid (FOLVITE) 1 mg tablet   No No   Sig: Take 1 tablet (1 mg total) by mouth 2 (two) times a day   hydroxychloroquine (PLAQUENIL) 200 mg tablet   No No   Sig: Take 1 tablet (200 mg total) by mouth 2 (two) times a day with meals   levothyroxine 175 mcg tablet   No No   Sig: Take 1 tablet (175 mcg total) by mouth daily in the early morning   methotrexate 2 5 mg tablet   No No   Sig: AS DIRECTED   nystatin (MYCOSTATIN) 500,000 units/5 mL suspension   No No   Sig: Swish and spit 5 mL (500,000 Units total) 4 (four) times a day for 10 days   oxymorphone (OPANA) 5 MG tablet   No No   Sig: Take 1 tablet (5 mg total) by mouth every 6 (six) hours as needed for moderate painMax Daily Amount: 20 mg   traZODone (DESYREL) 150 mg tablet   No No   Sig: Take 1 tablet (150 mg total) by mouth daily at bedtime      Facility-Administered Medications: None       Past Medical History:   Diagnosis Date    ADHD     Cellulitis of foot     Last assessed 10/24/16    Change in mental status     Last assessed 12/01/15    Chronic fatigue     PLAZA (dyspnea on exertion)     Last assessed 12/01/15    Fibromyalgia     Folliculitis     Last assessed 10/06/14    GERD (gastroesophageal reflux disease)     Non Hodgkin's lymphoma (HCC)     Osteopenia     Raynaud's disease     Scleroderma (HCC)     Systemic sclerosis (HCC)     Thyroid cancer ([facility] [address] ) 10/04/2018       Past Surgical History:   Procedure Laterality Date    BACK SURGERY      BLADDER SURGERY      BONE MARROW BIOPSY      CARDIAC CATHETERIZATION      HEMORRHOID SURGERY      HYSTERECTOMY  02/04/2004    KNEE SURGERY      NASAL SEPTUM SURGERY      ROTATOR CUFF REPAIR      THYROIDECTOMY N/A 10/4/2018    Procedure: TOTAL THYROIDECTOMY;  Surgeon: Elida Ortega MD;  Location: [facility];  Service: Surgical Oncology    TONSILLECTOMY      TOTAL THYROIDECTOMY         Family History   Problem Relation Age of Onset    Arthritis Mother     Diabetes Mother     Coronary artery disease Father    [place] Arthritis Sister     Asthma Sister     Crohn's disease Sister     Other Brother         myocardial ischemia    No Known Problems Daughter     No Known Problems Maternal Grandmother     No Known Problems Maternal Grandfather     No Known Problems Paternal Grandmother     No Known Problems Paternal Grandfather     No Known Problems Sister     Ovarian cancer Maternal Aunt     No Known Problems Maternal Aunt     No Known Problems Maternal Aunt     No Known Problems Maternal Aunt     No Known Problems Maternal Aunt     No Known Problems Maternal Aunt     No Known Problems Paternal Aunt      I have reviewed and agree with the history as documented      E-Cigarette/Vaping    E-Cigarette Use Never User      E-Cigarette/Vaping Substances     Social History     Tobacco Use    Smoking status: Former Smoker    Smokeless tobacco: Never Used    Tobacco comment: quit 40 years ago    Vaping Use    Vaping Use: Never used   Substance Use Topics    Alcohol use: Never     Alcohol/week: 0 0 standard drinks    Drug use: Never       Review of Systems   Constitutional: Positive for diaphoresis  Negative for chills and fever  HENT: Negative for ear pain and sore throat  Eyes: Negative for pain and visual disturbance  Respiratory: Negative for cough and shortness of breath  Cardiovascular: Negative for chest pain and palpitations  Gastrointestinal: Positive for abdominal pain and nausea  Negative for vomiting  Genitourinary: Negative for dysuria and hematuria  Musculoskeletal: Negative for arthralgias and back pain  Skin: Negative for color change and rash  Neurological: Negative for seizures and syncope  All other systems reviewed and are negative  Physical Exam  Physical Exam  Vitals and nursing note reviewed  Constitutional:       General: She is not in acute distress  Appearance: She is well-developed  HENT:      Head: Normocephalic and atraumatic  Eyes:      Conjunctiva/sclera: Conjunctivae normal    Cardiovascular:      Rate and Rhythm: Normal rate and regular rhythm  Heart sounds: No murmur heard  Pulmonary:      Effort: Pulmonary effort is normal  No respiratory distress  Breath sounds: Normal breath sounds  Abdominal:      Palpations: Abdomen is soft  Tenderness: There is no abdominal tenderness  There is no right CVA tenderness, left CVA tenderness, guarding or rebound  Negative signs include Bro's sign and McBurney's sign  Musculoskeletal:      Cervical back: Neck supple  Skin:     General: Skin is warm and dry  Neurological:      Mental Status: She is alert           Vital Signs  ED Triage Vitals [07/15/21 1639]   Temperature Pulse Respirations Blood Pressure SpO2   98 6 °F (37 °C) 84 18 130/62 98 %      Temp Source Heart Rate Source Patient Position - Orthostatic VS BP Location FiO2 (%)   Temporal Monitor Sitting Right arm --      Pain Score       6           Vitals:    07/15/21 1639 07/15/21 1700 07/15/21 1730   BP: 130/62 111/59 119/56   Pulse: 84 79 77   Patient Position - Orthostatic VS: Sitting Sitting Sitting         Visual Acuity      ED Medications  Medications - No data to display    Diagnostic Studies  Results Reviewed     Procedure Component Value Units Date/Time    Troponin I [[id number]]  (Normal) Collected: 07/15/21 1739    Lab Status: Final result Specimen: Blood Updated: 07/15/21 1800     Troponin I <0 02 ng/mL     Lipase [[id number]]  (Normal) Collected: 07/15/21 1718    Lab Status: Final result Specimen: Blood from Arm, Right Updated: 07/15/21 1743     Lipase 98 u/L     Hepatic function panel [[id number]]  (Abnormal) Collected: 07/15/21 1718    Lab Status: Final result Specimen: Blood from Arm, Right Updated: 07/15/21 1743     Total Bilirubin 0 44 mg/dL      Bilirubin, Direct 0 11 mg/dL      Alkaline Phosphatase 64 U/L      AST 15 U/L      ALT 27 U/L      Total Protein 6 3 g/dL      Albumin 3 6 g/dL     CBC and differential [[id number]]  (Abnormal) Collected: 07/15/21 1718    Lab Status: Final result Specimen: Blood from Arm, Right Updated: 07/15/21 1722     WBC 6 92 Thousand/uL      RBC 4 20 Million/uL      Hemoglobin 10 7 g/dL      Hematocrit 35 2 %      MCV 84 fL      MCH 25 5 pg      MCHC 30 4 g/dL      RDW 17 1 %      MPV 9 6 fL      Platelets 546 Thousands/uL      nRBC 0 /100 WBCs      Neutrophils Relative 81 %      Immat GRANS % 0 %      Lymphocytes Relative 11 %      Monocytes Relative 6 %      Eosinophils Relative 1 %      Basophils Relative 1 %      Neutrophils Absolute 5 56 Thousands/µL      Immature Grans Absolute 0 03 Thousand/uL      Lymphocytes Absolute 0 79 Thousands/µL      Monocytes Absolute 0 40 Thousand/µL      Eosinophils Absolute 0 08 Thousand/µL      Basophils Absolute 0 06 Thousands/µL                  XR chest 2 views   Final Result by Irene Moreno MD (07/16 0813)      No acute cardiopulmonary disease                 Workstation performed: [id number]         CT abdomen pelvis without contrast Final Result by Laura Olivares MD (07/15 1808)      No acute intra-abdominal pathology  Workstation performed: [id number]                    Procedures  Procedures         ED Course  ED Course as of Jul 22 2251   Thu Jul 15, 2021   1811 EKG interpreted via myself  EKG dated 07/15/2021 at 5:39 p m  Demonstrates normal sinus rhythm at 75 beats per minute, top normal SD interval of 200ms, normal QRS duration, normal QTC duration left axis deviation, no STEMI, no acute ischemic changes  SBIRT 20yo+      Most Recent Value   SBIRT (24 yo +)   In order to provide better care to our patients, we are screening all of our patients for alcohol and drug use  Would it be okay to ask you these screening questions? Yes Filed at: 07/15/2021 [facility]   Initial Alcohol Screen: US AUDIT-C    1  How often do you have a drink containing alcohol?  0 Filed at: 07/15/2021 1640   2  How many drinks containing alcohol do you have on a typical day you are drinking? 0 Filed at: 07/15/2021 [facility]   3a  Male UNDER 65: How often do you have five or more drinks on one occasion? 0 Filed at: 07/15/2021 1640   3b  FEMALE Any Age, or MALE 65+: How often do you have 4 or more drinks on one occassion? 0 Filed at: 07/15/2021 1640   Audit-C Score  0 Filed at: 07/15/2021 1640   MARLENY: How many times in the past year have you    Used an illegal drug or used a prescription medication for non-medical reasons?   Never Filed at: 07/15/2021 [facility]                    MDM  Number of Diagnoses or Management Options     Amount and/or Complexity of Data Reviewed  Clinical lab tests: ordered and reviewed  Tests in the radiology section of CPT®: ordered and reviewed  Tests in the medicine section of CPT®: ordered and reviewed  Review and summarize past medical records: yes  Independent visualization of images, tracings, or specimens: yes    Risk of Complications, Morbidity, and/or Mortality  Presenting problems: moderate  Diagnostic procedures: low  Management options: low    Patient Progress  Patient progress: stable    29-year-old female with extensive past medical history presenting for evaluation of 3 days of upper abdominal discomfort  Labs reviewed no significant abnormalities  No evidence of pancreatitis, biliary disease  CT scan the abdomen pelvis was unremarkable  Her symptoms are mild  She has tolerated p o  Suspects gastritis or possibly ulcer disease  Patient is already on antacid medication  Will ensure patient follows up with GI  Plan for discharge at this time  Patient agreeable with plan  Disposition  Final diagnoses:   Epigastric pain     Time reflects when diagnosis was documented in both MDM as applicable and the Disposition within this note     Time User Action Codes Description Comment    7/15/2021  6:21 PM Hiram Camilo Add [R10 13] Epigastric pain       ED Disposition     ED Disposition Condition Date/Time Comment    Discharge Stable Thu Jul 15, 2021  6:21 PM Zara Aguilar discharge to home/self care              Follow-up Information     Follow up With Specialties Details Why Contact Info    Martina Koo MD Family Medicine Schedule an appointment as soon as possible for a visit   [address] [place] [address]  [phone]            Discharge Medication List as of 7/15/2021  6:22 PM      CONTINUE these medications which have NOT CHANGED    Details   al mag oxide-diphenhydramine-lidocaine viscous (MAGIC MOUTHWASH) 1:1:1 suspension Swish and spit 10 mL every 4 (four) hours as needed for mouth pain or discomfort, Starting Thu 4/15/2021, Normal      ALPRAZolam (XANAX) 0 25 mg tablet Take 1 tablet (0 25 mg total) by mouth 3 (three) times a day as needed for anxiety, Starting Fri 2/5/2021, Normal      amphetamine-dextroamphetamine (ADDERALL XR) 30 MG 24 hr capsule Take 1 capsule (30 mg total) by mouth every morningMax Daily Amount: 30 mg, Starting Tue 7/13/2021, Normal amphetamine-dextroamphetamine (ADDERALL) 20 mg tablet Take 1 tablet (20 mg total) by mouth 2 (two) times a dayMax Daily Amount: 40 mg, Starting Tue 7/13/2021, Normal      buPROPion (WELLBUTRIN XL) 300 mg 24 hr tablet Take 1 tablet (300 mg total) by mouth daily, Starting Fri 2/5/2021, Normal      calcium-vitamin D (OSCAL) 250-125 MG-UNIT per tablet Take by mouth, Historical Med      chlordiazePOXIDE (LIBRIUM) 5 mg capsule Take 1 capsule (5 mg total) by mouth 2 (two) times a day, Starting Fri 2/5/2021, Normal      chlordiazepoxide-clidinium (LIBRAX) 5-2 5 mg per capsule Take by mouth, Starting Fri 12/7/2012, Historical Med      dicyclomine (BENTYL) 10 mg capsule Take 20 mg by mouth 2 (two) times a day  , Historical Med      Docusate Sodium 100 MG/5ML ENEM Take 250 mg by mouth, Historical Med      escitalopram (LEXAPRO) 10 mg tablet Take 1 tablet (10 mg total) by mouth daily, Starting Fri 2/5/2021, Normal      esomeprazole (NexIUM) 40 MG capsule Take 1 capsule (40 mg total) by mouth 2 (two) times a day before meals, Starting Fri 3/8/5061, Normal      folic acid (FOLVITE) 1 mg tablet Take 1 tablet (1 mg total) by mouth 2 (two) times a day, Starting Fri 2/5/2021, Normal      hydroxychloroquine (PLAQUENIL) 200 mg tablet Take 1 tablet (200 mg total) by mouth 2 (two) times a day with meals, Starting Fri 2/5/2021, Until Sun 3/7/2021, Normal      levothyroxine 175 mcg tablet Take 1 tablet (175 mcg total) by mouth daily in the early morning, Starting Tue 6/15/2021, Normal      methotrexate 2 5 mg tablet AS DIRECTED, Normal      Multiple Vitamin (MULTIVITAMIN) tablet Take 1 tablet by mouth daily, Historical Med      nystatin (MYCOSTATIN) 500,000 units/5 mL suspension Swish and spit 5 mL (500,000 Units total) 4 (four) times a day for 10 days, Starting Sat 7/10/2021, Until Tue 7/20/2021, Normal      oxymorphone (OPANA) 5 MG tablet Take 1 tablet (5 mg total) by mouth every 6 (six) hours as needed for moderate painMax Daily Amount: 20 mg, Starting Tue 7/13/2021, Normal      traZODone (DESYREL) 150 mg tablet Take 1 tablet (150 mg total) by mouth daily at bedtime, Starting Fri 2/5/2021, Normal      dicyclomine (BENTYL) 20 mg tablet Starting Thu 11/5/2020, Historical Med      naloxone (Narcan) 4 mg/0 1 mL nasal spray Narcan 4 mg/actuation nasal spray, Historical Med      naproxen (NAPROSYN) 500 mg tablet Starting Mon 10/15/2018, Historical Med      pantoprazole (PROTONIX) 40 mg tablet pantoprazole 40 mg tablet,delayed release, Historical Med      simvastatin (ZOCOR) 40 mg tablet simvastatin 40 mg tablet, Historical Med      SODIUM FLUORIDE, DENTAL GEL, (PREVIDENT) 1 1 % GEL PreviDent 5000 Dry Mouth 1 1 % gel, Historical Med           No discharge procedures on file      PDMP Review       Value Time User    PDMP Reviewed  Yes 1/22/2021 10:59 AM JANKI Green          ED Provider  Electronically Signed by           Angelo Rosenthal,   07/15/21 [address] [facility] [place],[address], DO  07/22/21 0866

## 2021-07-15 NOTE — DISCHARGE INSTRUCTIONS
Thank you for visiting the Emergency Department today  Your CT scan was negative  Your blood work was not significantly abnormal   Your ultrasound did not demonstrate evidence of gallbladder disease  Continue medications as prescribed  Return to the ER if symptoms worsen  Otherwise follow up with your GI doctor for further management of your symptoms

## 2021-07-16 ENCOUNTER — APPOINTMENT (OUTPATIENT)
Dept: PHYSICAL THERAPY | Facility: CLINIC | Age: 66
End: 2021-07-16
Payer: MEDICARE

## 2021-07-16 LAB
ATRIAL RATE: 75 BPM
P AXIS: 66 DEGREES
PR INTERVAL: 200 MS
QRS AXIS: -35 DEGREES
QRSD INTERVAL: 86 MS
QT INTERVAL: 390 MS
QTC INTERVAL: 435 MS
T WAVE AXIS: 51 DEGREES
VENTRICULAR RATE: 75 BPM

## 2021-07-16 PROCEDURE — 93010 ELECTROCARDIOGRAM REPORT: CPT | Performed by: INTERNAL MEDICINE

## 2021-07-19 ENCOUNTER — OFFICE VISIT (OUTPATIENT)
Dept: PHYSICAL THERAPY | Facility: CLINIC | Age: 66
End: 2021-07-19
Payer: MEDICARE

## 2021-07-19 DIAGNOSIS — M54.16 RADICULOPATHY, LUMBAR REGION: Primary | ICD-10-CM

## 2021-07-19 PROCEDURE — 97112 NEUROMUSCULAR REEDUCATION: CPT | Performed by: PHYSICAL THERAPIST

## 2021-07-19 PROCEDURE — 97110 THERAPEUTIC EXERCISES: CPT | Performed by: PHYSICAL THERAPIST

## 2021-07-19 NOTE — PROGRESS NOTES
Daily Note     Today's date: 2021  Patient name: Abebe Almazan  : 1955  MRN: [id number]  Referring provider: Zeferino Stack MD  Dx:   Encounter Diagnosis     ICD-10-CM    1  Radiculopathy, lumbar region  M54 16                   Subjective: Patient states "I'm feeling better  My GI doctor told me to go to the ER  Everything came back ok"  Objective: See treatment diary below      Assessment: Tolerated treatment well  Patient exhibited good technique with therapeutic exercises  Added Core strengthening today  Plan: Continue per plan of care  Precautions: Hx of cancer    Manuals 21   LE Stretch                                Neuro Re-Ed         MTP/LTP  GTB 2x10 GTB 2x10     PPT        PPT with march        PPT with heel slides        Stance on foam        Tandem Stance   2x20" 2x20 "     SLS   3x20" 3x20"      Side Stepping   10x10'     Bridges     2x10   Dead Bugs     2x10   OAL     2x10   Reverse Crunch     2x10   Abd Curl Machine     35# 2x10                   Ther Ex        SRC   10 min l1      UBE 5 min 5 min      Treadmill 3 1 mph 1 mile   3  5mph    1 mile  3 1   1 mile  3 0 incline 3 1 mph, 1 mile 3 0 Incline   TR/HR 2x10 2x10  2x10 2x10   Core:  Dead bugs  Crunches  Penguins  Reverse crunch   bridges 2x10 each  2x10  each Bridges 2x10     Self Piriformis Stretch     5 min   Wrist ROM        Supine SLR        Hip machine 45# 2x10 each  Abduction 45# 2x10  Adduction 45# 2x10 45# 2x10    45# 2x10    Leg press 30# 3x10  30# 3x10 30# 3x10 50# 3x10   Leg ext/curl machine 20# 2x10 each  20# 2x10 each 20# 2x10 each    Torso machine  30#  2x10  30#  2x10    Chest Press Machine  20#    2x10  10#  10x    Tricep machine  12,5#   2x10    12  5#  x10    Bicep Curl machine  10#  2x10    12  5#  x10    Seated row  20#  x10  20#   x12 20# 2x10   Elliptical  5 min 5   10 min l2    scaption  3#  x10        Modalities

## 2021-07-21 ENCOUNTER — PATIENT OUTREACH (OUTPATIENT)
Dept: CASE MANAGEMENT | Facility: HOSPITAL | Age: 66
End: 2021-07-21

## 2021-07-21 ENCOUNTER — OFFICE VISIT (OUTPATIENT)
Dept: PHYSICAL THERAPY | Facility: CLINIC | Age: 66
End: 2021-07-21
Payer: MEDICARE

## 2021-07-21 DIAGNOSIS — M54.16 RADICULOPATHY, LUMBAR REGION: Primary | ICD-10-CM

## 2021-07-21 PROCEDURE — 97110 THERAPEUTIC EXERCISES: CPT | Performed by: PHYSICAL THERAPIST

## 2021-07-21 NOTE — PROGRESS NOTES
Daily Note     Today's date: 2021  Patient name: Funmi Heath  : 1955  MRN: [id number]  Referring provider: Daniel Meyers MD  Dx:   Encounter Diagnosis     ICD-10-CM    1  Radiculopathy, lumbar region  M54 16                   Subjective: Patient states "I'm not too bad today"  Objective: See treatment diary below      Assessment: Tolerated treatment well  Patient exhibited good technique with therapeutic exercises  Improving endurance and strength noted with exercise today  Plan: Continue per plan of care  Precautions: Hx of cancer    Manuals 21   LE Stretch                                Neuro Re-Ed         MTP/LTP  GTB 2x10 GTB 2x10     PPT        PPT with march        PPT with heel slides        Stance on foam        Tandem Stance   2x20" 2x20 "     SLS   3x20" 3x20"      Side Stepping   10x10'     Bridges     2x10   Dead Bugs     2x10   OAL     2x10   Reverse Crunch     2x10   Abd Curl Machine 35# 2x10    35# 2x10                   Ther Ex        [facility]   10 min l1      UBE  5 min      Treadmill 3 1 mph 1 mile 10 min   3  5mph    1 mile  3 1   1 mile  3 0 incline 3 1 mph, 1 mile 3 0 Incline   TR/HR 2x10 2x10  2x10 2x10   Core:  Dead bugs  Crunches  Penguins  Reverse crunch   bridges  2x10  each Bridges 2x10     Self Piriformis Stretch     5 min   Wrist ROM        Supine SLR        Hip machine 45# 2x10 each  Abduction 45# 2x10  Adduction 45# 2x10 45# 2x10    45# 2x10    Leg press 80## 3x10  30# 3x10 30# 3x10 50# 3x10   Leg ext/curl machine 20# 2x10 each  20# 2x10 each 20# 2x10 each    Torso machine  30#  2x10  30#  2x10    Chest Press Machine  20#    2x10  10#  10x    Tricep machine 12 5# 2x10 12,5#   2x10    12  5#  x10    Bicep Curl machine  10#  2x10    12  5#  x10    Lumbar extension machine 35# 2x10       Seated row 20# 2x10 20#  x10  20#   x12 20# 2x10   Elliptical  5 min 5   10 min l2    scaption  3#  x10        Modalities

## 2021-07-21 NOTE — PROGRESS NOTES
Outpatient Care Management Note:  Patient was identified via report as having a recent non urgent and non life threatening ED visit at [address] [place] [address]  Chart reviewed  Patient was in the ED on 07/15/21 for evaluation of epigastric pain  Telephone outreach attempt #1 made to introduce self and role of care management, follow up on general health, and outreach for any possible medical or psychosocial services needed  No answer  Left voicemail message with general contact information with name, title, phone number and days / times when I am available  SDOH questionnaire sent via [employer] to assess for social needs

## 2021-07-22 ENCOUNTER — PATIENT OUTREACH (OUTPATIENT)
Dept: CASE MANAGEMENT | Facility: HOSPITAL | Age: 66
End: 2021-07-22

## 2021-07-22 NOTE — PROGRESS NOTES
Out Patient Care Management Note:  Patient returned my phone call today  Per patient preference a follow up phone call has been scheduled for Monday 7/26/21

## 2021-07-22 NOTE — PROGRESS NOTES
PT Re-Evaluation     Today's date: 2021  Patient name: Humberto Arriaga  : 1955  MRN: [id number]  Referring provider: Renetta Liang MD  Dx:   Encounter Diagnosis     ICD-10-CM    1  Radiculopathy, lumbar region  M54 16        Start Time: 1461  Stop Time: 1505  Total time in clinic (min): 70 minutes    Assessment  Assessment details: Patient has made good progress over first 10 visits of PT  Patient is showing improved strength t/o both LE and wrists compared to IE level  Patient is also showing significant improvement with endurance and activity tolerance with exercises and cardio activities  Patient would benefit from continued PT intervention with focus on maximizing strength and stability, improving endurance and activity tolerance and relieving pain in order to maximize functional ability and use of both wrists  Impairments: abnormal muscle tone, abnormal or restricted ROM, activity intolerance, impaired balance, impaired physical strength, lacks appropriate home exercise program and pain with function    Goals  ST  Initiate HEP- Met  2  Patient to report decreased pain at worst by 50% in 6 weeks- Progressing    LT  Patient to report decreased pain at worst by 75% in 12 weeks- Progressing  2  Patient to increase LE and wrist strength to 5/5 in 12 weeks- Progressing  3  Patient to improve core and lumbar stability to [facility] in 12 weeks- Progressing  4   Patient to return to normal functional and recreational activity in 12 weeks- [facility]  Patient would benefit from: skilled physical therapy  Planned modality interventions: cryotherapy and thermotherapy: hydrocollator packs  Planned therapy interventions: abdominal trunk stabilization, IADL retraining, ADL retraining, joint mobilization, manual therapy, massage, balance, neuromuscular re-education, patient education, postural training, strengthening, stretching, therapeutic activities, therapeutic exercise, therapeutic training, functional ROM exercises, flexibility, graded activity, graded exercise and home exercise program  Frequency: 3x week  Duration in visits: 12  Duration in weeks: 4  Treatment plan discussed with: patient        Subjective Evaluation    Pain  At best pain ratin  At worst pain rating: 10  Relieving factors: medications  Progression: improved    Patient Goals  Patient goals for therapy: decreased pain          Objective     Concurrent Complaints  Negative for night pain, disturbed sleep, bladder dysfunction, bowel dysfunction, saddle (S4) numbness, cardiac problem, kidney problem, gallbladder problem, stomach problem, ulcer, appendix problem, spleen problem, pancreas problem, history of cancer, history of trauma and infection    Palpation   Left   Muscle spasm in the lumbar paraspinals  Tenderness of the lumbar paraspinals  Right   Muscle spasm in the lumbar paraspinals  Tenderness of the lumbar paraspinals       Neurological Testing     Sensation     Lumbar   Left   Intact: light touch    Right   Intact: light touch    Active Range of Motion     Lumbar   Normal active range of motion    Strength/Myotome Testing     Left Wrist/Hand   Wrist extension: 4+  Wrist flexion: 4+  Radial deviation: 4+  Ulnar deviation: 4+    Right Wrist/Hand   Wrist extension: 4+  Wrist flexion: 4+  Radial deviation: 4+  Ulnar deviation: 4+    Left Hip   Planes of Motion   Flexion: 4+  Abduction: 4+  Adduction: 4+    Right Hip   Planes of Motion   Flexion: 4+  Abduction: 4+  Adduction: 4+    Left Knee   Flexion: 4+  Extension: 4+    Right Knee   Flexion: 4+  Extension: 4+    Left Ankle/Foot   Dorsiflexion: 4+    Right Ankle/Foot   Dorsiflexion: 4+    Additional Strength Details  Slightly limited  strength noted bilaterally    General Comments:    Lower quarter screen   Hips: unremarkable  Knees: unremarkable  Foot/ankle: unremarkable             Precautions: Hx of cancer

## 2021-07-23 ENCOUNTER — OFFICE VISIT (OUTPATIENT)
Dept: PHYSICAL THERAPY | Facility: CLINIC | Age: 66
End: 2021-07-23
Payer: MEDICARE

## 2021-07-23 DIAGNOSIS — M54.16 RADICULOPATHY, LUMBAR REGION: Primary | ICD-10-CM

## 2021-07-23 PROCEDURE — 97110 THERAPEUTIC EXERCISES: CPT | Performed by: PHYSICAL THERAPIST

## 2021-07-23 NOTE — PROGRESS NOTES
Daily Note     Today's date: 2021  Patient name: Day Malone  : 1955  MRN: [id number]  Referring provider: Tosin Su MD  Dx:   Encounter Diagnosis     ICD-10-CM    1  Radiculopathy, lumbar region  M54 16                   Subjective: Patient reports overall improvement in symptoms  Objective: See treatment diary below      Assessment: Tolerated treatment well  Patient exhibited good technique with therapeutic exercises      Plan: Continue per plan of care  Precautions: Hx of cancer    Manuals 21   LE Stretch                                Neuro Re-Ed         MTP/LTP   GTB 2x10     PPT        PPT with march        PPT with heel slides        Stance on foam        Tandem Stance   2x20" 2x20 "     SLS   3x20" 3x20"      Side Stepping   10x10'     Bridges     2x10   Dead Bugs     2x10   OAL     2x10   Reverse Crunch     2x10   Abd Curl Machine 35# 2x10 35# 2x10   35# 2x10                   Ther Ex        [address] [facility]   10 min l1      UBE        Treadmill 3 1 mph 1 mile 10 min   3 1   1 mile  3 0 incline 3 1 mph, 1 mile 3 0 Incline   TR/HR 2x10 2x10  2x10 2x10   Core:  Dead bugs  Crunches  Penguins  Reverse crunch   bridges  2x10  each Bridges 2x10     Self Piriformis Stretch     5 min   Wrist ROM        Supine SLR        Hip machine 45# 2x10 each 45# 2x10 each Abduction 45# 2x10  Adduction 45# 2x10 45# 2x10    45# 2x10    Leg press 80## 3x10 80 # 3x10 30# 3x10 30# 3x10 50# 3x10   Leg ext/curl machine 20# 2x10 each 25# 2x10 20# 2x10 each 20# 2x10 each    Torso machine  30#  2x10  30#  2x10    Chest Press Machine  20#    2x10  10#  10x    Tricep machine 12 5# 2x10 12,5#   2x10    12  5#  x10    Bicep Curl machine  10#  2x10    12  5#  x10    Lumbar extension machine 35# 2x10 35# 2x10      Seated row 20# 2x10 25#  x10  20#   x12 20# 2x10   Elliptical  5 min 5 min  10 min l2    scaption        Modalities

## 2021-07-26 ENCOUNTER — OFFICE VISIT (OUTPATIENT)
Dept: PHYSICAL THERAPY | Facility: CLINIC | Age: 66
End: 2021-07-26
Payer: MEDICARE

## 2021-07-26 ENCOUNTER — PATIENT OUTREACH (OUTPATIENT)
Dept: CASE MANAGEMENT | Facility: HOSPITAL | Age: 66
End: 2021-07-26

## 2021-07-26 DIAGNOSIS — Z59.41 FOOD INSECURITY: ICD-10-CM

## 2021-07-26 DIAGNOSIS — Z59.9 FINANCIAL DIFFICULTIES: Primary | ICD-10-CM

## 2021-07-26 DIAGNOSIS — M54.16 RADICULOPATHY, LUMBAR REGION: Primary | ICD-10-CM

## 2021-07-26 PROCEDURE — 97110 THERAPEUTIC EXERCISES: CPT

## 2021-07-26 SDOH — ECONOMIC STABILITY - FOOD INSECURITY: FOOD INSECURITY: Z59.41

## 2021-07-26 SDOH — ECONOMIC STABILITY - INCOME SECURITY: PROBLEM RELATED TO HOUSING AND ECONOMIC CIRCUMSTANCES, UNSPECIFIED: Z59.9

## 2021-07-26 NOTE — PROGRESS NOTES
Out Patient Care Management Note:  Patient's responses to SDOH questionnaire prompted an SW referral due to: [employer], and [employer] Insecurity  Telephone outreach made  Spoke with patient who consents to outpatient care management services  Following is an summary of phone call:    Patient reports her yearly income as approx 19K a year in SSI benefits  She receives SNAP benefits, has applied for Section 8 housing and has been approved  She is in the process of procuring an apartment she can move into  Patient sees a variety of specialists across different [facility] networks  She is followed by Cardiology at [facility], Endocrinology at [facility], Dentistry through the dental clinic at [facility]  Patient has medicare part A and B,  Medi-Gap insurance through [employer], [place] part D through [employer] and is enrolled in [employer]  Patient reports difficulties in paying her monthly out of pocket prescription expenses  Phone call made to [facility]'s pharmacy  Per pharmacist, patient's average monthly medication co-pays is $175 00  It appears [employer] has denied payment on some of patient's meds including Adderall 20 mg  tab which patient takes 2X a day  Per chart review denial medical exception letter from [employer] dated 5/6/21 for oxymorphone and awaiting authorization - pending notification dated 6/12/20 for aderall 20 mg  noted  Phone call to [facility]  Spoke with nurse Carlos A Miller  She could not provide any additional information into PACE denials, or pre-authorization for Adderall  A referral for [facility] has been placed  Patient agreeable to phone call from [facility]  She is appreciative of outpatient involvement and assistance

## 2021-07-26 NOTE — PROGRESS NOTES
Daily Note     Today's date: 2021  Patient name: Cadence Deutsch  : 1955  MRN: [id number]  Referring provider: Conchis Gan MD  Dx:   Encounter Diagnosis     ICD-10-CM    1  Radiculopathy, lumbar region  M54 16                   Subjective: "Im not feeling well today"        Objective: See treatment diary below      Assessment: Tolerated treatment well  Patient exhibited good technique with therapeutic exercises    Pt able to tolerate additional weight without incidence, evidence of improved strength  Pt would benefit from progressive strengthening  Pt reports being pleased with progress to date  Plan: Continue per plan of care  Precautions: Hx of cancer    Manuals 21   LE Stretch                                Neuro Re-Ed         MTP/LTP        PPT        PPT with march        PPT with heel slides        Stance on foam        Tandem Stance    2x20 "     SLS    3x20"      Side Stepping        Bridges     2x10   Dead Bugs     2x10   OAL     2x10   Reverse Crunch     2x10   Abd Curl Machine 35# 2x10 35# 2x10 35#  2x10  35# 2x10   Lat P/D   50#  10             Ther Ex        SRC        UBE        Treadmill 3 1 mph 1 mile 10 min  3 1  Mph 1 mile   3 1   1 mile  3 0 incline 3 1 mph, 1 mile 3 0 Incline   TR/HR 2x10 2x10  2x10 2x10   Core:  Dead bugs  Crunches  Penguins  Reverse crunch   bridges  2x10  each      Self Piriformis Stretch     5 min   Wrist ROM        Supine SLR        Hip machine 45# 2x10 each 45# 2x10 each Abduction 45# 2x10  Adduction 45# 2x10 45# 2x10    45# 2x10    Leg press 80## 3x10 80 # 3x10 70#    3x10 30# 3x10 50# 3x10   Leg ext/curl machine 20# 2x10 each 25# 2x10 25# 2x10 each 20# 2x10 each    Torso machine  30#  2x10 30#  2x10 30#  2x10    Chest Press Machine  20#    2x10 20#  2x10 10#  10x    Tricep machine 12 5# 2x10 12,5#   2x10   12 5#  2x10 12  5#  x10    Bicep Curl machine  10#  2x10   10#  2x10 12  5#  x10    Lumbar extension machine 35# 2x10 35# 2x10 50#     2x10     Seated row 20# 2x10 25#  x10 50#   x10 20#   x12 20# 2x10   Elliptical  5 min 5 min 5 min 10 min l2    scaption        Modalities

## 2021-07-27 ENCOUNTER — OFFICE VISIT (OUTPATIENT)
Dept: FAMILY MEDICINE CLINIC | Facility: CLINIC | Age: 66
End: 2021-07-27
Payer: MEDICARE

## 2021-07-27 VITALS
OXYGEN SATURATION: 98 % | HEART RATE: 84 BPM | SYSTOLIC BLOOD PRESSURE: 118 MMHG | TEMPERATURE: 97.2 F | BODY MASS INDEX: 22.84 KG/M2 | HEIGHT: 61 IN | DIASTOLIC BLOOD PRESSURE: 62 MMHG | WEIGHT: 121 LBS

## 2021-07-27 DIAGNOSIS — C82.90 FOLLICULAR LYMPHOMA, UNSPECIFIED FOLLICULAR LYMPHOMA TYPE, UNSPECIFIED BODY REGION (HCC): ICD-10-CM

## 2021-07-27 DIAGNOSIS — J43.9 PULMONARY EMPHYSEMA, UNSPECIFIED EMPHYSEMA TYPE (HCC): ICD-10-CM

## 2021-07-27 DIAGNOSIS — E03.9 HYPOTHYROIDISM, UNSPECIFIED TYPE: Primary | ICD-10-CM

## 2021-07-27 DIAGNOSIS — F41.8 SITUATIONAL ANXIETY: ICD-10-CM

## 2021-07-27 DIAGNOSIS — F32.A DEPRESSION, UNSPECIFIED DEPRESSION TYPE: ICD-10-CM

## 2021-07-27 DIAGNOSIS — M33.20 POLYMYOSITIS (HCC): ICD-10-CM

## 2021-07-27 DIAGNOSIS — F51.01 PRIMARY INSOMNIA: ICD-10-CM

## 2021-07-27 DIAGNOSIS — R10.13 EPIGASTRIC PAIN: ICD-10-CM

## 2021-07-27 DIAGNOSIS — F98.8 ATTENTION DEFICIT DISORDER, UNSPECIFIED HYPERACTIVITY PRESENCE: ICD-10-CM

## 2021-07-27 DIAGNOSIS — D64.9 ANEMIA, UNSPECIFIED TYPE: ICD-10-CM

## 2021-07-27 DIAGNOSIS — E55.9 VITAMIN D DEFICIENCY: ICD-10-CM

## 2021-07-27 PROCEDURE — 99213 OFFICE O/P EST LOW 20 MIN: CPT | Performed by: SURGERY

## 2021-07-27 RX ORDER — DICYCLOMINE HYDROCHLORIDE 10 MG/1
20 CAPSULE ORAL
Qty: 90 CAPSULE | Refills: 5 | Status: SHIPPED | OUTPATIENT
Start: 2021-07-27 | End: 2022-04-14 | Stop reason: DRUGHIGH

## 2021-07-27 RX ORDER — ESOMEPRAZOLE MAGNESIUM 40 MG/1
40 CAPSULE, DELAYED RELEASE ORAL
Qty: 180 CAPSULE | Refills: 3 | Status: SHIPPED | OUTPATIENT
Start: 2021-07-27 | End: 2022-05-04 | Stop reason: SDUPTHER

## 2021-07-27 RX ORDER — BUPROPION HYDROCHLORIDE 300 MG/1
300 TABLET ORAL DAILY
Qty: 90 TABLET | Refills: 3 | Status: SHIPPED | OUTPATIENT
Start: 2021-07-27 | End: 2022-04-14 | Stop reason: DRUGHIGH

## 2021-07-27 RX ORDER — FERROUS SULFATE TAB EC 324 MG (65 MG FE EQUIVALENT) 324 (65 FE) MG
324 TABLET DELAYED RESPONSE ORAL
Qty: 30 TABLET | Refills: 5 | Status: SHIPPED | OUTPATIENT
Start: 2021-07-27 | End: 2022-06-14

## 2021-07-27 NOTE — PROGRESS NOTES
Assessment/Plan:      Diagnoses and all orders for this visit:    Hypothyroidism, unspecified type  -     TSH, 3rd generation with Free T4 reflex; Future    Depression, unspecified depression type  -     buPROPion (WELLBUTRIN XL) 300 mg 24 hr tablet; Take 1 tablet (300 mg total) by mouth daily  -     esomeprazole (NexIUM) 40 MG capsule; Take 1 capsule (40 mg total) by mouth 2 (two) times a day before meals    Epigastric pain  -     dicyclomine (BENTYL) 10 mg capsule; Take 2 capsules (20 mg total) by mouth 3 (three) times a day before meals    Situational anxiety  -     esomeprazole (NexIUM) 40 MG capsule; Take 1 capsule (40 mg total) by mouth 2 (two) times a day before meals    Attention deficit disorder, unspecified hyperactivity presence  -     esomeprazole (NexIUM) 40 MG capsule; Take 1 capsule (40 mg total) by mouth 2 (two) times a day before meals    Primary insomnia  -     esomeprazole (NexIUM) 40 MG capsule; Take 1 capsule (40 mg total) by mouth 2 (two) times a day before meals    Follicular lymphoma, unspecified follicular lymphoma type, unspecified body region ([facility] [address] )  -     esomeprazole (NexIUM) 40 MG capsule; Take 1 capsule (40 mg total) by mouth 2 (two) times a day before meals    Polymyositis (HCC)  -     esomeprazole (NexIUM) 40 MG capsule; Take 1 capsule (40 mg total) by mouth 2 (two) times a day before meals    Pulmonary emphysema, unspecified emphysema type (HCC)  -     esomeprazole (NexIUM) 40 MG capsule; Take 1 capsule (40 mg total) by mouth 2 (two) times a day before meals    Anemia, unspecified type  Comments:  possible chronic disease  Up-to-date with colonoscopy, no postmenopausal bleeding  Add iron supplementation  F/u Rheum upcoming aptt  Orders:  -     ferrous sulfate 324 (65 Fe) mg; Take 1 tablet (324 mg total) by mouth 2 (two) times a day before meals    Vitamin D deficiency  -     calcium-vitamin D (OSCAL) 250-125 MG-UNIT per tablet;  Take 1 tablet by mouth 2 (two) times a day Subjective:     Patient ID: Myra Vale is a 78 y o  female  F/u appointment, presents with chief complaint of chronic bilateral wrist pain  She was also in the ER 7/15 for epigastric abdominal pain workup at that time was unremarkable she was discharged home to continue antacid therapy with GI follow-up  In the office today her symptoms have improved but she continues to bilateral wrist pain, she does have extensive autoimmune history and follows with Rheumatology does have an upcoming appointment with them  She states her wrist pain is worse towards the end today worsens with activity relieved with rest   Motor and sensory is intact  Denies fever chills denies recent illness denies rashes  Review of Systems   Constitutional: Negative for chills and fever  HENT: Negative for ear pain and sore throat  Eyes: Negative for pain and visual disturbance  Respiratory: Negative for cough and shortness of breath  Cardiovascular: Negative for chest pain and palpitations  Gastrointestinal: Negative for abdominal pain and vomiting  Genitourinary: Negative for dysuria and hematuria  Musculoskeletal: Positive for arthralgias  Negative for back pain  Skin: Negative for color change and rash  Neurological: Negative for seizures and syncope  All other systems reviewed and are negative  Objective:     Physical Exam  Vitals reviewed  Constitutional:       General: She is not in acute distress  Appearance: Normal appearance  She is not ill-appearing  HENT:      Head: Normocephalic and atraumatic  Right Ear: Tympanic membrane, ear canal and external ear normal       Left Ear: Tympanic membrane, ear canal and external ear normal       Nose: Nose normal       Mouth/Throat:      Mouth: Mucous membranes are moist       Pharynx: Oropharynx is clear  No oropharyngeal exudate or posterior oropharyngeal erythema  Eyes:      Extraocular Movements: Extraocular movements intact  Conjunctiva/sclera: Conjunctivae normal       Pupils: Pupils are equal, round, and reactive to light  Cardiovascular:      Rate and Rhythm: Normal rate and regular rhythm  Pulses: Normal pulses  Heart sounds: Normal heart sounds  No murmur heard  No friction rub  No gallop  Pulmonary:      Effort: Pulmonary effort is normal       Breath sounds: Normal breath sounds  No wheezing or rales  Abdominal:      General: There is no distension  Tenderness: There is no abdominal tenderness  Musculoskeletal:         General: No swelling, tenderness, deformity or signs of injury  Normal range of motion  Cervical back: Normal range of motion  Right lower leg: No edema  Left lower leg: No edema  Comments: Motor/sensory intact b/l upper extremities  Skin:     General: Skin is warm  Capillary Refill: Capillary refill takes less than 2 seconds  Neurological:      General: No focal deficit present  Mental Status: She is alert and oriented to person, place, and time  Mental status is at baseline  Psychiatric:         Mood and Affect: Mood normal          Behavior: Behavior normal          Thought Content:  Thought content normal          Judgment: Judgment normal            Wt Readings from Last 3 Encounters:   07/27/21 54 9 kg (121 lb)   07/15/21 56 7 kg (125 lb)   10/21/20 55 5 kg (122 lb 6 4 oz)     Temp Readings from Last 3 Encounters:   07/27/21 (!) 97 2 °F (36 2 °C)   07/15/21 98 6 °F (37 °C) (Temporal)   10/21/20 98 2 °F (36 8 °C)     BP Readings from Last 3 Encounters:   07/27/21 118/62   07/15/21 119/56   10/21/20 96/69     Pulse Readings from Last 3 Encounters:   07/27/21 84   07/15/21 77   10/21/20 88

## 2021-07-28 ENCOUNTER — APPOINTMENT (OUTPATIENT)
Dept: PHYSICAL THERAPY | Facility: CLINIC | Age: 66
End: 2021-07-28
Payer: MEDICARE

## 2021-07-30 ENCOUNTER — APPOINTMENT (OUTPATIENT)
Dept: PHYSICAL THERAPY | Facility: CLINIC | Age: 66
End: 2021-07-30
Payer: MEDICARE

## 2021-08-04 ENCOUNTER — OFFICE VISIT (OUTPATIENT)
Dept: PHYSICAL THERAPY | Facility: CLINIC | Age: 66
End: 2021-08-04
Payer: MEDICARE

## 2021-08-04 DIAGNOSIS — M54.16 RADICULOPATHY, LUMBAR REGION: Primary | ICD-10-CM

## 2021-08-04 PROCEDURE — 97110 THERAPEUTIC EXERCISES: CPT

## 2021-08-04 NOTE — PROGRESS NOTES
Daily Note     Today's date: 2021  Patient name: Meaghan Montoya  : 1955  MRN: [id number]  Referring provider: Igor Clemons MD  Dx:   Encounter Diagnosis     ICD-10-CM    1  Radiculopathy, lumbar region  M54 16                   Subjective: Patient reports "today is a good day "      Objective: See treatment diary below      Assessment: Tolerated treatment well  Patient exhibited good technique with therapeutic exercises      Plan: Continue per plan of care  Precautions: Hx of cancer    Manuals 21   LE Stretch                                Neuro Re-Ed         MTP/LTP        PPT        PPT with march        PPT with heel slides        Stance on foam        Tandem Stance        SLS        Side Stepping        Bridges     2x10   Dead Bugs     2x10   OAL     2x10   Reverse Crunch     2x10   Abd Curl Machine 35# 2x10 35# 2x10 35#  2x10 35# 3x10 35# 2x10   Lat P/D   50#  10 50# 3x10            Ther Ex        SRC        UBE        Treadmill 3 1 mph 1 mile 10 min  3 1  Mph 1 mile   3 1   1 mile  3 0 incline 3 1 mph, 1 mile 3 0 Incline   TR/HR 2x10 2x10  2x10 2x10   Core:  Dead bugs  Crunches  Penguins  Reverse crunch   bridges  2x10  each      Self Piriformis Stretch     5 min   Wrist ROM        Supine SLR        Hip machine 45# 2x10 each 45# 2x10 each Abduction 45# 2x10  Adduction 45# 2x10 45# 2x10    45# 2x10    Leg press 80## 3x10 80 # 3x10 70#    3x10 30# 3x10 50# 3x10   Leg ext/curl machine 20# 2x10 each 25# 2x10 25# 2x10 each 20# 2x10 each    Torso machine  30#  2x10 30#  2x10 30#  2x10    Chest Press Machine  20#    2x10 20#  2x10 10#  10x    Tricep machine 12 5# 2x10 12,5#   2x10   12 5#  2x10 12  5#  x10    Bicep Curl machine  10#  2x10   10#  2x10 12  5#  x10    Lumbar extension machine 35# 2x10 35# 2x10 50#     2x10 50# 2x10    Seated row 20# 2x10 25#  x10 50#   x10 50#   2x10 20# 2x10   Elliptical  5 min 5 min 5 min 10 min l2    scaption Modalities

## 2021-08-06 ENCOUNTER — PATIENT OUTREACH (OUTPATIENT)
Dept: CASE MANAGEMENT | Facility: HOSPITAL | Age: 66
End: 2021-08-06

## 2021-08-06 ENCOUNTER — APPOINTMENT (OUTPATIENT)
Dept: PHYSICAL THERAPY | Facility: CLINIC | Age: 66
End: 2021-08-06
Payer: MEDICARE

## 2021-08-06 NOTE — PROGRESS NOTES
Out Patient Care Management Note:  Per chart review noted patient kept appointment with PCP on 7/27/21  Provider's note reviewed  Reviewed last orders for Adderall  Adderall XR 30mg capsule and Adderall 20 mg tab refills last prescribed by Dr Lucianne Libman 7/13/21  There are no new pre-authorization requests from or insurance denial notifications noted  InTPACKet message sent to provider requesting confirmation that patient is to remain on both medications

## 2021-08-09 ENCOUNTER — APPOINTMENT (OUTPATIENT)
Dept: PHYSICAL THERAPY | Facility: CLINIC | Age: 66
End: 2021-08-09
Payer: MEDICARE

## 2021-08-09 DIAGNOSIS — M19.90 OSTEOARTHRITIS, UNSPECIFIED OSTEOARTHRITIS TYPE, UNSPECIFIED SITE: ICD-10-CM

## 2021-08-09 DIAGNOSIS — M54.16 RADICULOPATHY, LUMBAR REGION: ICD-10-CM

## 2021-08-09 DIAGNOSIS — R52 PAIN: ICD-10-CM

## 2021-08-09 DIAGNOSIS — F98.8 ATTENTION DEFICIT DISORDER, UNSPECIFIED HYPERACTIVITY PRESENCE: ICD-10-CM

## 2021-08-09 RX ORDER — DEXTROAMPHETAMINE SACCHARATE, AMPHETAMINE ASPARTATE MONOHYDRATE, DEXTROAMPHETAMINE SULFATE AND AMPHETAMINE SULFATE 7.5; 7.5; 7.5; 7.5 MG/1; MG/1; MG/1; MG/1
30 CAPSULE, EXTENDED RELEASE ORAL EVERY MORNING
Qty: 30 CAPSULE | Refills: 0 | Status: SHIPPED | OUTPATIENT
Start: 2021-08-09 | End: 2021-09-07 | Stop reason: SDUPTHER

## 2021-08-09 RX ORDER — OXYMORPHONE HYDROCHLORIDE 5 MG/1
5 TABLET ORAL EVERY 6 HOURS PRN
Qty: 120 TABLET | Refills: 0 | Status: SHIPPED | OUTPATIENT
Start: 2021-08-09 | End: 2021-09-07 | Stop reason: SDUPTHER

## 2021-08-09 RX ORDER — DEXTROAMPHETAMINE SACCHARATE, AMPHETAMINE ASPARTATE, DEXTROAMPHETAMINE SULFATE AND AMPHETAMINE SULFATE 5; 5; 5; 5 MG/1; MG/1; MG/1; MG/1
20 TABLET ORAL
Qty: 60 TABLET | Refills: 0 | Status: SHIPPED | OUTPATIENT
Start: 2021-08-09 | End: 2021-09-07 | Stop reason: SDUPTHER

## 2021-08-11 ENCOUNTER — APPOINTMENT (OUTPATIENT)
Dept: PHYSICAL THERAPY | Facility: CLINIC | Age: 66
End: 2021-08-11
Payer: MEDICARE

## 2021-08-11 ENCOUNTER — PATIENT OUTREACH (OUTPATIENT)
Dept: FAMILY MEDICINE CLINIC | Facility: CLINIC | Age: 66
End: 2021-08-11

## 2021-08-11 NOTE — PROGRESS NOTES
OP NEREIDA received referral from [facility] to complete general social work assessment  OP SW spoke with patient  Patient reports she is independent with ADLs  Patient goes to PT 3 x a week  Patient has car and drives  Patient has Social Security $1400 a month  Patient has SNAP benefits  Patient has [facility]  Patient has a housing voucher and is on wait list for an apartment  Patient has family in [facility]  OP SW to follow patient on surveillance

## 2021-08-13 ENCOUNTER — PATIENT OUTREACH (OUTPATIENT)
Dept: CASE MANAGEMENT | Facility: HOSPITAL | Age: 66
End: 2021-08-13

## 2021-08-13 ENCOUNTER — OFFICE VISIT (OUTPATIENT)
Dept: PHYSICAL THERAPY | Facility: CLINIC | Age: 66
End: 2021-08-13
Payer: MEDICARE

## 2021-08-13 DIAGNOSIS — M54.16 RADICULOPATHY, LUMBAR REGION: Primary | ICD-10-CM

## 2021-08-13 PROCEDURE — 97110 THERAPEUTIC EXERCISES: CPT

## 2021-08-13 NOTE — PROGRESS NOTES
Daily Note     Today's date: 2021  Patient name: Abebe Almazan  : 1955  MRN: [id number]  Referring provider: Zeferino Stack MD  Dx:   Encounter Diagnosis     ICD-10-CM    1  Radiculopathy, lumbar region  M54 16                   Subjective: Patient reports " I have been having good days and bad days "      Objective: See treatment diary below      Assessment: Tolerated treatment well  Patient exhibited good technique with therapeutic exercises      Plan: Continue per plan of care  Precautions: Hx of cancer    Manuals 21   LE Stretch                                Neuro Re-Ed         MTP/LTP        PPT        PPT with march        PPT with heel slides        Stance on foam        Tandem Stance        SLS        Side Stepping        Bridges        Dead Bugs        OAL        Reverse Crunch        Abd Curl Machine 35# 2x10 35# 2x10 35#  2x10 35# 3x10 35# 2x10   Lat P/D   50#  10 50# 3x10 50# 3x10           Ther Ex        SRC        UBE        Treadmill 3 1 mph 1 mile 10 min  3 1  Mph 1 mile   3 1   1 mile  3 0 incline 3 1 mph, 1 mile 3 0 Incline   TR/HR 2x10 2x10  2x10 2x10   Core:  Dead bugs  Crunches  Penguins  Reverse crunch   bridges  2x10  each      Self Piriformis Stretch        Wrist ROM        Supine SLR        Hip machine 45# 2x10 each 45# 2x10 each Abduction 45# 2x10  Adduction 45# 2x10 45# 2x10    45# 2x10 45# 2x10  45# 2x10   Leg press 80## 3x10 80 # 3x10 70#    3x10 30# 3x10 60# 3x10   Leg ext/curl machine 20# 2x10 each 25# 2x10 25# 2x10 each 20# 2x10 each 20# 2x10 each   Torso machine  30#  2x10 30#  2x10 30#  2x10 30# 2x10   Chest Press Machine  20#    2x10 20#  2x10 10#  10x 10# 10x   Tricep machine 12 5# 2x10 12,5#   2x10   12 5#  2x10 12  5#  x10 12 5# 10x   Bicep Curl machine  10#  2x10   10#  2x10 12  5#  x10 12 5# 10x   Lumbar extension machine 35# 2x10 35# 2x10 50#     2x10 50# 2x10 50# 2x10   Seated row 20# 2x10 25#  x10 50#   x10 50# 2x10 20# 2x10   Elliptical  5 min 5 min 5 min 10 min l2 10 min l2    scaption        Modalities

## 2021-08-13 NOTE — PROGRESS NOTES
Out Patient Care Management Note:   Received response from Dr David Lamas that patient needs to remain on both doses of Adderall  There does not appear to be any new pre authorization request letters or medical exception letters from [employer] in reference to Adderall  Per previous phone conversation with pharmacist it was Teddy Steiner has declined payment on one of the Adderall medication  Patient will have to continue paying out of pocket  Telephone outreach attempt made to inform patient of the above  No answer  Left voicemail message with general contact information with name, title, phone number and days / times when I am available  Patient returned my phone call later in the day  Shared above information with her  Patient understands that if she received a denial letter from insurance she should make PCP's office aware as medications may require pre-authorizations prior to payment  Patient verbalized understanding  COVID 19 Vaccine Confidence    The patient stated the following questions or concerns around the COVID 19 vaccine  Patient asks about new information on guidelines for third vaccination for immunocompromised people    Provided the patient with the following information:    Per FDA news release that came out yesterday [place]  Food and Drug Administration amended the emergency use authorizations for both [employer] and the National [employer] Vaccine to allow for the use of an additional dose in certain immunocompromised individuals, specifically, solid organ transplant recipients or those who are diagnosed with conditions that are considered to have an equivalent level of immunocompromise  Recommended that patient talk to her PCP to see if her medical conditions meet the criteria for this third dose  Patient did receive her 2 doses  Interventions:  Inbasket message sent to Provider on the 3rd vaccine dose  ED follow up episode will be closed

## 2021-08-16 ENCOUNTER — APPOINTMENT (OUTPATIENT)
Dept: PHYSICAL THERAPY | Facility: CLINIC | Age: 66
End: 2021-08-16
Payer: MEDICARE

## 2021-08-18 ENCOUNTER — OFFICE VISIT (OUTPATIENT)
Dept: PHYSICAL THERAPY | Facility: CLINIC | Age: 66
End: 2021-08-18
Payer: MEDICARE

## 2021-08-18 DIAGNOSIS — M54.16 RADICULOPATHY, LUMBAR REGION: Primary | ICD-10-CM

## 2021-08-18 PROCEDURE — 97110 THERAPEUTIC EXERCISES: CPT

## 2021-08-18 NOTE — PROGRESS NOTES
Daily Note     Today's date: 2021  Patient name: Jarrell Lino  : 1955  MRN: [id number]  Referring provider: Hemalatha Bravo MD  Dx:   Encounter Diagnosis     ICD-10-CM    1  Radiculopathy, lumbar region  M54 16                   Subjective: Patient reports had a bad few days with lots of pain  Patient reports bilateral wrists are very painful  Objective: See treatment diary below      Assessment: Tolerated treatment well  Patient exhibited good technique with therapeutic exercises      Plan: Continue per plan of care  Precautions: Hx of cancer    Manuals 21   LE Stretch                                Neuro Re-Ed         MTP/LTP        PPT        PPT with march        PPT with heel slides        Stance on foam        Tandem Stance        SLS        Side Stepping        Bridges        Dead Bugs        OAL        Reverse Crunch        Abd Curl Machine 35# 2x10 35# 2x10 35#  2x10 35# 3x10 35# 2x10   Lat P/D 50# 3x10  50#  10 50# 3x10 50# 3x10           Ther Ex        SRC        UBE        Treadmill 3 1 mph 1 mile   3 1  Mph 1 mile   3 1   1 mile  3 0 incline 3 1 mph, 1 mile 3 0 Incline   TR/HR 2x10 2x10  2x10 2x10   Core:  Dead bugs  Crunches  Penguins  Reverse crunch   bridges  2x10  each      Self Piriformis Stretch        Wrist ROM        Supine SLR        Hip machine adduction and abduction 45# 2x10 each 45# 2x10 each Abduction 45# 2x10  Adduction 45# 2x10 45# 2x10    45# 2x10 45# 2x10  45# 2x10   Leg press 80## 3x10 80 # 3x10 70#    3x10 30# 3x10 60# 3x10   Leg ext/curl machine 20# 2x10 each 25# 2x10 25# 2x10 each 20# 2x10 each 20# 2x10 each   Torso machine 30# 2x10 30#  2x10 30#  2x10 30#  2x10 30# 2x10   Chest Press Machine 10# 2x10 20#    2x10 20#  2x10 10#  10x 10# 10x   Tricep machine 12 5# 2x10 12,5#   2x10   12 5#  2x10 12  5#  x10 12 5# 10x   Bicep Curl machine 12 5# 10x 10#  2x10   10#  2x10 12  5#  x10 12 5# 10x   Lumbar extension machine 50# 2x10 35# 2x10 50#     2x10 50# 2x10 50# 2x10   Seated row 20# 2x10 25#  x10 50#   x10 50#   2x10 20# 2x10   Elliptical  10 min 5 min 5 min 10 min l2 10 min l2    scaption        Modalities        MHP to b/l wrists 15 min

## 2021-08-19 ENCOUNTER — APPOINTMENT (OUTPATIENT)
Dept: PHYSICAL THERAPY | Facility: CLINIC | Age: 66
End: 2021-08-19
Payer: MEDICARE

## 2021-08-20 ENCOUNTER — APPOINTMENT (OUTPATIENT)
Dept: PHYSICAL THERAPY | Facility: CLINIC | Age: 66
End: 2021-08-20
Payer: MEDICARE

## 2021-08-23 ENCOUNTER — OFFICE VISIT (OUTPATIENT)
Dept: PHYSICAL THERAPY | Facility: CLINIC | Age: 66
End: 2021-08-23
Payer: MEDICARE

## 2021-08-23 DIAGNOSIS — M54.16 RADICULOPATHY, LUMBAR REGION: Primary | ICD-10-CM

## 2021-08-23 PROCEDURE — 97110 THERAPEUTIC EXERCISES: CPT

## 2021-08-23 NOTE — PROGRESS NOTES
Daily Note     Today's date: 2021  Patient name: Dionne Perez  : 1955  MRN: [id number]  Referring provider: Marty Garcia MD  Dx:   Encounter Diagnosis     ICD-10-CM    1  Radiculopathy, lumbar region  M54 16                   Subjective:  " Im doing good"        Objective: See treatment diary below      Assessment: Tolerated treatment well  Patient exhibited good technique with therapeutic exercises   Patient reports a significant improvement in strength and endurance since IE as well as decreased LB pain  Able to increase therex without incidence  Plan: Continue per plan of care  Precautions: Hx of cancer    Manuals 21   LE Stretch                                Neuro Re-Ed         MTP/LTP        PPT        PPT with march        PPT with heel slides        Stance on foam        Tandem Stance        SLS        Side Stepping        Bridges        Dead Bugs        OAL        Reverse Crunch        Abd Curl Machine 35# 2x10 35# 2x10 35#  2x10 35# 3x10 35# 2x10   Lat P/D 50# 3x10 50#  3x10 50#  10 50# 3x10 50# 3x10           Ther Ex        SRC        UBE        Treadmill 3 1 mph 1 mile  3 1  mph 3 1  Mph 1 mile   3 1   1 mile  3 0 incline 3 1 mph, 1 mile 3 0 Incline   TR/HR 2x10 2x10  2x10 2x10   Core:  Dead bugs  Crunches  Penguins  Reverse crunch   bridges        Self Piriformis Stretch        Wrist ROM        Supine SLR        Hip machine adduction and abduction 45# 2x10 each 45# 2x10 each Abduction 45# 2x10  Adduction 45# 2x10 45# 2x10    45# 2x10 45# 2x10  45# 2x10   Leg press 80## 3x10 80 # 3x10 70#    3x10 30# 3x10 60# 3x10   Leg ext/curl machine 20# 2x10 each 25# 2x10 25# 2x10 each 20# 2x10 each 20# 2x10 each   Torso machine 30# 2x10 30#  2x10 30#  2x10 30#  2x10 30# 2x10   Chest Press Machine 10# 2x10 20#    2x10 20#  2x10 10#  10x 10# 10x   Tricep machine 12 5# 2x10 12,5#   2x10   12 5#  2x10 12  5#  x10 12 5# 10x   Bicep Curl machine 12 5# 10x 10#  2x10   10#  2x10 12  5#  x10 12 5# 10x   Lumbar extension machine 50# 2x10 50#  2x10 50#     2x10 50# 2x10 50# 2x10   Seated row 20# 2x10 25#  x10 50#   x10 50#   2x10 20# 2x10   Elliptical  10 min 10 min 5 min 10 min l2 10 min l2    scaption        Modalities        MHP to b/l wrists 15 min 10

## 2021-08-25 ENCOUNTER — OFFICE VISIT (OUTPATIENT)
Dept: PHYSICAL THERAPY | Facility: CLINIC | Age: 66
End: 2021-08-25
Payer: MEDICARE

## 2021-08-25 DIAGNOSIS — M54.16 RADICULOPATHY, LUMBAR REGION: Primary | ICD-10-CM

## 2021-08-25 PROCEDURE — 97110 THERAPEUTIC EXERCISES: CPT

## 2021-08-25 NOTE — PROGRESS NOTES
Daily Note     Today's date: 2021  Patient name: Graham Sullivan  : 1955  MRN: [id number]  Referring provider: Bartolome Major MD  Dx:   Encounter Diagnosis     ICD-10-CM    1  Radiculopathy, lumbar region  M54 16                   Subjective: Patient reports "today is a good day but I've been hurting lately "      Objective: See treatment diary below      Assessment: Tolerated treatment well  Patient exhibited good technique with therapeutic exercises      Plan: Continue per plan of care  Precautions: Hx of cancer    Manuals 21   LE Stretch                                Neuro Re-Ed         MTP/LTP        PPT        PPT with march        PPT with heel slides        Stance on foam        Tandem Stance        SLS        Side Stepping        Bridges        Dead Bugs        OAL        Reverse Crunch        Abd Curl Machine 35# 2x10 35# 2x10 35#  2x10 35# 3x10 35# 2x10   Lat P/D 50# 3x10 50#  3x10 50#  3x10 50# 3x10 50# 3x10           Ther Ex        SRC        UBE        Treadmill 3 1 mph 1 mile  3 1  mph 3 1  Mph 1 mile   3 1   1 mile  3 0 incline 3 1 mph, 1 mile 3 0 Incline   TR/HR 2x10 2x10 2x10 2x10 2x10   Core:  Dead bugs  Crunches  Penguins  Reverse crunch   bridges        Self Piriformis Stretch        Wrist ROM        Supine SLR        Hip machine adduction and abduction 45# 2x10 each 45# 2x10 each Abduction 45# 2x10  Adduction 45# 2x10 45# 2x10    45# 2x10 45# 2x10  45# 2x10   Leg press 80## 3x10 80 # 3x10 80#    3x10 30# 3x10 60# 3x10   Leg ext/curl machine 20# 2x10 each 25# 2x10 25# 2x10 each 20# 2x10 each 20# 2x10 each   Torso machine 30# 2x10 30#  2x10 30#  2x10 30#  2x10 30# 2x10   Chest Press Machine 10# 2x10 20#    2x10 20#  2x10 10#  10x 10# 10x   Tricep machine 12 5# 2x10 12,5#   2x10   12 5#  2x10 12  5#  x10 12 5# 10x   Bicep Curl machine 12 5# 10x 10#  2x10   10#  2x10 12  5#  x10 12 5# 10x   Lumbar extension machine 50# 2x10 50#  2x10 50# 2x10 50# 2x10 50# 2x10   Seated row 20# 2x10 25#  x10 25#   2x10 50#   2x10 20# 2x10   Elliptical  10 min 10 min 10 min 10 min l2 10 min l2    scaption        Modalities        MHP to b/l wrists 15 min 10

## 2021-08-30 ENCOUNTER — APPOINTMENT (OUTPATIENT)
Dept: PHYSICAL THERAPY | Facility: CLINIC | Age: 66
End: 2021-08-30
Payer: MEDICARE

## 2021-09-01 ENCOUNTER — OFFICE VISIT (OUTPATIENT)
Dept: PHYSICAL THERAPY | Facility: CLINIC | Age: 66
End: 2021-09-01
Payer: MEDICARE

## 2021-09-01 DIAGNOSIS — M54.16 RADICULOPATHY, LUMBAR REGION: Primary | ICD-10-CM

## 2021-09-01 PROCEDURE — 97110 THERAPEUTIC EXERCISES: CPT

## 2021-09-01 NOTE — PROGRESS NOTES
Daily Note     Today's date: 2021  Patient name: Jose Eduardo Elizabeth  : 1955  MRN: [id number]  Referring provider: Nery Arvizu MD  Dx:   Encounter Diagnosis     ICD-10-CM    1  Radiculopathy, lumbar region  M54 16                   Subjective: Patient reports has good days and bad days with pain in low back and all over  Objective: See treatment diary below      Assessment: Tolerated treatment well  Patient exhibited good technique with therapeutic exercises      Plan: Continue per plan of care        Precautions: Hx of cancer    Manuals 21   LE Stretch                                Neuro Re-Ed         MTP/LTP        PPT        PPT with march        PPT with heel slides        Stance on foam        Tandem Stance        SLS        Side Stepping        Bridges        Dead Bugs        OAL        Reverse Crunch        Abd Curl Machine 35# 2x10 35# 2x10 35#  2x10 35# 3x10 35# 2x10   Lat P/D 50# 3x10 50#  3x10 50#  3x10 50# 3x10 50# 3x10           Ther Ex        SRC        UBE        Treadmill 3 1 mph 1 mile  3 1  mph 3 1  Mph 1 mile   3 1   1 mile  3 0 incline 3 1 mph, 1 mile 3 0 Incline   TR/HR 2x10 2x10 2x10 2x10 2x10   Core:  Dead bugs  Crunches  Penguins  Reverse crunch   bridges        Self Piriformis Stretch        Wrist ROM        Supine SLR        Hip machine adduction and abduction 45# 2x10 each 45# 2x10 each Abduction 45# 2x10  Adduction 45# 2x10 45# 2x10    45# 2x10 45# 2x10  45# 2x10   Leg press 80## 3x10 80 # 3x10 80#    3x10 80# 3x10 60# 3x10   Leg ext/curl machine 20# 2x10 each 25# 2x10 25# 2x10 each 25# 2x10 each 20# 2x10 each   Torso machine 30# 2x10 30#  2x10 30#  2x10 30#  2x10 30# 2x10   Chest Press Machine 10# 2x10 20#    2x10 20#  2x10 10#  10x 10# 10x   Tricep machine 12 5# 2x10 12,5#   2x10   12 5#  2x10 12 5#  2x10 12 5# 10x   Bicep Curl machine 12 5# 10x 10#  2x10   10#  2x10 12 5#  2x10 12 5# 10x   Lumbar extension machine 50# 2x10 50# 2x10 50#     2x10 50# 2x10 50# 2x10   Seated row 20# 2x10 25#  x10 25#   2x10 50#   2x10 20# 2x10   Elliptical  10 min 10 min 10 min 10 min l2 10 min l2    scaption        Modalities        MHP to b/l wrists 15 min 10

## 2021-09-03 ENCOUNTER — APPOINTMENT (OUTPATIENT)
Dept: PHYSICAL THERAPY | Facility: CLINIC | Age: 66
End: 2021-09-03
Payer: MEDICARE

## 2021-09-07 DIAGNOSIS — M19.90 OSTEOARTHRITIS, UNSPECIFIED OSTEOARTHRITIS TYPE, UNSPECIFIED SITE: ICD-10-CM

## 2021-09-07 DIAGNOSIS — M54.16 RADICULOPATHY, LUMBAR REGION: ICD-10-CM

## 2021-09-07 DIAGNOSIS — R52 PAIN: ICD-10-CM

## 2021-09-07 DIAGNOSIS — F98.8 ATTENTION DEFICIT DISORDER, UNSPECIFIED HYPERACTIVITY PRESENCE: ICD-10-CM

## 2021-09-07 RX ORDER — DEXTROAMPHETAMINE SACCHARATE, AMPHETAMINE ASPARTATE MONOHYDRATE, DEXTROAMPHETAMINE SULFATE AND AMPHETAMINE SULFATE 7.5; 7.5; 7.5; 7.5 MG/1; MG/1; MG/1; MG/1
30 CAPSULE, EXTENDED RELEASE ORAL EVERY MORNING
Qty: 30 CAPSULE | Refills: 0 | Status: SHIPPED | OUTPATIENT
Start: 2021-09-07 | End: 2021-10-05 | Stop reason: SDUPTHER

## 2021-09-07 RX ORDER — DEXTROAMPHETAMINE SACCHARATE, AMPHETAMINE ASPARTATE, DEXTROAMPHETAMINE SULFATE AND AMPHETAMINE SULFATE 5; 5; 5; 5 MG/1; MG/1; MG/1; MG/1
20 TABLET ORAL
Qty: 60 TABLET | Refills: 0 | Status: SHIPPED | OUTPATIENT
Start: 2021-09-07 | End: 2021-10-05 | Stop reason: SDUPTHER

## 2021-09-07 RX ORDER — OXYMORPHONE HYDROCHLORIDE 5 MG/1
5 TABLET ORAL EVERY 6 HOURS PRN
Qty: 120 TABLET | Refills: 0 | Status: SHIPPED | OUTPATIENT
Start: 2021-09-07 | End: 2021-10-05 | Stop reason: SDUPTHER

## 2021-09-08 ENCOUNTER — OFFICE VISIT (OUTPATIENT)
Dept: PHYSICAL THERAPY | Facility: CLINIC | Age: 66
End: 2021-09-08
Payer: MEDICARE

## 2021-09-08 DIAGNOSIS — M54.16 RADICULOPATHY, LUMBAR REGION: Primary | ICD-10-CM

## 2021-09-08 PROCEDURE — 97110 THERAPEUTIC EXERCISES: CPT

## 2021-09-08 NOTE — PROGRESS NOTES
Daily Note     Today's date: 2021  Patient name: Jagruti Dowling  : 1955  MRN: [id number]  Referring provider: Jon Mena MD  Dx:   Encounter Diagnosis     ICD-10-CM    1  Radiculopathy, lumbar region  M54 16                   Subjective: "The doctor was shocked with my strength from PT"   "my wrist is so so through"        Objective: See treatment diary below      Assessment: Tolerated treatment well  Patient exhibited good technique with therapeutic exercises      Plan: Continue per plan of care        Precautions: Hx of cancer    Manuals 21   LE Stretch                                Neuro Re-Ed         MTP/LTP        PPT        PPT with march        PPT with heel slides        Stance on foam        Tandem Stance        SLS        Side Stepping        Bridges        Dead Bugs        OAL        Reverse Crunch        Abd Curl Machine 35# 2x10 35# 2x10 35#  2x10 35# 3x10 35# 2x10   Lat P/D 50# 3x10 50#  3x10 50#  3x10 50# 3x10 50# 3x10           Ther Ex        SRC        UBE        Treadmill 3 1 mph 1 mile  3 1  mph 3 1  Mph 1 mile   3 1   1 mile  3 0 incline 3 1 mph, 1 mile 3 0 Incline   TR/HR 2x10 2x10 2x10 2x10 2x10   Core:  Dead bugs  Crunches  Penguins  Reverse crunch   bridges        Self Piriformis Stretch        Wrist ROM        Supine SLR        Hip machine adduction and abduction 45# 2x10 each 45# 2x10 each Abduction 45# 2x10  Adduction 45# 2x10 45# 2x10    45# 2x10 45# 2x10  45# 2x10   Leg press 80## 3x10 80 # 3x10 80#    3x10 80# 3x10 80# 3x10   Leg ext/curl machine 20# 2x10 each 25# 2x10 25# 2x10 each 25# 2x10 each 20# 2x10 each   Torso machine 30# 2x10 30#  2x10 30#  2x10 30#  2x10 30# 2x10   Chest Press Machine 10# 2x10 20#    2x10 20#  2x10 10#  10x 10# 10x   Tricep machine 12 5# 2x10 12,5#   2x10   12 5#  2x10 12 5#  2x10 12 5# 10x   Bicep Curl machine 12 5# 10x 10#  2x10   10#  2x10 12 5#  2x10 12 5# 10x   Lumbar extension machine 50# 2x10 50#  2x10 50#     2x10 50# 2x10 50# 2x10   Seated row 20# 2x10 25#  x10 25#   2x10 50#   2x10 20# 2x10   Elliptical  10 min 10 min 10 min 10 min l2 10 min l2    scaption        Modalities        MHP to b/l wrists 15 min 10

## 2021-09-13 ENCOUNTER — APPOINTMENT (OUTPATIENT)
Dept: PHYSICAL THERAPY | Facility: CLINIC | Age: 66
End: 2021-09-13
Payer: MEDICARE

## 2021-09-14 ENCOUNTER — OFFICE VISIT (OUTPATIENT)
Dept: PHYSICAL THERAPY | Facility: CLINIC | Age: 66
End: 2021-09-14
Payer: MEDICARE

## 2021-09-14 DIAGNOSIS — M54.16 RADICULOPATHY, LUMBAR REGION: Primary | ICD-10-CM

## 2021-09-14 PROCEDURE — 97110 THERAPEUTIC EXERCISES: CPT

## 2021-09-14 NOTE — PROGRESS NOTES
Daily Note     Today's date: 2021  Patient name: Zara Aguilar  : 1955  MRN: [id number]  Referring provider: Vel Vickers MD  Dx:   Encounter Diagnosis     ICD-10-CM    1  Radiculopathy, lumbar region  M54 16                   Subjective: "I had a rough few days but today is better "      Objective: See treatment diary below      Assessment: Tolerated treatment well  Patient exhibited good technique with therapeutic exercises      Plan: Continue per plan of care        Precautions: Hx of cancer    Manuals 21   LE Stretch                                Neuro Re-Ed         MTP/LTP        PPT        PPT with march        PPT with heel slides        Stance on foam        Tandem Stance        SLS        Side Stepping        Bridges        Dead Bugs        OAL        Reverse Crunch        Abd Curl Machine 35# 2x10 35# 2x10 35#  2x10 35# 3x10 35# 2x10   Lat P/D 50# 3x10 50#  3x10 50#  3x10 50# 3x10 50# 3x10           Ther Ex        SRC        UBE        Treadmill 3 1 mph 1 mile  3 1  mph 3 1  Mph 1 mile   3 1   1 mile  3 0 incline 3 1 mph, 1 mile 3 0 Incline   TR/HR 2x10 2x10 2x10 2x10 2x10   Core:  Dead bugs  Crunches  Penguins  Reverse crunch   bridges        Self Piriformis Stretch        Wrist ROM        Supine SLR        Hip machine adduction and abduction 45# 2x10 each 45# 2x10 each Abduction 45# 2x10  Adduction 45# 2x10 45# 2x10    45# 2x10 45# 2x10  45# 2x10   Leg press 80# 3x10 80 # 3x10 80#    3x10 80# 3x10 80# 3x10   Leg ext/curl machine 20# 2x10 each 25# 2x10 25# 2x10 each 25# 2x10 each 20# 2x10 each   Torso machine 30# 2x10 30#  2x10 30#  2x10 30#  2x10 30# 2x10   Chest Press Machine 10# 2x10 20#    2x10 20#  2x10 10#  10x 10# 10x   Tricep machine 12 5# 2x10 12,5#   2x10   12 5#  2x10 12 5#  2x10 12 5# 10x   Bicep Curl machine 12 5# 10x 10#  2x10   10#  2x10 12 5#  2x10 12 5# 10x   Lumbar extension machine 50# 2x10 50#  2x10 50#     2x10 50# 2x10 50# 2x10   Seated row 20# 2x10 25#  x10 25#   2x10 50#   2x10 20# 2x10   Elliptical  10 min 10 min 10 min 10 min l2 10 min l2    scaption        Modalities        MHP to b/l wrists  10

## 2021-09-15 ENCOUNTER — OFFICE VISIT (OUTPATIENT)
Dept: PHYSICAL THERAPY | Facility: CLINIC | Age: 66
End: 2021-09-15
Payer: MEDICARE

## 2021-09-15 DIAGNOSIS — M54.16 RADICULOPATHY, LUMBAR REGION: Primary | ICD-10-CM

## 2021-09-15 PROCEDURE — 97110 THERAPEUTIC EXERCISES: CPT

## 2021-09-15 NOTE — PROGRESS NOTES
Daily Note     Today's date: 9/15/2021  Patient name: Tera Roblero  : 1955  MRN: [id number]  Referring provider: Andres Hammer MD  Dx:   Encounter Diagnosis     ICD-10-CM    1  Radiculopathy, lumbar region  M54 16                   Subjective: Patient reports no new complaints today  Objective: See treatment diary below      Assessment: Tolerated treatment well  Patient exhibited good technique with therapeutic exercises      Plan: Continue per plan of care        Precautions: Hx of cancer    Manuals 9/14//21 9/15/2021 2021 2021 2021   LE Stretch                                Neuro Re-Ed         MTP/LTP        PPT        PPT with march        PPT with heel slides        Stance on foam        Tandem Stance        SLS        Side Stepping        Bridges        Dead Bugs        OAL        Reverse Crunch        Abd Curl Machine 35# 2x10 35# 2x10 35#  2x10 35# 3x10 35# 2x10   Lat P/D 50# 3x10 50#  3x10 50#  3x10 50# 3x10 50# 3x10           Ther Ex        SRC        UBE        Treadmill 3 1 mph 1 mile  3 1  mph 3 1  Mph 1 mile   3 1   1 mile  3 0 incline 3 1 mph, 1 mile 3 0 Incline   TR/HR 2x10 2x10 2x10 2x10 2x10   Core:  Dead bugs  Crunches  Penguins  Reverse crunch   bridges        Self Piriformis Stretch        Wrist ROM        Supine SLR        Hip machine adduction and abduction 45# 2x10 each 45# 2x10 each Abduction 45# 2x10  Adduction 45# 2x10 45# 2x10    45# 2x10 45# 2x10  45# 2x10   Leg press 80# 3x10 80 # 3x10 80#    3x10 80# 3x10 80# 3x10   Leg ext/curl machine 20# 2x10 each 25# 2x10 25# 2x10 each 25# 2x10 each 20# 2x10 each   Torso machine 30# 2x10 30#  2x10 30#  2x10 30#  2x10 30# 2x10   Chest Press Machine 10# 2x10 20#    2x10 20#  2x10 10#  10x 10# 10x   Tricep machine 12 5# 2x10 12 5#   2x10   12 5#  2x10 12 5#  2x10 12 5# 10x   Bicep Curl machine 12 5# 10x 12 5#  2x10   10#  2x10 12 5#  2x10 12 5# 10x   Lumbar extension machine 50# 2x10 50#  2x10 50#     2x10 50# 2x10 50# 2x10   Seated row 20# 2x10 25#  x10 25#   2x10 50#   2x10 20# 2x10   Elliptical  10 min 10 min 10 min 10 min l2 10 min l2    scaption        Modalities        MHP to b/l wrists

## 2021-09-17 ENCOUNTER — APPOINTMENT (OUTPATIENT)
Dept: PHYSICAL THERAPY | Facility: CLINIC | Age: 66
End: 2021-09-17
Payer: MEDICARE

## 2021-09-20 ENCOUNTER — APPOINTMENT (OUTPATIENT)
Dept: PHYSICAL THERAPY | Facility: CLINIC | Age: 66
End: 2021-09-20
Payer: MEDICARE

## 2021-09-22 ENCOUNTER — APPOINTMENT (OUTPATIENT)
Dept: PHYSICAL THERAPY | Facility: CLINIC | Age: 66
End: 2021-09-22
Payer: MEDICARE

## 2021-09-23 ENCOUNTER — APPOINTMENT (OUTPATIENT)
Dept: PHYSICAL THERAPY | Facility: CLINIC | Age: 66
End: 2021-09-23
Payer: MEDICARE

## 2021-09-24 ENCOUNTER — PATIENT OUTREACH (OUTPATIENT)
Dept: FAMILY MEDICINE CLINIC | Facility: CLINIC | Age: 66
End: 2021-09-24

## 2021-09-24 NOTE — PROGRESS NOTES
OP SW left message to check in on patient  Per our previous discussion, patient does not have any OP SW needs  OP SW to close case at this time

## 2021-09-27 ENCOUNTER — APPOINTMENT (OUTPATIENT)
Dept: PHYSICAL THERAPY | Facility: CLINIC | Age: 66
End: 2021-09-27
Payer: MEDICARE

## 2021-09-29 ENCOUNTER — APPOINTMENT (OUTPATIENT)
Dept: PHYSICAL THERAPY | Facility: CLINIC | Age: 66
End: 2021-09-29
Payer: MEDICARE

## 2021-09-30 ENCOUNTER — OFFICE VISIT (OUTPATIENT)
Dept: PHYSICAL THERAPY | Facility: CLINIC | Age: 66
End: 2021-09-30
Payer: MEDICARE

## 2021-09-30 DIAGNOSIS — M54.16 RADICULOPATHY, LUMBAR REGION: Primary | ICD-10-CM

## 2021-09-30 PROCEDURE — 97110 THERAPEUTIC EXERCISES: CPT

## 2021-09-30 NOTE — PROGRESS NOTES
Daily Note     Today's date: 2021  Patient name: Oscar Story  : 1955  MRN: [id number]  Referring provider: Ruth Arceo MD  Dx:   Encounter Diagnosis     ICD-10-CM    1  Radiculopathy, lumbar region  M54 16                   Subjective: Patient reports bilateral wrists are painful today, "most days my wrists really hurt "      Objective: See treatment diary below      Assessment: Tolerated treatment well  Patient exhibited good technique with therapeutic exercises      Plan: Continue per plan of care        Precautions: Hx of cancer    Manuals 9/14//21 9/15/2021 2021 2021 2021   LE Stretch                                Neuro Re-Ed         MTP/LTP        PPT        PPT with march        PPT with heel slides        Stance on foam        Tandem Stance        SLS        Side Stepping        Bridges        Dead Bugs        OAL        Reverse Crunch        Abd Curl Machine 35# 2x10 35# 2x10 35#  2x10 35# 3x10 35# 2x10   Lat P/D 50# 3x10 50#  3x10 50#  3x10 50# 3x10 50# 3x10           Ther Ex        SRC        UBE        Treadmill 3 1 mph 1 mile  3 1  mph 3 1  Mph 1 mile   3 1   1 mile  3 0 incline 3 1 mph, 1 mile 3 0 Incline   TR/HR 2x10 2x10 2x10 2x10 2x10   Core:  Dead bugs  Crunches  Penguins  Reverse crunch   bridges        Self Piriformis Stretch        Wrist ROM        Supine SLR        Hip machine adduction and abduction 45# 2x10 each 45# 2x10 each Abduction 45# 2x10  Adduction 45# 2x10 45# 2x10    45# 2x10 45# 2x10  45# 2x10   Leg press 80# 3x10 80 # 3x10 80#    3x10 80# 3x10 80# 3x10   Leg ext/curl machine 20# 2x10 each 25# 2x10 25# 2x10 each 25# 2x10 each 20# 2x10 each   Torso machine 30# 2x10 30#  2x10 30#  2x10 30#  2x10 30# 2x10   Chest Press Machine 10# 2x10 20#    2x10 20#  2x10 10#  10x 10# 10x   Tricep machine 12 5# 2x10 12 5#   2x10   12 5#  2x10 12 5#  2x10 12 5# 10x   Bicep Curl  12 5# 10x 12 5#  2x10   12 5#  2x10 12 5#  2x10 12 5# 10x   Lumbar extension machine 50# 2x10 50#  2x10 50#     2x10 50# 2x10 50# 2x10   Seated row 20# 2x10 25#  x10 25#   2x10 50#   2x10 20# 2x10   Elliptical  10 min 10 min 10 min 10 min l2 10 min l2    scaption        Modalities        MHP to b/l wrists

## 2021-10-01 ENCOUNTER — APPOINTMENT (OUTPATIENT)
Dept: PHYSICAL THERAPY | Facility: CLINIC | Age: 66
End: 2021-10-01
Payer: MEDICARE

## 2021-10-04 ENCOUNTER — OFFICE VISIT (OUTPATIENT)
Dept: PHYSICAL THERAPY | Facility: CLINIC | Age: 66
End: 2021-10-04
Payer: MEDICARE

## 2021-10-04 DIAGNOSIS — M54.16 RADICULOPATHY, LUMBAR REGION: Primary | ICD-10-CM

## 2021-10-04 PROCEDURE — 97110 THERAPEUTIC EXERCISES: CPT

## 2021-10-05 DIAGNOSIS — M19.90 OSTEOARTHRITIS, UNSPECIFIED OSTEOARTHRITIS TYPE, UNSPECIFIED SITE: ICD-10-CM

## 2021-10-05 DIAGNOSIS — F98.8 ATTENTION DEFICIT DISORDER, UNSPECIFIED HYPERACTIVITY PRESENCE: ICD-10-CM

## 2021-10-05 DIAGNOSIS — M54.16 RADICULOPATHY, LUMBAR REGION: ICD-10-CM

## 2021-10-05 DIAGNOSIS — R52 PAIN: ICD-10-CM

## 2021-10-05 RX ORDER — DEXTROAMPHETAMINE SACCHARATE, AMPHETAMINE ASPARTATE MONOHYDRATE, DEXTROAMPHETAMINE SULFATE AND AMPHETAMINE SULFATE 7.5; 7.5; 7.5; 7.5 MG/1; MG/1; MG/1; MG/1
30 CAPSULE, EXTENDED RELEASE ORAL EVERY MORNING
Qty: 30 CAPSULE | Refills: 0 | Status: CANCELLED | OUTPATIENT
Start: 2021-10-05

## 2021-10-05 RX ORDER — OXYMORPHONE HYDROCHLORIDE 5 MG/1
5 TABLET ORAL EVERY 6 HOURS PRN
Qty: 120 TABLET | Refills: 0 | Status: CANCELLED | OUTPATIENT
Start: 2021-10-05

## 2021-10-05 RX ORDER — DEXTROAMPHETAMINE SACCHARATE, AMPHETAMINE ASPARTATE MONOHYDRATE, DEXTROAMPHETAMINE SULFATE AND AMPHETAMINE SULFATE 7.5; 7.5; 7.5; 7.5 MG/1; MG/1; MG/1; MG/1
30 CAPSULE, EXTENDED RELEASE ORAL EVERY MORNING
Qty: 30 CAPSULE | Refills: 0 | Status: SHIPPED | OUTPATIENT
Start: 2021-10-05 | End: 2021-11-03 | Stop reason: SDUPTHER

## 2021-10-05 RX ORDER — DEXTROAMPHETAMINE SACCHARATE, AMPHETAMINE ASPARTATE, DEXTROAMPHETAMINE SULFATE AND AMPHETAMINE SULFATE 5; 5; 5; 5 MG/1; MG/1; MG/1; MG/1
20 TABLET ORAL
Qty: 60 TABLET | Refills: 0 | Status: CANCELLED | OUTPATIENT
Start: 2021-10-05

## 2021-10-05 RX ORDER — OXYMORPHONE HYDROCHLORIDE 5 MG/1
5 TABLET ORAL EVERY 6 HOURS PRN
Qty: 120 TABLET | Refills: 0 | Status: SHIPPED | OUTPATIENT
Start: 2021-10-05 | End: 2021-11-03 | Stop reason: SDUPTHER

## 2021-10-05 RX ORDER — DEXTROAMPHETAMINE SACCHARATE, AMPHETAMINE ASPARTATE, DEXTROAMPHETAMINE SULFATE AND AMPHETAMINE SULFATE 5; 5; 5; 5 MG/1; MG/1; MG/1; MG/1
20 TABLET ORAL
Qty: 60 TABLET | Refills: 0 | Status: SHIPPED | OUTPATIENT
Start: 2021-10-05 | End: 2021-11-03 | Stop reason: SDUPTHER

## 2021-10-08 NOTE — PROGRESS NOTES
PT Re-Evaluation     Today's date: 2021  Patient name: Merle Mendoza  : 1955  MRN: [id number]  Referring provider: Maikol Kothari MD  Dx:   Encounter Diagnosis     ICD-10-CM    1  Radiculopathy, lumbar region  M54 16        Start Time: 1440  Stop Time: 8095  Total time in clinic (min): 65 minutes    Assessment  Assessment details: Patient continues to progress well with PT POC  Patient is gaining good strength and endurance overall  Patient is reporting significant improvement with functional activities at home  Patient would benefit from continued PT intervention with focus on maximizing strength, improving stability, increasing  strength and endurance and relieving pain in order to maximize function  Impairments: abnormal muscle tone, abnormal or restricted ROM, activity intolerance, impaired balance, impaired physical strength, lacks appropriate home exercise program and pain with function    Goals  ST  Initiate HEP- Met  2  Patient to report decreased pain at worst by 50% in 6 weeks- Progressing    LT  Patient to report decreased pain at worst by 75% in 12 weeks- Progressing  2  Patient to increase LE and wrist strength to 5/5 in 12 weeks- Progressing  3  Patient to improve core and lumbar stability to [facility] in 12 weeks- Progressing  4   Patient to return to normal functional and recreational activity in 12 weeks- [facility]  Patient would benefit from: skilled physical therapy  Planned modality interventions: cryotherapy and thermotherapy: hydrocollator packs  Planned therapy interventions: abdominal trunk stabilization, IADL retraining, ADL retraining, joint mobilization, manual therapy, massage, balance, neuromuscular re-education, patient education, postural training, strengthening, stretching, therapeutic activities, therapeutic exercise, therapeutic training, functional ROM exercises, flexibility, graded activity, graded exercise and home exercise program  Frequency: 3x week  Duration in visits: 12  Duration in weeks: 4  Treatment plan discussed with: patient        Subjective Evaluation    Pain  At best pain rating: 3  At worst pain ratin  Relieving factors: medications  Progression: improved    Patient Goals  Patient goals for therapy: decreased pain          Objective     Concurrent Complaints  Negative for night pain, disturbed sleep, bladder dysfunction, bowel dysfunction, saddle (S4) numbness, cardiac problem, kidney problem, gallbladder problem, stomach problem, ulcer, appendix problem, spleen problem, pancreas problem, history of cancer, history of trauma and infection    Palpation   Left   Muscle spasm in the lumbar paraspinals  Tenderness of the lumbar paraspinals  Right   Muscle spasm in the lumbar paraspinals  Tenderness of the lumbar paraspinals       Neurological Testing     Sensation     Lumbar   Left   Intact: light touch    Right   Intact: light touch    Active Range of Motion     Lumbar   Normal active range of motion    Strength/Myotome Testing     Left Wrist/Hand   Wrist extension: 4+  Wrist flexion: 4+  Radial deviation: 4+  Ulnar deviation: 4+    Right Wrist/Hand   Wrist extension: 4+  Wrist flexion: 4+  Radial deviation: 4+  Ulnar deviation: 4+    Left Hip   Planes of Motion   Flexion: 4+  Abduction: 4+  Adduction: 4+    Right Hip   Planes of Motion   Flexion: 4+  Abduction: 4+  Adduction: 4+    Left Knee   Flexion: 4+  Extension: 4+    Right Knee   Flexion: 4+  Extension: 4+    Left Ankle/Foot   Dorsiflexion: 4+    Right Ankle/Foot   Dorsiflexion: 4+    Additional Strength Details  Slightly limited  strength noted bilaterally  LE Strength 4+-5/5 to    General Comments:    Lower quarter screen   Hips: unremarkable  Knees: unremarkable  Foot/ankle: unremarkable             Precautions: Hx of cancer

## 2021-10-11 ENCOUNTER — TELEPHONE (OUTPATIENT)
Dept: FAMILY MEDICINE CLINIC | Facility: CLINIC | Age: 66
End: 2021-10-11

## 2021-10-12 DIAGNOSIS — R52 PAIN: Primary | ICD-10-CM

## 2021-10-14 ENCOUNTER — APPOINTMENT (OUTPATIENT)
Dept: RADIOLOGY | Facility: CLINIC | Age: 66
End: 2021-10-14
Payer: MEDICARE

## 2021-10-14 DIAGNOSIS — R52 PAIN: ICD-10-CM

## 2021-10-14 PROCEDURE — 73564 X-RAY EXAM KNEE 4 OR MORE: CPT

## 2021-10-25 DIAGNOSIS — M19.90 OSTEOARTHRITIS, UNSPECIFIED OSTEOARTHRITIS TYPE, UNSPECIFIED SITE: Primary | ICD-10-CM

## 2021-10-25 RX ORDER — MELATONIN
Qty: 180 TABLET | Refills: 3 | Status: SHIPPED | OUTPATIENT
Start: 2021-10-25 | End: 2021-12-03

## 2021-11-01 ENCOUNTER — OFFICE VISIT (OUTPATIENT)
Dept: PHYSICAL THERAPY | Facility: CLINIC | Age: 66
End: 2021-11-01
Payer: MEDICARE

## 2021-11-01 DIAGNOSIS — M54.16 RADICULOPATHY, LUMBAR REGION: Primary | ICD-10-CM

## 2021-11-01 PROCEDURE — 97110 THERAPEUTIC EXERCISES: CPT

## 2021-11-03 ENCOUNTER — OFFICE VISIT (OUTPATIENT)
Dept: PHYSICAL THERAPY | Facility: CLINIC | Age: 66
End: 2021-11-03
Payer: MEDICARE

## 2021-11-03 DIAGNOSIS — M54.16 RADICULOPATHY, LUMBAR REGION: Primary | ICD-10-CM

## 2021-11-03 DIAGNOSIS — F98.8 ATTENTION DEFICIT DISORDER, UNSPECIFIED HYPERACTIVITY PRESENCE: ICD-10-CM

## 2021-11-03 DIAGNOSIS — M19.90 OSTEOARTHRITIS, UNSPECIFIED OSTEOARTHRITIS TYPE, UNSPECIFIED SITE: ICD-10-CM

## 2021-11-03 DIAGNOSIS — M54.16 RADICULOPATHY, LUMBAR REGION: ICD-10-CM

## 2021-11-03 DIAGNOSIS — R52 PAIN: ICD-10-CM

## 2021-11-03 PROCEDURE — 97110 THERAPEUTIC EXERCISES: CPT

## 2021-11-04 RX ORDER — DEXTROAMPHETAMINE SACCHARATE, AMPHETAMINE ASPARTATE MONOHYDRATE, DEXTROAMPHETAMINE SULFATE AND AMPHETAMINE SULFATE 7.5; 7.5; 7.5; 7.5 MG/1; MG/1; MG/1; MG/1
30 CAPSULE, EXTENDED RELEASE ORAL EVERY MORNING
Qty: 30 CAPSULE | Refills: 0 | Status: SHIPPED | OUTPATIENT
Start: 2021-11-04 | End: 2021-12-03 | Stop reason: SDUPTHER

## 2021-11-04 RX ORDER — DEXTROAMPHETAMINE SACCHARATE, AMPHETAMINE ASPARTATE, DEXTROAMPHETAMINE SULFATE AND AMPHETAMINE SULFATE 5; 5; 5; 5 MG/1; MG/1; MG/1; MG/1
20 TABLET ORAL
Qty: 60 TABLET | Refills: 0 | Status: SHIPPED | OUTPATIENT
Start: 2021-11-04 | End: 2021-12-03 | Stop reason: SDUPTHER

## 2021-11-04 RX ORDER — OXYMORPHONE HYDROCHLORIDE 5 MG/1
5 TABLET ORAL EVERY 6 HOURS PRN
Qty: 120 TABLET | Refills: 0 | Status: SHIPPED | OUTPATIENT
Start: 2021-11-04 | End: 2021-12-03 | Stop reason: SDUPTHER

## 2021-11-05 ENCOUNTER — APPOINTMENT (OUTPATIENT)
Dept: PHYSICAL THERAPY | Facility: CLINIC | Age: 66
End: 2021-11-05
Payer: MEDICARE

## 2021-11-10 ENCOUNTER — APPOINTMENT (OUTPATIENT)
Dept: PHYSICAL THERAPY | Facility: CLINIC | Age: 66
End: 2021-11-10
Payer: MEDICARE

## 2021-11-17 ENCOUNTER — APPOINTMENT (OUTPATIENT)
Dept: PHYSICAL THERAPY | Facility: CLINIC | Age: 66
End: 2021-11-17
Payer: MEDICARE

## 2021-11-22 ENCOUNTER — APPOINTMENT (OUTPATIENT)
Dept: PHYSICAL THERAPY | Facility: CLINIC | Age: 66
End: 2021-11-22
Payer: MEDICARE

## 2021-11-24 ENCOUNTER — APPOINTMENT (OUTPATIENT)
Dept: PHYSICAL THERAPY | Facility: CLINIC | Age: 66
End: 2021-11-24
Payer: MEDICARE

## 2021-11-26 ENCOUNTER — APPOINTMENT (OUTPATIENT)
Dept: PHYSICAL THERAPY | Facility: CLINIC | Age: 66
End: 2021-11-26
Payer: MEDICARE

## 2021-12-03 ENCOUNTER — TELEMEDICINE (OUTPATIENT)
Dept: FAMILY MEDICINE CLINIC | Facility: CLINIC | Age: 66
End: 2021-12-03
Payer: MEDICARE

## 2021-12-03 DIAGNOSIS — M54.16 RADICULOPATHY, LUMBAR REGION: ICD-10-CM

## 2021-12-03 DIAGNOSIS — Z20.822 SUSPECTED COVID-19 VIRUS INFECTION: Primary | ICD-10-CM

## 2021-12-03 DIAGNOSIS — M19.90 OSTEOARTHRITIS, UNSPECIFIED OSTEOARTHRITIS TYPE, UNSPECIFIED SITE: ICD-10-CM

## 2021-12-03 DIAGNOSIS — F98.8 ATTENTION DEFICIT DISORDER, UNSPECIFIED HYPERACTIVITY PRESENCE: ICD-10-CM

## 2021-12-03 DIAGNOSIS — R52 PAIN: ICD-10-CM

## 2021-12-03 PROCEDURE — 99213 OFFICE O/P EST LOW 20 MIN: CPT | Performed by: FAMILY MEDICINE

## 2021-12-03 PROCEDURE — U0005 INFEC AGEN DETEC AMPLI PROBE: HCPCS | Performed by: FAMILY MEDICINE

## 2021-12-03 PROCEDURE — U0003 INFECTIOUS AGENT DETECTION BY NUCLEIC ACID (DNA OR RNA); SEVERE ACUTE RESPIRATORY SYNDROME CORONAVIRUS 2 (SARS-COV-2) (CORONAVIRUS DISEASE [COVID-19]), AMPLIFIED PROBE TECHNIQUE, MAKING USE OF HIGH THROUGHPUT TECHNOLOGIES AS DESCRIBED BY CMS-2020-01-R: HCPCS | Performed by: FAMILY MEDICINE

## 2021-12-03 RX ORDER — PREGABALIN 75 MG/1
1 CAPSULE ORAL 2 TIMES DAILY
COMMUNITY
Start: 2021-11-19 | End: 2022-01-03 | Stop reason: SDUPTHER

## 2021-12-03 RX ORDER — OXYMORPHONE HYDROCHLORIDE 5 MG/1
5 TABLET ORAL EVERY 6 HOURS PRN
Qty: 120 TABLET | Refills: 0 | Status: SHIPPED | OUTPATIENT
Start: 2021-12-03 | End: 2022-01-03 | Stop reason: SDUPTHER

## 2021-12-03 RX ORDER — DEXTROAMPHETAMINE SACCHARATE, AMPHETAMINE ASPARTATE, DEXTROAMPHETAMINE SULFATE AND AMPHETAMINE SULFATE 5; 5; 5; 5 MG/1; MG/1; MG/1; MG/1
20 TABLET ORAL
Qty: 60 TABLET | Refills: 0 | Status: SHIPPED | OUTPATIENT
Start: 2021-12-03 | End: 2022-01-03 | Stop reason: SDUPTHER

## 2021-12-03 RX ORDER — MYCOPHENOLIC ACID 360 MG/1
1 TABLET, DELAYED RELEASE ORAL 3 TIMES DAILY
COMMUNITY
End: 2022-05-04

## 2021-12-03 RX ORDER — DEXTROAMPHETAMINE SACCHARATE, AMPHETAMINE ASPARTATE MONOHYDRATE, DEXTROAMPHETAMINE SULFATE AND AMPHETAMINE SULFATE 7.5; 7.5; 7.5; 7.5 MG/1; MG/1; MG/1; MG/1
30 CAPSULE, EXTENDED RELEASE ORAL EVERY MORNING
Qty: 30 CAPSULE | Refills: 0 | Status: SHIPPED | OUTPATIENT
Start: 2021-12-03 | End: 2021-12-23

## 2021-12-03 RX ORDER — PILOCARPINE HYDROCHLORIDE 5 MG/1
5 TABLET, FILM COATED ORAL 2 TIMES DAILY
COMMUNITY
Start: 2021-08-26 | End: 2022-04-14

## 2021-12-03 RX ORDER — PREDNISONE 1 MG/1
5 TABLET ORAL DAILY
COMMUNITY
Start: 2021-09-17 | End: 2021-12-16

## 2021-12-04 LAB — SARS-COV-2 RNA RESP QL NAA+PROBE: NEGATIVE

## 2021-12-23 ENCOUNTER — OFFICE VISIT (OUTPATIENT)
Dept: FAMILY MEDICINE CLINIC | Facility: CLINIC | Age: 66
End: 2021-12-23
Payer: MEDICARE

## 2021-12-23 VITALS
WEIGHT: 105.8 LBS | RESPIRATION RATE: 18 BRPM | SYSTOLIC BLOOD PRESSURE: 110 MMHG | HEART RATE: 88 BPM | HEIGHT: 61 IN | OXYGEN SATURATION: 98 % | TEMPERATURE: 98.4 F | BODY MASS INDEX: 19.98 KG/M2 | DIASTOLIC BLOOD PRESSURE: 60 MMHG

## 2021-12-23 DIAGNOSIS — I73.9 PAD (PERIPHERAL ARTERY DISEASE) (HCC): ICD-10-CM

## 2021-12-23 DIAGNOSIS — C73 THYROID CANCER (HCC): ICD-10-CM

## 2021-12-23 DIAGNOSIS — G72.89 NECROTIZING MYOPATHY: ICD-10-CM

## 2021-12-23 DIAGNOSIS — R40.4 TRANSIENT ALTERATION OF AWARENESS: ICD-10-CM

## 2021-12-23 DIAGNOSIS — C82.90 FOLLICULAR LYMPHOMA, UNSPECIFIED FOLLICULAR LYMPHOMA TYPE, UNSPECIFIED BODY REGION (HCC): ICD-10-CM

## 2021-12-23 DIAGNOSIS — E44.0 MODERATE PROTEIN-CALORIE MALNUTRITION (HCC): ICD-10-CM

## 2021-12-23 DIAGNOSIS — Z12.31 SCREENING MAMMOGRAM FOR BREAST CANCER: ICD-10-CM

## 2021-12-23 DIAGNOSIS — D80.1 HYPOGAMMAGLOBULINEMIA (HCC): ICD-10-CM

## 2021-12-23 DIAGNOSIS — Z23 NEED FOR INFLUENZA VACCINATION: ICD-10-CM

## 2021-12-23 DIAGNOSIS — M54.16 RADICULOPATHY, LUMBAR REGION: Primary | ICD-10-CM

## 2021-12-23 DIAGNOSIS — M51.26 HERNIATED LUMBAR INTERVERTEBRAL DISC: ICD-10-CM

## 2021-12-23 DIAGNOSIS — F11.20 CONTINUOUS OPIOID DEPENDENCE (HCC): ICD-10-CM

## 2021-12-23 DIAGNOSIS — M34.0 SCLERODERMA PROGRESSIVE (HCC): ICD-10-CM

## 2021-12-23 DIAGNOSIS — M51.26 LUMBAR DISC HERNIATION: ICD-10-CM

## 2021-12-23 DIAGNOSIS — E55.9 VITAMIN D DEFICIENCY: ICD-10-CM

## 2021-12-23 PROCEDURE — G0008 ADMIN INFLUENZA VIRUS VAC: HCPCS | Performed by: FAMILY MEDICINE

## 2021-12-23 PROCEDURE — 99214 OFFICE O/P EST MOD 30 MIN: CPT | Performed by: FAMILY MEDICINE

## 2021-12-23 PROCEDURE — 90662 IIV NO PRSV INCREASED AG IM: CPT | Performed by: FAMILY MEDICINE

## 2021-12-23 RX ORDER — IBUPROFEN 800 MG/1
800 TABLET ORAL EVERY 8 HOURS PRN
COMMUNITY
End: 2022-04-14 | Stop reason: ALTCHOICE

## 2021-12-23 RX ORDER — DICYCLOMINE HCL 20 MG
20 TABLET ORAL 2 TIMES DAILY
COMMUNITY
Start: 2021-12-22 | End: 2022-04-14 | Stop reason: SDUPTHER

## 2021-12-23 RX ORDER — CALCIUM CARBONATE-CHOLECALCIFEROL TAB 250 MG-125 UNIT 250-125 MG-UNIT
1 TAB ORAL DAILY
COMMUNITY
Start: 2021-12-22 | End: 2022-01-31

## 2021-12-23 RX ORDER — PREDNISONE 1 MG/1
5 TABLET ORAL DAILY
COMMUNITY
Start: 2021-12-22 | End: 2022-04-14 | Stop reason: ALTCHOICE

## 2021-12-23 RX ORDER — ALPRAZOLAM 0.5 MG/1
0.5 TABLET ORAL 3 TIMES DAILY PRN
Qty: 90 TABLET | Refills: 0 | Status: SHIPPED | OUTPATIENT
Start: 2021-12-23 | End: 2022-05-04

## 2021-12-30 ENCOUNTER — PATIENT OUTREACH (OUTPATIENT)
Dept: FAMILY MEDICINE CLINIC | Facility: CLINIC | Age: 66
End: 2021-12-30

## 2022-01-03 DIAGNOSIS — R52 PAIN: ICD-10-CM

## 2022-01-03 DIAGNOSIS — M19.90 OSTEOARTHRITIS, UNSPECIFIED OSTEOARTHRITIS TYPE, UNSPECIFIED SITE: ICD-10-CM

## 2022-01-03 DIAGNOSIS — M54.16 RADICULOPATHY, LUMBAR REGION: ICD-10-CM

## 2022-01-03 DIAGNOSIS — M54.16 RADICULOPATHY, LUMBAR REGION: Primary | ICD-10-CM

## 2022-01-03 DIAGNOSIS — M34.0 SCLERODERMA PROGRESSIVE (HCC): Primary | ICD-10-CM

## 2022-01-03 DIAGNOSIS — F98.8 ATTENTION DEFICIT DISORDER, UNSPECIFIED HYPERACTIVITY PRESENCE: ICD-10-CM

## 2022-01-03 RX ORDER — PREGABALIN 75 MG/1
75 CAPSULE ORAL 2 TIMES DAILY
Qty: 60 CAPSULE | Refills: 5 | Status: SHIPPED | OUTPATIENT
Start: 2022-01-03 | End: 2022-05-04 | Stop reason: DRUGHIGH

## 2022-01-03 RX ORDER — DEXTROAMPHETAMINE SACCHARATE, AMPHETAMINE ASPARTATE, DEXTROAMPHETAMINE SULFATE AND AMPHETAMINE SULFATE 5; 5; 5; 5 MG/1; MG/1; MG/1; MG/1
20 TABLET ORAL
Qty: 60 TABLET | Refills: 0 | Status: SHIPPED | OUTPATIENT
Start: 2022-01-03 | End: 2022-01-31 | Stop reason: SDUPTHER

## 2022-01-03 RX ORDER — OXYMORPHONE HYDROCHLORIDE 5 MG/1
5 TABLET ORAL EVERY 6 HOURS PRN
Qty: 120 TABLET | Refills: 0 | Status: SHIPPED | OUTPATIENT
Start: 2022-01-03 | End: 2022-01-31 | Stop reason: SDUPTHER

## 2022-01-03 RX ORDER — DEXTROAMPHETAMINE SACCHARATE, AMPHETAMINE ASPARTATE MONOHYDRATE, DEXTROAMPHETAMINE SULFATE AND AMPHETAMINE SULFATE 7.5; 7.5; 7.5; 7.5 MG/1; MG/1; MG/1; MG/1
30 CAPSULE, EXTENDED RELEASE ORAL EVERY MORNING
Qty: 30 CAPSULE | Refills: 0 | Status: SHIPPED | OUTPATIENT
Start: 2022-01-03 | End: 2022-01-31 | Stop reason: SDUPTHER

## 2022-01-04 DIAGNOSIS — E03.9 HYPOTHYROIDISM, UNSPECIFIED TYPE: Primary | ICD-10-CM

## 2022-01-04 RX ORDER — LEVOTHYROXINE SODIUM 0.15 MG/1
150 TABLET ORAL
Qty: 90 TABLET | Refills: 3 | Status: SHIPPED | OUTPATIENT
Start: 2022-01-04 | End: 2022-04-14 | Stop reason: SDUPTHER

## 2022-01-06 ENCOUNTER — TELEPHONE (OUTPATIENT)
Dept: FAMILY MEDICINE CLINIC | Facility: CLINIC | Age: 67
End: 2022-01-06

## 2022-01-06 NOTE — TELEPHONE ENCOUNTER
Pt called because she had a fall over the past couple days and her family was concerned    states she was standing reading for some time and when she went to take a step with her right leg, her 'leg didn't move' and she ended up falling  She wants to know if you are concerned about this or have any recommendations       She did reschedule her brain MRI to 1/26/2022

## 2022-01-12 ENCOUNTER — PATIENT OUTREACH (OUTPATIENT)
Dept: FAMILY MEDICINE CLINIC | Facility: CLINIC | Age: 67
End: 2022-01-12

## 2022-01-12 ENCOUNTER — APPOINTMENT (RX ONLY)
Dept: URBAN - NONMETROPOLITAN AREA CLINIC 4 | Facility: CLINIC | Age: 67
Setting detail: DERMATOLOGY
End: 2022-01-12

## 2022-01-12 DIAGNOSIS — L82.1 OTHER SEBORRHEIC KERATOSIS: ICD-10-CM

## 2022-01-12 DIAGNOSIS — L73.9 FOLLICULAR DISORDER, UNSPECIFIED: ICD-10-CM | Status: INADEQUATELY CONTROLLED

## 2022-01-12 DIAGNOSIS — L738 OTHER SPECIFIED DISEASES OF HAIR AND HAIR FOLLICLES: ICD-10-CM | Status: INADEQUATELY CONTROLLED

## 2022-01-12 DIAGNOSIS — L663 OTHER SPECIFIED DISEASES OF HAIR AND HAIR FOLLICLES: ICD-10-CM | Status: INADEQUATELY CONTROLLED

## 2022-01-12 DIAGNOSIS — L57.0 ACTINIC KERATOSIS: ICD-10-CM

## 2022-01-12 DIAGNOSIS — Z71.89 OTHER SPECIFIED COUNSELING: ICD-10-CM

## 2022-01-12 DIAGNOSIS — D18.0 HEMANGIOMA: ICD-10-CM

## 2022-01-12 DIAGNOSIS — D22 MELANOCYTIC NEVI: ICD-10-CM

## 2022-01-12 DIAGNOSIS — L81.4 OTHER MELANIN HYPERPIGMENTATION: ICD-10-CM

## 2022-01-12 DIAGNOSIS — L259 CONTACT DERMATITIS AND OTHER ECZEMA, UNSPECIFIED CAUSE: ICD-10-CM | Status: INADEQUATELY CONTROLLED

## 2022-01-12 PROBLEM — D18.01 HEMANGIOMA OF SKIN AND SUBCUTANEOUS TISSUE: Status: ACTIVE | Noted: 2022-01-12

## 2022-01-12 PROBLEM — L30.8 OTHER SPECIFIED DERMATITIS: Status: ACTIVE | Noted: 2022-01-12

## 2022-01-12 PROBLEM — D22.9 MELANOCYTIC NEVI, UNSPECIFIED: Status: ACTIVE | Noted: 2022-01-12

## 2022-01-12 PROBLEM — L02.821 FURUNCLE OF HEAD [ANY PART, EXCEPT FACE]: Status: ACTIVE | Noted: 2022-01-12

## 2022-01-12 PROCEDURE — ? PRESCRIPTION MEDICATION MANAGEMENT

## 2022-01-12 PROCEDURE — ? SUNSCREEN RECOMMENDATIONS

## 2022-01-12 PROCEDURE — 17000 DESTRUCT PREMALG LESION: CPT

## 2022-01-12 PROCEDURE — ? TREATMENT REGIMEN

## 2022-01-12 PROCEDURE — ? COUNSELING

## 2022-01-12 PROCEDURE — 17003 DESTRUCT PREMALG LES 2-14: CPT

## 2022-01-12 PROCEDURE — ? FULL BODY SKIN EXAM

## 2022-01-12 PROCEDURE — ? PRESCRIPTION

## 2022-01-12 PROCEDURE — ? MEDICARE ABN

## 2022-01-12 PROCEDURE — ? LIQUID NITROGEN

## 2022-01-12 PROCEDURE — 99204 OFFICE O/P NEW MOD 45 MIN: CPT | Mod: 25

## 2022-01-12 RX ORDER — CLINDAMYCIN PHOSPHATE 10 MG/ML
1% LOTION TOPICAL QD
Qty: 60 | Refills: 3 | Status: ERX | COMMUNITY
Start: 2022-01-12

## 2022-01-12 RX ORDER — CLOBETASOL PROPIONATE 0.5 MG/G
0.05% OINTMENT TOPICAL BID
Qty: 60 | Refills: 3 | Status: ERX | COMMUNITY
Start: 2022-01-12

## 2022-01-12 RX ADMIN — CLINDAMYCIN PHOSPHATE 1: 10 LOTION TOPICAL at 00:00

## 2022-01-12 RX ADMIN — CLOBETASOL PROPIONATE 0.05: 0.5 OINTMENT TOPICAL at 00:00

## 2022-01-12 ASSESSMENT — LOCATION DETAILED DESCRIPTION DERM
LOCATION DETAILED: LEFT DISTAL DORSAL FOREARM
LOCATION DETAILED: RIGHT MEDIAL FRONTAL SCALP
LOCATION DETAILED: LEFT VENTRAL PROXIMAL FOREARM
LOCATION DETAILED: LEFT FOREHEAD
LOCATION DETAILED: LEFT SUPERIOR MEDIAL FOREHEAD
LOCATION DETAILED: LEFT SUPERIOR UPPER BACK
LOCATION DETAILED: LEFT RADIAL DORSAL HAND
LOCATION DETAILED: LEFT MEDIAL MALAR CHEEK
LOCATION DETAILED: LEFT INFERIOR CENTRAL MALAR CHEEK
LOCATION DETAILED: RIGHT RADIAL DORSAL HAND
LOCATION DETAILED: RIGHT INFERIOR MEDIAL UPPER BACK
LOCATION DETAILED: RIGHT PROXIMAL DORSAL FOREARM
LOCATION DETAILED: RIGHT VENTRAL PROXIMAL FOREARM
LOCATION DETAILED: RIGHT FOREHEAD
LOCATION DETAILED: UPPER STERNUM
LOCATION DETAILED: RIGHT SUPERIOR UPPER BACK
LOCATION DETAILED: RIGHT LATERAL MALAR CHEEK
LOCATION DETAILED: EPIGASTRIC SKIN

## 2022-01-12 ASSESSMENT — LOCATION ZONE DERM
LOCATION ZONE: TRUNK
LOCATION ZONE: FACE
LOCATION ZONE: HAND
LOCATION ZONE: SCALP
LOCATION ZONE: ARM

## 2022-01-12 ASSESSMENT — LOCATION SIMPLE DESCRIPTION DERM
LOCATION SIMPLE: LEFT UPPER BACK
LOCATION SIMPLE: LEFT HAND
LOCATION SIMPLE: RIGHT CHEEK
LOCATION SIMPLE: RIGHT FOREHEAD
LOCATION SIMPLE: RIGHT FOREARM
LOCATION SIMPLE: RIGHT HAND
LOCATION SIMPLE: RIGHT SCALP
LOCATION SIMPLE: CHEST
LOCATION SIMPLE: LEFT FOREARM
LOCATION SIMPLE: LEFT FOREHEAD
LOCATION SIMPLE: LEFT CHEEK
LOCATION SIMPLE: ABDOMEN
LOCATION SIMPLE: RIGHT UPPER BACK

## 2022-01-12 NOTE — PROCEDURE: TREATMENT REGIMEN
Detail Level: Zone
Initiate Treatment: Daily sunscreen SPF 30+ Neutrogena
Continue Regimen: Daily moisturizing cream Cerave or Cetaphil

## 2022-01-12 NOTE — PROCEDURE: LIQUID NITROGEN
Post-Care Instructions: I reviewed with the patient in detail post-care instructions. Patient is to wear sunprotection, and avoid picking at any of the treated lesions. Pt may apply Vaseline to crusted or scabbing areas.
Consent: The patient's consent was obtained including but not limited to risks of crusting, scabbing, blistering, scarring, darker or lighter pigmentary change, recurrence, incomplete removal and infection.
Show Applicator Variable?: Yes
Render Note In Bullet Format When Appropriate: No
Detail Level: Zone
Number Of Freeze-Thaw Cycles: 1 freeze-thaw cycle

## 2022-01-12 NOTE — PROCEDURE: MEDICARE ABN
Reason?: Additional Information
Detail Level: Zone
Procedure (Limit To 20 Characters): destruction-cryotherapy
Reason?: non-covered service
Payment Option: Option 1: Bill Medicare, await for decision on payment.

## 2022-01-12 NOTE — PROCEDURE: PRESCRIPTION MEDICATION MANAGEMENT
Initiate Treatment: Clindamycin lotion QD AD
Render In Strict Bullet Format?: No
Detail Level: Zone
Initiate Treatment: Clobetasol ointment AD

## 2022-01-12 NOTE — PROGRESS NOTES
NEREIDA IYER discussed patient with NEREIDA Villarreal who this NEREIDA IYER handed case off to  NEREIDA IYER will assign NEREIDA CM Lorelei to case and remove self

## 2022-01-12 NOTE — HPI: EVALUATION OF SKIN LESION(S)
What Type Of Note Output Would You Prefer (Optional)?: Standard Output
Hpi Title: Evaluation of Skin Lesions
How Severe Are Your Spot(S)?: mild
Have Your Spot(S) Been Treated In The Past?: has not been treated
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

## 2022-01-18 DIAGNOSIS — E03.9 HYPOTHYROIDISM, UNSPECIFIED TYPE: ICD-10-CM

## 2022-01-18 RX ORDER — LEVOTHYROXINE SODIUM 175 UG/1
175 TABLET ORAL
Qty: 30 TABLET | Refills: 5 | Status: SHIPPED | OUTPATIENT
Start: 2022-01-18 | End: 2022-05-04 | Stop reason: SDUPTHER

## 2022-01-20 ENCOUNTER — HOSPITAL ENCOUNTER (OUTPATIENT)
Dept: MRI IMAGING | Facility: HOSPITAL | Age: 67
Discharge: HOME | End: 2022-01-20
Attending: FAMILY MEDICINE
Payer: MEDICARE

## 2022-01-20 DIAGNOSIS — R40.4 TRANSIENT ALTERATION OF AWARENESS: ICD-10-CM

## 2022-01-20 PROCEDURE — 70551 MRI BRAIN STEM W/O DYE: CPT

## 2022-01-20 PROCEDURE — G1004 CDSM NDSC: HCPCS

## 2022-01-31 DIAGNOSIS — F41.8 SITUATIONAL ANXIETY: ICD-10-CM

## 2022-01-31 DIAGNOSIS — J43.9 PULMONARY EMPHYSEMA, UNSPECIFIED EMPHYSEMA TYPE (HCC): ICD-10-CM

## 2022-01-31 DIAGNOSIS — M33.20 POLYMYOSITIS (HCC): Primary | ICD-10-CM

## 2022-01-31 DIAGNOSIS — R52 PAIN: ICD-10-CM

## 2022-01-31 DIAGNOSIS — F51.01 PRIMARY INSOMNIA: ICD-10-CM

## 2022-01-31 DIAGNOSIS — M54.16 RADICULOPATHY, LUMBAR REGION: ICD-10-CM

## 2022-01-31 DIAGNOSIS — F98.8 ATTENTION DEFICIT DISORDER, UNSPECIFIED HYPERACTIVITY PRESENCE: ICD-10-CM

## 2022-01-31 DIAGNOSIS — M33.20 POLYMYOSITIS (HCC): ICD-10-CM

## 2022-01-31 DIAGNOSIS — M34.0 SCLERODERMA PROGRESSIVE (HCC): ICD-10-CM

## 2022-01-31 DIAGNOSIS — M19.90 OSTEOARTHRITIS, UNSPECIFIED OSTEOARTHRITIS TYPE, UNSPECIFIED SITE: ICD-10-CM

## 2022-01-31 DIAGNOSIS — F32.A DEPRESSION, UNSPECIFIED DEPRESSION TYPE: ICD-10-CM

## 2022-01-31 DIAGNOSIS — C82.90 FOLLICULAR LYMPHOMA, UNSPECIFIED FOLLICULAR LYMPHOMA TYPE, UNSPECIFIED BODY REGION (HCC): ICD-10-CM

## 2022-01-31 RX ORDER — DEXTROAMPHETAMINE SACCHARATE, AMPHETAMINE ASPARTATE MONOHYDRATE, DEXTROAMPHETAMINE SULFATE AND AMPHETAMINE SULFATE 7.5; 7.5; 7.5; 7.5 MG/1; MG/1; MG/1; MG/1
30 CAPSULE, EXTENDED RELEASE ORAL EVERY MORNING
Qty: 30 CAPSULE | Refills: 0 | Status: SHIPPED | OUTPATIENT
Start: 2022-01-31 | End: 2022-03-01 | Stop reason: SDUPTHER

## 2022-01-31 RX ORDER — HYDROXYCHLOROQUINE SULFATE 200 MG/1
200 TABLET, FILM COATED ORAL DAILY
Qty: 90 TABLET | Refills: 3 | Status: SHIPPED | OUTPATIENT
Start: 2022-01-31 | End: 2022-07-28 | Stop reason: SDUPTHER

## 2022-01-31 RX ORDER — CALCIUM CARBONATE-CHOLECALCIFEROL TAB 250 MG-125 UNIT 250-125 MG-UNIT
TAB ORAL
Qty: 30 TABLET | Refills: 5 | Status: SHIPPED | OUTPATIENT
Start: 2022-01-31 | End: 2022-05-04 | Stop reason: SDUPTHER

## 2022-01-31 RX ORDER — OXYMORPHONE HYDROCHLORIDE 5 MG/1
5 TABLET ORAL EVERY 6 HOURS PRN
Qty: 120 TABLET | Refills: 0 | Status: SHIPPED | OUTPATIENT
Start: 2022-01-31 | End: 2022-03-01 | Stop reason: SDUPTHER

## 2022-01-31 RX ORDER — DEXTROAMPHETAMINE SACCHARATE, AMPHETAMINE ASPARTATE, DEXTROAMPHETAMINE SULFATE AND AMPHETAMINE SULFATE 5; 5; 5; 5 MG/1; MG/1; MG/1; MG/1
20 TABLET ORAL
Qty: 60 TABLET | Refills: 0 | Status: SHIPPED | OUTPATIENT
Start: 2022-01-31 | End: 2022-03-01 | Stop reason: SDUPTHER

## 2022-02-01 ENCOUNTER — PATIENT OUTREACH (OUTPATIENT)
Dept: FAMILY MEDICINE CLINIC | Facility: CLINIC | Age: 67
End: 2022-02-01

## 2022-02-01 NOTE — PROGRESS NOTES
NEREIDA IYER reviewed chart as case was handed off from covering NEREIDA IYER, [employer] [facility]  Patient has been being followed by OP CM due to wanting to apply for MA on her own  NEREIDA IYER outreached patient via telephone ([phone])  NEREIDA IYER introduced herself and patient was open to working with newly assigned NEREIDA IYER  Patient was denied MA  Patient shared that she is on expensive medications  [facility] but some medications are not covered at the lower cost  Patient stated that her most expensive medications are: Librax and Adderall  Patient expressed need for dental care and that nearest clinic is quite a distance away  Patient works out and tries to stay healthy  Patient is having her information reviewed by [employer] for assistance  Patient agreeable to NEREIDA IYER researching PAP for Librax and following up with her

## 2022-02-08 ENCOUNTER — PATIENT OUTREACH (OUTPATIENT)
Dept: FAMILY MEDICINE CLINIC | Facility: CLINIC | Age: 67
End: 2022-02-08

## 2022-02-08 ENCOUNTER — OFFICE VISIT (OUTPATIENT)
Dept: FAMILY MEDICINE CLINIC | Facility: CLINIC | Age: 67
End: 2022-02-08
Payer: MEDICARE

## 2022-02-08 VITALS
SYSTOLIC BLOOD PRESSURE: 122 MMHG | BODY MASS INDEX: 19.52 KG/M2 | HEIGHT: 61 IN | TEMPERATURE: 97.9 F | HEART RATE: 68 BPM | OXYGEN SATURATION: 100 % | WEIGHT: 103.4 LBS | RESPIRATION RATE: 18 BRPM | DIASTOLIC BLOOD PRESSURE: 70 MMHG

## 2022-02-08 DIAGNOSIS — F11.20 OPIOID DEPENDENCE, UNCOMPLICATED (HCC): ICD-10-CM

## 2022-02-08 DIAGNOSIS — F98.8 ATTENTION DEFICIT DISORDER, UNSPECIFIED HYPERACTIVITY PRESENCE: ICD-10-CM

## 2022-02-08 DIAGNOSIS — F22 PARANOIA (HCC): Primary | ICD-10-CM

## 2022-02-08 DIAGNOSIS — G47.33 OSA (OBSTRUCTIVE SLEEP APNEA): ICD-10-CM

## 2022-02-08 DIAGNOSIS — R56.9 CONVULSIONS, UNSPECIFIED CONVULSION TYPE (HCC): ICD-10-CM

## 2022-02-08 PROCEDURE — 80307 DRUG TEST PRSMV CHEM ANLYZR: CPT | Performed by: STUDENT IN AN ORGANIZED HEALTH CARE EDUCATION/TRAINING PROGRAM

## 2022-02-08 PROCEDURE — 99215 OFFICE O/P EST HI 40 MIN: CPT | Performed by: STUDENT IN AN ORGANIZED HEALTH CARE EDUCATION/TRAINING PROGRAM

## 2022-02-08 RX ORDER — CHLORDIAZEPOXIDE HYDROCHLORIDE 5 MG/1
1 CAPSULE, GELATIN COATED ORAL 2 TIMES DAILY
COMMUNITY
Start: 2022-01-31 | End: 2022-04-14 | Stop reason: SDUPTHER

## 2022-02-08 RX ORDER — CLINDAMYCIN PHOSPHATE 10 UG/ML
1 LOTION TOPICAL DAILY PRN
COMMUNITY
Start: 2022-01-12

## 2022-02-08 RX ORDER — CLOBETASOL PROPIONATE 0.5 MG/G
OINTMENT TOPICAL
COMMUNITY
Start: 2022-01-12 | End: 2022-07-26
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
## 2022-02-09 ENCOUNTER — PATIENT OUTREACH (OUTPATIENT)
Dept: FAMILY MEDICINE CLINIC | Facility: CLINIC | Age: 67
End: 2022-02-09
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
## 2022-02-14 ENCOUNTER — TELEPHONE (OUTPATIENT)
Dept: FAMILY MEDICINE CLINIC | Facility: CLINIC | Age: 67
End: 2022-02-14

## 2022-02-14 DIAGNOSIS — K04.7 DENTAL INFECTION: Primary | ICD-10-CM

## 2022-02-14 RX ORDER — AMOXICILLIN 500 MG/1
500 CAPSULE ORAL EVERY 8 HOURS SCHEDULED
Qty: 21 CAPSULE | Refills: 0 | Status: SHIPPED | OUTPATIENT
Start: 2022-02-14 | End: 2022-02-21

## 2022-02-14 NOTE — TELEPHONE ENCOUNTER
Pt called, left message stating she has a tooth abcess and is requesting abx to help until she can see dentist

## 2022-02-15 LAB
AMPHETAMINES UR QL SCN: POSITIVE
BARBITURATES UR QL SCN: NEGATIVE NG/ML
BENZODIAZ UR QL: POSITIVE
BZE UR QL: NEGATIVE NG/ML
CANNABINOIDS UR QL SCN: NEGATIVE NG/ML
METHADONE UR QL SCN: NEGATIVE NG/ML
OPIATES UR QL: NEGATIVE NG/ML
PCP UR QL: NEGATIVE NG/ML
PROPOXYPH UR QL SCN: NEGATIVE NG/ML

## 2022-03-01 DIAGNOSIS — F98.8 ATTENTION DEFICIT DISORDER, UNSPECIFIED HYPERACTIVITY PRESENCE: ICD-10-CM

## 2022-03-01 DIAGNOSIS — M19.90 OSTEOARTHRITIS, UNSPECIFIED OSTEOARTHRITIS TYPE, UNSPECIFIED SITE: ICD-10-CM

## 2022-03-01 DIAGNOSIS — M34.0 SCLERODERMA PROGRESSIVE (HCC): ICD-10-CM

## 2022-03-01 DIAGNOSIS — R52 PAIN: ICD-10-CM

## 2022-03-01 DIAGNOSIS — M54.16 RADICULOPATHY, LUMBAR REGION: ICD-10-CM

## 2022-03-01 RX ORDER — OXYMORPHONE HYDROCHLORIDE 5 MG/1
5 TABLET ORAL EVERY 6 HOURS PRN
Qty: 120 TABLET | Refills: 0 | Status: SHIPPED | OUTPATIENT
Start: 2022-03-01 | End: 2022-04-01 | Stop reason: SDUPTHER

## 2022-03-01 RX ORDER — DEXTROAMPHETAMINE SACCHARATE, AMPHETAMINE ASPARTATE, DEXTROAMPHETAMINE SULFATE AND AMPHETAMINE SULFATE 5; 5; 5; 5 MG/1; MG/1; MG/1; MG/1
20 TABLET ORAL
Qty: 60 TABLET | Refills: 0 | Status: SHIPPED | OUTPATIENT
Start: 2022-03-01 | End: 2022-04-01 | Stop reason: SDUPTHER

## 2022-03-01 RX ORDER — DEXTROAMPHETAMINE SACCHARATE, AMPHETAMINE ASPARTATE MONOHYDRATE, DEXTROAMPHETAMINE SULFATE AND AMPHETAMINE SULFATE 7.5; 7.5; 7.5; 7.5 MG/1; MG/1; MG/1; MG/1
30 CAPSULE, EXTENDED RELEASE ORAL EVERY MORNING
Qty: 30 CAPSULE | Refills: 0 | Status: SHIPPED | OUTPATIENT
Start: 2022-03-01 | End: 2022-04-01 | Stop reason: SDUPTHER

## 2022-03-08 DIAGNOSIS — M34.0 SCLERODERMA PROGRESSIVE (HCC): Primary | ICD-10-CM

## 2022-03-08 DIAGNOSIS — K04.7 DENTAL INFECTION: ICD-10-CM

## 2022-03-10 ENCOUNTER — PATIENT OUTREACH (OUTPATIENT)
Dept: FAMILY MEDICINE CLINIC | Facility: CLINIC | Age: 67
End: 2022-03-10

## 2022-03-10 NOTE — PROGRESS NOTES
NEREIDA IYER outreached patient via telephone ([phone])  Patient stated she is "in a dental crisis of sorts"  Shared that she is going to the [facility]  She stated that two of her teeth broke off at her gums due to periodontal disease  Provided phone numbers for [facility] [place] [address]  Patient stated she would like to find a psychiatrist through [facility]  NEREIDA IYER gave webpage www  [employer]  [employer] [facility]  org for patient to look at and encouraged her to call providers she would be willing to travel to  Patient stated she will look into resources provided and call NEREIDA IYER with any further questions  NEREIDA IYER will follow up within six weeks

## 2022-03-11 ENCOUNTER — HOSPITAL ENCOUNTER (EMERGENCY)
Facility: HOSPITAL | Age: 67
Discharge: HOME | End: 2022-03-11
Attending: EMERGENCY MEDICINE | Admitting: EMERGENCY MEDICINE
Payer: MEDICARE

## 2022-03-11 VITALS
RESPIRATION RATE: 18 BRPM | SYSTOLIC BLOOD PRESSURE: 146 MMHG | WEIGHT: 102.51 LBS | OXYGEN SATURATION: 94 % | HEART RATE: 70 BPM | DIASTOLIC BLOOD PRESSURE: 82 MMHG | BODY MASS INDEX: 19.37 KG/M2 | TEMPERATURE: 98.2 F

## 2022-03-11 DIAGNOSIS — K08.9 CHRONIC DENTAL PAIN: Primary | ICD-10-CM

## 2022-03-11 DIAGNOSIS — G89.29 CHRONIC DENTAL PAIN: Primary | ICD-10-CM

## 2022-03-11 PROCEDURE — 99284 EMERGENCY DEPT VISIT MOD MDM: CPT | Performed by: EMERGENCY MEDICINE

## 2022-03-11 PROCEDURE — 99282 EMERGENCY DEPT VISIT SF MDM: CPT

## 2022-03-11 NOTE — DISCHARGE INSTRUCTIONS
Here is a list of  dental clinics that may be able to help you  Keep in mind that these clinics do not have to see you or any other patient  Also, these clinics are not connected to the [facility] or [address] [place] [address] system but if they agree to see you as a patient it is easy for them to call  Medical Records Department to have your records faxed to them  [facility]:  [address]   [address]   22-85-39-05:   [address]   [address], [address] [facility]   [phone]     [facility]:  [address] [facility], [address] [facility]  [phone]    [address]:  [address] [place], [address] [place] [address]  [phone]    [address]:  [address] [facility], [address]  ([place]:  [address] [facility], [address] [facility]  [phone]    [facility]:  [address] [facility], [address] [facility]  [phone]    [address]:  [address] [facility] [address]  [phone]    [place] [address]:  [address]  [address]  [phone] [facility]  [address]   [phone]    [address] [facility] [address]:  [address] [place] [address], [address] [place] [address]  21  Associates:  [address]  [address]  [phone]

## 2022-03-11 NOTE — ED NOTES
Case management called for information on dental clinics and follow up        Jordy GILLES Lizama  03/11/22 4286

## 2022-03-11 NOTE — ED NOTES
Dressing applied to her  cleft using a folded 2X2 and a tegaderm  Patient shown how to apply it and given a few extra supplies to change it as needed        Orlin Curry RN  22 1640

## 2022-03-11 NOTE — ED PROVIDER NOTES
History  Chief Complaint   Patient presents with    Dental Pain     unable to find a dentist and has periodontal disease  trying to go to [facility]  contacted the [facility] and they told her to come to the ED  Patient with chronic dental decay and chronic periodontal disease complains of recently worsening dental pain and tooth decay  States she is having trouble eating  Denies voice change or drooling or trouble swallowing or trouble breathing  States she was told to come to the emergency room when she tried to contacted dentist by phone  Patient states that she has been trying to go to the [facility] dental clinic but it is too far and she is unable to travel down there  History provided by:  Patient   used: No    Dental Pain  Location:  Generalized  Quality:  Aching and constant  Severity:  Moderate  Onset quality:  Gradual  Timing:  Constant  Progression:  Unchanged  Chronicity:  New  Relieved by:  Nothing  Worsened by:  Nothing  Ineffective treatments:  None tried  Associated symptoms: gum swelling    Associated symptoms: no difficulty swallowing, no drooling, no facial pain, no facial swelling, no fever, no headaches, no neck pain, no neck swelling, no oral bleeding and no trismus        Prior to Admission Medications   Prescriptions Last Dose Informant Patient Reported? Taking?    ALPRAZolam (XANAX) 0 5 mg tablet   No No   Sig: Take 1 tablet (0 5 mg total) by mouth 3 (three) times a day as needed for anxiety   Calcium Carb-Cholecalciferol (Calcium-Vitamin D3) 250-125 MG-UNIT TABS   No No   Sig: TAKE 1 TABLET BY MOUTH 2 (TWO) TIMES A DAY   Multiple Vitamin (MULTIVITAMIN) tablet   Yes No   Sig: Take 1 tablet by mouth daily   amphetamine-dextroamphetamine (ADDERALL XR, 30MG,) 30 MG 24 hr capsule   No No   Sig: Take 1 capsule (30 mg total) by mouth every morning Max Daily Amount: 30 mg   amphetamine-dextroamphetamine (ADDERALL) 20 mg tablet   No No   Sig: Take 1 tablet (20 mg total) by mouth 2 (two) times a day Max Daily Amount: 40 mg   buPROPion (WELLBUTRIN XL) 300 mg 24 hr tablet   No No   Sig: Take 1 tablet (300 mg total) by mouth daily   calcium-vitamin D (OSCAL) 250-125 MG-UNIT per tablet   No No   Sig: Take 1 tablet by mouth 2 (two) times a day   chlordiazePOXIDE (LIBRIUM) 5 mg capsule   Yes No   Sig: Take 1 capsule by mouth 2 (two) times a day   chlordiazepoxide-clidinium (LIBRAX) 5-2 5 mg per capsule   Yes No   Sig: Take 1 capsule by mouth 2 (two) times a day     clindamycin (CLEOCIN T) 1 % lotion   Yes No   clobetasol (TEMOVATE) 0 05 % ointment   Yes No   dicyclomine (BENTYL) 10 mg capsule   No No   Sig: Take 2 capsules (20 mg total) by mouth 3 (three) times a day before meals   Patient taking differently: Take 10 mg by mouth 2 (two) times a day     dicyclomine (BENTYL) 20 mg tablet   Yes No   Sig: Take 20 mg by mouth 2 (two) times a day     esomeprazole (NexIUM) 40 MG capsule   No No   Sig: Take 1 capsule (40 mg total) by mouth 2 (two) times a day before meals   ferrous sulfate 324 (65 Fe) mg   No No   Sig: Take 1 tablet (324 mg total) by mouth 2 (two) times a day before meals   folic acid (FOLVITE) 1 mg tablet   No No   Sig: Take 1 tablet (1 mg total) by mouth 2 (two) times a day   hydroxychloroquine (PLAQUENIL) 200 mg tablet   No No   Sig: Take 1 tablet (200 mg total) by mouth daily   ibuprofen (MOTRIN) 800 mg tablet   Yes No   Sig: Take 800 mg by mouth every 8 (eight) hours as needed     levothyroxine (Synthroid) 150 mcg tablet   No No   Sig: Take 1 tablet (150 mcg total) by mouth daily in the early morning   levothyroxine 175 mcg tablet   No No   Sig: TAKE 1 TABLET (175 MCG TOTAL) BY MOUTH DAILY IN THE EARLY MORNING   methotrexate 2 5 mg tablet   No No   Sig: AS DIRECTED   mycophenolate (MYFORTIC) 360 MG TBEC   Yes No   Sig: Take 1 tablet by mouth 3 (three) times a day   oxymorphone (OPANA) 5 MG tablet   No No   Sig: Take 1 tablet (5 mg total) by mouth every 6 (six) hours as needed for moderate pain Max Daily Amount: 20 mg   pilocarpine (SALAGEN) 5 mg tablet   Yes No   Sig: Take 5 mg by mouth 2 (two) times a day   predniSONE 5 mg tablet   Yes No   Sig: Take 5 mg by mouth daily     pregabalin (LYRICA) 75 mg capsule   No No   Sig: Take 1 capsule (75 mg total) by mouth 2 (two) times a day   traZODone (DESYREL) 150 mg tablet   No No   Sig: Take 1 tablet (150 mg total) by mouth daily at bedtime      Facility-Administered Medications: None       Past Medical History:   Diagnosis Date    ADHD     Cellulitis of foot     Last assessed 10/24/16    Change in mental status     Last assessed 12/01/15    Chronic fatigue     PLAZA (dyspnea on exertion)     Last assessed 12/01/15    Fibromyalgia     Folliculitis     Last assessed 10/06/14    GERD (gastroesophageal reflux disease)     Non [facility]gkin[facility] [address] [facility]     Osteopenia     Raynaud's disease     Scleroderma [facility] [address] [facility]     Systemic sclerosis ([facility] [address] [facility]     Thyroid cancer ([facility] [address] ) 10/04/2018       Past Surgical History:   Procedure Laterality Date    BACK SURGERY      BLADDER SURGERY      BONE MARROW BIOPSY      CARDIAC CATHETERIZATION      HEMORRHOID SURGERY      HYSTERECTOMY  02/04/2004    KNEE SURGERY      NASAL SEPTUM SURGERY      ROTATOR CUFF REPAIR      THYROIDECTOMY N/A 10/4/2018    Procedure: TOTAL THYROIDECTOMY;  Surgeon: Leonardo Everett MD;  [facility];  Service: Surgical Oncology    TONSILLECTOMY      TOTAL THYROIDECTOMY         Family History   Problem Relation Age of Onset    Arthritis Mother     Diabetes Mother     Coronary artery disease Father     Arthritis Sister     Asthma Sister     Crohn's disease Sister     Other Brother         myocardial ischemia    No Known Problems Daughter     No Known Problems Maternal Grandmother     No Known Problems Maternal Grandfather     No Known Problems Paternal Grandmother     No Known Problems Paternal Grandfather     No Known Problems Sister  Ovarian cancer Maternal Aunt     No Known Problems Maternal Aunt     No Known Problems Maternal Aunt     No Known Problems Maternal Aunt     No Known Problems Maternal Aunt     No Known Problems Maternal Aunt     No Known Problems Paternal Aunt      I have reviewed and agree with the history as documented  E-Cigarette/Vaping    E-Cigarette Use Never User      E-Cigarette/Vaping Substances     Social History     Tobacco Use    Smoking status: Former Smoker    Smokeless tobacco: Never Used    Tobacco comment: quit 40 years ago    Vaping Use    Vaping Use: Never used   Substance Use Topics    Alcohol use: Never     Alcohol/week: 0 0 standard drinks    Drug use: Never       Review of Systems   Constitutional: Negative for chills and fever  HENT: Negative for drooling, ear pain, facial swelling, hearing loss, sore throat, trouble swallowing and voice change  Eyes: Negative for pain and discharge  Respiratory: Negative for cough, shortness of breath and wheezing  Cardiovascular: Negative for chest pain and palpitations  Gastrointestinal: Negative for abdominal pain, blood in stool, constipation, diarrhea, nausea and vomiting  Genitourinary: Negative for dysuria, flank pain, frequency and hematuria  Musculoskeletal: Negative for joint swelling, neck pain and neck stiffness  Skin: Negative for rash and wound  Neurological: Negative for dizziness, seizures, syncope, facial asymmetry and headaches  Psychiatric/Behavioral: Negative for hallucinations, self-injury and suicidal ideas  All other systems reviewed and are negative  Physical Exam  Physical Exam  Vitals and nursing note reviewed  Constitutional:       General: She is not in acute distress  Appearance: She is well-developed  HENT:      Head: Normocephalic and atraumatic        Right Ear: External ear normal       Left Ear: External ear normal       Mouth/Throat:      Comments: Generally poor dentition throughout with severe dental decay and dental caries and periodontal disease and gingivitis  There are many missing teeth  There are no obvious dental abscesses noted  The posterior pharynx is normal with the airway preserved  Eyes:      General: No scleral icterus  Right eye: No discharge  Left eye: No discharge  Extraocular Movements: Extraocular movements intact  Conjunctiva/sclera: Conjunctivae normal    Cardiovascular:      Rate and Rhythm: Normal rate and regular rhythm  Heart sounds: Normal heart sounds  No murmur heard  Pulmonary:      Effort: Pulmonary effort is normal       Breath sounds: Normal breath sounds  No wheezing or rales  Abdominal:      General: Bowel sounds are normal  There is no distension  Palpations: Abdomen is soft  Tenderness: There is no abdominal tenderness  There is no guarding or rebound  Musculoskeletal:         General: No deformity  Normal range of motion  Cervical back: Normal range of motion and neck supple  Skin:     General: Skin is warm and dry  Findings: No rash  Neurological:      General: No focal deficit present  Mental Status: She is alert and oriented to person, place, and time  Cranial Nerves: No cranial nerve deficit  Psychiatric:         Mood and Affect: Mood normal          Behavior: Behavior normal          Thought Content:  Thought content normal          Judgment: Judgment normal          Vital Signs  ED Triage Vitals [03/11/22 1606]   Temperature Pulse Respirations Blood Pressure SpO2   98 2 °F (36 8 °C) 70 18 146/82 94 %      Temp Source Heart Rate Source Patient Position - Orthostatic VS BP Location FiO2 (%)   Temporal Monitor Sitting Left arm --      Pain Score       7           Vitals:    03/11/22 1606   BP: 146/82   Pulse: 70   Patient Position - Orthostatic VS: Sitting         Visual Acuity      ED Medications  Medications - No data to display    Diagnostic Studies  Results Reviewed None                 No orders to display              Procedures  Procedures         ED Course  ED Course as of 03/11/22 1650   Fri Mar 11, 2022   1621 Discussed with case management, patient does have Medicare coverage in should be covered to see Oral maxillofacial surgery  Outpatient referral placed  Patient also provided with outpatient follow-up information for local dental clinics  [facility]  Number of Diagnoses or Management Options  Chronic dental pain  Diagnosis management comments: Outpatient referral for oral maxillofacial surgery placed  Patient given list of outpatient dental clinics  Disposition  Final diagnoses:   Chronic dental pain     Time reflects when diagnosis was documented in both MDM as applicable and the Disposition within this note     Time User Action Codes Description Comment    3/11/2022  4:22 PM Darvin Ruggiero Add [K08 9,  G89 29] Chronic dental pain       ED Disposition     ED Disposition Condition Date/Time Comment    Discharge Stable Fri Mar 11, 2022  4:22 PM Milana Mendieta discharge to home/self care              Follow-up Information     Follow up With Specialties Details Why [address]  Call  [facility], [address] [place] [address]    Metsa [address], DMD Oral Maxillofacial Surgery   [address] [place] [address] [address] [facility]  [phone]            Discharge Medication List as of 3/11/2022  4:24 PM      CONTINUE these medications which have NOT CHANGED    Details   ALPRAZolam (XANAX) 0 5 mg tablet Take 1 tablet (0 5 mg total) by mouth 3 (three) times a day as needed for anxiety, Starting Thu 12/23/2021, Normal      amphetamine-dextroamphetamine (ADDERALL XR, 30MG,) 30 MG 24 hr capsule Take 1 capsule (30 mg total) by mouth every morning Max Daily Amount: 30 mg, Starting Tue 3/1/2022, Normal      amphetamine-dextroamphetamine (ADDERALL) 20 mg tablet Take 1 tablet (20 mg total) by mouth 2 (two) times a day Max Daily Amount: 40 mg, Starting Tue 3/1/2022, Normal      buPROPion (WELLBUTRIN XL) 300 mg 24 hr tablet Take 1 tablet (300 mg total) by mouth daily, Starting Tue 7/27/2021, Normal      Calcium Carb-Cholecalciferol (Calcium-Vitamin D3) 250-125 MG-UNIT TABS TAKE 1 TABLET BY MOUTH 2 (TWO) TIMES A DAY, Normal      calcium-vitamin D (OSCAL) 250-125 MG-UNIT per tablet Take 1 tablet by mouth 2 (two) times a day, Starting Tue 7/27/2021, Normal      chlordiazePOXIDE (LIBRIUM) 5 mg capsule Take 1 capsule by mouth 2 (two) times a day, Starting Mon 1/31/2022, Historical Med      chlordiazepoxide-clidinium (LIBRAX) 5-2 5 mg per capsule Take 1 capsule by mouth 2 (two) times a day  , Starting Fri 12/7/2012, Historical Med      clindamycin (CLEOCIN T) 1 % lotion Starting Wed 1/12/2022, Historical Med      clobetasol (TEMOVATE) 0 05 % ointment Starting Wed 1/12/2022, Historical Med      dicyclomine (BENTYL) 10 mg capsule Take 2 capsules (20 mg total) by mouth 3 (three) times a day before meals, Starting Tue 7/27/2021, Normal      dicyclomine (BENTYL) 20 mg tablet Take 20 mg by mouth 2 (two) times a day  , Starting Wed 12/22/2021, Historical Med      esomeprazole (NexIUM) 40 MG capsule Take 1 capsule (40 mg total) by mouth 2 (two) times a day before meals, Starting Tue 7/27/2021, Normal      ferrous sulfate 324 (65 Fe) mg Take 1 tablet (324 mg total) by mouth 2 (two) times a day before meals, Starting Tue 0/48/4578, Normal      folic acid (FOLVITE) 1 mg tablet Take 1 tablet (1 mg total) by mouth 2 (two) times a day, Starting Fri 2/5/2021, Normal      hydroxychloroquine (PLAQUENIL) 200 mg tablet Take 1 tablet (200 mg total) by mouth daily, Starting Mon 1/31/2022, Until Wed 3/2/2022, Normal      ibuprofen (MOTRIN) 800 mg tablet Take 800 mg by mouth every 8 (eight) hours as needed  , Historical Med      !! levothyroxine (Synthroid) 150 mcg tablet Take 1 tablet (150 mcg total) by mouth daily in the early morning, Starting Tue 1/4/2022, Normal      !! levothyroxine 175 mcg tablet TAKE 1 TABLET (175 MCG TOTAL) BY MOUTH DAILY IN THE EARLY MORNING, Starting Tue 1/18/2022, Normal      methotrexate 2 5 mg tablet AS DIRECTED, Normal      Multiple Vitamin (MULTIVITAMIN) tablet Take 1 tablet by mouth daily, Historical Med      mycophenolate (MYFORTIC) 360 MG TBEC Take 1 tablet by mouth 3 (three) times a day, Historical Med      oxymorphone (OPANA) 5 MG tablet Take 1 tablet (5 mg total) by mouth every 6 (six) hours as needed for moderate pain Max Daily Amount: 20 mg, Starting Tue 3/1/2022, Normal      pilocarpine (SALAGEN) 5 mg tablet Take 5 mg by mouth 2 (two) times a day, Starting Thu 8/26/2021, Historical Med      predniSONE 5 mg tablet Take 5 mg by mouth daily  , Starting Wed 12/22/2021, Historical Med      pregabalin (LYRICA) 75 mg capsule Take 1 capsule (75 mg total) by mouth 2 (two) times a day, Starting Mon 1/3/2022, Normal      traZODone (DESYREL) 150 mg tablet Take 1 tablet (150 mg total) by mouth daily at bedtime, Starting Fri 2/5/2021, Normal       !! - Potential duplicate medications found  Please discuss with provider                PDMP Review       Value Time User    PDMP Reviewed  Yes 1/22/2021 10:59 AM JANKI Velazquez          ED Provider  Electronically Signed by           Piotr Narayan MD  03/11/22 [phone]

## 2022-03-21 ENCOUNTER — TELEPHONE (OUTPATIENT)
Dept: FAMILY MEDICINE CLINIC | Facility: CLINIC | Age: 67
End: 2022-03-21

## 2022-03-21 NOTE — TELEPHONE ENCOUNTER
Pt called office to let you know she would like to taper down dosing of oxycodone and adderral starting next month  I did advise you would likely discuss at her OV 4/7/22   Pt verbalized understanding

## 2022-03-23 ENCOUNTER — APPOINTMENT (RX ONLY)
Dept: URBAN - NONMETROPOLITAN AREA CLINIC 4 | Facility: CLINIC | Age: 67
Setting detail: DERMATOLOGY
End: 2022-03-23

## 2022-03-23 DIAGNOSIS — L57.0 ACTINIC KERATOSIS: ICD-10-CM

## 2022-03-23 DIAGNOSIS — L259 CONTACT DERMATITIS AND OTHER ECZEMA, UNSPECIFIED CAUSE: ICD-10-CM

## 2022-03-23 PROBLEM — L30.8 OTHER SPECIFIED DERMATITIS: Status: ACTIVE | Noted: 2022-03-23

## 2022-03-23 PROCEDURE — 99213 OFFICE O/P EST LOW 20 MIN: CPT | Mod: 25

## 2022-03-23 PROCEDURE — ? TREATMENT REGIMEN

## 2022-03-23 PROCEDURE — 17003 DESTRUCT PREMALG LES 2-14: CPT

## 2022-03-23 PROCEDURE — ? COUNSELING

## 2022-03-23 PROCEDURE — ? MEDICARE ABN

## 2022-03-23 PROCEDURE — ? LIQUID NITROGEN

## 2022-03-23 PROCEDURE — 17000 DESTRUCT PREMALG LESION: CPT

## 2022-03-23 ASSESSMENT — LOCATION DETAILED DESCRIPTION DERM
LOCATION DETAILED: LEFT RADIAL DORSAL HAND
LOCATION DETAILED: RIGHT CENTRAL TEMPLE
LOCATION DETAILED: RIGHT LATERAL TEMPLE
LOCATION DETAILED: RIGHT INFERIOR LATERAL FOREHEAD
LOCATION DETAILED: RIGHT LATERAL ZYGOMA
LOCATION DETAILED: LEFT INFERIOR FOREHEAD
LOCATION DETAILED: LEFT CENTRAL ZYGOMA
LOCATION DETAILED: RIGHT RADIAL DORSAL HAND
LOCATION DETAILED: LEFT CENTRAL TEMPLE

## 2022-03-23 ASSESSMENT — LOCATION SIMPLE DESCRIPTION DERM
LOCATION SIMPLE: LEFT ZYGOMA
LOCATION SIMPLE: LEFT FOREHEAD
LOCATION SIMPLE: RIGHT HAND
LOCATION SIMPLE: LEFT TEMPLE
LOCATION SIMPLE: LEFT HAND
LOCATION SIMPLE: RIGHT TEMPLE
LOCATION SIMPLE: RIGHT FOREHEAD
LOCATION SIMPLE: RIGHT ZYGOMA

## 2022-03-23 ASSESSMENT — LOCATION ZONE DERM
LOCATION ZONE: FACE
LOCATION ZONE: HAND

## 2022-03-23 NOTE — PROCEDURE: MEDICARE ABN
Reason?: Additional Information
Procedure (Limit To 20 Characters): liquid nitrogen
Payment Option: Option 1: Bill Medicare, await for decision on payment.
Detail Level: Detailed
Reason?: non-covered service

## 2022-03-23 NOTE — PROCEDURE: LIQUID NITROGEN
Post-Care Instructions: I reviewed with the patient in detail post-care instructions. Patient is to wear sunprotection, and avoid picking at any of the treated lesions. Pt may apply Vaseline to crusted or scabbing areas.
Render Post-Care Instructions In Note?: yes
Detail Level: Zone
Render Note In Bullet Format When Appropriate: No
Duration Of Freeze Thaw-Cycle (Seconds): 5
Number Of Freeze-Thaw Cycles: 1 freeze-thaw cycle
Consent: The patient's consent was obtained including but not limited to risks of crusting, scabbing, blistering, scarring, darker or lighter pigmentary change, recurrence, incomplete removal and infection.

## 2022-03-30 NOTE — PATIENT INSTRUCTIONS
Medicare Preventive Visit Patient Instructions  Thank you for completing your Welcome to Medicare Visit or Medicare Annual Wellness Visit today  Your next wellness visit will be due in one year (2/5/2022)  The screening/preventive services that you may require over the next 5-10 years are detailed below  Some tests may not apply to you based off risk factors and/or age  Screening tests ordered at today's visit but not completed yet may show as past due  Also, please note that scanned in results may not display below  Preventive Screenings:  Service Recommendations Previous Testing/Comments   Colorectal Cancer Screening  * Colonoscopy    * Fecal Occult Blood Test (FOBT)/Fecal Immunochemical Test (FIT)  * Fecal DNA/Cologuard Test  * Flexible Sigmoidoscopy Age: 54-65 years old   Colonoscopy: every 10 years (may be performed more frequently if at higher risk)  OR  FOBT/FIT: every 1 year  OR  Cologuard: every 3 years  OR  Sigmoidoscopy: every 5 years  Screening may be recommended earlier than age 48 if at higher risk for colorectal cancer  Also, an individualized decision between you and your healthcare provider will decide whether screening between the ages of 74-80 would be appropriate  Colonoscopy: 06/04/2019  FOBT/FIT: Not on file  Cologuard: Not on file  Sigmoidoscopy: Not on file    Screening Current     Breast Cancer Screening Age: 36 years old  Frequency: every 1-2 years  Not required if history of left and right mastectomy Mammogram: 09/04/2020    Screening Current   Cervical Cancer Screening Between the ages of 21-29, pap smear recommended once every 3 years  Between the ages of 33-67, can perform pap smear with HPV co-testing every 5 years     Recommendations may differ for women with a history of total hysterectomy, cervical cancer, or abnormal pap smears in past  Pap Smear: 12/08/2020    Screening Not Indicated   Hepatitis C Screening Once for adults born between 1945 and 1965  More frequently in patients at high risk for Hepatitis C Hep C Antibody: 02/19/2019    Screening Current   Diabetes Screening 1-2 times per year if you're at risk for diabetes or have pre-diabetes Fasting glucose: 99 mg/dL   A1C: No results in last 5 years    Screening Current   Cholesterol Screening Once every 5 years if you don't have a lipid disorder  May order more often based on risk factors  Lipid panel: 08/13/2019    Screening Not Indicated  History Lipid Disorder     Other Preventive Screenings Covered by Medicare:  1  Abdominal Aortic Aneurysm (AAA) Screening: covered once if your at risk  You're considered to be at risk if you have a family history of AAA  2  Lung Cancer Screening: covers low dose CT scan once per year if you meet all of the following conditions: (1) Age 50-69; (2) No signs or symptoms of lung cancer; (3) Current smoker or have quit smoking within the last 15 years; (4) You have a tobacco smoking history of at least 30 pack years (packs per day multiplied by number of years you smoked); (5) You get a written order from a healthcare provider  3  Glaucoma Screening: covered annually if you're considered high risk: (1) You have diabetes OR (2) Family history of glaucoma OR (3)  aged 48 and older OR (3)  American aged 72 and older  3  Osteoporosis Screening: covered every 2 years if you meet one of the following conditions: (1) You're estrogen deficient and at risk for osteoporosis based off medical history and other findings; (2) Have a vertebral abnormality; (3) On glucocorticoid therapy for more than 3 months; (4) Have primary hyperparathyroidism; (5) On osteoporosis medications and need to assess response to drug therapy  · Last bone density test (DXA Scan): 02/19/2019   5  HIV Screening: covered annually if you're between the age of 15-65  Also covered annually if you are younger than 13 and older than 72 with risk factors for HIV infection   For pregnant patients, it is covered up to 3 times per pregnancy  Immunizations:  Immunization Recommendations   Influenza Vaccine Annual influenza vaccination during flu season is recommended for all persons aged >= 6 months who do not have contraindications   Pneumococcal Vaccine (Prevnar and Pneumovax)  * Prevnar = PCV13  * Pneumovax = PPSV23   Adults 25-60 years old: 1-3 doses may be recommended based on certain risk factors  Adults 72 years old: Prevnar (PCV13) vaccine recommended followed by Pneumovax (PPSV23) vaccine  If already received PPSV23 since turning 65, then PCV13 recommended at least one year after PPSV23 dose  Hepatitis B Vaccine 3 dose series if at intermediate or high risk (ex: diabetes, end stage renal disease, liver disease)   Tetanus (Td) Vaccine - COST NOT COVERED BY MEDICARE PART B Following completion of primary series, a booster dose should be given every 10 years to maintain immunity against tetanus  Td may also be given as tetanus wound prophylaxis  Tdap Vaccine - COST NOT COVERED BY MEDICARE PART B Recommended at least once for all adults  For pregnant patients, recommended with each pregnancy  Shingles Vaccine (Shingrix) - COST NOT COVERED BY MEDICARE PART B  2 shot series recommended in those aged 48 and above     Health Maintenance Due:      Topic Date Due    MAMMOGRAM  09/04/2022    Cervical Cancer Screening  12/08/2023    Colonoscopy Surveillance  06/04/2024    Colorectal Cancer Screening  06/04/2029    Hepatitis C Screening  Completed     Immunizations Due:      Topic Date Due    Influenza Vaccine (1) 09/01/2020     Advance Directives   What are advance directives? Advance directives are legal documents that state your wishes and plans for medical care  These plans are made ahead of time in case you lose your ability to make decisions for yourself  Advance directives can apply to any medical decision, such as the treatments you want, and if you want to donate organs     What are the types of advance directives? There are many types of advance directives, and each state has rules about how to use them  You may choose a combination of any of the following:  · Living will: This is a written record of the treatment you want  You can also choose which treatments you do not want, which to limit, and which to stop at a certain time  This includes surgery, medicine, IV fluid, and tube feedings  · Durable power of  for healthcare [facility]): This is a written record that states who you want to make healthcare choices for you when you are unable to make them for yourself  This person, called a proxy, is usually a family member or a friend  You may choose more than 1 proxy  · Do not resuscitate (DNR) order:  A DNR order is used in case your heart stops beating or you stop breathing  It is a request not to have certain forms of treatment, such as CPR  A DNR order may be included in other types of advance directives  · Medical directive: This covers the care that you want if you are in a coma, near death, or unable to make decisions for yourself  You can list the treatments you want for each condition  Treatment may include pain medicine, surgery, blood transfusions, dialysis, IV or tube feedings, and a ventilator (breathing machine)  · Values history: This document has questions about your views, beliefs, and how you feel and think about life  This information can help others choose the care that you would choose  Why are advance directives important? An advance directive helps you control your care  Although spoken wishes may be used, it is better to have your wishes written down  Spoken wishes can be misunderstood, or not followed  Treatments may be given even if you do not want them  An advance directive may make it easier for your family to make difficult choices about your care  Urinary Incontinence   Urinary incontinence (UI)  is when you lose control of your bladder   UI develops because your bladder cannot store or empty urine properly  The 3 most common types of UI are stress incontinence, urge incontinence, or both  Medicines:   · May be given to help strengthen your bladder control  Report any side effects of medication to your healthcare provider  Do pelvic muscle exercises often:  Your pelvic muscles help you stop urinating  Squeeze these muscles tight for 5 seconds, then relax for 5 seconds  Gradually work up to squeezing for 10 seconds  Do 3 sets of 15 repetitions a day, or as directed  This will help strengthen your pelvic muscles and improve bladder control  Train your bladder:  Go to the bathroom at set times, such as every 2 hours, even if you do not feel the urge to go  You can also try to hold your urine when you feel the urge to go  For example, hold your urine for 5 minutes when you feel the urge to go  As that becomes easier, hold your urine for 10 minutes  Self-care:   · Keep a UI record  Write down how often you leak urine and how much you leak  Make a note of what you were doing when you leaked urine  · Drink liquids as directed  You may need to limit the amount of liquid you drink to help control your urine leakage  Do not drink any liquid right before you go to bed  Limit or do not have drinks that contain caffeine or alcohol  · Prevent constipation  Eat a variety of high-fiber foods  Good examples are high-fiber cereals, beans, vegetables, and whole-grain breads  Walking is the best way to trigger your intestines to have a bowel movement  · Exercise regularly and maintain a healthy weight  Weight loss and exercise will decrease pressure on your bladder and help you control your leakage  · Use a catheter as directed  to help empty your bladder  A catheter is a tiny, plastic tube that is put into your bladder to drain your urine  · Go to behavior therapy as directed  Behavior therapy may be used to help you learn to control your urge to urinate         © Copyright [employer] 2018 Information is for [employer] use only and may not be sold, redistributed or otherwise used for commercial purposes   All illustrations and images included in CareNotes® are the copyrighted property of A D A M , Inc  or [address] [place], [place] [address]ru tello  No

## 2022-04-01 DIAGNOSIS — M19.90 OSTEOARTHRITIS, UNSPECIFIED OSTEOARTHRITIS TYPE, UNSPECIFIED SITE: ICD-10-CM

## 2022-04-01 DIAGNOSIS — M54.16 RADICULOPATHY, LUMBAR REGION: ICD-10-CM

## 2022-04-01 DIAGNOSIS — F98.8 ATTENTION DEFICIT DISORDER, UNSPECIFIED HYPERACTIVITY PRESENCE: ICD-10-CM

## 2022-04-01 DIAGNOSIS — R52 PAIN: ICD-10-CM

## 2022-04-01 DIAGNOSIS — M34.0 SCLERODERMA PROGRESSIVE (HCC): ICD-10-CM

## 2022-04-01 RX ORDER — DEXTROAMPHETAMINE SACCHARATE, AMPHETAMINE ASPARTATE, DEXTROAMPHETAMINE SULFATE AND AMPHETAMINE SULFATE 5; 5; 5; 5 MG/1; MG/1; MG/1; MG/1
20 TABLET ORAL DAILY
Qty: 30 TABLET | Refills: 0 | Status: SHIPPED | OUTPATIENT
Start: 2022-04-01 | End: 2022-04-14 | Stop reason: ALTCHOICE

## 2022-04-01 RX ORDER — OXYMORPHONE HYDROCHLORIDE 5 MG/1
5 TABLET ORAL 3 TIMES DAILY PRN
Qty: 90 TABLET | Refills: 0 | Status: SHIPPED | OUTPATIENT
Start: 2022-04-01 | End: 2022-04-14 | Stop reason: ALTCHOICE

## 2022-04-01 RX ORDER — DEXTROAMPHETAMINE SACCHARATE, AMPHETAMINE ASPARTATE MONOHYDRATE, DEXTROAMPHETAMINE SULFATE AND AMPHETAMINE SULFATE 7.5; 7.5; 7.5; 7.5 MG/1; MG/1; MG/1; MG/1
30 CAPSULE, EXTENDED RELEASE ORAL EVERY MORNING
Qty: 30 CAPSULE | Refills: 0 | Status: SHIPPED | OUTPATIENT
Start: 2022-04-01 | End: 2022-05-04 | Stop reason: DRUGHIGH

## 2022-04-01 NOTE — TELEPHONE ENCOUNTER
Pt requests reduced script of oxymorphone, states she only takes this TID  Adderrall 20mg she only takes once daily

## 2022-04-02 ENCOUNTER — NURSE TRIAGE (OUTPATIENT)
Dept: OTHER | Facility: OTHER | Age: 67
End: 2022-04-02

## 2022-04-02 DIAGNOSIS — B37.9 YEAST INFECTION: Primary | ICD-10-CM

## 2022-04-02 DIAGNOSIS — M35.1 MCTD (MIXED CONNECTIVE TISSUE DISEASE) (HCC): ICD-10-CM

## 2022-04-02 RX ORDER — FLUCONAZOLE 200 MG/1
200 TABLET ORAL DAILY
Qty: 10 TABLET | Refills: 0 | Status: SHIPPED | OUTPATIENT
Start: 2022-04-02 | End: 2022-04-12

## 2022-04-02 RX ORDER — AMOXICILLIN 875 MG/1
875 TABLET, COATED ORAL 2 TIMES DAILY
Qty: 14 TABLET | Refills: 0 | Status: SHIPPED | OUTPATIENT
Start: 2022-04-02 | End: 2022-04-09

## 2022-04-02 NOTE — TELEPHONE ENCOUNTER
Regarding: Oral yeast infection, tooth infection  ----- Message from Garfield Redd sent at 4/2/2022 11:55 AM EDT -----  "I have a yeast infection in my mouth  I get these often   I also think that I have an infection in my tooth "

## 2022-04-02 NOTE — TELEPHONE ENCOUNTER
Reports she gets frequent yeast infections in her mouth  Notes she started with another one yesterday  She reports a white film on her tongue and back of throat  Also complains of a sore throat  No fever  She reports Dr Nahum Gary usually prescribes her Amoxicillin 500mg BID, as well as Diflucan  On call provider notified    Per provider patient was notified that Amoxicillin and Diflucan will be sent to her pharmacy  Patient verbalized understanding  Reason for Disposition   Patient sounds very sick or weak to the triager    Answer Assessment - Initial Assessment Questions  1  SYMPTOM: "What's the main symptom you're concerned about?" (e g , dry mouth  chapped lips, lump)      White film on her tongue and back of throat  2  ONSET: "When did the  film  start?"      yesterday  3  PAIN: "Is there any pain?" If Yes, ask: "How bad is it?" (Scale: 1-10; mild, moderate, severe)      Complains of sore throat  4  CAUSE: "What do you think is causing the symptoms?"      Reports she gets frequent infections  5  OTHER SYMPTOMS: "Do you have any other symptoms?" (e g , fever, sore throat, toothache, swelling)      Denies fever, but does have a sore throat  6   PREGNANCY: "Is there any chance you are pregnant?" "When was your last menstrual period?"      no    Protocols used: [facility]

## 2022-04-14 ENCOUNTER — TELEPHONE (OUTPATIENT)
Dept: DENTISTRY | Facility: CLINIC | Age: 67
End: 2022-04-14

## 2022-04-14 ENCOUNTER — TELEMEDICINE (OUTPATIENT)
Dept: FAMILY MEDICINE CLINIC | Facility: CLINIC | Age: 67
End: 2022-04-14
Payer: MEDICARE

## 2022-04-14 DIAGNOSIS — K58.8 OTHER IRRITABLE BOWEL SYNDROME: ICD-10-CM

## 2022-04-14 DIAGNOSIS — F51.01 PRIMARY INSOMNIA: ICD-10-CM

## 2022-04-14 DIAGNOSIS — M51.26 HERNIATED LUMBAR INTERVERTEBRAL DISC: ICD-10-CM

## 2022-04-14 DIAGNOSIS — M34.0 SCLERODERMA PROGRESSIVE (HCC): ICD-10-CM

## 2022-04-14 DIAGNOSIS — K02.9 TOOTH DECAY: Primary | ICD-10-CM

## 2022-04-14 DIAGNOSIS — K13.79 SORE MOUTH: ICD-10-CM

## 2022-04-14 DIAGNOSIS — F98.8 ATTENTION DEFICIT DISORDER, UNSPECIFIED HYPERACTIVITY PRESENCE: Primary | ICD-10-CM

## 2022-04-14 DIAGNOSIS — F22 PARANOIA (HCC): ICD-10-CM

## 2022-04-14 PROCEDURE — 99214 OFFICE O/P EST MOD 30 MIN: CPT | Performed by: FAMILY MEDICINE

## 2022-04-14 RX ORDER — OLANZAPINE 2.5 MG/1
2.5 TABLET ORAL
Qty: 30 TABLET | Refills: 0
Start: 2022-04-14 | End: 2022-05-04 | Stop reason: DRUGHIGH

## 2022-04-14 RX ORDER — NAPROXEN 500 MG/1
500 TABLET ORAL 2 TIMES DAILY PRN
Qty: 90 TABLET | Refills: 1 | Status: SHIPPED | OUTPATIENT
Start: 2022-04-14

## 2022-04-14 RX ORDER — TRAZODONE HYDROCHLORIDE 150 MG/1
75 TABLET ORAL
Qty: 90 TABLET | Refills: 3
Start: 2022-04-14 | End: 2022-05-04 | Stop reason: DRUGHIGH

## 2022-04-14 RX ORDER — PILOCARPINE HYDROCHLORIDE 5 MG/1
5 TABLET, FILM COATED ORAL 2 TIMES DAILY
Qty: 90 TABLET | Refills: 0
Start: 2022-04-14 | End: 2022-05-04

## 2022-04-14 RX ORDER — BUPROPION HYDROCHLORIDE 150 MG/1
150 TABLET ORAL EVERY MORNING
Qty: 90 TABLET | Refills: 1
Start: 2022-04-14 | End: 2022-05-04 | Stop reason: SDUPTHER

## 2022-04-14 RX ORDER — SENNOSIDES 8.6 MG
650 CAPSULE ORAL EVERY 8 HOURS PRN
Qty: 90 TABLET | Refills: 1 | Status: SHIPPED | OUTPATIENT
Start: 2022-04-14 | End: 2022-05-04

## 2022-04-14 RX ORDER — DICYCLOMINE HYDROCHLORIDE 10 MG/1
10 CAPSULE ORAL
Qty: 90 CAPSULE | Refills: 0
Start: 2022-04-14 | End: 2022-05-04 | Stop reason: SDUPTHER

## 2022-04-14 RX ORDER — CHLORDIAZEPOXIDE HYDROCHLORIDE 5 MG/1
5 CAPSULE, GELATIN COATED ORAL 2 TIMES DAILY PRN
Qty: 30 CAPSULE | Refills: 0
Start: 2022-04-14 | End: 2022-05-04 | Stop reason: SDUPTHER

## 2022-04-14 NOTE — TELEPHONE ENCOUNTER
I spoke to Prema  She wanted me to call Nishi her sister  I spoke to Jami ([phone])  Real Lazarus needs all of her teeth EXT- referral made  I recommended patient go back to [facility] for dentures  Both Nishi and Jose Qiu understand we do not do dentures here and that once her teeth are out she will not have any replacement  Told Nishi to call the insurance to see if any Oral Surgeons are in network  Nishi said that she would be able to help Real Link get back and forth to [facility] if needed  Explained that it may be the best option for replacement options   Jami states that she may need to try regular dentures first

## 2022-04-14 NOTE — PROGRESS NOTES
Virtual Regular Visit    Verification of patient location:    Patient is located in the following state in which I hold an active license [place]      Assessment/Plan:    1  Attention deficit disorder, unspecified hyperactivity presence  - now just on daily medicine  Afternoon medication stopped  2  Herniated lumbar intervertebral disc  - she is off of her oxymorphone  She does not feel she needs this and has been more confused of late and since off of these meds, feels better and less confused  Will tx her pain with prn Naprosyn and Tylenol  We discussed that Tylenol should be the preferred  - acetaminophen (TYLENOL) 650 mg CR tablet; Take 1 tablet (650 mg total) by mouth every 8 (eight) hours as needed for mild pain or moderate pain  Dispense: 90 tablet; Refill: 1  - naproxen (Naprosyn) 500 mg tablet; Take 1 tablet (500 mg total) by mouth 2 (two) times a day as needed for moderate pain  Dispense: 90 tablet; Refill: 1    3  Primary insomnia  - adjusted her Trazodone to 75 mg qhs from 150 mg     - traZODone (DESYREL) 150 mg tablet; Take 0 5 tablets (75 mg total) by mouth daily at bedtime  Dispense: 90 tablet; Refill: 3    4  Paranoia ([address] )  - zyprexa added and Wellbutrin XL dose adjusted  She did see psychiatry in the hospital   They have bene having some time getting her a psychiatrist     - OLANZapine (ZyPREXA) 2 5 mg tablet; Take 1 tablet (2 5 mg total) by mouth daily at bedtime  Dispense: 30 tablet; Refill: 0  - buPROPion (Wellbutrin XL) 150 mg 24 hr tablet; Take 1 tablet (150 mg total) by mouth every morning  Dispense: 90 tablet; Refill: 1    5  Sore mouth  - al mag oxide-diphenhydramine-lidocaine viscous (MAGIC MOUTHWASH) 1:1:1 suspension; Swish and spit 10 mL every 4 (four) hours as needed for mouth pain or discomfort Rx upon her d/c from [facility]  Dispense: 90 mL; Refill: 0    6  Other irritable bowel syndrome  - dicyclomine (BENTYL) 10 mg capsule;  Take 1 capsule (10 mg total) by mouth 3 (three) times a day before meals  Dispense: 90 capsule; Refill: 0    7  Scleroderma progressive (HCC)  Unsure of pilocarpine  Will check at f/u if she is still on this  Was not on her hospital d/c summary  - pilocarpine (SALAGEN) 5 mg tablet; Take 1 tablet (5 mg total) by mouth 2 (two) times a day Unsure if patient taking this  Dispense: 90 tablet; Refill: 0    RTC in 1 months for AMW visit    Patient/Caretaker verbalized understanding and were in agreement with today's assessment and plan  Time was taken to address any questions patient/caretaker had  Indication/Risks/Benefits of medication(s) as prescribed were discussed with the patient/caretaker  The patient verbalized understanding and agreement and elects to take medications as prescribed  Time was taken to answer any questions the patient/caretaker may have had  Reason for visit is   Chief Complaint   Patient presents with    Virtual Regular Visit        Encounter provider Ene Galvin DO    Provider located at [address] [place] [id number]      Recent Visits  No visits were found meeting these conditions  Showing recent visits within past 7 days and meeting all other requirements  Today's Visits  Date Type Provider Dept   04/14/22 Telemedicine Ene Galvin DO [facility]   Showing today's visits and meeting all other requirements  Future Appointments  No visits were found meeting these conditions  Showing future appointments within next 150 days and meeting all other requirements       The patient was identified by name and date of birth  Chase Ramirez was informed that this is a telemedicine visit and that the visit is being conducted through [address] [place] [address] and patient was informed that this is a secure, HIPAA-compliant platform  She agrees to proceed     My office door was closed  No one else was in the room    She acknowledged consent and understanding of privacy and security of the video platform  The patient has agreed to participate and understands they can discontinue the visit at any time  Patient is aware this is a billable service  Subjective  Mirna Varela is a 78 y o  female she was in the hospital recently and was diagnosed with aphasia  She was there from 4/6-->4/12  There were many medication adjustments  Psychiatry referral done and they felt her symptoms were polypharmacy  They worked with her to tailor these  She is off of her oxymorphone, she feels more clear  They adjusted her stimulant to just once/day  She does not desire to be on controlled substance for pain at this time and has been managing, without  She is asking what is safe for her to take  She does have Raynaud's, Scleroderma  Sjogrens, Autoimmune necrotizing myopathy  She is on board with Rheum and Neurology  She has poor dentition and per hospital d/c, they feel it is medically necessary for her to see dentist/oral surgeon to have her teeth cared for  She is having difficulty with her teeth, her jaw is painful, causes headaches, etc       The patient feels much more awake since she is off of her medications  Her sister, Zoila Márquez is also telling me that she had OT during her hospital stay and this was helpful for her  She is getting HH PT but not OT           Past Medical History:   Diagnosis Date    ADHD     Cellulitis of foot     Last assessed 10/24/16    Change in mental status     Last assessed 12/01/15    Chronic fatigue     PLAZA (dyspnea on exertion)     Last assessed 12/01/15    Fibromyalgia     Folliculitis     Last assessed 10/06/14    GERD (gastroesophageal reflux disease)     Non Hodgkin's [facility]     Osteopenia     Raynaud's disease     Scleroderma ([facility]     Systemic sclerosis ([facility] )     Thyroid cancer ([facility] ) 10/04/2018       Past Surgical History:   Procedure Laterality Date    BACK SURGERY      BLADDER SURGERY      BONE MARROW BIOPSY      CARDIAC CATHETERIZATION  HEMORRHOID SURGERY      HYSTERECTOMY  02/04/2004    KNEE SURGERY      NASAL SEPTUM SURGERY      ROTATOR CUFF REPAIR      THYROIDECTOMY N/A 10/4/2018    Procedure: TOTAL THYROIDECTOMY;  Surgeon: José Miguel Rosario MD;  [facility]: [facility];  Service: Surgical Oncology    TONSILLECTOMY      TOTAL THYROIDECTOMY         Current Outpatient Medications   Medication Sig Dispense Refill    acetaminophen (TYLENOL) 650 mg CR tablet Take 1 tablet (650 mg total) by mouth every 8 (eight) hours as needed for mild pain or moderate pain 90 tablet 1    al mag oxide-diphenhydramine-lidocaine viscous (MAGIC MOUTHWASH) 1:1:1 suspension Swish and spit 10 mL every 4 (four) hours as needed for mouth pain or discomfort Rx upon her d/c from [facility] 90 mL 0    ALPRAZolam (XANAX) 0 5 mg tablet Take 1 tablet (0 5 mg total) by mouth 3 (three) times a day as needed for anxiety 90 tablet 0    amphetamine-dextroamphetamine (ADDERALL XR, 30MG,) 30 MG 24 hr capsule Take 1 capsule (30 mg total) by mouth every morning Max Daily Amount: 30 mg 30 capsule 0    buPROPion (Wellbutrin XL) 150 mg 24 hr tablet Take 1 tablet (150 mg total) by mouth every morning 90 tablet 1    Calcium Carb-Cholecalciferol (Calcium-Vitamin D3) 250-125 MG-UNIT TABS TAKE 1 TABLET BY MOUTH 2 (TWO) TIMES A DAY 30 tablet 5    calcium-vitamin D (OSCAL) 250-125 MG-UNIT per tablet Take 1 tablet by mouth 2 (two) times a day 30 tablet 5    chlordiazePOXIDE (LIBRIUM) 5 mg capsule Take 1 capsule (5 mg total) by mouth 2 (two) times a day as needed for anxiety or withdrawal Rx changed upon her d/c from [facility] - she is weaning her Oxymorphone 30 capsule 0    clindamycin (CLEOCIN T) 1 % lotion       clobetasol (TEMOVATE) 0 05 % ointment       dicyclomine (BENTYL) 10 mg capsule Take 1 capsule (10 mg total) by mouth 3 (three) times a day before meals 90 capsule 0    esomeprazole (NexIUM) 40 MG capsule Take 1 capsule (40 mg total) by mouth 2 (two) times a day before meals 180 capsule 3    ferrous sulfate 324 (65 Fe) mg Take 1 tablet (324 mg total) by mouth 2 (two) times a day before meals 30 tablet 5    folic acid (FOLVITE) 1 mg tablet Take 1 tablet (1 mg total) by mouth 2 (two) times a day 180 tablet 3    hydroxychloroquine (PLAQUENIL) 200 mg tablet Take 1 tablet (200 mg total) by mouth daily 90 tablet 3    levothyroxine 175 mcg tablet TAKE 1 TABLET (175 MCG TOTAL) BY MOUTH DAILY IN THE EARLY MORNING 30 tablet 5    methotrexate 2 5 mg tablet TAKE 8 TABLETS BY MOUTH ONCE WEEKLY  96 tablet 1    Multiple Vitamin (MULTIVITAMIN) tablet Take 1 tablet by mouth daily      mycophenolate (MYFORTIC) 360 MG TBEC Take 1 tablet by mouth 3 (three) times a day      naproxen (Naprosyn) 500 mg tablet Take 1 tablet (500 mg total) by mouth 2 (two) times a day as needed for moderate pain 90 tablet 1    OLANZapine (ZyPREXA) 2 5 mg tablet Take 1 tablet (2 5 mg total) by mouth daily at bedtime 30 tablet 0    pilocarpine (SALAGEN) 5 mg tablet Take 1 tablet (5 mg total) by mouth 2 (two) times a day Unsure if patient taking this 90 tablet 0    pregabalin (LYRICA) 75 mg capsule Take 1 capsule (75 mg total) by mouth 2 (two) times a day 60 capsule 5    traZODone (DESYREL) 150 mg tablet Take 0 5 tablets (75 mg total) by mouth daily at bedtime 90 tablet 3     No current facility-administered medications for this visit  Allergies   Allergen Reactions    Duloxetine Other (See Comments)     Mental psychosis    Revatio [Sildenafil] Other (See Comments)     mental status changes    Savella [Milnacipran] Other (See Comments)     Feeling out of it    Sulfamethoxazole-Trimethoprim Rash and GI Intolerance    Tedizolid Rash       Review of Systems   Constitutional: Positive for activity change, fatigue and unexpected weight change  HENT: Negative for congestion and sinus pressure  Eyes: Negative for visual disturbance  Respiratory: Negative for shortness of breath      Cardiovascular: Negative for chest pain and leg swelling  Gastrointestinal: Positive for diarrhea  Musculoskeletal: Positive for arthralgias and myalgias  Skin: Negative for rash  Psychiatric/Behavioral: Positive for sleep disturbance  Negative for agitation  The patient is nervous/anxious  Video Exam    There were no vitals filed for this visit  Physical Exam  Constitutional:       General: She is not in acute distress  Appearance: She is not ill-appearing  Comments: Sister is helping her with the Video visit as well and providing some of the history    HENT:      Head: Normocephalic  Eyes:      Conjunctiva/sclera: Conjunctivae normal    Pulmonary:      Effort: Pulmonary effort is normal    Neurological:      General: No focal deficit present  Mental Status: She is alert and oriented to person, place, and time  Psychiatric:         Mood and Affect: Mood normal          Behavior: Behavior normal           I spent 25 minutes directly with the patient during this visit    VIRTUAL VISIT [address] [place] [address] verbally agrees to participate in [employer]  Pt is aware that [employer] could be limited without vital signs or the ability to perform a full hands-on physical exam  Mimi Gregg understands she or the provider may request at any time to terminate the video visit and request the patient to seek care or treatment in person

## 2022-04-21 ENCOUNTER — PATIENT OUTREACH (OUTPATIENT)
Dept: FAMILY MEDICINE CLINIC | Facility: CLINIC | Age: 67
End: 2022-04-21

## 2022-04-21 NOTE — PROGRESS NOTES
NEREIDA IYER outreached patient ([phone]) and spoke with patient  She stated she was in the hospital for 6 days and took herself "off of a lot of medications"  Stated she is in PT  She has an appointment with [facility]'s oral surgeons on Monday  Patient stated she is "coming around and feeling pretty good"  Patient stated she is in need of finding a way to get dentures  NEREIDA IYER shared that she may want to call [place] [phone] to find out if they can offer a payment plan for her  Patient shared that her SNAP benefits increased to $79/month  Patient shared that she was going to see a psychiatrist at [facility], but thinks they are a psychologist  [place] [facility] explained that [facility] offers psychiatry and therapy  Patient stated she will call NEREIDA IYER after her appointment on Monday if she needs assistance  NEREIDA IYER will follow up with patient within two months

## 2022-05-04 ENCOUNTER — OFFICE VISIT (OUTPATIENT)
Dept: FAMILY MEDICINE CLINIC | Facility: CLINIC | Age: 67
End: 2022-05-04
Payer: MEDICARE

## 2022-05-04 VITALS
BODY MASS INDEX: 19.45 KG/M2 | DIASTOLIC BLOOD PRESSURE: 72 MMHG | HEIGHT: 61 IN | OXYGEN SATURATION: 97 % | TEMPERATURE: 98 F | SYSTOLIC BLOOD PRESSURE: 120 MMHG | WEIGHT: 103 LBS

## 2022-05-04 DIAGNOSIS — R52 PAIN: ICD-10-CM

## 2022-05-04 DIAGNOSIS — J43.9 PULMONARY EMPHYSEMA, UNSPECIFIED EMPHYSEMA TYPE (HCC): ICD-10-CM

## 2022-05-04 DIAGNOSIS — E55.9 VITAMIN D DEFICIENCY: ICD-10-CM

## 2022-05-04 DIAGNOSIS — F11.90 OPIOID USE: ICD-10-CM

## 2022-05-04 DIAGNOSIS — Z79.899 CONTROLLED SUBSTANCE AGREEMENT SIGNED: ICD-10-CM

## 2022-05-04 DIAGNOSIS — F32.A DEPRESSION, UNSPECIFIED DEPRESSION TYPE: ICD-10-CM

## 2022-05-04 DIAGNOSIS — E03.9 HYPOTHYROIDISM, UNSPECIFIED TYPE: ICD-10-CM

## 2022-05-04 DIAGNOSIS — K58.8 OTHER IRRITABLE BOWEL SYNDROME: ICD-10-CM

## 2022-05-04 DIAGNOSIS — F41.8 SITUATIONAL ANXIETY: ICD-10-CM

## 2022-05-04 DIAGNOSIS — M51.26 HERNIATED LUMBAR INTERVERTEBRAL DISC: ICD-10-CM

## 2022-05-04 DIAGNOSIS — M33.20 POLYMYOSITIS (HCC): ICD-10-CM

## 2022-05-04 DIAGNOSIS — F98.8 ATTENTION DEFICIT DISORDER, UNSPECIFIED HYPERACTIVITY PRESENCE: ICD-10-CM

## 2022-05-04 DIAGNOSIS — F51.01 PRIMARY INSOMNIA: ICD-10-CM

## 2022-05-04 DIAGNOSIS — M34.9 SCLERODERMA (HCC): ICD-10-CM

## 2022-05-04 DIAGNOSIS — G72.89 NECROTIZING MYOPATHY: ICD-10-CM

## 2022-05-04 DIAGNOSIS — M35.1 MCTD (MIXED CONNECTIVE TISSUE DISEASE) (HCC): ICD-10-CM

## 2022-05-04 DIAGNOSIS — C82.90 FOLLICULAR LYMPHOMA, UNSPECIFIED FOLLICULAR LYMPHOMA TYPE, UNSPECIFIED BODY REGION (HCC): ICD-10-CM

## 2022-05-04 DIAGNOSIS — F22 PARANOIA (PSYCHOSIS) (HCC): ICD-10-CM

## 2022-05-04 DIAGNOSIS — R53.83 OTHER FATIGUE: ICD-10-CM

## 2022-05-04 DIAGNOSIS — M34.0 SCLERODERMA PROGRESSIVE (HCC): Primary | ICD-10-CM

## 2022-05-04 DIAGNOSIS — M54.16 RADICULOPATHY, LUMBAR REGION: ICD-10-CM

## 2022-05-04 DIAGNOSIS — F22 PARANOIA (HCC): ICD-10-CM

## 2022-05-04 PROBLEM — R79.89 ELEVATED TSH: Status: RESOLVED | Noted: 2019-08-13 | Resolved: 2022-05-04

## 2022-05-04 PROBLEM — Z01.818 PREOPERATIVE CLEARANCE: Status: RESOLVED | Noted: 2018-06-25 | Resolved: 2022-05-04

## 2022-05-04 PROBLEM — Z00.00 MEDICARE ANNUAL WELLNESS VISIT, SUBSEQUENT: Status: RESOLVED | Noted: 2019-02-15 | Resolved: 2022-05-04

## 2022-05-04 PROCEDURE — 99215 OFFICE O/P EST HI 40 MIN: CPT | Performed by: FAMILY MEDICINE

## 2022-05-04 RX ORDER — OLANZAPINE 5 MG/1
5 TABLET ORAL
Qty: 90 TABLET | Refills: 0 | Status: SHIPPED | OUTPATIENT
Start: 2022-05-04 | End: 2022-06-14 | Stop reason: ALTCHOICE

## 2022-05-04 RX ORDER — DICYCLOMINE HYDROCHLORIDE 10 MG/1
10 CAPSULE ORAL 2 TIMES DAILY
Qty: 180 CAPSULE | Refills: 1
Start: 2022-05-04 | End: 2022-06-02 | Stop reason: SDUPTHER

## 2022-05-04 RX ORDER — FOLIC ACID 1 MG/1
2 TABLET ORAL DAILY
Qty: 180 TABLET | Refills: 3 | Status: SHIPPED | OUTPATIENT
Start: 2022-05-04 | End: 2022-07-28 | Stop reason: SDUPTHER

## 2022-05-04 RX ORDER — OXYMORPHONE HYDROCHLORIDE 5 MG/1
5 TABLET ORAL EVERY OTHER DAY
Qty: 15 TABLET | Refills: 0 | Status: SHIPPED | OUTPATIENT
Start: 2022-05-04 | End: 2022-06-14

## 2022-05-04 RX ORDER — DEXTROAMPHETAMINE SACCHARATE, AMPHETAMINE ASPARTATE, DEXTROAMPHETAMINE SULFATE AND AMPHETAMINE SULFATE 3.75; 3.75; 3.75; 3.75 MG/1; MG/1; MG/1; MG/1
15 TABLET ORAL 2 TIMES DAILY
Qty: 60 TABLET | Refills: 0 | Status: SHIPPED | OUTPATIENT
Start: 2022-05-04 | End: 2022-06-06 | Stop reason: SDUPTHER

## 2022-05-04 RX ORDER — LEVOTHYROXINE SODIUM 175 UG/1
175 TABLET ORAL
Qty: 30 TABLET | Refills: 1 | Status: SHIPPED | OUTPATIENT
Start: 2022-05-04 | End: 2022-06-02

## 2022-05-04 RX ORDER — SENNOSIDES 8.6 MG
650 CAPSULE ORAL EVERY 4 HOURS PRN
Qty: 90 TABLET | Refills: 1
Start: 2022-05-04

## 2022-05-04 RX ORDER — CHLORDIAZEPOXIDE HYDROCHLORIDE 5 MG/1
5 CAPSULE, GELATIN COATED ORAL DAILY PRN
Qty: 30 CAPSULE | Refills: 3
Start: 2022-05-04 | End: 2022-06-14 | Stop reason: ALTCHOICE

## 2022-05-04 RX ORDER — NALOXONE HYDROCHLORIDE 4 MG/.1ML
SPRAY NASAL
Qty: 1 EACH | Refills: 1 | Status: SHIPPED | OUTPATIENT
Start: 2022-05-04

## 2022-05-04 RX ORDER — ESOMEPRAZOLE MAGNESIUM 40 MG/1
40 CAPSULE, DELAYED RELEASE ORAL
Qty: 180 CAPSULE | Refills: 3 | Status: SHIPPED | OUTPATIENT
Start: 2022-05-04 | End: 2022-07-26

## 2022-05-04 RX ORDER — BUPROPION HYDROCHLORIDE 150 MG/1
150 TABLET ORAL EVERY MORNING
Qty: 90 TABLET | Refills: 1 | Status: SHIPPED | OUTPATIENT
Start: 2022-05-04

## 2022-05-04 RX ORDER — BUPROPION HYDROCHLORIDE 150 MG/1
150 TABLET ORAL EVERY MORNING
Qty: 90 TABLET | Refills: 1
Start: 2022-05-04 | End: 2022-05-04 | Stop reason: SDUPTHER

## 2022-05-04 RX ORDER — TRAZODONE HYDROCHLORIDE 100 MG/1
100 TABLET ORAL
Qty: 90 TABLET | Refills: 1 | Status: SHIPPED | OUTPATIENT
Start: 2022-05-04 | End: 2022-06-09

## 2022-05-04 RX ORDER — PREGABALIN 100 MG/1
100 CAPSULE ORAL 2 TIMES DAILY
Qty: 180 CAPSULE | Refills: 0 | Status: SHIPPED | OUTPATIENT
Start: 2022-05-04 | End: 2022-07-28 | Stop reason: SDUPTHER

## 2022-05-04 RX ORDER — MYCOPHENOLIC ACID 360 MG/1
720 TABLET, DELAYED RELEASE ORAL 2 TIMES DAILY
Qty: 120 TABLET | Refills: 0
Start: 2022-05-04 | End: 2022-07-28 | Stop reason: SDUPTHER

## 2022-05-04 NOTE — PATIENT INSTRUCTIONS
Goals of care:  Maximize your health and quality of life by:   · Increasing your level of function and activity  · Decreasing the negative effects of pain on your life  · Minimizing the risks and side effects of medications and ensuring safe use of opioid medication     Ways for you to help meet your goals:  Maintain a healthy lifestyle  This includes proper nutrition, regular physical activity as able, try for 8 hours of sleep per night, use stress reduction strategies, avoid triggers  Risks and side effects of opioid use:  Prescription opioids carry serious risks of addiction and  overdose, especially with prolonged use  An opioid overdose,  often marked by slowed breathing, can cause sudden death  The  use of prescription opioids can have a number of side effects as  well, even when taken as directed:  · Tolerance--meaning you might need to take more of a medication for the same pain relief  · Physical dependence--meaning you have symptoms of withdrawal when a medication is stopped  · Increased sensitivity to pain  · Constipation  · Nausea, vomiting, dry mouth  · Sleepiness and dizziness   · Confusion  · Depression  · Low levels of testosterone that can result in lower sex drive, energy, and strength  · Itching and sweating    If you are prescribed opioids for pain:  · Never take opioids in greater amounts or more often than prescribed  · Help prevent misuse and abuse  - Never sell or share prescription opioids         - Never use another persons prescription opioids  · Store prescription opioids in a secure place and out of reach of others (this may include visitors, children, friends, and family)  · Safely dispose of unused prescription opioids: Find your community drug take-back program or your pharmacy mail-back program, or flush them down the toilet, following guidance from the Food and Drug Administration (www fda gov/Drugs/ResourcesForYou)    · Visit www cdc gov/drugoverdose to learn about the risks of opioid abuse and overdose  · If you believe you may be struggling with addiction, tell your health care provider and ask for guidance or call [address] at 5-406-997-HELP  Goals of care:  Maximize your health and quality of life by: Increasing your level of function and activity  Decreasing the negative effects of pain on your life  Minimizing the risks and side effects of medications and ensuring safe use of opioid medication     Ways for you to help meet your goals:  Maintain a healthy lifestyle  This includes proper nutrition, regular physical activity as able, try for 8 hours of sleep per night, use stress reduction strategies, avoid triggers  Risks and side effects of opioid use:  Prescription opioids carry serious risks of addiction and  overdose, especially with prolonged use  An opioid overdose,  often marked by slowed breathing, can cause sudden death  The  use of prescription opioids can have a number of side effects as  well, even when taken as directed: Tolerance--meaning you might need to take more of a medication for the same pain relief  Physical dependence--meaning you have symptoms of withdrawal when a medication is stopped  Increased sensitivity to pain  Constipation  Nausea, vomiting, dry mouth  Sleepiness and dizziness   Confusion  Depression  Low levels of testosterone that can result in lower sex drive, energy, and strength  Itching and sweating    If you are prescribed opioids for pain:  Never take opioids in greater amounts or more often than prescribed  Help prevent misuse and abuse  - Never sell or share prescription opioids         - Never use another persons prescription opioids  Store prescription opioids in a secure place and out of reach of others (this may include visitors, children, friends, and family)    Safely dispose of unused prescription opioids: Find your community drug take-back program or your pharmacy mail-back program, or flush them down the toilet, following guidance from the Food and Drug Administration (www fda gov/Drugs/ResourcesForYou)  Visit www cdc gov/drugoverdose to learn about the risks of opioid abuse and overdose  If you believe you may be struggling with addiction, tell your health care provider and ask for guidance or call [address] at 3-050-358-HELP  Goals of care:  Maximize your health and quality of life by: Increasing your level of function and activity  Decreasing the negative effects of pain on your life  Minimizing the risks and side effects of medications and ensuring safe use of opioid medication     Ways for you to help meet your goals:  Maintain a healthy lifestyle  This includes proper nutrition, regular physical activity as able, try for 8 hours of sleep per night, use stress reduction strategies, avoid triggers  Risks and side effects of opioid use:  Prescription opioids carry serious risks of addiction and  overdose, especially with prolonged use  An opioid overdose,  often marked by slowed breathing, can cause sudden death  The  use of prescription opioids can have a number of side effects as  well, even when taken as directed: Tolerance--meaning you might need to take more of a medication for the same pain relief  Physical dependence--meaning you have symptoms of withdrawal when a medication is stopped  Increased sensitivity to pain  Constipation  Nausea, vomiting, dry mouth  Sleepiness and dizziness   Confusion  Depression  Low levels of testosterone that can result in lower sex drive, energy, and strength  Itching and sweating    If you are prescribed opioids for pain:  Never take opioids in greater amounts or more often than prescribed  Help prevent misuse and abuse  - Never sell or share prescription opioids         - Never use another persons prescription opioids    Store prescription opioids in a secure place and out of reach of others (this may include visitors, children, friends, and family)  Safely dispose of unused prescription opioids: Find your community drug take-back program or your pharmacy mail-back program, or flush them down the toilet, following guidance from the Food and Drug Administration (www fda gov/Drugs/ResourcesForYou)  Visit www cdc gov/drugoverdose to learn about the risks of opioid abuse and overdose  If you believe you may be struggling with addiction, tell your health care provider and ask for guidance or call [address] at 6-997-486-UZCI

## 2022-05-04 NOTE — PROGRESS NOTES
Assessment/Plan        Treatment Plan: We will continue oxymorphone for now as patient has justifiable reasons for pain with her autoimmune dz  She is followed by multispecialty care   is c/w Rx  Her sister is also helping her with her meds  We will cont to attempt to titrate this down  Treatment Goals: Continue to be functional with her quality of life  Opiate risks  There are risks associated with opioid medications, including dependence, addiction and tolerance  The patient understands and agrees to use these medications only as prescribed  Potential side effects of the medications include, but are not limited to, constipation, drowsiness, addiction, impaired judgment and risk of fatal overdose if not taken as prescribed  The patient was warned against driving while taking sedation medications  Sharing medications is a felony  At this point in time, the patient is showing no signs of addiction, abuse, diversion or suicidal ideation  Patient is taking concurrent muscle relaxers  Has been counseled on the risks of taking opioids and muscle relaxers including sedation, increased fall risk, dizziness, addictive potential and death  Patient has a high risk condition (age > 72, RADAMES, renal or hepatic impairment, mental health condition, use of alcohol or other substances)  Has been counseled on the specific risks of taking opioids with these conditions and the increased risks including including sedation, increased fall risk, dizziness, addictive potential and death  Opioid agreement  Pain management agreement was reviewed with patient and signed/updated during visit      Drug screen  Drug screen collected during today's visit      PDMP review  [place] or [place]  reviewed  No red flags were identified; safe to proceed with prescription      I have spent 45 minutes directly with the patient   Greater than 50% of this time was spent in counseling/coordination of care regarding: risks and benefits of treatment options, instructions for management, patient and family education, importance of treatment compliance and risk factor reductions  Subjective         Pain related diagnoses: Chronic Pain 2/2 Scleroderma, Necrotiziny myopathy, etc       Is attempting to wean her oxymorphone  She is on Lyrica as well  She was Rx Xanax but has stopped taking these as she was on polypharmacy at her recent hospitalization and her meds were tapered/adjusted  She still has daily pain but is doing okay  Current pain description: Achy, sharp at times  Functional status: Can do her ADLs  Goals of care: Cont doing ADLs    Social Support System  Patient receives support from their: other ()    Sister and other family members  Screening Tools/Assessments:    PHQ-2/9:  PHQ-9 score: 7      Drug Screen:  Date of last drug screen: 2/15/2022    Drug screen comments: Obtained today  Took a dose of oxymorphone last evening and has been taking other meds as listed on her med list that was updated today  Opioid agreement:  Active Opioid agreement on file?: Yes    Opioid agreement signed date: 5/4/2022    Naloxone:  Currently prescribed Naloxone (Narcan): Yes      High risk medications  High risk meds taken in last 72 hours: Rx meds     Other treatments tried/failed:   Prescription NSAIDs, ibuprofen (OTC), naproxen (OTC), heat, massage, topical patches (OTC), home exercises, acetaminophen, topical creams (OTC) and physical therapy    She cont to use other modalities as well        Prior referrals:  Physical therapy, Rheumatology and Other ()    HPI  Pain Medications             acetaminophen (TYLENOL) 650 mg CR tablet Take 1 tablet (650 mg total) by mouth every 4 (four) hours as needed for mild pain or moderate pain    buPROPion (Wellbutrin XL) 150 mg 24 hr tablet Take 1 tablet (150 mg total) by mouth every morning    naproxen (Naprosyn) 500 mg tablet Take 1 tablet (500 mg total) by mouth 2 (two) times a day as needed for moderate pain    OLANZapine (ZyPREXA) 5 mg tablet Take 1 tablet (5 mg total) by mouth daily at bedtime    oxymorphone (OPANA) 5 MG tablet Take 1 tablet (5 mg total) by mouth every other day Max Daily Amount: 5 mg    pregabalin (LYRICA) 100 mg capsule Take 1 capsule (100 mg total) by mouth 2 (two) times a day    traZODone (DESYREL) 100 mg tablet Take 1 tablet (100 mg total) by mouth daily at bedtime         Outpatient Morphine Milligram Equivalents Per Day     5/5/22 and after 15 MME/Day    Order Name Dose Route Frequency Maximum MME/Day     oxymorphone (OPANA) 5 MG tablet 5 mg Oral Every other day 15 MME/Day    Total Potential Morphine Milligram Equivalents Per Day 15 MME/Day    Calculation Information        oxymorphone (OPANA) 5 MG tablet    oxymorphone 5 MG Tabs: maximum daily dose of 5 mg * morphine equivalence factor of 3 = 15 MME/Day                         PDMP Review       Value Time User    PDMP Reviewed  Yes 1/22/2021 10:59 AM JANKI Ochoa         Review of Systems   Constitutional: Negative for activity change and fatigue  HENT: Negative for congestion  Musculoskeletal: Positive for arthralgias, back pain, joint swelling and myalgias  Objective     /72 (BP Location: Right arm, Patient Position: Sitting, Cuff Size: Standard)   Temp 98 °F (36 7 °C) (Temporal)   Ht 5' 1" (1 549 m)   Wt 46 7 kg (103 lb)   SpO2 97%   BMI 19 46 kg/m²     Physical Exam  Vitals and nursing note reviewed  Constitutional:       Appearance: Normal appearance  HENT:      Head: Normocephalic and atraumatic  Eyes:      Conjunctiva/sclera: Conjunctivae normal    Cardiovascular:      Rate and Rhythm: Normal rate and regular rhythm  Pulmonary:      Breath sounds: Normal breath sounds  No wheezing, rhonchi or rales  Musculoskeletal:      Cervical back: Neck supple        Comments: Walks with antalgic gait  Slow to transition from seated to standing  Deformities of her hands - mild  Pain in her joints, back   Strength preserved throughout    Lymphadenopathy:      Cervical: No cervical adenopathy  Skin:     General: Skin is warm and dry  Neurological:      General: No focal deficit present  Mental Status: She is alert and oriented to person, place, and time  Psychiatric:         Mood and Affect: Mood normal          Behavior: Behavior normal          Thought Content:  Thought content normal          Judgment: Judgment normal          Ene Mendoza Adjutant, DO

## 2022-05-04 NOTE — PROGRESS NOTES
Assessment/Plan:    1  Scleroderma progressive ([place] [address] )  Ambulatory Referral to Rheumatology    amphetamine-dextroamphetamine (ADDERALL, 15MG,) 15 MG tablet    oxymorphone (OPANA) 5 MG tablet    naloxone (NARCAN) 4 mg/0 1 mL nasal spray    [facility] Prescribed Meds    Amphetamines, Methamphetamines    Butalbital    Phenobarbital    Secobarbital    Temazepam    Alprazolam    Clonazepam    Diazepam    Lorazepam    Oxazepam    Gabapentin    Pregabalin    Cocaine    Heroin    Buprenorphine    Levorphanol    Meperidine    Naltrexone    Fentanyl    Methadone    Oxycodone    Oxymorphone    Tapentadol    Tramadol    Codeine, Hydrocodone, Hydropmorphone, Morphine    Bath Salts    Kratom    Spice    Methylphenidate    Phentermine    Validity Oxidant    Validity Creatinine    Validity pH    Validity Specific   2  MCTD (mixed connective tissue disease) ([place] [address] )  Ambulatory Referral to Rheumatology    amphetamine-dextroamphetamine (ADDERALL, 15MG,) 15 MG tablet    oxymorphone (OPANA) 5 MG tablet    naloxone (NARCAN) 4 mg/0 1 mL nasal spray    [facility] Prescribed Med[facility]    Amphetamines, Methamphetamines    Butalbital    Phenobarbital    Secobarbital    Temazepam    Alprazolam    Clonazepam    Diazepam    Lorazepam    Oxazepam    Gabapentin    Pregabalin    Cocaine    Heroin    Buprenorphine    Levorphanol    Meperidine    Naltrexone    Fentanyl    Methadone    Oxycodone    Oxymorphone    Tapentadol    Tramadol    Codeine, Hydrocodone, Hydropmorphone, Morphine    Bath Salts    Kratom    Spice    Methylphenidate    Phentermine    Validity Oxidant    Validity Creatinine    Validity pH    Validity Specific   3   Polymyositis ([place] [address] )  Ambulatory Referral to Rheumatology    esomeprazole (NexIUM) 40 MG capsule    folic acid (FOLVITE) 1 mg tablet    oxymorphone (OPANA) 5 MG tablet    naloxone (NARCAN) 4 mg/0 1 mL nasal spray    [facility] Prescribed Med[facility]    Amphetamines, Methamphetamines    Butalbital    Phenobarbital    Secobarbital Temazepam    Alprazolam    Clonazepam    Diazepam    Lorazepam    Oxazepam    Gabapentin    Pregabalin    Cocaine    Heroin    Buprenorphine    Levorphanol    Meperidine    Naltrexone    Fentanyl    Methadone    Oxycodone    Oxymorphone    Tapentadol    Tramadol    Codeine, Hydrocodone, Hydropmorphone, Morphine    Bath Salts    Kratom    Spice    Methylphenidate    Phentermine    Validity Oxidant    Validity Creatinine    Validity pH    Validity Specific   4  Necrotizing myopathy  Ambulatory Referral to Rheumatology    mycophenolate (MYFORTIC) 360 MG TBEC    oxymorphone (OPANA) 5 MG tablet    naloxone (NARCAN) 4 mg/0 1 mL nasal spray   5  Scleroderma (HCC)  Complete PFT with post bronchodilator    naloxone (NARCAN) 4 mg/0 1 mL nasal spray   6  Other fatigue  amphetamine-dextroamphetamine (ADDERALL, 15MG,) 15 MG tablet   7  Attention deficit disorder, unspecified hyperactivity presence  chlordiazePOXIDE (LIBRIUM) 5 mg capsule    esomeprazole (NexIUM) 40 MG capsule    folic acid (FOLVITE) 1 mg tablet    buPROPion (Wellbutrin XL) 150 mg 24 hr tablet    DISCONTINUED: buPROPion (Wellbutrin XL) 150 mg 24 hr tablet   8  Paranoia (HCC)  buPROPion (Wellbutrin XL) 150 mg 24 hr tablet    DISCONTINUED: buPROPion (Wellbutrin XL) 150 mg 24 hr tablet   9  Other irritable bowel syndrome  dicyclomine (BENTYL) 10 mg capsule   10  Depression, unspecified depression type  esomeprazole (NexIUM) 40 MG capsule    folic acid (FOLVITE) 1 mg tablet   11  Situational anxiety  esomeprazole (NexIUM) 40 MG capsule    folic acid (FOLVITE) 1 mg tablet   12  Primary insomnia  esomeprazole (NexIUM) 40 MG capsule    folic acid (FOLVITE) 1 mg tablet    traZODone (DESYREL) 100 mg tablet   13  Follicular lymphoma, unspecified follicular lymphoma type, unspecified body region ([facility] [address] )  esomeprazole (NexIUM) 40 MG capsule    folic acid (FOLVITE) 1 mg tablet   14   Pulmonary emphysema, unspecified emphysema type (HCC)  esomeprazole (NexIUM) 40 MG capsule folic acid (FOLVITE) 1 mg tablet   15  Radiculopathy, lumbar region  oxymorphone (OPANA) 5 MG tablet    pregabalin (LYRICA) 100 mg capsule    [facility] Prescribed Meds    Amphetamines, Methamphetamines    Butalbital    Phenobarbital    Secobarbital    Temazepam    Alprazolam    Clonazepam    Diazepam    Lorazepam    Oxazepam    Gabapentin    Pregabalin    Cocaine    Heroin    Buprenorphine    Levorphanol    Meperidine    Naltrexone    Fentanyl    Methadone    Oxycodone    Oxymorphone    Tapentadol    Tramadol    Codeine, Hydrocodone, Hydropmorphone, Morphine    Bath Salts    Kratom    Spice    Methylphenidate    Phentermine    Validity Oxidant    Validity Creatinine    Validity pH    Validity Specific   16  Hypothyroidism, unspecified type  levothyroxine 175 mcg tablet   17  Vitamin D deficiency  calcium-vitamin D (OSCAL) 250-125 MG-UNIT per tablet   18  Pain     19  Herniated lumbar intervertebral disc  oxymorphone (OPANA) 5 MG tablet    pregabalin (LYRICA) 100 mg capsule    acetaminophen (TYLENOL) 650 mg CR tablet    naloxone (NARCAN) 4 mg/0 1 mL nasal spray    [facility] Prescribed Meds    Amphetamines, Methamphetamines    Butalbital    Phenobarbital    Secobarbital    Temazepam    Alprazolam    Clonazepam    Diazepam    Lorazepam    Oxazepam    Gabapentin    Pregabalin    Cocaine    Heroin    Buprenorphine    Levorphanol    Meperidine    Naltrexone    Fentanyl    Methadone    Oxycodone    Oxymorphone    Tapentadol    Tramadol    Codeine, Hydrocodone, Hydropmorphone, Morphine    Bath Salts    Kratom    Spice    Methylphenidate    Phentermine    Validity Oxidant    Validity Creatinine    Validity pH    Validity Specific   20  Paranoia (psychosis) (HCC)  OLANZapine (ZyPREXA) 5 mg tablet   21   Opioid use  naloxone (NARCAN) 4 mg/0 1 mL nasal spray    [facility] Prescribed Meds    Amphetamines, Methamphetamines    Butalbital    Phenobarbital    Secobarbital    Temazepam    Alprazolam    Clonazepam Diazepam    Lorazepam    Oxazepam    Gabapentin    Pregabalin    Cocaine    Heroin    Buprenorphine    Levorphanol    Meperidine    Naltrexone    Fentanyl    Methadone    Oxycodone    Oxymorphone    Tapentadol    Tramadol    Codeine, Hydrocodone, Hydropmorphone, Morphine    Bath Salts    Kratom    Spice    Methylphenidate    Phentermine    Validity Oxidant    Validity Creatinine    Validity pH    Validity Specific   22  Controlled substance agreement signed  [facility] Prescribed Meds    Amphetamines, Methamphetamines    Butalbital    Phenobarbital    Secobarbital    Temazepam    Alprazolam    Clonazepam    Diazepam    Lorazepam    Oxazepam    Gabapentin    Pregabalin    Cocaine    Heroin    Buprenorphine    Levorphanol    Meperidine    Naltrexone    Fentanyl    Methadone    Oxycodone    Oxymorphone    Tapentadol    Tramadol    Codeine, Hydrocodone, Hydropmorphone, Morphine    Bath Salts    Kratom    Spice    Methylphenidate    Phentermine    Validity Oxidant    Validity Creatinine    Validity pH    Validity Specific     Today's visit was spent doing medication reconciliation, updating problem list, etc   Meds refilled  New opioid agreement completed  UDS completed  Labs from her most recent hospital stay reviewed  New referral to Rheum in our system done due to drive to [place] not being doable  She is with Neurology at [facility] and plans to stay with them  I did discuss the importance of continuing with specialty care for her disease states given significance of symptoms and necessity for close monitoring of her meds  They are on board  They asked me to give some bridge refills of meds until she can get back in to be seen  - PFTs ordered given her h/o Scleroderma and need for yearly monitoring  - she does get yearly eye exams     - Per Endo chart review - Goal TSH is  between 0 1-0 5  Her level was 0 3 in April, 2022 on current dose  - will offer counseling - may help her Paranoia  Increased her Zyprexa to 5 mg at night  RTC in 3 months  Total time I spent caring for this patient on the day of the encounter - 60 minutes  20 minutes spent before the visit - chart review, extensive   30 minutes spent during the visit  10 minutes spent after the visit - ongoing chart review    Patient/Caretaker verbalized understanding and were in agreement with today's assessment and plan  Time was taken to address any questions patient/caretaker had  Indication/Risks/Benefits of medication(s) as prescribed were discussed with the patient/caretaker  The patient verbalized understanding and agreement and elects to take medications as prescribed  Time was taken to answer any questions the patient/caretaker may have had  Chief Complaint   Patient presents with    Follow-up     Review with DR Bravo:      Patient ID: Collins Andrews is a 75 y o  female  Here with her sister Cristy clark who helps her  She is getting [facility] PT/OT and medication management at the moment which has been helpful  Rheum - Dr Nicolette Lee is her current Rheumatologist   He is in [place]  She is on MTX 12 5 mg/day  She needs to have yearly PFTs for her Scleroderma - she is due for these and would like to have them done, locally  She does have yearly eye exams  She is going to an ophthalmologist   This is in [place]  She is on Plaquenil - 200 mg/day  She has been on this regimen for some time  She is on folate bid due to MTX therapy  She is asking for all refills today as we do mediation reconciliation and is in need of refills that are usually Rx by specialty care -- they recognize these will not be refilled by me, monthly as she needs to see specialists given her complicated history  Neurology - [facility] - follows her for necrotizing myositis  They typically Rx her meds, she is asking for refill of this until she can get back to them    She is stable per report, no issues breathing or increased weakness to report  Endo - she used to see Endo but has not done so in some time  She has h/o thyroid Cancer and TSH has been at goal - Per chart review, goal is -- between 0 1-0 5   0 3 - 4/2022 was last TSH  She feels stable in this regard  Patient had recent hospital stay and her polypharmacy, mainly centered around controlled substances/mood meds and dosages, was addressed  She has been weaning down on some of these, she has stopped her Xanax and is working to wean down her Oxymorphone  She does have chronic pain given her necrotizing myopathy, MCTD/Scleroderma, etc   She is overall feeling much better with these adjustments  She does cont to have pain and paranoia continues to be an issue for her  She feels that people are watching her  She acknowledges this is not the case and cont to work through this  Zyprexa was started during her hospital stay and she feels she could use increased dose  No si/hi  Chronic Fatigue - that goes with her autoimmune disease  The provigil/nuvigil was not covered  She is now on Adderall XR 30 mg, she was on higher doses in the past - up to 70 mg  Anx/Depression -- she is on Wellbutrin  mg/day  This used to be a higher dose  Xanax was also Rx, she is no longer taking this  Irritable Bowel/Esophageal Spasm -- she is on bentyl and chlordiazepoxide prn  She is also with reflux and takes nexium - bid  These meds are helpful for her  Fibromyalgia - 75 mg po bid lyrica but is trying to wean down oxymorphone so is wondering if the Lyrica can be bumped a bit  The following portions of the patient's history were reviewed and updated as appropriate: allergies, current medications, past family history, past medical history, past social history, past surgical history and problem list     Review of Systems   All other systems reviewed and are negative          Objective:      /72 (BP Location: Right arm, Patient Position: Sitting, Cuff Size: Standard)   Temp 98 °F (36 7 °C) (Temporal)   Ht 5' 1" (1 549 m)   Wt 46 7 kg (103 lb)   SpO2 97%   BMI 19 46 kg/m²          Physical Exam  Vitals and nursing note reviewed  Constitutional:       General: She is not in acute distress  Appearance: Normal appearance  She is not ill-appearing  HENT:      Head: Normocephalic and atraumatic  Eyes:      Conjunctiva/sclera: Conjunctivae normal    Cardiovascular:      Rate and Rhythm: Normal rate and regular rhythm  Pulmonary:      Effort: Pulmonary effort is normal       Breath sounds: Normal breath sounds  No wheezing, rhonchi or rales  Musculoskeletal:      Cervical back: Neck supple  Lymphadenopathy:      Cervical: No cervical adenopathy  Skin:     General: Skin is warm and dry  Neurological:      General: No focal deficit present  Mental Status: She is alert and oriented to person, place, and time  Psychiatric:         Mood and Affect: Mood normal          Behavior: Behavior normal          Thought Content:  Thought content normal          Judgment: Judgment normal

## 2022-05-05 ENCOUNTER — PATIENT MESSAGE (OUTPATIENT)
Dept: FAMILY MEDICINE CLINIC | Facility: CLINIC | Age: 67
End: 2022-05-05

## 2022-05-05 DIAGNOSIS — F41.8 SITUATIONAL ANXIETY: ICD-10-CM

## 2022-05-05 DIAGNOSIS — F32.A DEPRESSION, UNSPECIFIED DEPRESSION TYPE: ICD-10-CM

## 2022-05-05 DIAGNOSIS — F22 PARANOIA (HCC): Primary | ICD-10-CM

## 2022-05-11 LAB
6MAM UR QL CFM: NEGATIVE NG/ML
7AMINOCLONAZEPAM UR QL CFM: NEGATIVE NG/ML
A-OH ALPRAZ UR QL CFM: NEGATIVE NG/ML
ACCEPTABLE CREAT UR QL: NORMAL MG/DL
ACCEPTIBLE SP GR UR QL: NORMAL
AMPHET UR QL CFM: NORMAL NG/ML
BUPRENORPHINE UR QL CFM: NEGATIVE NG/ML
BUTALBITAL UR QL CFM: NEGATIVE NG/ML
BZE UR QL CFM: NEGATIVE NG/ML
CODEINE UR QL CFM: NEGATIVE NG/ML
EDDP UR QL CFM: NEGATIVE NG/ML
EUTYLONE UR QL: NEGATIVE NG/ML
FENTANYL UR QL CFM: NEGATIVE NG/ML
GLIADIN IGG SER IA-ACNC: NEGATIVE NG/ML
HYDROCODONE UR QL CFM: NEGATIVE NG/ML
HYDROMORPHONE UR QL CFM: NEGATIVE NG/ML
LORAZEPAM UR QL CFM: NEGATIVE NG/ML
ME-PHENIDATE UR QL CFM: NEGATIVE NG/ML
MEPERIDINE UR QL CFM: NEGATIVE NG/ML
METHADONE UR QL CFM: NEGATIVE NG/ML
METHAMPHET UR QL CFM: NEGATIVE NG/ML
MORPHINE UR QL CFM: NEGATIVE NG/ML
NALTREXONE UR QL CFM: NEGATIVE NG/ML
NITRITE UR QL: NORMAL UG/ML
NORBUPRENORPHINE UR QL CFM: NEGATIVE NG/ML
NORDIAZEPAM UR QL CFM: NEGATIVE NG/ML
NORFENTANYL UR QL CFM: NEGATIVE NG/ML
NORHYDROCODONE UR QL CFM: NEGATIVE NG/ML
NORMEPERIDINE UR QL CFM: NEGATIVE NG/ML
NOROXYCODONE UR QL CFM: NEGATIVE NG/ML
OXAZEPAM UR QL CFM: NORMAL NG/ML
OXYCODONE UR QL CFM: NEGATIVE NG/ML
OXYMORPHONE UR QL CFM: NORMAL NG/ML
OXYMORPHONE UR QL CFM: NORMAL NG/ML
PHENOBARB UR QL CFM: NEGATIVE NG/ML
RESULT ALL_PRESCRIBED MEDS AND SPECIAL INSTRUCTIONS: NORMAL
SECOBARBITAL UR QL CFM: NEGATIVE NG/ML
SL AMB 3-METHYL-FENTANYL QUANTIFICATION: NORMAL NG/ML
SL AMB 4-ANPP QUANTIFICATION: NORMAL NG/ML
SL AMB 4-FIBF QUANTIFICATION: NORMAL NG/ML
SL AMB 5F-ADB-M7 METABOLITE QUANTIFICATION: NEGATIVE NG/ML
SL AMB 7-OH-MITRAGYNINE (KRATOM ALKALOID) QUANTIFICATION: NEGATIVE NG/ML
SL AMB AB-FUBINACA-M3 METABOLITE QUANTIFICATION: NEGATIVE NG/ML
SL AMB ACETYL FENTANYL QUANTIFICATION: NORMAL NG/ML
SL AMB ACETYL NORFENTANYL QUANTIFICATION: NORMAL NG/ML
SL AMB ACRYL FENTANYL QUANTIFICATION: NORMAL NG/ML
SL AMB BUTRYL FENTANYL QUANTIFICATION: NORMAL NG/ML
SL AMB CARFENTANIL QUANTIFICATION: NORMAL NG/ML
SL AMB CYCLOPROPYL FENTANYL QUANTIFICATION: NORMAL NG/ML
SL AMB DEXTRORPHAN (DEXTROMETHORPHAN METABOLITE) QUANT: NEGATIVE NG/ML
SL AMB FURANYL FENTANYL QUANTIFICATION: NORMAL NG/ML
SL AMB GABAPENTIN QUANTIFICATION: NEGATIVE
SL AMB JWH018 METABOLITE QUANTIFICATION: NEGATIVE NG/ML
SL AMB JWH073 METABOLITE QUANTIFICATION: NEGATIVE NG/ML
SL AMB MDMB-FUBINACA-M1 METABOLITE QUANTIFICATION: NEGATIVE NG/ML
SL AMB METHOXYACETYL FENTANYL QUANTIFICATION: NORMAL NG/ML
SL AMB METHYLONE QUANTIFICATION: NEGATIVE NG/ML
SL AMB N-DESMETHYL U-47700 QUANTIFICATION: NORMAL NG/ML
SL AMB N-DESMETHYL-TRAMADOL QUANTIFICATION: NEGATIVE NG/ML
SL AMB PHENTERMINE QUANTIFICATION: NEGATIVE NG/ML
SL AMB PREGABALIN QUANTIFICATION: NORMAL
SL AMB RCS4 METABOLITE QUANTIFICATION: NEGATIVE NG/ML
SL AMB RITALINIC ACID QUANTIFICATION: NEGATIVE NG/ML
SL AMB U-47700 QUANTIFICATION: NORMAL NG/ML
SMOOTH MUSCLE AB TITR SER IF: NEGATIVE NG/ML
SPECIMEN DRAWN SERPL: NEGATIVE NG/ML
SPECIMEN PH ACCEPTABLE UR: NORMAL
TAPENTADOL UR QL CFM: NEGATIVE NG/ML
TEMAZEPAM UR QL CFM: NEGATIVE NG/ML
TEMAZEPAM UR QL CFM: NEGATIVE NG/ML
TRAMADOL UR QL CFM: NEGATIVE NG/ML
URATE/CREAT 24H UR: NEGATIVE NG/ML

## 2022-05-19 NOTE — PROGRESS NOTES
Assessment and Plan:   Hawk Echevarria is a 75 y o   female who presents as a Rheumatology consult referred by Ari Lucas DO to establish care for her multiple autoimmune diseases; over the years, has been diagnosed with systemic sclerosis, Sjogren's syndrome, and necrotizing myopathy  Used to follow with outside rheumatologist Dr Shira Giron in [place], [place] for several years  Patient is on several DMARDs, including methotrexate, hydroxychloroquine, and Myfortic for her necrotizing myopathy  Full autoimmune workup labs ordered below, which interestingly all returned negative, including scleroderma antibodies, Sjogren's antibodies, RF/anti-CCP, anti-RNP, and BRYNN  Curious to know what patient's antibody profile was in past medical record; release of health information form signed by patient in clinic  Do labs  Continue methotrexate 5 tabs once a week  Continue folic 2 tabs daily  Continue Myfortic 2 tab twice a day for necrotizing myopathy prescribed by outside rheumatologist at [address] hydroxychloroquine daily; continue regular eye exams  Continue Lyrica 100mg po bid for fibromyalgia  Continue calcium and Vit  D for osteopenia  Take fluconazole tab for possible oral thrush  Release of Health Information form filled out to obtain records from patient's previous rheumatologist Dr Ari Diaz pre-exposure prophylaxis Evusheld injection at [address]    Return to clinic in 4 months    Plan:  Diagnoses and all orders for this visit:    Scleroderma progressive ([facility] )  -     Ambulatory Referral to Rheumatology    MCTD (mixed connective tissue disease) ([facility] )  -     Ambulatory Referral to Rheumatology  -     Cancel: RNP Antibody  -     Cancel: MISCELLANEOUS LAB TEST  -     Sjogren's Antibodies  -     RF Screen w/ Reflex to Titer  -     Cyclic citrul peptide antibody, IgG  -     fluconazole (DIFLUCAN) 150 mg tablet;  Take 1 tablet (150 mg total) by mouth once for 1 dose  -     Sedimentation rate, automated  -     C-reactive protein  -     C4 complement  -     C3 complement  -     Cancel: Sedimentation rate, automated  -     Protein / creatinine ratio, urine  -     Urinalysis with microscopic  -     Anti-DNA antibody, double-stranded  -     Centromere Antibody  -     Anti-scleroderma antibody  -     CBC and differential  -     Comprehensive metabolic panel    Necrotizing myopathy  -     Ambulatory Referral to Rheumatology    Polymyositis [facility])  -     Ambulatory Referral to Rheumatology    Situational anxiety    Attention deficit disorder, unspecified hyperactivity presence    Primary insomnia    Depression, unspecified depression type    Follicular lymphoma, unspecified follicular lymphoma type, unspecified body region [facility])    Pulmonary emphysema, unspecified emphysema type ([place] [address] )    Low vitamin D level  -     Vitamin D 25 hydroxy    PAD (peripheral artery disease) (HCC)    Disorder of bone, unspecified   -     Vitamin D 25 hydroxy  -     DXA bone density spine hip and pelvis; Future    Osteopenia, unspecified location  -     DXA bone density spine hip and pelvis; Future    Immunocompromised state ([place] [address] )  -     Cancel: tixagevimab-cilgavimab (Evusheld) 600 mg combined    High risk medication use  -     Cancel: tixagevimab-cilgavimab (Evusheld) 600 mg combined    Other orders  -     Discontinue: dicyclomine (BENTYL) 20 mg tablet  -     oxymorphone (OPANA) 5 MG tablet; Take 5 mg by mouth (Patient not taking: Reported on 5/20/2022)  High risk medication use - Benefits and risks of methotrexate use, including but not limited to gastrointestinal disturbances such as diarrhea, hair loss, fatigue, stomatitis, leukopenia, lung hypersensitivity reaction, hepatotoxicity, and lymphoma were discussed with the patient  CBC,CMP will be regularly monitored  Patient was prescribed Folic Acid 2 mg po daily to take while on methotrexate to help prevent side effects    Patient counseled on abstinence from alcohol while using methotrexate  Benefits and risks of hydroxychloroquine, including but not limited to retinal toxicity, corneal deposits, gastrointestinal side effects, and headaches were discussed with the patient  The need for a regular eye exam to monitor for ocular toxicity while on this medication was also explained to the patient  Follow-up plan: Return to clinic in 4 months        HPI  Shasta Davis is a 75 y o   female who presents as a Rheumatology consult referred by Caleb Najjar, DO to establish care for her multiple autoimmune diseases; over the years, has been diagnosed with systemic sclerosis, Sjogren's syndrome, and necrotizing myopathy  Diagnosed with systemic sclerosis in 2003  Was seeing outside rheumatologist Dr Brionna Felix in [place], [place] since then  Has intermittent Sjogren's syndrome, Raynaud's symptoms, fibromyalgia, and osteopenia  Currently lives in [place], [place]  Was hospitalized in 4/6-4/12th at [facility]; stroke ruled out  Couldn't walk and talk right; inconclusive workup  Has been experiencing fatigue, pain, arthritis  Has been diagnosed with necrotizing myopathy based on muscle biopsy in 2019; sees a Torito Mahoney rheumatologist, on Myfortic 760mg po bid  Doesn't have dental insurance; needs all her teeth pulled, has a lot of cavities and teeth ar broken  Her Sjogren's syndrome seems to be controlled lately; makes tears and saliva  Review of Systems  Review of Systems   Constitutional: Positive for fatigue  Negative for fever and unexpected weight change  HENT: Negative for mouth sores  Respiratory: Negative for cough and shortness of breath  Cardiovascular: Negative for chest pain and leg swelling  Gastrointestinal: Negative for abdominal pain, constipation and diarrhea  Musculoskeletal: Positive for arthralgias, back pain, joint swelling, myalgias and neck pain  Skin: Positive for color change and rash     Neurological: Negative for weakness  Hematological: Negative for adenopathy  Psychiatric/Behavioral: Negative for sleep disturbance  Reviewed and agree      Allergies  Allergies   Allergen Reactions    Duloxetine Other (See Comments)     Mental psychosis    Revatio [Sildenafil] Other (See Comments)     mental status changes    Savella [Milnacipran] Other (See Comments)     Feeling out of it    Sulfamethoxazole-Trimethoprim Rash and GI Intolerance    Tedizolid Rash       Home Medications    Current Outpatient Medications:     buPROPion (Wellbutrin XL) 150 mg 24 hr tablet, Take 1 tablet (150 mg total) by mouth every morning, Disp: 90 tablet, Rfl: 1    calcium-vitamin D (OSCAL) 250-125 MG-UNIT per tablet, Take 2 tablets by mouth every evening, Disp: 180 tablet, Rfl: 1    chlordiazePOXIDE (LIBRIUM) 5 mg capsule, Take 1 capsule (5 mg total) by mouth daily as needed (abdominal pain/spasm) Rx changed upon her d/c from [facility] - she is weaning her Oxymorphone, Disp: 30 capsule, Rfl: 3    clindamycin (CLEOCIN T) 1 % lotion, , Disp: , Rfl:     clobetasol (TEMOVATE) 0 05 % ointment, , Disp: , Rfl:     esomeprazole (NexIUM) 40 MG capsule, Take 1 capsule (40 mg total) by mouth 2 (two) times a day before meals, Disp: 180 capsule, Rfl: 3    folic acid (FOLVITE) 1 mg tablet, Take 2 tablets (2 mg total) by mouth daily, Disp: 180 tablet, Rfl: 3    hydroxychloroquine (PLAQUENIL) 200 mg tablet, Take 1 tablet (200 mg total) by mouth daily, Disp: 90 tablet, Rfl: 3    Multiple Vitamin (MULTIVITAMIN) tablet, Take 1 tablet by mouth daily, Disp: , Rfl:     mycophenolate (MYFORTIC) 360 MG TBEC, Take 2 tablets (720 mg total) by mouth 2 (two) times a day, Disp: 120 tablet, Rfl: 0    naproxen (Naprosyn) 500 mg tablet, Take 1 tablet (500 mg total) by mouth 2 (two) times a day as needed for moderate pain, Disp: 90 tablet, Rfl: 1    oxymorphone (OPANA) 5 MG tablet, Take 1 tablet (5 mg total) by mouth every other day Max Daily Amount: 5 mg, Disp: 15 tablet, Rfl: 0    pregabalin (LYRICA) 100 mg capsule, Take 1 capsule (100 mg total) by mouth 2 (two) times a day, Disp: 180 capsule, Rfl: 0    acetaminophen (TYLENOL) 650 mg CR tablet, Take 1 tablet (650 mg total) by mouth every 4 (four) hours as needed for mild pain or moderate pain, Disp: 90 tablet, Rfl: 1    amphetamine-dextroamphetamine (ADDERALL, 15MG,) 15 MG tablet, Take 1 tablet (15 mg total) by mouth 2 (two) times a day Max Daily Amount: 30 mg, Disp: 60 tablet, Rfl: 0    dicyclomine (BENTYL) 20 mg tablet, Take 1 tablet (20 mg total) by mouth 3 (three) times a day before meals, Disp: 270 tablet, Rfl: 0    ferrous sulfate 324 (65 Fe) mg, Take 1 tablet (324 mg total) by mouth 2 (two) times a day before meals (Patient not taking: Reported on 5/20/2022), Disp: 30 tablet, Rfl: 5    levothyroxine 175 mcg tablet, TAKE 1 TABLET (175 MCG TOTAL) BY MOUTH DAILY IN THE EARLY MORNING, Disp: 30 tablet, Rfl: 1    naloxone (NARCAN) 4 mg/0 1 mL nasal spray, Administer 1 spray into a nostril   If no response after 2-3 minutes, give another dose in the other nostril using a new spray , Disp: 1 each, Rfl: 1    OLANZapine (ZyPREXA) 5 mg tablet, Take 1 tablet (5 mg total) by mouth daily at bedtime (Patient not taking: Reported on 5/20/2022), Disp: 90 tablet, Rfl: 0    oxymorphone (OPANA) 5 MG tablet, Take 5 mg by mouth (Patient not taking: Reported on 5/20/2022), Disp: , Rfl:     traZODone (DESYREL) 100 mg tablet, Take 1 5 tablets (150 mg total) by mouth daily at bedtime, Disp: 90 tablet, Rfl: 1    Past Medical History  Past Medical History:   Diagnosis Date    ADHD     Cellulitis of foot     Last assessed 10/24/16    Change in mental status     Last assessed 12/01/15    Chronic fatigue     PLAZA (dyspnea on exertion)     Last assessed 12/01/15    Fibromyalgia     Folliculitis     Last assessed 10/06/14    GERD (gastroesophageal reflux disease)     Non Hodgkin's [facility] )     Osteopenia     Raynaud's disease     [facility]oderma [facility] )     Systemic sclerosis (HCC)     Thyroid cancer ([facility] ) 10/04/2018       Past Surgical History   Past Surgical History:   Procedure Laterality Date    BACK SURGERY      BLADDER SURGERY      BONE MARROW BIOPSY      CARDIAC CATHETERIZATION      HEMORRHOID SURGERY      HYSTERECTOMY  02/04/2004    KNEE SURGERY      NASAL SEPTUM SURGERY      ROTATOR CUFF REPAIR      THYROIDECTOMY N/A 10/4/2018    Procedure: TOTAL THYROIDECTOMY;  Surgeon: Giovana Thibodeaux MD;  [facility]: [facility];  Service: Surgical Oncology    TONSILLECTOMY      TOTAL THYROIDECTOMY         Family History    Family History   Problem Relation Age of Onset    Arthritis Mother     Diabetes Mother     Coronary artery disease Father    Kya Mulbryant Arthritis Sister     Asthma Sister     Crohn's disease Sister     Other Brother         myocardial ischemia    No Known Problems Daughter     No Known Problems Maternal Grandmother     No Known Problems Maternal Grandfather     No Known Problems Paternal Grandmother     No Known Problems Paternal Grandfather     No Known Problems Sister     Ovarian cancer Maternal Aunt     No Known Problems Maternal Aunt     No Known Problems Maternal Aunt     No Known Problems Maternal Aunt     No Known Problems Maternal Aunt     No Known Problems Maternal Aunt     No Known Problems Paternal Aunt        Social History  Social History     Substance and Sexual Activity   Alcohol Use Never    Alcohol/week: 0 0 standard drinks     Social History     Substance and Sexual Activity   Drug Use Never     Social History     Tobacco Use   Smoking Status Former Smoker   Smokeless Tobacco Never Used   Tobacco Comment    quit 40 years ago        Objective:  Vitals:    05/20/22 1040   BP: 127/71   Pulse: 80   Weight: 49 kg (108 lb)   Height: 5' 1" (1 549 m)       Physical Exam  Constitutional:       General: She is not in acute distress  HENT:      Head: Normocephalic and atraumatic  Mouth/Throat:      Mouth: Mucous membranes are dry  Comments: Dry mucus membranes; poor dentition  Eyes:      Conjunctiva/sclera: Conjunctivae normal    Cardiovascular:      Rate and Rhythm: Normal rate and regular rhythm  Heart sounds: S1 normal and S2 normal      No friction rub  Pulmonary:      Effort: Pulmonary effort is normal  No respiratory distress  Breath sounds: Normal breath sounds  No wheezing, rhonchi or rales  Musculoskeletal:         General: Tenderness present  Cervical back: Neck supple  Comments: Right wrist and right knee tenderness; lower back tenderness   Skin:     General: Skin is warm and dry  Coloration: Skin is not pale  Findings: Erythema present  No rash  Comments: Malar erythema   Neurological:      Mental Status: She is alert  Mental status is at baseline  Psychiatric:         Mood and Affect: Mood normal          Behavior: Behavior normal        Reviewed labs and imaging  Imaging:   XR right knee 10/14/21  Severe tricompartmental osteoarthritis, most pronounced in the medial tibiofemoral compartment  CXR 7/15/21  No acute cardiopulmonary disease  CT abdomen pelvis wo contrast 10/16/20  No acute intra-abdominal pathology  Labs:   Appointment on 05/20/2022   Component Date Value Ref Range Status    Antinuclear Antibodies, IFA 05/20/2022 Negative   Final                                         Negative   <1:80                                       Borderline  1:80                                       Positive   >1:80  ICAP nomenclature: AC-0  For more information about Hep-2 cell patterns use  ANApatterns  org, the official website for the International  Consensus on Antinuclear Antibody (BRYNN) Patterns (ICAP)      HERMAN Wilkerson (SM) Ab 05/20/2022 <0 2  0 0 - 0 9 AI Final    HERMAN RNP Ab 05/20/2022 0 2  0 0 - 0 9 AI Final   Office Visit on 05/20/2022   Component Date Value Ref Range Status    SS-A (RO) Ab 05/20/2022 <0 2  0 0 - 0 9 AI Final    SS-B (LA) Ab 05/20/2022 <0 2  0 0 - 0 9 AI Final    Rheumatoid Factor 05/20/2022 Negative  Negative Final    Cyclic Citrullinated Peptide Ab  05/20/2022 0 9  See comment Final    Sed Rate 05/20/2022 1  0 - 29 mm/hour Final    CRP 05/20/2022 <3 0  <3 0 mg/L Final    C4, COMPLEMENT 05/20/2022 19 0  10 0 - 40 0 mg/dL Final    C3 Complement 05/20/2022 102 0  90 0 - 180 0 mg/dL Final    Creatinine, Ur 05/20/2022 40 4  mg/dL Final    Protein Urine Random 05/20/2022 10  mg/dL Final    Prot/Creat Ratio, Ur 05/20/2022 0 25 (A) 0 00 - 0 10 Final    Color, UA 05/20/2022 Light Yellow   Final    Clarity, UA 05/20/2022 Clear   Final    Specific Gravity, UA 05/20/2022 1 014  1 003 - 1 030 Final    pH, UA 05/20/2022 6 5  4 5, 5 0, 5 5, 6 0, 6 5, 7 0, 7 5, 8 0 Final    Leukocytes, UA 05/20/2022 Negative  Negative Final    Nitrite, UA 05/20/2022 Negative  Negative Final    Protein, UA 05/20/2022 Negative  Negative mg/dl Final    Glucose, UA 05/20/2022 Negative  Negative mg/dl Final    Ketones, UA 05/20/2022 Negative  Negative mg/dl Final    Urobilinogen, UA 05/20/2022 <2 0  <2 0 mg/dl mg/dl Final    Bilirubin, UA 05/20/2022 Negative  Negative Final    Blood, UA 05/20/2022 Negative  Negative Final    RBC, UA 05/20/2022 1-2  None Seen, 1-2 /hpf Final    WBC, UA 05/20/2022 1-2  None Seen, 1-2 /hpf Final    Epithelial Cells 05/20/2022 Occasional  None Seen, Occasional /hpf Final    Bacteria, UA 05/20/2022 None Seen  None Seen, Occasional /hpf Final    ds DNA Ab 05/20/2022 <1  0 - 9 IU/mL Final                                       Negative      <5                                     Equivocal  5 - 9                                     Positive      >9    Anti-Centromere B Antibodies 05/20/2022 <0 2  0 0 - 0 9 AI Final    Scleroderma SCL-70 05/20/2022 <0 2  0 0 - 0 9 AI Final    Vit D, 25-Hydroxy 05/20/2022 35 4  30 0 - 100 0 ng/mL Final    WBC 05/20/2022 5 44  4 31 - 10 16 Thousand/uL Final    RBC 05/20/2022 4 33  3 81 - 5 12 Million/uL Final    Hemoglobin 05/20/2022 13 0  11 5 - 15 4 g/dL Final    Hematocrit 05/20/2022 40 0  34 8 - 46 1 % Final    MCV 05/20/2022 92  82 - 98 fL Final    MCH 05/20/2022 30 0  26 8 - 34 3 pg Final    MCHC 05/20/2022 32 5  31 4 - 37 4 g/dL Final    RDW 05/20/2022 15 4 (A) 11 6 - 15 1 % Final    MPV 05/20/2022 9 8  8 9 - 12 7 fL Final    Platelets 52/16/6705 360  149 - 390 Thousands/uL Final    nRBC 05/20/2022 0  /100 WBCs Final    Neutrophils Relative 05/20/2022 54  43 - 75 % Final    Immat GRANS % 05/20/2022 1  0 - 2 % Final    Lymphocytes Relative 05/20/2022 27  14 - 44 % Final    Monocytes Relative 05/20/2022 10  4 - 12 % Final    Eosinophils Relative 05/20/2022 6  0 - 6 % Final    Basophils Relative 05/20/2022 2 (A) 0 - 1 % Final    Neutrophils Absolute 05/20/2022 3 02  1 85 - 7 62 Thousands/µL Final    Immature Grans Absolute 05/20/2022 0 03  0 00 - 0 20 Thousand/uL Final    Lymphocytes Absolute 05/20/2022 1 45  0 60 - 4 47 Thousands/µL Final    Monocytes Absolute 05/20/2022 0 55  0 17 - 1 22 Thousand/µL Final    Eosinophils Absolute 05/20/2022 0 30  0 00 - 0 61 Thousand/µL Final    Basophils Absolute 05/20/2022 0 09  0 00 - 0 10 Thousands/µL Final    Sodium 05/20/2022 136  136 - 145 mmol/L Final    Potassium 05/20/2022 4 8  3 5 - 5 3 mmol/L Final    Chloride 05/20/2022 104  100 - 108 mmol/L Final    CO2 05/20/2022 29  21 - 32 mmol/L Final    ANION GAP 05/20/2022 3 (A) 4 - 13 mmol/L Final    BUN 05/20/2022 21  5 - 25 mg/dL Final    Creatinine 05/20/2022 0 75  0 60 - 1 30 mg/dL Final    Standardized to IDMS reference method    Glucose 05/20/2022 99  65 - 140 mg/dL Final    If the patient is fasting, the ADA then defines impaired fasting glucose as > 100 mg/dL and diabetes as > or equal to 123 mg/dL  Specimen collection should occur prior to Sulfasalazine administration due to the potential for falsely depressed results   Specimen collection should occur prior to Sulfapyridine administration due to the potential for falsely elevated results   Calcium 05/20/2022 9 2  8 3 - 10 1 mg/dL Final    AST 05/20/2022 25  5 - 45 U/L Final    Specimen collection should occur prior to Sulfasalazine administration due to the potential for falsely depressed results   ALT 05/20/2022 58  12 - 78 U/L Final    Specimen collection should occur prior to Sulfasalazine and/or Sulfapyridine administration due to the potential for falsely depressed results   Alkaline Phosphatase 05/20/2022 62  46 - 116 U/L Final    Total Protein 05/20/2022 6 6  6 4 - 8 2 g/dL Final    Albumin 05/20/2022 4 0  3 5 - 5 0 g/dL Final    Total Bilirubin 05/20/2022 0 29  0 20 - 1 00 mg/dL Final    Use of this assay is not recommended for patients undergoing treatment with eltrombopag due to the potential for falsely elevated results   eGFR 05/20/2022 82  ml/min/1 73sq m Final   Office Visit on 05/04/2022   Component Date Value Ref Range Status    RESULT ALL_PRESC MEDS SP INSTRNS 03/67/8206 Not Applicable   Final    Comment:  cis-3-methylfentanyl: Fentanyl Negative  4-ANPP: Fentanyl Negative  4-FiBF: Fen  tanyl Negative  Acetyl fentanyl: Fentanyl Negative  Acetyl norfentanyl: Fentanyl   Negative  Acryl fentanyl: Fentanyl Negative  Butyryl fentanyl: Fentanyl Negativ  e  Carfentanil: Fentanyl Negative  Cyclopropyl fentanyl: Fentanyl Negative  Fura  nyl fentanyl: Fentanyl Negative  Methoxyacetyl fentanyl: Fentanyl Negative  U-47  700: Fentanyl Negative  N-desmethyl X-[id number]: Fentanyl Negative        Amphetamine Quantification 05/04/2022 positive-[id number]  100 ng/mL Final    Methamphetamine Quantification 05/04/2022 negative  100 ng/mL Final    Butalbital Quantification 05/04/2022 negative  200 ng/mL Final    Phenobarbital Quantification 05/04/2022 negative  200 ng/mL Final    Secobarbital Quantification 05/04/2022 negative  200 ng/mL Final    Temazepam Quantification 05/04/2022 negative  50 ng/mL Final    Oxazepam Quantification 05/04/2022 positive-59 057  40 ng/mL Final    Alpha-Hydroxyalprazolam Quantifica* 05/04/2022 negative  20 ng/mL Final    7-Amino-Clonazepam Quantification * 05/04/2022 negative  20 ng/mL Final    Nordiazepam Quantification 05/04/2022 negative  40 ng/mL Final    Temazepam Quantification 05/04/2022 negative  50 ng/mL Final    Oxazepam Quantification 05/04/2022 positive-59 057  40 ng/mL Final    Lorazepam Quantification 05/04/2022 negative  40 ng/mL Final    Oxazepam Quantification 05/04/2022 positive-59 057  40 ng/mL Final    Gabapentin Quantification 05/04/2022 negative  400 Final    PREGABALIN QUANTIFICATION 05/04/2022 positive-[id number]  400 Final    Cocaine metabolite Quantification 05/04/2022 negative  50 ng/mL Final    6-MERVIN (Heroin metabolite) Quantifi* 05/04/2022 negative  10 ng/mL Final    Buprenorphine Quantification 05/04/2022 negative  5 ng/mL Final    Norbuprenorphine Quantification 05/04/2022 negative  20 ng/mL Final    Dextrorphan (Dextromethorphan meta* 05/04/2022 negative  50 ng/mL Final    Meperidine Quantification 05/04/2022 negative  50 ng/mL Final    Normeperidine Quantification 05/04/2022 negative  50 ng/mL Final    Naltrexone Quantification 05/04/2022 negative  10 ng/mL Final    NALTREXOL (NALTREXONE METABOLITE) * 05/04/2022 negative  10 ng/mL Final    Fentanyl Quantification 05/04/2022 negative  1 ng/mL Final    Norfentanyl Quantification 05/04/2022 negative  8 ng/mL Final    3-methyl-fentanyl Quantification 05/04/2022 Fen Neg  2 ng/mL Final    4-ANPP Quantification 05/04/2022 Fen Neg  2 ng/mL Final    4-FiBF Quantification 05/04/2022 Fen Neg  2 ng/mL Final    Acetyl fentanyl Quantification 05/04/2022 Fen Neg  2 ng/mL Final    Acetyl norfentanyl Quantification 05/04/2022 Fen Neg  5 ng/mL Final    Acryl fentanyl Quantification 05/04/2022 Fen Neg  1 ng/mL Final    Butryl fentanyl Quantification 05/04/2022 Fen Neg  1 ng/mL Final  Carfentanil Quantification 05/04/2022 Fen Neg  2 ng/mL Final    Cyclopropyl fentanyl Quantification 05/04/2022 Fen Neg  1 ng/mL Final    Furanyl fentanyl Quantification 05/04/2022 Fen Neg  2 ng/mL Final    Methoxyacetyl fentanyl Quantificat* 05/04/2022 Fen Neg  2 ng/mL Final    [id number] Quantification 05/04/2022 Fen Neg  2 ng/mL Final    N-desmethyl [id number] Quantification 05/04/2022 Fen Neg  2 ng/mL Final    Methadone Quantification 05/04/2022 negative  100 ng/mL Final    EDDP (Methadone metabolite) Quanti* 05/04/2022 negative  100 ng/mL Final    Oxycodone Quantification 05/04/2022 negative  50 ng/mL Final    Noroxycodone Quantification 05/04/2022 negative  50 ng/mL Final    Oxymorphone Quantification 05/04/2022 positive-[id number] 817  50 ng/mL Final    Oxymorphone Quantification 05/04/2022 positive-134 817  50 ng/mL Final    Tapentadol Quantification 05/04/2022 negative  50 ng/mL Final    Tramadol Quantification 05/04/2022 negative  100 ng/mL Final    O-desmethyl-tramadol Quantification 05/04/2022 negative  100 ng/mL Final    N-DESMETHYL-TRAMADOL QUANTIFICATION 05/04/2022 negative  100 ng/mL Final    Codeine Quantification 05/04/2022 negative  50 ng/mL Final    Morphine Quantification 05/04/2022 negative  50 ng/mL Final    Hydrocodone Quantification 05/04/2022 negative  50 ng/mL Final    Norhydrocodone Quantification 05/04/2022 negative  50 ng/mL Final    Hydromorphone Quantification 05/04/2022 negative  50 ng/mL Final    EUTYLONE QUANTIFICATION 05/04/2022 negative  10 ng/mL Final    METHYLONE QUANTIFICATION 05/04/2022 negative  3 ng/mL Final    Mitragynine (Kratom alkaloid) Haroldo* 05/04/2022 negative  1 ng/mL Final    4-WS-Wigbkaymkja (Kratom alkaloid)* 05/04/2022 negative  1 ng/mL Final    5F-ADB-M7 05/04/2022 negative  10 ng/mL Final    [id number] METABOLITE QUANTIFI* 05/04/2022 negative  10 ng/mL Final    [id number] METABOLITE QUANTI* 05/04/2022 negative  10 ng/mL Final    [id number] metabolite Quantification 05/04/2022 negative  10 ng/mL Final    [id number] metabolite Quantification 05/04/2022 negative  10 ng/mL Final    RCS4 METABOLITE QUANTIFICATION 05/04/2022 negative  10 ng/mL Final    OCU85/ METABOLITE QUANTIFICAT* 05/04/2022 negative  10 ng/mL Final    Methylphenidate Quantification 05/04/2022 negative  50 ng/mL Final    RITALINIC ACID QUANTIFICATION 05/04/2022 negative  50 ng/mL Final    PHENTERMINE QUANTIFICATION 05/04/2022 negative  50 ng/mL Final    OXIDANT 05/04/2022 normal-0  <200 ug/mL ug/mL Final    CREATININE 05/04/2022 normal-27 5  >20 mg/dL mg/dL Final    pH 05/04/2022 normal-6 1  4 5 - 9 5 Final    SPECIFIC GRAVITY URINE 05/04/2022 normal-1 008  1 003 - 1 035 Final   Office Visit on 02/08/2022   Component Date Value Ref Range Status    Amphetamine Screen, Ur 02/08/2022 Positive (A) [id number] Final    Please Note:                                                          01  Amphetamine test includes Amphetamine and Methamphetamine  Amphetamine               Positive        A                         01  Amphetamine Conf, MS, UR    >5000              ng/mL Kpwxhj=242       01    Methamphetamine           Negative            Ofqijn=516            01    Barbiturate Screen, Ur 02/08/2022 Negative  Sopyad=026 ng/mL Final    Cannabinoid Scrn, Ur 02/08/2022 Negative  Cutoff=50 ng/mL Final    Methadone Screen, Urine 02/08/2022 Negative  Bprpbc=810 ng/mL Final    Opiate Scrn, Ur 02/08/2022 Negative  Xrcpbp=126 ng/mL Final    Opiate test includes Codeine and Morphine only      Phencyclidine (PCP), Qual, Ur 02/08/2022 Negative  Cutoff=25 ng/mL Final    Benzodiazepines 02/08/2022 Positive (A) Ejwjrd=224 Final      Nordiazepam               Negative            Rmubix=911            01    Oxazepam                  Positive        A                         01  Oxazepam Conf, MS, UR       617                ng/mL Afruhm=394       01    OH-Alprazolam Negative            Ccbwfz=135            01    Cocaine (Metab ) Urine 02/08/2022 Negative  Boowlt=644 ng/mL Final    Propoxyphene Screen, Urine 02/08/2022 Negative  Ianqgl=620 ng/mL Final   Telemedicine on 12/03/2021   Component Date Value Ref Range Status    SARS-CoV-2 12/03/2021 Negative  Negative Final

## 2022-05-20 ENCOUNTER — TELEPHONE (OUTPATIENT)
Dept: RHEUMATOLOGY | Facility: CLINIC | Age: 67
End: 2022-05-20

## 2022-05-20 ENCOUNTER — APPOINTMENT (OUTPATIENT)
Dept: LAB | Facility: MEDICAL CENTER | Age: 67
End: 2022-05-20
Payer: MEDICARE

## 2022-05-20 ENCOUNTER — OFFICE VISIT (OUTPATIENT)
Dept: RHEUMATOLOGY | Facility: CLINIC | Age: 67
End: 2022-05-20
Payer: MEDICARE

## 2022-05-20 VITALS
SYSTOLIC BLOOD PRESSURE: 127 MMHG | WEIGHT: 108 LBS | BODY MASS INDEX: 20.39 KG/M2 | DIASTOLIC BLOOD PRESSURE: 71 MMHG | HEART RATE: 80 BPM | HEIGHT: 61 IN

## 2022-05-20 DIAGNOSIS — C82.90 FOLLICULAR LYMPHOMA, UNSPECIFIED FOLLICULAR LYMPHOMA TYPE, UNSPECIFIED BODY REGION (HCC): ICD-10-CM

## 2022-05-20 DIAGNOSIS — I73.9 PAD (PERIPHERAL ARTERY DISEASE) (HCC): ICD-10-CM

## 2022-05-20 DIAGNOSIS — M35.1 MCTD (MIXED CONNECTIVE TISSUE DISEASE) (HCC): ICD-10-CM

## 2022-05-20 DIAGNOSIS — F98.8 ATTENTION DEFICIT DISORDER, UNSPECIFIED HYPERACTIVITY PRESENCE: ICD-10-CM

## 2022-05-20 DIAGNOSIS — F41.8 SITUATIONAL ANXIETY: ICD-10-CM

## 2022-05-20 DIAGNOSIS — M85.80 OSTEOPENIA, UNSPECIFIED LOCATION: ICD-10-CM

## 2022-05-20 DIAGNOSIS — F32.A DEPRESSION, UNSPECIFIED DEPRESSION TYPE: ICD-10-CM

## 2022-05-20 DIAGNOSIS — D84.9 IMMUNOCOMPROMISED STATE (HCC): ICD-10-CM

## 2022-05-20 DIAGNOSIS — Z79.899 HIGH RISK MEDICATION USE: ICD-10-CM

## 2022-05-20 DIAGNOSIS — M89.9 DISORDER OF BONE, UNSPECIFIED: ICD-10-CM

## 2022-05-20 DIAGNOSIS — G72.89 NECROTIZING MYOPATHY: ICD-10-CM

## 2022-05-20 DIAGNOSIS — M35.1 MIXED CONNECTIVE TISSUE DISEASE (HCC): Primary | ICD-10-CM

## 2022-05-20 DIAGNOSIS — R79.89 LOW VITAMIN D LEVEL: ICD-10-CM

## 2022-05-20 DIAGNOSIS — F51.01 PRIMARY INSOMNIA: ICD-10-CM

## 2022-05-20 DIAGNOSIS — M33.20 POLYMYOSITIS (HCC): ICD-10-CM

## 2022-05-20 DIAGNOSIS — J43.9 PULMONARY EMPHYSEMA, UNSPECIFIED EMPHYSEMA TYPE (HCC): ICD-10-CM

## 2022-05-20 DIAGNOSIS — M34.0 SCLERODERMA PROGRESSIVE (HCC): Primary | ICD-10-CM

## 2022-05-20 LAB
25(OH)D3 SERPL-MCNC: 35.4 NG/ML (ref 30–100)
BACTERIA UR QL AUTO: NORMAL /HPF
BASOPHILS # BLD AUTO: 0.09 THOUSANDS/ΜL (ref 0–0.1)
BASOPHILS NFR BLD AUTO: 2 % (ref 0–1)
BILIRUB UR QL STRIP: NEGATIVE
CLARITY UR: CLEAR
COLOR UR: NORMAL
CREAT UR-MCNC: 40.4 MG/DL
EOSINOPHIL # BLD AUTO: 0.3 THOUSAND/ΜL (ref 0–0.61)
EOSINOPHIL NFR BLD AUTO: 6 % (ref 0–6)
ERYTHROCYTE [DISTWIDTH] IN BLOOD BY AUTOMATED COUNT: 15.4 % (ref 11.6–15.1)
ERYTHROCYTE [SEDIMENTATION RATE] IN BLOOD: 1 MM/HOUR (ref 0–29)
GLUCOSE UR STRIP-MCNC: NEGATIVE MG/DL
HCT VFR BLD AUTO: 40 % (ref 34.8–46.1)
HGB BLD-MCNC: 13 G/DL (ref 11.5–15.4)
HGB UR QL STRIP.AUTO: NEGATIVE
IMM GRANULOCYTES # BLD AUTO: 0.03 THOUSAND/UL (ref 0–0.2)
IMM GRANULOCYTES NFR BLD AUTO: 1 % (ref 0–2)
KETONES UR STRIP-MCNC: NEGATIVE MG/DL
LEUKOCYTE ESTERASE UR QL STRIP: NEGATIVE
LYMPHOCYTES # BLD AUTO: 1.45 THOUSANDS/ΜL (ref 0.6–4.47)
LYMPHOCYTES NFR BLD AUTO: 27 % (ref 14–44)
MCH RBC QN AUTO: 30 PG (ref 26.8–34.3)
MCHC RBC AUTO-ENTMCNC: 32.5 G/DL (ref 31.4–37.4)
MCV RBC AUTO: 92 FL (ref 82–98)
MONOCYTES # BLD AUTO: 0.55 THOUSAND/ΜL (ref 0.17–1.22)
MONOCYTES NFR BLD AUTO: 10 % (ref 4–12)
NEUTROPHILS # BLD AUTO: 3.02 THOUSANDS/ΜL (ref 1.85–7.62)
NEUTS SEG NFR BLD AUTO: 54 % (ref 43–75)
NITRITE UR QL STRIP: NEGATIVE
NON-SQ EPI CELLS URNS QL MICRO: NORMAL /HPF
NRBC BLD AUTO-RTO: 0 /100 WBCS
PH UR STRIP.AUTO: 6.5 [PH]
PLATELET # BLD AUTO: 360 THOUSANDS/UL (ref 149–390)
PMV BLD AUTO: 9.8 FL (ref 8.9–12.7)
PROT UR STRIP-MCNC: NEGATIVE MG/DL
PROT UR-MCNC: 10 MG/DL
PROT/CREAT UR: 0.25 MG/G{CREAT} (ref 0–0.1)
RBC # BLD AUTO: 4.33 MILLION/UL (ref 3.81–5.12)
RBC #/AREA URNS AUTO: NORMAL /HPF
SP GR UR STRIP.AUTO: 1.01 (ref 1–1.03)
UROBILINOGEN UR STRIP-ACNC: <2 MG/DL
WBC # BLD AUTO: 5.44 THOUSAND/UL (ref 4.31–10.16)
WBC #/AREA URNS AUTO: NORMAL /HPF

## 2022-05-20 PROCEDURE — 85025 COMPLETE CBC W/AUTO DIFF WBC: CPT | Performed by: INTERNAL MEDICINE

## 2022-05-20 PROCEDURE — 85652 RBC SED RATE AUTOMATED: CPT | Performed by: INTERNAL MEDICINE

## 2022-05-20 PROCEDURE — 99205 OFFICE O/P NEW HI 60 MIN: CPT | Performed by: INTERNAL MEDICINE

## 2022-05-20 PROCEDURE — 84156 ASSAY OF PROTEIN URINE: CPT | Performed by: INTERNAL MEDICINE

## 2022-05-20 PROCEDURE — 82570 ASSAY OF URINE CREATININE: CPT | Performed by: INTERNAL MEDICINE

## 2022-05-20 PROCEDURE — 86430 RHEUMATOID FACTOR TEST QUAL: CPT | Performed by: INTERNAL MEDICINE

## 2022-05-20 PROCEDURE — 81001 URINALYSIS AUTO W/SCOPE: CPT | Performed by: INTERNAL MEDICINE

## 2022-05-20 PROCEDURE — 86160 COMPLEMENT ANTIGEN: CPT | Performed by: INTERNAL MEDICINE

## 2022-05-20 PROCEDURE — 86235 NUCLEAR ANTIGEN ANTIBODY: CPT

## 2022-05-20 PROCEDURE — 80053 COMPREHEN METABOLIC PANEL: CPT | Performed by: INTERNAL MEDICINE

## 2022-05-20 PROCEDURE — 86200 CCP ANTIBODY: CPT | Performed by: INTERNAL MEDICINE

## 2022-05-20 PROCEDURE — 86140 C-REACTIVE PROTEIN: CPT | Performed by: INTERNAL MEDICINE

## 2022-05-20 PROCEDURE — 86225 DNA ANTIBODY NATIVE: CPT | Performed by: INTERNAL MEDICINE

## 2022-05-20 PROCEDURE — 86235 NUCLEAR ANTIGEN ANTIBODY: CPT | Performed by: INTERNAL MEDICINE

## 2022-05-20 PROCEDURE — 82306 VITAMIN D 25 HYDROXY: CPT | Performed by: INTERNAL MEDICINE

## 2022-05-20 PROCEDURE — 36415 COLL VENOUS BLD VENIPUNCTURE: CPT | Performed by: INTERNAL MEDICINE

## 2022-05-20 PROCEDURE — 86038 ANTINUCLEAR ANTIBODIES: CPT

## 2022-05-20 RX ORDER — DICYCLOMINE HCL 20 MG
TABLET ORAL
COMMUNITY
Start: 2022-04-14 | End: 2022-06-02 | Stop reason: SDUPTHER

## 2022-05-20 RX ORDER — FLUCONAZOLE 150 MG/1
150 TABLET ORAL ONCE
Qty: 1 TABLET | Refills: 0 | Status: SHIPPED | OUTPATIENT
Start: 2022-05-20 | End: 2022-05-20

## 2022-05-20 RX ORDER — OXYMORPHONE HYDROCHLORIDE 5 MG/1
5 TABLET ORAL
COMMUNITY
Start: 2022-05-06 | End: 2022-06-14 | Stop reason: ALTCHOICE

## 2022-05-20 NOTE — PATIENT INSTRUCTIONS
Do labs  Continue methotrexate 5 tabs once a week  Continue folic 2 tabs daily  Continue Myfortic 2 tab twice a day  Continue hydroxychloroquine daily; continue regular eye exams  Continue Lyrica 100mg bid  Continue calcium and Vit   D  Take fluconazole tab  Will request records from Dr Chucky Rae scan  Will call you about Chicaeld injection at [address]    Return to clinic in 4 months

## 2022-05-20 NOTE — TELEPHONE ENCOUNTER
Patient has mixed connective tissue disease, systemic sclerosis, necrotizing myositis, and Rheumatoid arthritis; is on Myfortic  She should qualify for Evusheld injection for pre exposure COVID prophylaxis  She would like to receive this at the [address] [facility] infusion center  I placed the order in the system; please call infusion center and get it scheduled then confirm with patient  Let me know the date in case I need to make a date change  Thanks!

## 2022-05-21 LAB
ALBUMIN SERPL BCP-MCNC: 4 G/DL (ref 3.5–5)
ALP SERPL-CCNC: 62 U/L (ref 46–116)
ALT SERPL W P-5'-P-CCNC: 58 U/L (ref 12–78)
ANA TITR SER IF: NEGATIVE {TITER}
ANION GAP SERPL CALCULATED.3IONS-SCNC: 3 MMOL/L (ref 4–13)
AST SERPL W P-5'-P-CCNC: 25 U/L (ref 5–45)
BILIRUB SERPL-MCNC: 0.29 MG/DL (ref 0.2–1)
BUN SERPL-MCNC: 21 MG/DL (ref 5–25)
C3 SERPL-MCNC: 102 MG/DL (ref 90–180)
C4 SERPL-MCNC: 19 MG/DL (ref 10–40)
CALCIUM SERPL-MCNC: 9.2 MG/DL (ref 8.3–10.1)
CHLORIDE SERPL-SCNC: 104 MMOL/L (ref 100–108)
CO2 SERPL-SCNC: 29 MMOL/L (ref 21–32)
CREAT SERPL-MCNC: 0.75 MG/DL (ref 0.6–1.3)
CRP SERPL QL: <3 MG/L
DSDNA AB SER-ACNC: <1 IU/ML (ref 0–9)
ENA RNP AB SER-ACNC: 0.2 AI (ref 0–0.9)
ENA SCL70 AB SER-ACNC: <0.2 AI (ref 0–0.9)
ENA SM AB SER-ACNC: <0.2 AI (ref 0–0.9)
ENA SS-A AB SER-ACNC: <0.2 AI (ref 0–0.9)
ENA SS-B AB SER-ACNC: <0.2 AI (ref 0–0.9)
GFR SERPL CREATININE-BSD FRML MDRD: 82 ML/MIN/1.73SQ M
GLUCOSE SERPL-MCNC: 99 MG/DL (ref 65–140)
POTASSIUM SERPL-SCNC: 4.8 MMOL/L (ref 3.5–5.3)
PROT SERPL-MCNC: 6.6 G/DL (ref 6.4–8.2)
RHEUMATOID FACT SER QL LA: NEGATIVE
SODIUM SERPL-SCNC: 136 MMOL/L (ref 136–145)

## 2022-05-23 LAB — CENTROMERE B AB SER-ACNC: <0.2 AI (ref 0–0.9)

## 2022-05-25 ENCOUNTER — APPOINTMENT (RX ONLY)
Dept: URBAN - NONMETROPOLITAN AREA CLINIC 4 | Facility: CLINIC | Age: 67
Setting detail: DERMATOLOGY
End: 2022-05-25

## 2022-05-25 DIAGNOSIS — L57.0 ACTINIC KERATOSIS: ICD-10-CM

## 2022-05-25 DIAGNOSIS — L82.1 OTHER SEBORRHEIC KERATOSIS: ICD-10-CM

## 2022-05-25 DIAGNOSIS — L259 CONTACT DERMATITIS AND OTHER ECZEMA, UNSPECIFIED CAUSE: ICD-10-CM | Status: IMPROVED

## 2022-05-25 DIAGNOSIS — L82.0 INFLAMED SEBORRHEIC KERATOSIS: ICD-10-CM

## 2022-05-25 PROBLEM — L30.8 OTHER SPECIFIED DERMATITIS: Status: ACTIVE | Noted: 2022-05-25

## 2022-05-25 LAB — CCP AB SER IA-ACNC: 0.9

## 2022-05-25 PROCEDURE — 17110 DESTRUCTION B9 LES UP TO 14: CPT

## 2022-05-25 PROCEDURE — 17003 DESTRUCT PREMALG LES 2-14: CPT | Mod: 59

## 2022-05-25 PROCEDURE — ? COUNSELING

## 2022-05-25 PROCEDURE — ? LIQUID NITROGEN

## 2022-05-25 PROCEDURE — 17000 DESTRUCT PREMALG LESION: CPT | Mod: 59

## 2022-05-25 PROCEDURE — 99213 OFFICE O/P EST LOW 20 MIN: CPT | Mod: 25

## 2022-05-25 PROCEDURE — ? MEDICARE ABN

## 2022-05-25 PROCEDURE — ? TREATMENT REGIMEN

## 2022-05-25 ASSESSMENT — LOCATION SIMPLE DESCRIPTION DERM
LOCATION SIMPLE: CHEST
LOCATION SIMPLE: LEFT HAND
LOCATION SIMPLE: RIGHT ZYGOMA
LOCATION SIMPLE: LEFT FOOT
LOCATION SIMPLE: RIGHT HAND
LOCATION SIMPLE: LEFT UPPER BACK
LOCATION SIMPLE: RIGHT TEMPLE
LOCATION SIMPLE: RIGHT CHEEK

## 2022-05-25 ASSESSMENT — LOCATION DETAILED DESCRIPTION DERM
LOCATION DETAILED: RIGHT CENTRAL TEMPLE
LOCATION DETAILED: RIGHT DORSAL MIDDLE METACARPOPHALANGEAL JOINT
LOCATION DETAILED: LEFT RADIAL DORSAL HAND
LOCATION DETAILED: LEFT MEDIAL DORSAL FOOT
LOCATION DETAILED: LEFT MEDIAL SUPERIOR CHEST
LOCATION DETAILED: RIGHT SUPERIOR LATERAL MALAR CHEEK
LOCATION DETAILED: LEFT MEDIAL UPPER BACK
LOCATION DETAILED: RIGHT LATERAL MALAR CHEEK
LOCATION DETAILED: RIGHT LATERAL ZYGOMA

## 2022-05-25 ASSESSMENT — LOCATION ZONE DERM
LOCATION ZONE: FEET
LOCATION ZONE: HAND
LOCATION ZONE: TRUNK
LOCATION ZONE: FACE

## 2022-05-25 NOTE — PROCEDURE: LIQUID NITROGEN
Consent: The patient's consent was obtained including but not limited to risks of crusting, scabbing, blistering, scarring, darker or lighter pigmentary change, recurrence, incomplete removal and infection.
Spray Paint Text: The liquid nitrogen was applied to the skin utilizing a spray paint frosting technique.
Show Topical Anesthesia Variable?: Yes
Spray Paint Technique: No
Medical Necessity Information: It is in your best interest to select a reason for this procedure from the list below. All of these items fulfill various CMS LCD requirements except the new and changing color options.
Detail Level: Zone
Medical Necessity Clause: This procedure was medically necessary because the lesions that were treated were:
Number Of Freeze-Thaw Cycles: 1 freeze-thaw cycle
Post-Care Instructions: I reviewed with the patient in detail post-care instructions. Patient is to wear sunprotection, and avoid picking at any of the treated lesions. Pt may apply Vaseline to crusted or scabbing areas.
Duration Of Freeze Thaw-Cycle (Seconds): 5

## 2022-05-26 ENCOUNTER — TELEPHONE (OUTPATIENT)
Dept: FAMILY MEDICINE CLINIC | Facility: CLINIC | Age: 67
End: 2022-05-26

## 2022-06-02 DIAGNOSIS — K58.8 OTHER IRRITABLE BOWEL SYNDROME: ICD-10-CM

## 2022-06-02 DIAGNOSIS — E03.9 HYPOTHYROIDISM, UNSPECIFIED TYPE: ICD-10-CM

## 2022-06-02 RX ORDER — DICYCLOMINE HCL 20 MG
20 TABLET ORAL
Qty: 270 TABLET | Refills: 0 | Status: SHIPPED | OUTPATIENT
Start: 2022-06-02 | End: 2022-06-14 | Stop reason: ALTCHOICE

## 2022-06-02 RX ORDER — DICYCLOMINE HYDROCHLORIDE 10 MG/1
CAPSULE ORAL
Qty: 90 CAPSULE | Refills: 5 | OUTPATIENT
Start: 2022-06-02

## 2022-06-02 RX ORDER — LEVOTHYROXINE SODIUM 175 UG/1
175 TABLET ORAL
Qty: 30 TABLET | Refills: 1 | Status: SHIPPED | OUTPATIENT
Start: 2022-06-02 | End: 2022-08-02

## 2022-06-02 NOTE — TELEPHONE ENCOUNTER
Pt needs a repeat TSH -- I placed in system  She can have this drawn  If need be, we can send to her [place] mail [place] [facility] [place] will let her know results when I see them back        Saran Alexandra, DO

## 2022-06-06 DIAGNOSIS — M35.1 MCTD (MIXED CONNECTIVE TISSUE DISEASE) (HCC): ICD-10-CM

## 2022-06-06 DIAGNOSIS — R53.83 OTHER FATIGUE: ICD-10-CM

## 2022-06-06 DIAGNOSIS — M34.0 SCLERODERMA PROGRESSIVE (HCC): ICD-10-CM

## 2022-06-06 RX ORDER — DEXTROAMPHETAMINE SACCHARATE, AMPHETAMINE ASPARTATE, DEXTROAMPHETAMINE SULFATE AND AMPHETAMINE SULFATE 3.75; 3.75; 3.75; 3.75 MG/1; MG/1; MG/1; MG/1
15 TABLET ORAL 2 TIMES DAILY
Qty: 60 TABLET | Refills: 0 | Status: SHIPPED | OUTPATIENT
Start: 2022-06-06 | End: 2022-06-14 | Stop reason: DRUGHIGH

## 2022-06-07 ENCOUNTER — TELEPHONE (OUTPATIENT)
Dept: OBGYN CLINIC | Facility: HOSPITAL | Age: 67
End: 2022-06-07

## 2022-06-08 ENCOUNTER — HOSPITAL ENCOUNTER (OUTPATIENT)
Dept: BONE DENSITY | Facility: HOSPITAL | Age: 67
Discharge: HOME | End: 2022-06-08
Attending: INTERNAL MEDICINE
Payer: MEDICARE

## 2022-06-08 ENCOUNTER — HOSPITAL ENCOUNTER (OUTPATIENT)
Dept: MAMMOGRAPHY | Facility: HOSPITAL | Age: 67
Discharge: HOME | End: 2022-06-08
Attending: FAMILY MEDICINE
Payer: MEDICARE

## 2022-06-08 VITALS — HEIGHT: 61 IN | BODY MASS INDEX: 20.39 KG/M2 | WEIGHT: 108 LBS

## 2022-06-08 DIAGNOSIS — Z12.31 SCREENING MAMMOGRAM FOR BREAST CANCER: ICD-10-CM

## 2022-06-08 DIAGNOSIS — M89.9 DISORDER OF BONE, UNSPECIFIED: ICD-10-CM

## 2022-06-08 DIAGNOSIS — M80.08XA AGE-RELATED OSTEOPOROSIS WITH CURRENT PATHOLOGICAL FRACTURE, VERTEBRA(E), INITIAL ENCOUNTER FOR FRACTURE (HCC): ICD-10-CM

## 2022-06-08 DIAGNOSIS — M85.80 OSTEOPENIA, UNSPECIFIED LOCATION: ICD-10-CM

## 2022-06-08 PROCEDURE — 77080 DXA BONE DENSITY AXIAL: CPT

## 2022-06-08 PROCEDURE — 77063 BREAST TOMOSYNTHESIS BI: CPT

## 2022-06-08 PROCEDURE — 77067 SCR MAMMO BI INCL CAD: CPT

## 2022-06-09 ENCOUNTER — HOSPITAL ENCOUNTER (OUTPATIENT)
Dept: INFUSION CENTER | Facility: HOSPITAL | Age: 67
Discharge: HOME | End: 2022-06-09
Attending: INTERNAL MEDICINE
Payer: MEDICARE

## 2022-06-09 VITALS
RESPIRATION RATE: 16 BRPM | TEMPERATURE: 96.8 F | DIASTOLIC BLOOD PRESSURE: 54 MMHG | HEART RATE: 74 BPM | SYSTOLIC BLOOD PRESSURE: 104 MMHG | OXYGEN SATURATION: 99 %

## 2022-06-09 DIAGNOSIS — F51.01 PRIMARY INSOMNIA: ICD-10-CM

## 2022-06-09 DIAGNOSIS — D84.9 IMMUNOCOMPROMISED STATE (HCC): Primary | ICD-10-CM

## 2022-06-09 DIAGNOSIS — Z79.899 HIGH RISK MEDICATION USE: ICD-10-CM

## 2022-06-09 PROCEDURE — M0220 HB TIXAGEV AND CILGAV INF ADMIN: HCPCS | Performed by: INTERNAL MEDICINE

## 2022-06-09 RX ORDER — TRAZODONE HYDROCHLORIDE 100 MG/1
150 TABLET ORAL
Qty: 90 TABLET | Refills: 1
Start: 2022-06-09 | End: 2022-07-14 | Stop reason: SDUPTHER

## 2022-06-09 RX ADMIN — AZD7442 600 MG COMBINED: KIT at 15:13

## 2022-06-09 NOTE — PROGRESS NOTES
Patient's one hour observation period completed without incident  Vital signs are stable  Discharge instructions reviewed with patient  Patient verbalized understanding of above  Patient discharged in stable condition  Patient ambulated off unit without incident  All personal belongings taken with patient

## 2022-06-09 NOTE — PROGRESS NOTES
Patient arrived to unit to receive Evusheld injection  Patient's vital signs taken and documented  Evusheld EUA reviewed and copy given to patient  Patient denies receiving COVID 19 vaccine within the last 2 weeks  Patient gave verbal consent to receive Evusheld

## 2022-06-09 NOTE — PROGRESS NOTES
Patient received Evusheld injection without incident  Evusheld injection administered to right and left gluteal   Patient tolerated well  Observation period started  Will continue to monitor patient for one hour

## 2022-06-14 ENCOUNTER — OFFICE VISIT (OUTPATIENT)
Dept: FAMILY MEDICINE CLINIC | Facility: CLINIC | Age: 67
End: 2022-06-14
Payer: MEDICARE

## 2022-06-14 VITALS
RESPIRATION RATE: 16 BRPM | HEIGHT: 61 IN | BODY MASS INDEX: 20.01 KG/M2 | DIASTOLIC BLOOD PRESSURE: 60 MMHG | TEMPERATURE: 98 F | SYSTOLIC BLOOD PRESSURE: 122 MMHG | HEART RATE: 75 BPM | WEIGHT: 106 LBS | OXYGEN SATURATION: 96 %

## 2022-06-14 DIAGNOSIS — M99.08 SOMATIC DYSFUNCTION OF RIB: ICD-10-CM

## 2022-06-14 DIAGNOSIS — K58.2 IRRITABLE BOWEL SYNDROME WITH BOTH CONSTIPATION AND DIARRHEA: ICD-10-CM

## 2022-06-14 DIAGNOSIS — M99.05 SOMATIC DYSFUNCTION OF PELVIS REGION: ICD-10-CM

## 2022-06-14 DIAGNOSIS — D84.9 IMMUNOCOMPROMISED STATE (HCC): ICD-10-CM

## 2022-06-14 DIAGNOSIS — M99.07 SOMATIC DYSFUNCTION OF BOTH UPPER EXTREMITIES: ICD-10-CM

## 2022-06-14 DIAGNOSIS — M99.01 CERVICOTHORACIC SOMATIC DYSFUNCTION: ICD-10-CM

## 2022-06-14 DIAGNOSIS — M99.06 SOMATIC DYSFUNCTION OF BOTH LOWER EXTREMITIES: ICD-10-CM

## 2022-06-14 DIAGNOSIS — G89.29 CHRONIC BILATERAL LOW BACK PAIN WITHOUT SCIATICA: ICD-10-CM

## 2022-06-14 DIAGNOSIS — F90.0 ATTENTION DEFICIT HYPERACTIVITY DISORDER (ADHD), PREDOMINANTLY INATTENTIVE TYPE: Primary | ICD-10-CM

## 2022-06-14 DIAGNOSIS — M54.50 CHRONIC BILATERAL LOW BACK PAIN WITHOUT SCIATICA: ICD-10-CM

## 2022-06-14 DIAGNOSIS — M99.03 SOMATIC DYSFUNCTION OF LUMBOSACRAL REGION: ICD-10-CM

## 2022-06-14 DIAGNOSIS — M99.00 SOMATIC DYSFUNCTION OF HEAD REGION: ICD-10-CM

## 2022-06-14 DIAGNOSIS — M25.559 HIP PAIN: ICD-10-CM

## 2022-06-14 PROBLEM — M81.0 AGE-RELATED OSTEOPOROSIS WITHOUT CURRENT PATHOLOGICAL FRACTURE: Status: ACTIVE | Noted: 2022-06-14

## 2022-06-14 PROCEDURE — 99214 OFFICE O/P EST MOD 30 MIN: CPT | Performed by: FAMILY MEDICINE

## 2022-06-14 PROCEDURE — 98929 OSTEOPATH MANJ 9-10 REGIONS: CPT | Performed by: FAMILY MEDICINE

## 2022-06-14 RX ORDER — DEXTROAMPHETAMINE SACCHARATE, AMPHETAMINE ASPARTATE, DEXTROAMPHETAMINE SULFATE AND AMPHETAMINE SULFATE 2.5; 2.5; 2.5; 2.5 MG/1; MG/1; MG/1; MG/1
10 TABLET ORAL DAILY PRN
Qty: 30 TABLET | Refills: 0 | Status: SHIPPED | OUTPATIENT
Start: 2022-06-14 | End: 2022-07-14 | Stop reason: SDUPTHER

## 2022-06-14 RX ORDER — FLUCONAZOLE 150 MG/1
TABLET ORAL
COMMUNITY
Start: 2022-05-20 | End: 2022-06-14 | Stop reason: ALTCHOICE

## 2022-06-14 RX ORDER — DEXTROAMPHETAMINE SACCHARATE, AMPHETAMINE ASPARTATE MONOHYDRATE, DEXTROAMPHETAMINE SULFATE AND AMPHETAMINE SULFATE 7.5; 7.5; 7.5; 7.5 MG/1; MG/1; MG/1; MG/1
30 CAPSULE, EXTENDED RELEASE ORAL EVERY MORNING
Qty: 30 CAPSULE | Refills: 0 | Status: SHIPPED | OUTPATIENT
Start: 2022-06-14 | End: 2022-07-14 | Stop reason: SDUPTHER

## 2022-06-14 RX ORDER — DICYCLOMINE HYDROCHLORIDE 10 MG/1
10 CAPSULE ORAL
Qty: 180 CAPSULE | Refills: 0
Start: 2022-06-14 | End: 2022-07-05

## 2022-06-14 NOTE — PROGRESS NOTES
Assessment/Plan:    OMT  Performed by: Elizabeth Melton DO  Authorized by: Elizabeth Melton DO   Universal Protocol:  Consent: Verbal consent obtained  Risks and benefits: risks, benefits and alternatives were discussed  Consent given by: patient  Time out: Immediately prior to procedure a "time out" was called to verify the correct patient, procedure, equipment, support staff and site/side marked as required  Patient understanding: patient states understanding of the procedure being performed  Patient consent: the patient's understanding of the procedure matches consent given  Radiology Images displayed and confirmed  If images not available, report reviewed: imaging studies available  Patient identity confirmed: verbally with patient        Procedure Details:     Region evaluated and treated:  Head, Cervical, Thoracic, Lumbar, Sacrum/Pelvis, Pelvis Innominate, Right Extremities, Left Extremities and Ribs    Extremity Information  Extremities: left upper extremity and left lower extremity    Extremity Information  Extremities: right upper extremity and right lower extremity    Thoracic Information  Thoracic Region: T1 - T4, T5 - T9 and T10 - T12  Total Regions Treated:  9      Procedure note:     - Risk and benefits of OMT were explained to patient; patient expresses understanding and wishes to proceed  OMT performed to below areas consisting of: Soft tissue, myofascial, ME, HVLA, articulatory, FPR, Strain/Counterstrain  Patient tolerated OMT well, without adverse effect noted in office  OMT lead to some resolution of somatic dysfunction and of current symptoms  Patient instructed to drink @ least 64 oz of water today after treatment to help with the potential increase in soreness that may be experienced initially  May take Tylenol/Ibuprofen prn  RTC as scheduled or sooner for acute issues       1  Attention deficit hyperactivity disorder (ADHD), predominantly inattentive type  - she is feeling lack of energy, difficulty keeping on taks since dose reduced  Was on 30 mg po bid -- this was excess dosing  Since decreased, notes changes  We will switch to XR formulation and also add 10 mg in the afternoon -- total dose increase of 10 mg/day  She is on board with this  - amphetamine-dextroamphetamine (ADDERALL XR, 30MG,) 30 MG 24 hr capsule; Take 1 capsule (30 mg total) by mouth every morning Max Daily Amount: 30 mg  Dispense: 30 capsule; Refill: 0  - amphetamine-dextroamphetamine (ADDERALL, 10MG,) 10 mg tablet; Take 1 tablet (10 mg total) by mouth daily as needed (afternoon let down) Max Daily Amount: 10 mg  Dispense: 30 tablet; Refill: 0    2  Irritable bowel syndrome with both constipation and diarrhea  - stable -- just taking bentyl 10 mg po bid  Tolerating well  - dicyclomine (BENTYL) 10 mg capsule; Take 1 capsule (10 mg total) by mouth 2 (two) times a day before meals  Dispense: 180 capsule; Refill: 0    3  Hip pain/4  Chronic bilateral low back pain without sciatica  - she has significant OA, polyarticular  She is seeing Rheumatology as well and has had labs for inflammatory arthritis  She is on her regimen for this  She does have Osteoporosis of her L hip - noted on most recent DEXA  She is on Ca2+ and Vit D  There were obvious mechanical findings on exam today with SD  She was open to manipulation, had before and felt great benefit and desired to trial again today  She tolerated tx well with obj/subjective improvement in her symptoms -- had immediate relief of her R hip/knee pain  - she is off of her morphine sulfate and doing well, in general   She cont on bid Lyrica dosing  She can take tylenol/nsaids prn, ice/heat, etc   Can certainly re-entertain PT when she is healed from her oral surgery  5  Somatic dysfunction - 9 regions  - see above  We discussed it is OK for her oral surgeon to supply pain meds for her as they feel necessary after her surgery    This will NOT be a violation with me as we have discussed  RTC in ~ 3w for OMT visit for back/hip/knee pain    Patient/Caretaker verbalized understanding and were in agreement with today's assessment and plan  Time was taken to address any questions patient/caretaker had  Indication/Risks/Benefits of medication(s) as prescribed were discussed with the patient/caretaker  The patient verbalized understanding and agreement and elects to take medications as prescribed  Time was taken to answer any questions the patient/caretaker may have had  Chief Complaint   Patient presents with    Follow-up     Hip and pelvis pain and want to talk to you about MRI and having sharp pain        Subjective:      Patient ID: Collins Andrews is a 75 y o  female  Patient with pain in her R hip pain and it is radiating to her RLE  There is a constant, sharp/stabbing pain and goes down to her medial knee and ant thigh  She has known arthritis in her R knee  She has had rotated pelvis in the past and she feels it is related  She feels her R leg and arm are shorter than the L  She has seen a chiropractor before and this helped her but they had a gentle approach  PT did help her as well and did some manipulation and this helped her, more  She is willing to do PT again after her teeth get addressed and this is tomorrow  She also desires a trial of manipulation again and is willing to trial here with me today - she did not know I did manipulation  She is not weak, no falls  No bowel or bladder symptoms relating to this pain  She is having hand pain and difficulty opening them in AM   She has known OA  She has h/o L RC shoulder repair, she did have tendonitis/bursitis in the R shoulder and was doing PT and that was helpful  She is telling me that her lounge clothes and bed clothes smelled like "cheese "  She has been drinking apple cider vinegar and this has not happened again    She noted she was taking a lot of dairy leading into this  She feels her eyesight and concentration is better as well  She is not with vaginal discharge, etc   She has these clothes in her car for me, if need be  Again, no further issues since adjusting her diet  She has no fevers/sweats, she is cold natured  She is no longer on her morphine sulfate, she has weaned herself off of this  She has her tooth extraction tomorrow and is worried about pain control - she has a signed contract with me  She is going to affordable [facility] in [place] - she will then have immediate dentures - they will adjust, accordingly  The following portions of the patient's history were reviewed and updated as appropriate: allergies, current medications, past family history, past medical history, past social history, past surgical history and problem list     Review of Systems      Objective:    /60 (BP Location: Left arm, Patient Position: Sitting, Cuff Size: Large)   Pulse 75   Temp 98 °F (36 7 °C) (Temporal)   Resp 16   Ht 5' 1" (1 549 m)   Wt 48 1 kg (106 lb)   SpO2 96%   BMI 20 03 kg/m²        Physical Exam  Vitals and nursing note reviewed  Constitutional:       General: She is not in acute distress  Appearance: Normal appearance  She is not ill-appearing  HENT:      Head: Normocephalic and atraumatic  Mouth/Throat:      Mouth: Mucous membranes are dry  Eyes:      Conjunctiva/sclera: Conjunctivae normal    Cardiovascular:      Rate and Rhythm: Normal rate and regular rhythm  Heart sounds: Normal heart sounds  Pulmonary:      Effort: Pulmonary effort is normal       Breath sounds: Normal breath sounds  No wheezing, rhonchi or rales  Abdominal:      Palpations: Abdomen is soft  Musculoskeletal:         General: Deformity (DIP/PIP nodes noted) present  Cervical back: Neck supple        Comments: TART changes noted paraspinal musculature  There is dec ROM of the L-spine and pain with passive ROM of the hip  There is short R leg noted on exam       Osteopathic Exam:  Pos standing flexion test on R  Pos seated flexion test on R  [id number]  T1-4NsLrR  L4-5ErRsR  R innominate ant/outward rotation  L on L sacral torsion  B/l hip impaction  C3-4FrLsL  OA EsLrR  Elev rib 1 - R   Ribs 4-7 exhalation dysfunction - L   R scapula rLsR  L shoulder rRsL    Strength is preserved throughout    Lymphadenopathy:      Cervical: No cervical adenopathy  Neurological:      General: No focal deficit present  Mental Status: She is alert and oriented to person, place, and time  Psychiatric:         Mood and Affect: Mood normal          Behavior: Behavior normal          Thought Content:  Thought content normal          Judgment: Judgment normal

## 2022-06-14 NOTE — PATIENT INSTRUCTIONS
Today we performed Osteopathic Manual Medicine - similar approach to chiropracty just I am a fully licensed physican!!

## 2022-06-27 ENCOUNTER — RA CDI HCC (OUTPATIENT)
Dept: OTHER | Facility: HOSPITAL | Age: 67
End: 2022-06-27

## 2022-06-27 NOTE — PROGRESS NOTES
[address]  coding opportunities       Chart reviewed, no opportunity found: CHART REVIEWED, NO OPPORTUNITY FOUND        Patients Insurance     Medicare Insurance: Medicare

## 2022-06-28 ENCOUNTER — PATIENT OUTREACH (OUTPATIENT)
Dept: FAMILY MEDICINE CLINIC | Facility: CLINIC | Age: 67
End: 2022-06-28

## 2022-06-28 NOTE — PROGRESS NOTES
NEREIDA IYER outreached patient to discuss any needs  Patient stated she got her teeth out and is starting the process to get dentures  She is seeing a psychiatrist today  Patient stated she made diet changes and feels "pretty darn good"  She denied having any needs at this time  SW Surveillance episode resolved and NEREIDA CM removed from care team  NEREIDA IYER will remain available for any future needs

## 2022-07-05 ENCOUNTER — OFFICE VISIT (OUTPATIENT)
Dept: FAMILY MEDICINE CLINIC | Facility: CLINIC | Age: 67
End: 2022-07-05
Payer: MEDICARE

## 2022-07-05 VITALS
SYSTOLIC BLOOD PRESSURE: 122 MMHG | TEMPERATURE: 98.3 F | HEIGHT: 61 IN | OXYGEN SATURATION: 97 % | WEIGHT: 102.6 LBS | HEART RATE: 88 BPM | BODY MASS INDEX: 19.37 KG/M2 | DIASTOLIC BLOOD PRESSURE: 70 MMHG | RESPIRATION RATE: 18 BRPM

## 2022-07-05 DIAGNOSIS — M99.03 SOMATIC DYSFUNCTION OF LUMBOSACRAL REGION: ICD-10-CM

## 2022-07-05 DIAGNOSIS — M99.01 CERVICOTHORACIC SOMATIC DYSFUNCTION: ICD-10-CM

## 2022-07-05 DIAGNOSIS — M25.559 HIP PAIN: Primary | ICD-10-CM

## 2022-07-05 DIAGNOSIS — M99.07 SOMATIC DYSFUNCTION OF BOTH UPPER EXTREMITIES: ICD-10-CM

## 2022-07-05 DIAGNOSIS — M54.50 CHRONIC BILATERAL LOW BACK PAIN WITHOUT SCIATICA: ICD-10-CM

## 2022-07-05 DIAGNOSIS — M99.06 SOMATIC DYSFUNCTION OF BOTH LOWER EXTREMITIES: ICD-10-CM

## 2022-07-05 DIAGNOSIS — M99.05 SOMATIC DYSFUNCTION OF PELVIS REGION: ICD-10-CM

## 2022-07-05 DIAGNOSIS — G89.29 CHRONIC BILATERAL LOW BACK PAIN WITHOUT SCIATICA: ICD-10-CM

## 2022-07-05 DIAGNOSIS — M99.00 SOMATIC DYSFUNCTION OF HEAD REGION: ICD-10-CM

## 2022-07-05 DIAGNOSIS — M99.08 SOMATIC DYSFUNCTION OF RIB: ICD-10-CM

## 2022-07-05 DIAGNOSIS — B35.1 FUNGAL INFECTION OF TOENAIL: ICD-10-CM

## 2022-07-05 PROCEDURE — 98929 OSTEOPATH MANJ 9-10 REGIONS: CPT | Performed by: FAMILY MEDICINE

## 2022-07-05 PROCEDURE — 99214 OFFICE O/P EST MOD 30 MIN: CPT | Performed by: FAMILY MEDICINE

## 2022-07-05 NOTE — PROGRESS NOTES
Assessment/Plan:    OMT  Performed by: Sara Millard DO  Authorized by: Sara Millard DO   Universal Protocol:  Consent: Verbal consent obtained  Risks and benefits: risks, benefits and alternatives were discussed  Consent given by: patient  Time out: Immediately prior to procedure a "time out" was called to verify the correct patient, procedure, equipment, support staff and site/side marked as required  Patient understanding: patient states understanding of the procedure being performed  Patient consent: the patient's understanding of the procedure matches consent given  Procedure consent: procedure consent matches procedure scheduled  Relevant documents: relevant documents present and verified  Test results: test results available and properly labeled  Radiology Images displayed and confirmed  If images not available, report reviewed: imaging studies available  Patient identity confirmed: verbally with patient        Procedure Details:     Region evaluated and treated:  Head, Left Extremities, Ribs, Cervical, Lumbar, Sacrum/Pelvis, Thoracic, Pelvis Innominate and Right Extremities    Extremity Information  Extremities: left upper extremity and left lower extremity    Extremity Information  Extremities: right upper extremity and right lower extremity    Thoracic Information  Thoracic Region: T1 - T4, T5 - T9 and T10 - T12  Total Regions Treated:  9      Procedure note:     - Risk and benefits of OMT were explained to patient; patient expresses understanding and wishes to proceed  OMT performed to below areas consisting of: Soft tissue, myofascial, ME, HVLA, articulatory, FPR, Strain/Counterstrain  Patient tolerated OMT well, without adverse effect noted in office  OMT lead to some resolution of somatic dysfunction and of current symptoms  Patient instructed to drink @ least 64 oz of water today after treatment to help with the potential increase in soreness that may be experienced initially   May take Tylenol/Ibuprofen prn  RTC as scheduled or sooner for acute issues  Hip pain/4  Chronic bilateral low back pain without sciatica  - she has significant OA, polyarticular  She is seeing Rheumatology as well and has had labs for inflammatory arthritis  She is on her regimen for this  She does have Osteoporosis of her L hip - noted on most recent DEXA  She is on Ca2+ and Vit D  There were obvious mechanical findings on exam today with SD  Had good results with tx at last visit  She has immediate improvement in her symptoms  - she is off of her morphine sulfate and doing well, in general   She cont on bid Lyrica dosing  She can take tylenol/nsaids prn, ice/heat, etc   Can certainly re-entertain PT when she is healed from her oral surgery  She is struggling to recover from this surgery; this has been hard on her  5  Somatic dysfunction - 9 regions  - see above  Fungal infection of toenail  - mild; will trial penlac for ~ 8w and monitor     - ciclopirox (PENLAC) 8 % solution; Apply topically daily at bedtime  Dispense: 6 6 mL; Refill: 1     RTC in ~ 3w for OMT visit and will need separate for AMW/CDM  Patient/Caretaker verbalized understanding and were in agreement with today's assessment and plan  Time was taken to address any questions patient/caretaker had  Indication/Risks/Benefits of medication(s) as prescribed were discussed with the patient/caretaker  The patient verbalized understanding and agreement and elects to take medications as prescribed  Time was taken to answer any questions the patient/caretaker may have had  Chief Complaint   Patient presents with    Back Pain     Consideration for OMT        Subjective:      Patient ID: Todd Mae is a 75 y o  female  Had dental procedure about 3 weeks ago - extractions and is being fitted for dentures  She cont to have issues with this and is working through this  She has pain, throughout 2/2 this  Would like consideration for manipulation  Patient with pain in her R hip pain and it is radiating to her RLE  There is a constant, sharp/stabbing pain and goes down to her medial knee and ant thigh  She has known arthritis in her R knee - has some swelling of this on occasion  She has had rotated pelvis in the past and she feels it is related  She feels her R leg and arm are shorter than the L  She has seen a chiropractor before and this helped her but they had a gentle approach  PT did help her as well and did some manipulation and this helped her, more  She is willing to do PT again after her teeth get addressed and feeling better in this regard  She does try some home manipulation which she feels is beneficial   She is not weak, no falls  No bowel or bladder symptoms relating to this pain  She is having hand pain and difficulty opening them in AM   She has known OA  She has h/o L RC shoulder repair, she did have tendonitis/bursitis in the R shoulder and was doing PT and that was helpful  She is worried about a rash today on her skin and her toenails/fingernails are "brittle "  She is wondering what she can do  She is very stressed of late due to her health and not able to eat due to oral surgery, etc       The following portions of the patient's history were reviewed and updated as appropriate: allergies, current medications, past family history, past medical history, past social history, past surgical history and problem list     Review of Systems   All other systems reviewed and are negative  Objective:    /70   Pulse 88   Temp 98 3 °F (36 8 °C)   Resp 18   Ht 5' 1" (1 549 m)   Wt 46 5 kg (102 lb 9 6 oz)   SpO2 97%   BMI 19 39 kg/m²        Physical Exam  Vitals and nursing note reviewed  Constitutional:       General: She is not in acute distress  Appearance: Normal appearance  She is not ill-appearing  HENT:      Head: Normocephalic and atraumatic  Mouth/Throat:      Mouth: Mucous membranes are dry  Comments: With dentures    Eyes:      Conjunctiva/sclera: Conjunctivae normal    Cardiovascular:      Rate and Rhythm: Normal rate and regular rhythm  Heart sounds: Normal heart sounds  Pulmonary:      Effort: Pulmonary effort is normal       Breath sounds: Normal breath sounds  No wheezing, rhonchi or rales  Musculoskeletal:         General: Deformity (DIP/PIP nodes noted) present  Cervical back: Neck supple  Comments: TART changes noted paraspinal musculature  There is dec ROM of the L-spine and pain with passive ROM of the hip    L Iliac Crest is higher   L short leg today - prior exam, R LE was shorter     Osteopathic Exam:  Pos standing flexion test on R  Pos seated flexion test on R  [id number]  T1-4NsLrR  L4-5ErRsR  R innominate ext rotation/ant   L on L sacral torsion  C3-4FrLsL  OA EsLrR  Elev rib 1 - L  Ribs 4-7 exhalation dysfunction - L   R scapula rLsR  L shoulder rRsL  R ant talus/L external tibia rotation     Strength is preserved throughout    Lymphadenopathy:      Cervical: No cervical adenopathy  Neurological:      General: No focal deficit present  Mental Status: She is alert and oriented to person, place, and time  Psychiatric:         Mood and Affect: Mood normal          Behavior: Behavior normal          Thought Content:  Thought content normal          Judgment: Judgment normal

## 2022-07-14 ENCOUNTER — TELEPHONE (OUTPATIENT)
Dept: FAMILY MEDICINE CLINIC | Facility: CLINIC | Age: 67
End: 2022-07-14

## 2022-07-14 DIAGNOSIS — F51.01 PRIMARY INSOMNIA: Primary | ICD-10-CM

## 2022-07-14 DIAGNOSIS — F90.0 ATTENTION DEFICIT HYPERACTIVITY DISORDER (ADHD), PREDOMINANTLY INATTENTIVE TYPE: ICD-10-CM

## 2022-07-14 DIAGNOSIS — F51.01 PRIMARY INSOMNIA: ICD-10-CM

## 2022-07-14 RX ORDER — TRAZODONE HYDROCHLORIDE 100 MG/1
150 TABLET ORAL
Qty: 90 TABLET | Refills: 1 | Status: SHIPPED | OUTPATIENT
Start: 2022-07-14 | End: 2022-07-14 | Stop reason: DRUGHIGH

## 2022-07-14 RX ORDER — DEXTROAMPHETAMINE SACCHARATE, AMPHETAMINE ASPARTATE MONOHYDRATE, DEXTROAMPHETAMINE SULFATE AND AMPHETAMINE SULFATE 7.5; 7.5; 7.5; 7.5 MG/1; MG/1; MG/1; MG/1
30 CAPSULE, EXTENDED RELEASE ORAL EVERY MORNING
Qty: 30 CAPSULE | Refills: 0 | Status: SHIPPED | OUTPATIENT
Start: 2022-07-14 | End: 2022-08-10 | Stop reason: SDUPTHER

## 2022-07-14 RX ORDER — DEXTROAMPHETAMINE SACCHARATE, AMPHETAMINE ASPARTATE, DEXTROAMPHETAMINE SULFATE AND AMPHETAMINE SULFATE 2.5; 2.5; 2.5; 2.5 MG/1; MG/1; MG/1; MG/1
10 TABLET ORAL DAILY PRN
Qty: 30 TABLET | Refills: 0 | Status: SHIPPED | OUTPATIENT
Start: 2022-07-14 | End: 2022-08-10 | Stop reason: SDUPTHER

## 2022-07-14 RX ORDER — TRAZODONE HYDROCHLORIDE 150 MG/1
150 TABLET ORAL
Qty: 90 TABLET | Refills: 1 | Status: SHIPPED | OUTPATIENT
Start: 2022-07-14 | End: 2022-10-18 | Stop reason: SDUPTHER

## 2022-07-14 NOTE — TELEPHONE ENCOUNTER
Patient states Trazodone 100 mg was sent into [facility] in [place]  She is suppose to take one and a half tablets  She would like to know if Trazodone 150 mg can be sent in instead, because she has a hard time cutting the pills in half

## 2022-07-21 DIAGNOSIS — M34.0 SCLERODERMA PROGRESSIVE (HCC): Primary | ICD-10-CM

## 2022-07-21 RX ORDER — PREDNISONE 1 MG/1
TABLET ORAL
Qty: 42 TABLET | Refills: 0 | Status: SHIPPED | OUTPATIENT
Start: 2022-07-21 | End: 2022-08-11

## 2022-07-25 ENCOUNTER — APPOINTMENT (RX ONLY)
Dept: URBAN - NONMETROPOLITAN AREA CLINIC 4 | Facility: CLINIC | Age: 67
Setting detail: DERMATOLOGY
End: 2022-07-25

## 2022-07-25 ENCOUNTER — TELEPHONE (OUTPATIENT)
Dept: OBGYN CLINIC | Facility: HOSPITAL | Age: 67
End: 2022-07-25

## 2022-07-25 DIAGNOSIS — L259 CONTACT DERMATITIS AND OTHER ECZEMA, UNSPECIFIED CAUSE: ICD-10-CM

## 2022-07-25 DIAGNOSIS — L82.1 OTHER SEBORRHEIC KERATOSIS: ICD-10-CM

## 2022-07-25 DIAGNOSIS — Z87.2 PERSONAL HISTORY OF DISEASES OF THE SKIN AND SUBCUTANEOUS TISSUE: ICD-10-CM

## 2022-07-25 PROBLEM — L30.8 OTHER SPECIFIED DERMATITIS: Status: ACTIVE | Noted: 2022-07-25

## 2022-07-25 PROCEDURE — 99214 OFFICE O/P EST MOD 30 MIN: CPT

## 2022-07-25 PROCEDURE — ? TREATMENT REGIMEN

## 2022-07-25 PROCEDURE — ? COUNSELING

## 2022-07-25 PROCEDURE — ? PRESCRIPTION

## 2022-07-25 RX ORDER — CLOBETASOL PROPIONATE 0.5 MG/G
0.05% OINTMENT TOPICAL BID
Qty: 1 | Refills: 3 | Status: ERX

## 2022-07-25 ASSESSMENT — LOCATION SIMPLE DESCRIPTION DERM
LOCATION SIMPLE: LEFT CHEEK
LOCATION SIMPLE: LEFT HAND
LOCATION SIMPLE: LEFT FOOT
LOCATION SIMPLE: RIGHT 2ND TOE
LOCATION SIMPLE: LEFT SHOULDER
LOCATION SIMPLE: RIGHT MIDDLE FINGER

## 2022-07-25 ASSESSMENT — LOCATION DETAILED DESCRIPTION DERM
LOCATION DETAILED: RIGHT PROXIMAL DORSAL MIDDLE FINGER
LOCATION DETAILED: LEFT MEDIAL MALAR CHEEK
LOCATION DETAILED: LEFT POSTERIOR SHOULDER
LOCATION DETAILED: RIGHT DORSAL 2ND TOE
LOCATION DETAILED: 2ND WEBSPACE LEFT FOOT
LOCATION DETAILED: LEFT RADIAL DORSAL HAND
LOCATION DETAILED: LEFT CENTRAL MALAR CHEEK

## 2022-07-25 ASSESSMENT — LOCATION ZONE DERM
LOCATION ZONE: ARM
LOCATION ZONE: FINGER
LOCATION ZONE: TOE
LOCATION ZONE: HAND
LOCATION ZONE: FEET
LOCATION ZONE: FACE

## 2022-07-25 NOTE — TELEPHONE ENCOUNTER
DR Butler Heads  RE: Flare-up Systemic Scleroderma  CB:     Patient  called stating that she is having a flare-up of her Systemic Scleroderma and is looking for advisement  She has follow-up 10/6/22  Attempted to schedule tomorrow but she has another MD appt and lives about an hour away   Caller asked to speak to clinical team

## 2022-07-26 ENCOUNTER — OFFICE VISIT (OUTPATIENT)
Dept: FAMILY MEDICINE CLINIC | Facility: CLINIC | Age: 67
End: 2022-07-26
Payer: MEDICARE

## 2022-07-26 VITALS
OXYGEN SATURATION: 97 % | DIASTOLIC BLOOD PRESSURE: 60 MMHG | WEIGHT: 101 LBS | SYSTOLIC BLOOD PRESSURE: 110 MMHG | RESPIRATION RATE: 18 BRPM | TEMPERATURE: 98.5 F | BODY MASS INDEX: 19.07 KG/M2 | HEART RATE: 96 BPM | HEIGHT: 61 IN

## 2022-07-26 DIAGNOSIS — D84.9 IMMUNOCOMPROMISED STATE (HCC): ICD-10-CM

## 2022-07-26 DIAGNOSIS — E03.9 ACQUIRED HYPOTHYROIDISM: ICD-10-CM

## 2022-07-26 DIAGNOSIS — Z71.89 ADVANCED CARE PLANNING/COUNSELING DISCUSSION: ICD-10-CM

## 2022-07-26 DIAGNOSIS — M81.0 AGE-RELATED OSTEOPOROSIS WITHOUT CURRENT PATHOLOGICAL FRACTURE: ICD-10-CM

## 2022-07-26 DIAGNOSIS — Z00.00 MEDICARE ANNUAL WELLNESS VISIT, SUBSEQUENT: Primary | ICD-10-CM

## 2022-07-26 DIAGNOSIS — G72.89 NECROTIZING MYOPATHY: ICD-10-CM

## 2022-07-26 DIAGNOSIS — E55.9 VITAMIN D DEFICIENCY: ICD-10-CM

## 2022-07-26 PROBLEM — E44.0 MODERATE PROTEIN-CALORIE MALNUTRITION (HCC): Status: RESOLVED | Noted: 2021-12-23 | Resolved: 2022-07-26

## 2022-07-26 PROBLEM — F11.20 CONTINUOUS OPIOID DEPENDENCE (HCC): Status: RESOLVED | Noted: 2021-12-23 | Resolved: 2022-07-26

## 2022-07-26 PROBLEM — E87.6 HYPOKALEMIA: Status: RESOLVED | Noted: 2019-08-13 | Resolved: 2022-07-26

## 2022-07-26 PROCEDURE — 1123F ACP DISCUSS/DSCN MKR DOCD: CPT | Performed by: FAMILY MEDICINE

## 2022-07-26 PROCEDURE — G0439 PPPS, SUBSEQ VISIT: HCPCS | Performed by: FAMILY MEDICINE

## 2022-07-26 NOTE — PROGRESS NOTES
Assessment and Plan:     Problem List Items Addressed This Visit        Musculoskeletal and Integument    Necrotizing myopathy    Age-related osteoporosis without current pathological fracture    Relevant Medications    Cholecalciferol 50 MCG (2000 UT) TABS       Other    Vitamin D deficiency    Relevant Medications    calcium-vitamin D (OSCAL) 250-125 MG-UNIT per tablet    Immunocompromised state ([facility] )      Other Visit Diagnoses     Medicare annual wellness visit, subsequent    -  Primary    Advanced care planning/counseling discussion        Relevant Orders    Ambulatory Referral to Social Work Care Management Program    Acquired hypothyroidism        Relevant Orders    TSH, 3rd generation with Free T4 reflex        She has adequate specialist f/u  Will recheck her TSH and discuss necessary dispo  She is otherwise utd  Preventive health issues were discussed with patient, and age appropriate screening tests were ordered as noted in patient's After Visit Summary  Personalized health advice and appropriate referrals for health education or preventive services given if needed, as noted in patient's After Visit Summary  History of Present Illness:     Patient presents for a Medicare Wellness Visit    Patient is here today for AMW visit  Was off Rheumatological Agents for Autoimmune Dz but since time, her health spiraled out of control  She is now back on these and prednisone  She is also seeing Derm for nail changes and they feel this is ? Scleroderma -- she is topical steroids for this as well  She has ongoing issues with her teeth  She goes back to oral surgeon tomorrow  She is on board with [facility] for her necrotizing myopathy  Has f/u with them in September         Patient Care Team:  Sheryl Rizzo DO as PCP - General (Family Medicine)  Sherice Hernandez MD as PCP - Endocrinology (Endocrinology)     Review of Systems:     Review of Systems   All other systems reviewed and are negative         Problem List:     Patient Active Problem List   Diagnosis    Bilateral hand numbness    Methotrexate, long term, current use    Scleroderma progressive (HCC)    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[facility] [address] )    BRCA gene mutation positive    Allergic rhinitis    Bartholin gland cyst    Carpal tunnel syndrome of left wrist    Carpal tunnel syndrome of right wrist    Dental disorder    Depression    Esophageal reflux disease    Fecal soiling    Fibromyalgia    Hearing loss    Heel spur    Hematuria    Herniated lumbar intervertebral disc    Hyperlipidemia    Lichen planus    Lumbar disc herniation    Osteoarthritis    Osteoarthritis of both knees    Osteopenia    Ovarian cyst    Pain in joint of left shoulder    Peripheral neuropathy    Post concussion syndrome    Radiculopathy, lumbar region    Raynaud's syndrome    Rectal prolapse    Sjogren's syndrome ([facility] [address] [facility]    Thyroid cancer ([facility] [address] )    Vitamin D deficiency    Postoperative hypothyroidism    Chronic pain    Vasomotor rhinitis    Necrotizing myopathy    [facility] emphysema [facility]    Hypogammaglobulinemia ([facility] [address] )    PAD (peripheral artery disease) ([facility] [address] )    High risk medication use    Immunocompromised state ([facility] )    Age-related osteoporosis without current pathological fracture      Past Medical and Surgical History:     Past Medical History:   Diagnosis Date    ADHD     Cellulitis of foot     Last assessed 10/24/16    Change in mental status     Last assessed 12/01/15    Chronic fatigue     PLAZA (dyspnea on exertion)     Last assessed 12/01/15    Fibromyalgia     Folliculitis     Last assessed 10/06/14    GERD (gastroesophageal reflux disease)     Non Hodgkin's lymphoma (HCC)     Osteopenia     Raynaud's disease     Scleroderma ([facility] [address] [facility]     Systemic sclerosis ([facility] [address] [facility]     Thyroid cancer ([facility] ) 10/04/2018     Past Surgical History:   Procedure Laterality Date    BACK SURGERY      BLADDER SURGERY      BONE MARROW BIOPSY  CARDIAC CATHETERIZATION      HEMORRHOID SURGERY      HYSTERECTOMY  02/04/2004    KNEE SURGERY      NASAL SEPTUM SURGERY      ROTATOR CUFF REPAIR      THYROIDECTOMY N/A 10/4/2018    Procedure: TOTAL THYROIDECTOMY;  Surgeon: Fausto Schreiber MD;  [facility]: [facility];  Service: Surgical Oncology    TONSILLECTOMY      TOTAL THYROIDECTOMY        Family History:     Family History   Problem Relation Age of Onset    Arthritis Mother     Diabetes Mother     Coronary artery disease Father     Arthritis Sister     Asthma Sister     Crohn's disease Sister     Other Brother         myocardial ischemia    No Known Problems Daughter     No Known Problems Maternal Grandmother     No Known Problems Maternal Grandfather     No Known Problems Paternal Grandmother     No Known Problems Paternal Grandfather     No Known Problems Sister     Ovarian cancer Maternal Aunt     No Known Problems Maternal Aunt     No Known Problems Maternal Aunt     No Known Problems Maternal Aunt     No Known Problems Maternal Aunt     No Known Problems Maternal Aunt     No Known Problems Paternal Aunt       Social History:     Social History     Socioeconomic History    Marital status:      Spouse name: None    Number of children: None    Years of education: None    Highest education level: Associate degree: occupational, technical, or vocational program   Occupational History    None   Tobacco Use    Smoking status: Former Smoker    Smokeless tobacco: Never Used    Tobacco comment: quit 40 years ago    Vaping Use    Vaping Use: Never used   Substance and Sexual Activity    Alcohol use: Never     Alcohol/week: 0 0 standard drinks    Drug use: Never    Sexual activity: None   Other Topics Concern    None   Social History Narrative    None     Social Determinants of Health     Financial Resource Strain: Not on file   Food Insecurity: Not on file   Transportation Needs: Not on file   Physical Activity: Not on file   Stress: Not on file   Social Connections: Not on file   Intimate Partner Violence: Not on file   Housing Stability: Not on file      Medications and Allergies:     Current Outpatient Medications   Medication Sig Dispense Refill    amphetamine-dextroamphetamine (ADDERALL XR, 30MG,) 30 MG 24 hr capsule Take 1 capsule (30 mg total) by mouth every morning Max Daily Amount: 30 mg 30 capsule 0    amphetamine-dextroamphetamine (ADDERALL, 10MG,) 10 mg tablet Take 1 tablet (10 mg total) by mouth daily as needed (afternoon let down) Max Daily Amount: 10 mg 30 tablet 0    buPROPion (Wellbutrin XL) 150 mg 24 hr tablet Take 1 tablet (150 mg total) by mouth every morning 90 tablet 1    calcium-vitamin D (OSCAL) 250-125 MG-UNIT per tablet Take 2 tablets by mouth every evening 180 tablet 1    Cholecalciferol 50 MCG (2000 UT) TABS Take 1 tablet (2,000 Units total) by mouth in the morning 90 tablet 1    clindamycin (CLEOCIN T) 1 % lotion Apply 1 application topically daily as needed      folic acid (FOLVITE) 1 mg tablet Take 2 tablets (2 mg total) by mouth daily 180 tablet 3    hydroxychloroquine (PLAQUENIL) 200 mg tablet Take 1 tablet (200 mg total) by mouth daily 90 tablet 3    levothyroxine 175 mcg tablet TAKE 1 TABLET (175 MCG TOTAL) BY MOUTH DAILY IN THE EARLY MORNING 30 tablet 1    Multiple Vitamin (MULTIVITAMIN) tablet Take 1 tablet by mouth daily      mycophenolate (MYFORTIC) 360 MG TBEC Take 2 tablets (720 mg total) by mouth 2 (two) times a day 120 tablet 0    naloxone (NARCAN) 4 mg/0 1 mL nasal spray Administer 1 spray into a nostril  If no response after 2-3 minutes, give another dose in the other nostril using a new spray  (Patient taking differently: every 3 (three) minutes as needed Administer 1 spray into a nostril   If no response after 2-3 minutes, give another dose in the other nostril using a new spray ) 1 each 1    naproxen (Naprosyn) 500 mg tablet Take 1 tablet (500 mg total) by mouth 2 (two) times a day as needed for moderate pain 90 tablet 1    predniSONE 5 mg tablet Take 3 tablets (15 mg total) by mouth daily for 7 days, THEN 2 tablets (10 mg total) daily for 7 days, THEN 1 tablet (5 mg total) daily for 7 days  42 tablet 0    pregabalin (LYRICA) 100 mg capsule Take 1 capsule (100 mg total) by mouth 2 (two) times a day 180 capsule 0    traZODone (DESYREL) 150 mg tablet Take 1 tablet (150 mg total) by mouth daily at bedtime 90 tablet 1    acetaminophen (TYLENOL) 650 mg CR tablet Take 1 tablet (650 mg total) by mouth every 4 (four) hours as needed for mild pain or moderate pain 90 tablet 1     No current facility-administered medications for this visit       Allergies   Allergen Reactions    Duloxetine Other (See Comments)     Mental psychosis    Revatio [Sildenafil] Other (See Comments)     mental status changes    Savella [Milnacipran] Other (See Comments)     Feeling out of it    Sulfamethoxazole-Trimethoprim Rash and GI Intolerance    Tedizolid Rash      Immunizations:     Immunization History   Administered Date(s) Administered    COVID-19 MODERNA VACC 0 5 ML IM 01/26/2021, 02/23/2021, 08/16/2021, 03/16/2022    INFLUENZA 01/06/2006, 10/22/2013, 11/10/2014, 10/18/2016, 10/04/2017, 09/21/2018    Influenza Quadrivalent Preservative Free 3 years and older IM 10/18/2016    Influenza, high dose seasonal 0 7 mL 11/12/2019, 12/23/2021    Influenza, recombinant, quadrivalent,injectable, preservative free 09/21/2018    Pneumococcal Conjugate 13-Valent 02/15/2019    Pneumococcal Polysaccharide PPV23 01/01/2006, 10/29/2020    Tdap 10/01/2013      Health Maintenance:         Topic Date Due    Cervical Cancer Screening  12/08/2023    Colorectal Cancer Screening  06/04/2024    Breast Cancer Screening: Mammogram  06/08/2024    Hepatitis C Screening  Completed         Topic Date Due    COVID-19 Vaccine (5 - Booster for Moderna series) 07/16/2022    Influenza Vaccine (1) 09/01/2022 Medicare Screening Tests and Risk Assessments:     Last Medicare Wellness visit information reviewed, patient interviewed and updates made to the record today  Health Risk Assessment:   Patient rates overall health as fair  Patient feels that their physical health rating is slightly worse  Patient is satisfied with their life  Eyesight was rated as slightly worse  Hearing was rated as same  Patient feels that their emotional and mental health rating is slightly better  Patients states they are never, rarely angry  Patient states they are often unusually tired/fatigued  Pain experienced in the last 7 days has been some  Patient's pain rating has been 5/10  Patient states that she has experienced weight loss or gain in last 6 months  B/c Dentures causing issues  Went back to eating bad  Scleroderma & Autoimmune Dz is flared up  Fall Risk Screening: In the past year, patient has experienced: no history of falling in past year      Urinary Incontinence Screening:   Patient has leaked urine accidently in the last six months  Recently tx'd for UTI  Home Safety:  Patient does not have trouble with stairs inside or outside of their home  Patient has working smoke alarms and has working carbon monoxide detector  Home safety hazards include: none  Nutrition:   Current diet is Other (please comment)  Back on sugar,carbs,dairy and flour  Trouble with teeth lost to much weight    Medications:   Patient is currently taking over-the-counter supplements  OTC medications include: Gut Food mix of pre and probiotics,polyphenols  Patient is able to manage medications  Will go back to healthy diet when mouth heals    Activities of Daily Living (ADLs)/Instrumental Activities of Daily Living (IADLs):   Walk and transfer into and out of bed and chair?: Yes  Dress and groom yourself?: Yes    Bathe or shower yourself?: Yes    Feed yourself?  Yes  Do your laundry/housekeeping?: Yes  Manage your money, pay your bills and track your expenses?: Yes  Make your own meals?: Yes    Do your own shopping?: Yes    ADL comments: Depends on the day    Previous Hospitalizations:   Any hospitalizations or ED visits within the last 12 months?: Yes    How many hospitalizations have you had in the last year?: 1-2    Hospitalization Comments: [place]:   Living will: No    Durable POA for healthcare: No    Advanced directive: No    Advanced directive counseling given: Yes    Five wishes given: Yes      Comments: SW referral placed for this as she is telling me she wants DNR     Cognitive Screening:   Provider or family/friend/caregiver concerned regarding cognition?: No    PREVENTIVE SCREENINGS      Cardiovascular Screening:    General: History Lipid Disorder and Screening Current      Diabetes Screening:     General: Screening Current      Colorectal Cancer Screening:     General: Screening Current      Breast Cancer Screening:     General: Screening Current      Cervical Cancer Screening:    General: Screening Not Indicated      Osteoporosis Screening:    General: Screening Not Indicated and History Osteoporosis      Abdominal Aortic Aneurysm (AAA) Screening:        General: Screening Not Indicated      Lung Cancer Screening:     General: Screening Not Indicated      Hepatitis C Screening:    General: Screening Current    Screening, Brief Intervention, and Referral to Treatment (SBIRT)    Screening  Typical number of drinks in a day: 0  Typical number of drinks in a week: 0  Interpretation: Low risk drinking behavior      AUDIT-C Screenin) How often did you have a drink containing alcohol in the past year? monthly or less  2) How many drinks did you have on a typical day when you were drinking in the past year? 0  3) How often did you have 6 or more drinks on one occasion in the past year? never    AUDIT-C Score: 1  Interpretation: Score 0-2 (female): Negative screen for alcohol misuse    Single Item Drug Screening:  How often have you used an illegal drug (including marijuana) or a prescription medication for non-medical reasons in the past year? never    Single Item Drug Screen Score: 0  Interpretation: Negative screen for possible drug use disorder    Other Counseling Topics:   Car/seat belt/driving safety, skin self-exam, sunscreen and calcium and vitamin D intake and regular weightbearing exercise  No exam data present     Physical Exam:     /60   Pulse 96   Temp 98 5 °F (36 9 °C)   Resp 18   Ht 5' 1" (1 549 m)   Wt 45 8 kg (101 lb)   SpO2 97%   Breastfeeding No   BMI 19 08 kg/m²     Physical Exam  Vitals and nursing note reviewed  Constitutional:       General: She is not in acute distress  Appearance: Normal appearance  HENT:      Head: Normocephalic and atraumatic  Mouth/Throat:      Comments: She is with top dentures, not lower  Her lower lip is stitched to her bottom bum and this is causing her significant distress    Eyes:      Conjunctiva/sclera: Conjunctivae normal    Cardiovascular:      Rate and Rhythm: Normal rate and regular rhythm  Pulmonary:      Effort: Pulmonary effort is normal       Breath sounds: Normal breath sounds  No wheezing, rhonchi or rales  Musculoskeletal:      Cervical back: Neck supple  Comments: There is deformity noted of her fingers - has scleroderma     Lymphadenopathy:      Cervical: No cervical adenopathy  Skin:     General: Skin is dry  Comments: Brittle nails     Neurological:      General: No focal deficit present  Mental Status: She is alert and oriented to person, place, and time  Psychiatric:         Mood and Affect: Mood normal          Behavior: Behavior normal          Thought Content:  Thought content normal          Judgment: Judgment normal       Comments: Anxious today regarding her health             Ene Landry, DO

## 2022-07-26 NOTE — TELEPHONE ENCOUNTER
Patient could not make appointment for today but took 7/28 appointment at 4 p  States she will speak with you over questions on prednisone  Says she is improving but has questions that she will ask on Thursday

## 2022-07-26 NOTE — PATIENT INSTRUCTIONS
Medicare Preventive Visit Patient Instructions  Thank you for completing your Welcome to Medicare Visit or Medicare Annual Wellness Visit today  Your next wellness visit will be due in one year (7/27/2023)  The screening/preventive services that you may require over the next 5-10 years are detailed below  Some tests may not apply to you based off risk factors and/or age  Screening tests ordered at today's visit but not completed yet may show as past due  Also, please note that scanned in results may not display below  Preventive Screenings:  Service Recommendations Previous Testing/Comments   Colorectal Cancer Screening  * Colonoscopy    * Fecal Occult Blood Test (FOBT)/Fecal Immunochemical Test (FIT)  * Fecal DNA/Cologuard Test  * Flexible Sigmoidoscopy Age: 54-65 years old   Colonoscopy: every 10 years (may be performed more frequently if at higher risk)  OR  FOBT/FIT: every 1 year  OR  Cologuard: every 3 years  OR  Sigmoidoscopy: every 5 years  Screening may be recommended earlier than age 48 if at higher risk for colorectal cancer  Also, an individualized decision between you and your healthcare provider will decide whether screening between the ages of 74-80 would be appropriate  Colonoscopy: 06/04/2019  FOBT/FIT: Not on file  Cologuard: Not on file  Sigmoidoscopy: Not on file    Screening Current     Breast Cancer Screening Age: 36 years old  Frequency: every 1-2 years  Not required if history of left and right mastectomy Mammogram: 06/08/2022    Screening Current   Cervical Cancer Screening Between the ages of 21-29, pap smear recommended once every 3 years  Between the ages of 33-67, can perform pap smear with HPV co-testing every 5 years     Recommendations may differ for women with a history of total hysterectomy, cervical cancer, or abnormal pap smears in past  Pap Smear: 12/08/2020    Screening Not Indicated   Hepatitis C Screening Once for adults born between 1945 and 1965  More frequently in patients at high risk for Hepatitis C Hep C Antibody: 02/19/2019    Screening Current   Diabetes Screening 1-2 times per year if you're at risk for diabetes or have pre-diabetes Fasting glucose: 99 mg/dL   A1C: No results in last 5 years    Screening Current   Cholesterol Screening Once every 5 years if you don't have a lipid disorder  May order more often based on risk factors  Lipid panel: 04/07/2022    Screening Not Indicated  History Lipid Disorder     Other Preventive Screenings Covered by Medicare:  Abdominal Aortic Aneurysm (AAA) Screening: covered once if your at risk  You're considered to be at risk if you have a family history of AAA  Lung Cancer Screening: covers low dose CT scan once per year if you meet all of the following conditions: (1) Age 50-69; (2) No signs or symptoms of lung cancer; (3) Current smoker or have quit smoking within the last 15 years; (4) You have a tobacco smoking history of at least 30 pack years (packs per day multiplied by number of years you smoked); (5) You get a written order from a healthcare provider  Glaucoma Screening: covered annually if you're considered high risk: (1) You have diabetes OR (2) Family history of glaucoma OR (3)  aged 48 and older OR (3)  American aged 72 and older  Osteoporosis Screening: covered every 2 years if you meet one of the following conditions: (1) You're estrogen deficient and at risk for osteoporosis based off medical history and other findings; (2) Have a vertebral abnormality; (3) On glucocorticoid therapy for more than 3 months; (4) Have primary hyperparathyroidism; (5) On osteoporosis medications and need to assess response to drug therapy  Last bone density test (DXA Scan): 06/08/2022  HIV Screening: covered annually if you're between the age of 12-76  Also covered annually if you are younger than 13 and older than 72 with risk factors for HIV infection   For pregnant patients, it is covered up to 3 times per pregnancy  Immunizations:  Immunization Recommendations   Influenza Vaccine Annual influenza vaccination during flu season is recommended for all persons aged >= 6 months who do not have contraindications   Pneumococcal Vaccine (Prevnar and Pneumovax)  * Prevnar = PCV13  * Pneumovax = PPSV23   Adults 25-60 years old: 1-3 doses may be recommended based on certain risk factors  Adults 72 years old: Prevnar (PCV13) vaccine recommended followed by Pneumovax (PPSV23) vaccine  If already received PPSV23 since turning 65, then PCV13 recommended at least one year after PPSV23 dose  Hepatitis B Vaccine 3 dose series if at intermediate or high risk (ex: diabetes, end stage renal disease, liver disease)   Tetanus (Td) Vaccine - COST NOT COVERED BY MEDICARE PART B Following completion of primary series, a booster dose should be given every 10 years to maintain immunity against tetanus  Td may also be given as tetanus wound prophylaxis  Tdap Vaccine - COST NOT COVERED BY MEDICARE PART B Recommended at least once for all adults  For pregnant patients, recommended with each pregnancy  Shingles Vaccine (Shingrix) - COST NOT COVERED BY MEDICARE PART B  2 shot series recommended in those aged 48 and above     Health Maintenance Due:      Topic Date Due    Cervical Cancer Screening  12/08/2023    Colorectal Cancer Screening  06/04/2024    Breast Cancer Screening: Mammogram  06/08/2024    Hepatitis C Screening  Completed     Immunizations Due:      Topic Date Due    COVID-19 Vaccine (5 - Booster for Moderna series) 07/16/2022    Influenza Vaccine (1) 09/01/2022     Advance Directives   What are advance directives? Advance directives are legal documents that state your wishes and plans for medical care  These plans are made ahead of time in case you lose your ability to make decisions for yourself  Advance directives can apply to any medical decision, such as the treatments you want, and if you want to donate organs     What are the types of advance directives? There are many types of advance directives, and each state has rules about how to use them  You may choose a combination of any of the following:  Living will: This is a written record of the treatment you want  You can also choose which treatments you do not want, which to limit, and which to stop at a certain time  This includes surgery, medicine, IV fluid, and tube feedings  Durable power of  for [facility]): This is a written record that states who you want to make healthcare choices for you when you are unable to make them for yourself  This person, called a proxy, is usually a family member or a friend  You may choose more than 1 proxy  Do not resuscitate (DNR) order:  A DNR order is used in case your heart stops beating or you stop breathing  It is a request not to have certain forms of treatment, such as CPR  A DNR order may be included in other types of advance directives  Medical directive: This covers the care that you want if you are in a coma, near death, or unable to make decisions for yourself  You can list the treatments you want for each condition  Treatment may include pain medicine, surgery, blood transfusions, dialysis, IV or tube feedings, and a ventilator (breathing machine)  Values history: This document has questions about your views, beliefs, and how you feel and think about life  This information can help others choose the care that you would choose  Why are advance directives important? An advance directive helps you control your care  Although spoken wishes may be used, it is better to have your wishes written down  Spoken wishes can be misunderstood, or not followed  Treatments may be given even if you do not want them  An advance directive may make it easier for your family to make difficult choices about your care  Urinary Incontinence   Urinary incontinence (UI)  is when you lose control of your bladder   UI develops because your bladder cannot store or empty urine properly  The 3 most common types of UI are stress incontinence, urge incontinence, or both  Medicines:   May be given to help strengthen your bladder control  Report any side effects of medication to your healthcare provider  Do pelvic muscle exercises often:  Your pelvic muscles help you stop urinating  Squeeze these muscles tight for 5 seconds, then relax for 5 seconds  Gradually work up to squeezing for 10 seconds  Do 3 sets of 15 repetitions a day, or as directed  This will help strengthen your pelvic muscles and improve bladder control  Train your bladder:  Go to the bathroom at set times, such as every 2 hours, even if you do not feel the urge to go  You can also try to hold your urine when you feel the urge to go  For example, hold your urine for 5 minutes when you feel the urge to go  As that becomes easier, hold your urine for 10 minutes  Self-care:   Keep a UI record  Write down how often you leak urine and how much you leak  Make a note of what you were doing when you leaked urine  Drink liquids as directed  You may need to limit the amount of liquid you drink to help control your urine leakage  Do not drink any liquid right before you go to bed  Limit or do not have drinks that contain caffeine or alcohol  Prevent constipation  Eat a variety of high-fiber foods  Good examples are high-fiber cereals, beans, vegetables, and whole-grain breads  Walking is the best way to trigger your intestines to have a bowel movement  Exercise regularly and maintain a healthy weight  Weight loss and exercise will decrease pressure on your bladder and help you control your leakage  Use a catheter as directed  to help empty your bladder  A catheter is a tiny, plastic tube that is put into your bladder to drain your urine  Go to behavior therapy as directed  Behavior therapy may be used to help you learn to control your urge to urinate         © Copyright [employer] 2018 Information is for [employer] use only and may not be sold, redistributed or otherwise used for commercial purposes   All illustrations and images included in CareNotes® are the copyrighted property of [employer] M [employer]  or [address] [place], [place] [address]ru tello

## 2022-07-28 ENCOUNTER — PATIENT OUTREACH (OUTPATIENT)
Dept: FAMILY MEDICINE CLINIC | Facility: CLINIC | Age: 67
End: 2022-07-28

## 2022-07-28 ENCOUNTER — OFFICE VISIT (OUTPATIENT)
Dept: RHEUMATOLOGY | Facility: CLINIC | Age: 67
End: 2022-07-28
Payer: MEDICARE

## 2022-07-28 VITALS
DIASTOLIC BLOOD PRESSURE: 60 MMHG | HEIGHT: 61 IN | SYSTOLIC BLOOD PRESSURE: 99 MMHG | BODY MASS INDEX: 19.26 KG/M2 | WEIGHT: 102 LBS

## 2022-07-28 DIAGNOSIS — G72.89 NECROTIZING MYOPATHY: ICD-10-CM

## 2022-07-28 DIAGNOSIS — F41.8 SITUATIONAL ANXIETY: ICD-10-CM

## 2022-07-28 DIAGNOSIS — F98.8 ATTENTION DEFICIT DISORDER, UNSPECIFIED HYPERACTIVITY PRESENCE: ICD-10-CM

## 2022-07-28 DIAGNOSIS — F51.01 PRIMARY INSOMNIA: ICD-10-CM

## 2022-07-28 DIAGNOSIS — M33.20 POLYMYOSITIS (HCC): ICD-10-CM

## 2022-07-28 DIAGNOSIS — M34.0 SCLERODERMA PROGRESSIVE (HCC): ICD-10-CM

## 2022-07-28 DIAGNOSIS — J43.9 PULMONARY EMPHYSEMA, UNSPECIFIED EMPHYSEMA TYPE (HCC): ICD-10-CM

## 2022-07-28 DIAGNOSIS — M51.26 HERNIATED LUMBAR INTERVERTEBRAL DISC: ICD-10-CM

## 2022-07-28 DIAGNOSIS — F32.A DEPRESSION, UNSPECIFIED DEPRESSION TYPE: ICD-10-CM

## 2022-07-28 DIAGNOSIS — C82.90 FOLLICULAR LYMPHOMA, UNSPECIFIED FOLLICULAR LYMPHOMA TYPE, UNSPECIFIED BODY REGION (HCC): ICD-10-CM

## 2022-07-28 DIAGNOSIS — M80.00XS OSTEOPOROSIS WITH CURRENT PATHOLOGICAL FRACTURE, UNSPECIFIED OSTEOPOROSIS TYPE, SEQUELA: ICD-10-CM

## 2022-07-28 DIAGNOSIS — Z79.899 HIGH RISK MEDICATION USE: ICD-10-CM

## 2022-07-28 DIAGNOSIS — M35.1 MCTD (MIXED CONNECTIVE TISSUE DISEASE) (HCC): Primary | ICD-10-CM

## 2022-07-28 DIAGNOSIS — M54.16 RADICULOPATHY, LUMBAR REGION: ICD-10-CM

## 2022-07-28 PROCEDURE — 99215 OFFICE O/P EST HI 40 MIN: CPT | Performed by: INTERNAL MEDICINE

## 2022-07-28 RX ORDER — HYDROXYCHLOROQUINE SULFATE 200 MG/1
200 TABLET, FILM COATED ORAL DAILY
Qty: 90 TABLET | Refills: 1 | Status: SHIPPED | OUTPATIENT
Start: 2022-07-28 | End: 2023-01-24

## 2022-07-28 RX ORDER — PREGABALIN 100 MG/1
100 CAPSULE ORAL 2 TIMES DAILY
Qty: 180 CAPSULE | Refills: 1 | Status: SHIPPED | OUTPATIENT
Start: 2022-07-28

## 2022-07-28 RX ORDER — ALENDRONATE SODIUM 70 MG/1
70 TABLET ORAL
Qty: 4 TABLET | Refills: 6 | Status: SHIPPED | OUTPATIENT
Start: 2022-07-28

## 2022-07-28 RX ORDER — MYCOPHENOLIC ACID 360 MG/1
720 TABLET, DELAYED RELEASE ORAL 2 TIMES DAILY
Qty: 120 TABLET | Refills: 6 | Status: SHIPPED | OUTPATIENT
Start: 2022-07-28

## 2022-07-28 RX ORDER — FOLIC ACID 1 MG/1
2 TABLET ORAL DAILY
Qty: 180 TABLET | Refills: 1 | Status: SHIPPED | OUTPATIENT
Start: 2022-07-28

## 2022-07-28 NOTE — PATIENT INSTRUCTIONS
Do labs before next visit  Continue methotrexate 5 tabs once a week  Continue folic 2 tabs daily  Continue Myfortic 2 tabs twice a day  Continue hydroxychloroquine daily; continue regular eye exams  Continue Lyrica 100mg po bid for fibromyalgia  Continue calcium and Vit   D  Start alendronate once a week with a full glass of water on an empty stomach, and do not lie down for 30 minutes afterwards  Physical therapy referral made     Return to clinic in 3 months

## 2022-07-28 NOTE — PROGRESS NOTES
Referral received from patient's PCP d/t patient expressing that she would like DNR  IB sent to PCP to share that POLST form should be completed during a discussion between the physician and patient  SW CM asked if there are any other needs  Referral will remain open until more information is available

## 2022-07-28 NOTE — PROGRESS NOTES
Assessment and Plan:   Nickolas Orozco is a 75 y o   female who presents for follow up of her systemic sclerosis, Sjogren's syndrome, and necrotizing myopathy, which overall all seem to be stable  Repeat autoimmune labs returned unremarkable  Was having a skin tightening flare-up of fingers recently, which has improved with a steroid course  Has osteoporosis on recent DEXA scan    Do labs before next visit  Continue methotrexate 5 tabs once a week  Continue folic 2 tabs daily  Continue Myfortic 2 tabs twice a day from myopathy specialist  Continue hydroxychloroquine daily; continue regular eye exams  Continue Lyrica 100mg po bid for fibromyalgia  Continue calcium and Vit  D  Start alendronate once a week with a full glass of water on an empty stomach, and do not lie down for 30 minutes afterwards  Physical therapy referral made     Return to clinic in 3 months    Plan:  Diagnoses and all orders for this visit:    MCTD (mixed connective tissue disease) ([facility] )  -     CBC and differential  -     Comprehensive metabolic panel  -     C-reactive protein  -     Sedimentation rate, automated  -     Urinalysis with microscopic  -     Protein / creatinine ratio, urine  -     C4 complement  -     C3 complement  -     Anti-DNA antibody, double-stranded  -     methotrexate 2 5 mg tablet; 5 tablet po weekly (take all 5 tablets on the same day every week)  -     Ambulatory referral to Physical Therapy; Future    Necrotizing myopathy  -     mycophenolate (MYFORTIC) 360 MG TBEC; Take 2 tablets (720 mg total) by mouth 2 (two) times a day    Scleroderma progressive (HCC)    Radiculopathy, lumbar region  -     pregabalin (LYRICA) 100 mg capsule; Take 1 capsule (100 mg total) by mouth 2 (two) times a day  -     Ambulatory referral to Physical Therapy; Future    Herniated lumbar intervertebral disc  -     pregabalin (LYRICA) 100 mg capsule;  Take 1 capsule (100 mg total) by mouth 2 (two) times a day    Situational anxiety  - hydroxychloroquine (PLAQUENIL) 200 mg tablet; Take 1 tablet (200 mg total) by mouth daily  -     folic acid (FOLVITE) 1 mg tablet; Take 2 tablets (2 mg total) by mouth daily    Attention deficit disorder, unspecified hyperactivity presence  -     hydroxychloroquine (PLAQUENIL) 200 mg tablet; Take 1 tablet (200 mg total) by mouth daily  -     folic acid (FOLVITE) 1 mg tablet; Take 2 tablets (2 mg total) by mouth daily    Primary insomnia  -     hydroxychloroquine (PLAQUENIL) 200 mg tablet; Take 1 tablet (200 mg total) by mouth daily  -     folic acid (FOLVITE) 1 mg tablet; Take 2 tablets (2 mg total) by mouth daily    Depression, unspecified depression type  -     hydroxychloroquine (PLAQUENIL) 200 mg tablet; Take 1 tablet (200 mg total) by mouth daily  -     folic acid (FOLVITE) 1 mg tablet; Take 2 tablets (2 mg total) by mouth daily    Follicular lymphoma, unspecified follicular lymphoma type, unspecified body region ([facility] )  -     hydroxychloroquine (PLAQUENIL) 200 mg tablet; Take 1 tablet (200 mg total) by mouth daily  -     folic acid (FOLVITE) 1 mg tablet; Take 2 tablets (2 mg total) by mouth daily    Polymyositis (HCC)  -     hydroxychloroquine (PLAQUENIL) 200 mg tablet; Take 1 tablet (200 mg total) by mouth daily  -     folic acid (FOLVITE) 1 mg tablet; Take 2 tablets (2 mg total) by mouth daily    Pulmonary emphysema, unspecified emphysema type (HCC)  -     hydroxychloroquine (PLAQUENIL) 200 mg tablet; Take 1 tablet (200 mg total) by mouth daily  -     folic acid (FOLVITE) 1 mg tablet; Take 2 tablets (2 mg total) by mouth daily    Osteoporosis with current pathological fracture, unspecified osteoporosis type, sequela  -     alendronate (FOSAMAX) 70 mg tablet;  Take 1 tablet (70 mg total) by mouth every 7 days Take on an empty stomach with a full glass of water and do not like down for 30 minutes afterwards    High risk medication use  High risk medication use - Benefits and risks of methotrexate use, including but not limited to gastrointestinal disturbances such as diarrhea, hair loss, fatigue, stomatitis, leukopenia, lung hypersensitivity reaction, hepatotoxicity, and lymphoma were discussed with the patient  CBC,CMP will be regularly monitored  Patient was prescribed Folic Acid 2 mg po daily to take while on methotrexate to help prevent side effects  Patient counseled on abstinence from alcohol while using methotrexate       Benefits and risks of hydroxychloroquine, including but not limited to retinal toxicity, corneal deposits, gastrointestinal side effects, and headaches were discussed with the patient  The need for a regular eye exam to monitor for ocular toxicity while on this medication was also explained to the patient  Follow-up plan:  RTC in 3 months        Rheumatic Disease Summary  Initial visit 5/20/22: Marita Uriarte is a 75 y o   female who presents as a Rheumatology consult referred by Darvin Serna DO to establish care for her multiple autoimmune diseases; over the years, has been diagnosed with systemic sclerosis, Sjogren's syndrome, and necrotizing myopathy  Used to follow with outside rheumatologist Dr Lura Holstein in [place], [place] for several years  Patient is on several DMARDs, including methotrexate, hydroxychloroquine, and Myfortic for her necrotizing myopathy  Full autoimmune workup labs ordered below, which interestingly all returned negative, including scleroderma antibodies, Sjogren's antibodies, RF/anti-CCP, anti-RNP, and BRYNN  Curious to know what patient's antibody profile was in past medical record; release of health information form signed by patient in clinic  Continue methotrexate 5 tabs once a week, folic 2 tabs daily, Myfortic 2 tab twice a day for necrotizing myopathy prescribed by outside rheumatologist at [facility]  Continue hydroxychloroquine daily; continue regular eye exams, Lyrica 100mg po bid for fibromyalgia, calcium and Vit  D for osteopenia   Take fluconazole tab for possible oral thrush  Release of Health Information form filled out to obtain records from patient's previous rheumatologist Dr Mayra Clemens  Ordered COVID pre-exposure prophylaxis Evusheld injection at [id number] is a 75 y o   female who presents for follow up  Last clinic visit was 5/20/22 which was her initial visit  Was having a skin tightening flare-up involving her fingers recently, improved with prednisone course  Feels overall well currently  Received Evusheld since last visit  The following portions of the patient's history were reviewed and updated as appropriate: allergies, current medications, past family history, past medical history, past social history, past surgical history and problem list     Review of Systems:   Review of Systems   Constitutional: Negative for fatigue  HENT: Negative for mouth sores  Eyes: Negative for pain  Respiratory: Negative for shortness of breath  Cardiovascular: Negative for leg swelling  Musculoskeletal: Positive for arthralgias and myalgias  Negative for joint swelling  Skin: Positive for color change  Negative for rash  Neurological: Negative for weakness  Hematological: Negative for adenopathy  Psychiatric/Behavioral: Negative for sleep disturbance  Reviewed and agree      Home Medications:    Current Outpatient Medications:     acetaminophen (TYLENOL) 650 mg CR tablet, Take 1 tablet (650 mg total) by mouth every 4 (four) hours as needed for mild pain or moderate pain, Disp: 90 tablet, Rfl: 1    alendronate (FOSAMAX) 70 mg tablet, Take 1 tablet (70 mg total) by mouth every 7 days Take on an empty stomach with a full glass of water and do not like down for 30 minutes afterwards, Disp: 4 tablet, Rfl: 6    buPROPion (Wellbutrin XL) 150 mg 24 hr tablet, Take 1 tablet (150 mg total) by mouth every morning, Disp: 90 tablet, Rfl: 1    calcium-vitamin D (OSCAL) 250-125 MG-UNIT per tablet, Take 2 tablets by mouth every evening, Disp: 180 tablet, Rfl: 1    Cholecalciferol 50 MCG (2000 UT) TABS, Take 1 tablet (2,000 Units total) by mouth in the morning, Disp: 90 tablet, Rfl: 1    clindamycin (CLEOCIN T) 1 % lotion, Apply 1 application topically daily as needed, Disp: , Rfl:     folic acid (FOLVITE) 1 mg tablet, Take 2 tablets (2 mg total) by mouth daily, Disp: 180 tablet, Rfl: 1    hydroxychloroquine (PLAQUENIL) 200 mg tablet, Take 1 tablet (200 mg total) by mouth daily, Disp: 90 tablet, Rfl: 1    methotrexate 2 5 mg tablet, 5 tablet po weekly (take all 5 tablets on the same day every week), Disp: 25 tablet, Rfl: 6    Multiple Vitamin (MULTIVITAMIN) tablet, Take 1 tablet by mouth daily, Disp: , Rfl:     mycophenolate (MYFORTIC) 360 MG TBEC, Take 2 tablets (720 mg total) by mouth 2 (two) times a day, Disp: 120 tablet, Rfl: 6    naloxone (NARCAN) 4 mg/0 1 mL nasal spray, Administer 1 spray into a nostril  If no response after 2-3 minutes, give another dose in the other nostril using a new spray  (Patient taking differently: every 3 (three) minutes as needed Administer 1 spray into a nostril  If no response after 2-3 minutes, give another dose in the other nostril using a new spray ), Disp: 1 each, Rfl: 1    naproxen (Naprosyn) 500 mg tablet, Take 1 tablet (500 mg total) by mouth 2 (two) times a day as needed for moderate pain, Disp: 90 tablet, Rfl: 1    predniSONE 5 mg tablet, Take 2 tablets (10 mg total) by mouth daily for 7 days, THEN 1 tablet (5 mg total) daily for 7 days  , Disp: 21 tablet, Rfl: 0    pregabalin (LYRICA) 100 mg capsule, Take 1 capsule (100 mg total) by mouth 2 (two) times a day, Disp: 180 capsule, Rfl: 1    traZODone (DESYREL) 150 mg tablet, Take 1 tablet (150 mg total) by mouth daily at bedtime, Disp: 90 tablet, Rfl: 1    amphetamine-dextroamphetamine (ADDERALL XR, 30MG,) 30 MG 24 hr capsule, Take 1 capsule (30 mg total) by mouth every morning Max Daily Amount: 30 mg, Disp: 30 capsule, Rfl: 0   amphetamine-dextroamphetamine (ADDERALL, 10MG,) 10 mg tablet, Take 1 tablet (10 mg total) by mouth daily as needed (afternoon let down) Max Daily Amount: 10 mg, Disp: 30 tablet, Rfl: 0    Calcium Carb-Cholecalciferol (Calcium-Vitamin D3) 250-125 MG-UNIT TABS, TAKE 2 TABLETS BY MOUTH EVERY EVENING, Disp: 180 tablet, Rfl: 1    levothyroxine (Euthyrox) 150 mcg tablet, Take 1 tablet (150 mcg total) by mouth daily in the early morning, Disp: 90 tablet, Rfl: 0    Objective:    Vitals:    07/28/22 1559   BP: 99/60   Weight: 46 3 kg (102 lb)   Height: 5' 1" (1 549 m)       Physical Exam  Constitutional:       General: She is not in acute distress  HENT:      Head: Normocephalic and atraumatic  Eyes:      Conjunctiva/sclera: Conjunctivae normal    Cardiovascular:      Rate and Rhythm: Normal rate and regular rhythm  Heart sounds: S1 normal and S2 normal      No friction rub  Pulmonary:      Effort: Pulmonary effort is normal  No respiratory distress  Breath sounds: Normal breath sounds  No wheezing, rhonchi or rales  Musculoskeletal:         General: No tenderness  Cervical back: Neck supple  Skin:     Coloration: Skin is not pale  Comments: Slight skin tightening distal to PIPs   Neurological:      Mental Status: She is alert  Mental status is at baseline  Psychiatric:         Mood and Affect: Mood normal          Behavior: Behavior normal        Reviewed labs and imaging  Imaging:   DXA 6/8/22  LUMBAR SPINE L1-L3 (L4 vertebra excluded from analysis due to local structural abnormalities or artifact): BMD  1 035  gm/cm2   T-score 0 2    These values are artifactually elevated due to the presence of scoliosis with spondylosis      LEFT  TOTAL HIP:   BMD:  0 865  gm/cm2   T-score:  -0 6     LEFT  FEMORAL NECK:   BMD:  0 555  gm/cm2   T score: -2 6     LEFT FOREARM :  BMD 0 708 gm/sq-cm,  T-score is  0 4     IMPRESSION:  1   OSTEOPOROSIS   [Based on the left femoral neck]    XR right knee 10/14/21  Severe tricompartmental osteoarthritis, most pronounced in the medial tibiofemoral compartment       CXR 7/15/21  No acute cardiopulmonary disease      CT abdomen pelvis wo contrast 10/16/20  No acute intra-abdominal pathology  Labs:   Appointment on 05/20/2022   Component Date Value Ref Range Status    Antinuclear Antibodies, IFA 05/20/2022 Negative   Final                                         Negative   <1:80                                       Borderline  1:80                                       Positive   >1:80  ICAP nomenclature: AC-0  For more information about Hep-2 cell patterns use  ANApatterns  org, the official website for the International  Consensus on Antinuclear Antibody (BRYNN) Patterns (ICAP)      HERMAN Wilkerson (SM) Ab 05/20/2022 <0 2  0 0 - 0 9 AI Final    HERMAN RNP Ab 05/20/2022 0 2  0 0 - 0 9 AI Final   Office Visit on 05/20/2022   Component Date Value Ref Range Status    SS-A (RO) Ab 05/20/2022 <0 2  0 0 - 0 9 AI Final    SS-B (LA) Ab 05/20/2022 <0 2  0 0 - 0 9 AI Final    Rheumatoid Factor 05/20/2022 Negative  Negative Final    Cyclic Citrullinated Peptide Ab  05/20/2022 0 9  See comment Final    Sed Rate 05/20/2022 1  0 - 29 mm/hour Final    CRP 05/20/2022 <3 0  <3 0 mg/L Final    C4, COMPLEMENT 05/20/2022 19 0  10 0 - 40 0 mg/dL Final    C3 Complement 05/20/2022 102 0  90 0 - 180 0 mg/dL Final    Creatinine, Ur 05/20/2022 40 4  mg/dL Final    Protein Urine Random 05/20/2022 10  mg/dL Final    Prot/Creat Ratio, Ur 05/20/2022 0 25 (A) 0 00 - 0 10 Final    Color, UA 05/20/2022 Light Yellow   Final    Clarity, UA 05/20/2022 Clear   Final    Specific Gravity, UA 05/20/2022 1 014  1 003 - 1 030 Final    pH, UA 05/20/2022 6 5  4 5, 5 0, 5 5, 6 0, 6 5, 7 0, 7 5, 8 0 Final    Leukocytes, UA 05/20/2022 Negative  Negative Final    Nitrite, UA 05/20/2022 Negative  Negative Final    Protein, UA 05/20/2022 Negative  Negative mg/dl Final    Glucose, UA 05/20/2022 Negative Negative mg/dl Final    Ketones, UA 05/20/2022 Negative  Negative mg/dl Final    Urobilinogen, UA 05/20/2022 <2 0  <2 0 mg/dl mg/dl Final    Bilirubin, UA 05/20/2022 Negative  Negative Final    Occult Blood, UA 05/20/2022 Negative  Negative Final    RBC, UA 05/20/2022 1-2  None Seen, 1-2 /hpf Final    WBC, UA 05/20/2022 1-2  None Seen, 1-2 /hpf Final    Epithelial Cells 05/20/2022 Occasional  None Seen, Occasional /hpf Final    Bacteria, UA 05/20/2022 None Seen  None Seen, Occasional /hpf Final    ds DNA Ab 05/20/2022 <1  0 - 9 IU/mL Final                                       Negative      <5                                     Equivocal  5 - 9                                     Positive      >9    Anti-Centromere B Antibodies 05/20/2022 <0 2  0 0 - 0 9 AI Final    Scleroderma SCL-70 05/20/2022 <0 2  0 0 - 0 9 AI Final    Vit D, 25-Hydroxy 05/20/2022 35 4  30 0 - 100 0 ng/mL Final    WBC 05/20/2022 5 44  4 31 - 10 16 Thousand/uL Final    RBC 05/20/2022 4 33  3 81 - 5 12 Million/uL Final    Hemoglobin 05/20/2022 13 0  11 5 - 15 4 g/dL Final    Hematocrit 05/20/2022 40 0  34 8 - 46 1 % Final    MCV 05/20/2022 92  82 - 98 fL Final    MCH 05/20/2022 30 0  26 8 - 34 3 pg Final    MCHC 05/20/2022 32 5  31 4 - 37 4 g/dL Final    RDW 05/20/2022 15 4 (A) 11 6 - 15 1 % Final    MPV 05/20/2022 9 8  8 9 - 12 7 fL Final    Platelets 50/92/0410 360  149 - 390 Thousands/uL Final    nRBC 05/20/2022 0  /100 WBCs Final    Neutrophils Relative 05/20/2022 54  43 - 75 % Final    Immat GRANS % 05/20/2022 1  0 - 2 % Final    Lymphocytes Relative 05/20/2022 27  14 - 44 % Final    Monocytes Relative 05/20/2022 10  4 - 12 % Final    Eosinophils Relative 05/20/2022 6  0 - 6 % Final    Basophils Relative 05/20/2022 2 (A) 0 - 1 % Final    Neutrophils Absolute 05/20/2022 3 02  1 85 - 7 62 Thousands/µL Final    Immature Grans Absolute 05/20/2022 0 03  0 00 - 0 20 Thousand/uL Final    Lymphocytes Absolute 05/20/2022 1 45  0 60 - 4 47 Thousands/µL Final    Monocytes Absolute 05/20/2022 0 55  0 17 - 1 22 Thousand/µL Final    Eosinophils Absolute 05/20/2022 0 30  0 00 - 0 61 Thousand/µL Final    Basophils Absolute 05/20/2022 0 09  0 00 - 0 10 Thousands/µL Final    Sodium 05/20/2022 136  136 - 145 mmol/L Final    Potassium 05/20/2022 4 8  3 5 - 5 3 mmol/L Final    Chloride 05/20/2022 104  100 - 108 mmol/L Final    CO2 05/20/2022 29  21 - 32 mmol/L Final    ANION GAP 05/20/2022 3 (A) 4 - 13 mmol/L Final    BUN 05/20/2022 21  5 - 25 mg/dL Final    Creatinine 05/20/2022 0 75  0 60 - 1 30 mg/dL Final    Standardized to IDMS reference method    Glucose 05/20/2022 99  65 - 140 mg/dL Final    If the patient is fasting, the ADA then defines impaired fasting glucose as > 100 mg/dL and diabetes as > or equal to 123 mg/dL  Specimen collection should occur prior to Sulfasalazine administration due to the potential for falsely depressed results  Specimen collection should occur prior to Sulfapyridine administration due to the potential for falsely elevated results   Calcium 05/20/2022 9 2  8 3 - 10 1 mg/dL Final    AST 05/20/2022 25  5 - 45 U/L Final    Specimen collection should occur prior to Sulfasalazine administration due to the potential for falsely depressed results   ALT 05/20/2022 58  12 - 78 U/L Final    Specimen collection should occur prior to Sulfasalazine and/or Sulfapyridine administration due to the potential for falsely depressed results   Alkaline Phosphatase 05/20/2022 62  46 - 116 U/L Final    Total Protein 05/20/2022 6 6  6 4 - 8 2 g/dL Final    Albumin 05/20/2022 4 0  3 5 - 5 0 g/dL Final    Total Bilirubin 05/20/2022 0 29  0 20 - 1 00 mg/dL Final    Use of this assay is not recommended for patients undergoing treatment with eltrombopag due to the potential for falsely elevated results      eGFR 05/20/2022 82  ml/min/1 73sq m Final   Office Visit on 05/04/2022   Component Date Value Ref Range Status    RESULT ALL_PRESC MEDS SP INSTRNS 08/79/4297 Not Applicable   Final    Comment:  cis-3-methylfentanyl: Fentanyl Negative  4-ANPP: Fentanyl Negative  4-FiBF: Fen  tanyl Negative  Acetyl fentanyl: Fentanyl Negative  Acetyl norfentanyl: Fentanyl   Negative  Acryl fentanyl: Fentanyl Negative  Butyryl fentanyl: Fentanyl Negativ  e  Carfentanil: Fentanyl Negative  Cyclopropyl fentanyl: Fentanyl Negative  Fura  nyl fentanyl: Fentanyl Negative  Methoxyacetyl fentanyl: Fentanyl Negative  U-47  700: Fentanyl Negative  N-desmethyl [id number]-[id number]: Fentanyl Negative        Amphetamine Quantification 05/04/2022 positive-[id number]  100 ng/mL Final    Methamphetamine Quantification 05/04/2022 negative  100 ng/mL Final    Butalbital Quantification 05/04/2022 negative  200 ng/mL Final    Phenobarbital Quantification 05/04/2022 negative  200 ng/mL Final    Secobarbital Quantification 05/04/2022 negative  200 ng/mL Final    Temazepam Quantification 05/04/2022 negative  50 ng/mL Final    Oxazepam Quantification 05/04/2022 positive-59 057  40 ng/mL Final    Alpha-Hydroxyalprazolam Quantifica* 05/04/2022 negative  20 ng/mL Final    7-Amino-Clonazepam Quantification * 05/04/2022 negative  20 ng/mL Final    Nordiazepam Quantification 05/04/2022 negative  40 ng/mL Final    Temazepam Quantification 05/04/2022 negative  50 ng/mL Final    Oxazepam Quantification 05/04/2022 positive-59 057  40 ng/mL Final    Lorazepam Quantification 05/04/2022 negative  40 ng/mL Final    Oxazepam Quantification 05/04/2022 positive-59 057  40 ng/mL Final    Gabapentin Quantification 05/04/2022 negative  400 Final    PREGABALIN QUANTIFICATION 05/04/2022 positive-[id number]  400 Final    Cocaine metabolite Quantification 05/04/2022 negative  50 ng/mL Final    6-MERVIN (Heroin metabolite) Quantifi* 05/04/2022 negative  10 ng/mL Final    Buprenorphine Quantification 05/04/2022 negative  5 ng/mL Final    Norbuprenorphine Quantification 05/04/2022 negative  20 ng/mL Final    Dextrorphan (Dextromethorphan meta* 05/04/2022 negative  50 ng/mL Final    Meperidine Quantification 05/04/2022 negative  50 ng/mL Final    Normeperidine Quantification 05/04/2022 negative  50 ng/mL Final    Naltrexone Quantification 05/04/2022 negative  10 ng/mL Final    NALTREXOL (NALTREXONE METABOLITE) * 05/04/2022 negative  10 ng/mL Final    Fentanyl Quantification 05/04/2022 negative  1 ng/mL Final    Norfentanyl Quantification 05/04/2022 negative  8 ng/mL Final    3-methyl-fentanyl Quantification 05/04/2022 Fen Neg  2 ng/mL Final    4-ANPP Quantification 05/04/2022 Fen Neg  2 ng/mL Final    4-FiBF Quantification 05/04/2022 Fen Neg  2 ng/mL Final    Acetyl fentanyl Quantification 05/04/2022 Fen Neg  2 ng/mL Final    Acetyl norfentanyl Quantification 05/04/2022 Fen Neg  5 ng/mL Final    Acryl fentanyl Quantification 05/04/2022 Fen Neg  1 ng/mL Final    Butryl fentanyl Quantification 05/04/2022 Fen Neg  1 ng/mL Final    Carfentanil Quantification 05/04/2022 Fen Neg  2 ng/mL Final    Cyclopropyl fentanyl Quantification 05/04/2022 Fen Neg  1 ng/mL Final    Furanyl fentanyl Quantification 05/04/2022 Fen Neg  2 ng/mL Final    Methoxyacetyl fentanyl Quantificat* 05/04/2022 Fen Neg  2 ng/mL Final    [id number] Quantification 05/04/2022 Fen Neg  2 ng/mL Final    N-desmethyl [id number] Quantification 05/04/2022 Fen Neg  2 ng/mL Final    Methadone Quantification 05/04/2022 negative  100 ng/mL Final    EDDP (Methadone metabolite) Quanti* 05/04/2022 negative  100 ng/mL Final    Oxycodone Quantification 05/04/2022 negative  50 ng/mL Final    Noroxycodone Quantification 05/04/2022 negative  50 ng/mL Final    Oxymorphone Quantification 05/04/2022 positive-134 817  50 ng/mL Final    Oxymorphone Quantification 05/04/2022 positive-134 817  50 ng/mL Final    Tapentadol Quantification 05/04/2022 negative  50 ng/mL Final    Tramadol Quantification 05/04/2022 negative  100 ng/mL Final    O-desmethyl-tramadol Quantification 05/04/2022 negative  100 ng/mL Final    N-DESMETHYL-TRAMADOL QUANTIFICATION 05/04/2022 negative  100 ng/mL Final    Codeine Quantification 05/04/2022 negative  50 ng/mL Final    Morphine Quantification 05/04/2022 negative  50 ng/mL Final    Hydrocodone Quantification 05/04/2022 negative  50 ng/mL Final    Norhydrocodone Quantification 05/04/2022 negative  50 ng/mL Final    Hydromorphone Quantification 05/04/2022 negative  50 ng/mL Final    EUTYLONE QUANTIFICATION 05/04/2022 negative  10 ng/mL Final    METHYLONE QUANTIFICATION 05/04/2022 negative  3 ng/mL Final    Mitragynine (Kratom alkaloid) Haroldo* 05/04/2022 negative  1 ng/mL Final    0-BB-Diwhykcixpg (Kratom alkaloid)* 05/04/2022 negative  1 ng/mL Final    5F-ADB-M7 05/04/2022 negative  10 ng/mL Final    ZR-[id number]-N1 METABOLITE QUANTIFI* 05/04/2022 negative  10 ng/mL Final    [id number]-[id number]-L6 METABOLITE QUANTI* 05/04/2022 negative  10 ng/mL Final    QQX122 metabolite Quantification 05/04/2022 negative  10 ng/mL Final    [id number] metabolite Quantification 05/04/2022 negative  10 ng/mL Final    RCS4 METABOLITE QUANTIFICATION 05/04/2022 negative  10 ng/mL Final    ZCY71/ METABOLITE QUANTIFICAT* 05/04/2022 negative  10 ng/mL Final    Methylphenidate Quantification 05/04/2022 negative  50 ng/mL Final    RITALINIC ACID QUANTIFICATION 05/04/2022 negative  50 ng/mL Final    PHENTERMINE QUANTIFICATION 05/04/2022 negative  50 ng/mL Final    OXIDANT 05/04/2022 normal-0  <200 ug/mL ug/mL Final    CREATININE 05/04/2022 normal-27 5  >20 mg/dL mg/dL Final    pH 05/04/2022 normal-6 1  4 5 - 9 5 Final    SPECIFIC GRAVITY URINE 05/04/2022 normal-1 008  1 003 - 1 035 Final   Office Visit on 02/08/2022   Component Date Value Ref Range Status    Amphetamine Screen, Ur 02/08/2022 Positive (A) Oynvxi=[id number] Final    Please Note:                                                          01  Amphetamine test includes Amphetamine and Methamphetamine  Amphetamine               Positive        A                         01  Amphetamine Conf, MS, UR    >5000              ng/mL Peqxxh=205       01    Methamphetamine           Negative            Zagvlu=386            01    Barbiturate Screen, Ur 02/08/2022 Negative  Ibeiui=913 ng/mL Final    Cannabinoid Scrn, Ur 02/08/2022 Negative  Cutoff=50 ng/mL Final    Methadone Screen, Urine 02/08/2022 Negative  Yvhdlv=631 ng/mL Final    Opiate Scrn, Ur 02/08/2022 Negative  Lxneyd=521 ng/mL Final    Opiate test includes Codeine and Morphine only      Phencyclidine (PCP), Qual, Ur 02/08/2022 Negative  Cutoff=25 ng/mL Final    Benzodiazepines 02/08/2022 Positive (A) Utelnr=913 Final      Nordiazepam               Negative            Plfqot=926            01    Oxazepam                  Positive        A                         01  Oxazepam Conf, MS, UR       617                ng/mL Bifdco=488       01    OH-Alprazolam             Negative            Hajlvv=837            01    Cocaine (Metab ) Urine 02/08/2022 Negative  Ldlbdi=834 ng/mL Final    Propoxyphene Screen, Urine 02/08/2022 Negative  Iuagkb=271 ng/mL Final

## 2022-08-02 DIAGNOSIS — E03.9 HYPOTHYROIDISM, UNSPECIFIED TYPE: ICD-10-CM

## 2022-08-02 RX ORDER — LEVOTHYROXINE SODIUM 175 UG/1
175 TABLET ORAL
Qty: 30 TABLET | Refills: 1 | Status: SHIPPED | OUTPATIENT
Start: 2022-08-02 | End: 2022-08-10 | Stop reason: DRUGHIGH

## 2022-08-10 ENCOUNTER — OFFICE VISIT (OUTPATIENT)
Dept: FAMILY MEDICINE CLINIC | Facility: CLINIC | Age: 67
End: 2022-08-10
Payer: MEDICARE

## 2022-08-10 ENCOUNTER — TELEPHONE (OUTPATIENT)
Dept: NEUROLOGY | Facility: CLINIC | Age: 67
End: 2022-08-10

## 2022-08-10 VITALS
HEART RATE: 76 BPM | WEIGHT: 100.8 LBS | HEIGHT: 61 IN | SYSTOLIC BLOOD PRESSURE: 110 MMHG | BODY MASS INDEX: 19.03 KG/M2 | TEMPERATURE: 97.8 F | DIASTOLIC BLOOD PRESSURE: 60 MMHG | RESPIRATION RATE: 18 BRPM | OXYGEN SATURATION: 97 %

## 2022-08-10 DIAGNOSIS — M99.06 SOMATIC DYSFUNCTION OF BOTH LOWER EXTREMITIES: ICD-10-CM

## 2022-08-10 DIAGNOSIS — M25.559 HIP PAIN: Primary | ICD-10-CM

## 2022-08-10 DIAGNOSIS — R41.3 MEMORY LOSS: ICD-10-CM

## 2022-08-10 DIAGNOSIS — E03.9 HYPOTHYROIDISM, UNSPECIFIED TYPE: ICD-10-CM

## 2022-08-10 DIAGNOSIS — G89.29 CHRONIC BILATERAL LOW BACK PAIN WITHOUT SCIATICA: ICD-10-CM

## 2022-08-10 DIAGNOSIS — M99.00 SOMATIC DYSFUNCTION OF HEAD REGION: ICD-10-CM

## 2022-08-10 DIAGNOSIS — M99.07 SOMATIC DYSFUNCTION OF BOTH UPPER EXTREMITIES: ICD-10-CM

## 2022-08-10 DIAGNOSIS — M99.08 SOMATIC DYSFUNCTION OF RIB: ICD-10-CM

## 2022-08-10 DIAGNOSIS — M99.01 CERVICOTHORACIC SOMATIC DYSFUNCTION: ICD-10-CM

## 2022-08-10 DIAGNOSIS — M99.05 SOMATIC DYSFUNCTION OF PELVIS REGION: ICD-10-CM

## 2022-08-10 DIAGNOSIS — F90.0 ATTENTION DEFICIT HYPERACTIVITY DISORDER (ADHD), PREDOMINANTLY INATTENTIVE TYPE: ICD-10-CM

## 2022-08-10 DIAGNOSIS — M99.03 SOMATIC DYSFUNCTION OF LUMBOSACRAL REGION: ICD-10-CM

## 2022-08-10 DIAGNOSIS — M81.0 AGE-RELATED OSTEOPOROSIS WITHOUT CURRENT PATHOLOGICAL FRACTURE: ICD-10-CM

## 2022-08-10 DIAGNOSIS — M54.50 CHRONIC BILATERAL LOW BACK PAIN WITHOUT SCIATICA: ICD-10-CM

## 2022-08-10 PROCEDURE — 99214 OFFICE O/P EST MOD 30 MIN: CPT | Performed by: FAMILY MEDICINE

## 2022-08-10 PROCEDURE — 98929 OSTEOPATH MANJ 9-10 REGIONS: CPT | Performed by: FAMILY MEDICINE

## 2022-08-10 RX ORDER — DEXTROAMPHETAMINE SACCHARATE, AMPHETAMINE ASPARTATE MONOHYDRATE, DEXTROAMPHETAMINE SULFATE AND AMPHETAMINE SULFATE 7.5; 7.5; 7.5; 7.5 MG/1; MG/1; MG/1; MG/1
30 CAPSULE, EXTENDED RELEASE ORAL EVERY MORNING
Qty: 30 CAPSULE | Refills: 0 | Status: SHIPPED | OUTPATIENT
Start: 2022-08-10 | End: 2022-09-08 | Stop reason: SDUPTHER

## 2022-08-10 RX ORDER — LEVOTHYROXINE SODIUM 0.15 MG/1
150 TABLET ORAL
Qty: 90 TABLET | Refills: 0 | Status: SHIPPED | OUTPATIENT
Start: 2022-08-10 | End: 2022-08-26 | Stop reason: DRUGHIGH

## 2022-08-10 RX ORDER — DEXTROAMPHETAMINE SACCHARATE, AMPHETAMINE ASPARTATE, DEXTROAMPHETAMINE SULFATE AND AMPHETAMINE SULFATE 2.5; 2.5; 2.5; 2.5 MG/1; MG/1; MG/1; MG/1
10 TABLET ORAL DAILY PRN
Qty: 30 TABLET | Refills: 0 | Status: SHIPPED | OUTPATIENT
Start: 2022-08-10 | End: 2022-09-08 | Stop reason: SDUPTHER

## 2022-08-10 NOTE — PROGRESS NOTES
Assessment/Plan:    OMT  Performed by: Bradley Velazquez DO  Authorized by: Bradley Velazquez DO   Tyler Protocol:  Consent: Verbal consent obtained  Risks and benefits: risks, benefits and alternatives were discussed  Consent given by: patient  Time out: Immediately prior to procedure a "time out" was called to verify the correct patient, procedure, equipment, support staff and site/side marked as required  Patient understanding: patient states understanding of the procedure being performed  Patient consent: the patient's understanding of the procedure matches consent given  Relevant documents: relevant documents present and verified  Radiology Images displayed and confirmed  If images not available, report reviewed: imaging studies available  Patient identity confirmed: verbally with patient        Procedure Details:     Region evaluated and treated:  Head, Ribs, Cervical, Lumbar, Thoracic, Sacrum/Pelvis, Pelvis Innominate, Right Extremities and Left Extremities    Extremity Information  Extremities: left upper extremity and left lower extremity    Extremity Information  Extremities: right upper extremity and right lower extremity    Thoracic Information  Thoracic Region: T1 - T4, T5 - T9 and T10 - T12  Total Regions Treated:  9      Procedure note:     - Risk and benefits of OMT were explained to patient; patient expresses understanding and wishes to proceed  OMT performed to below areas consisting of: Soft tissue, myofascial, ME, LV, gentle articulatory, FPR, Strain/Counterstrain  Patient tolerated OMT well, without adverse effect noted in office  OMT lead to some resolution of somatic dysfunction and of current symptoms  Patient instructed to drink @ least 64 oz of water today after treatment to help with the potential increase in soreness that may be experienced initially  May take Tylenol/Ibuprofen prn  RTC as scheduled or sooner for acute issues  Hip pain/4   Chronic bilateral low back pain without sciatica  - she has significant OA, polyarticular  She is seeing Rheumatology as well and being tx'd  Am cautious with direct techniques due to osteoporosis/RA  She understands this  Osteoporosis of her L hip - noted on most recent DEXA  She is on Ca2+ and Vit D  There were obvious mechanical findings on exam today with SD  Had good results with tx at last visit and today  She has immediate improvement in her symptoms  - she is off of her morphine sulfate and doing well, in general   She cont on bid Lyrica dosing  She can take tylenol/nsaids prn, ice/heat, etc   Will restart PT in the near future - Rx for this given by Rheumatology  5  Somatic dysfunction - 9 regions  - see above  Memory loss  - rx placed for neurology  [facility] is working with her on this; she is stressed but having memory issues, they will try to get her plugged in with neurology for this  Is able to do ADLs  Very stressed of late with her oral yoana and is working to see a new dentist - saw [facility] and is looking to get reimbursed as she has had numerous issues with this  - Ambulatory Referral to Neurology; Future    12  Attention deficit hyperactivity disorder (ADHD), predominantly inattentive type  - stable  Helps with energy as well  Need to monitor weight; down due to poor po intake 2/2 issues with dentures  - amphetamine-dextroamphetamine (ADDERALL XR, 30MG,) 30 MG 24 hr capsule; Take 1 capsule (30 mg total) by mouth every morning Max Daily Amount: 30 mg  Dispense: 30 capsule; Refill: 0  - amphetamine-dextroamphetamine (ADDERALL, 10MG,) 10 mg tablet; Take 1 tablet (10 mg total) by mouth daily as needed (afternoon let down) Max Daily Amount: 10 mg  Dispense: 30 tablet; Refill: 0    13  Hypothyroidism, unspecified type  - overtreated  Will dec dose to 150 from 175  Will need to recheck in about 6w  - levothyroxine (Euthyrox) 150 mcg tablet;  Take 1 tablet (150 mcg total) by mouth daily in the early morning  Dispense: 90 tablet; Refill: 0       RTC in ~ 4w for OMT visit  Will need Rx for TSH to have in near future from this visit  Patient/Caretaker verbalized understanding and were in agreement with today's assessment and plan  Time was taken to address any questions patient/caretaker had  Indication/Risks/Benefits of medication(s) as prescribed were discussed with the patient/caretaker  The patient verbalized understanding and agreement and elects to take medications as prescribed  Time was taken to answer any questions the patient/caretaker may have had  Chief Complaint   Patient presents with    Follow-up     OMT       Subjective:      Patient ID: Anusha Pagan is a 75 y o  female  Cont to struggle wit oral health and will look to see a new dentist for this  Is not going back to [facility]  Top is ill fitting and cannot get lower ones' in  Not able to eat much since this and is losing weight  She is trying her best       Patient with pain in her R hip pain and it is radiating to her RLE  There is a constant, sharp/stabbing pain and goes down to her medial knee and ant thigh  She has known arthritis in her R knee - has some swelling of this on occasion  She has had rotated pelvis in the past and she feels it is related  She feels her R leg and arm are shorter than the L  PT did help her as well and did some manipulation and this helped her, more  She is willing to do PT again and has Rx for this  She does try some home manipulation which she feels is beneficial   She is not weak, no falls  No bowel or bladder symptoms relating to this pain  She is having hand pain and difficulty opening them in AM   She has known OA  She has h/o L RC shoulder repair, she did have tendonitis/bursitis in the R shoulder and was doing PT and that was helpful  ADD - needs Adderall XR  This improves her quality of life          The following portions of the patient's history were reviewed and updated as appropriate: allergies, current medications, past family history, past medical history, past social history, past surgical history and problem list     Review of Systems   All other systems reviewed and are negative  Objective:    /60   Pulse 76   Temp 97 8 °F (36 6 °C)   Resp 18   Ht 5' 1" (1 549 m)   Wt 45 7 kg (100 lb 12 8 oz)   SpO2 97%   BMI 19 05 kg/m²        Physical Exam  Vitals and nursing note reviewed  Constitutional:       General: She is not in acute distress  Appearance: Normal appearance  She is not ill-appearing  HENT:      Head: Normocephalic and atraumatic  Mouth/Throat:      Mouth: Mucous membranes are dry  Comments: With dentures    Eyes:      Conjunctiva/sclera: Conjunctivae normal    Cardiovascular:      Rate and Rhythm: Normal rate and regular rhythm  Heart sounds: Normal heart sounds  Pulmonary:      Effort: Pulmonary effort is normal       Breath sounds: Normal breath sounds  No wheezing, rhonchi or rales  Musculoskeletal:         General: Deformity (DIP/PIP nodes noted) present  Cervical back: Neck supple  Comments: TART changes noted paraspinal musculature  There is dec ROM of the L-spine and pain with passive ROM of the hip    R shoulder is high   L Iliac Crest is higher   L short leg today - prior exam, R LE was shorter - happens after adjustment     Osteopathic Exam:  Pos standing flexion test on R  Pos seated flexion test on R  [id number]  T1-4NsLrR  L4-5ErRsR  R innominate ext rotation/ant   L on L sacral torsion  C3-4FrLsL  OA EsLrR  Elev rib 1 - L  Ribs 4-7 exhalation dysfunction - L   R scapula rLsR  L shoulder rRsL  R ant talus/L external tibia rotation     Strength is preserved throughout    Lymphadenopathy:      Cervical: No cervical adenopathy  Neurological:      General: No focal deficit present  Mental Status: She is alert and oriented to person, place, and time  Psychiatric:         Mood and Affect: Mood normal          Behavior: Behavior normal          Thought Content:  Thought content normal          Judgment: Judgment normal

## 2022-08-18 ENCOUNTER — TELEPHONE (OUTPATIENT)
Dept: NEUROLOGY | Facility: CLINIC | Age: 67
End: 2022-08-18

## 2022-08-18 NOTE — TELEPHONE ENCOUNTER
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

## 2022-08-21 ENCOUNTER — RA CDI HCC (OUTPATIENT)
Dept: OTHER | Facility: HOSPITAL | Age: 67
End: 2022-08-21

## 2022-08-22 DIAGNOSIS — M35.1 MCTD (MIXED CONNECTIVE TISSUE DISEASE) (HCC): ICD-10-CM

## 2022-08-22 DIAGNOSIS — R53.83 OTHER FATIGUE: Primary | ICD-10-CM

## 2022-08-22 DIAGNOSIS — M34.0 SCLERODERMA PROGRESSIVE (HCC): ICD-10-CM

## 2022-08-22 DIAGNOSIS — R53.83 MALAISE AND FATIGUE: ICD-10-CM

## 2022-08-22 DIAGNOSIS — R53.81 MALAISE AND FATIGUE: ICD-10-CM

## 2022-08-22 NOTE — PROGRESS NOTES
D80 1  [facility] 74  coding opportunities          Chart Reviewed number of suggestions sent to Provider: 1     Patients Insurance     Medicare Insurance: [employer]

## 2022-08-24 DIAGNOSIS — R79.89 LOW VITAMIN D LEVEL: Primary | ICD-10-CM

## 2022-08-25 DIAGNOSIS — M34.0 SCLERODERMA PROGRESSIVE (HCC): Primary | ICD-10-CM

## 2022-08-25 RX ORDER — PREDNISONE 1 MG/1
TABLET ORAL
Qty: 21 TABLET | Refills: 0 | Status: SHIPPED | OUTPATIENT
Start: 2022-08-25 | End: 2022-09-08

## 2022-08-26 DIAGNOSIS — E03.9 HYPOTHYROIDISM, UNSPECIFIED TYPE: Primary | ICD-10-CM

## 2022-08-26 RX ORDER — LEVOTHYROXINE SODIUM 0.12 MG/1
125 TABLET ORAL
Qty: 30 TABLET | Refills: 2 | Status: SHIPPED | OUTPATIENT
Start: 2022-08-26 | End: 2022-09-02 | Stop reason: DRUGHIGH

## 2022-09-02 DIAGNOSIS — E03.9 HYPOTHYROIDISM, UNSPECIFIED TYPE: ICD-10-CM

## 2022-09-02 RX ORDER — LEVOTHYROXINE SODIUM 0.15 MG/1
150 TABLET ORAL
Qty: 90 TABLET | Refills: 0
Start: 2022-09-02

## 2022-09-08 ENCOUNTER — OFFICE VISIT (OUTPATIENT)
Dept: FAMILY MEDICINE CLINIC | Facility: CLINIC | Age: 67
End: 2022-09-08
Payer: MEDICARE

## 2022-09-08 VITALS
TEMPERATURE: 98.6 F | SYSTOLIC BLOOD PRESSURE: 110 MMHG | HEART RATE: 87 BPM | RESPIRATION RATE: 16 BRPM | WEIGHT: 107 LBS | HEIGHT: 61 IN | OXYGEN SATURATION: 97 % | DIASTOLIC BLOOD PRESSURE: 60 MMHG | BODY MASS INDEX: 20.2 KG/M2

## 2022-09-08 DIAGNOSIS — M99.03 SOMATIC DYSFUNCTION OF LUMBOSACRAL REGION: ICD-10-CM

## 2022-09-08 DIAGNOSIS — M99.05 SOMATIC DYSFUNCTION OF PELVIS REGION: ICD-10-CM

## 2022-09-08 DIAGNOSIS — F90.0 ATTENTION DEFICIT HYPERACTIVITY DISORDER (ADHD), PREDOMINANTLY INATTENTIVE TYPE: ICD-10-CM

## 2022-09-08 DIAGNOSIS — M99.06 SOMATIC DYSFUNCTION OF BOTH LOWER EXTREMITIES: ICD-10-CM

## 2022-09-08 DIAGNOSIS — M54.50 CHRONIC BILATERAL LOW BACK PAIN WITHOUT SCIATICA: Primary | ICD-10-CM

## 2022-09-08 DIAGNOSIS — M99.08 SOMATIC DYSFUNCTION OF RIB: ICD-10-CM

## 2022-09-08 DIAGNOSIS — M99.01 CERVICOTHORACIC SOMATIC DYSFUNCTION: ICD-10-CM

## 2022-09-08 DIAGNOSIS — E03.9 ACQUIRED HYPOTHYROIDISM: ICD-10-CM

## 2022-09-08 DIAGNOSIS — M81.0 AGE-RELATED OSTEOPOROSIS WITHOUT CURRENT PATHOLOGICAL FRACTURE: ICD-10-CM

## 2022-09-08 DIAGNOSIS — G89.29 CHRONIC BILATERAL LOW BACK PAIN WITHOUT SCIATICA: Primary | ICD-10-CM

## 2022-09-08 DIAGNOSIS — M99.07 SOMATIC DYSFUNCTION OF BOTH UPPER EXTREMITIES: ICD-10-CM

## 2022-09-08 DIAGNOSIS — M25.559 HIP PAIN: ICD-10-CM

## 2022-09-08 DIAGNOSIS — M99.00 SOMATIC DYSFUNCTION OF HEAD REGION: ICD-10-CM

## 2022-09-08 PROCEDURE — 99214 OFFICE O/P EST MOD 30 MIN: CPT | Performed by: FAMILY MEDICINE

## 2022-09-08 PROCEDURE — 98929 OSTEOPATH MANJ 9-10 REGIONS: CPT | Performed by: FAMILY MEDICINE

## 2022-09-08 RX ORDER — DICYCLOMINE HCL 20 MG
20 TABLET ORAL
COMMUNITY
Start: 2022-08-24

## 2022-09-08 RX ORDER — DEXTROAMPHETAMINE SACCHARATE, AMPHETAMINE ASPARTATE, DEXTROAMPHETAMINE SULFATE AND AMPHETAMINE SULFATE 2.5; 2.5; 2.5; 2.5 MG/1; MG/1; MG/1; MG/1
10 TABLET ORAL DAILY PRN
Qty: 30 TABLET | Refills: 0 | Status: SHIPPED | OUTPATIENT
Start: 2022-09-08 | End: 2022-10-07 | Stop reason: SDUPTHER

## 2022-09-08 RX ORDER — CLOBETASOL PROPIONATE 0.5 MG/G
0.05 OINTMENT TOPICAL
COMMUNITY
Start: 2022-08-24

## 2022-09-08 RX ORDER — DEXTROAMPHETAMINE SACCHARATE, AMPHETAMINE ASPARTATE MONOHYDRATE, DEXTROAMPHETAMINE SULFATE AND AMPHETAMINE SULFATE 7.5; 7.5; 7.5; 7.5 MG/1; MG/1; MG/1; MG/1
30 CAPSULE, EXTENDED RELEASE ORAL EVERY MORNING
Qty: 30 CAPSULE | Refills: 0 | Status: SHIPPED | OUTPATIENT
Start: 2022-09-08 | End: 2022-10-07 | Stop reason: SDUPTHER

## 2022-09-08 NOTE — PROGRESS NOTES
Assessment/Plan:    OMT  Performed by: Han William DO  Authorized by: Han William DO   Universal Protocol:  Consent: Verbal consent obtained  Risks and benefits: risks, benefits and alternatives were discussed  Consent given by: patient  Time out: Immediately prior to procedure a "time out" was called to verify the correct patient, procedure, equipment, support staff and site/side marked as required  Patient understanding: patient states understanding of the procedure being performed  Patient consent: the patient's understanding of the procedure matches consent given  Procedure consent: procedure consent matches procedure scheduled  Relevant documents: relevant documents present and verified  Radiology Images displayed and confirmed  If images not available, report reviewed: imaging studies available  Patient identity confirmed: verbally with patient        Procedure Details:     Region evaluated and treated:  Head, Left Extremities, Cervical, Ribs, Lumbar, Thoracic, Pelvis Innominate, Right Extremities and Sacrum/Pelvis    Extremity Information  Extremities: left upper extremity and left lower extremity    Extremity Information  Extremities: right upper extremity and right lower extremity    Thoracic Information  Thoracic Region: T1 - T4, T5 - T9 and T10 - T12  Total Regions Treated:  9      Procedure note:     - Risk and benefits of OMT were explained to patient; patient expresses understanding and wishes to proceed  OMT performed to below areas consisting of: Soft tissue, myofascial, ME, LV, gentle articulatory, FPR, Strain/Counterstrain  Patient tolerated OMT well, without adverse effect noted in office  OMT lead to some resolution of somatic dysfunction and of current symptoms  Patient instructed to drink @ least 64 oz of water today after treatment to help with the potential increase in soreness that may be experienced initially  May take Tylenol/Ibuprofen prn    RTC as scheduled or sooner for acute issues  Hip pain/4  Chronic bilateral low back pain without sciatica  - she has significant OA, polyarticular  She is seeing Rheumatology as well and being tx'd  Am cautious with direct techniques due to osteoporosis/RA  She understands this  Osteoporosis of her L hip - noted on most recent DEXA  She is on Ca2+ and Vit D  There were obvious mechanical findings on exam today with SD  Has good results with tx both subjective and objective  She has immediate improvement in her symptoms       - She cont on bid Lyrica dosing  She can take tylenol/nsaids prn, ice/heat, etc   Will restart PT in the near future - Rx for this given by Rheumatology  5  Somatic dysfunction - 9 regions  - see above  Attention deficit hyperactivity disorder (ADHD), predominantly inattentive type  - stable  Helps with energy as well  Need to monitor weight; down due to poor po intake 2/2 issues with dentures  - amphetamine-dextroamphetamine (ADDERALL XR, 30MG,) 30 MG 24 hr capsule; Take 1 capsule (30 mg total) by mouth every morning Max Daily Amount: 30 mg  Dispense: 30 capsule; Refill: 0  - amphetamine-dextroamphetamine (ADDERALL, 10MG,) 10 mg tablet; Take 1 tablet (10 mg total) by mouth daily as needed (afternoon let down) Max Daily Amount: 10 mg  Dispense: 30 tablet; Refill: 0    13  Hypothyroidism, unspecified type  - she prefers to be on her 150 mcg dose of medicine  We will monitor this closely  RTC in ~ 4w for OMT visit  Pt verbalized understanding and were in agreement with today's assessment and plan  Time was taken to address any questions patient/caretaker had  Indication/Risks/Benefits of medication(s) as prescribed were discussed with the patient/caretaker  The patient verbalized understanding and agreement and elects to take medications as prescribed  Time was taken to answer any questions the patient/caretaker may have had        Chief Complaint   Patient presents with  Back Pain       Subjective:      Patient ID: Nickolas Orozco is a 75 y o  female  Hit her L forehead and has bruising on this due to lid hitting her head  She is not confused or with h/a  There was a scrape to her L ant shin as well  Cont to struggle wit oral health and has dental appt tomorrow for this  Low self esteem and very tearful 2/2 this  Patient with pain in her R hip pain and it is radiating to her RLE  There is a constant, sharp/stabbing pain and goes down to her medial knee and ant thigh  She has known arthritis in her R knee - has some swelling of this on occasion  She has had rotated pelvis in the past and she feels it is related  She feels her R leg and arm are shorter than the L  PT did help her as well and did some manipulation and this helped her, more  She is trying to get back to PT for this  She does try some home manipulation which she feels is beneficial   She is not weak, no falls  No bowel or bladder symptoms relating to this pain  ADD - needs Adderall XR  This improves her quality of life  The following portions of the patient's history were reviewed and updated as appropriate: allergies, current medications, past family history, past medical history, past social history, past surgical history and problem list     Review of Systems   All other systems reviewed and are negative  Objective:    /60 (BP Location: Right arm, Patient Position: Sitting, Cuff Size: Standard)   Pulse 87   Temp 98 6 °F (37 °C) (Temporal)   Resp 16   Ht 5' 1" (1 549 m)   Wt 48 5 kg (107 lb)   SpO2 97%   BMI 20 22 kg/m²        Physical Exam  Vitals and nursing note reviewed  Constitutional:       General: She is not in acute distress  Appearance: Normal appearance  She is not ill-appearing  HENT:      Head: Normocephalic and atraumatic  Mouth/Throat:      Mouth: Mucous membranes are dry  Comments:  With dentures    Eyes: Conjunctiva/sclera: Conjunctivae normal    Cardiovascular:      Rate and Rhythm: Normal rate and regular rhythm  Heart sounds: Normal heart sounds  Pulmonary:      Effort: Pulmonary effort is normal       Breath sounds: Normal breath sounds  No wheezing, rhonchi or rales  Musculoskeletal:         General: Deformity (DIP/PIP nodes noted) present  Cervical back: Neck supple  Comments: TART changes noted paraspinal musculature  There is dec ROM of the L-spine and pain with passive ROM of the hip    R shoulder is high   L Iliac Crest is higher   No leg length discrepancy     Osteopathic Exam:  Pos standing flexion test on R  Pos seated flexion test on R  [id number]  T1-4NsLrR  L4-5ErRsR  R innominate ext rotation/ant   L on L sacral torsion  C3-4FrLsL  OA EsLrR  Elev rib 1 - L  Ribs 4-7 exhalation dysfunction - L   R scapula rLsR  L shoulder rRsL  R ant talus/L external tibia rotation     Strength is preserved throughout    Lymphadenopathy:      Cervical: No cervical adenopathy  Neurological:      General: No focal deficit present  Mental Status: She is alert and oriented to person, place, and time  Psychiatric:         Mood and Affect: Mood normal          Behavior: Behavior normal          Thought Content:  Thought content normal          Judgment: Judgment normal

## 2022-09-26 DIAGNOSIS — F41.8 SITUATIONAL ANXIETY: ICD-10-CM

## 2022-09-26 RX ORDER — ALPRAZOLAM 0.25 MG/1
0.25 TABLET ORAL 2 TIMES DAILY PRN
Qty: 30 TABLET | Refills: 0 | Status: SHIPPED | OUTPATIENT
Start: 2022-09-26

## 2022-10-03 ENCOUNTER — PATIENT MESSAGE (OUTPATIENT)
Dept: RHEUMATOLOGY | Facility: CLINIC | Age: 67
End: 2022-10-03

## 2022-10-07 ENCOUNTER — OFFICE VISIT (OUTPATIENT)
Dept: FAMILY MEDICINE CLINIC | Facility: CLINIC | Age: 67
End: 2022-10-07
Payer: MEDICARE

## 2022-10-07 DIAGNOSIS — M99.01 CERVICOTHORACIC SOMATIC DYSFUNCTION: ICD-10-CM

## 2022-10-07 DIAGNOSIS — G89.29 CHRONIC BILATERAL LOW BACK PAIN WITHOUT SCIATICA: Primary | ICD-10-CM

## 2022-10-07 DIAGNOSIS — M99.05 SOMATIC DYSFUNCTION OF PELVIS REGION: ICD-10-CM

## 2022-10-07 DIAGNOSIS — M99.06 SOMATIC DYSFUNCTION OF BOTH LOWER EXTREMITIES: ICD-10-CM

## 2022-10-07 DIAGNOSIS — M99.07 SOMATIC DYSFUNCTION OF BOTH UPPER EXTREMITIES: ICD-10-CM

## 2022-10-07 DIAGNOSIS — F90.0 ATTENTION DEFICIT HYPERACTIVITY DISORDER (ADHD), PREDOMINANTLY INATTENTIVE TYPE: ICD-10-CM

## 2022-10-07 DIAGNOSIS — M25.559 HIP PAIN: ICD-10-CM

## 2022-10-07 DIAGNOSIS — Z23 NEEDS FLU SHOT: ICD-10-CM

## 2022-10-07 DIAGNOSIS — M99.08 SOMATIC DYSFUNCTION OF RIB: ICD-10-CM

## 2022-10-07 DIAGNOSIS — M54.50 CHRONIC BILATERAL LOW BACK PAIN WITHOUT SCIATICA: Primary | ICD-10-CM

## 2022-10-07 DIAGNOSIS — M99.03 SOMATIC DYSFUNCTION OF LUMBOSACRAL REGION: ICD-10-CM

## 2022-10-07 DIAGNOSIS — M99.00 SOMATIC DYSFUNCTION OF HEAD REGION: ICD-10-CM

## 2022-10-07 PROCEDURE — 98929 OSTEOPATH MANJ 9-10 REGIONS: CPT | Performed by: FAMILY MEDICINE

## 2022-10-07 PROCEDURE — G0008 ADMIN INFLUENZA VIRUS VAC: HCPCS

## 2022-10-07 PROCEDURE — 99214 OFFICE O/P EST MOD 30 MIN: CPT | Performed by: FAMILY MEDICINE

## 2022-10-07 PROCEDURE — 90662 IIV NO PRSV INCREASED AG IM: CPT

## 2022-10-07 RX ORDER — DEXTROAMPHETAMINE SACCHARATE, AMPHETAMINE ASPARTATE MONOHYDRATE, DEXTROAMPHETAMINE SULFATE AND AMPHETAMINE SULFATE 7.5; 7.5; 7.5; 7.5 MG/1; MG/1; MG/1; MG/1
30 CAPSULE, EXTENDED RELEASE ORAL EVERY MORNING
Qty: 30 CAPSULE | Refills: 0 | Status: SHIPPED | OUTPATIENT
Start: 2022-10-07

## 2022-10-07 RX ORDER — DEXTROAMPHETAMINE SACCHARATE, AMPHETAMINE ASPARTATE, DEXTROAMPHETAMINE SULFATE AND AMPHETAMINE SULFATE 2.5; 2.5; 2.5; 2.5 MG/1; MG/1; MG/1; MG/1
10 TABLET ORAL DAILY PRN
Qty: 30 TABLET | Refills: 0 | Status: SHIPPED | OUTPATIENT
Start: 2022-10-07

## 2022-10-07 NOTE — PROGRESS NOTES
Assessment/Plan:    1  Chronic bilateral low back pain without sciatica     2  Needs flu shot  influenza vaccine, high-dose, PF 0 7 mL (FLUZONE HIGH-DOSE)   3  Hip pain     4  Attention deficit hyperactivity disorder (ADHD), predominantly inattentive type  amphetamine-dextroamphetamine (ADDERALL, 10MG,) 10 mg tablet    amphetamine-dextroamphetamine (ADDERALL XR, 30MG,) 30 MG 24 hr capsule   5  Somatic dysfunction of head region  OMT   6  Cervicothoracic somatic dysfunction  OMT   7  Somatic dysfunction of lumbosacral region  OMT   8  Somatic dysfunction of pelvis region  OMT   9  Somatic dysfunction of both lower extremities  OMT   10  Somatic dysfunction of both upper extremities  OMT   11  Somatic dysfunction of rib  OMT     meds refilled for her ADHD  Stable  Compliant  Takes as Rx  PDPMP is c/w Rx  OMT is helpful for patient  We will continue tx     - she has significant OA, polyarticular  She is seeing Rheumatology as well and being tx'd  Am cautious with direct techniques due to osteoporosis/RA  She understands this  Osteoporosis of her L hip - noted on most recent DEXA  She is on Ca2+ and Vit D  There were obvious mechanical findings on exam today with SD  Has good results with tx both subjective and objective  She has immediate improvement in her symptoms       - She cont on bid Lyrica dosing  She can take tylenol/nsaids prn, ice/heat, etc   PT on board at times  Attention deficit hyperactivity disorder (ADHD), predominantly inattentive type  - stable  Helps with energy as well  Need to monitor weight; down due to poor po intake 2/2 issues with dentures  - amphetamine-dextroamphetamine (ADDERALL XR, 30MG,) 30 MG 24 hr capsule; Take 1 capsule (30 mg total) by mouth every morning Max Daily Amount: 30 mg  Dispense: 30 capsule; Refill: 0  - amphetamine-dextroamphetamine (ADDERALL, 10MG,) 10 mg tablet;  Take 1 tablet (10 mg total) by mouth daily as needed (afternoon let down) Max Daily Amount: 10 mg  Dispense: 30 tablet; Refill: 0     RTC in ~ 4w for OMT visit  Pt verbalized understanding and were in agreement with today's assessment and plan  Time was taken to address any questions patient/caretaker had  Indication/Risks/Benefits of medication(s) as prescribed were discussed with the patient/caretaker  The patient verbalized understanding and agreement and elects to take medications as prescribed  Time was taken to answer any questions the patient/caretaker may have had  OMT  Performed by: Yuri Knight DO  Authorized by: Yuri Knight DO   Universal Protocol:  Consent: Verbal consent obtained  Risks and benefits: risks, benefits and alternatives were discussed  Consent given by: patient  Time out: Immediately prior to procedure a "time out" was called to verify the correct patient, procedure, equipment, support staff and site/side marked as required  Patient understanding: patient states understanding of the procedure being performed  Patient consent: the patient's understanding of the procedure matches consent given  Procedure consent: procedure consent matches procedure scheduled  Patient identity confirmed: verbally with patient        Procedure Details:     Region evaluated and treated:  Head, Left Extremities, Cervical, Ribs, Lumbar, Sacrum/Pelvis, Thoracic, Pelvis Innominate and Right Extremities    Extremity Information  Extremities: left upper extremity and left lower extremity    Extremity Information  Extremities: right upper extremity and right lower extremity    Thoracic Information  Thoracic Region: T1 - T4, T5 - T9 and T10 - T12  Total Regions Treated:  9      Procedure note:     - Risk and benefits of OMT were explained to patient; patient expresses understanding and wishes to proceed  OMT performed to below areas consisting of: Soft tissue, myofascial, ME, LV, gentle articulatory, FPR, Strain/Counterstrain    Patient tolerated OMT well, without adverse effect noted in office  OMT lead to some resolution of somatic dysfunction and of current symptoms  Patient instructed to drink @ least 64 oz of water today after treatment to help with the potential increase in soreness that may be experienced initially  May take Tylenol/Ibuprofen prn  RTC as scheduled or sooner for acute issues  Chief Complaint   Patient presents with    Back Pain     Subjective:      Patient ID: Bernarda Mata is a 75 y o  female  Cont to struggle wit oral health and has dental appt in [facility] for this next Wed -- this is through [employer] work  She is looking forward to this as she has struggled for some time  Still eats soft foods due to this  Low self esteem and very tearful 2/2 this  Patient with pain in her R hip pain and it is radiating to her RLE  There is a constant, sharp/stabbing pain and goes down to her medial knee and ant thigh  She has known arthritis in her R knee - has some swelling of this on occasion  She has had rotated pelvis in the past and she feels it is related  She feels her R leg and arm are shorter than the L  PT did help her as well and did some manipulation and this helped her, more  She is trying to get back to PT for this  She does try some home manipulation which she feels is beneficial   She is not weak, no falls  No bowel or bladder symptoms relating to this pain  ADD - needs Adderall XR  This improves her quality of life  Increased stress of late, was helping a sick from in [place] who is dying  The following portions of the patient's history were reviewed and updated as appropriate: allergies, current medications, past family history, past medical history, past social history, past surgical history and problem list     Review of Systems   All other systems reviewed and are negative  Objective: There were no vitals taken for this visit  Physical Exam  Vitals and nursing note reviewed  Constitutional:       General: She is not in acute distress  Appearance: Normal appearance  She is not ill-appearing  HENT:      Head: Normocephalic and atraumatic  Mouth/Throat:      Mouth: Mucous membranes are dry  Comments: With dentures    Eyes:      Conjunctiva/sclera: Conjunctivae normal    Cardiovascular:      Rate and Rhythm: Normal rate and regular rhythm  Heart sounds: Normal heart sounds  Pulmonary:      Effort: Pulmonary effort is normal       Breath sounds: Normal breath sounds  No wheezing, rhonchi or rales  Musculoskeletal:         General: Deformity (DIP/PIP nodes noted) present  Cervical back: Neck supple  Comments: TART changes noted paraspinal musculature  There is dec ROM of the L-spine and pain with passive ROM of the hip    R shoulder is high   L Iliac Crest is higher   R leg short     Osteopathic Exam:  Pos standing flexion test on R  Pos seated flexion test on R  [id number]  T1-4NsLrR  L4-5ErRsR  R innominate ext rotation/ant   L on L sacral torsion  C3-4FrLsL  OA EsLrR  Elev rib 1 - L  Ribs 4-7 exhalation dysfunction - L   R scapula rLsR  L shoulder rRsL  R ant talus/L external tibia rotation     Strength is preserved throughout    Lymphadenopathy:      Cervical: No cervical adenopathy  Neurological:      General: No focal deficit present  Mental Status: She is alert and oriented to person, place, and time  Psychiatric:         Mood and Affect: Mood normal          Behavior: Behavior normal          Thought Content:  Thought content normal          Judgment: Judgment normal

## 2022-10-18 ENCOUNTER — OFFICE VISIT (OUTPATIENT)
Dept: FAMILY MEDICINE CLINIC | Facility: CLINIC | Age: 67
End: 2022-10-18
Payer: MEDICARE

## 2022-10-18 ENCOUNTER — TELEPHONE (OUTPATIENT)
Dept: FAMILY MEDICINE CLINIC | Facility: CLINIC | Age: 67
End: 2022-10-18

## 2022-10-18 VITALS
HEIGHT: 61 IN | DIASTOLIC BLOOD PRESSURE: 76 MMHG | TEMPERATURE: 98.1 F | WEIGHT: 102 LBS | BODY MASS INDEX: 19.26 KG/M2 | RESPIRATION RATE: 16 BRPM | HEART RATE: 85 BPM | SYSTOLIC BLOOD PRESSURE: 114 MMHG | OXYGEN SATURATION: 98 %

## 2022-10-18 DIAGNOSIS — J02.8 PHARYNGITIS DUE TO OTHER ORGANISM: Primary | ICD-10-CM

## 2022-10-18 DIAGNOSIS — L81.9 CHANGE IN MULTIPLE PIGMENTED SKIN LESIONS: ICD-10-CM

## 2022-10-18 DIAGNOSIS — M35.1 MCTD (MIXED CONNECTIVE TISSUE DISEASE) (HCC): ICD-10-CM

## 2022-10-18 DIAGNOSIS — R09.82 PND (POST-NASAL DRIP): ICD-10-CM

## 2022-10-18 DIAGNOSIS — M34.0 SCLERODERMA PROGRESSIVE (HCC): ICD-10-CM

## 2022-10-18 DIAGNOSIS — F51.01 PRIMARY INSOMNIA: ICD-10-CM

## 2022-10-18 PROCEDURE — 99214 OFFICE O/P EST MOD 30 MIN: CPT | Performed by: FAMILY MEDICINE

## 2022-10-18 RX ORDER — IBUPROFEN 600 MG/1
600 TABLET ORAL EVERY 8 HOURS PRN
Qty: 90 TABLET | Refills: 0 | Status: SHIPPED | OUTPATIENT
Start: 2022-10-18

## 2022-10-18 RX ORDER — TRAZODONE HYDROCHLORIDE 150 MG/1
150 TABLET ORAL
Qty: 90 TABLET | Refills: 1 | Status: SHIPPED | OUTPATIENT
Start: 2022-10-18

## 2022-10-18 RX ORDER — CETIRIZINE HYDROCHLORIDE 10 MG/1
10 TABLET ORAL DAILY
Qty: 30 TABLET | Refills: 1 | Status: SHIPPED | OUTPATIENT
Start: 2022-10-18

## 2022-10-18 NOTE — PROGRESS NOTES
Name: Viviana Carrillo      :       MRN: [id number]  Encounter Provider: Indira Guzman DO  Encounter Date: 10/18/2022   Encounter department: [address] [place] [address]     1  Pharyngitis due to other organism  - since having her dentures worked on  There is some erythema  Mention of white plaques  We will trial nystatin swish and spit and see how she does  I also see PND, will trial daily zyrtec as well  Certainly could be multifactorial   I see no signs of acute strep, etc     - nystatin (MYCOSTATIN) 500,000 units/5 mL suspension; Apply 5 mL (500,000 Units total) to the mouth or throat 4 (four) times a day  Dispense: 200 mL; Refill: 1    2  Primary insomnia  - trazodone refilled  - traZODone (DESYREL) 150 mg tablet; Take 1 tablet (150 mg total) by mouth daily at bedtime  Dispense: 90 tablet; Refill: 1    3  Change in multiple pigmented skin lesions  - there is lesion on her R thigh that looks c/w Patrick K  There are a few areas of her mid back that are inflamed  I suggested she phone her Derm to see if they can see her  She is on board  4  Scleroderma progressive ([address] )  Prefers Motrin to Aleve  Not to daily qd  She acknowledges this  - ibuprofen (MOTRIN) 600 mg tablet; Take 1 tablet (600 mg total) by mouth every 8 (eight) hours as needed for mild pain or moderate pain  Dispense: 90 tablet; Refill: 0    5  MCTD (mixed connective tissue disease) (HCC)  - ibuprofen (MOTRIN) 600 mg tablet; Take 1 tablet (600 mg total) by mouth every 8 (eight) hours as needed for mild pain or moderate pain  Dispense: 90 tablet; Refill: 0    6  PND (post-nasal drip)  As per above  - cetirizine (ZyrTEC) 10 mg tablet; Take 1 tablet (10 mg total) by mouth daily  Dispense: 30 tablet; Refill: 1    Keep scheduled f/u, sooner prn    Patient/Caretaker verbalized understanding and were in agreement with today's assessment and plan    Time was taken to address any questions patient/caretaker had  Indication/Risks/Benefits of medication(s) as prescribed were discussed with the patient/caretaker  The patient verbalized understanding and agreement and elects to take medications as prescribed  Time was taken to answer any questions the patient/caretaker may have had  Chief Complaint   Patient presents with   • Sore Throat            Subjective        Here with sore throat since last week when getting dentures/oral work  There were white spots per report on her palate that she noted  There is no f/c/s, cough, etc   Throat painful to swallow  She has f/u denture appt today, wondering if she should keep this  Changes in skin lesions/pain in her mid back area, itching  Sees [facility]  Back lesions are bothering her for some time  They itch, she is not able to get to these due to location  The R thigh lesion is growing per report and itchy  Insomnia -- needs trazodone refilled  Review of Systems   All other systems reviewed and are negative        Current Outpatient Medications on File Prior to Visit   Medication Sig   • ALPRAZolam (XANAX) 0 25 mg tablet Take 1 tablet (0 25 mg total) by mouth 2 (two) times a day as needed for anxiety   • amphetamine-dextroamphetamine (ADDERALL XR, 30MG,) 30 MG 24 hr capsule Take 1 capsule (30 mg total) by mouth every morning Max Daily Amount: 30 mg   • amphetamine-dextroamphetamine (ADDERALL, 10MG,) 10 mg tablet Take 1 tablet (10 mg total) by mouth daily as needed (afternoon let down) Max Daily Amount: 10 mg   • buPROPion (Wellbutrin XL) 150 mg 24 hr tablet Take 1 tablet (150 mg total) by mouth every morning   • Calcium Carb-Cholecalciferol (Calcium-Vitamin D3) 250-125 MG-UNIT TABS TAKE 2 TABLETS BY MOUTH EVERY EVENING   • calcium-vitamin D (OSCAL) 250-125 MG-UNIT per tablet Take 2 tablets by mouth every evening   • Cholecalciferol 50 MCG (2000 UT) TABS Take 1 tablet (2,000 Units total) by mouth in the morning   • clindamycin (CLEOCIN T) 1 % lotion Apply 1 application topically daily as needed   • clobetasol (TEMOVATE) 0 05 % ointment Apply 2 80 application topically   • folic acid (FOLVITE) 1 mg tablet Take 2 tablets (2 mg total) by mouth daily   • hydroxychloroquine (PLAQUENIL) 200 mg tablet Take 1 tablet (200 mg total) by mouth daily   • levothyroxine (Euthyrox) 150 mcg tablet Take 1 tablet (150 mcg total) by mouth daily in the early morning   • methotrexate 2 5 mg tablet 5 tablet po weekly (take all 5 tablets on the same day every week)   • Multiple Vitamin (MULTIVITAMIN) tablet Take 1 tablet by mouth daily   • mycophenolate (MYFORTIC) 360 MG TBEC Take 2 tablets (720 mg total) by mouth 2 (two) times a day   • pregabalin (LYRICA) 100 mg capsule Take 1 capsule (100 mg total) by mouth 2 (two) times a day   • [DISCONTINUED] traZODone (DESYREL) 150 mg tablet Take 1 tablet (150 mg total) by mouth daily at bedtime   • acetaminophen (TYLENOL) 650 mg CR tablet Take 1 tablet (650 mg total) by mouth every 4 (four) hours as needed for mild pain or moderate pain   • alendronate (FOSAMAX) 70 mg tablet Take 1 tablet (70 mg total) by mouth every 7 days Take on an empty stomach with a full glass of water and do not like down for 30 minutes afterwards   • dicyclomine (BENTYL) 20 mg tablet Take 20 mg by mouth   • naloxone (NARCAN) 4 mg/0 1 mL nasal spray Administer 1 spray into a nostril  If no response after 2-3 minutes, give another dose in the other nostril using a new spray  (Patient not taking: No sig reported)   • [DISCONTINUED] naproxen (Naprosyn) 500 mg tablet Take 1 tablet (500 mg total) by mouth 2 (two) times a day as needed for moderate pain       Objective     /76 (BP Location: Left arm, Patient Position: Sitting, Cuff Size: Standard)   Pulse 85   Temp 98 1 °F (36 7 °C) (Temporal)   Resp 16   Ht 5' 1" (1 549 m)   Wt 46 3 kg (102 lb)   SpO2 98%   BMI 19 27 kg/m²     Physical Exam  Vitals and nursing note reviewed  Constitutional:       Appearance: Normal appearance  HENT:      Head: Normocephalic and atraumatic  Mouth/Throat:      Mouth: Mucous membranes are moist       Comments: There is erythema and pnd at the posterior oropharynx  I do not see white plaques/exudate on exam today  Dentures are in place  Eyes:      Conjunctiva/sclera: Conjunctivae normal       Pupils: Pupils are equal, round, and reactive to light  Cardiovascular:      Rate and Rhythm: Normal rate and regular rhythm  Heart sounds: Normal heart sounds  Pulmonary:      Effort: Pulmonary effort is normal       Breath sounds: Normal breath sounds  No wheezing, rhonchi or rales  Musculoskeletal:      Cervical back: Neck supple  Lymphadenopathy:      Cervical: No cervical adenopathy  Neurological:      General: No focal deficit present  Mental Status: She is alert and oriented to person, place, and time  Psychiatric:         Mood and Affect: Mood normal          Behavior: Behavior normal          Thought Content:  Thought content normal          Judgment: Judgment normal        Ene Alvarenga DO

## 2022-10-28 ENCOUNTER — OFFICE VISIT (OUTPATIENT)
Dept: FAMILY MEDICINE CLINIC | Facility: CLINIC | Age: 67
End: 2022-10-28

## 2022-10-28 VITALS
HEART RATE: 74 BPM | RESPIRATION RATE: 18 BRPM | HEIGHT: 61 IN | DIASTOLIC BLOOD PRESSURE: 60 MMHG | BODY MASS INDEX: 19.3 KG/M2 | SYSTOLIC BLOOD PRESSURE: 110 MMHG | WEIGHT: 102.2 LBS | TEMPERATURE: 98.2 F | OXYGEN SATURATION: 98 %

## 2022-10-28 DIAGNOSIS — M47.14 THORACIC SPONDYLOSIS WITH MYELOPATHY: Primary | ICD-10-CM

## 2022-10-28 DIAGNOSIS — M47.16 LUMBAR SPONDYLOSIS WITH MYELOPATHY: ICD-10-CM

## 2022-10-28 DIAGNOSIS — R29.898 UPPER EXTREMITY WEAKNESS: ICD-10-CM

## 2022-10-28 DIAGNOSIS — R29.898 WEAKNESS OF BOTH LOWER EXTREMITIES: ICD-10-CM

## 2022-10-28 DIAGNOSIS — M34.0 SCLERODERMA PROGRESSIVE (HCC): ICD-10-CM

## 2022-10-28 DIAGNOSIS — G72.89 NECROTIZING MYOPATHY: ICD-10-CM

## 2022-10-28 NOTE — PROGRESS NOTES
Assessment/Plan:    1  Thoracic spondylosis with myelopathy/2  Upper extremity weakness/3  Weakness of both lower extremities/4  Lumbar spondylosis with myelopathy  - at this point, we will get MR of T-L spine  She is with weakness of her b/l UE/LEs, loss of bulk and tone in the setting of progressive Scleroderma and Necrotizing myopathy  She is a patient at [employer] and also with Rheum, locally  She is on board with plan  - MRI lumbar spine wo contrast; Future    5  Necrotizing myopathy  See above  6  Scleroderma progressive ([address] )      RTC as scheduled pending above  Patient/Caretaker verbalized understanding and were in agreement with today's assessment and plan  Time was taken to address any questions patient/caretaker had  Chief Complaint   Patient presents with   • Back Pain   • Extremity Weakness     Subjective:      Patient ID: Kayla Nuñez is a 75 y o  female  She has progressive worsening T/L spine pain  She has significant C-spine Degeneration and is plugged in with the spine center  She at one point had good bulk and tone and was able to exercise without problems, this has been getting a bit worse over time  She is weak and yesterday, struggled to lift a case of water into her shopping cart at the store  She has pain as well  No bowel or bladder c/o's  Cont to struggle wit oral health and is working with a dentist in [facility] for this  This has significantly sidelined her overall yoana due to inability to eat really well as her teeth fall out  Low self esteem and very tearful 2/2 this  The following portions of the patient's history were reviewed and updated as appropriate: allergies, current medications, past family history, past medical history, past social history, past surgical history and problem list     Review of Systems   All other systems reviewed and are negative          Objective:    /60 (BP Location: Left arm, Patient Position: Sitting, Cuff Size: Standard)   Pulse 74   Temp 98 2 °F (36 8 °C) (Temporal)   Resp 18   Ht 5' 1" (1 549 m)   Wt 46 4 kg (102 lb 3 2 oz)   SpO2 98%   BMI 19 31 kg/m²        Physical Exam  Vitals and nursing note reviewed  Constitutional:       General: She is not in acute distress  Appearance: Normal appearance  She is not ill-appearing  HENT:      Head: Normocephalic and atraumatic  Mouth/Throat:      Mouth: Mucous membranes are dry  Comments: With dentures    Eyes:      Conjunctiva/sclera: Conjunctivae normal    Cardiovascular:      Rate and Rhythm: Normal rate and regular rhythm  Heart sounds: Normal heart sounds  Pulmonary:      Effort: Pulmonary effort is normal       Breath sounds: Normal breath sounds  No wheezing, rhonchi or rales  Musculoskeletal:         General: Deformity (DIP/PIP nodes noted) present  Cervical back: Neck supple  Comments: TART changes noted paraspinal musculature  There is dec ROM of the L-spine and T-spine with pain and tightness  There is obvious deformity of her spine/scoliosis noted as well  Strength to her b/l UEs is ~ 4/5 and LEs is 5/5 today  She has notable loss of bulk and tone of her b/l UE/LEs  She is worried about this  Lymphadenopathy:      Cervical: No cervical adenopathy  Neurological:      General: No focal deficit present  Mental Status: She is alert and oriented to person, place, and time  Psychiatric:         Mood and Affect: Mood normal          Behavior: Behavior normal          Thought Content:  Thought content normal          Judgment: Judgment normal

## 2022-10-31 ENCOUNTER — APPOINTMENT (OUTPATIENT)
Dept: LAB | Facility: HOSPITAL | Age: 67
End: 2022-10-31
Attending: INTERNAL MEDICINE

## 2022-10-31 ENCOUNTER — HOSPITAL ENCOUNTER (OUTPATIENT)
Dept: MRI IMAGING | Facility: HOSPITAL | Age: 67
Discharge: HOME | End: 2022-10-31
Attending: FAMILY MEDICINE

## 2022-10-31 DIAGNOSIS — M34.0 SCLERODERMA PROGRESSIVE (HCC): ICD-10-CM

## 2022-10-31 DIAGNOSIS — R53.83 MALAISE AND FATIGUE: ICD-10-CM

## 2022-10-31 DIAGNOSIS — R53.81 MALAISE AND FATIGUE: ICD-10-CM

## 2022-10-31 DIAGNOSIS — R29.898 WEAKNESS OF BOTH LOWER EXTREMITIES: ICD-10-CM

## 2022-10-31 DIAGNOSIS — M47.16 LUMBAR SPONDYLOSIS WITH MYELOPATHY: ICD-10-CM

## 2022-10-31 DIAGNOSIS — M47.14 THORACIC SPONDYLOSIS WITH MYELOPATHY: ICD-10-CM

## 2022-10-31 DIAGNOSIS — R29.898 UPPER EXTREMITY WEAKNESS: ICD-10-CM

## 2022-10-31 DIAGNOSIS — M35.1 MCTD (MIXED CONNECTIVE TISSUE DISEASE) (HCC): ICD-10-CM

## 2022-10-31 LAB
ALBUMIN SERPL BCP-MCNC: 3.7 G/DL (ref 3.5–5)
ALP SERPL-CCNC: 53 U/L (ref 46–116)
ALT SERPL W P-5'-P-CCNC: 21 U/L (ref 12–78)
ANION GAP SERPL CALCULATED.3IONS-SCNC: 1 MMOL/L (ref 4–13)
AST SERPL W P-5'-P-CCNC: 12 U/L (ref 5–45)
BACTERIA UR QL AUTO: NORMAL /HPF
BASOPHILS # BLD AUTO: 0.08 THOUSANDS/ÂΜL (ref 0–0.1)
BASOPHILS NFR BLD AUTO: 2 % (ref 0–1)
BILIRUB SERPL-MCNC: 0.32 MG/DL (ref 0.2–1)
BILIRUB UR QL STRIP: NEGATIVE
BUN SERPL-MCNC: 18 MG/DL (ref 5–25)
C3 SERPL-MCNC: 77.9 MG/DL (ref 90–180)
C4 SERPL-MCNC: 17 MG/DL (ref 10–40)
CALCIUM SERPL-MCNC: 9.3 MG/DL (ref 8.3–10.1)
CHLORIDE SERPL-SCNC: 106 MMOL/L (ref 96–108)
CLARITY UR: CLEAR
CO2 SERPL-SCNC: 31 MMOL/L (ref 21–32)
COLOR UR: YELLOW
CREAT SERPL-MCNC: 0.75 MG/DL (ref 0.6–1.3)
CREAT UR-MCNC: 15.1 MG/DL
CRP SERPL QL: <0.5 MG/L
EOSINOPHIL # BLD AUTO: 0.26 THOUSAND/ÂΜL (ref 0–0.61)
EOSINOPHIL NFR BLD AUTO: 8 % (ref 0–6)
ERYTHROCYTE [DISTWIDTH] IN BLOOD BY AUTOMATED COUNT: 14 % (ref 11.6–15.1)
ERYTHROCYTE [SEDIMENTATION RATE] IN BLOOD: 1 MM/HOUR (ref 0–29)
GFR SERPL CREATININE-BSD FRML MDRD: 82 ML/MIN/1.73SQ M
GLUCOSE SERPL-MCNC: 101 MG/DL (ref 65–140)
GLUCOSE UR STRIP-MCNC: NEGATIVE MG/DL
HCT VFR BLD AUTO: 38.6 % (ref 34.8–46.1)
HGB BLD-MCNC: 12.5 G/DL (ref 11.5–15.4)
HGB UR QL STRIP.AUTO: NEGATIVE
IMM GRANULOCYTES # BLD AUTO: 0.01 THOUSAND/UL (ref 0–0.2)
IMM GRANULOCYTES NFR BLD AUTO: 0 % (ref 0–2)
KETONES UR STRIP-MCNC: NEGATIVE MG/DL
LEUKOCYTE ESTERASE UR QL STRIP: NEGATIVE
LYMPHOCYTES # BLD AUTO: 1.31 THOUSANDS/ÂΜL (ref 0.6–4.47)
LYMPHOCYTES NFR BLD AUTO: 38 % (ref 14–44)
MCH RBC QN AUTO: 29.4 PG (ref 26.8–34.3)
MCHC RBC AUTO-ENTMCNC: 32.4 G/DL (ref 31.4–37.4)
MCV RBC AUTO: 91 FL (ref 82–98)
MONOCYTES # BLD AUTO: 0.38 THOUSAND/ÂΜL (ref 0.17–1.22)
MONOCYTES NFR BLD AUTO: 11 % (ref 4–12)
NEUTROPHILS # BLD AUTO: 1.38 THOUSANDS/ÂΜL (ref 1.85–7.62)
NEUTS SEG NFR BLD AUTO: 41 % (ref 43–75)
NITRITE UR QL STRIP: NEGATIVE
NON-SQ EPI CELLS URNS QL MICRO: NORMAL /HPF
NRBC BLD AUTO-RTO: 0 /100 WBCS
PH UR STRIP.AUTO: 7 [PH]
PLATELET # BLD AUTO: 228 THOUSANDS/UL (ref 149–390)
PMV BLD AUTO: 9.8 FL (ref 8.9–12.7)
POTASSIUM SERPL-SCNC: 4.7 MMOL/L (ref 3.5–5.3)
PROT SERPL-MCNC: 6.1 G/DL (ref 6.4–8.4)
PROT UR STRIP-MCNC: NEGATIVE MG/DL
PROT UR-MCNC: <6 MG/DL
PROT/CREAT UR: <0.4 MG/G{CREAT} (ref 0–0.1)
RBC # BLD AUTO: 4.25 MILLION/UL (ref 3.81–5.12)
RBC #/AREA URNS AUTO: NORMAL /HPF
SODIUM SERPL-SCNC: 138 MMOL/L (ref 135–147)
SP GR UR STRIP.AUTO: 1.01 (ref 1–1.03)
UROBILINOGEN UR QL STRIP.AUTO: 0.2 E.U./DL
WBC # BLD AUTO: 3.42 THOUSAND/UL (ref 4.31–10.16)
WBC #/AREA URNS AUTO: NORMAL /HPF

## 2022-11-01 LAB
BACTERIA UR CULT: NORMAL
DSDNA AB SER-ACNC: <1 IU/ML (ref 0–9)

## 2022-11-06 DIAGNOSIS — F90.0 ATTENTION DEFICIT HYPERACTIVITY DISORDER (ADHD), PREDOMINANTLY INATTENTIVE TYPE: ICD-10-CM

## 2022-11-08 DIAGNOSIS — M51.26 HERNIATED LUMBAR INTERVERTEBRAL DISC: ICD-10-CM

## 2022-11-08 DIAGNOSIS — M54.16 RADICULOPATHY, LUMBAR REGION: ICD-10-CM

## 2022-11-08 RX ORDER — DEXTROAMPHETAMINE SACCHARATE, AMPHETAMINE ASPARTATE, DEXTROAMPHETAMINE SULFATE AND AMPHETAMINE SULFATE 2.5; 2.5; 2.5; 2.5 MG/1; MG/1; MG/1; MG/1
10 TABLET ORAL DAILY PRN
Qty: 30 TABLET | Refills: 0 | Status: SHIPPED | OUTPATIENT
Start: 2022-11-08

## 2022-11-08 RX ORDER — DEXTROAMPHETAMINE SACCHARATE, AMPHETAMINE ASPARTATE MONOHYDRATE, DEXTROAMPHETAMINE SULFATE AND AMPHETAMINE SULFATE 7.5; 7.5; 7.5; 7.5 MG/1; MG/1; MG/1; MG/1
30 CAPSULE, EXTENDED RELEASE ORAL EVERY MORNING
Qty: 30 CAPSULE | Refills: 0 | Status: SHIPPED | OUTPATIENT
Start: 2022-11-08

## 2022-11-08 RX ORDER — PREGABALIN 100 MG/1
100 CAPSULE ORAL 2 TIMES DAILY
Qty: 180 CAPSULE | Refills: 1 | Status: SHIPPED | OUTPATIENT
Start: 2022-11-08

## 2022-11-09 DIAGNOSIS — R09.82 PND (POST-NASAL DRIP): ICD-10-CM

## 2022-11-09 RX ORDER — CETIRIZINE HYDROCHLORIDE 10 MG/1
10 TABLET ORAL DAILY
Qty: 90 TABLET | Refills: 1 | Status: SHIPPED | OUTPATIENT
Start: 2022-11-09

## 2022-11-11 DIAGNOSIS — M51.26 HERNIATED LUMBAR INTERVERTEBRAL DISC: ICD-10-CM

## 2022-11-11 RX ORDER — ACETAMINOPHEN 650 MG/1
TABLET, FILM COATED, EXTENDED RELEASE ORAL
Qty: 90 TABLET | Refills: 1 | Status: SHIPPED | OUTPATIENT
Start: 2022-11-11

## 2022-11-21 DIAGNOSIS — R79.89 LOW VITAMIN D LEVEL: ICD-10-CM

## 2022-11-21 DIAGNOSIS — F98.8 ATTENTION DEFICIT DISORDER, UNSPECIFIED HYPERACTIVITY PRESENCE: ICD-10-CM

## 2022-11-21 DIAGNOSIS — F22 PARANOIA (HCC): ICD-10-CM

## 2022-11-22 RX ORDER — BUPROPION HYDROCHLORIDE 150 MG/1
150 TABLET ORAL EVERY MORNING
Qty: 90 TABLET | Refills: 1 | Status: SHIPPED | OUTPATIENT
Start: 2022-11-22

## 2022-11-22 RX ORDER — CALCIUM CARBONATE-CHOLECALCIFEROL TAB 250 MG-125 UNIT 250-125 MG-UNIT
TAB ORAL
Qty: 180 TABLET | Refills: 1 | Status: SHIPPED | OUTPATIENT
Start: 2022-11-22

## 2022-11-30 ENCOUNTER — TELEPHONE (OUTPATIENT)
Dept: OBGYN CLINIC | Facility: HOSPITAL | Age: 67
End: 2022-11-30

## 2022-11-30 ENCOUNTER — APPOINTMENT (RX ONLY)
Dept: URBAN - NONMETROPOLITAN AREA CLINIC 4 | Facility: CLINIC | Age: 67
Setting detail: DERMATOLOGY
End: 2022-11-30

## 2022-11-30 DIAGNOSIS — L82.0 INFLAMED SEBORRHEIC KERATOSIS: ICD-10-CM

## 2022-11-30 DIAGNOSIS — L259 CONTACT DERMATITIS AND OTHER ECZEMA, UNSPECIFIED CAUSE: ICD-10-CM

## 2022-11-30 PROBLEM — L30.8 OTHER SPECIFIED DERMATITIS: Status: ACTIVE | Noted: 2022-11-30

## 2022-11-30 PROCEDURE — 17110 DESTRUCTION B9 LES UP TO 14: CPT

## 2022-11-30 PROCEDURE — ? LIQUID NITROGEN

## 2022-11-30 PROCEDURE — 99213 OFFICE O/P EST LOW 20 MIN: CPT | Mod: 25

## 2022-11-30 PROCEDURE — ? COUNSELING

## 2022-11-30 PROCEDURE — ? TREATMENT REGIMEN

## 2022-11-30 ASSESSMENT — LOCATION DETAILED DESCRIPTION DERM
LOCATION DETAILED: RIGHT ANTERIOR PROXIMAL THIGH
LOCATION DETAILED: RIGHT ULNAR DORSAL HAND
LOCATION DETAILED: INFERIOR THORACIC SPINE
LOCATION DETAILED: RIGHT INFERIOR UPPER BACK
LOCATION DETAILED: RIGHT MEDIAL UPPER BACK
LOCATION DETAILED: LEFT INFERIOR UPPER BACK
LOCATION DETAILED: RIGHT CENTRAL MALAR CHEEK
LOCATION DETAILED: LEFT SUPERIOR UPPER BACK
LOCATION DETAILED: LEFT ULNAR DORSAL HAND
LOCATION DETAILED: LEFT INFERIOR MEDIAL MALAR CHEEK

## 2022-11-30 ASSESSMENT — LOCATION SIMPLE DESCRIPTION DERM
LOCATION SIMPLE: RIGHT THIGH
LOCATION SIMPLE: RIGHT HAND
LOCATION SIMPLE: RIGHT UPPER BACK
LOCATION SIMPLE: LEFT CHEEK
LOCATION SIMPLE: LEFT HAND
LOCATION SIMPLE: UPPER BACK
LOCATION SIMPLE: LEFT UPPER BACK
LOCATION SIMPLE: RIGHT CHEEK

## 2022-11-30 ASSESSMENT — LOCATION ZONE DERM
LOCATION ZONE: LEG
LOCATION ZONE: HAND
LOCATION ZONE: FACE
LOCATION ZONE: TRUNK

## 2022-11-30 NOTE — TELEPHONE ENCOUNTER
Caller: Tri Hopkins    Doctor: Gus Chappell    Reason for call: patient has a sore throat and would like her visit 12/01/2022 at   10:30 AM to be a video visit    Patient also states she has two MRIs for you to review     Call back#: [phone]

## 2022-11-30 NOTE — PROCEDURE: LIQUID NITROGEN
Add 52 Modifier (Optional): no
Medical Necessity Clause: This procedure was medically necessary because the lesions that were treated were:
Render Post-Care Instructions In Note?: yes
Medical Necessity Information: It is in your best interest to select a reason for this procedure from the list below. All of these items fulfill various CMS LCD requirements except the new and changing color options.
Spray Paint Text: The liquid nitrogen was applied to the skin utilizing a spray paint frosting technique.
Post-Care Instructions: I reviewed with the patient in detail post-care instructions. Patient is to wear sunprotection, and avoid picking at any of the treated lesions. Pt may apply Vaseline to crusted or scabbing areas.
Number Of Freeze-Thaw Cycles: 1 freeze-thaw cycle
Consent: The patient's consent was obtained including but not limited to risks of crusting, scabbing, blistering, scarring, darker or lighter pigmentary change, recurrence, incomplete removal and infection.
Detail Level: Zone

## 2022-12-05 ENCOUNTER — TELEPHONE (OUTPATIENT)
Dept: RHEUMATOLOGY | Facility: CLINIC | Age: 67
End: 2022-12-05

## 2022-12-05 ENCOUNTER — TELEPHONE (OUTPATIENT)
Dept: INFUSION CENTER | Facility: CLINIC | Age: 67
End: 2022-12-05

## 2022-12-05 NOTE — TELEPHONE ENCOUNTER
I think the patient was accidentally marked as not seen on Thursday, but I did do a virtual visit with her   Please schedule her for follow-up on Monday, July 3rd at 12pm

## 2022-12-05 NOTE — TELEPHONE ENCOUNTER
Called patient and left message to return my call regarding infusion visit tomorrow  Will be providing patient with updated efficacy information for evushield with new covid 19 variants

## 2022-12-06 ENCOUNTER — APPOINTMENT (OUTPATIENT)
Dept: LAB | Facility: CLINIC | Age: 67
End: 2022-12-06

## 2022-12-06 ENCOUNTER — HOSPITAL ENCOUNTER (OUTPATIENT)
Dept: INFUSION CENTER | Facility: CLINIC | Age: 67
Discharge: HOME | End: 2022-12-06

## 2022-12-06 DIAGNOSIS — F90.0 ATTENTION DEFICIT HYPERACTIVITY DISORDER (ADHD), PREDOMINANTLY INATTENTIVE TYPE: ICD-10-CM

## 2022-12-07 RX ORDER — DEXTROAMPHETAMINE SACCHARATE, AMPHETAMINE ASPARTATE, DEXTROAMPHETAMINE SULFATE AND AMPHETAMINE SULFATE 2.5; 2.5; 2.5; 2.5 MG/1; MG/1; MG/1; MG/1
10 TABLET ORAL DAILY PRN
Qty: 30 TABLET | Refills: 0 | Status: SHIPPED | OUTPATIENT
Start: 2022-12-07

## 2022-12-07 RX ORDER — DEXTROAMPHETAMINE SACCHARATE, AMPHETAMINE ASPARTATE MONOHYDRATE, DEXTROAMPHETAMINE SULFATE AND AMPHETAMINE SULFATE 7.5; 7.5; 7.5; 7.5 MG/1; MG/1; MG/1; MG/1
30 CAPSULE, EXTENDED RELEASE ORAL EVERY MORNING
Qty: 30 CAPSULE | Refills: 0 | Status: SHIPPED | OUTPATIENT
Start: 2022-12-07

## 2022-12-12 ENCOUNTER — TELEPHONE (OUTPATIENT)
Dept: FAMILY MEDICINE CLINIC | Facility: CLINIC | Age: 67
End: 2022-12-12

## 2022-12-12 DIAGNOSIS — U07.1 COVID-19: ICD-10-CM

## 2022-12-12 DIAGNOSIS — U07.1 COVID-19: Primary | ICD-10-CM

## 2022-12-12 RX ORDER — NIRMATRELVIR AND RITONAVIR 300-100 MG
3 KIT ORAL 2 TIMES DAILY
Qty: 30 TABLET | Refills: 0 | Status: SHIPPED | OUTPATIENT
Start: 2022-12-12 | End: 2022-12-17

## 2022-12-12 RX ORDER — NIRMATRELVIR AND RITONAVIR 300-100 MG
3 KIT ORAL 2 TIMES DAILY
Qty: 30 TABLET | Refills: 0 | Status: SHIPPED | OUTPATIENT
Start: 2022-12-12 | End: 2022-12-12 | Stop reason: SDUPTHER

## 2022-12-14 DIAGNOSIS — G72.89 NECROTIZING MYOPATHY: ICD-10-CM

## 2022-12-14 DIAGNOSIS — M35.1 MCTD (MIXED CONNECTIVE TISSUE DISEASE) (HCC): Primary | ICD-10-CM

## 2022-12-14 RX ORDER — MYCOPHENOLIC ACID 360 MG/1
TABLET, DELAYED RELEASE ORAL
Qty: 90 TABLET | Refills: 6 | Status: SHIPPED | OUTPATIENT
Start: 2022-12-14

## 2023-01-06 DIAGNOSIS — M35.1 MCTD (MIXED CONNECTIVE TISSUE DISEASE) (HCC): ICD-10-CM

## 2023-01-06 DIAGNOSIS — M34.0 SCLERODERMA PROGRESSIVE (HCC): ICD-10-CM

## 2023-01-06 DIAGNOSIS — F90.0 ATTENTION DEFICIT HYPERACTIVITY DISORDER (ADHD), PREDOMINANTLY INATTENTIVE TYPE: ICD-10-CM

## 2023-01-06 DIAGNOSIS — F51.01 PRIMARY INSOMNIA: ICD-10-CM

## 2023-01-06 DIAGNOSIS — M81.0 AGE-RELATED OSTEOPOROSIS WITHOUT CURRENT PATHOLOGICAL FRACTURE: ICD-10-CM

## 2023-01-06 DIAGNOSIS — E03.9 HYPOTHYROIDISM, UNSPECIFIED TYPE: ICD-10-CM

## 2023-01-06 RX ORDER — IBUPROFEN 600 MG/1
600 TABLET ORAL EVERY 8 HOURS PRN
Qty: 90 TABLET | Refills: 0 | Status: SHIPPED | OUTPATIENT
Start: 2023-01-06

## 2023-01-06 RX ORDER — LEVOTHYROXINE SODIUM 0.15 MG/1
150 TABLET ORAL
Qty: 90 TABLET | Refills: 0 | Status: SHIPPED | OUTPATIENT
Start: 2023-01-06

## 2023-01-06 RX ORDER — DEXTROAMPHETAMINE SACCHARATE, AMPHETAMINE ASPARTATE MONOHYDRATE, DEXTROAMPHETAMINE SULFATE AND AMPHETAMINE SULFATE 7.5; 7.5; 7.5; 7.5 MG/1; MG/1; MG/1; MG/1
30 CAPSULE, EXTENDED RELEASE ORAL EVERY MORNING
Qty: 30 CAPSULE | Refills: 0 | Status: SHIPPED | OUTPATIENT
Start: 2023-01-06

## 2023-01-06 RX ORDER — DEXTROAMPHETAMINE SACCHARATE, AMPHETAMINE ASPARTATE, DEXTROAMPHETAMINE SULFATE AND AMPHETAMINE SULFATE 2.5; 2.5; 2.5; 2.5 MG/1; MG/1; MG/1; MG/1
10 TABLET ORAL DAILY PRN
Qty: 30 TABLET | Refills: 0 | Status: SHIPPED | OUTPATIENT
Start: 2023-01-06

## 2023-01-06 RX ORDER — TRAZODONE HYDROCHLORIDE 150 MG/1
150 TABLET ORAL
Qty: 90 TABLET | Refills: 1 | Status: SHIPPED | OUTPATIENT
Start: 2023-01-06

## 2023-01-10 DIAGNOSIS — R79.89 LOW VITAMIN D LEVEL: ICD-10-CM

## 2023-01-10 DIAGNOSIS — M81.0 AGE-RELATED OSTEOPOROSIS WITHOUT CURRENT PATHOLOGICAL FRACTURE: ICD-10-CM

## 2023-01-10 RX ORDER — CALCIUM CARBONATE-CHOLECALCIFEROL TAB 250 MG-125 UNIT 250-125 MG-UNIT
2 TAB ORAL EVERY EVENING
Qty: 180 TABLET | Refills: 1 | Status: SHIPPED | OUTPATIENT
Start: 2023-01-10

## 2023-01-27 ENCOUNTER — OFFICE VISIT (OUTPATIENT)
Dept: FAMILY MEDICINE CLINIC | Facility: CLINIC | Age: 68
End: 2023-01-27

## 2023-01-27 VITALS
OXYGEN SATURATION: 97 % | WEIGHT: 103.4 LBS | DIASTOLIC BLOOD PRESSURE: 57 MMHG | SYSTOLIC BLOOD PRESSURE: 129 MMHG | HEIGHT: 61 IN | TEMPERATURE: 97.2 F | HEART RATE: 89 BPM | BODY MASS INDEX: 19.52 KG/M2

## 2023-01-27 DIAGNOSIS — M25.561 RECURRENT PAIN OF RIGHT KNEE: ICD-10-CM

## 2023-01-27 DIAGNOSIS — C82.95 FOLLICULAR LYMPHOMA OF LYMPH NODES OF INGUINAL REGION, UNSPECIFIED FOLLICULAR LYMPHOMA TYPE (HCC): Primary | ICD-10-CM

## 2023-01-27 DIAGNOSIS — E78.2 MIXED HYPERLIPIDEMIA: ICD-10-CM

## 2023-01-27 DIAGNOSIS — I73.9 PAD (PERIPHERAL ARTERY DISEASE) (HCC): ICD-10-CM

## 2023-01-27 DIAGNOSIS — R09.82 POST-NASAL DRIP: ICD-10-CM

## 2023-01-27 DIAGNOSIS — Z85.850 HISTORY OF THYROID CANCER: ICD-10-CM

## 2023-01-27 DIAGNOSIS — M34.0 SCLERODERMA PROGRESSIVE (HCC): ICD-10-CM

## 2023-01-27 DIAGNOSIS — M25.561 ACUTE PAIN OF RIGHT KNEE: ICD-10-CM

## 2023-01-27 DIAGNOSIS — R59.0 OCCIPITAL LYMPHADENOPATHY: ICD-10-CM

## 2023-01-27 DIAGNOSIS — D84.9 IMMUNOCOMPROMISED STATE (HCC): ICD-10-CM

## 2023-01-27 DIAGNOSIS — E03.9 ACQUIRED HYPOTHYROIDISM: ICD-10-CM

## 2023-01-27 DIAGNOSIS — J43.9 PULMONARY EMPHYSEMA, UNSPECIFIED EMPHYSEMA TYPE (HCC): ICD-10-CM

## 2023-01-27 NOTE — PROGRESS NOTES
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, unspecified follicular lymphoma type [facility])  - she has h/o such  There is palpable abnormality at the R post occiput  I am no convinced this is a lymph node but certainly warrants a scan given her history  She is very worried  We will further discuss plan, pending findings  She just had labs in Dec and these look good  She is not with weight loss, night sweats, etc     - CT soft tissue neck w contrast; Future    2  Occipital lymphadenopathy  As per above  - CT soft tissue neck w contrast; Future    3  Recurrent pain of right knee  Will get XR  4  Acute pain of right knee   XR knee 4+ vw right injury; Future    5  Immunocompromised state ([address] )  Seeing Rheum  6  Scleroderma progressive ([address] )    7  Pulmonary emphysema, unspecified emphysema type ([address] )    8  PAD (peripheral artery disease) ([address] )    9  Acquired hypothyroidism  Will check labs  S/p thyroidectomy  Needs repeat TSH  10  History of thyroid cancer  - TSH, 3rd generation with Free T4 reflex; Future    11  Mixed hyperlipidemia    - Lipid Panel with Direct LDL reflex; Future    12  Post-nasal drip  To take her zyrtec  She agrees  Return in about 3 months (around 2023) for f/u CDM   Patient/Caretaker verbalized understanding and were in agreement with today's assessment and plan  Time was taken to address any questions patient/caretaker had  Indication/Risks/Benefits of medication(s) as prescribed were discussed with the patient/caretaker  The patient verbalized understanding and agreement and elects to take medications as prescribed  Time was taken to answer any questions the patient/caretaker may have had             Chief Complaint   Patient presents with   • LUMP     States she has a lump behind her right ear- she states it feels swollen  The lump hurts her on and off  She states it has been there for about 3 months  Subjective      Here out of concern for "lump in the back of my head "  She is also with fatigue  No other notable nodes  Lymphoma found in L groin  No acute change there  She is worried about the posterior head  No f/c/s, etc   She does follow with Rheum for her autoimmune conditions and has blood work  She is with h/o thyroid Ca  She needs TSH  See plan above  Review of Systems   All other systems reviewed and are negative  Current Outpatient Medications on File Prior to Visit   Medication Sig   • ALPRAZolam (XANAX) 0 25 mg tablet Take 1 tablet (0 25 mg total) by mouth 2 (two) times a day as needed for anxiety   • amphetamine-dextroamphetamine (ADDERALL XR, 30MG,) 30 MG 24 hr capsule Take 1 capsule (30 mg total) by mouth every morning Max Daily Amount: 30 mg   • amphetamine-dextroamphetamine (ADDERALL, 10MG,) 10 mg tablet Take 1 tablet (10 mg total) by mouth daily as needed (afternoon let down) Max Daily Amount: 10 mg   • buPROPion (WELLBUTRIN XL) 150 mg 24 hr tablet TAKE 1 TABLET (150 MG TOTAL) BY MOUTH EVERY MORNING   • Calcium Carb-Cholecalciferol (Calcium-Vitamin D3) 250-3  125 MG-MCG TABS Take 2 tablets by mouth every evening   • calcium-vitamin D (OSCAL) 250-125 MG-UNIT per tablet Take 2 tablets by mouth every evening   • cetirizine (ZyrTEC) 10 mg tablet TAKE 1 TABLET (10 MG TOTAL) BY MOUTH DAILY   • Cholecalciferol 50 MCG (2000 UT) TABS Take 1 tablet (2,000 Units total) by mouth in the morning   • clindamycin (CLEOCIN T) 1 % lotion Apply 1 application topically daily as needed   • clobetasol (TEMOVATE) 0 05 % ointment Apply 4 41 application topically   • dicyclomine (BENTYL) 20 mg tablet Take 20 mg by mouth   • folic acid (FOLVITE) 1 mg tablet Take 2 tablets (2 mg total) by mouth daily   • hydroxychloroquine (PLAQUENIL) 200 mg tablet Take 1 tablet (200 mg total) by mouth daily   • ibuprofen (MOTRIN) 600 mg tablet Take 1 tablet (600 mg total) by mouth every 8 (eight) hours as needed for mild pain or moderate pain   • levothyroxine (Euthyrox) 150 mcg tablet Take 1 tablet (150 mcg total) by mouth daily in the early morning   • Mapap Arthritis Pain 650 MG CR tablet TAKE 1 TABLET (650 MG TOTAL) BY MOUTH EVERY 8 (EIGHT) HOURS AS NEEDED FOR MILD PAIN OR MODERATE PAIN   • Multiple Vitamin (MULTIVITAMIN) tablet Take 1 tablet by mouth daily   • mycophenolate (MYFORTIC) 360 MG TBEC Take 2 in the morning and 1 at night   • nystatin (MYCOSTATIN) 500,000 units/5 mL suspension Apply 5 mL (500,000 Units total) to the mouth or throat 4 (four) times a day   • pregabalin (LYRICA) 100 mg capsule Take 1 capsule (100 mg total) by mouth 2 (two) times a day   • traZODone (DESYREL) 150 mg tablet TAKE 1 TABLET (150 MG TOTAL) BY MOUTH DAILY AT BEDTIME       Objective     /57 (BP Location: Right arm, Patient Position: Sitting)   Pulse 89   Temp (!) 97 2 °F (36 2 °C) (Temporal)   Ht 5' 1" (1 549 m)   Wt 46 9 kg (103 lb 6 4 oz)   SpO2 97%   BMI 19 54 kg/m²     Physical Exam  Vitals and nursing note reviewed  Constitutional:       Appearance: Normal appearance  HENT:      Head: Normocephalic and atraumatic  Comments: There is palpable fullness to R posterior occiput area  No overlying skin color changes, no pain  I cannot palpate a mass on today's exam   No other notable adenopathy to the head      Mouth/Throat:      Comments: Mucus streaking at the posterior oropharynx with mild erythema     Eyes:      Conjunctiva/sclera: Conjunctivae normal       Pupils: Pupils are equal, round, and reactive to light  Cardiovascular:      Rate and Rhythm: Normal rate and regular rhythm  Heart sounds: Normal heart sounds  Pulmonary:      Breath sounds: Normal breath sounds  No wheezing, rhonchi or rales  Musculoskeletal:      Cervical back: Neck supple  No rigidity  Comments: Swelling to R knee  No skin color change  Medial joint line pain  Strength preserved  Palpable crepitus with flex/ext    Lymphadenopathy:      Cervical: No cervical adenopathy  Skin:     General: Skin is warm and dry  Neurological:      General: No focal deficit present  Mental Status: She is alert and oriented to person, place, and time  Psychiatric:         Mood and Affect: Mood normal          Behavior: Behavior normal          Thought Content:  Thought content normal          Judgment: Judgment normal        Ene Doss, DO

## 2023-01-31 ENCOUNTER — APPOINTMENT (RX ONLY)
Dept: URBAN - NONMETROPOLITAN AREA CLINIC 4 | Facility: CLINIC | Age: 68
Setting detail: DERMATOLOGY
End: 2023-01-31

## 2023-01-31 DIAGNOSIS — L57.0 ACTINIC KERATOSIS: ICD-10-CM

## 2023-01-31 DIAGNOSIS — L259 CONTACT DERMATITIS AND OTHER ECZEMA, UNSPECIFIED CAUSE: ICD-10-CM | Status: IMPROVED

## 2023-01-31 DIAGNOSIS — L82.0 INFLAMED SEBORRHEIC KERATOSIS: ICD-10-CM

## 2023-01-31 PROBLEM — L30.8 OTHER SPECIFIED DERMATITIS: Status: ACTIVE | Noted: 2023-01-31

## 2023-01-31 PROCEDURE — 17003 DESTRUCT PREMALG LES 2-14: CPT | Mod: 59

## 2023-01-31 PROCEDURE — ? TREATMENT REGIMEN

## 2023-01-31 PROCEDURE — 17110 DESTRUCTION B9 LES UP TO 14: CPT

## 2023-01-31 PROCEDURE — ? LIQUID NITROGEN

## 2023-01-31 PROCEDURE — 99213 OFFICE O/P EST LOW 20 MIN: CPT | Mod: 25

## 2023-01-31 PROCEDURE — 17000 DESTRUCT PREMALG LESION: CPT | Mod: 59

## 2023-01-31 PROCEDURE — ? MEDICARE ABN

## 2023-01-31 PROCEDURE — ? COUNSELING

## 2023-01-31 PROCEDURE — ? PRESCRIPTION

## 2023-01-31 RX ORDER — CLOBETASOL PROPIONATE 0.5 MG/G
0.05% OINTMENT TOPICAL BID
Qty: 60 | Refills: 3 | Status: ERX

## 2023-01-31 ASSESSMENT — PAIN INTENSITY VAS: HOW INTENSE IS YOUR PAIN 0 BEING NO PAIN, 10 BEING THE MOST SEVERE PAIN POSSIBLE?: 3/10 PAIN

## 2023-01-31 ASSESSMENT — LOCATION DETAILED DESCRIPTION DERM
LOCATION DETAILED: LEFT INFERIOR CENTRAL MALAR CHEEK
LOCATION DETAILED: INFERIOR THORACIC SPINE
LOCATION DETAILED: RIGHT MID-UPPER BACK
LOCATION DETAILED: LEFT LATERAL DISTAL PRETIBIAL REGION
LOCATION DETAILED: LEFT INFERIOR LATERAL MALAR CHEEK
LOCATION DETAILED: LEFT MEDIAL UPPER BACK
LOCATION DETAILED: LEFT PROXIMAL PRETIBIAL REGION

## 2023-01-31 ASSESSMENT — SEVERITY ASSESSMENT 2020: SEVERITY 2020: MILD

## 2023-01-31 ASSESSMENT — LOCATION ZONE DERM
LOCATION ZONE: FACE
LOCATION ZONE: TRUNK
LOCATION ZONE: LEG

## 2023-01-31 ASSESSMENT — LOCATION SIMPLE DESCRIPTION DERM
LOCATION SIMPLE: RIGHT UPPER BACK
LOCATION SIMPLE: LEFT PRETIBIAL REGION
LOCATION SIMPLE: LEFT CHEEK
LOCATION SIMPLE: UPPER BACK
LOCATION SIMPLE: LEFT UPPER BACK

## 2023-01-31 ASSESSMENT — ITCH NUMERIC RATING SCALE: HOW SEVERE IS YOUR ITCHING?: 6

## 2023-01-31 ASSESSMENT — BSA RASH: BSA RASH: 1

## 2023-01-31 NOTE — PROCEDURE: LIQUID NITROGEN
Medical Necessity Information: It is in your best interest to select a reason for this procedure from the list below. All of these items fulfill various CMS LCD requirements except the new and changing color options.
Spray Paint Text: The liquid nitrogen was applied to the skin utilizing a spray paint frosting technique.
Show Applicator Variable?: Yes
Add 52 Modifier (Optional): no
Number Of Freeze-Thaw Cycles: 1 freeze-thaw cycle
Consent: The patient's consent was obtained including but not limited to risks of crusting, scabbing, blistering, scarring, darker or lighter pigmentary change, recurrence, incomplete removal and infection.
Detail Level: Zone
Medical Necessity Clause: This procedure was medically necessary because the lesions that were treated were:
Post-Care Instructions: I reviewed with the patient in detail post-care instructions. Patient is to wear sunprotection, and avoid picking at any of the treated lesions. Pt may apply Vaseline to crusted or scabbing areas.
Duration Of Freeze Thaw-Cycle (Seconds): 5-10
Duration Of Freeze Thaw-Cycle (Seconds): 5
Detail Level: Detailed

## 2023-02-03 ENCOUNTER — HOSPITAL ENCOUNTER (OUTPATIENT)
Dept: CT IMAGING | Facility: HOSPITAL | Age: 68
Discharge: HOME | End: 2023-02-03
Attending: FAMILY MEDICINE

## 2023-02-03 ENCOUNTER — HOSPITAL ENCOUNTER (OUTPATIENT)
Dept: RADIOLOGY | Facility: HOSPITAL | Age: 68
Discharge: HOME | End: 2023-02-03
Attending: FAMILY MEDICINE

## 2023-02-03 ENCOUNTER — APPOINTMENT (OUTPATIENT)
Dept: LAB | Facility: HOSPITAL | Age: 68
End: 2023-02-03
Attending: FAMILY MEDICINE

## 2023-02-03 DIAGNOSIS — E78.2 MIXED HYPERLIPIDEMIA: ICD-10-CM

## 2023-02-03 DIAGNOSIS — M25.561 ACUTE PAIN OF RIGHT KNEE: ICD-10-CM

## 2023-02-03 DIAGNOSIS — Z85.850 HISTORY OF THYROID CANCER: ICD-10-CM

## 2023-02-03 DIAGNOSIS — C82.95 FOLLICULAR LYMPHOMA OF LYMPH NODES OF INGUINAL REGION, UNSPECIFIED FOLLICULAR LYMPHOMA TYPE (HCC): ICD-10-CM

## 2023-02-03 DIAGNOSIS — R59.0 OCCIPITAL LYMPHADENOPATHY: ICD-10-CM

## 2023-02-03 LAB
CHOLEST SERPL-MCNC: 226 MG/DL
HDLC SERPL-MCNC: 68 MG/DL
LDLC SERPL CALC-MCNC: 142 MG/DL (ref 0–100)
TRIGL SERPL-MCNC: 82 MG/DL
TSH SERPL DL<=0.05 MIU/L-ACNC: 1.84 UIU/ML (ref 0.45–4.5)

## 2023-02-03 RX ADMIN — IOHEXOL 85 ML: 350 INJECTION, SOLUTION INTRAVENOUS at 08:31

## 2023-02-05 DIAGNOSIS — R79.89 LOW VITAMIN D LEVEL: ICD-10-CM

## 2023-02-05 DIAGNOSIS — F90.0 ATTENTION DEFICIT HYPERACTIVITY DISORDER (ADHD), PREDOMINANTLY INATTENTIVE TYPE: ICD-10-CM

## 2023-02-05 DIAGNOSIS — M81.0 AGE-RELATED OSTEOPOROSIS WITHOUT CURRENT PATHOLOGICAL FRACTURE: ICD-10-CM

## 2023-02-06 RX ORDER — CALCIUM CARBONATE-CHOLECALCIFEROL TAB 250 MG-125 UNIT 250-125 MG-UNIT
2 TAB ORAL EVERY EVENING
Qty: 180 TABLET | Refills: 0 | Status: SHIPPED | OUTPATIENT
Start: 2023-02-06

## 2023-02-06 RX ORDER — DEXTROAMPHETAMINE SACCHARATE, AMPHETAMINE ASPARTATE MONOHYDRATE, DEXTROAMPHETAMINE SULFATE AND AMPHETAMINE SULFATE 7.5; 7.5; 7.5; 7.5 MG/1; MG/1; MG/1; MG/1
30 CAPSULE, EXTENDED RELEASE ORAL EVERY MORNING
Qty: 30 CAPSULE | Refills: 0 | Status: SHIPPED | OUTPATIENT
Start: 2023-02-06

## 2023-02-06 RX ORDER — DEXTROAMPHETAMINE SACCHARATE, AMPHETAMINE ASPARTATE, DEXTROAMPHETAMINE SULFATE AND AMPHETAMINE SULFATE 2.5; 2.5; 2.5; 2.5 MG/1; MG/1; MG/1; MG/1
10 TABLET ORAL DAILY PRN
Qty: 30 TABLET | Refills: 0 | Status: SHIPPED | OUTPATIENT
Start: 2023-02-06

## 2023-02-15 ENCOUNTER — APPOINTMENT (RX ONLY)
Dept: URBAN - NONMETROPOLITAN AREA CLINIC 4 | Facility: CLINIC | Age: 68
Setting detail: DERMATOLOGY
End: 2023-02-15

## 2023-02-15 DIAGNOSIS — L71.8 OTHER ROSACEA: ICD-10-CM | Status: INADEQUATELY CONTROLLED

## 2023-02-15 PROCEDURE — ? PRESCRIPTION

## 2023-02-15 PROCEDURE — ? COUNSELING

## 2023-02-15 PROCEDURE — ? TREATMENT REGIMEN

## 2023-02-15 PROCEDURE — 99214 OFFICE O/P EST MOD 30 MIN: CPT

## 2023-02-15 RX ORDER — DOXYCYCLINE HYCLATE 50 MG/1
50MG CAPSULE, GELATIN COATED ORAL BID
Qty: 60 | Refills: 0 | Status: ERX | COMMUNITY
Start: 2023-02-15

## 2023-02-15 RX ORDER — METRONIDAZOLE 10 MG/G
1% GEL TOPICAL BID
Qty: 60 | Refills: 3 | Status: ERX | COMMUNITY
Start: 2023-02-15

## 2023-02-15 RX ADMIN — DOXYCYCLINE HYCLATE 50: 50 CAPSULE, GELATIN COATED ORAL at 00:00

## 2023-02-15 RX ADMIN — METRONIDAZOLE 1: 10 GEL TOPICAL at 00:00

## 2023-02-15 ASSESSMENT — LOCATION DETAILED DESCRIPTION DERM
LOCATION DETAILED: RIGHT MENTAL CREASE
LOCATION DETAILED: NASAL SUPRATIP
LOCATION DETAILED: RIGHT CENTRAL MALAR CHEEK
LOCATION DETAILED: LEFT CENTRAL MALAR CHEEK
LOCATION DETAILED: RIGHT SUPERIOR LATERAL NECK

## 2023-02-15 ASSESSMENT — LOCATION ZONE DERM
LOCATION ZONE: FACE
LOCATION ZONE: NECK
LOCATION ZONE: NOSE

## 2023-02-15 ASSESSMENT — LOCATION SIMPLE DESCRIPTION DERM
LOCATION SIMPLE: LEFT CHEEK
LOCATION SIMPLE: CHIN
LOCATION SIMPLE: RIGHT CHEEK
LOCATION SIMPLE: POSTERIOR NECK
LOCATION SIMPLE: NOSE

## 2023-02-16 ENCOUNTER — DOCTOR'S OFFICE (OUTPATIENT)
Dept: URBAN - NONMETROPOLITAN AREA CLINIC 1 | Facility: CLINIC | Age: 68
Setting detail: OPHTHALMOLOGY
End: 2023-02-16
Payer: COMMERCIAL

## 2023-02-16 DIAGNOSIS — H35.373: ICD-10-CM

## 2023-02-16 DIAGNOSIS — Z79.891: ICD-10-CM

## 2023-02-16 PROCEDURE — 92134 CPTRZ OPH DX IMG PST SGM RTA: CPT | Performed by: OPHTHALMOLOGY

## 2023-02-16 PROCEDURE — 99213 OFFICE O/P EST LOW 20 MIN: CPT | Performed by: OPHTHALMOLOGY

## 2023-02-16 ASSESSMENT — SUPERFICIAL PUNCTATE KERATITIS (SPK)
OD_SPK: ABSENT
OS_SPK: ABSENT

## 2023-02-16 ASSESSMENT — TEAR BREAK UP TIME (TBUT)
OD_TBUT: ABSENT
OS_TBUT: ABSENT

## 2023-02-16 ASSESSMENT — CONFRONTATIONAL VISUAL FIELD TEST (CVF)
OS_FINDINGS: FULL
OD_FINDINGS: FULL

## 2023-02-16 ASSESSMENT — LID POSITION - PTOSIS
OS_PTOSIS: 1+
OD_PTOSIS: 1+

## 2023-02-20 ENCOUNTER — DOCTOR'S OFFICE (OUTPATIENT)
Dept: URBAN - NONMETROPOLITAN AREA CLINIC 1 | Facility: CLINIC | Age: 68
Setting detail: OPHTHALMOLOGY
End: 2023-02-20
Payer: COMMERCIAL

## 2023-02-20 ENCOUNTER — RX ONLY (RX ONLY)
Age: 68
End: 2023-02-20

## 2023-02-20 DIAGNOSIS — H53.123: ICD-10-CM

## 2023-02-20 DIAGNOSIS — H35.373: ICD-10-CM

## 2023-02-20 PROCEDURE — 99214 OFFICE O/P EST MOD 30 MIN: CPT | Performed by: OPHTHALMOLOGY

## 2023-02-20 PROCEDURE — 92250 FUNDUS PHOTOGRAPHY W/I&R: CPT | Performed by: OPHTHALMOLOGY

## 2023-02-20 PROCEDURE — 92235 FLUORESCEIN ANGRPH MLTIFRAME: CPT | Performed by: OPHTHALMOLOGY

## 2023-02-20 RX ORDER — BROMFENAC 1.03 MG/ML
SOLUTION/ DROPS OPHTHALMIC
Qty: 10 | Refills: 6 | Status: ACTIVE | OUTPATIENT

## 2023-02-20 RX ORDER — KETOROLAC TROMETHAMINE 5 MG/ML
SOLUTION OPHTHALMIC
Qty: 5 | Refills: 4 | Status: ACTIVE | OUTPATIENT

## 2023-02-20 ASSESSMENT — REFRACTION_MANIFEST
OD_SPHERE: +1.25
OS_ADD: +2.50
OS_VA1: 20/25
OD_ADD: +2.50
OS_AXIS: 090
OS_CYLINDER: -0.25
OD_SPHERE: +1.25
OD_AXIS: 088
OS_SPHERE: +1.00
OS_SPHERE: +1.00
OD_ADD: +2.50
OD_CYLINDER: -0.75
OD_VA1: 20/25-1
OS_AXIS: 090
OD_CYLINDER: -0.75
OS_ADD: +2.50
OS_CYLINDER: -0.25
OD_AXIS: 088
OS_VA1: 20/25
OD_VA1: 20/25-1

## 2023-02-20 ASSESSMENT — REFRACTION_AUTOREFRACTION
OD_AXIS: 096
OD_SPHERE: +3.00
OS_CYLINDER: -1.25
OD_CYLINDER: -1.75
OS_CYLINDER: -1.25
OD_SPHERE: +3.00
OS_SPHERE: +2.75
OS_AXIS: 080
OD_AXIS: 096
OD_CYLINDER: -1.75
OS_AXIS: 080
OS_SPHERE: +2.75

## 2023-02-20 ASSESSMENT — SPHEQUIV_DERIVED
OS_SPHEQUIV: 0.875
OD_SPHEQUIV: 2.125
OD_SPHEQUIV: 0.875
OS_SPHEQUIV: 0.875
OD_SPHEQUIV: 2.125
OS_SPHEQUIV: 2.125
OD_SPHEQUIV: 0.875
OS_SPHEQUIV: 2.125

## 2023-02-20 ASSESSMENT — KERATOMETRY
OD_K2POWER_DIOPTERS: 42.50
OS_K2POWER_DIOPTERS: 41.75
OD_K2POWER_DIOPTERS: 42.50
OD_AXISANGLE_DEGREES: 037
OS_AXISANGLE_DEGREES: 002
OS_K1POWER_DIOPTERS: 41.00
OS_AXISANGLE_DEGREES: 002
OD_K1POWER_DIOPTERS: 42.25
OD_K1POWER_DIOPTERS: 42.25
OS_K2POWER_DIOPTERS: 41.75
OS_K1POWER_DIOPTERS: 41.00
OD_AXISANGLE_DEGREES: 037

## 2023-02-20 ASSESSMENT — REFRACTION_CURRENTRX
OS_AXIS: 030
OD_OVR_VA: 20/
OS_SPHERE: +0.25
OS_SPHERE: +0.25
OD_SPHERE: +0.25
OS_CYLINDER: -0.25
OD_CYLINDER: -0.25
OD_ADD: +2.25
OS_VPRISM_DIRECTION: PROGS
OD_CYLINDER: -0.25
OD_OVR_VA: 20/
OD_AXIS: 090
OD_VPRISM_DIRECTION: PROGS
OD_AXIS: 090
OS_AXIS: 030
OS_ADD: +2.25
OS_CYLINDER: -0.25
OS_ADD: +2.25
OD_ADD: +2.25
OD_SPHERE: +0.25
OD_VPRISM_DIRECTION: PROGS
OS_OVR_VA: 20/
OS_OVR_VA: 20/
OS_VPRISM_DIRECTION: PROGS

## 2023-02-20 ASSESSMENT — CONFRONTATIONAL VISUAL FIELD TEST (CVF)
OS_FINDINGS: FULL
OD_FINDINGS: FULL

## 2023-02-20 ASSESSMENT — AXIALLENGTH_DERIVED
OS_AL: 23.5446
OD_AL: 23.6644
OS_AL: 24.0399
OS_AL: 24.0399
OD_AL: 23.1844
OS_AL: 23.5446
OD_AL: 23.6644
OD_AL: 23.1844

## 2023-02-20 ASSESSMENT — VISUAL ACUITY
OS_BCVA: 20/30-1
OD_BCVA: 20/
OS_BCVA: 20/25
OD_BCVA: 20/40

## 2023-02-20 ASSESSMENT — TEAR BREAK UP TIME (TBUT)
OD_TBUT: ABSENT
OS_TBUT: ABSENT

## 2023-02-20 ASSESSMENT — SUPERFICIAL PUNCTATE KERATITIS (SPK)
OD_SPK: ABSENT
OS_SPK: ABSENT

## 2023-02-20 ASSESSMENT — LID POSITION - PTOSIS
OS_PTOSIS: 1+
OD_PTOSIS: 1+

## 2023-02-27 DIAGNOSIS — F98.8 ATTENTION DEFICIT DISORDER, UNSPECIFIED HYPERACTIVITY PRESENCE: ICD-10-CM

## 2023-02-27 DIAGNOSIS — F22 PARANOIA (HCC): ICD-10-CM

## 2023-02-27 RX ORDER — BUPROPION HYDROCHLORIDE 150 MG/1
150 TABLET ORAL EVERY MORNING
Qty: 90 TABLET | Refills: 1 | Status: SHIPPED | OUTPATIENT
Start: 2023-02-27

## 2023-02-28 DIAGNOSIS — M51.26 HERNIATED LUMBAR INTERVERTEBRAL DISC: ICD-10-CM

## 2023-02-28 DIAGNOSIS — G72.89 NECROTIZING MYOPATHY: ICD-10-CM

## 2023-02-28 DIAGNOSIS — M54.16 RADICULOPATHY, LUMBAR REGION: ICD-10-CM

## 2023-02-28 RX ORDER — PREGABALIN 75 MG/1
75 CAPSULE ORAL 2 TIMES DAILY
Qty: 180 CAPSULE | Refills: 0 | Status: SHIPPED | OUTPATIENT
Start: 2023-02-28 | End: 2023-02-28 | Stop reason: SDUPTHER

## 2023-02-28 RX ORDER — PREGABALIN 75 MG/1
75 CAPSULE ORAL 2 TIMES DAILY
Qty: 180 CAPSULE | Refills: 0 | Status: SHIPPED | OUTPATIENT
Start: 2023-02-28 | End: 2023-05-29

## 2023-02-28 RX ORDER — MYCOPHENOLIC ACID 360 MG/1
TABLET, DELAYED RELEASE ORAL
Qty: 90 TABLET | Refills: 6 | Status: SHIPPED | OUTPATIENT
Start: 2023-02-28

## 2023-03-03 DIAGNOSIS — M25.561 ACUTE PAIN OF RIGHT KNEE: ICD-10-CM

## 2023-03-03 DIAGNOSIS — F90.0 ATTENTION DEFICIT HYPERACTIVITY DISORDER (ADHD), PREDOMINANTLY INATTENTIVE TYPE: ICD-10-CM

## 2023-03-03 DIAGNOSIS — F41.8 SITUATIONAL ANXIETY: Primary | ICD-10-CM

## 2023-03-03 DIAGNOSIS — R59.0 OCCIPITAL LYMPHADENOPATHY: ICD-10-CM

## 2023-03-03 RX ORDER — DEXTROAMPHETAMINE SACCHARATE, AMPHETAMINE ASPARTATE MONOHYDRATE, DEXTROAMPHETAMINE SULFATE AND AMPHETAMINE SULFATE 7.5; 7.5; 7.5; 7.5 MG/1; MG/1; MG/1; MG/1
30 CAPSULE, EXTENDED RELEASE ORAL EVERY MORNING
Qty: 30 CAPSULE | Refills: 0 | Status: SHIPPED | OUTPATIENT
Start: 2023-03-03

## 2023-03-03 RX ORDER — DEXTROAMPHETAMINE SACCHARATE, AMPHETAMINE ASPARTATE, DEXTROAMPHETAMINE SULFATE AND AMPHETAMINE SULFATE 2.5; 2.5; 2.5; 2.5 MG/1; MG/1; MG/1; MG/1
10 TABLET ORAL DAILY PRN
Qty: 30 TABLET | Refills: 0 | Status: SHIPPED | OUTPATIENT
Start: 2023-03-03

## 2023-03-03 RX ORDER — ALPRAZOLAM 0.25 MG/1
0.25 TABLET ORAL 2 TIMES DAILY PRN
Qty: 30 TABLET | Refills: 0 | Status: SHIPPED | OUTPATIENT
Start: 2023-03-03

## 2023-03-03 RX ORDER — MELOXICAM 15 MG/1
15 TABLET ORAL DAILY
Qty: 90 TABLET | Refills: 0 | Status: SHIPPED | OUTPATIENT
Start: 2023-03-03

## 2023-03-20 ENCOUNTER — TELEPHONE (OUTPATIENT)
Dept: PSYCHIATRY | Facility: CLINIC | Age: 68
End: 2023-03-20

## 2023-03-21 DIAGNOSIS — M51.26 HERNIATED LUMBAR INTERVERTEBRAL DISC: ICD-10-CM

## 2023-03-21 DIAGNOSIS — M54.16 RADICULOPATHY, LUMBAR REGION: ICD-10-CM

## 2023-03-21 RX ORDER — PREGABALIN 75 MG/1
75 CAPSULE ORAL 2 TIMES DAILY
Qty: 180 CAPSULE | Refills: 0 | Status: SHIPPED | OUTPATIENT
Start: 2023-03-21 | End: 2023-06-19

## 2023-04-04 DIAGNOSIS — F90.0 ATTENTION DEFICIT HYPERACTIVITY DISORDER (ADHD), PREDOMINANTLY INATTENTIVE TYPE: ICD-10-CM

## 2023-04-04 RX ORDER — DEXTROAMPHETAMINE SACCHARATE, AMPHETAMINE ASPARTATE, DEXTROAMPHETAMINE SULFATE AND AMPHETAMINE SULFATE 2.5; 2.5; 2.5; 2.5 MG/1; MG/1; MG/1; MG/1
TABLET ORAL
Qty: 60 TABLET | Refills: 0 | Status: SHIPPED | OUTPATIENT
Start: 2023-04-04

## 2023-04-04 RX ORDER — DEXTROAMPHETAMINE SACCHARATE, AMPHETAMINE ASPARTATE MONOHYDRATE, DEXTROAMPHETAMINE SULFATE AND AMPHETAMINE SULFATE 7.5; 7.5; 7.5; 7.5 MG/1; MG/1; MG/1; MG/1
30 CAPSULE, EXTENDED RELEASE ORAL EVERY MORNING
Qty: 30 CAPSULE | Refills: 0 | Status: SHIPPED | OUTPATIENT
Start: 2023-04-04

## 2023-04-04 RX ORDER — DEXTROAMPHETAMINE SACCHARATE, AMPHETAMINE ASPARTATE, DEXTROAMPHETAMINE SULFATE AND AMPHETAMINE SULFATE 2.5; 2.5; 2.5; 2.5 MG/1; MG/1; MG/1; MG/1
TABLET ORAL
Qty: 60 TABLET | Refills: 0 | Status: SHIPPED | OUTPATIENT
Start: 2023-04-04 | End: 2023-04-04 | Stop reason: SDUPTHER

## 2023-04-04 RX ORDER — DEXTROAMPHETAMINE SACCHARATE, AMPHETAMINE ASPARTATE, DEXTROAMPHETAMINE SULFATE AND AMPHETAMINE SULFATE 2.5; 2.5; 2.5; 2.5 MG/1; MG/1; MG/1; MG/1
TABLET ORAL
Qty: 30 TABLET | Refills: 0 | Status: SHIPPED | OUTPATIENT
Start: 2023-04-04 | End: 2023-04-04 | Stop reason: SDUPTHER

## 2023-04-05 DIAGNOSIS — E03.9 HYPOTHYROIDISM, UNSPECIFIED TYPE: ICD-10-CM

## 2023-04-05 RX ORDER — LEVOTHYROXINE SODIUM 0.15 MG/1
150 TABLET ORAL
Qty: 90 TABLET | Refills: 0 | Status: SHIPPED | OUTPATIENT
Start: 2023-04-05

## 2023-04-07 ENCOUNTER — PATIENT MESSAGE (OUTPATIENT)
Dept: FAMILY MEDICINE CLINIC | Facility: CLINIC | Age: 68
End: 2023-04-07

## 2023-04-07 DIAGNOSIS — F51.01 PRIMARY INSOMNIA: ICD-10-CM

## 2023-04-07 RX ORDER — TRAZODONE HYDROCHLORIDE 150 MG/1
150 TABLET ORAL
Qty: 90 TABLET | Refills: 1 | Status: SHIPPED | OUTPATIENT
Start: 2023-04-07 | End: 2023-04-07 | Stop reason: SDUPTHER

## 2023-05-02 ENCOUNTER — OFFICE VISIT (OUTPATIENT)
Dept: FAMILY MEDICINE CLINIC | Facility: CLINIC | Age: 68
End: 2023-05-02

## 2023-05-02 VITALS
WEIGHT: 110.4 LBS | DIASTOLIC BLOOD PRESSURE: 60 MMHG | TEMPERATURE: 98.2 F | RESPIRATION RATE: 18 BRPM | OXYGEN SATURATION: 98 % | HEART RATE: 85 BPM | BODY MASS INDEX: 20.86 KG/M2 | SYSTOLIC BLOOD PRESSURE: 114 MMHG

## 2023-05-02 DIAGNOSIS — F90.0 ATTENTION DEFICIT HYPERACTIVITY DISORDER (ADHD), PREDOMINANTLY INATTENTIVE TYPE: ICD-10-CM

## 2023-05-02 DIAGNOSIS — F51.01 PRIMARY INSOMNIA: ICD-10-CM

## 2023-05-02 DIAGNOSIS — L71.9 ROSACEA: Primary | ICD-10-CM

## 2023-05-02 DIAGNOSIS — M54.16 RADICULOPATHY, LUMBAR REGION: ICD-10-CM

## 2023-05-02 DIAGNOSIS — K22.2 PEPTIC STRICTURE OF ESOPHAGUS: ICD-10-CM

## 2023-05-02 DIAGNOSIS — E03.9 ACQUIRED HYPOTHYROIDISM: ICD-10-CM

## 2023-05-02 DIAGNOSIS — M81.0 AGE-RELATED OSTEOPOROSIS WITHOUT CURRENT PATHOLOGICAL FRACTURE: ICD-10-CM

## 2023-05-02 DIAGNOSIS — F41.8 SITUATIONAL ANXIETY: ICD-10-CM

## 2023-05-02 DIAGNOSIS — E78.2 MIXED HYPERLIPIDEMIA: ICD-10-CM

## 2023-05-02 DIAGNOSIS — G72.89 NECROTIZING MYOPATHY: ICD-10-CM

## 2023-05-02 RX ORDER — DEXTROAMPHETAMINE SACCHARATE, AMPHETAMINE ASPARTATE, DEXTROAMPHETAMINE SULFATE AND AMPHETAMINE SULFATE 2.5; 2.5; 2.5; 2.5 MG/1; MG/1; MG/1; MG/1
TABLET ORAL
Qty: 60 TABLET | Refills: 0 | Status: SHIPPED | OUTPATIENT
Start: 2023-05-02 | End: 2023-05-04 | Stop reason: SDUPTHER

## 2023-05-02 RX ORDER — DEXTROAMPHETAMINE SACCHARATE, AMPHETAMINE ASPARTATE MONOHYDRATE, DEXTROAMPHETAMINE SULFATE AND AMPHETAMINE SULFATE 7.5; 7.5; 7.5; 7.5 MG/1; MG/1; MG/1; MG/1
30 CAPSULE, EXTENDED RELEASE ORAL EVERY MORNING
Qty: 30 CAPSULE | Refills: 0 | Status: SHIPPED | OUTPATIENT
Start: 2023-05-02 | End: 2023-05-04 | Stop reason: SDUPTHER

## 2023-05-02 RX ORDER — ESOMEPRAZOLE MAGNESIUM 40 MG/1
40 CAPSULE, DELAYED RELEASE ORAL EVERY MORNING
Qty: 90 CAPSULE | Refills: 0
Start: 2023-05-02

## 2023-05-02 RX ORDER — METRONIDAZOLE 10 MG/G
GEL TOPICAL DAILY
Qty: 45 G | Refills: 0
Start: 2023-05-02

## 2023-05-02 NOTE — PROGRESS NOTES
Name: Luis Enrique Buchanan      :       MRN: [id number]  Encounter Provider: Armando Cabello DO  Encounter Date: 2023   Encounter department: [facility]     1  Follicular lymphoma of lymph nodes of inguinal region, unspecified follicular lymphoma type [facility])  - she has h/o such  CT done and OK  No acute c/o's around this today  Immunocompromised state ([facility] )  Seeing Rheum both locally and through [employer]  Has f/u  Was seen recently as well  Notes reviewed  Scleroderma progressive ([facility] )    Pulmonary emphysema, unspecified emphysema type ([facility] )  - stable  PAD (peripheral artery disease) ([facility] )    Acquired hypothyroidism  - has been stable  S/p thyroidectomy  History of thyroid cancer  As per above  Rosacea  - is on metrogel  Has Derm appt at [facility] in   This is improved since starting on this  - metroNIDAZOLE (METROGEL) 1 % gel; Apply topically daily Rx by Derm  Dispense: 45 g; Refill: 0    Primary insomnia  - stable on regimen  Radiculopathy, lumbar region  Referral done to [facility] and this is in the Fall  Situational anxiety  - has a lot on her mind regarding her family  Plans to write them a letter and send in the mail  This is in regards to her mothers' estate  Cont meds  CSA on file  PDPMP is c/w Rx      Age-related osteoporosis without current pathological fracture  - is going to discuss tx with Rheum  She does not regularly see a dentist   Working with a group in [facility] on her dentures  Will d/w rheum in f/u  Necrotizing myopathy  - plugged in with pena  Attention deficit hyperactivity disorder (ADHD), predominantly inattentive type  - on stimulant therapy  Stable and improves her quality of life  - amphetamine-dextroamphetamine (ADDERALL XR, 30MG,) 30 MG 24 hr capsule;  Take 1 capsule (30 mg total) by mouth every morning Max Daily Amount: 30 mg  Dispense: 30 capsule; Refill: 0  - amphetamine-dextroamphetamine (ADDERALL, 10MG,) 10 mg tablet; 20 mg every afternoon ~ 2 PM  Dispense: 60 tablet; Refill: 0    Peptic stricture of esophagus  - recently at [facility] and had dilation  Put back on nexium  Symptoms are stable  - esomeprazole (NexIUM) 40 MG capsule; Take 1 capsule (40 mg total) by mouth every morning  Dispense: 90 capsule; Refill: 0        Return for Return in July -- AMW when needed     She has a non healing lesion on her Lateral Left leg  This is concerning for SCC  She is plugged in with Derm and should be biopsied  She is on board  Patient/Caretaker verbalized understanding and were in agreement with today's assessment and plan  Time was taken to address any questions patient/caretaker had  Indication/Risks/Benefits of medication(s) as prescribed were discussed with the patient/caretaker  The patient verbalized understanding and agreement and elects to take medications as prescribed  Time was taken to answer any questions the patient/caretaker may have had  Chief Complaint   Patient presents with    Follow-up       Subjective      Derm referral to [facility] and also Neurosurgery appt -- Derm - June and Neurosx - Oct for now  She is getting pulmonary and cardiac testing upcoming as well  This is on file  Will be at [facility]  Seen at [facility] recently:    Immune mediated necrotizing myopathy:   Her biopsy was consistent with a necrotizing myopathy  She has lost proximal muscle strength with neck flexion, arm abduction, and hip flexors all weaker compared to her last visit  This is likely secondary to her poor nutrition and weight loss due to complications to dental procedure  Fortunately now this has resolved, she is gaining weight and feels stronger since restarting going to the gym  However, worsening of her necrotizing myopathy cannot be excluded  We will update muscle enzymes   If her strength worsens over the next month, we will pursue further evaluation such as EMG and/or muscle MRI  She is currently on cellcept BID, as well as daily HCQ  She will continue on this regimen  For her neck and back pain, likely secondary to spinal stenosis and multiple herniated disks, she should be evaluated by a spinal surgeon  Referral to [facility] neurosurgery placed per patient preference  Limited scleroderma:   She is doing well from this perspective without any active skin, joint, cardiopulmonary or GI complaints  She denies any significant Raynaud's  She should have echo and PFTs yearly  PFTs and echo were ordered to get completed locally  Unclear etiology of her erythematous facial rash  Patient reports it did not improve with oral antibiotics and has been using the topical metronidazole prescribed by a local dermatologist  Referral to [facility] Dermatology placed for second opinion  Long term use of high risk immunomodulatory medications: The patient remains on immunomodulatory medications and requires frequent toxicity monitoring by blood work  Respective labs were ordered  She has tolerated the medication without illness or complication  She is up to date with vaccination  HCQ monitoring  Currently, the American Academy of Ophthalmology and the [employer] of Rheumatology recommend that patients on hydroxychloroquine undergo baseline and annual screening beginning after 5 years of treatment  She will update OCT locally this year  Follicular Lymphoma - had scan that looked OK after prior visit  She is with h/o thyroid Ca  TSH has been stable  She is very stressed about family dynamics  She is also having issues with her internet and this is stressful for her  She is currently working with a group in [facility] for her teeth  She cont to work through this  She is working through filing a complaint with [facility] in [place]        She has had a non healing skin ulceration/lesion on the L outer, lateral leg  There was a lump at first but now it is not healing  See plan above  Review of Systems   All other systems reviewed and are negative  Current Outpatient Medications on File Prior to Visit   Medication Sig    ALPRAZolam (XANAX) 0 25 mg tablet Take 1 tablet (0 25 mg total) by mouth 2 (two) times a day as needed for anxiety    buPROPion (WELLBUTRIN XL) 150 mg 24 hr tablet Take 1 tablet (150 mg total) by mouth every morning    Calcium Carb-Cholecalciferol (Calcium-Vitamin D3) 250-3  125 MG-MCG TABS Take 2 tablets by mouth every evening    calcium-vitamin D (OSCAL) 250-125 MG-UNIT per tablet Take 2 tablets by mouth every evening    cetirizine (ZyrTEC) 10 mg tablet TAKE 1 TABLET (10 MG TOTAL) BY MOUTH DAILY    Cholecalciferol 50 MCG (2000 UT) TABS Take 1 tablet (2,000 Units total) by mouth in the morning    clobetasol (TEMOVATE) 0 05 % ointment Apply 1 33 application topically    folic acid (FOLVITE) 1 mg tablet Take 2 tablets (2 mg total) by mouth daily    hydroxychloroquine (PLAQUENIL) 200 mg tablet Take 1 tablet (200 mg total) by mouth daily    ibuprofen (MOTRIN) 600 mg tablet Take 1 tablet (600 mg total) by mouth every 8 (eight) hours as needed for mild pain or moderate pain    levothyroxine 150 mcg tablet TAKE 1 TABLET (150 MCG TOTAL) BY MOUTH DAILY IN THE EARLY MORNING    Mapap Arthritis Pain 650 MG CR tablet TAKE 1 TABLET (650 MG TOTAL) BY MOUTH EVERY 8 (EIGHT) HOURS AS NEEDED FOR MILD PAIN OR MODERATE PAIN    meloxicam (Mobic) 15 mg tablet Take 1 tablet (15 mg total) by mouth daily    Multiple Vitamin (MULTIVITAMIN) tablet Take 1 tablet by mouth daily    mycophenolate (MYFORTIC) 360 MG TBEC Take 2 in the morning and 1 at night    nystatin (MYCOSTATIN) 500,000 units/5 mL suspension Apply 5 mL (500,000 Units total) to the mouth or throat 4 (four) times a day    pregabalin (LYRICA) 75 mg capsule Take 1 capsule (75 mg total) by mouth 2 (two) times a day    traZODone (DESYREL) 150 mg tablet Take 1 tablet (150 mg total) by mouth daily at bedtime    [DISCONTINUED] amphetamine-dextroamphetamine (ADDERALL XR, 30MG,) 30 MG 24 hr capsule Take 1 capsule (30 mg total) by mouth every morning Max Daily Amount: 30 mg    [DISCONTINUED] amphetamine-dextroamphetamine (ADDERALL, 10MG,) 10 mg tablet 20 mg every afternoon ~ 2 PM    clindamycin (CLEOCIN T) 1 % lotion Apply 1 application topically daily as needed    dicyclomine (BENTYL) 20 mg tablet Take 20 mg by mouth       Objective     /60 (BP Location: Left arm, Patient Position: Sitting, Cuff Size: Standard)   Pulse 85   Temp 98 2 °F (36 8 °C) (Tympanic)   Resp 18   Wt 50 1 kg (110 lb 6 4 oz)   SpO2 98%   BMI 20 86 kg/m²     Physical Exam  Vitals and nursing note reviewed  Constitutional:       Appearance: Normal appearance  HENT:      Head: Normocephalic and atraumatic  Mouth/Throat:      Comments: Mucus streaking at the posterior oropharynx with mild erythema     Eyes:      Conjunctiva/sclera: Conjunctivae normal       Pupils: Pupils are equal, round, and reactive to light  Cardiovascular:      Rate and Rhythm: Normal rate and regular rhythm  Heart sounds: Normal heart sounds  Pulmonary:      Breath sounds: Normal breath sounds  No wheezing, rhonchi or rales  Abdominal:      General: Bowel sounds are normal       Palpations: Abdomen is soft  Musculoskeletal:         General: No swelling  Cervical back: Neck supple  No rigidity  Comments: Has knee/back pain, chronic in nature     Lymphadenopathy:      Cervical: No cervical adenopathy  Skin:     General: Skin is warm and dry  Comments: There is a red, non healing lesion to the distal, lateral LLE  Neurological:      General: No focal deficit present  Mental Status: She is alert and oriented to person, place, and time     Psychiatric:         Mood and Affect: Mood normal          Behavior: Behavior normal          Thought Content: Thought content normal          Judgment: Judgment normal      Total time I spent caring for this patient on the day of the encounter - 40 minutes      10 minutes spent before the visit  25 minutes spent during the visit  5 minutes spent after the visit       [place]  [address], DO

## 2023-05-03 DIAGNOSIS — M81.0 AGE-RELATED OSTEOPOROSIS WITHOUT CURRENT PATHOLOGICAL FRACTURE: ICD-10-CM

## 2023-05-03 DIAGNOSIS — R79.89 LOW VITAMIN D LEVEL: ICD-10-CM

## 2023-05-03 DIAGNOSIS — G72.89 NECROTIZING MYOPATHY: ICD-10-CM

## 2023-05-03 RX ORDER — CHOLECALCIFEROL (VITAMIN D3) 125 MCG
CAPSULE ORAL
Qty: 90 TABLET | Refills: 0 | Status: SHIPPED | OUTPATIENT
Start: 2023-05-03

## 2023-05-03 RX ORDER — CALCIUM CARBONATE-CHOLECALCIFEROL TAB 250 MG-125 UNIT 250-125 MG-UNIT
2 TAB ORAL EVERY EVENING
Qty: 180 TABLET | Refills: 0 | Status: SHIPPED | OUTPATIENT
Start: 2023-05-03

## 2023-05-03 RX ORDER — MYCOPHENOLIC ACID 360 MG/1
TABLET, DELAYED RELEASE ORAL
Qty: 90 TABLET | Refills: 6 | Status: SHIPPED | OUTPATIENT
Start: 2023-05-03

## 2023-05-04 ENCOUNTER — PATIENT MESSAGE (OUTPATIENT)
Dept: FAMILY MEDICINE CLINIC | Facility: CLINIC | Age: 68
End: 2023-05-04

## 2023-05-04 ENCOUNTER — TELEPHONE (OUTPATIENT)
Dept: FAMILY MEDICINE CLINIC | Facility: CLINIC | Age: 68
End: 2023-05-04

## 2023-05-04 DIAGNOSIS — F90.0 ATTENTION DEFICIT HYPERACTIVITY DISORDER (ADHD), PREDOMINANTLY INATTENTIVE TYPE: ICD-10-CM

## 2023-05-04 RX ORDER — DEXTROAMPHETAMINE SACCHARATE, AMPHETAMINE ASPARTATE, DEXTROAMPHETAMINE SULFATE, AND AMPHETAMINE SULFATE 2.5; 2.5; 2.5; 2.5 MG/1; MG/1; MG/1; MG/1
TABLET ORAL
Qty: 60 TABLET | Refills: 0 | Status: SHIPPED | OUTPATIENT
Start: 2023-05-04

## 2023-05-04 RX ORDER — DEXTROAMPHETAMINE SULFATE, DEXTROAMPHETAMINE SACCHARATE, AMPHETAMINE SULFATE AND AMPHETAMINE ASPARTATE 7.5; 7.5; 7.5; 7.5 MG/1; MG/1; MG/1; MG/1
30 CAPSULE, EXTENDED RELEASE ORAL EVERY MORNING
Qty: 30 CAPSULE | Refills: 0 | Status: SHIPPED | OUTPATIENT
Start: 2023-05-04

## 2023-05-05 DIAGNOSIS — F41.8 SITUATIONAL ANXIETY: ICD-10-CM

## 2023-05-05 RX ORDER — ALPRAZOLAM 0.25 MG/1
0.25 TABLET ORAL 2 TIMES DAILY PRN
Qty: 60 TABLET | Refills: 0 | Status: SHIPPED | OUTPATIENT
Start: 2023-05-05

## 2023-05-05 RX ORDER — ALPRAZOLAM 0.25 MG/1
0.25 TABLET ORAL 2 TIMES DAILY PRN
Qty: 30 TABLET | Refills: 0 | Status: CANCELLED | OUTPATIENT
Start: 2023-05-05

## 2023-05-15 DIAGNOSIS — F90.0 ATTENTION DEFICIT HYPERACTIVITY DISORDER (ADHD), PREDOMINANTLY INATTENTIVE TYPE: ICD-10-CM

## 2023-05-15 RX ORDER — DEXTROAMPHETAMINE SULFATE, DEXTROAMPHETAMINE SACCHARATE, AMPHETAMINE SULFATE AND AMPHETAMINE ASPARTATE 7.5; 7.5; 7.5; 7.5 MG/1; MG/1; MG/1; MG/1
30 CAPSULE, EXTENDED RELEASE ORAL EVERY MORNING
Qty: 30 CAPSULE | Refills: 0 | Status: SHIPPED | OUTPATIENT
Start: 2023-05-15 | End: 2023-05-16 | Stop reason: SDUPTHER

## 2023-05-16 ENCOUNTER — TELEPHONE (OUTPATIENT)
Dept: FAMILY MEDICINE CLINIC | Facility: CLINIC | Age: 68
End: 2023-05-16

## 2023-05-16 DIAGNOSIS — F90.0 ATTENTION DEFICIT HYPERACTIVITY DISORDER (ADHD), PREDOMINANTLY INATTENTIVE TYPE: ICD-10-CM

## 2023-05-16 RX ORDER — DEXTROAMPHETAMINE SACCHARATE, AMPHETAMINE ASPARTATE, DEXTROAMPHETAMINE SULFATE AND AMPHETAMINE SULFATE 2.5; 2.5; 2.5; 2.5 MG/1; MG/1; MG/1; MG/1
TABLET ORAL
Qty: 60 TABLET | Refills: 0 | Status: SHIPPED | OUTPATIENT
Start: 2023-05-16

## 2023-05-16 RX ORDER — DEXTROAMPHETAMINE SACCHARATE, AMPHETAMINE ASPARTATE, DEXTROAMPHETAMINE SULFATE AND AMPHETAMINE SULFATE 2.5; 2.5; 2.5; 2.5 MG/1; MG/1; MG/1; MG/1
TABLET ORAL
Qty: 60 TABLET | Refills: 0 | Status: SHIPPED | OUTPATIENT
Start: 2023-05-16 | End: 2023-05-16 | Stop reason: SDUPTHER

## 2023-05-16 RX ORDER — DEXTROAMPHETAMINE SULFATE, DEXTROAMPHETAMINE SACCHARATE, AMPHETAMINE SULFATE AND AMPHETAMINE ASPARTATE 7.5; 7.5; 7.5; 7.5 MG/1; MG/1; MG/1; MG/1
30 CAPSULE, EXTENDED RELEASE ORAL EVERY MORNING
Qty: 30 CAPSULE | Refills: 0 | Status: SHIPPED | OUTPATIENT
Start: 2023-05-16

## 2023-05-16 NOTE — TELEPHONE ENCOUNTER
I sent this many times  Again, I am not sure what is going on with these meds?       Flor Meadows, DO

## 2023-05-31 DIAGNOSIS — F22 PARANOIA (HCC): ICD-10-CM

## 2023-05-31 DIAGNOSIS — F41.8 SITUATIONAL ANXIETY: ICD-10-CM

## 2023-05-31 DIAGNOSIS — F98.8 ATTENTION DEFICIT DISORDER, UNSPECIFIED HYPERACTIVITY PRESENCE: ICD-10-CM

## 2023-05-31 DIAGNOSIS — F90.0 ATTENTION DEFICIT HYPERACTIVITY DISORDER (ADHD), PREDOMINANTLY INATTENTIVE TYPE: ICD-10-CM

## 2023-05-31 DIAGNOSIS — M25.561 ACUTE PAIN OF RIGHT KNEE: ICD-10-CM

## 2023-05-31 RX ORDER — MELOXICAM 15 MG/1
15 TABLET ORAL DAILY
Qty: 90 TABLET | Refills: 0 | Status: SHIPPED | OUTPATIENT
Start: 2023-05-31

## 2023-05-31 RX ORDER — BUPROPION HYDROCHLORIDE 150 MG/1
150 TABLET ORAL EVERY MORNING
Qty: 90 TABLET | Refills: 1 | Status: SHIPPED | OUTPATIENT
Start: 2023-05-31

## 2023-05-31 RX ORDER — DEXTROAMPHETAMINE SULFATE, DEXTROAMPHETAMINE SACCHARATE, AMPHETAMINE SULFATE AND AMPHETAMINE ASPARTATE 7.5; 7.5; 7.5; 7.5 MG/1; MG/1; MG/1; MG/1
30 CAPSULE, EXTENDED RELEASE ORAL EVERY MORNING
Qty: 30 CAPSULE | Refills: 0 | Status: SHIPPED | OUTPATIENT
Start: 2023-05-31 | End: 2023-06-01 | Stop reason: SDUPTHER

## 2023-06-01 ENCOUNTER — TELEPHONE (OUTPATIENT)
Dept: FAMILY MEDICINE CLINIC | Facility: CLINIC | Age: 68
End: 2023-06-01

## 2023-06-01 DIAGNOSIS — F90.0 ATTENTION DEFICIT HYPERACTIVITY DISORDER (ADHD), PREDOMINANTLY INATTENTIVE TYPE: ICD-10-CM

## 2023-06-01 RX ORDER — DEXTROAMPHETAMINE SACCHARATE, AMPHETAMINE ASPARTATE MONOHYDRATE, DEXTROAMPHETAMINE SULFATE AND AMPHETAMINE SULFATE 7.5; 7.5; 7.5; 7.5 MG/1; MG/1; MG/1; MG/1
30 CAPSULE, EXTENDED RELEASE ORAL EVERY MORNING
Qty: 30 CAPSULE | Refills: 0 | Status: SHIPPED | OUTPATIENT
Start: 2023-06-01

## 2023-06-01 NOTE — TELEPHONE ENCOUNTER
Sent   PLEASE tell pt or she will My Chart/Call as she worries about things        Johnston Osgood, DO

## 2023-06-01 NOTE — TELEPHONE ENCOUNTER
Pharmacy states they have plenty of generic on hand and the brand kind will not go through her insurance   If you can resend for the generic

## 2023-06-08 DIAGNOSIS — F90.0 ATTENTION DEFICIT HYPERACTIVITY DISORDER (ADHD), PREDOMINANTLY INATTENTIVE TYPE: ICD-10-CM

## 2023-06-08 RX ORDER — DEXTROAMPHETAMINE SACCHARATE, AMPHETAMINE ASPARTATE, DEXTROAMPHETAMINE SULFATE AND AMPHETAMINE SULFATE 2.5; 2.5; 2.5; 2.5 MG/1; MG/1; MG/1; MG/1
TABLET ORAL
Qty: 60 TABLET | Refills: 0 | Status: SHIPPED | OUTPATIENT
Start: 2023-06-08

## 2023-06-20 DIAGNOSIS — M54.16 RADICULOPATHY, LUMBAR REGION: ICD-10-CM

## 2023-06-20 DIAGNOSIS — M51.26 HERNIATED LUMBAR INTERVERTEBRAL DISC: ICD-10-CM

## 2023-06-20 RX ORDER — PREGABALIN 75 MG/1
75 CAPSULE ORAL 2 TIMES DAILY
Qty: 180 CAPSULE | Refills: 0 | Status: SHIPPED | OUTPATIENT
Start: 2023-06-20 | End: 2023-09-18

## 2023-06-29 DIAGNOSIS — F90.0 ATTENTION DEFICIT HYPERACTIVITY DISORDER (ADHD), PREDOMINANTLY INATTENTIVE TYPE: ICD-10-CM

## 2023-06-29 RX ORDER — DEXTROAMPHETAMINE SACCHARATE, AMPHETAMINE ASPARTATE MONOHYDRATE, DEXTROAMPHETAMINE SULFATE AND AMPHETAMINE SULFATE 7.5; 7.5; 7.5; 7.5 MG/1; MG/1; MG/1; MG/1
30 CAPSULE, EXTENDED RELEASE ORAL EVERY MORNING
Qty: 30 CAPSULE | Refills: 0 | Status: SHIPPED | OUTPATIENT
Start: 2023-06-29

## 2023-07-01 DIAGNOSIS — E03.9 HYPOTHYROIDISM, UNSPECIFIED TYPE: ICD-10-CM

## 2023-07-03 DIAGNOSIS — F90.0 ATTENTION DEFICIT HYPERACTIVITY DISORDER (ADHD), PREDOMINANTLY INATTENTIVE TYPE: ICD-10-CM

## 2023-07-03 DIAGNOSIS — F41.8 SITUATIONAL ANXIETY: ICD-10-CM

## 2023-07-03 RX ORDER — DEXTROAMPHETAMINE SACCHARATE, AMPHETAMINE ASPARTATE, DEXTROAMPHETAMINE SULFATE AND AMPHETAMINE SULFATE 2.5; 2.5; 2.5; 2.5 MG/1; MG/1; MG/1; MG/1
TABLET ORAL
Qty: 60 TABLET | Refills: 0 | Status: SHIPPED | OUTPATIENT
Start: 2023-07-03

## 2023-07-03 RX ORDER — ALPRAZOLAM 0.25 MG/1
0.25 TABLET ORAL 2 TIMES DAILY PRN
Qty: 60 TABLET | Refills: 0 | Status: SHIPPED | OUTPATIENT
Start: 2023-07-03

## 2023-07-05 RX ORDER — LEVOTHYROXINE SODIUM 0.15 MG/1
150 TABLET ORAL
Qty: 90 TABLET | Refills: 0 | Status: SHIPPED | OUTPATIENT
Start: 2023-07-05

## 2023-07-06 ENCOUNTER — OFFICE VISIT (OUTPATIENT)
Dept: FAMILY MEDICINE CLINIC | Facility: CLINIC | Age: 68
End: 2023-07-06
Payer: MEDICARE

## 2023-07-06 VITALS
TEMPERATURE: 98.5 F | BODY MASS INDEX: 20.82 KG/M2 | WEIGHT: 110.2 LBS | HEART RATE: 89 BPM | DIASTOLIC BLOOD PRESSURE: 70 MMHG | SYSTOLIC BLOOD PRESSURE: 122 MMHG | RESPIRATION RATE: 18 BRPM | OXYGEN SATURATION: 95 %

## 2023-07-06 DIAGNOSIS — J30.1 SEASONAL ALLERGIC RHINITIS DUE TO POLLEN: ICD-10-CM

## 2023-07-06 DIAGNOSIS — Z12.31 ENCOUNTER FOR SCREENING MAMMOGRAM FOR MALIGNANT NEOPLASM OF BREAST: ICD-10-CM

## 2023-07-06 DIAGNOSIS — F33.9 RECURRENT DEPRESSION (HCC): ICD-10-CM

## 2023-07-06 DIAGNOSIS — H81.13 BENIGN PAROXYSMAL POSITIONAL VERTIGO DUE TO BILATERAL VESTIBULAR DISORDER: ICD-10-CM

## 2023-07-06 DIAGNOSIS — R42 VERTIGO: ICD-10-CM

## 2023-07-06 DIAGNOSIS — F90.0 ATTENTION DEFICIT HYPERACTIVITY DISORDER (ADHD), PREDOMINANTLY INATTENTIVE TYPE: ICD-10-CM

## 2023-07-06 DIAGNOSIS — F41.8 SITUATIONAL ANXIETY: Primary | ICD-10-CM

## 2023-07-06 DIAGNOSIS — M81.0 AGE-RELATED OSTEOPOROSIS WITHOUT CURRENT PATHOLOGICAL FRACTURE: ICD-10-CM

## 2023-07-06 DIAGNOSIS — R09.82 PND (POST-NASAL DRIP): ICD-10-CM

## 2023-07-06 PROCEDURE — 99214 OFFICE O/P EST MOD 30 MIN: CPT | Performed by: FAMILY MEDICINE

## 2023-07-06 RX ORDER — CETIRIZINE HYDROCHLORIDE 10 MG/1
10 TABLET ORAL DAILY
Qty: 90 TABLET | Refills: 1 | Status: SHIPPED | OUTPATIENT
Start: 2023-07-06

## 2023-07-06 RX ORDER — BUPROPION HYDROCHLORIDE 300 MG/1
300 TABLET ORAL EVERY MORNING
Qty: 90 TABLET | Refills: 1 | Status: SHIPPED | OUTPATIENT
Start: 2023-07-06 | End: 2024-01-02

## 2023-07-06 RX ORDER — MECLIZINE HYDROCHLORIDE 25 MG/1
25 TABLET ORAL DAILY PRN
Qty: 30 TABLET | Refills: 0 | Status: SHIPPED | OUTPATIENT
Start: 2023-07-06

## 2023-07-06 RX ORDER — FLUTICASONE PROPIONATE 50 MCG
1 SPRAY, SUSPENSION (ML) NASAL 2 TIMES DAILY
Qty: 16 G | Refills: 3 | Status: SHIPPED | OUTPATIENT
Start: 2023-07-06

## 2023-07-06 NOTE — PROGRESS NOTES
Name: Cj Teran      : [birth year]      MRN: [id number]  Encounter Provider: Maribel Meyers DO  Encounter Date: 2023   Encounter department: [address] [place] [address]     1. Age-related osteoporosis without current pathological fracture  - will rescreen. Had last year. Does follow with rhuem. Is on vit d and Ca2+. Did not tolerate fosamax. Per Rheum note, deferred Reclast until dental issues addressed. She will f/u with rheum.    - Cholecalciferol 50 MCG ( UT) TABS; Take 1 tablet (2,000 Units total) by mouth in the morning  Dispense: 90 tablet; Refill: 1  - DXA bone density spine hip and pelvis; Future    2. Situational anxiety  Prn xanax helps her. Needs updated PDPMP in f/u. PDPMP is c/w Rx. No si/hi. 3. Attention deficit hyperactivity disorder (ADHD), predominantly inattentive type  Stable. 4. Recurrent depression (HCC)  Increase wellbutrin. Lost a great friend that was her neighbor - she moved and now is with a new  and this has her stressed. Will bump wellbutrin to 300.    - buPROPion (WELLBUTRIN XL) 300 mg 24 hr tablet; Take 1 tablet (300 mg total) by mouth every morning  Dispense: 90 tablet; Refill: 1    5. Vertigo  This is Benign in nature. She is doing eppley and has been helpful. Her ears are clear -- no cerumen impaction. Will Rx meclizine for prn use. Has known cervical stenosis with prior sx and will see neurosx at [facility]. ED precautions given. - meclizine (ANTIVERT) 25 mg tablet; Take 1 tablet (25 mg total) by mouth daily as needed (vertigo)  Dispense: 30 tablet; Refill: 0    6. Seasonal allergic rhinitis due to pollen  Add flonase. Cont antihistamine.    - fluticasone (FLONASE) 50 mcg/act nasal spray; 1 spray into each nostril 2 (two) times a day  Dispense: 16 g; Refill: 3    7. Benign paroxysmal positional vertigo due to bilateral vestibular disorder  As per above.       8. Encounter for screening mammogram for malignant neoplasm of breast  Due in June -- will scheduled for July when returns from [place].   - Mammo screening bilateral w 3d & cad; Future    Return for keep f/u appt . Patient/Caretaker verbalized understanding and were in agreement with today's assessment and plan. Time was taken to address any questions patient/caretaker had. Indication/Risks/Benefits of medication(s) as prescribed were discussed with the patient/caretaker. The patient verbalized understanding and agreement and elects to take medications as prescribed. Time was taken to answer any questions the patient/caretaker may have had. Chief Complaint   Patient presents with   • Ear Fullness            Subjective      She is with ear fullness for ~ 1 weeks. Had appt today with Rheum but cancelled due to this. Is here to see if her ears are clogged with wax. Has had before and knows [employer]. Has been doing. Worse in AM and does maneuvers and this helps. She is continuing to work out and is not with other focal neurological deficits. She is waiting to see [facility] for her known Cervical Stenosis -- she knows this could play into it as well. ADHD - stable. Anxiety - stable    Depression - a bit worse due to losing friends to moving -- see plan. No si/hi. She is active and keeps herself busy. She works out. Review of Systems   All other systems reviewed and are negative. Current Outpatient Medications on File Prior to Visit   Medication Sig   • ALPRAZolam (XANAX) 0.25 mg tablet Take 1 tablet (0.25 mg total) by mouth 2 (two) times a day as needed for anxiety   • amphetamine-dextroamphetamine (ADDERALL XR, 30MG,) 30 MG 24 hr capsule Take 1 capsule (30 mg total) by mouth every morning Max Daily Amount: 30 mg   • amphetamine-dextroamphetamine (ADDERALL, 10MG,) 10 mg tablet 20 mg every afternoon ~ 2 PM   • Calcium Carb-Cholecalciferol (Calcium-Vitamin D3) 250-3. 125 MG-MCG TABS TAKE 2 TABLETS BY MOUTH EVERY EVENING   • calcium-vitamin D (OSCAL) 250-125 MG-UNIT per tablet Take 2 tablets by mouth every evening   • cetirizine (ZyrTEC) 10 mg tablet TAKE 1 TABLET (10 MG TOTAL) BY MOUTH DAILY   • clindamycin (CLEOCIN T) 1 % lotion Apply 1 application topically daily as needed   • clobetasol (TEMOVATE) 0.05 % ointment Apply 0.19 application topically   • esomeprazole (NexIUM) 40 MG capsule Take 1 capsule (40 mg total) by mouth every morning   • folic acid (FOLVITE) 1 mg tablet TAKE 2 TABLETS (2 MG TOTAL) BY MOUTH DAILY   • hydroxychloroquine (PLAQUENIL) 200 mg tablet Take 1 tablet (200 mg total) by mouth daily   • ibuprofen (MOTRIN) 600 mg tablet Take 1 tablet (600 mg total) by mouth every 8 (eight) hours as needed for mild pain or moderate pain   • levothyroxine 150 mcg tablet TAKE 1 TABLET (150 MCG TOTAL) BY MOUTH DAILY IN THE EARLY MORNING   • Mapap Arthritis Pain 650 MG CR tablet TAKE 1 TABLET (650 MG TOTAL) BY MOUTH EVERY 8 (EIGHT) HOURS AS NEEDED FOR MILD PAIN OR MODERATE PAIN   • meloxicam (Mobic) 15 mg tablet Take 1 tablet (15 mg total) by mouth daily   • metroNIDAZOLE (METROGEL) 1 % gel Apply topically daily Rx by Derm   • Multiple Vitamin (MULTIVITAMIN) tablet Take 1 tablet by mouth daily   • mycophenolate (MYFORTIC) 360 MG TBEC Take 2 in the morning and 1 at night   • nystatin (MYCOSTATIN) 500,000 units/5 mL suspension Apply 5 mL (500,000 Units total) to the mouth or throat 4 (four) times a day   • pregabalin (LYRICA) 75 mg capsule TAKE 1 CAPSULE (75 MG TOTAL) BY MOUTH 2 (TWO) TIMES A DAY   • traZODone (DESYREL) 150 mg tablet Take 1 tablet (150 mg total) by mouth daily at bedtime   • [DISCONTINUED] buPROPion (WELLBUTRIN XL) 150 mg 24 hr tablet TAKE 1 TABLET (150 MG TOTAL) BY MOUTH EVERY MORNING   • [DISCONTINUED] Cholecalciferol 50 MCG (2000 UT) TABS Take 1 tablet (2,000 Units total) by mouth in the morning   • dicyclomine (BENTYL) 20 mg tablet Take 20 mg by mouth   • [DISCONTINUED] Cholecalciferol (Vitamin D3) 50 MCG (2000 UT) TABS TAKE 1 TABLET (2,000 UNITS TOTAL) BY MOUTH IN THE MORNING       Objective     /70   Pulse 89   Temp 98.5 °F (36.9 °C) (Temporal)   Resp 18   Wt 50 kg (110 lb 3.2 oz)   SpO2 95%   BMI 20.82 kg/m²     Physical Exam  Vitals and nursing note reviewed. Constitutional:       Appearance: Normal appearance. HENT:      Head: Normocephalic and atraumatic. Right Ear: Tympanic membrane and ear canal normal.      Left Ear: Tympanic membrane and ear canal normal.      Nose:      Comments: Boggy, erythematous turbinates b/l        Mouth/Throat:      Comments: Mucus streaking at the posterior oropharynx with mild erythema     Eyes:      Conjunctiva/sclera: Conjunctivae normal.   Neck:      Vascular: No carotid bruit. Cardiovascular:      Rate and Rhythm: Normal rate and regular rhythm. Heart sounds: Normal heart sounds. Pulmonary:      Effort: Pulmonary effort is normal.      Breath sounds: Normal breath sounds. No wheezing, rhonchi or rales. Abdominal:      General: Bowel sounds are normal.      Palpations: Abdomen is soft. Musculoskeletal:         General: No swelling. Cervical back: Neck supple. Lymphadenopathy:      Cervical: No cervical adenopathy. Skin:     General: Skin is warm and dry. Neurological:      General: No focal deficit present. Mental Status: She is alert and oriented to person, place, and time. Mental status is at baseline. Cranial Nerves: No cranial nerve deficit. Motor: No weakness. Coordination: Coordination normal.      Gait: Gait normal.   Psychiatric:         Mood and Affect: Mood normal.         Behavior: Behavior normal.         Thought Content:  Thought content normal.         Judgment: Judgment normal.       Ene Tijerina,

## 2023-07-14 ENCOUNTER — TELEPHONE (OUTPATIENT)
Dept: RHEUMATOLOGY | Facility: CLINIC | Age: 68
End: 2023-07-14

## 2023-07-19 ENCOUNTER — RA CDI HCC (OUTPATIENT)
Dept: OTHER | Facility: HOSPITAL | Age: 68
End: 2023-07-19

## 2023-07-19 NOTE — PROGRESS NOTES
[address] [facility] coding opportunities          Chart Reviewed number of suggestions sent to Provider: 1     Patients Insurance     Medicare Insurance: [employer]

## 2023-07-26 DIAGNOSIS — M81.0 AGE-RELATED OSTEOPOROSIS WITHOUT CURRENT PATHOLOGICAL FRACTURE: ICD-10-CM

## 2023-07-26 DIAGNOSIS — F90.0 ATTENTION DEFICIT HYPERACTIVITY DISORDER (ADHD), PREDOMINANTLY INATTENTIVE TYPE: ICD-10-CM

## 2023-07-26 RX ORDER — DEXTROAMPHETAMINE SACCHARATE, AMPHETAMINE ASPARTATE MONOHYDRATE, DEXTROAMPHETAMINE SULFATE AND AMPHETAMINE SULFATE 7.5; 7.5; 7.5; 7.5 MG/1; MG/1; MG/1; MG/1
30 CAPSULE, EXTENDED RELEASE ORAL EVERY MORNING
Qty: 30 CAPSULE | Refills: 0 | Status: SHIPPED | OUTPATIENT
Start: 2023-07-26

## 2023-08-09 DIAGNOSIS — F90.0 ATTENTION DEFICIT HYPERACTIVITY DISORDER (ADHD), PREDOMINANTLY INATTENTIVE TYPE: ICD-10-CM

## 2023-08-09 RX ORDER — DEXTROAMPHETAMINE SACCHARATE, AMPHETAMINE ASPARTATE, DEXTROAMPHETAMINE SULFATE AND AMPHETAMINE SULFATE 2.5; 2.5; 2.5; 2.5 MG/1; MG/1; MG/1; MG/1
TABLET ORAL
Qty: 60 TABLET | Refills: 0 | Status: SHIPPED | OUTPATIENT
Start: 2023-08-09

## 2023-08-10 DIAGNOSIS — M25.561 ACUTE PAIN OF RIGHT KNEE: ICD-10-CM

## 2023-08-10 DIAGNOSIS — M54.16 RADICULOPATHY, LUMBAR REGION: ICD-10-CM

## 2023-08-10 DIAGNOSIS — M51.26 HERNIATED LUMBAR INTERVERTEBRAL DISC: ICD-10-CM

## 2023-08-10 DIAGNOSIS — L71.9 ROSACEA: ICD-10-CM

## 2023-08-10 RX ORDER — PREGABALIN 75 MG/1
75 CAPSULE ORAL 2 TIMES DAILY
Qty: 180 CAPSULE | Refills: 0 | Status: SHIPPED | OUTPATIENT
Start: 2023-08-10 | End: 2023-11-08

## 2023-08-10 RX ORDER — METRONIDAZOLE 10 MG/G
GEL TOPICAL DAILY
Qty: 45 G | Refills: 0 | Status: SHIPPED | OUTPATIENT
Start: 2023-08-10

## 2023-08-10 RX ORDER — MELOXICAM 15 MG/1
15 TABLET ORAL DAILY
Qty: 90 TABLET | Refills: 0 | Status: SHIPPED | OUTPATIENT
Start: 2023-08-10

## 2023-08-24 ENCOUNTER — TELEPHONE (OUTPATIENT)
Dept: FAMILY MEDICINE CLINIC | Facility: CLINIC | Age: 68
End: 2023-08-24

## 2023-08-24 DIAGNOSIS — F90.0 ATTENTION DEFICIT HYPERACTIVITY DISORDER (ADHD), PREDOMINANTLY INATTENTIVE TYPE: ICD-10-CM

## 2023-08-24 RX ORDER — DEXTROAMPHETAMINE SACCHARATE, AMPHETAMINE ASPARTATE MONOHYDRATE, DEXTROAMPHETAMINE SULFATE AND AMPHETAMINE SULFATE 7.5; 7.5; 7.5; 7.5 MG/1; MG/1; MG/1; MG/1
30 CAPSULE, EXTENDED RELEASE ORAL EVERY MORNING
Qty: 90 CAPSULE | Refills: 0 | Status: SHIPPED | OUTPATIENT
Start: 2023-08-24 | End: 2023-08-25 | Stop reason: SDUPTHER

## 2023-08-24 NOTE — TELEPHONE ENCOUNTER
Patient called asking if her [employer] message went through to the doctor. Verified that I saw that her [employer] message was routed to the doctor. I informed the patient that the doctor will look at her message as soon as she is able, however, she is seeing patients today so it may take some time to receive a response.

## 2023-08-25 DIAGNOSIS — F90.0 ATTENTION DEFICIT HYPERACTIVITY DISORDER (ADHD), PREDOMINANTLY INATTENTIVE TYPE: ICD-10-CM

## 2023-08-25 RX ORDER — DEXTROAMPHETAMINE SACCHARATE, AMPHETAMINE ASPARTATE MONOHYDRATE, DEXTROAMPHETAMINE SULFATE AND AMPHETAMINE SULFATE 7.5; 7.5; 7.5; 7.5 MG/1; MG/1; MG/1; MG/1
30 CAPSULE, EXTENDED RELEASE ORAL EVERY MORNING
Qty: 90 CAPSULE | Refills: 0 | Status: SHIPPED | OUTPATIENT
Start: 2023-08-25

## 2023-08-29 ENCOUNTER — RX ONLY (OUTPATIENT)
Age: 68
Setting detail: RX ONLY
End: 2023-08-29

## 2023-08-29 ENCOUNTER — APPOINTMENT (RX ONLY)
Dept: URBAN - NONMETROPOLITAN AREA CLINIC 4 | Facility: CLINIC | Age: 68
Setting detail: DERMATOLOGY
End: 2023-08-29

## 2023-08-29 DIAGNOSIS — L71.8 OTHER ROSACEA: ICD-10-CM | Status: INADEQUATELY CONTROLLED

## 2023-08-29 PROCEDURE — ? TREATMENT REGIMEN

## 2023-08-29 PROCEDURE — ? OTHER

## 2023-08-29 PROCEDURE — 99214 OFFICE O/P EST MOD 30 MIN: CPT

## 2023-08-29 PROCEDURE — ? COUNSELING

## 2023-08-29 PROCEDURE — ? PHOTO-DOCUMENTATION

## 2023-08-29 PROCEDURE — ? PRESCRIPTION

## 2023-08-29 RX ORDER — DOXYCYCLINE HYCLATE 50 MG/1
50MG CAPSULE, GELATIN COATED ORAL BID
Qty: 60 | Refills: 1 | Status: ERX

## 2023-08-29 RX ORDER — IVERMECTIN 10 MG/G
1% CREAM TOPICAL QD
Qty: 45 | Refills: 3 | Status: ERX | COMMUNITY
Start: 2023-08-29

## 2023-08-29 RX ADMIN — IVERMECTIN 1: 10 CREAM TOPICAL at 00:00

## 2023-08-29 ASSESSMENT — LOCATION DETAILED DESCRIPTION DERM
LOCATION DETAILED: RIGHT MEDIAL MALAR CHEEK
LOCATION DETAILED: NASAL DORSUM
LOCATION DETAILED: LEFT MEDIAL MALAR CHEEK

## 2023-08-29 ASSESSMENT — LOCATION SIMPLE DESCRIPTION DERM
LOCATION SIMPLE: NOSE
LOCATION SIMPLE: LEFT CHEEK
LOCATION SIMPLE: RIGHT CHEEK

## 2023-08-29 ASSESSMENT — LOCATION ZONE DERM
LOCATION ZONE: FACE
LOCATION ZONE: NOSE

## 2023-08-29 ASSESSMENT — SEVERITY ASSESSMENT OVERALL AMONG ALL PATIENTS
IN YOUR EXPERIENCE, AMONG ALL PATIENTS YOU HAVE SEEN WITH THIS CONDITION, HOW SEVERE IS THIS PATIENT'S CONDITION?: MILD TO MODERATE

## 2023-08-29 NOTE — PROCEDURE: TREATMENT REGIMEN
Otc Regimen: Cerave  moisturizer , SPF 50+ broad spectrum.
Initiate Treatment: Soolantra 1% Apply to AA face QD midday
Detail Level: Zone
Continue Regimen: Metronidazole 1% topical gel BID, Doxycycline 50mg  BID .

## 2023-08-29 NOTE — PROCEDURE: OTHER
Detail Level: Zone
Render Risk Assessment In Note?: no
Note Text (......Xxx Chief Complaint.): This diagnosis correlates with the
Other (Free Text): The patient is experiencing a flare after returning for [place] for 2.5 months.

## 2023-08-31 DIAGNOSIS — B37.41 YEAST CYSTITIS: ICD-10-CM

## 2023-08-31 DIAGNOSIS — F41.8 SITUATIONAL ANXIETY: ICD-10-CM

## 2023-08-31 DIAGNOSIS — J02.8 PHARYNGITIS DUE TO OTHER ORGANISM: ICD-10-CM

## 2023-08-31 RX ORDER — FLUCONAZOLE 150 MG/1
150 TABLET ORAL DAILY
Qty: 7 TABLET | Refills: 0 | Status: SHIPPED | OUTPATIENT
Start: 2023-08-31 | End: 2023-09-07

## 2023-08-31 RX ORDER — ALPRAZOLAM 0.25 MG/1
0.25 TABLET ORAL 2 TIMES DAILY PRN
Qty: 60 TABLET | Refills: 2 | Status: SHIPPED | OUTPATIENT
Start: 2023-08-31

## 2023-09-05 ENCOUNTER — TELEPHONE (OUTPATIENT)
Dept: FAMILY MEDICINE CLINIC | Facility: CLINIC | Age: 68
End: 2023-09-05

## 2023-09-05 ENCOUNTER — RX ONLY (OUTPATIENT)
Age: 68
Setting detail: RX ONLY
End: 2023-09-05

## 2023-09-05 RX ORDER — FLUCONAZOLE 50 MG/1
50 MG TABLET ORAL QD
Qty: 7 | Refills: 0 | Status: ERX | COMMUNITY
Start: 2023-09-05

## 2023-09-06 DIAGNOSIS — E03.9 ACQUIRED HYPOTHYROIDISM: ICD-10-CM

## 2023-09-06 DIAGNOSIS — E53.8 B12 DEFICIENCY: ICD-10-CM

## 2023-09-06 DIAGNOSIS — E78.2 MIXED HYPERLIPIDEMIA: ICD-10-CM

## 2023-09-06 DIAGNOSIS — R53.83 MALAISE AND FATIGUE: ICD-10-CM

## 2023-09-06 DIAGNOSIS — R30.0 DYSURIA: ICD-10-CM

## 2023-09-06 DIAGNOSIS — J02.8 PHARYNGITIS DUE TO OTHER ORGANISM: Primary | ICD-10-CM

## 2023-09-06 DIAGNOSIS — R79.89 LOW VITAMIN D LEVEL: ICD-10-CM

## 2023-09-06 DIAGNOSIS — E55.9 VITAMIN D DEFICIENCY: ICD-10-CM

## 2023-09-06 DIAGNOSIS — R53.81 MALAISE AND FATIGUE: ICD-10-CM

## 2023-09-07 ENCOUNTER — OFFICE VISIT (OUTPATIENT)
Dept: FAMILY MEDICINE CLINIC | Facility: CLINIC | Age: 68
End: 2023-09-07
Payer: MEDICARE

## 2023-09-07 VITALS
TEMPERATURE: 97 F | BODY MASS INDEX: 20.6 KG/M2 | RESPIRATION RATE: 12 BRPM | WEIGHT: 109 LBS | OXYGEN SATURATION: 94 % | HEART RATE: 93 BPM | DIASTOLIC BLOOD PRESSURE: 68 MMHG | SYSTOLIC BLOOD PRESSURE: 128 MMHG

## 2023-09-07 DIAGNOSIS — B37.0 ORAL CANDIDA: ICD-10-CM

## 2023-09-07 DIAGNOSIS — C73 THYROID CANCER (HCC): ICD-10-CM

## 2023-09-07 DIAGNOSIS — J02.8 PHARYNGITIS DUE TO OTHER ORGANISM: ICD-10-CM

## 2023-09-07 DIAGNOSIS — E03.9 ACQUIRED HYPOTHYROIDISM: Primary | ICD-10-CM

## 2023-09-07 DIAGNOSIS — F90.0 ATTENTION DEFICIT HYPERACTIVITY DISORDER (ADHD), PREDOMINANTLY INATTENTIVE TYPE: ICD-10-CM

## 2023-09-07 PROCEDURE — 99214 OFFICE O/P EST MOD 30 MIN: CPT | Performed by: FAMILY MEDICINE

## 2023-09-07 RX ORDER — DEXTROAMPHETAMINE SACCHARATE, AMPHETAMINE ASPARTATE, DEXTROAMPHETAMINE SULFATE AND AMPHETAMINE SULFATE 2.5; 2.5; 2.5; 2.5 MG/1; MG/1; MG/1; MG/1
TABLET ORAL
Qty: 60 TABLET | Refills: 0 | Status: SHIPPED | OUTPATIENT
Start: 2023-09-07

## 2023-09-07 RX ORDER — LEVOTHYROXINE SODIUM 0.1 MG/1
100 TABLET ORAL
Qty: 30 TABLET | Refills: 1 | Status: SHIPPED | OUTPATIENT
Start: 2023-09-07

## 2023-09-07 NOTE — PROGRESS NOTES
Name: David Anguiano      : [id number]      MRN: [id number]  Encounter Provider: Stacia Alegria DO  Encounter Date: 2023   Encounter department: [address] [place] [address]     1. Attention deficit hyperactivity disorder (ADHD), predominantly inattentive type  - needs Rx. CSA on file. PDPMP is c/w Rx. This improves her quality of life. Will continue. - amphetamine-dextroamphetamine (ADDERALL, 10MG,) 10 mg tablet; 20 mg every afternoon ~ 2 PM  Dispense: 60 tablet; Refill: 0    2. Acquired hypothyroidism  She is s/p thyroidectomy. We will get US as her TSH was normal and is now very low, almost undetectable. She is on board. We will adjust her levo from 150 --> 100. She is on board. Recheck in 6 weeks. - US thyroid; Future    3. Thyroid cancer ([address])  - h/o such. Is s/p thyroidectomy in 2018 - per path report - papillary carcinoma. Has been doing OK -- was 1.8 when last checked. Will do US. - levothyroxine (Euthyrox) 100 mcg tablet; Take 1 tablet (100 mcg total) by mouth daily in the early morning  Dispense: 30 tablet; Refill: 1  - TSH, 3rd generation with Free T4 reflex; Future  - US thyroid; Future    4. Pharyngitis due to other organism  - she is with candida and this is 2/2 abx she was put on for Rosacea. Is off but Derm did do lower dose 50 mg po bid. She is holding for now until her mouth/throat clears up. Will do nystatin. Also had diflucan. This improving, overall. 5. Oral candida  - nystatin (MYCOSTATIN) 500,000 units/5 mL suspension; Apply 5 mL (500,000 Units total) to the mouth or throat 3 (three) times a day for 7 days  Dispense: 473 mL; Refill: 1    Return for 9/15 f/u -- switch Tu --> Fri . Patient/Caretaker verbalized understanding and were in agreement with today's assessment and plan. Time was taken to address any questions patient/caretaker had.       Indication/Risks/Benefits of medication(s) as prescribed were discussed with the patient/caretaker. The patient verbalized understanding and agreement and elects to take medications as prescribed. Time was taken to answer any questions the patient/caretaker may have had. Chief Complaint   Patient presents with   • Fatigue     Pt states she has thrush in her mouth from one of her medications. Notes to be very tired lately. Subjective      Rosacea - she is being tx'd for this -- [place]. She was on higher dose doxy and she developed candida. She then went locally and they decreased doxy to 50 mg po bid. She is still with throat and mouth discomfort. It is better. There were white plaques. She could barely eat. Rx was provided for diflucan and nystatin. A bit better here today but still with discomfort and plaques on her tonsils. She is not choking. No f/c/s. She has been in [place] her friends house for some time. She is now back. She is seen at [facility] for Rheum and other subspecialties as well. She is frustrated. Her labs were done yesterday and her cholesterol is too high and her TSH is quite low. She is s/p thyroidectomy in the past for Ca - papillary thyroid Carcinoma. Her TSH was wnl in Feb.  She does feel anxious and on edge but tired. She is open to dose adjustment. Review of Systems   All other systems reviewed and are negative. Current Outpatient Medications on File Prior to Visit   Medication Sig   • ALPRAZolam (XANAX) 0.25 mg tablet Take 1 tablet (0.25 mg total) by mouth 2 (two) times a day as needed for anxiety   • amphetamine-dextroamphetamine (ADDERALL XR, 30MG,) 30 MG 24 hr capsule Take 1 capsule (30 mg total) by mouth every morning Max Daily Amount: 30 mg   • buPROPion (WELLBUTRIN XL) 300 mg 24 hr tablet Take 1 tablet (300 mg total) by mouth every morning   • Calcium Carb-Cholecalciferol (Calcium-Vitamin D3) 250-3. 125 MG-MCG TABS TAKE 2 TABLETS BY MOUTH EVERY EVENING   • cetirizine (ZyrTEC) 10 mg tablet TAKE 1 TABLET (10 MG TOTAL) BY MOUTH DAILY   • clobetasol (TEMOVATE) 0.05 % ointment Apply 7.70 application topically   • esomeprazole (NexIUM) 40 MG capsule Take 1 capsule (40 mg total) by mouth every morning   • fluconazole (DIFLUCAN) 150 mg tablet Take 1 tablet (150 mg total) by mouth in the morning for 7 doses   • fluticasone (FLONASE) 50 mcg/act nasal spray 1 spray into each nostril 2 (two) times a day   • folic acid (FOLVITE) 1 mg tablet TAKE 2 TABLETS (2 MG TOTAL) BY MOUTH DAILY   • ibuprofen (MOTRIN) 600 mg tablet Take 1 tablet (600 mg total) by mouth every 8 (eight) hours as needed for mild pain or moderate pain   • Mapap Arthritis Pain 650 MG CR tablet TAKE 1 TABLET (650 MG TOTAL) BY MOUTH EVERY 8 (EIGHT) HOURS AS NEEDED FOR MILD PAIN OR MODERATE PAIN   • meloxicam (Mobic) 15 mg tablet Take 1 tablet (15 mg total) by mouth daily   • metroNIDAZOLE (METROGEL) 1 % gel Apply topically daily   • Multiple Vitamin (MULTIVITAMIN) tablet Take 1 tablet by mouth daily   • mycophenolate (MYFORTIC) 360 MG TBEC Take 2 in the morning and 1 at night   • pregabalin (LYRICA) 75 mg capsule Take 1 capsule (75 mg total) by mouth 2 (two) times a day   • traZODone (DESYREL) 150 mg tablet Take 1 tablet (150 mg total) by mouth daily at bedtime   • [DISCONTINUED] amphetamine-dextroamphetamine (ADDERALL, 10MG,) 10 mg tablet 20 mg every afternoon ~ 2 PM   • [DISCONTINUED] nystatin (MYCOSTATIN) 500,000 units/5 mL suspension Apply 5 mL (500,000 Units total) to the mouth or throat 4 (four) times a day   • calcium-vitamin D (OSCAL) 250-125 MG-UNIT per tablet Take 2 tablets by mouth every evening (Patient not taking: Reported on 9/7/2023)   • Cholecalciferol 50 MCG (2000 UT) TABS Take 1 tablet (2,000 Units total) by mouth in the morning   • Collagen Hydrolysate, Bovine, POWD Take by mouth every other day   • dicyclomine (BENTYL) 20 mg tablet Take 20 mg by mouth   • hydroxychloroquine (PLAQUENIL) 200 mg tablet Take 1 tablet (200 mg total) by mouth daily   • [DISCONTINUED] clindamycin (CLEOCIN T) 1 % lotion Apply 1 application topically daily as needed (Patient not taking: Reported on 9/7/2023)   • [DISCONTINUED] levothyroxine 150 mcg tablet TAKE 1 TABLET (150 MCG TOTAL) BY MOUTH DAILY IN THE EARLY MORNING   • [DISCONTINUED] meclizine (ANTIVERT) 25 mg tablet Take 1 tablet (25 mg total) by mouth daily as needed (vertigo) (Patient not taking: Reported on 9/7/2023)       Objective     /68 (BP Location: Left arm, Patient Position: Sitting)   Pulse 93   Temp (!) 97 °F (36.1 °C) (Temporal)   Resp 12   Wt 49.4 kg (109 lb)   SpO2 94%   BMI 20.60 kg/m²     Physical Exam  Vitals and nursing note reviewed. Constitutional:       General: She is not in acute distress. Appearance: Normal appearance. HENT:      Head: Normocephalic and atraumatic. Right Ear: Tympanic membrane and ear canal normal.      Left Ear: Tympanic membrane and ear canal normal.      Mouth/Throat:      Comments: Mucus streaking at the posterior oropharynx with mild erythema     There is white plaque on the R tonsil on exam  Green film on her tongue      Cardiovascular:      Rate and Rhythm: Normal rate and regular rhythm. Heart sounds: Normal heart sounds. Pulmonary:      Effort: Pulmonary effort is normal.      Breath sounds: Normal breath sounds. No wheezing, rhonchi or rales. Musculoskeletal:         General: Deformity present. No swelling. Cervical back: Neck supple. Lymphadenopathy:      Cervical: No cervical adenopathy. Skin:     General: Skin is warm and dry. Neurological:      General: No focal deficit present. Mental Status: She is alert and oriented to person, place, and time. Psychiatric:         Mood and Affect: Mood normal.         Behavior: Behavior normal.         Thought Content:  Thought content normal.         Judgment: Judgment normal.       Ene Epperson DO

## 2023-09-11 ENCOUNTER — HOSPITAL ENCOUNTER (OUTPATIENT)
Dept: ULTRASOUND IMAGING | Facility: HOSPITAL | Age: 68
Discharge: HOME | End: 2023-09-11
Attending: FAMILY MEDICINE
Payer: MEDICARE

## 2023-09-11 ENCOUNTER — HOSPITAL ENCOUNTER (OUTPATIENT)
Dept: RADIOLOGY | Facility: CLINIC | Age: 68
Discharge: HOME | End: 2023-09-11
Payer: MEDICARE

## 2023-09-11 VITALS — BODY MASS INDEX: 20.58 KG/M2 | WEIGHT: 109 LBS | HEIGHT: 61 IN

## 2023-09-11 DIAGNOSIS — Z12.31 ENCOUNTER FOR SCREENING MAMMOGRAM FOR MALIGNANT NEOPLASM OF BREAST: ICD-10-CM

## 2023-09-11 DIAGNOSIS — E03.9 ACQUIRED HYPOTHYROIDISM: ICD-10-CM

## 2023-09-11 DIAGNOSIS — C73 THYROID CANCER (HCC): ICD-10-CM

## 2023-09-11 PROCEDURE — 76536 US EXAM OF HEAD AND NECK: CPT

## 2023-09-11 PROCEDURE — 77063 BREAST TOMOSYNTHESIS BI: CPT

## 2023-09-11 PROCEDURE — 77067 SCR MAMMO BI INCL CAD: CPT

## 2023-09-12 ENCOUNTER — APPOINTMENT (RX ONLY)
Dept: URBAN - NONMETROPOLITAN AREA CLINIC 4 | Facility: CLINIC | Age: 68
Setting detail: DERMATOLOGY
End: 2023-09-12

## 2023-09-12 DIAGNOSIS — L71.8 OTHER ROSACEA: ICD-10-CM | Status: WELL CONTROLLED

## 2023-09-12 PROCEDURE — ? PHOTO-DOCUMENTATION

## 2023-09-12 PROCEDURE — ? MEDICATION COUNSELING

## 2023-09-12 PROCEDURE — 99213 OFFICE O/P EST LOW 20 MIN: CPT

## 2023-09-12 PROCEDURE — ? COUNSELING

## 2023-09-12 PROCEDURE — ? PRESCRIPTION MEDICATION MANAGEMENT

## 2023-09-12 ASSESSMENT — SEVERITY ASSESSMENT OVERALL AMONG ALL PATIENTS: IN YOUR EXPERIENCE, AMONG ALL PATIENTS YOU HAVE SEEN WITH THIS CONDITION, HOW SEVERE IS THIS PATIENT'S CONDITION?: MILD

## 2023-09-12 ASSESSMENT — LOCATION DETAILED DESCRIPTION DERM: LOCATION DETAILED: NASAL SUPRATIP

## 2023-09-12 ASSESSMENT — LOCATION SIMPLE DESCRIPTION DERM: LOCATION SIMPLE: NOSE

## 2023-09-12 ASSESSMENT — LOCATION ZONE DERM: LOCATION ZONE: NOSE

## 2023-09-12 NOTE — PROCEDURE: PRESCRIPTION MEDICATION MANAGEMENT
Continue Regimen: Doxycycline 50 mg QD, and Metronidazole cream BID.
Render In Strict Bullet Format?: No
Detail Level: Zone

## 2023-09-19 DIAGNOSIS — G72.89 NECROTIZING MYOPATHY: ICD-10-CM

## 2023-09-19 RX ORDER — MYCOPHENOLIC ACID 360 MG/1
TABLET, DELAYED RELEASE ORAL
Qty: 90 TABLET | Refills: 0 | Status: SHIPPED | OUTPATIENT
Start: 2023-09-19

## 2023-09-20 ENCOUNTER — OFFICE VISIT (OUTPATIENT)
Dept: FAMILY MEDICINE CLINIC | Facility: CLINIC | Age: 68
End: 2023-09-20
Payer: MEDICARE

## 2023-09-20 VITALS
RESPIRATION RATE: 12 BRPM | BODY MASS INDEX: 20.22 KG/M2 | HEART RATE: 83 BPM | SYSTOLIC BLOOD PRESSURE: 130 MMHG | DIASTOLIC BLOOD PRESSURE: 78 MMHG | OXYGEN SATURATION: 98 % | TEMPERATURE: 98.4 F | WEIGHT: 107 LBS

## 2023-09-20 DIAGNOSIS — Z00.00 MEDICARE ANNUAL WELLNESS VISIT, SUBSEQUENT: Primary | ICD-10-CM

## 2023-09-20 DIAGNOSIS — E55.9 VITAMIN D DEFICIENCY: ICD-10-CM

## 2023-09-20 DIAGNOSIS — E78.2 MIXED HYPERLIPIDEMIA: ICD-10-CM

## 2023-09-20 DIAGNOSIS — B37.0 ORAL CANDIDA: ICD-10-CM

## 2023-09-20 DIAGNOSIS — Z59.9 FINANCIAL DIFFICULTIES: ICD-10-CM

## 2023-09-20 DIAGNOSIS — E03.9 ACQUIRED HYPOTHYROIDISM: ICD-10-CM

## 2023-09-20 DIAGNOSIS — Z71.89 ADVANCED CARE PLANNING/COUNSELING DISCUSSION: ICD-10-CM

## 2023-09-20 DIAGNOSIS — Z23 NEEDS FLU SHOT: ICD-10-CM

## 2023-09-20 DIAGNOSIS — F90.0 ATTENTION DEFICIT HYPERACTIVITY DISORDER (ADHD), PREDOMINANTLY INATTENTIVE TYPE: ICD-10-CM

## 2023-09-20 PROCEDURE — 99214 OFFICE O/P EST MOD 30 MIN: CPT | Performed by: FAMILY MEDICINE

## 2023-09-20 PROCEDURE — G0439 PPPS, SUBSEQ VISIT: HCPCS | Performed by: FAMILY MEDICINE

## 2023-09-20 PROCEDURE — G0008 ADMIN INFLUENZA VIRUS VAC: HCPCS | Performed by: FAMILY MEDICINE

## 2023-09-20 PROCEDURE — 90662 IIV NO PRSV INCREASED AG IM: CPT | Performed by: FAMILY MEDICINE

## 2023-09-20 RX ORDER — DOXYCYCLINE HYCLATE 50 MG/1
50 CAPSULE ORAL DAILY
COMMUNITY
Start: 2023-08-29

## 2023-09-20 RX ORDER — FLUCONAZOLE 150 MG/1
150 TABLET ORAL DAILY
Qty: 7 TABLET | Refills: 1 | Status: SHIPPED | OUTPATIENT
Start: 2023-09-20 | End: 2023-09-27

## 2023-09-20 SDOH — ECONOMIC STABILITY - INCOME SECURITY: PROBLEM RELATED TO HOUSING AND ECONOMIC CIRCUMSTANCES, UNSPECIFIED: Z59.9

## 2023-09-20 NOTE — PROGRESS NOTES
Assessment and Plan:     AMW visit -- discussed ACP today. She is otherwise utd. Preventive health issues were discussed with patient, and age appropriate screening tests were ordered as noted in patient's After Visit Summary. Personalized health advice and appropriate referrals for health education or preventive services given if needed, as noted in patient's After Visit Summary. She desires to est with Dr. Clark Smoker    Patient/Caretaker verbalized understanding and were in agreement with today's assessment and plan. Time was taken to address any questions patient/caretaker had. Indication/Risks/Benefits of medication(s) as prescribed were discussed with the patient/caretaker. The patient verbalized understanding and agreement and elects to take medications as prescribed. Time was taken to answer any questions the patient/caretaker may have had. Chief Complaint   Patient presents with   • Medicare Wellness Visit        History of Present Illness:     Patient presents for a Medicare Wellness Visit       Patient Care Team:  Elizabeth Rojo DO as PCP - General (Family Medicine)  Sangeeta Mack MD as PCP - Endocrinology (Endocrinology)     Review of Systems:     Review of Systems   All other systems reviewed and are negative.        Problem List:     Patient Active Problem List   Diagnosis   • Bilateral hand numbness   • Methotrexate, long term, current use   • Scleroderma progressive (HCC)   • 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HCC)   • BRCA gene mutation positive   • Allergic rhinitis   • Bartholin gland cyst   • Carpal tunnel syndrome of left wrist   • Carpal tunnel syndrome of right wrist   • Dental disorder   • Depression   • Esophageal reflux disease   • Fecal soiling   • Fibromyalgia   • Hearing loss   • Heel spur   • Hematuria   • Herniated lumbar intervertebral disc   • Hyperlipidemia   • Lichen planus   • Lumbar disc herniation   • Osteoarthritis   • Osteoarthritis of both knees   • Osteopenia   • Ovarian cyst   • Pain in joint of left shoulder   • Peripheral neuropathy   • Post concussion syndrome   • Radiculopathy, lumbar region   • Raynaud's syndrome   • Rectal prolapse   • Sjogren's syndrome (HCC)   • Thyroid cancer ([address])   • Vitamin D deficiency   • Acquired hypothyroidism   • Advanced care planning/counseling discussion   • Chronic pain   • Vasomotor rhinitis   • Necrotizing myopathy   • Pulmonary emphysema (HCC)   • Hypogammaglobulinemia ([address])   • PAD (peripheral artery disease) ([facility])   • High risk medication use   • Immunocompromised state ([address])   • Age-related osteoporosis without current pathological fracture   • Primary insomnia   • Post-nasal drip   • Peptic stricture of esophagus   • Attention deficit hyperactivity disorder (ADHD), predominantly inattentive type      Past Medical and Surgical History:     Past Medical History:   Diagnosis Date   • ADHD    • Allergic 3-2020    Bactrim   • Anemia 2018    Latest blood work OK   • Anxiety Since diagnoised 2003    Intermittent related to my life trying to move. My illness   • Arthritis     Very bad especially Right knee   • Cancer ([address]) 7-    Cut the Lymph Node Out & thyroid out   • Cellulitis of foot     Last assessed 10/24/16   • Change in mental status     Last assessed 12/01/15   • Chronic fatigue    • Depression 2003    I am on meds.  Feel good   • Disease of thyroid gland     Cancer Surgical Removal total Thyroid   • Diverticulitis of colon Last Egd    Diverticlum   • PLAZA (dyspnea on exertion)     Last assessed 12/01/15   • Fibromyalgia    • Folliculitis     Last assessed 10/06/14   • GERD (gastroesophageal reflux disease)    • Headache(784.0) 3-2020    In front of head   • Inflammatory bowel disease 1990’s   • Memory loss 2010    New disease neurogentic changes Necrotizing Myopathy   • Non Hodgkin's lymphoma ([address]) 07/13/2012   • Osteopenia    • Osteoporosis 6-2019    Broke Pelvis -left and right pubic ramus,fractured Sacrum   • Raynaud's disease    • Scleroderma ([address])    • Systemic sclerosis ([address])    • Thyroid cancer ([address]) 10/04/2018   • Visual impairment     Need new glasses     Past Surgical History:   Procedure Laterality Date   • BACK SURGERY     • BLADDER SURGERY     • BONE MARROW BIOPSY     • CARDIAC CATHETERIZATION     • EYE SURGERY  1010    Correct angles in both eyes   • HEMORRHOID SURGERY     • HYSTERECTOMY  2004   • KNEE SURGERY     • LYMPH NODE BIOPSY  2020     Non Hodgkins Lymphoma   • NASAL SEPTUM SURGERY     • OOPHORECTOMY Bilateral 2004   • ROTATOR CUFF REPAIR     • SPINE SURGERY  2016    Failed microdisectomy L5 S1   • THYROIDECTOMY N/A 10/04/2018    Procedure: TOTAL THYROIDECTOMY;  Surgeon: Fernandez Wall MD;  Location: [facility];  Service: Surgical Oncology   • TONSILLECTOMY     • TOTAL THYROIDECTOMY        Family History:     Family History   Problem Relation Age of Onset   • Arthritis Mother    • Diabetes Mother    • Hyperlipidemia Mother         Passed 10- Kidney- Heart Disease   • Thyroid disease Mother         Benign lump removed   • Autoimmune disease Mother    • Coronary artery disease Father    • Hyperlipidemia Father          Massive Heart Attack   • Arthritis Sister    • Asthma Sister    • Crohn's disease Sister    • Autoimmune disease Sister    • Depression Sister    • Hyperlipidemia Sister         Very  bad Chrons Disease   • Autoimmune disease Sister    • Depression Sister    • Hyperlipidemia Sister         Heart disease -Lupus like-Thyroid issues   • Thyroid disease Sister         Seeing Endrocologist dosage for thyroid   • Arthritis Sister         Having many issues this year   • Autoimmune disease Sister    • No Known Problems Daughter    • No Known Problems Maternal Grandmother    • No Known Problems Maternal Grandfather    • No Known Problems Paternal Grandmother    • No Known Problems Paternal Grandfather    • Other Brother         myocardial ischemia   • Hyperlipidemia Brother         Heart Attacks,torn rotator and bicept . arthritis, knees   • Autoimmune disease Brother    • Hyperlipidemia Brother         Heart Attacks. bad kness,Arthrites   • Autoimmune disease Brother    • Ovarian cancer Maternal Aunt    • Dementia Maternal Aunt            • No Known Problems Maternal Aunt    • No Known Problems Maternal Aunt    • No Known Problems Maternal Aunt    • No Known Problems Maternal Aunt    • No Known Problems Maternal Aunt    • No Known Problems Paternal Aunt    • Breast cancer Neg Hx    • Endometrial cancer Neg Hx    • Colon cancer Neg Hx    • Breast cancer additional onset Neg Hx    • BRCA 1/2 Neg Hx    • BRCA2 Positive Neg Hx    • BRCA2 Negative Neg Hx    • BRCA1 Positive Neg Hx    • BRCA1 Negative Neg Hx       Social History:     Social History     Socioeconomic History   • Marital status:      Spouse name: None   • Number of children: None   • Years of education: None   • Highest education level: Associate degree: occupational, technical, or vocational program   Occupational History   • None   Tobacco Use   • Smoking status: Former     Packs/day: 0.25     Years: 5.00     Total pack years: 1.25     Types: Cigarettes   • Smokeless tobacco: Never   • Tobacco comments:     I quit cold turkey when I learned 2nd hand smoke   Vaping Use   • Vaping Use: Never used   Substance and Sexual Activity   • Alcohol use: Yes     Comment: social   • Drug use: No   • Sexual activity: Not Currently     Birth control/protection: Abstinence     Comment: Had a hysterectomy   Other Topics Concern   • None   Social History Narrative   • None     Social Determinants of Health     Financial Resource Strain: Medium Risk (2023)    Overall Financial Resource Strain (CARDIA)    • Difficulty of Paying Living Expenses: Somewhat hard   Food Insecurity: Food Insecurity Present (2021)    Hunger Vital Sign    • Worried About Running Out of Food in the Last Year: Sometimes true    • Ran Out of Food in the Last Year: Sometimes true   Transportation Needs: No Transportation Needs (9/19/2023)    PRAPARE - Transportation    • Lack of Transportation (Medical): No    • Lack of Transportation (Non-Medical): No   Physical Activity: Sufficiently Active (7/21/2021)    Exercise Vital Sign    • Days of Exercise per Week: 3 days    • Minutes of Exercise per Session: 60 min   Stress: No Stress Concern Present (7/21/2021)    [facility]    • Feeling of Stress : Only a little   Social Connections: Socially Isolated (7/21/2021)    Social Connection and Isolation Panel [NHANES]    • Frequency of Communication with Friends and Family: More than three times a week    • Frequency of Social Gatherings with Friends and Family: Never    • Attends Congregation Services: Never    • Active Member of Clubs or Organizations: No    • Attends Club or Organization Meetings: Never    • Marital Status:    Intimate Partner Violence: Not on file   Housing Stability: Low Risk  (7/21/2021)    Housing Stability Vital Sign    • Unable to Pay for Housing in the Last Year: No    • Number of Places Lived in the Last Year: 1    • Unstable Housing in the Last Year: No      Medications and Allergies:     Current Outpatient Medications   Medication Sig Dispense Refill   • ALPRAZolam (XANAX) 0.25 mg tablet Take 1 tablet (0.25 mg total) by mouth 2 (two) times a day as needed for anxiety 60 tablet 2   • amphetamine-dextroamphetamine (ADDERALL XR, 30MG,) 30 MG 24 hr capsule Take 1 capsule (30 mg total) by mouth every morning Max Daily Amount: 30 mg 90 capsule 0   • amphetamine-dextroamphetamine (ADDERALL, 10MG,) 10 mg tablet 20 mg every afternoon ~ 2 PM 60 tablet 0   • buPROPion (WELLBUTRIN XL) 300 mg 24 hr tablet Take 1 tablet (300 mg total) by mouth every morning 90 tablet 1   • Calcium Carb-Cholecalciferol (Calcium-Vitamin D3) 250-3. 125 MG-MCG TABS TAKE 2 TABLETS BY MOUTH EVERY EVENING 180 tablet 0   • calcium-vitamin D (OSCAL) 250-125 MG-UNIT per tablet Take 2 tablets by mouth every evening 180 tablet 1   • cetirizine (ZyrTEC) 10 mg tablet TAKE 1 TABLET (10 MG TOTAL) BY MOUTH DAILY 90 tablet 1   • Cholecalciferol 50 MCG (2000 UT) TABS Take 1 tablet (2,000 Units total) by mouth in the morning 90 tablet 1   • clobetasol (TEMOVATE) 0.05 % ointment Apply 4.84 application topically     • Collagen Hydrolysate, Bovine, POWD Take by mouth every other day     • esomeprazole (NexIUM) 40 MG capsule Take 1 capsule (40 mg total) by mouth every morning 90 capsule 0   • fluconazole (DIFLUCAN) 150 mg tablet Take 1 tablet (150 mg total) by mouth in the morning for 7 doses 7 tablet 1   • fluticasone (FLONASE) 50 mcg/act nasal spray 1 spray into each nostril 2 (two) times a day 16 g 3   • folic acid (FOLVITE) 1 mg tablet TAKE 2 TABLETS (2 MG TOTAL) BY MOUTH DAILY 180 tablet 2   • hydroxychloroquine (PLAQUENIL) 200 mg tablet Take 1 tablet (200 mg total) by mouth daily 90 tablet 2   • ibuprofen (MOTRIN) 600 mg tablet Take 1 tablet (600 mg total) by mouth every 8 (eight) hours as needed for mild pain or moderate pain 90 tablet 0   • levothyroxine (Euthyrox) 100 mcg tablet Take 1 tablet (100 mcg total) by mouth daily in the early morning 30 tablet 1   • Mapap Arthritis Pain 650 MG CR tablet TAKE 1 TABLET (650 MG TOTAL) BY MOUTH EVERY 8 (EIGHT) HOURS AS NEEDED FOR MILD PAIN OR MODERATE PAIN 90 tablet 1   • meloxicam (Mobic) 15 mg tablet Take 1 tablet (15 mg total) by mouth daily 90 tablet 0   • metroNIDAZOLE (METROGEL) 1 % gel Apply topically daily 45 g 0   • Multiple Vitamin (MULTIVITAMIN) tablet Take 1 tablet by mouth daily     • mycophenolate (MYFORTIC) 360 MG TBEC Take 2 in the morning and 1 at night 90 tablet 0   • pregabalin (LYRICA) 75 mg capsule Take 1 capsule (75 mg total) by mouth 2 (two) times a day 180 capsule 0   • traZODone (DESYREL) 150 mg tablet Take 1 tablet (150 mg total) by mouth daily at bedtime 90 tablet 1   • dicyclomine (BENTYL) 20 mg tablet Take 20 mg by mouth     • doxycycline hyclate (VIBRAMYCIN) 50 mg capsule Take 50 mg by mouth in the morning       No current facility-administered medications for this visit. Allergies   Allergen Reactions   • Duloxetine Other (See Comments)     Mental psychosis   • Alendronate Myalgia, GI Intolerance and Nausea Only   • Revatio [Sildenafil] Other (See Comments)     mental status changes   • Savella [Milnacipran] Other (See Comments)     Feeling out of it   • Sulfamethoxazole-Trimethoprim Rash and GI Intolerance   • Tedizolid Rash      Immunizations:     Immunization History   Administered Date(s) Administered   • COVID-19 MODERNA VACC 0.5 ML IM 01/26/2021, 02/23/2021, 08/16/2021, 03/16/2022   • INFLUENZA 01/06/2006, 10/22/2013, 11/10/2014, 10/18/2016, 10/04/2017, 09/21/2018, 10/07/2022   • Influenza Quadrivalent Preservative Free 3 years and older IM 10/18/2016   • Influenza, high dose seasonal 0.7 mL 11/12/2019, 10/29/2020, 12/23/2021, 10/07/2022, 09/20/2023   • Influenza, recombinant, quadrivalent,injectable, preservative free 09/21/2018   • Influenza, seasonal, injectable 10/04/2017   • Pneumococcal Conjugate 13-Valent 02/15/2019   • Pneumococcal Polysaccharide PPV23 01/01/2006, 10/29/2020   • Tdap 10/01/2013      Health Maintenance:         Topic Date Due   • Cervical Cancer Screening  12/08/2023   • Colorectal Cancer Screening  06/04/2024   • Breast Cancer Screening: Mammogram  09/11/2024   • Hepatitis C Screening  Completed         Topic Date Due   • COVID-19 Vaccine (5 - Booster for Celso Steele series) 05/11/2022      Medicare Screening Tests and Risk Assessments:     Valentino Geiger is here for her Subsequent Wellness visit. Last Medicare Wellness visit information reviewed, patient interviewed and updates made to the record today. Health Risk Assessment:   Patient rates overall health as good.  Patient feels that their physical health rating is much better. Patient is satisfied with their life. Eyesight was rated as same. Hearing was rated as same. Patient feels that their emotional and mental health rating is much better. Patients states they are never, rarely angry. Patient states they are sometimes unusually tired/fatigued. Pain experienced in the last 7 days has been none. Patient states that she has experienced no weight loss or gain in last 6 months. Fall Risk Screening: In the past year, patient has experienced: no history of falling in past year      Urinary Incontinence Screening:   Patient has leaked urine accidently in the last six months. Working on that . Pelvis & core    Home Safety:  Patient does not have trouble with stairs inside or outside of their home. Patient has working smoke alarms and has working carbon monoxide detector. Home safety hazards include: none. Nutrition:   Current diet is Low Cholesterol and Low Carb. Working to bring down cholesterol down    Medications:   Patient is currently taking over-the-counter supplements. OTC medications include: Daily vitamin  & Premiere Protein. Patient is able to manage medications. Activities of Daily Living (ADLs)/Instrumental Activities of Daily Living (IADLs):   Walk and transfer into and out of bed and chair?: Yes  Dress and groom yourself?: Yes    Bathe or shower yourself?: Yes    Feed yourself?  Yes  Do your laundry/housekeeping?: Yes  Manage your money, pay your bills and track your expenses?: Yes  Make your own meals?: Yes    Do your own shopping?: Yes    Previous Hospitalizations:   Any hospitalizations or ED visits within the last 12 months?: No      Advance Care Planning:   Living will: No    Durable POA for healthcare: No    Advanced directive: No    Advanced directive counseling given: Yes    Five wishes given: Yes      PREVENTIVE SCREENINGS      Cardiovascular Screening:    General: History Lipid Disorder, Risks and Benefits Discussed and Screening Current      Diabetes Screening:     General: Screening Current      Colorectal Cancer Screening:     General: Screening Current      Breast Cancer Screening:     General: Screening Current      Cervical Cancer Screening:    General: Screening Not Indicated      Osteoporosis Screening:    General: History Osteoporosis and Screening Current      Abdominal Aortic Aneurysm (AAA) Screening:        General: Screening Not Indicated      Lung Cancer Screening:     General: Screening Not Indicated      Hepatitis C Screening:    General: Screening Current    Screening, Brief Intervention, and Referral to Treatment (SBIRT)    Screening  Typical number of drinks in a day: 0  Typical number of drinks in a week: 0  Interpretation: Low risk drinking behavior. AUDIT-C Screenin) How often did you have a drink containing alcohol in the past year? monthly or less  2) How many drinks did you have on a typical day when you were drinking in the past year? 0  3) How often did you have 6 or more drinks on one occasion in the past year? never    AUDIT-C Score: 1  Interpretation: Score 0-2 (female): Negative screen for alcohol misuse    Single Item Drug Screening:  How often have you used an illegal drug (including marijuana) or a prescription medication for non-medical reasons in the past year? never    Single Item Drug Screen Score: 0  Interpretation: Negative screen for possible drug use disorder    Other Counseling Topics:   Car/seat belt/driving safety, skin self-exam, sunscreen and calcium and vitamin D intake and regular weightbearing exercise. No results found. Physical Exam:     /78 (BP Location: Left arm, Patient Position: Sitting)   Pulse 83   Temp 98.4 °F (36.9 °C) (Temporal)   Resp 12   Wt 48.5 kg (107 lb)   SpO2 98%   BMI 20.22 kg/m²     Physical Exam  Vitals and nursing note reviewed. Constitutional:       Appearance: Normal appearance. Neurological:      Mental Status: She is alert. Psychiatric:         Mood and Affect: Mood normal.         Behavior: Behavior normal.         Thought Content:  Thought content normal.         Judgment: Judgment normal.          Ene Mcconnell, DO

## 2023-09-20 NOTE — PATIENT INSTRUCTIONS
Medicare Preventive Visit Patient Instructions  Thank you for completing your Welcome to Medicare Visit or Medicare Annual Wellness Visit today. Your next wellness visit will be due in one year (9/20/2024). The screening/preventive services that you may require over the next 5-10 years are detailed below. Some tests may not apply to you based off risk factors and/or age. Screening tests ordered at today's visit but not completed yet may show as past due. Also, please note that scanned in results may not display below. Preventive Screenings:  Service Recommendations Previous Testing/Comments   Colorectal Cancer Screening  * Colonoscopy    * Fecal Occult Blood Test (FOBT)/Fecal Immunochemical Test (FIT)  * Fecal DNA/Cologuard Test  * Flexible Sigmoidoscopy Age: 43-73 years old   Colonoscopy: every 10 years (may be performed more frequently if at higher risk)  OR  FOBT/FIT: every 1 year  OR  Cologuard: every 3 years  OR  Sigmoidoscopy: every 5 years  Screening may be recommended earlier than age 39 if at higher risk for colorectal cancer. Also, an individualized decision between you and your healthcare provider will decide whether screening between the ages of 77-80 would be appropriate. Colonoscopy: 06/04/2019  FOBT/FIT: Not on file  Cologuard: Not on file  Sigmoidoscopy: Not on file          Breast Cancer Screening Age: 36 years old  Frequency: every 1-2 years  Not required if history of left and right mastectomy Mammogram: 09/11/2023        Cervical Cancer Screening Between the ages of 21-29, pap smear recommended once every 3 years. Between the ages of 32-69, can perform pap smear with HPV co-testing every 5 years.    Recommendations may differ for women with a history of total hysterectomy, cervical cancer, or abnormal pap smears in past. Pap Smear: 12/08/2020        Hepatitis C Screening Once for adults born between 1945 and 1965  More frequently in patients at high risk for Hepatitis C Hep C Antibody: 02/19/2019        Diabetes Screening 1-2 times per year if you're at risk for diabetes or have pre-diabetes Fasting glucose: 107 mg/dL (12/6/2022)  A1C: No results in last 5 years (No results in last 5 years)      Cholesterol Screening Once every 5 years if you don't have a lipid disorder. May order more often based on risk factors. Lipid panel: 02/03/2023          Other Preventive Screenings Covered by Medicare:  1. Abdominal Aortic Aneurysm (AAA) Screening: covered once if your at risk. You're considered to be at risk if you have a family history of AAA. 2. Lung Cancer Screening: covers low dose CT scan once per year if you meet all of the following conditions: (1) Age 48-67; (2) No signs or symptoms of lung cancer; (3) Current smoker or have quit smoking within the last 15 years; (4) You have a tobacco smoking history of at least 20 pack years (packs per day multiplied by number of years you smoked); (5) You get a written order from a healthcare provider. 3. Glaucoma Screening: covered annually if you're considered high risk: (1) You have diabetes OR (2) Family history of glaucoma OR (3)  aged 48 and older OR (3)  American aged 72 and older  3. Osteoporosis Screening: covered every 2 years if you meet one of the following conditions: (1) You're estrogen deficient and at risk for osteoporosis based off medical history and other findings; (2) Have a vertebral abnormality; (3) On glucocorticoid therapy for more than 3 months; (4) Have primary hyperparathyroidism; (5) On osteoporosis medications and need to assess response to drug therapy. · Last bone density test (DXA Scan): 06/08/2022.  5. HIV Screening: covered annually if you're between the age of 15-65. Also covered annually if you are younger than 13 and older than 72 with risk factors for HIV infection. For pregnant patients, it is covered up to 3 times per pregnancy.     Immunizations:  Immunization Recommendations   Influenza Vaccine Annual influenza vaccination during flu season is recommended for all persons aged >= 6 months who do not have contraindications   Pneumococcal Vaccine   * Pneumococcal conjugate vaccine = PCV13 (Prevnar 13), PCV15 (Vaxneuvance), PCV20 (Prevnar 20)  * Pneumococcal polysaccharide vaccine = PPSV23 (Pneumovax) Adults 20-63 years old: 1-3 doses may be recommended based on certain risk factors  Adults 72 years old: 1-2 doses may be recommended based off what pneumonia vaccine you previously received   Hepatitis B Vaccine 3 dose series if at intermediate or high risk (ex: diabetes, end stage renal disease, liver disease)   Tetanus (Td) Vaccine - COST NOT COVERED BY MEDICARE PART B Following completion of primary series, a booster dose should be given every 10 years to maintain immunity against tetanus. Td may also be given as tetanus wound prophylaxis. Tdap Vaccine - COST NOT COVERED BY MEDICARE PART B Recommended at least once for all adults. For pregnant patients, recommended with each pregnancy. Shingles Vaccine (Shingrix) - COST NOT COVERED BY MEDICARE PART B  2 shot series recommended in those aged 48 and above     Health Maintenance Due:      Topic Date Due   • Cervical Cancer Screening  12/08/2023   • Colorectal Cancer Screening  06/04/2024   • Breast Cancer Screening: Mammogram  09/11/2024   • Hepatitis C Screening  Completed     Immunizations Due:      Topic Date Due   • COVID-19 Vaccine (5 - Booster for Moderna series) 05/11/2022   • Influenza Vaccine (1) 09/01/2023     Advance Directives   What are advance directives? Advance directives are legal documents that state your wishes and plans for medical care. These plans are made ahead of time in case you lose your ability to make decisions for yourself. Advance directives can apply to any medical decision, such as the treatments you want, and if you want to donate organs. What are the types of advance directives?   There are many types of advance directives, and each state has rules about how to use them. You may choose a combination of any of the following:  · Living will: This is a written record of the treatment you want. You can also choose which treatments you do not want, which to limit, and which to stop at a certain time. This includes surgery, medicine, IV fluid, and tube feedings. · Durable power of  for healthcare [facility]): This is a written record that states who you want to make healthcare choices for you when you are unable to make them for yourself. This person, called a proxy, is usually a family member or a friend. You may choose more than 1 proxy. · Do not resuscitate (DNR) order:  A DNR order is used in case your heart stops beating or you stop breathing. It is a request not to have certain forms of treatment, such as CPR. A DNR order may be included in other types of advance directives. · Medical directive: This covers the care that you want if you are in a coma, near death, or unable to make decisions for yourself. You can list the treatments you want for each condition. Treatment may include pain medicine, surgery, blood transfusions, dialysis, IV or tube feedings, and a ventilator (breathing machine). · Values history: This document has questions about your views, beliefs, and how you feel and think about life. This information can help others choose the care that you would choose. Why are advance directives important? An advance directive helps you control your care. Although spoken wishes may be used, it is better to have your wishes written down. Spoken wishes can be misunderstood, or not followed. Treatments may be given even if you do not want them. An advance directive may make it easier for your family to make difficult choices about your care. © Copyright [employer] 2018 Information is for End User's use only and may not be sold, redistributed or otherwise used for commercial purposes.  All illustrations and images included in CareNotes® are the copyrighted property of [email or web address] or [address] [place] [address]

## 2023-09-20 NOTE — PROGRESS NOTES
Name: Julius Ellison      :       MRN: [id number]  Encounter Provider: Sandra Betancourt DO  Encounter Date: 2023   Encounter department: [address] [place] [address]     1. Attention deficit hyperactivity disorder (ADHD), predominantly inattentive type  - Stable on AM dosing and afternoon prn dosing. This improves her quality of life. She came to me on meds and we worked to adjust meds to lowest effective doses. Currently stable. CSA on file. PDPMP is c/w Rx. This improves her quality of life. Will continue    2. Acquired hypothyroidism  She is s/p thyroidectomy due to Ca. Recent US is OK. We adjusted her dose due to significant supra-therapeutic dosing to 100 from 150 and she will be due for repeat near the end of Oct.  She is on board with this. She was feeling quite irritable, anxious, sweats, ill prior to adjustment. Since adjusted, she clinically feels much improved. 3. Thyroid cancer ([address])  - h/o such. Is s/p thyroidectomy in 2018 - per path report - papillary carcinoma. See above. Oral candida  - she is on daily doxy for rosacea -- seeing Derm. Gets oral candida. Has nystatin, does well with intermittent dosing of diflucan. Recently improved significant case. OK to have on hand.    - fluconazole (DIFLUCAN) 150 mg tablet; Take 1 tablet (150 mg total) by mouth in the morning for 7 doses  Dispense: 7 tablet; Refill: 1    Vitamin D deficiency  - on supplement. Mixed hyperlipidemia  - she was on statin therapy but self discontinued due to undesirable SEs and fear of worsening her autoimmune dz - MCTD compromising of systemic sclerosis, Sjogren's syndrome, and necrotizing myopathy.    - cholesterol is poorly controlled and we reviewed these labs at prior visit. She is dialing in on [employer]. To be rechecked in ~ 3-6 months.     8. Needs flu shot  - influenza vaccine, high-dose, PF 0.7 mL (FLUZONE HIGH-DOSE)    She does see Rheum at [facility] for her autoimmune Dz. She sees Derm for skin manifestations of such  She is on board with Pulmonary medicine as well at [facility] as well at [facility] - for cupping of optical discs. Saw ortho at [facility] for back pain/scoliosis     Patient/Caretaker verbalized understanding and were in agreement with today's assessment and plan. Time was taken to address any questions patient/caretaker had. Indication/Risks/Benefits of medication(s) as prescribed were discussed with the patient/caretaker. The patient verbalized understanding and agreement and elects to take medications as prescribed. Time was taken to answer any questions the patient/caretaker may have had. Chief Complaint   Patient presents with   • Medicare Wellness Visit       Subjective      Rosacea - she is being tx'd for this -- [facility]. She was on higher dose doxy and she developed candida. She then went locally and they decreased doxy to 50 mg po bid. Since being put on diflucan X 1 week, this is improved but still afraid to have return of symptoms being she is still on this. Plans to have f/u as well for the rosacea. She is seen at [facility] for Rheum and other subspecialties as well. Feels much improved today in regards to her overall health. See plan. TSH - overtreated. Down to 100 mcg/day an feels much better -- see plan. Review of Systems   All other systems reviewed and are negative.       Current Outpatient Medications on File Prior to Visit   Medication Sig   • ALPRAZolam (XANAX) 0.25 mg tablet Take 1 tablet (0.25 mg total) by mouth 2 (two) times a day as needed for anxiety   • amphetamine-dextroamphetamine (ADDERALL XR, 30MG,) 30 MG 24 hr capsule Take 1 capsule (30 mg total) by mouth every morning Max Daily Amount: 30 mg   • amphetamine-dextroamphetamine (ADDERALL, 10MG,) 10 mg tablet 20 mg every afternoon ~ 2 PM   • buPROPion (WELLBUTRIN XL) 300 mg 24 hr tablet Take 1 tablet (300 mg total) by mouth every morning   • Calcium Carb-Cholecalciferol (Calcium-Vitamin D3) 250-3. 125 MG-MCG TABS TAKE 2 TABLETS BY MOUTH EVERY EVENING   • calcium-vitamin D (OSCAL) 250-125 MG-UNIT per tablet Take 2 tablets by mouth every evening   • cetirizine (ZyrTEC) 10 mg tablet TAKE 1 TABLET (10 MG TOTAL) BY MOUTH DAILY   • Cholecalciferol 50 MCG (2000 UT) TABS Take 1 tablet (2,000 Units total) by mouth in the morning   • clobetasol (TEMOVATE) 0.05 % ointment Apply 4.00 application topically   • Collagen Hydrolysate, Bovine, POWD Take by mouth every other day   • esomeprazole (NexIUM) 40 MG capsule Take 1 capsule (40 mg total) by mouth every morning   • fluticasone (FLONASE) 50 mcg/act nasal spray 1 spray into each nostril 2 (two) times a day   • folic acid (FOLVITE) 1 mg tablet TAKE 2 TABLETS (2 MG TOTAL) BY MOUTH DAILY   • hydroxychloroquine (PLAQUENIL) 200 mg tablet Take 1 tablet (200 mg total) by mouth daily   • ibuprofen (MOTRIN) 600 mg tablet Take 1 tablet (600 mg total) by mouth every 8 (eight) hours as needed for mild pain or moderate pain   • levothyroxine (Euthyrox) 100 mcg tablet Take 1 tablet (100 mcg total) by mouth daily in the early morning   • Mapap Arthritis Pain 650 MG CR tablet TAKE 1 TABLET (650 MG TOTAL) BY MOUTH EVERY 8 (EIGHT) HOURS AS NEEDED FOR MILD PAIN OR MODERATE PAIN   • meloxicam (Mobic) 15 mg tablet Take 1 tablet (15 mg total) by mouth daily   • metroNIDAZOLE (METROGEL) 1 % gel Apply topically daily   • Multiple Vitamin (MULTIVITAMIN) tablet Take 1 tablet by mouth daily   • mycophenolate (MYFORTIC) 360 MG TBEC Take 2 in the morning and 1 at night   • pregabalin (LYRICA) 75 mg capsule Take 1 capsule (75 mg total) by mouth 2 (two) times a day   • traZODone (DESYREL) 150 mg tablet Take 1 tablet (150 mg total) by mouth daily at bedtime   • dicyclomine (BENTYL) 20 mg tablet Take 20 mg by mouth   • doxycycline hyclate (VIBRAMYCIN) 50 mg capsule Take 50 mg by mouth in the morning   • [DISCONTINUED] fluconazole (DIFLUCAN) 150 mg tablet Take 1 tablet (150 mg total) by mouth in the morning for 7 doses       Objective     /78 (BP Location: Left arm, Patient Position: Sitting)   Pulse 83   Temp 98.4 °F (36.9 °C) (Temporal)   Resp 12   Wt 48.5 kg (107 lb)   SpO2 98%   BMI 20.22 kg/m²     Physical Exam  Vitals and nursing note reviewed. Constitutional:       General: She is not in acute distress. Appearance: Normal appearance. HENT:      Head: Normocephalic and atraumatic. Mouth/Throat:      Comments: Much improved today      Cardiovascular:      Rate and Rhythm: Normal rate and regular rhythm. Heart sounds: Normal heart sounds. Pulmonary:      Effort: Pulmonary effort is normal.      Breath sounds: Normal breath sounds. No wheezing, rhonchi or rales. Musculoskeletal:         General: Deformity present. No swelling. Cervical back: Neck supple. Lymphadenopathy:      Cervical: No cervical adenopathy. Skin:     General: Skin is warm and dry. Neurological:      General: No focal deficit present. Mental Status: She is alert and oriented to person, place, and time. Psychiatric:         Mood and Affect: Mood normal.         Behavior: Behavior normal.         Thought Content:  Thought content normal.         Judgment: Judgment normal.       Ene Juarez DO

## 2023-09-25 DIAGNOSIS — K13.79 ORAL PAIN OF UNKNOWN ETIOLOGY: Primary | ICD-10-CM

## 2023-09-25 DIAGNOSIS — R21 RASH OF MOUTH PRESENT ON EXAMINATION: ICD-10-CM

## 2023-10-03 DIAGNOSIS — F51.01 PRIMARY INSOMNIA: ICD-10-CM

## 2023-10-03 DIAGNOSIS — F90.0 ATTENTION DEFICIT HYPERACTIVITY DISORDER (ADHD), PREDOMINANTLY INATTENTIVE TYPE: ICD-10-CM

## 2023-10-03 DIAGNOSIS — F33.9 RECURRENT DEPRESSION (HCC): ICD-10-CM

## 2023-10-03 RX ORDER — BUPROPION HYDROCHLORIDE 300 MG/1
300 TABLET ORAL EVERY MORNING
Qty: 90 TABLET | Refills: 1 | Status: SHIPPED | OUTPATIENT
Start: 2023-10-03 | End: 2024-03-31

## 2023-10-03 RX ORDER — TRAZODONE HYDROCHLORIDE 150 MG/1
150 TABLET ORAL
Qty: 90 TABLET | Refills: 1 | Status: SHIPPED | OUTPATIENT
Start: 2023-10-03 | End: 2023-10-04 | Stop reason: SDUPTHER

## 2023-10-03 RX ORDER — DEXTROAMPHETAMINE SACCHARATE, AMPHETAMINE ASPARTATE, DEXTROAMPHETAMINE SULFATE AND AMPHETAMINE SULFATE 2.5; 2.5; 2.5; 2.5 MG/1; MG/1; MG/1; MG/1
TABLET ORAL
Qty: 60 TABLET | Refills: 0 | Status: SHIPPED | OUTPATIENT
Start: 2023-10-03

## 2023-10-04 DIAGNOSIS — F51.01 PRIMARY INSOMNIA: ICD-10-CM

## 2023-10-04 RX ORDER — TRAZODONE HYDROCHLORIDE 150 MG/1
150 TABLET ORAL
Qty: 90 TABLET | Refills: 1 | Status: SHIPPED | OUTPATIENT
Start: 2023-10-04

## 2023-10-06 DIAGNOSIS — C73 THYROID CANCER (HCC): ICD-10-CM

## 2023-10-06 RX ORDER — LEVOTHYROXINE SODIUM 0.1 MG/1
100 TABLET ORAL
Qty: 30 TABLET | Refills: 1 | Status: SHIPPED | OUTPATIENT
Start: 2023-10-06

## 2023-10-18 ENCOUNTER — APPOINTMENT (OUTPATIENT)
Dept: LAB | Facility: CLINIC | Age: 68
End: 2023-10-18
Payer: MEDICARE

## 2023-10-18 DIAGNOSIS — E78.2 MIXED HYPERLIPIDEMIA: ICD-10-CM

## 2023-10-18 DIAGNOSIS — R79.89 LOW VITAMIN D LEVEL: ICD-10-CM

## 2023-10-18 DIAGNOSIS — E55.9 VITAMIN D DEFICIENCY: ICD-10-CM

## 2023-10-18 DIAGNOSIS — R53.83 MALAISE AND FATIGUE: ICD-10-CM

## 2023-10-18 DIAGNOSIS — R53.81 MALAISE AND FATIGUE: ICD-10-CM

## 2023-10-18 DIAGNOSIS — R30.0 DYSURIA: ICD-10-CM

## 2023-10-18 DIAGNOSIS — C73 THYROID CANCER (HCC): ICD-10-CM

## 2023-10-18 DIAGNOSIS — E53.8 B12 DEFICIENCY: ICD-10-CM

## 2023-10-18 LAB
25(OH)D3 SERPL-MCNC: 71.5 NG/ML (ref 30–100)
ALBUMIN SERPL BCP-MCNC: 4 G/DL (ref 3.5–5)
ALP SERPL-CCNC: 62 U/L (ref 34–104)
ALT SERPL W P-5'-P-CCNC: 15 U/L (ref 7–52)
ANION GAP SERPL CALCULATED.3IONS-SCNC: 9 MMOL/L
AST SERPL W P-5'-P-CCNC: 18 U/L (ref 13–39)
BACTERIA UR QL AUTO: NORMAL /HPF
BASOPHILS # BLD AUTO: 0.11 THOUSANDS/ÂΜL (ref 0–0.1)
BASOPHILS NFR BLD AUTO: 2 % (ref 0–1)
BILIRUB SERPL-MCNC: 0.44 MG/DL (ref 0.2–1)
BILIRUB UR QL STRIP: NEGATIVE
BUN SERPL-MCNC: 14 MG/DL (ref 5–25)
CALCIUM SERPL-MCNC: 9.1 MG/DL (ref 8.4–10.2)
CHLORIDE SERPL-SCNC: 101 MMOL/L (ref 96–108)
CHOLEST SERPL-MCNC: 236 MG/DL
CLARITY UR: CLEAR
CO2 SERPL-SCNC: 29 MMOL/L (ref 21–32)
COLOR UR: COLORLESS
CREAT SERPL-MCNC: 0.73 MG/DL (ref 0.6–1.3)
EOSINOPHIL # BLD AUTO: 0.3 THOUSAND/ÂΜL (ref 0–0.61)
EOSINOPHIL NFR BLD AUTO: 6 % (ref 0–6)
ERYTHROCYTE [DISTWIDTH] IN BLOOD BY AUTOMATED COUNT: 13.7 % (ref 11.6–15.1)
GFR SERPL CREATININE-BSD FRML MDRD: 84 ML/MIN/1.73SQ M
GLUCOSE SERPL-MCNC: 98 MG/DL (ref 65–140)
GLUCOSE UR STRIP-MCNC: NEGATIVE MG/DL
HCT VFR BLD AUTO: 39.5 % (ref 34.8–46.1)
HDLC SERPL-MCNC: 66 MG/DL
HGB BLD-MCNC: 13.1 G/DL (ref 11.5–15.4)
HGB UR QL STRIP.AUTO: NEGATIVE
IMM GRANULOCYTES # BLD AUTO: 0.03 THOUSAND/UL (ref 0–0.2)
IMM GRANULOCYTES NFR BLD AUTO: 1 % (ref 0–2)
KETONES UR STRIP-MCNC: NEGATIVE MG/DL
LDLC SERPL CALC-MCNC: 154 MG/DL (ref 0–100)
LEUKOCYTE ESTERASE UR QL STRIP: NEGATIVE
LYMPHOCYTES # BLD AUTO: 1.55 THOUSANDS/ÂΜL (ref 0.6–4.47)
LYMPHOCYTES NFR BLD AUTO: 31 % (ref 14–44)
MCH RBC QN AUTO: 28.5 PG (ref 26.8–34.3)
MCHC RBC AUTO-ENTMCNC: 33.2 G/DL (ref 31.4–37.4)
MCV RBC AUTO: 86 FL (ref 82–98)
MONOCYTES # BLD AUTO: 0.6 THOUSAND/ÂΜL (ref 0.17–1.22)
MONOCYTES NFR BLD AUTO: 12 % (ref 4–12)
NEUTROPHILS # BLD AUTO: 2.44 THOUSANDS/ÂΜL (ref 1.85–7.62)
NEUTS SEG NFR BLD AUTO: 48 % (ref 43–75)
NITRITE UR QL STRIP: NEGATIVE
NON-SQ EPI CELLS URNS QL MICRO: NORMAL /HPF
NRBC BLD AUTO-RTO: 0 /100 WBCS
PH UR STRIP.AUTO: 7.5 [PH]
PLATELET # BLD AUTO: 309 THOUSANDS/UL (ref 149–390)
PMV BLD AUTO: 10.1 FL (ref 8.9–12.7)
POTASSIUM SERPL-SCNC: 4.2 MMOL/L (ref 3.5–5.3)
PROT SERPL-MCNC: 6 G/DL (ref 6.4–8.4)
PROT UR STRIP-MCNC: NEGATIVE MG/DL
RBC # BLD AUTO: 4.59 MILLION/UL (ref 3.81–5.12)
RBC #/AREA URNS AUTO: NORMAL /HPF
SODIUM SERPL-SCNC: 139 MMOL/L (ref 135–147)
SP GR UR STRIP.AUTO: 1.01 (ref 1–1.03)
TRIGL SERPL-MCNC: 81 MG/DL
TSH SERPL DL<=0.05 MIU/L-ACNC: 0.98 UIU/ML (ref 0.45–4.5)
UROBILINOGEN UR STRIP-ACNC: <2 MG/DL
VIT B12 SERPL-MCNC: 872 PG/ML (ref 180–914)
WBC # BLD AUTO: 5.03 THOUSAND/UL (ref 4.31–10.16)
WBC #/AREA URNS AUTO: NORMAL /HPF

## 2023-10-18 PROCEDURE — 82306 VITAMIN D 25 HYDROXY: CPT

## 2023-10-18 PROCEDURE — 87086 URINE CULTURE/COLONY COUNT: CPT

## 2023-10-18 PROCEDURE — 85025 COMPLETE CBC W/AUTO DIFF WBC: CPT

## 2023-10-18 PROCEDURE — 82607 VITAMIN B-12: CPT

## 2023-10-18 PROCEDURE — 84443 ASSAY THYROID STIM HORMONE: CPT

## 2023-10-18 PROCEDURE — 80061 LIPID PANEL: CPT

## 2023-10-18 PROCEDURE — 81001 URINALYSIS AUTO W/SCOPE: CPT

## 2023-10-18 PROCEDURE — 36415 COLL VENOUS BLD VENIPUNCTURE: CPT

## 2023-10-18 PROCEDURE — 80053 COMPREHEN METABOLIC PANEL: CPT

## 2023-10-19 LAB — BACTERIA UR CULT: NORMAL

## 2023-10-30 ENCOUNTER — OFFICE VISIT (OUTPATIENT)
Dept: FAMILY MEDICINE CLINIC | Facility: CLINIC | Age: 68
End: 2023-10-30
Payer: MEDICARE

## 2023-10-30 VITALS
DIASTOLIC BLOOD PRESSURE: 63 MMHG | HEIGHT: 61 IN | TEMPERATURE: 97.8 F | WEIGHT: 106.92 LBS | HEART RATE: 85 BPM | BODY MASS INDEX: 20.19 KG/M2 | SYSTOLIC BLOOD PRESSURE: 137 MMHG | OXYGEN SATURATION: 98 %

## 2023-10-30 DIAGNOSIS — E03.9 ACQUIRED HYPOTHYROIDISM: ICD-10-CM

## 2023-10-30 DIAGNOSIS — Z23 ENCOUNTER FOR IMMUNIZATION: ICD-10-CM

## 2023-10-30 DIAGNOSIS — E78.2 MIXED HYPERLIPIDEMIA: ICD-10-CM

## 2023-10-30 DIAGNOSIS — B37.0 ORAL CANDIDA: ICD-10-CM

## 2023-10-30 DIAGNOSIS — F90.0 ATTENTION DEFICIT HYPERACTIVITY DISORDER (ADHD), PREDOMINANTLY INATTENTIVE TYPE: ICD-10-CM

## 2023-10-30 DIAGNOSIS — L71.9 ROSACEA: Primary | ICD-10-CM

## 2023-10-30 DIAGNOSIS — E55.9 VITAMIN D DEFICIENCY: ICD-10-CM

## 2023-10-30 DIAGNOSIS — M34.0 SCLERODERMA PROGRESSIVE (HCC): ICD-10-CM

## 2023-10-30 DIAGNOSIS — C73 THYROID CANCER (HCC): ICD-10-CM

## 2023-10-30 PROCEDURE — G0008 ADMIN INFLUENZA VIRUS VAC: HCPCS | Performed by: FAMILY MEDICINE

## 2023-10-30 PROCEDURE — 99214 OFFICE O/P EST MOD 30 MIN: CPT | Performed by: FAMILY MEDICINE

## 2023-10-30 PROCEDURE — 90662 IIV NO PRSV INCREASED AG IM: CPT | Performed by: FAMILY MEDICINE

## 2023-10-30 PROCEDURE — 1124F ACP DISCUSS-NO DSCNMKR DOCD: CPT | Performed by: FAMILY MEDICINE

## 2023-10-30 NOTE — PROGRESS NOTES
Name: Scarlett Arriola      :       MRN: [id number]  Encounter Provider: Lorena العلي DO  Encounter Date: 10/30/2023   Encounter department: [facility]    Assessment & Plan     1. Rosacea  Comments:  continue mgmt per dermatology, currently on doxycycline 50mg daily  has f/u next week with derm    2. Oral candida  Comments:  resolved at this time, continue to monitor    3. 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 Attention deficit hyperactivity disorder (ADHD), predominantly inattentive type  Assessment & Plan:  Chronic, stable  Reviewed prior notes from prior PCP  Stable on 30AM dosing and 10mg afternoon prn dosing. PDMP is c/w Rx.      5. Acquired hypothyroidism  Assessment & Plan:  Chronic  I reviewed her prior PCP note  She is s/p thyroidectomy due to [address] is OK. She is currently taking levothyroxine 100mcg daily. TSH 10/18/23 reviewed and wnl  F/u q3mo      6. Thyroid cancer [facility])  Assessment & Plan:  s/p thyroidectomy in 2018 - per path report - papillary carcinoma. Mgmt of hypothyroidism as above      7. Vitamin D deficiency  Assessment & Plan:  Chronic, stable  Reviewed recent labs 10/18 and vit D wnl  Continue to monitor with annual labs      8. Mixed hyperlipidemia  Assessment & Plan:  Lab Results   Component Value Date    CHOLESTEROL 236 (H) 10/18/2023    TRIG 81 10/18/2023    HDL 66 10/18/2023    LDLCALC 154 (H) 10/18/2023     Chronic  Reviewed last PCP note: she was on statin therapy but self discontinued due to undesirable SEs and fear of worsening her autoimmune dz - MCTD compromising of systemic sclerosis, Sjogren's syndrome, and necrotizing myopathy. She is working on sticking to a heart healthy diet and will plan to recheck lipid panel in 3-6 mo       9.  Encounter for immunization  -     influenza vaccine, high-dose, PF 0.7 mL (FLUZONE HIGH-DOSE)      Return in about 3 months (around 1/30/2024) for follow up chronic conditions . Subjective      HPI  CC: establish care  Patient is a previous patient at [address]. She presents today to establish with me in our [address] [place] [address] practice. PMH: Acid sclerosis sine scleroderma, Raynaud's syndrome, fibromyalgia, history of follicular non-Hodgkin's lymphoma, Sjogren's syndrome, chronic fatigue, osteopenia, myosis, rectal prolapse, chronic pain, endometriosis, bilateral carpal tunnel  SurgHx: reviewed in chart and compared to her personal list she provided and chart is accurate. Allergies: reviewed in chart and up to date. Medications: Lyrica 75 mg twice daily, Nexium 40 mg daily, Adderall 30 mg in the morning +10 mg as needed, mycophenolate 360 mg twice daily, levothyroxine 100 mcg daily, hydroxychloroquine 100 mg daily, Wellbutrin 530 mg daily, folic acid 1 mg daily, Mobic 15 mg daily, trazodone 150 mg daily at bedtime, Zyrtec 10 mg daily, nystatin mouth rinse 4 times daily, doxycycline 50 mg daily, calcium and vitamin D supplementation, metronidazole gel twice daily, daily multivitamin  FamHx: Father-heart attack age 72, passed away; mother-heart disease, diabetes, RA, CKD on dialysis; brother-heart attack, back and knee issues; sister-lupus, thyroid issues, CAD, arthritis; SocialHx:   Tobacco: Quit at age 21   Alcohol: Less than 1/week on average   Is very active, has a total gym at home. She exercises 5 days a week. Patient follows with multiple specialists at [facility] for her chronic medical conditions:  She does see Rheum at [address] [place] [address] for her autoimmune Dz. She sees Derm for skin manifestations of such  She is on board with Pulmonary medicine as well at [facility] as well at [facility] - for cupping of optical discs.     Saw ortho at [facility] for back pain/scoliosis     Most recently she had a flare of her Rosacea, and was treated with a month of doxycyline. Unfortunately she developed thrush while on the Abx and had to decrease doxy dose while using nystatin rinse. Currently the thrush and rosacea have resolved and she is finishing doxy at the lower dose. She has a follow up with dermatology next week to re-assess. Review of Systems   Constitutional:  Negative for chills and fever. Eyes:  Negative for visual disturbance. Respiratory:  Negative for shortness of breath. Cardiovascular:  Negative for chest pain. Gastrointestinal:  Negative for constipation and diarrhea. Skin:  Negative for rash. Neurological:  Negative for dizziness, light-headedness and headaches. Psychiatric/Behavioral:  Negative for dysphoric mood. Current Outpatient Medications on File Prior to Visit   Medication Sig    amphetamine-dextroamphetamine (ADDERALL XR, 30MG,) 30 MG 24 hr capsule Take 1 capsule (30 mg total) by mouth every morning Max Daily Amount: 30 mg    amphetamine-dextroamphetamine (ADDERALL, 10MG,) 10 mg tablet 20 mg every afternoon ~ 2 PM    buPROPion (WELLBUTRIN XL) 300 mg 24 hr tablet TAKE 1 TABLET (300 MG TOTAL) BY MOUTH EVERY MORNING    Calcium Carb-Cholecalciferol (Calcium-Vitamin D3) 250-3. 125 MG-MCG TABS TAKE 2 TABLETS BY MOUTH EVERY EVENING    cetirizine (ZyrTEC) 10 mg tablet TAKE 1 TABLET (10 MG TOTAL) BY MOUTH DAILY    Cholecalciferol 50 MCG (2000 UT) TABS Take 1 tablet (2,000 Units total) by mouth in the morning    doxycycline hyclate (VIBRAMYCIN) 50 mg capsule Take 50 mg by mouth in the morning    esomeprazole (NexIUM) 40 MG capsule Take 1 capsule (40 mg total) by mouth every morning    folic acid (FOLVITE) 1 mg tablet TAKE 2 TABLETS (2 MG TOTAL) BY MOUTH DAILY    hydroxychloroquine (PLAQUENIL) 200 mg tablet Take 1 tablet (200 mg total) by mouth daily    levothyroxine 100 mcg tablet TAKE 1 TABLET (100 MCG TOTAL) BY MOUTH DAILY IN THE EARLY MORNING    meloxicam (Mobic) 15 mg tablet Take 1 tablet (15 mg total) by mouth daily    metroNIDAZOLE (METROGEL) 1 % gel Apply topically daily    Multiple Vitamin (MULTIVITAMIN) tablet Take 1 tablet by mouth daily    mycophenolate (MYFORTIC) 360 MG TBEC Take 2 in the morning and 1 at night    pregabalin (LYRICA) 75 mg capsule Take 1 capsule (75 mg total) by mouth 2 (two) times a day    traZODone (DESYREL) 150 mg tablet Take 1 tablet (150 mg total) by mouth daily at bedtime       Objective     /63 (BP Location: Right arm, Patient Position: Sitting, Cuff Size: Standard)   Pulse 85   Temp 97.8 °F (36.6 °C) (Tympanic)   Ht 5' 1" (1.549 m)   Wt 48.5 kg (106 lb 14.8 oz)   SpO2 98%   BMI 20.20 kg/m²     Physical Exam  Vitals reviewed. Constitutional:       Appearance: She is normal weight. HENT:      Head: Normocephalic and atraumatic. Right Ear: Tympanic membrane, ear canal and external ear normal.      Left Ear: Tympanic membrane, ear canal and external ear normal.      Ears:      Comments: Right side- excess cerumen not impacted  Eyes:      Extraocular Movements: Extraocular movements intact. Conjunctiva/sclera: Conjunctivae normal.   Neck:      Vascular: No carotid bruit. Cardiovascular:      Rate and Rhythm: Normal rate and regular rhythm. Pulses: Normal pulses. Heart sounds: Normal heart sounds. Pulmonary:      Effort: Pulmonary effort is normal.      Breath sounds: Normal breath sounds. Abdominal:      General: Abdomen is flat. Palpations: Abdomen is soft. Musculoskeletal:      Right lower leg: No edema. Left lower leg: No edema. Neurological:      General: No focal deficit present. Mental Status: She is alert.    Psychiatric:         Mood and Affect: Mood normal.         Behavior: Behavior normal.           Phil Bueno DO

## 2023-10-31 DIAGNOSIS — M54.16 RADICULOPATHY, LUMBAR REGION: ICD-10-CM

## 2023-10-31 DIAGNOSIS — M81.0 AGE-RELATED OSTEOPOROSIS WITHOUT CURRENT PATHOLOGICAL FRACTURE: ICD-10-CM

## 2023-10-31 DIAGNOSIS — G72.89 NECROTIZING MYOPATHY: ICD-10-CM

## 2023-10-31 DIAGNOSIS — M51.26 HERNIATED LUMBAR INTERVERTEBRAL DISC: ICD-10-CM

## 2023-10-31 DIAGNOSIS — R79.89 LOW VITAMIN D LEVEL: ICD-10-CM

## 2023-10-31 RX ORDER — CALCIUM CARBONATE-CHOLECALCIFEROL TAB 250 MG-125 UNIT 250-125 MG-UNIT
2 TAB ORAL EVERY EVENING
Qty: 180 TABLET | Refills: 0 | Status: SHIPPED | OUTPATIENT
Start: 2023-10-31

## 2023-10-31 RX ORDER — CHOLECALCIFEROL (VITAMIN D3) 125 MCG
CAPSULE ORAL
Qty: 90 TABLET | Refills: 1 | Status: SHIPPED | OUTPATIENT
Start: 2023-10-31

## 2023-10-31 NOTE — ASSESSMENT & PLAN NOTE
Lab Results   Component Value Date    CHOLESTEROL 236 (H) 10/18/2023    TRIG 81 10/18/2023    HDL 66 10/18/2023    LDLCALC 154 (H) 10/18/2023     Chronic  Reviewed last PCP note: she was on statin therapy but self discontinued due to undesirable SEs and fear of worsening her autoimmune dz - MCTD compromising of systemic sclerosis, Sjogren's syndrome, and necrotizing myopathy.     She is working on sticking to a heart healthy diet and will plan to recheck lipid panel in 3-6 mo n/a

## 2023-10-31 NOTE — ASSESSMENT & PLAN NOTE
s/p thyroidectomy in 2018 - per path report - papillary carcinoma.     Mgmt of hypothyroidism as above

## 2023-10-31 NOTE — ASSESSMENT & PLAN NOTE
Chronic  I reviewed her prior PCP note  She is s/p thyroidectomy due to [address] is OK. She is currently taking levothyroxine 100mcg daily.    TSH 10/18/23 reviewed and wnl  F/u q3mo

## 2023-10-31 NOTE — ASSESSMENT & PLAN NOTE
Chronic, stable  Reviewed prior notes from prior PCP  Stable on 30AM dosing and 10mg afternoon prn dosing. PDMP is c/w Rx.

## 2023-11-01 DIAGNOSIS — C73 THYROID CANCER (HCC): ICD-10-CM

## 2023-11-01 RX ORDER — MYCOPHENOLIC ACID 360 MG/1
TABLET, DELAYED RELEASE ORAL
Qty: 90 TABLET | Refills: 6 | Status: SHIPPED | OUTPATIENT
Start: 2023-11-01

## 2023-11-01 RX ORDER — LEVOTHYROXINE SODIUM 0.1 MG/1
100 TABLET ORAL DAILY
Qty: 30 TABLET | Refills: 1 | Status: SHIPPED | OUTPATIENT
Start: 2023-11-01

## 2023-11-01 RX ORDER — PREGABALIN 75 MG/1
75 CAPSULE ORAL 2 TIMES DAILY
Qty: 60 CAPSULE | Refills: 5 | Status: SHIPPED | OUTPATIENT
Start: 2023-11-01 | End: 2024-04-29

## 2023-11-02 DIAGNOSIS — J30.1 SEASONAL ALLERGIC RHINITIS DUE TO POLLEN: ICD-10-CM

## 2023-11-02 RX ORDER — FLUTICASONE PROPIONATE 50 MCG
SPRAY, SUSPENSION (ML) NASAL
Qty: 16 G | Refills: 3 | Status: SHIPPED | OUTPATIENT
Start: 2023-11-02

## 2023-11-06 ENCOUNTER — APPOINTMENT (RX ONLY)
Dept: URBAN - NONMETROPOLITAN AREA CLINIC 4 | Facility: CLINIC | Age: 68
Setting detail: DERMATOLOGY
End: 2023-11-06

## 2023-11-06 DIAGNOSIS — F90.0 ATTENTION DEFICIT HYPERACTIVITY DISORDER (ADHD), PREDOMINANTLY INATTENTIVE TYPE: ICD-10-CM

## 2023-11-06 DIAGNOSIS — L71.8 OTHER ROSACEA: ICD-10-CM | Status: WELL CONTROLLED

## 2023-11-06 PROCEDURE — 99213 OFFICE O/P EST LOW 20 MIN: CPT

## 2023-11-06 PROCEDURE — ? COUNSELING

## 2023-11-06 PROCEDURE — ? TREATMENT REGIMEN

## 2023-11-06 RX ORDER — DEXTROAMPHETAMINE SACCHARATE, AMPHETAMINE ASPARTATE, DEXTROAMPHETAMINE SULFATE AND AMPHETAMINE SULFATE 2.5; 2.5; 2.5; 2.5 MG/1; MG/1; MG/1; MG/1
TABLET ORAL
Qty: 60 TABLET | Refills: 0 | Status: SHIPPED | OUTPATIENT
Start: 2023-11-06

## 2023-11-06 ASSESSMENT — LOCATION DETAILED DESCRIPTION DERM
LOCATION DETAILED: LEFT INFERIOR MEDIAL MALAR CHEEK
LOCATION DETAILED: RIGHT INFERIOR CENTRAL MALAR CHEEK

## 2023-11-06 ASSESSMENT — LOCATION ZONE DERM: LOCATION ZONE: FACE

## 2023-11-06 ASSESSMENT — SEVERITY ASSESSMENT OVERALL AMONG ALL PATIENTS
IN YOUR EXPERIENCE, AMONG ALL PATIENTS YOU HAVE SEEN WITH THIS CONDITION, HOW SEVERE IS THIS PATIENT'S CONDITION?: MINIMAL

## 2023-11-06 ASSESSMENT — LOCATION SIMPLE DESCRIPTION DERM
LOCATION SIMPLE: LEFT CHEEK
LOCATION SIMPLE: RIGHT CHEEK

## 2023-11-13 ENCOUNTER — TELEPHONE (OUTPATIENT)
Dept: FAMILY MEDICINE CLINIC | Facility: CLINIC | Age: 68
End: 2023-11-13

## 2023-11-13 RX ORDER — LIDOCAINE 50 MG/G
OINTMENT TOPICAL
COMMUNITY
Start: 2023-11-09

## 2023-11-13 RX ORDER — VALACYCLOVIR HYDROCHLORIDE 1 G/1
TABLET, FILM COATED ORAL
COMMUNITY
Start: 2023-11-09

## 2023-11-13 NOTE — TELEPHONE ENCOUNTER
----- Message from [facility] sent at 11/13/2023  1:20 PM EST -----  Regarding: FW: ?,s  Contact: [phone]    ----- Message -----  From: Julius Ellison  Sent: 11/13/2023  12:51 PM EST  To: [id number]  Subject: ?,s                                              Hi   I am mess . Last week I developed a rash on my butt cheek. I went to the gynecologist who did a biopsy but said it looked like shingles. On Friday i got a bomb dropped on me when I was told I had genital herpes. I haven’t had sex in 25-30 years. They gave me valtrex 1pm & lidocaine ointment. I started that on the 9th. Saturday my left eye started to hurt & burn now my ear feels odd &  from my eye back is swollen on that side &  small different rash on my right cheek. Today I used lidocaine to the wound it burned got all red and hurt bad again. Can I please get bloodwork done to confirm that diagnosis. And also would like all my antibodies tested too. I had a bad diagnosis from a prior lab test from [facility] & I need to be sure. I have an appointment with them tomorrow at 10:45 in [place] but would rather get bloodwork done at your place right after. I’d appreciate your help & feedback. Thank you for helping me & for being my doctor.   Brittany Lundberg

## 2023-11-13 NOTE — TELEPHONE ENCOUNTER
Patient sent below [employer]. I called the patient to further discuss. I discussed the pathophysiology of Herpes virus and reassured her this was likely not from any recent exposure and from a distant exposure. She reports she stopped using the lidocaine cream and the irritation from it has resolved. She is still taking Valtrex which I informed her was good treatment option. She was relieved by our phone conversation and will follow up with her gyn tomorrow as scheduled. No further blood work or labs needed at this time.

## 2023-11-15 ENCOUNTER — OFFICE VISIT (OUTPATIENT)
Dept: FAMILY MEDICINE CLINIC | Facility: CLINIC | Age: 68
End: 2023-11-15
Payer: MEDICARE

## 2023-11-15 VITALS
WEIGHT: 106.04 LBS | SYSTOLIC BLOOD PRESSURE: 116 MMHG | OXYGEN SATURATION: 96 % | DIASTOLIC BLOOD PRESSURE: 55 MMHG | TEMPERATURE: 96.7 F | BODY MASS INDEX: 20.82 KG/M2 | HEART RATE: 91 BPM | HEIGHT: 60 IN

## 2023-11-15 DIAGNOSIS — M54.16 RADICULOPATHY, LUMBAR REGION: ICD-10-CM

## 2023-11-15 DIAGNOSIS — M25.561 ACUTE PAIN OF RIGHT KNEE: ICD-10-CM

## 2023-11-15 DIAGNOSIS — F90.0 ATTENTION DEFICIT HYPERACTIVITY DISORDER (ADHD), PREDOMINANTLY INATTENTIVE TYPE: Primary | ICD-10-CM

## 2023-11-15 DIAGNOSIS — F41.1 GENERALIZED ANXIETY DISORDER WITH PANIC ATTACKS: ICD-10-CM

## 2023-11-15 DIAGNOSIS — F41.0 GENERALIZED ANXIETY DISORDER WITH PANIC ATTACKS: ICD-10-CM

## 2023-11-15 PROCEDURE — 99213 OFFICE O/P EST LOW 20 MIN: CPT | Performed by: FAMILY MEDICINE

## 2023-11-15 RX ORDER — MELOXICAM 15 MG/1
15 TABLET ORAL DAILY
Qty: 90 TABLET | Refills: 1 | Status: SHIPPED | OUTPATIENT
Start: 2023-11-15

## 2023-11-15 RX ORDER — ALPRAZOLAM 0.25 MG/1
0.25 TABLET ORAL 2 TIMES DAILY PRN
COMMUNITY

## 2023-11-15 NOTE — PROGRESS NOTES
Name: Kenneht Dumont      :       MRN: [id number]  Encounter Provider: Edwina Saenz DO  Encounter Date: 11/15/2023   Encounter department: [facility]    Assessment & Plan     1. Attention deficit hyperactivity disorder (ADHD), predominantly inattentive type  -     meloxicam (Mobic) 15 mg tablet; Take 1 tablet (15 mg total) by mouth daily  -     [facility] Prescribed Meds and Special Instructions  -     Amphetamines, Methamphetamines  -     Butalbital  -     Phenobarbital  -     Secobarbital  -     Alprazolam  -     Clonazepam  -     Diazepam  -     Lorazepam  -     Gabapentin  -     Pregabalin  -     Cocaine  -     Heroin  -     Buprenorphine  -     Levorphanol  -     Meperidine  -     Naltrexone  -     Fentanyl  -     Methadone  -     Oxycodone  -     Tapentadol  -     THC  -     Tramadol  -     Codeine, Hydrocodone, Hydropmorphone, Morphine  -     Bath Salts  -     Ethyl Glucuronide/Ethyl Sulfate  -     Kratom  -     Spice  -     Methylphenidate  -     Phentermine  -     Validity Oxidant  -     Validity Creatinine  -     Validity pH  -     Validity Specific    2. Acute pain of right knee  -     meloxicam (Mobic) 15 mg tablet; Take 1 tablet (15 mg total) by mouth daily    3. Radiculopathy, lumbar region  -     meloxicam (Mobic) 15 mg tablet;  Take 1 tablet (15 mg total) by mouth daily  -     [facility] Prescribed Meds and Special Instructions  -     Amphetamines, Methamphetamines  -     Butalbital  -     Phenobarbital  -     Secobarbital  -     Alprazolam  -     Clonazepam  -     Diazepam  -     Lorazepam  -     Gabapentin  -     Pregabalin  -     Cocaine  -     Heroin  -     Buprenorphine  -     Levorphanol  -     Meperidine  -     Naltrexone  -     Fentanyl  -     Methadone  -     Oxycodone  -     Tapentadol  -     THC  -     Tramadol  -     Codeine, Hydrocodone, Hydropmorphone, Morphine  -     Bath Salts  -     Ethyl Glucuronide/Ethyl Sulfate  - Kratom  -     Spice  -     Methylphenidate  -     Phentermine  -     Validity Oxidant  -     Validity Creatinine  -     Validity pH  -     Validity Specific    4. Generalized anxiety disorder with panic attacks  -     meloxicam (Mobic) 15 mg tablet; Take 1 tablet (15 mg total) by mouth daily  -     [facility] Prescribed Meds and Special Instructions  -     Amphetamines, Methamphetamines  -     Butalbital  -     Phenobarbital  -     Secobarbital  -     Alprazolam  -     Clonazepam  -     Diazepam  -     Lorazepam  -     Gabapentin  -     Pregabalin  -     Cocaine  -     Heroin  -     Buprenorphine  -     Levorphanol  -     Meperidine  -     Naltrexone  -     Fentanyl  -     Methadone  -     Oxycodone  -     Tapentadol  -     THC  -     Tramadol  -     Codeine, Hydrocodone, Hydropmorphone, Morphine  -     Bath Salts  -     Ethyl Glucuronide/Ethyl Sulfate  -     Kratom  -     Spice  -     Methylphenidate  -     Phentermine  -     Validity Oxidant  -     Validity Creatinine  -     Validity pH  -     Validity Specific      CSA signed. UDS obtained. Follow up as scheduled. Subjective      HPI  CC: bump    Patient presents to discuss concerns of a bump. This appeared yesterday on her left temple. It did not last even the full day and was only slightly irritating but not particularly painful. She denies headache. Again, this has resolved already and is not present for me to examine today. She showed me a picture on her phone of what it looked like. She was diagnosed with HSV outbreak this week and has been significantly stressed about this. She has required use of her PRN xanax due to this stress. I discussed this was likely a "vein popping" as a result of the immense stress she has been under this week, and as this has resolved, I am not concerned. IF it returns, she is to let me know.      While patient is here today, I want to get a CSA signed and UDS up to date for her chronic use of the following medications: Adderall   Lyrica  Trazadone  Xanax     She also requests a refill of her mobic. Review of Systems   Constitutional:  Negative for chills and fever. Respiratory:  Negative for shortness of breath. Cardiovascular:  Negative for chest pain. Skin:  Negative for rash. Neurological:  Negative for dizziness, light-headedness and headaches.        Current Outpatient Medications on File Prior to Visit   Medication Sig    ALPRAZolam (XANAX) 0.25 mg tablet Take 0.25 mg by mouth 2 (two) times a day as needed for anxiety    levothyroxine 100 mcg tablet Take 1 tablet (100 mcg total) by mouth daily    lidocaine (XYLOCAINE) 5 % ointment     valACYclovir (VALTREX) 1,000 mg tablet     amphetamine-dextroamphetamine (ADDERALL XR, 30MG,) 30 MG 24 hr capsule Take 1 capsule (30 mg total) by mouth every morning Max Daily Amount: 30 mg    amphetamine-dextroamphetamine (ADDERALL, 10MG,) 10 mg tablet 20 mg every afternoon ~ 2 PM    buPROPion (WELLBUTRIN XL) 300 mg 24 hr tablet TAKE 1 TABLET (300 MG TOTAL) BY MOUTH EVERY MORNING    calcium carbonate-vitamin D 250 mg-3.125 mcg tablet TAKE 2 TABLETS BY MOUTH EVERY EVENING    cetirizine (ZyrTEC) 10 mg tablet TAKE 1 TABLET (10 MG TOTAL) BY MOUTH DAILY    Cholecalciferol (Vitamin D3) 50 MCG (2000 UT) TABS TAKE 1 TABLET (2,000 UNITS TOTAL) BY MOUTH IN THE MORNING    doxycycline hyclate (VIBRAMYCIN) 50 mg capsule Take 50 mg by mouth in the morning    esomeprazole (NexIUM) 40 MG capsule Take 1 capsule (40 mg total) by mouth every morning    fluticasone (FLONASE) 50 mcg/act nasal spray ADMINISTER 1 SPRAY INTO EACH NOSTRIL 2 (TWO) TIMES A DAY    folic acid (FOLVITE) 1 mg tablet TAKE 2 TABLETS (2 MG TOTAL) BY MOUTH DAILY    hydroxychloroquine (PLAQUENIL) 200 mg tablet Take 1 tablet (200 mg total) by mouth daily    metroNIDAZOLE (METROGEL) 1 % gel Apply topically daily    Multiple Vitamin (MULTIVITAMIN) tablet Take 1 tablet by mouth daily    mycophenolate (MYFORTIC) 360 MG TBEC Take 2 in the morning and 1 at night    pregabalin (LYRICA) 75 mg capsule Take 1 capsule (75 mg total) by mouth 2 (two) times a day    traZODone (DESYREL) 150 mg tablet Take 1 tablet (150 mg total) by mouth daily at bedtime    [DISCONTINUED] meloxicam (Mobic) 15 mg tablet Take 1 tablet (15 mg total) by mouth daily       Objective     /55 (BP Location: Right arm, Patient Position: Sitting, Cuff Size: Standard)   Pulse 91   Temp (!) 96.7 °F (35.9 °C) (Tympanic)   Ht 5' (1.524 m)   Wt 48.1 kg (106 lb 0.7 oz)   SpO2 96%   BMI 20.71 kg/m²     Physical Exam  Vitals reviewed. Constitutional:       General: She is not in acute distress. Appearance: She is normal weight. She is not ill-appearing. HENT:      Head: Normocephalic and atraumatic. Eyes:      Extraocular Movements: Extraocular movements intact. Conjunctiva/sclera: Conjunctivae normal.   Cardiovascular:      Rate and Rhythm: Normal rate. Pulmonary:      Effort: Pulmonary effort is normal.   Skin:     General: Skin is warm and dry. Neurological:      Mental Status: She is alert. Psychiatric:         Mood and Affect: Mood normal.         Behavior: Behavior normal.         Thought Content:  Thought content normal.       Julienne Amezquita,

## 2023-11-20 LAB
6MAM UR QL CFM: NEGATIVE NG/ML
7AMINOCLONAZEPAM UR QL CFM: NEGATIVE NG/ML
A-OH ALPRAZ UR QL CFM: NORMAL NG/ML
ACCEPTABLE CREAT UR QL: NORMAL MG/DL
ACCEPTIBLE SP GR UR QL: NORMAL
AMPHET UR QL CFM: NORMAL NG/ML
BUPRENORPHINE UR QL CFM: NEGATIVE NG/ML
BUTALBITAL UR QL CFM: NEGATIVE NG/ML
BZE UR QL CFM: NEGATIVE NG/ML
CODEINE UR QL CFM: NEGATIVE NG/ML
EDDP UR QL CFM: NEGATIVE NG/ML
ETHYL GLUCURONIDE UR QL CFM: NEGATIVE NG/ML
ETHYL SULFATE UR QL SCN: NEGATIVE NG/ML
EUTYLONE UR QL: NEGATIVE NG/ML
FENTANYL UR QL CFM: NEGATIVE NG/ML
GLIADIN IGG SER IA-ACNC: NEGATIVE NG/ML
HYDROCODONE UR QL CFM: NEGATIVE NG/ML
HYDROMORPHONE UR QL CFM: NEGATIVE NG/ML
LORAZEPAM UR QL CFM: NEGATIVE NG/ML
ME-PHENIDATE UR QL CFM: NEGATIVE NG/ML
MEPERIDINE UR QL CFM: NEGATIVE NG/ML
METHADONE UR QL CFM: NEGATIVE NG/ML
METHAMPHET UR QL CFM: NEGATIVE NG/ML
MORPHINE UR QL CFM: NEGATIVE NG/ML
NALTREXONE UR QL CFM: NEGATIVE NG/ML
NITRITE UR QL: NORMAL UG/ML
NORBUPRENORPHINE UR QL CFM: NEGATIVE NG/ML
NORDIAZEPAM UR QL CFM: NEGATIVE NG/ML
NORFENTANYL UR QL CFM: NEGATIVE NG/ML
NORHYDROCODONE UR QL CFM: NEGATIVE NG/ML
NORMEPERIDINE UR QL CFM: NEGATIVE NG/ML
NOROXYCODONE UR QL CFM: NEGATIVE NG/ML
OXAZEPAM UR QL CFM: NEGATIVE NG/ML
OXYCODONE UR QL CFM: NEGATIVE NG/ML
OXYMORPHONE UR QL CFM: NEGATIVE NG/ML
PARA-FLUOROFENTANYL QUANTIFICATION: NORMAL NG/ML
PHENOBARB UR QL CFM: NEGATIVE NG/ML
RESULT ALL_PRESCRIBED MEDS AND SPECIAL INSTRUCTIONS: NORMAL
SECOBARBITAL UR QL CFM: NEGATIVE NG/ML
SL AMB 4-ANPP QUANTIFICATION: NORMAL NG/ML
SL AMB 5F-ADB-M7 METABOLITE QUANTIFICATION: NEGATIVE NG/ML
SL AMB 7-OH-MITRAGYNINE (KRATOM ALKALOID) QUANTIFICATION: NEGATIVE NG/ML
SL AMB AB-FUBINACA-M3 METABOLITE QUANTIFICATION: NEGATIVE NG/ML
SL AMB ACETYL FENTANYL QUANTIFICATION: NORMAL NG/ML
SL AMB ACETYL NORFENTANYL QUANTIFICATION: NORMAL NG/ML
SL AMB ACRYL FENTANYL QUANTIFICATION: NORMAL NG/ML
SL AMB CARFENTANIL QUANTIFICATION: NORMAL NG/ML
SL AMB CTHC (MARIJUANA METABOLITE) QUANTIFICATION: NEGATIVE NG/ML
SL AMB DEXTRORPHAN (DEXTROMETHORPHAN METABOLITE) QUANT: NEGATIVE NG/ML
SL AMB GABAPENTIN QUANTIFICATION: NEGATIVE
SL AMB JWH018 METABOLITE QUANTIFICATION: NEGATIVE NG/ML
SL AMB JWH073 METABOLITE QUANTIFICATION: NEGATIVE NG/ML
SL AMB MDMB-FUBINACA-M1 METABOLITE QUANTIFICATION: NEGATIVE NG/ML
SL AMB METHYLONE QUANTIFICATION: NEGATIVE NG/ML
SL AMB N-DESMETHYL-TRAMADOL QUANTIFICATION: NEGATIVE NG/ML
SL AMB PHENTERMINE QUANTIFICATION: NEGATIVE NG/ML
SL AMB PREGABALIN QUANTIFICATION: NORMAL
SL AMB RCS4 METABOLITE QUANTIFICATION: NEGATIVE NG/ML
SL AMB RITALINIC ACID QUANTIFICATION: NEGATIVE NG/ML
SMOOTH MUSCLE AB TITR SER IF: NEGATIVE NG/ML
SPECIMEN DRAWN SERPL: NEGATIVE NG/ML
SPECIMEN PH ACCEPTABLE UR: NORMAL
TAPENTADOL UR QL CFM: NEGATIVE NG/ML
TEMAZEPAM UR QL CFM: NEGATIVE NG/ML
TRAMADOL UR QL CFM: NEGATIVE NG/ML
URATE/CREAT 24H UR: NEGATIVE NG/ML

## 2023-11-29 DIAGNOSIS — C73 THYROID CANCER (HCC): ICD-10-CM

## 2023-11-29 DIAGNOSIS — F90.0 ATTENTION DEFICIT HYPERACTIVITY DISORDER (ADHD), PREDOMINANTLY INATTENTIVE TYPE: ICD-10-CM

## 2023-11-29 RX ORDER — LEVOTHYROXINE SODIUM 0.1 MG/1
100 TABLET ORAL DAILY
Qty: 90 TABLET | Refills: 1 | Status: SHIPPED | OUTPATIENT
Start: 2023-11-29

## 2023-11-29 RX ORDER — DEXTROAMPHETAMINE SACCHARATE, AMPHETAMINE ASPARTATE, DEXTROAMPHETAMINE SULFATE AND AMPHETAMINE SULFATE 2.5; 2.5; 2.5; 2.5 MG/1; MG/1; MG/1; MG/1
TABLET ORAL
Qty: 60 TABLET | Refills: 0 | Status: CANCELLED | OUTPATIENT
Start: 2023-11-29

## 2023-11-29 RX ORDER — DEXTROAMPHETAMINE SACCHARATE, AMPHETAMINE ASPARTATE MONOHYDRATE, DEXTROAMPHETAMINE SULFATE AND AMPHETAMINE SULFATE 7.5; 7.5; 7.5; 7.5 MG/1; MG/1; MG/1; MG/1
30 CAPSULE, EXTENDED RELEASE ORAL EVERY MORNING
Qty: 90 CAPSULE | Refills: 0 | Status: SHIPPED | OUTPATIENT
Start: 2023-11-29

## 2023-12-06 DIAGNOSIS — F90.0 ATTENTION DEFICIT HYPERACTIVITY DISORDER (ADHD), PREDOMINANTLY INATTENTIVE TYPE: ICD-10-CM

## 2023-12-07 RX ORDER — DEXTROAMPHETAMINE SACCHARATE, AMPHETAMINE ASPARTATE, DEXTROAMPHETAMINE SULFATE AND AMPHETAMINE SULFATE 2.5; 2.5; 2.5; 2.5 MG/1; MG/1; MG/1; MG/1
TABLET ORAL
Qty: 60 TABLET | Refills: 0 | Status: SHIPPED | OUTPATIENT
Start: 2023-12-07

## 2024-01-03 DIAGNOSIS — F51.01 PRIMARY INSOMNIA: ICD-10-CM

## 2024-01-03 RX ORDER — TRAZODONE HYDROCHLORIDE 150 MG/1
150 TABLET ORAL
Qty: 90 TABLET | Refills: 1 | Status: SHIPPED | OUTPATIENT
Start: 2024-01-03

## 2024-01-03 RX ORDER — ALPRAZOLAM 0.25 MG/1
0.25 TABLET ORAL 2 TIMES DAILY PRN
Qty: 60 TABLET | Refills: 0 | Status: SHIPPED | OUTPATIENT
Start: 2024-01-03

## 2024-01-08 DIAGNOSIS — F90.0 ATTENTION DEFICIT HYPERACTIVITY DISORDER (ADHD), PREDOMINANTLY INATTENTIVE TYPE: ICD-10-CM

## 2024-01-08 RX ORDER — DEXTROAMPHETAMINE SACCHARATE, AMPHETAMINE ASPARTATE, DEXTROAMPHETAMINE SULFATE AND AMPHETAMINE SULFATE 2.5; 2.5; 2.5; 2.5 MG/1; MG/1; MG/1; MG/1
TABLET ORAL
Qty: 60 TABLET | Refills: 0 | Status: SHIPPED | OUTPATIENT
Start: 2024-01-08

## 2024-01-18 DIAGNOSIS — F98.8 ATTENTION DEFICIT DISORDER, UNSPECIFIED HYPERACTIVITY PRESENCE: ICD-10-CM

## 2024-01-18 DIAGNOSIS — F32.A DEPRESSION, UNSPECIFIED DEPRESSION TYPE: ICD-10-CM

## 2024-01-18 DIAGNOSIS — F33.9 RECURRENT DEPRESSION (HCC): ICD-10-CM

## 2024-01-18 DIAGNOSIS — M33.20 POLYMYOSITIS (HCC): ICD-10-CM

## 2024-01-18 DIAGNOSIS — R09.82 PND (POST-NASAL DRIP): ICD-10-CM

## 2024-01-18 DIAGNOSIS — J43.9 PULMONARY EMPHYSEMA, UNSPECIFIED EMPHYSEMA TYPE (HCC): ICD-10-CM

## 2024-01-18 DIAGNOSIS — F41.8 SITUATIONAL ANXIETY: ICD-10-CM

## 2024-01-18 DIAGNOSIS — C82.90 FOLLICULAR LYMPHOMA, UNSPECIFIED FOLLICULAR LYMPHOMA TYPE, UNSPECIFIED BODY REGION (HCC): ICD-10-CM

## 2024-01-18 DIAGNOSIS — F51.01 PRIMARY INSOMNIA: ICD-10-CM

## 2024-01-18 RX ORDER — BUPROPION HYDROCHLORIDE 300 MG/1
300 TABLET ORAL EVERY MORNING
Qty: 90 TABLET | Refills: 1 | Status: SHIPPED | OUTPATIENT
Start: 2024-01-18 | End: 2024-07-16

## 2024-01-18 RX ORDER — CETIRIZINE HYDROCHLORIDE 10 MG/1
10 TABLET ORAL DAILY
Qty: 90 TABLET | Refills: 1 | Status: SHIPPED | OUTPATIENT
Start: 2024-01-18

## 2024-01-19 RX ORDER — FOLIC ACID 1 MG/1
2 TABLET ORAL DAILY
Qty: 180 TABLET | Refills: 2 | Status: SHIPPED | OUTPATIENT
Start: 2024-01-19

## 2024-01-24 ENCOUNTER — RA CDI HCC (OUTPATIENT)
Dept: OTHER | Facility: HOSPITAL | Age: 69
End: 2024-01-24

## 2024-01-24 NOTE — PROGRESS NOTES
D80.1  HCC coding opportunities          Chart Reviewed number of suggestions sent to Provider: 1     Patients Insurance     Medicare Insurance: Medicare

## 2024-01-31 ENCOUNTER — OFFICE VISIT (OUTPATIENT)
Dept: FAMILY MEDICINE CLINIC | Facility: CLINIC | Age: 69
End: 2024-01-31
Payer: MEDICARE

## 2024-01-31 ENCOUNTER — LAB (OUTPATIENT)
Dept: LAB | Facility: CLINIC | Age: 69
End: 2024-01-31
Payer: MEDICARE

## 2024-01-31 VITALS
BODY MASS INDEX: 19.69 KG/M2 | SYSTOLIC BLOOD PRESSURE: 124 MMHG | OXYGEN SATURATION: 99 % | DIASTOLIC BLOOD PRESSURE: 59 MMHG | HEIGHT: 60 IN | WEIGHT: 100.31 LBS | HEART RATE: 86 BPM

## 2024-01-31 DIAGNOSIS — E78.2 MIXED HYPERLIPIDEMIA: ICD-10-CM

## 2024-01-31 DIAGNOSIS — L65.9 HAIR LOSS: ICD-10-CM

## 2024-01-31 DIAGNOSIS — L71.9 ROSACEA: Primary | ICD-10-CM

## 2024-01-31 DIAGNOSIS — E55.9 VITAMIN D DEFICIENCY: ICD-10-CM

## 2024-01-31 DIAGNOSIS — K21.9 GASTROESOPHAGEAL REFLUX DISEASE, UNSPECIFIED WHETHER ESOPHAGITIS PRESENT: ICD-10-CM

## 2024-01-31 DIAGNOSIS — C73 THYROID CANCER (HCC): ICD-10-CM

## 2024-01-31 DIAGNOSIS — F90.0 ATTENTION DEFICIT HYPERACTIVITY DISORDER (ADHD), PREDOMINANTLY INATTENTIVE TYPE: ICD-10-CM

## 2024-01-31 DIAGNOSIS — M81.0 AGE-RELATED OSTEOPOROSIS WITHOUT CURRENT PATHOLOGICAL FRACTURE: ICD-10-CM

## 2024-01-31 DIAGNOSIS — J30.89 NON-SEASONAL ALLERGIC RHINITIS, UNSPECIFIED TRIGGER: ICD-10-CM

## 2024-01-31 DIAGNOSIS — E03.9 ACQUIRED HYPOTHYROIDISM: ICD-10-CM

## 2024-01-31 DIAGNOSIS — R11.2 NAUSEA AND VOMITING, UNSPECIFIED VOMITING TYPE: ICD-10-CM

## 2024-01-31 DIAGNOSIS — E53.8 B12 DEFICIENCY: ICD-10-CM

## 2024-01-31 DIAGNOSIS — M34.0 SCLERODERMA PROGRESSIVE (HCC): ICD-10-CM

## 2024-01-31 LAB
25(OH)D3 SERPL-MCNC: 74.6 NG/ML (ref 30–100)
ALBUMIN SERPL BCP-MCNC: 4.4 G/DL (ref 3.5–5)
ALP SERPL-CCNC: 52 U/L (ref 34–104)
ALT SERPL W P-5'-P-CCNC: 15 U/L (ref 7–52)
ANION GAP SERPL CALCULATED.3IONS-SCNC: 9 MMOL/L
AST SERPL W P-5'-P-CCNC: 19 U/L (ref 13–39)
BASOPHILS # BLD AUTO: 0.09 THOUSANDS/ÂΜL (ref 0–0.1)
BASOPHILS NFR BLD AUTO: 2 % (ref 0–1)
BILIRUB SERPL-MCNC: 0.45 MG/DL (ref 0.2–1)
BUN SERPL-MCNC: 12 MG/DL (ref 5–25)
CALCIUM SERPL-MCNC: 9.5 MG/DL (ref 8.4–10.2)
CHLORIDE SERPL-SCNC: 99 MMOL/L (ref 96–108)
CHOLEST SERPL-MCNC: 243 MG/DL
CO2 SERPL-SCNC: 28 MMOL/L (ref 21–32)
CREAT SERPL-MCNC: 0.86 MG/DL (ref 0.6–1.3)
EOSINOPHIL # BLD AUTO: 0.32 THOUSAND/ÂΜL (ref 0–0.61)
EOSINOPHIL NFR BLD AUTO: 9 % (ref 0–6)
ERYTHROCYTE [DISTWIDTH] IN BLOOD BY AUTOMATED COUNT: 13.7 % (ref 11.6–15.1)
FERRITIN SERPL-MCNC: 21 NG/ML (ref 11–307)
GFR SERPL CREATININE-BSD FRML MDRD: 69 ML/MIN/1.73SQ M
GLUCOSE SERPL-MCNC: 87 MG/DL (ref 65–140)
HCT VFR BLD AUTO: 40.9 % (ref 34.8–46.1)
HDLC SERPL-MCNC: 72 MG/DL
HGB BLD-MCNC: 13.5 G/DL (ref 11.5–15.4)
IMM GRANULOCYTES # BLD AUTO: 0.01 THOUSAND/UL (ref 0–0.2)
IMM GRANULOCYTES NFR BLD AUTO: 0 % (ref 0–2)
IRON SATN MFR SERPL: 24 % (ref 15–50)
IRON SERPL-MCNC: 100 UG/DL (ref 50–212)
LDLC SERPL CALC-MCNC: 151 MG/DL (ref 0–100)
LYMPHOCYTES # BLD AUTO: 1.23 THOUSANDS/ÂΜL (ref 0.6–4.47)
LYMPHOCYTES NFR BLD AUTO: 33 % (ref 14–44)
MCH RBC QN AUTO: 29.7 PG (ref 26.8–34.3)
MCHC RBC AUTO-ENTMCNC: 33 G/DL (ref 31.4–37.4)
MCV RBC AUTO: 90 FL (ref 82–98)
MONOCYTES # BLD AUTO: 0.54 THOUSAND/ÂΜL (ref 0.17–1.22)
MONOCYTES NFR BLD AUTO: 15 % (ref 4–12)
NEUTROPHILS # BLD AUTO: 1.52 THOUSANDS/ÂΜL (ref 1.85–7.62)
NEUTS SEG NFR BLD AUTO: 41 % (ref 43–75)
NONHDLC SERPL-MCNC: 171 MG/DL
NRBC BLD AUTO-RTO: 0 /100 WBCS
PLATELET # BLD AUTO: 271 THOUSANDS/UL (ref 149–390)
PMV BLD AUTO: 10.9 FL (ref 8.9–12.7)
POTASSIUM SERPL-SCNC: 4.2 MMOL/L (ref 3.5–5.3)
PROT SERPL-MCNC: 6.4 G/DL (ref 6.4–8.4)
RBC # BLD AUTO: 4.55 MILLION/UL (ref 3.81–5.12)
SODIUM SERPL-SCNC: 136 MMOL/L (ref 135–147)
TIBC SERPL-MCNC: 421 UG/DL (ref 250–450)
TRIGL SERPL-MCNC: 102 MG/DL
TSH SERPL DL<=0.05 MIU/L-ACNC: 3.83 UIU/ML (ref 0.45–4.5)
UIBC SERPL-MCNC: 321 UG/DL (ref 155–355)
VIT B12 SERPL-MCNC: 816 PG/ML (ref 180–914)
WBC # BLD AUTO: 3.71 THOUSAND/UL (ref 4.31–10.16)

## 2024-01-31 PROCEDURE — 36415 COLL VENOUS BLD VENIPUNCTURE: CPT

## 2024-01-31 PROCEDURE — 84443 ASSAY THYROID STIM HORMONE: CPT

## 2024-01-31 PROCEDURE — 80061 LIPID PANEL: CPT

## 2024-01-31 PROCEDURE — 83540 ASSAY OF IRON: CPT

## 2024-01-31 PROCEDURE — 80053 COMPREHEN METABOLIC PANEL: CPT

## 2024-01-31 PROCEDURE — 99214 OFFICE O/P EST MOD 30 MIN: CPT | Performed by: FAMILY MEDICINE

## 2024-01-31 PROCEDURE — 85025 COMPLETE CBC W/AUTO DIFF WBC: CPT

## 2024-01-31 PROCEDURE — 82172 ASSAY OF APOLIPOPROTEIN: CPT

## 2024-01-31 PROCEDURE — 82728 ASSAY OF FERRITIN: CPT

## 2024-01-31 PROCEDURE — 83550 IRON BINDING TEST: CPT

## 2024-01-31 PROCEDURE — 82607 VITAMIN B-12: CPT

## 2024-01-31 PROCEDURE — 82306 VITAMIN D 25 HYDROXY: CPT

## 2024-01-31 RX ORDER — CHOLECALCIFEROL (VITAMIN D3) 125 MCG
CAPSULE ORAL
Qty: 90 TABLET | Refills: 1 | Status: SHIPPED | OUTPATIENT
Start: 2024-01-31

## 2024-01-31 RX ORDER — ONDANSETRON 4 MG/1
4 TABLET, FILM COATED ORAL EVERY 8 HOURS PRN
Qty: 20 TABLET | Refills: 0 | Status: SHIPPED | OUTPATIENT
Start: 2024-01-31

## 2024-01-31 NOTE — ASSESSMENT & PLAN NOTE
Lab Results   Component Value Date    CHOLESTEROL 236 (H) 10/18/2023    TRIG 81 10/18/2023    HDL 66 10/18/2023    LDLCALC 154 (H) 10/18/2023     Chronic  Reviewed last PCP note: she was on statin therapy but self discontinued due to undesirable SEs and fear of worsening her autoimmune dz - MCTD compromising of systemic sclerosis, Sjogren's syndrome, and necrotizing myopathy.    She is working on sticking to a heart healthy diet and will plan to recheck lipid panel at this time.   She is going to continue with anti-inflammatory diet, I discussed the benefits of Whole Food Plant based diet with her today. F/u 3 mo

## 2024-01-31 NOTE — ASSESSMENT & PLAN NOTE
Overall waxes and wanes.  She is following closely with GI.  Recently having some worsening nausea and vomiting, though this does seem to be improving.  I recommend as needed Zofran for now and to see her GI as planned.  Continue with Nexium daily.

## 2024-01-31 NOTE — PROGRESS NOTES
Name: Mimi Biggs      : 1955      MRN: [id number]  Encounter Provider: Patti Amaro DO  Encounter Date: 2024   Encounter department: [facility] PR[facility]    Assessment & Plan     1. Rosacea  Assessment & Plan:  stable, no current issues with this.  Now off doxycycline followed by derm       2. Scleroderma progressive (HCC)  Assessment & Plan:  Chronic, follows with rheum in [place] as well as with [facility]   Mgmt per rheumatology      3. Attention deficit hyperactivity disorder (ADHD), predominantly inattentive type  Assessment & Plan:  Chronic, stable  Reviewed prior notes from prior PCP  Stable on 30AM dosing and 10mg afternoon prn dosing.  PDMP is c/w Rx.    Orders:  -     Comprehensive metabolic panel; Future    4. Acquired hypothyroidism  Assessment & Plan:  Chronic  I reviewed her prior PCP note  She is s/p thyroidectomy due to Ca  Recent US is OK.    She is currently taking levothyroxine 100mcg daily.   TSH 10/18/23 reviewed and wnl  Will continue to monitor labs approximately every 6 months, repeat at this time  Follow-up in 3 months    Orders:  -     TSH, 3rd generation with Free T4 reflex; Future    5. Thyroid cancer (HCC)  Assessment & Plan:  s/p thyroidectomy in 2018 - per path report - papillary carcinoma.    Mgmt of hypothyroidism as above    Orders:  -     TSH, 3rd generation with Free T4 reflex; Future    6. Vitamin D deficiency  Assessment & Plan:  Chronic, stable  Reviewed recent labs 10/18 and vit D wnl  Continue to monitor    Orders:  -     Vitamin D 25 hydroxy; Future    7. Mixed hyperlipidemia  Assessment & Plan:  Lab Results   Component Value Date    CHOLESTEROL 236 (H) 10/18/2023    TRIG 81 10/18/2023    HDL 66 10/18/2023    LDLCALC 154 (H) 10/18/2023     Chronic  Reviewed last PCP note: she was on statin therapy but self discontinued due to undesirable SEs and fear of worsening her autoimmune dz - MCTD compromising of systemic sclerosis,  Sjogren's syndrome, and necrotizing myopathy.    She is working on sticking to a heart healthy diet and will plan to recheck lipid panel at this time.   She is going to continue with anti-inflammatory diet, I discussed the benefits of Whole Food Plant based diet with her today. F/u 3 mo    Orders:  -     Lipid panel; Future  -     Comprehensive metabolic panel; Future  -     Apolipoprotein B; Future    8. Gastroesophageal reflux disease, unspecified whether esophagitis present  Assessment & Plan:  Overall waxes and wanes.  She is following closely with GI.  Recently having some worsening nausea and vomiting, though this does seem to be improving.  I recommend as needed Zofran for now and to see her GI as planned.  Continue with Nexium daily.    Orders:  -     Comprehensive metabolic panel; Future    9. Non-seasonal allergic rhinitis, unspecified trigger  Assessment & Plan:  Chronic, stable  Stopped Zyrtec as this was not helping.  Continue Flonase      10. Hair loss  -     CBC and differential; Future  -     Iron Panel (Includes Ferritin, Iron Sat%, Iron, and TIBC); Future    11. B12 deficiency  -     Vitamin B12; Future  -     CBC and differential; Future    12. Nausea and vomiting, unspecified vomiting type  -     ondansetron (ZOFRAN) 4 mg tablet; Take 1 tablet (4 mg total) by mouth every 8 (eight) hours as needed for nausea or vomiting        Return in about 4 months (around 5/31/2024) for Follow up chronic health .      Subjective      HPI  CC: follow up    Patient presents for follow up of chronic conditions. She is doing well. She reports no updates from derm or rheum perspective. She is however having worsening nausea and vomiting and will be seeing her GI for this. It does seem to be improving and may have been viral illness. She overall feels well and is really working on managing her chronic issues with lifestyle. She is very active and working to stick to a mostly anti-inflammatory diet. She is avoiding  processed foods. She is worried about her thyroid as she is losing more hair recently.     Review of Systems   Constitutional:  Negative for appetite change, chills, diaphoresis, fever and unexpected weight change.   Eyes:  Negative for visual disturbance.   Respiratory:  Negative for shortness of breath.    Cardiovascular:  Negative for chest pain and leg swelling.   Gastrointestinal:  Positive for nausea and vomiting. Negative for constipation and diarrhea.   Endocrine: Negative for polydipsia and polyuria.   Genitourinary:  Negative for frequency.   Neurological:  Negative for dizziness, light-headedness and headaches.       Current Outpatient Medications on File Prior to Visit   Medication Sig    ALPRAZolam (XANAX) 0.25 mg tablet Take 1 tablet (0.25 mg total) by mouth 2 (two) times a day as needed for anxiety    amphetamine-dextroamphetamine (ADDERALL XR, 30MG,) 30 MG 24 hr capsule Take 1 capsule (30 mg total) by mouth every morning Max Daily Amount: 30 mg    amphetamine-dextroamphetamine (ADDERALL, 10MG,) 10 mg tablet 20 mg every afternoon ~ 2 PM    buPROPion (WELLBUTRIN XL) 300 mg 24 hr tablet Take 1 tablet (300 mg total) by mouth every morning    calcium carbonate-vitamin D 250 mg-3.125 mcg tablet TAKE 2 TABLETS BY MOUTH EVERY EVENING    Cholecalciferol (Vitamin D3) 50 MCG (2000 UT) TABS TAKE 1 TABLET (2,000 UNITS TOTAL) BY MOUTH IN THE MORNING    esomeprazole (NexIUM) 40 MG capsule Take 1 capsule (40 mg total) by mouth every morning    fluticasone (FLONASE) 50 mcg/act nasal spray ADMINISTER 1 SPRAY INTO EACH NOSTRIL 2 (TWO) TIMES A DAY    folic acid (FOLVITE) 1 mg tablet TAKE 2 TABLETS (2 MG TOTAL) BY MOUTH DAILY    hydroxychloroquine (PLAQUENIL) 200 mg tablet Take 1 tablet (200 mg total) by mouth daily    levothyroxine 100 mcg tablet Take 1 tablet (100 mcg total) by mouth daily    lidocaine (XYLOCAINE) 5 % ointment     meloxicam (Mobic) 15 mg tablet Take 1 tablet (15 mg total) by mouth daily     metroNIDAZOLE (METROGEL) 1 % gel Apply topically daily    Multiple Vitamin (MULTIVITAMIN) tablet Take 1 tablet by mouth daily    mycophenolate (MYFORTIC) 360 MG TBEC Take 2 in the morning and 1 at night    pregabalin (LYRICA) 75 mg capsule Take 1 capsule (75 mg total) by mouth 2 (two) times a day    traZODone (DESYREL) 150 mg tablet Take 1 tablet (150 mg total) by mouth daily at bedtime    valACYclovir (VALTREX) 1,000 mg tablet     [DISCONTINUED] cetirizine (ZyrTEC) 10 mg tablet Take 1 tablet (10 mg total) by mouth daily    [DISCONTINUED] doxycycline hyclate (VIBRAMYCIN) 50 mg capsule Take 50 mg by mouth in the morning       Objective     /59 (BP Location: Left arm, Patient Position: Sitting, Cuff Size: Standard)   Pulse 86   Ht 5' (1.524 m)   Wt 45.5 kg (100 lb 5 oz)   SpO2 99%   BMI 19.59 kg/m²     Physical Exam  Vitals reviewed.   Constitutional:       General: She is not in acute distress.     Appearance: She is normal weight. She is not ill-appearing.   HENT:      Head: Normocephalic and atraumatic.      Left Ear: External ear normal.   Eyes:      Extraocular Movements: Extraocular movements intact.      Conjunctiva/sclera: Conjunctivae normal.   Neck:      Vascular: No carotid bruit.   Cardiovascular:      Rate and Rhythm: Normal rate and regular rhythm.      Pulses: Normal pulses.      Heart sounds: Normal heart sounds.   Pulmonary:      Effort: Pulmonary effort is normal.      Breath sounds: Normal breath sounds.   Abdominal:      General: Abdomen is flat.      Palpations: Abdomen is soft.   Musculoskeletal:      Right lower leg: No edema.      Left lower leg: No edema.   Skin:     General: Skin is warm and dry.   Neurological:      General: No focal deficit present.      Mental Status: She is alert.   Psychiatric:         Mood and Affect: Mood normal.         Behavior: Behavior normal.         Thought Content: Thought content normal.       Patti Amaro DO

## 2024-01-31 NOTE — ASSESSMENT & PLAN NOTE
Chronic  I reviewed her prior PCP note  She is s/p thyroidectomy due to Ca  Recent US is OK.    She is currently taking levothyroxine 100mcg daily.   TSH 10/18/23 reviewed and wnl  Will continue to monitor labs approximately every 6 months, repeat at this time  Follow-up in 3 months

## 2024-02-02 ENCOUNTER — TELEPHONE (OUTPATIENT)
Age: 69
End: 2024-02-02

## 2024-02-02 ENCOUNTER — PATIENT MESSAGE (OUTPATIENT)
Dept: FAMILY MEDICINE CLINIC | Facility: CLINIC | Age: 69
End: 2024-02-02

## 2024-02-02 LAB — APO B SERPL-MCNC: 115 MG/DL

## 2024-02-02 NOTE — TELEPHONE ENCOUNTER
Patient called and wanted to advise her messages that she had sent in I did forward these over to the office and patient is just concerned about her labs and wants further testing and has an appointment in march with cardiology but doesn't want to wait that long, she declined any active symptoms of a heart attack patient was advised if she occurred to have an active symptoms to call 911 or go to the nearest emergency room. She is aware.

## 2024-02-07 ENCOUNTER — TELEPHONE (OUTPATIENT)
Age: 69
End: 2024-02-07

## 2024-02-07 DIAGNOSIS — F90.0 ATTENTION DEFICIT HYPERACTIVITY DISORDER (ADHD), PREDOMINANTLY INATTENTIVE TYPE: ICD-10-CM

## 2024-02-07 NOTE — TELEPHONE ENCOUNTER
Pt calling regarding lab results. She states she recently had labs drawn for her PCP. She would like for Dr. Suresh to review her CBC.

## 2024-02-07 NOTE — TELEPHONE ENCOUNTER
Medication:   amphetamine-dextroamphetamine (ADDERALL, 10MG,) 10 mg tablet     Dose/Frequency: 20 mg every afternoon ~ 2 PM     Quantity: 60 tablet    Pharmacy: [id number] [address]    Office:   [x] PCP/Provider -   [] Speciality/Provider -     Does the patient have enough for 3 days?   [] Yes   [x] No - Send as HP to POD     Patient has enough medication for tomorrow. Please review PDMP.

## 2024-02-08 RX ORDER — DEXTROAMPHETAMINE SACCHARATE, AMPHETAMINE ASPARTATE, DEXTROAMPHETAMINE SULFATE AND AMPHETAMINE SULFATE 2.5; 2.5; 2.5; 2.5 MG/1; MG/1; MG/1; MG/1
TABLET ORAL
Qty: 60 TABLET | Refills: 0 | OUTPATIENT
Start: 2024-02-08

## 2024-02-09 DIAGNOSIS — F90.0 ATTENTION DEFICIT HYPERACTIVITY DISORDER (ADHD), PREDOMINANTLY INATTENTIVE TYPE: ICD-10-CM

## 2024-02-09 NOTE — TELEPHONE ENCOUNTER
Please let her know that I am not too concerned about her CBC; her WBC count was slightly low.  Her white blood cell count was low in the past as well, but we can discuss more at her upcoming clinic visit.

## 2024-02-09 NOTE — TELEPHONE ENCOUNTER
Medication Refill Request     Name amphetamine-dextroamphetamine (ADDERALL, 10MG,)   Dose/Frequency 10MG,/       20 mg every afternoon ~ 2 PM     Quantity  60 tablet   Verified pharmacy   [x]  Verified ordering Provider   [x]  Does patient have enough for the next 3 days? Yes [] No [x]

## 2024-02-12 DIAGNOSIS — F41.1 GENERALIZED ANXIETY DISORDER WITH PANIC ATTACKS: ICD-10-CM

## 2024-02-12 DIAGNOSIS — M54.16 RADICULOPATHY, LUMBAR REGION: ICD-10-CM

## 2024-02-12 DIAGNOSIS — F41.0 GENERALIZED ANXIETY DISORDER WITH PANIC ATTACKS: ICD-10-CM

## 2024-02-12 DIAGNOSIS — F90.0 ATTENTION DEFICIT HYPERACTIVITY DISORDER (ADHD), PREDOMINANTLY INATTENTIVE TYPE: ICD-10-CM

## 2024-02-12 DIAGNOSIS — M25.561 ACUTE PAIN OF RIGHT KNEE: ICD-10-CM

## 2024-02-12 RX ORDER — DEXTROAMPHETAMINE SACCHARATE, AMPHETAMINE ASPARTATE, DEXTROAMPHETAMINE SULFATE AND AMPHETAMINE SULFATE 2.5; 2.5; 2.5; 2.5 MG/1; MG/1; MG/1; MG/1
TABLET ORAL
Qty: 60 TABLET | Refills: 0 | OUTPATIENT
Start: 2024-02-12

## 2024-02-12 RX ORDER — DEXTROAMPHETAMINE SACCHARATE, AMPHETAMINE ASPARTATE, DEXTROAMPHETAMINE SULFATE AND AMPHETAMINE SULFATE 2.5; 2.5; 2.5; 2.5 MG/1; MG/1; MG/1; MG/1
TABLET ORAL
Qty: 60 TABLET | Refills: 0 | Status: SHIPPED | OUTPATIENT
Start: 2024-02-12

## 2024-02-12 RX ORDER — MELOXICAM 15 MG/1
15 TABLET ORAL DAILY
Qty: 90 TABLET | Refills: 1 | Status: SHIPPED | OUTPATIENT
Start: 2024-02-12

## 2024-02-12 NOTE — TELEPHONE ENCOUNTER
Requested medication(s) are due for refill today: No  Patient has already received a courtesy refill: No  Other reason request has been forwarded to provider: DUPLICATE

## 2024-02-14 ENCOUNTER — HOSPITAL ENCOUNTER (OUTPATIENT)
Dept: ULTRASOUND IMAGING | Facility: HOSPITAL | Age: 69
Discharge: HOME | End: 2024-02-14
Payer: MEDICARE

## 2024-02-14 DIAGNOSIS — R10.13 EPIGASTRIC PAIN: ICD-10-CM

## 2024-02-14 PROCEDURE — 76700 US EXAM ABDOM COMPLETE: CPT

## 2024-02-22 ENCOUNTER — TELEPHONE (OUTPATIENT)
Age: 69
End: 2024-02-22

## 2024-02-22 NOTE — TELEPHONE ENCOUNTER
Pt calls in and is complaining off soreness and weakness and feeling sick. She is wondering if  can put  blood work in to make sure things are ok

## 2024-02-23 ENCOUNTER — APPOINTMENT (OUTPATIENT)
Dept: LAB | Facility: CLINIC | Age: 69
End: 2024-02-23
Payer: MEDICARE

## 2024-02-23 ENCOUNTER — OFFICE VISIT (OUTPATIENT)
Dept: FAMILY MEDICINE CLINIC | Facility: CLINIC | Age: 69
End: 2024-02-23
Payer: MEDICARE

## 2024-02-23 VITALS
SYSTOLIC BLOOD PRESSURE: 159 MMHG | WEIGHT: 100.53 LBS | HEART RATE: 82 BPM | OXYGEN SATURATION: 98 % | DIASTOLIC BLOOD PRESSURE: 72 MMHG | BODY MASS INDEX: 19.74 KG/M2 | HEIGHT: 60 IN

## 2024-02-23 DIAGNOSIS — R53.83 MALAISE AND FATIGUE: ICD-10-CM

## 2024-02-23 DIAGNOSIS — F51.01 PRIMARY INSOMNIA: ICD-10-CM

## 2024-02-23 DIAGNOSIS — R63.0 POOR APPETITE: ICD-10-CM

## 2024-02-23 DIAGNOSIS — R53.81 MALAISE AND FATIGUE: ICD-10-CM

## 2024-02-23 DIAGNOSIS — R53.81 MALAISE AND FATIGUE: Primary | ICD-10-CM

## 2024-02-23 DIAGNOSIS — M35.1 MCTD (MIXED CONNECTIVE TISSUE DISEASE) (HCC): Primary | ICD-10-CM

## 2024-02-23 DIAGNOSIS — R61 NIGHT SWEATS: ICD-10-CM

## 2024-02-23 DIAGNOSIS — C82.95 FOLLICULAR LYMPHOMA OF LYMPH NODES OF INGUINAL REGION, UNSPECIFIED FOLLICULAR LYMPHOMA TYPE (HCC): ICD-10-CM

## 2024-02-23 DIAGNOSIS — R59.0 LYMPHADENOPATHY, CERVICAL: ICD-10-CM

## 2024-02-23 DIAGNOSIS — R11.0 NAUSEA: ICD-10-CM

## 2024-02-23 DIAGNOSIS — R53.83 MALAISE AND FATIGUE: Primary | ICD-10-CM

## 2024-02-23 DIAGNOSIS — F90.0 ATTENTION DEFICIT HYPERACTIVITY DISORDER (ADHD), PREDOMINANTLY INATTENTIVE TYPE: ICD-10-CM

## 2024-02-23 LAB
ALBUMIN SERPL BCP-MCNC: 4.5 G/DL (ref 3.5–5)
ALP SERPL-CCNC: 57 U/L (ref 34–104)
ALT SERPL W P-5'-P-CCNC: 13 U/L (ref 7–52)
ANION GAP SERPL CALCULATED.3IONS-SCNC: 8 MMOL/L
AST SERPL W P-5'-P-CCNC: 14 U/L (ref 13–39)
BACTERIA UR QL AUTO: NORMAL /HPF
BASOPHILS # BLD AUTO: 0.07 THOUSANDS/ÂΜL (ref 0–0.1)
BASOPHILS NFR BLD AUTO: 2 % (ref 0–1)
BILIRUB SERPL-MCNC: 0.33 MG/DL (ref 0.2–1)
BILIRUB UR QL STRIP: NEGATIVE
BILIRUB UR QL STRIP: NEGATIVE
BUN SERPL-MCNC: 13 MG/DL (ref 5–25)
C3 SERPL-MCNC: 127 MG/DL (ref 87–200)
C4 SERPL-MCNC: 24 MG/DL (ref 19–52)
CALCIUM SERPL-MCNC: 10.2 MG/DL (ref 8.4–10.2)
CHLORIDE SERPL-SCNC: 97 MMOL/L (ref 96–108)
CK SERPL-CCNC: 64 U/L (ref 26–192)
CLARITY UR: CLEAR
CLARITY UR: CLEAR
CO2 SERPL-SCNC: 31 MMOL/L (ref 21–32)
COLOR UR: YELLOW
COLOR UR: YELLOW
CREAT SERPL-MCNC: 0.77 MG/DL (ref 0.6–1.3)
CREAT UR-MCNC: 44.9 MG/DL
CRP SERPL QL: 22.3 MG/L
EOSINOPHIL # BLD AUTO: 0.26 THOUSAND/ÂΜL (ref 0–0.61)
EOSINOPHIL NFR BLD AUTO: 6 % (ref 0–6)
ERYTHROCYTE [DISTWIDTH] IN BLOOD BY AUTOMATED COUNT: 12.6 % (ref 11.6–15.1)
ERYTHROCYTE [SEDIMENTATION RATE] IN BLOOD: 19 MM/HOUR (ref 0–29)
GFR SERPL CREATININE-BSD FRML MDRD: 79 ML/MIN/1.73SQ M
GLUCOSE SERPL-MCNC: 106 MG/DL (ref 65–140)
GLUCOSE UR STRIP-MCNC: NEGATIVE MG/DL
GLUCOSE UR STRIP-MCNC: NEGATIVE MG/DL
HCT VFR BLD AUTO: 39.9 % (ref 34.8–46.1)
HGB BLD-MCNC: 13.1 G/DL (ref 11.5–15.4)
HGB UR QL STRIP.AUTO: NEGATIVE
HGB UR QL STRIP.AUTO: NEGATIVE
IMM GRANULOCYTES # BLD AUTO: 0.02 THOUSAND/UL (ref 0–0.2)
IMM GRANULOCYTES NFR BLD AUTO: 1 % (ref 0–2)
KETONES UR STRIP-MCNC: NEGATIVE MG/DL
KETONES UR STRIP-MCNC: NEGATIVE MG/DL
LEUKOCYTE ESTERASE UR QL STRIP: NEGATIVE
LEUKOCYTE ESTERASE UR QL STRIP: NEGATIVE
LYMPHOCYTES # BLD AUTO: 1.31 THOUSANDS/ÂΜL (ref 0.6–4.47)
LYMPHOCYTES NFR BLD AUTO: 31 % (ref 14–44)
MCH RBC QN AUTO: 28.5 PG (ref 26.8–34.3)
MCHC RBC AUTO-ENTMCNC: 32.8 G/DL (ref 31.4–37.4)
MCV RBC AUTO: 87 FL (ref 82–98)
MONOCYTES # BLD AUTO: 0.42 THOUSAND/ÂΜL (ref 0.17–1.22)
MONOCYTES NFR BLD AUTO: 10 % (ref 4–12)
NEUTROPHILS # BLD AUTO: 2.21 THOUSANDS/ÂΜL (ref 1.85–7.62)
NEUTS SEG NFR BLD AUTO: 50 % (ref 43–75)
NITRITE UR QL STRIP: NEGATIVE
NITRITE UR QL STRIP: NEGATIVE
NON-SQ EPI CELLS URNS QL MICRO: NORMAL /HPF
NRBC BLD AUTO-RTO: 0 /100 WBCS
PH UR STRIP.AUTO: 7 [PH]
PH UR STRIP.AUTO: 7 [PH]
PLATELET # BLD AUTO: 321 THOUSANDS/UL (ref 149–390)
PMV BLD AUTO: 10.6 FL (ref 8.9–12.7)
POTASSIUM SERPL-SCNC: 5.1 MMOL/L (ref 3.5–5.3)
PROT SERPL-MCNC: 5.8 G/DL (ref 6.4–8.4)
PROT UR STRIP-MCNC: NEGATIVE MG/DL
PROT UR STRIP-MCNC: NEGATIVE MG/DL
PROT UR-MCNC: 4 MG/DL
PROT/CREAT UR: 0.09 MG/G{CREAT} (ref 0–0.1)
RBC # BLD AUTO: 4.59 MILLION/UL (ref 3.81–5.12)
RBC #/AREA URNS AUTO: NORMAL /HPF
SODIUM SERPL-SCNC: 136 MMOL/L (ref 135–147)
SP GR UR STRIP.AUTO: 1.01 (ref 1–1.03)
SP GR UR STRIP.AUTO: 1.01 (ref 1–1.03)
UROBILINOGEN UR STRIP-ACNC: <2 MG/DL
UROBILINOGEN UR STRIP-ACNC: <2 MG/DL
WBC # BLD AUTO: 4.29 THOUSAND/UL (ref 4.31–10.16)
WBC #/AREA URNS AUTO: NORMAL /HPF

## 2024-02-23 PROCEDURE — 85025 COMPLETE CBC W/AUTO DIFF WBC: CPT | Performed by: INTERNAL MEDICINE

## 2024-02-23 PROCEDURE — 99213 OFFICE O/P EST LOW 20 MIN: CPT | Performed by: FAMILY MEDICINE

## 2024-02-23 PROCEDURE — 82570 ASSAY OF URINE CREATININE: CPT | Performed by: INTERNAL MEDICINE

## 2024-02-23 PROCEDURE — 80053 COMPREHEN METABOLIC PANEL: CPT | Performed by: INTERNAL MEDICINE

## 2024-02-23 PROCEDURE — 86140 C-REACTIVE PROTEIN: CPT | Performed by: INTERNAL MEDICINE

## 2024-02-23 PROCEDURE — 84156 ASSAY OF PROTEIN URINE: CPT | Performed by: INTERNAL MEDICINE

## 2024-02-23 PROCEDURE — 85652 RBC SED RATE AUTOMATED: CPT | Performed by: INTERNAL MEDICINE

## 2024-02-23 PROCEDURE — 81003 URINALYSIS AUTO W/O SCOPE: CPT

## 2024-02-23 PROCEDURE — 86160 COMPLEMENT ANTIGEN: CPT | Performed by: INTERNAL MEDICINE

## 2024-02-23 PROCEDURE — 82550 ASSAY OF CK (CPK): CPT | Performed by: INTERNAL MEDICINE

## 2024-02-23 PROCEDURE — 81001 URINALYSIS AUTO W/SCOPE: CPT | Performed by: INTERNAL MEDICINE

## 2024-02-23 PROCEDURE — 82085 ASSAY OF ALDOLASE: CPT | Performed by: INTERNAL MEDICINE

## 2024-02-23 PROCEDURE — 36415 COLL VENOUS BLD VENIPUNCTURE: CPT | Performed by: INTERNAL MEDICINE

## 2024-02-23 PROCEDURE — 86225 DNA ANTIBODY NATIVE: CPT | Performed by: INTERNAL MEDICINE

## 2024-02-23 RX ORDER — DEXTROAMPHETAMINE SACCHARATE, AMPHETAMINE ASPARTATE MONOHYDRATE, DEXTROAMPHETAMINE SULFATE AND AMPHETAMINE SULFATE 7.5; 7.5; 7.5; 7.5 MG/1; MG/1; MG/1; MG/1
30 CAPSULE, EXTENDED RELEASE ORAL EVERY MORNING
Qty: 90 CAPSULE | Refills: 0 | Status: SHIPPED | OUTPATIENT
Start: 2024-02-23

## 2024-02-23 RX ORDER — ALPRAZOLAM 0.25 MG/1
0.25 TABLET ORAL 2 TIMES DAILY PRN
Qty: 60 TABLET | Refills: 0 | Status: SHIPPED | OUTPATIENT
Start: 2024-02-23

## 2024-02-23 NOTE — PROGRESS NOTES
Name: Mimi Biggs      : 1955      MRN: [id number]  Encounter Provider: Patti Amaro DO  Encounter Date: 2024   Encounter department: [facility] PRIMARY [facility]    Assessment & Plan     1. Malaise and fatigue  -     US head neck soft tissue; Future; Expected date: 2024  -     UA w Reflex to Microscopic w Reflex to Culture; Future    2. Nausea  -     US head neck soft tissue; Future; Expected date: 2024    3. Night sweats  -     US head neck soft tissue; Future; Expected date: 2024  -     UA w Reflex to Microscopic w Reflex to Culture; Future    4. Poor appetite  -     US head neck soft tissue; Future; Expected date: 2024    5. Lymphadenopathy, cervical  -     US head neck soft tissue; Future; Expected date: 2024    6. 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 I recommend US. She also is getting worked up by her rheumatologist for these symptoms has has labs already ordered. She is going to get these labs done after our visit. I will add UA to her workup. Follow up pending results.     We did also discuss her current dose of adderall (30mg XL in AM, 20mg IR in the afternoon), and I recommended she decrease this. We will start with decreasing the afternoon dose to 10mg. I am concerned this is causing her decreased appetite.     She requested refills of Xanax and Adderal XR 30mg. Refills placed    Return for Next scheduled follow up.      Subjective      HPI  Chief Complaint   Patient presents with    sick visit     No appetite (doesn't feel well), clear discharge coming from rectal, spoke with gastro     Patient is a 69 yo female with PMH of Follicular Non-Hodgkin's Lymphoma, scleroderma, presents for overall feeling unwell.  She  has been in close contact with her gastroenterologist and her rheumatologist.  She has blood work that she is going to get today that her rheumatologist recommended.  She has been having intermittent issues with diarrhea, nausea, vomiting.  Her gastroenterologist recommended Metamucil and for the most part the diarrhea and vomiting have improved but she still occasionally feels nauseous.  The last week she reports feeling rundown, fatigued, weak, with generalized muscle aches and night sweats as well.  She reports her appetite is overall poor, but denies any weight loss.       Review of Systems   Constitutional:  Positive for appetite change, diaphoresis and fatigue. Negative for unexpected weight change.   Respiratory:  Negative for cough and shortness of breath.    Cardiovascular:  Negative for chest pain, palpitations and leg swelling.   Gastrointestinal:  Positive for abdominal pain, diarrhea, nausea and vomiting. Negative for constipation.   Skin:  Negative for color change and rash.   Neurological:  Positive for weakness.   Psychiatric/Behavioral:  The patient is nervous/anxious.        Current Outpatient Medications on File Prior to Visit   Medication Sig    ALPRAZolam (XANAX) 0.25 mg tablet Take 1 tablet (0.25 mg total) by mouth 2 (two) times a day as needed for anxiety    amphetamine-dextroamphetamine (ADDERALL XR, 30MG,) 30 MG 24 hr capsule Take 1 capsule (30 mg total) by mouth every morning Max Daily Amount: 30 mg    amphetamine-dextroamphetamine (ADDERALL, 10MG,) 10 mg tablet 20 mg every afternoon ~ 2 PM    buPROPion (WELLBUTRIN XL) 300 mg 24 hr tablet Take 1 tablet (300 mg total) by mouth every morning    calcium carbonate-vitamin D 250 mg-3.125 mcg tablet Take 2 tablets by mouth every evening    Cholecalciferol (Vitamin D3) 50 MCG (2000 UT) TABS TAKE 1 TABLET (2,000 UNITS TOTAL) BY MOUTH IN THE MORNING    esomeprazole (NexIUM) 40 MG capsule Take 1 capsule (40 mg total) by mouth every morning     fluticasone (FLONASE) 50 mcg/act nasal spray ADMINISTER 1 SPRAY INTO EACH NOSTRIL 2 (TWO) TIMES A DAY    folic acid (FOLVITE) 1 mg tablet TAKE 2 TABLETS (2 MG TOTAL) BY MOUTH DAILY    hydroxychloroquine (PLAQUENIL) 200 mg tablet Take 1 tablet (200 mg total) by mouth daily    levothyroxine 100 mcg tablet Take 1 tablet (100 mcg total) by mouth daily    lidocaine (XYLOCAINE) 5 % ointment     meloxicam (MOBIC) 15 mg tablet TAKE 1 TABLET (15 MG TOTAL) BY MOUTH DAILY    metroNIDAZOLE (METROGEL) 1 % gel Apply topically daily    Multiple Vitamin (MULTIVITAMIN) tablet Take 1 tablet by mouth daily    mycophenolate (MYFORTIC) 360 MG TBEC Take 2 in the morning and 1 at night    ondansetron (ZOFRAN) 4 mg tablet Take 1 tablet (4 mg total) by mouth every 8 (eight) hours as needed for nausea or vomiting    pregabalin (LYRICA) 75 mg capsule Take 1 capsule (75 mg total) by mouth 2 (two) times a day    traZODone (DESYREL) 150 mg tablet Take 1 tablet (150 mg total) by mouth daily at bedtime    valACYclovir (VALTREX) 1,000 mg tablet        Objective     /72 (BP Location: Right arm, Patient Position: Sitting, Cuff Size: Standard)   Pulse 82   Ht 5' (1.524 m)   Wt 45.6 kg (100 lb 8.5 oz)   SpO2 98%   BMI 19.63 kg/m²     Physical Exam  Vitals reviewed.   Constitutional:       General: She is not in acute distress.     Appearance: She is normal weight. She is not ill-appearing.   HENT:      Head: Normocephalic and atraumatic.      Nose: Nose normal.      Mouth/Throat:      Mouth: Mucous membranes are moist.      Pharynx: Oropharynx is clear.   Eyes:      Extraocular Movements: Extraocular movements intact.      Conjunctiva/sclera: Conjunctivae normal.   Cardiovascular:      Rate and Rhythm: Normal rate and regular rhythm.      Heart sounds: Normal heart sounds.   Pulmonary:      Effort: Pulmonary effort is normal.      Breath sounds: Normal breath sounds.   Abdominal:      General: Abdomen is flat. Bowel sounds are normal.       Palpations: Abdomen is soft.      Tenderness: There is no abdominal tenderness.   Musculoskeletal:      Cervical back: Neck supple. Tenderness present.   Lymphadenopathy:      Cervical: Cervical adenopathy present.   Neurological:      General: No focal deficit present.      Mental Status: She is alert.   Psychiatric:         Mood and Affect: Affect normal. Mood is anxious.         Behavior: Behavior normal.         Thought Content: Thought content normal.       Patti Amaro, DO

## 2024-02-24 LAB — DSDNA AB SER-ACNC: <1 IU/ML (ref 0–9)

## 2024-02-26 ENCOUNTER — HOSPITAL ENCOUNTER (OUTPATIENT)
Dept: ULTRASOUND IMAGING | Facility: HOSPITAL | Age: 69
Discharge: HOME | End: 2024-02-26
Payer: MEDICARE

## 2024-02-26 DIAGNOSIS — R61 NIGHT SWEATS: ICD-10-CM

## 2024-02-26 DIAGNOSIS — R63.0 POOR APPETITE: ICD-10-CM

## 2024-02-26 DIAGNOSIS — R53.83 MALAISE AND FATIGUE: ICD-10-CM

## 2024-02-26 DIAGNOSIS — R11.0 NAUSEA: ICD-10-CM

## 2024-02-26 DIAGNOSIS — R59.0 LYMPHADENOPATHY, CERVICAL: ICD-10-CM

## 2024-02-26 DIAGNOSIS — R53.81 MALAISE AND FATIGUE: ICD-10-CM

## 2024-02-26 LAB — ALDOLASE SERPL-CCNC: 2.4 U/L (ref 3.3–10.3)

## 2024-02-26 PROCEDURE — 76536 US EXAM OF HEAD AND NECK: CPT

## 2024-03-04 ENCOUNTER — TELEPHONE (OUTPATIENT)
Age: 69
End: 2024-03-04

## 2024-03-04 DIAGNOSIS — R61 NIGHT SWEATS: ICD-10-CM

## 2024-03-04 DIAGNOSIS — R53.81 MALAISE AND FATIGUE: Primary | ICD-10-CM

## 2024-03-04 DIAGNOSIS — R63.0 POOR APPETITE: ICD-10-CM

## 2024-03-04 DIAGNOSIS — R53.83 MALAISE AND FATIGUE: Primary | ICD-10-CM

## 2024-03-04 DIAGNOSIS — K11.1 ENLARGEMENT OF SUBMANDIBULAR GLAND: ICD-10-CM

## 2024-03-04 DIAGNOSIS — R59.0 SUBMANDIBULAR LYMPHADENOPATHY: ICD-10-CM

## 2024-03-04 NOTE — TELEPHONE ENCOUNTER
Patient called and would like provider to review her ultrasound of her neck and is requesting additional testing.

## 2024-03-05 ENCOUNTER — HOSPITAL ENCOUNTER (OUTPATIENT)
Dept: CT IMAGING | Facility: HOSPITAL | Age: 69
Discharge: HOME | End: 2024-03-05
Payer: MEDICARE

## 2024-03-05 DIAGNOSIS — R61 NIGHT SWEATS: ICD-10-CM

## 2024-03-05 DIAGNOSIS — R53.83 MALAISE AND FATIGUE: ICD-10-CM

## 2024-03-05 DIAGNOSIS — R63.0 POOR APPETITE: ICD-10-CM

## 2024-03-05 DIAGNOSIS — R59.0 SUBMANDIBULAR LYMPHADENOPATHY: ICD-10-CM

## 2024-03-05 DIAGNOSIS — R53.81 MALAISE AND FATIGUE: ICD-10-CM

## 2024-03-05 DIAGNOSIS — K11.1 ENLARGEMENT OF SUBMANDIBULAR GLAND: ICD-10-CM

## 2024-03-05 PROCEDURE — 70491 CT SOFT TISSUE NECK W/DYE: CPT

## 2024-03-05 RX ADMIN — IOHEXOL 85 ML: 350 INJECTION, SOLUTION INTRAVENOUS at 13:02

## 2024-03-08 ENCOUNTER — TELEPHONE (OUTPATIENT)
Age: 69
End: 2024-03-08

## 2024-03-08 NOTE — TELEPHONE ENCOUNTER
Pt calls in and states that she would like to speak to  in regards to blood work she has noticed some missing and has questions on others. Pt explains she has been sick and is being tested for cancer

## 2024-03-14 NOTE — TELEPHONE ENCOUNTER
Pt calls for update on lab results she has some questions also she is going to have PCP order a full panel again. Pt requesting call back

## 2024-03-15 ENCOUNTER — OFFICE VISIT (OUTPATIENT)
Dept: CARDIOLOGY CLINIC | Facility: HOSPITAL | Age: 69
End: 2024-03-15
Payer: MEDICARE

## 2024-03-15 VITALS
OXYGEN SATURATION: 97 % | HEART RATE: 83 BPM | SYSTOLIC BLOOD PRESSURE: 120 MMHG | BODY MASS INDEX: 19.63 KG/M2 | HEIGHT: 60 IN | DIASTOLIC BLOOD PRESSURE: 70 MMHG

## 2024-03-15 DIAGNOSIS — I73.9 PAD (PERIPHERAL ARTERY DISEASE) (HCC): Primary | ICD-10-CM

## 2024-03-15 DIAGNOSIS — M79.7 FIBROMYALGIA: ICD-10-CM

## 2024-03-15 DIAGNOSIS — M34.0 SCLERODERMA PROGRESSIVE (HCC): ICD-10-CM

## 2024-03-15 DIAGNOSIS — I70.90 ATHEROSCLEROSIS OF ARTERIES: ICD-10-CM

## 2024-03-15 DIAGNOSIS — I67.89 OTHER CEREBROVASCULAR DISEASE: ICD-10-CM

## 2024-03-15 DIAGNOSIS — G72.89 NECROTIZING MYOPATHY: ICD-10-CM

## 2024-03-15 DIAGNOSIS — R10.13 EPIGASTRIC PAIN: ICD-10-CM

## 2024-03-15 DIAGNOSIS — E78.49 OTHER HYPERLIPIDEMIA: ICD-10-CM

## 2024-03-15 PROCEDURE — 99203 OFFICE O/P NEW LOW 30 MIN: CPT | Performed by: INTERNAL MEDICINE

## 2024-03-15 PROCEDURE — 93000 ELECTROCARDIOGRAM COMPLETE: CPT | Performed by: INTERNAL MEDICINE

## 2024-03-15 NOTE — PATIENT INSTRUCTIONS
"   Therapeutic lifestyle changes were discussed with the patient- Specifically:  1.  Decreasing saturated fat intake to less than 13 g per day. I showed the pt how to look at a food label.  Watch intake of cheese, red meat, cream and butter containing foods  2.  Increase soluble fiber in the diet-beans, pears, oatmeal  3.  Weight loss of even 5-10 pounds will also help to lower cholesterol levels.  Decreasing caloric intake by about 100 lizzy a day-should lead to a weight loss of 1-2 pounds over one month  4.  Increase aerobic physical activity - ultimate goal of 150 min per week. Start low and increase level and duration of activity slowly  5. Google 'TLC Cholesterol\" = Your guide to lowering your cholesterol with TLC is probably the best online resource to lower your LDL cholesterol  6. I showed the patient how to look at a food label.      Any new medications-tell your pharmacist that you are on medications that can prolong the QT interval and ask if the new medication can prolong the QT interval as well  "

## 2024-03-15 NOTE — PROGRESS NOTES
Cardiology Consultation     Mimi Biggs  [id number]  1955  [facility] [address] [address]      There are no diagnoses linked to this encounter.    I had the pleasure of seeing Mimi Biggs for a consultation regarding a FH of vascular disease and atherosclerosis requested by     History of the Presenting Illness, Discussion/Summary and my Plan are as follows:::    Mimi is a pleasant 68-year-old without hypertension, diabetes, or tobacco use.  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  In addition she also has limited scleroderma, chronic fatigue/fibromyalgia.    Family history-mother in her 50s, dad  in his 60s of a massive heart attack, both parents were smokers, both of her brothers had coronary artery disease although not premature in onset, both of them were non-smokers.  Because of her family history, she underwent coronary angiography in  that did not demonstrate any obstructive disease.  In terms of ischemic evaluation she also had a nuclear stress test in  without ischemia and a coronary CTA in  that had a calcium score of 0 without any obstructive coronary artery disease.    She is mainly here for cardiac evaluation with her family history, also occasionally gets some epigastric pain.  However she is active in the gym, walks 2 miles, 3 miles on the elliptical, does free weights and machines without any limitations.    Sometimes she feels fatigued and sometimes she feels it is not from her rheumatologic issues.  In terms of diet, overall low in butter, cheese and meat, was having ice cream every night but has completely cut down now.    Medications-she  is not on any antihypertensive medications or dyslipidemic medications, she is on hydroxychloroquine, mycophenolate and trazodone for her rheumatologic issues.    She also had a neck CT recently that showed atherosclerosis also prompting this visit.    Cardiac exam is unremarkable.    Plan:    Family history of premature coronary disease, epigastric pain and fatigue: Will repeat a coronary CTA.  She will take metoprolol-25 mg 1 dose the night before and 1 dose the morning of the procedure to slow down her heart rates to get a good quality study.  In terms of ischemic evaluation, she did have a negative coronary CTA in 2021, negative nuclear stress test in 2019 and negative coronary angiography in 2012.    Dyslipidemia: Most recently LDL in the 150s, goal would be closer to 100, await next lipid profile in 3 months.  She is not a candidate for statins, she will explore if Zetia/ezetimibe is an option for her dyslipidemia.  Otherwise might be a candidate for Leqvio/inclisiran    Atherosclerosis in the neck: Incidentally detected while undergoing a neck CAT scan for pain, will check carotid Dopplers, of note she did have negative carotid Dopplers in 2022.    Rheumatologic issues: Including limited scleroderma and immune mediated necrotizing myopathy: She follows at [facility], she is on mycophenolate, hydroxychloroquine and trazodone  Trazodone and hydroxychloroquine can cause QT prolongation, her QT interval is normal at 423 corrected QT  Mycophenolate can cause elevated blood pressures, her blood pressures are normal. She is also on Adderall.  None of these should cause any dyslipidemia  No identifiable secondary causes with normal TSH, CMP, urinalysis      Follow-up in about 6 months     Latest Reference Range & Units 08/13/19 03:54 02/03/23 08:51 10/18/23 12:05 01/31/24 11:48   Cholesterol See Comment mg/dL 141 226 (H) 236 (H) 243 (H)   Triglycerides See Comment mg/dL 45 82 81 102   HDL >=50 mg/dL 68 (H) 68 66  72   Non-HDL Cholesterol mg/dl    171   LDL Calculated 0 - 100 mg/dL 64 142 (H) 154 (H) 151 (H)   APOLIPOPROTEIN B <90 mg/dL    115 (H)   (H): Data is abnormally high     Latest Reference Range & Units 09/06/23 14:07 10/18/23 12:05 01/31/24 11:48   TSH, POC 0.45 - 5.33 uIU/mL 0.02 (L) (E)     TSH 3RD GENERATON 0.450 - 4.500 uIU/mL  0.981 3.831   (L): Data is abnormally low  (E): External lab result  No diagnosis found.  Patient Active Problem List   Diagnosis    Bilateral hand numbness    Methotrexate, long term, current use    Scleroderma progressive (HCC)    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HCC)    BRCA gene mutation positive    Allergic rhinitis    Bartholin gland cyst    Carpal tunnel syndrome of left wrist    Carpal tunnel syndrome of right wrist    Dental disorder    Depression    Esophageal reflux disease    Fecal soiling    Fibromyalgia    Hearing loss    Heel spur    Hematuria    Herniated lumbar intervertebral disc    Hyperlipidemia    Lichen planus    Lumbar disc herniation    Osteoarthritis    Osteoarthritis of both knees    Osteopenia    Ovarian cyst    Pain in joint of left shoulder    Peripheral neuropathy    Post concussion syndrome    Radiculopathy, lumbar region    Raynaud's syndrome    Rectal prolapse    Sjogren's syndrome (HCC)    Thyroid cancer (HCC)    Vitamin D deficiency    Acquired hypothyroidism    Advanced care planning/counseling discussion    Chronic pain    Vasomotor rhinitis    Necrotizing myopathy    Pulmonary emphysema (HCC)    Hypogammaglobulinemia (HCC)    PAD (peripheral artery disease) (HCC)    High risk medication use    Immunocompromised state (HCC)    Age-related osteoporosis without current pathological fracture    Primary insomnia    Post-nasal drip    Peptic stricture of esophagus    Attention deficit hyperactivity disorder (ADHD), predominantly inattentive type    Rosacea    B12 deficiency     Past Medical History:   Diagnosis Date    ADHD     Allergic 3-2020    Bactrim    Anemia 2018     Latest blood work OK    Anxiety Since diagnoised 2003    Intermittent related to my life trying to move. My illness    Arthritis     Very bad especially Right knee    Cancer (HCC) 7-    Cut the Lymph Node Out & thyroid out    Cellulitis of foot     Last assessed 10/24/16    Change in mental status     Last assessed 12/01/15    Chronic fatigue     Depression 2003    I am on meds. Feel good    Disease of thyroid gland     Cancer Surgical Removal total Thyroid    Diverticulitis of colon Last Egd    Diverticlum    PLAZA (dyspnea on exertion)     Last assessed 12/01/15    Fibromyalgia     Folliculitis     Last assessed 10/06/14    GERD (gastroesophageal reflux disease)     Headache(784.0) 3-2020    In front of head    Inflammatory bowel disease 1990’s    Memory loss 2010    New disease neurogentic changes Necrotizing Myopathy    Non Hodgkin's lymphoma (HCC) 07/13/2012    Osteopenia     Osteoporosis 6-2019    Broke Pelvis -left and right pubic ramus,fractured Sacrum    Raynaud's disease     Scleroderma (HCC)     Systemic sclerosis (HCC)     Thyroid cancer (HCC) 10/04/2018    Visual impairment 2021    Need new glasses     Social History     Socioeconomic History    Marital status:      Spouse name: Not on file    Number of children: Not on file    Years of education: Not on file    Highest education level: Associate degree: occupational, technical, or vocational program   Occupational History    Not on file   Tobacco Use    Smoking status: Former     Current packs/day: 0.25     Average packs/day: 0.3 packs/day for 5.0 years (1.3 ttl pk-yrs)     Types: Cigarettes    Smokeless tobacco: Never    Tobacco comments:     I quit cold turkey when I learned 2nd hand smoke   Vaping Use    Vaping status: Never Used   Substance and Sexual Activity    Alcohol use: Yes     Comment: social    Drug use: No    Sexual activity: Not Currently     Birth control/protection: Abstinence     Comment: Had a hysterectomy    Other Topics Concern    Not on file   Social History Narrative    Not on file     Social Determinants of Health     Financial Resource Strain: Medium Risk (9/19/2023)    Overall Financial Resource Strain (CARDIA)     Difficulty of Paying Living Expenses: Somewhat hard   Food Insecurity: Food Insecurity Present (7/21/2021)    Hunger Vital Sign     Worried About Running Out of Food in the Last Year: Sometimes true     Ran Out of Food in the Last Year: Sometimes true   Transportation Needs: No Transportation Needs (9/19/2023)    PRAPARE - Transportation     Lack of Transportation (Medical): No     Lack of Transportation (Non-Medical): No   Physical Activity: Sufficiently Active (7/21/2021)    Exercise Vital Sign     Days of Exercise per Week: 3 days     Minutes of Exercise per Session: 60 min   Stress: No Stress Concern Present (7/21/2021)    [employer] - Occupational Stress Questionnaire     Feeling of Stress : Only a little   Social Connections: Socially Isolated (7/21/2021)    Social Connection and Isolation Panel [NHANES]     Frequency of Communication with Friends and Family: More than three times a week     Frequency of Social Gatherings with Friends and Family: Never     Attends Voodoo Services: Never     Active Member of Clubs or Organizations: No     Attends Club or Organization Meetings: Never     Marital Status:    Intimate Partner Violence: Not on file   Housing Stability: Low Risk  (7/21/2021)    Housing Stability Vital Sign     Unable to Pay for Housing in the Last Year: No     Number of Places Lived in the Last Year: 1     Unstable Housing in the Last Year: No      Family History   Problem Relation Age of Onset    Arthritis Mother     Diabetes Mother     Hyperlipidemia Mother         Passed 10- Kidney- Heart Disease    Thyroid disease Mother         Benign lump removed    Autoimmune disease Mother     Coronary artery disease Father     Hyperlipidemia  Father          Massive Heart Attack    Arthritis Sister     Asthma Sister     Crohn's disease Sister     Autoimmune disease Sister     Depression Sister     Hyperlipidemia Sister         Very  bad Chrons Disease    Autoimmune disease Sister     Depression Sister     Hyperlipidemia Sister         Heart disease -Lupus like-Thyroid issues    Thyroid disease Sister         Seeing Endrocologist dosage for thyroid    Arthritis Sister         Having many issues this year    Autoimmune disease Sister     No Known Problems Daughter     No Known Problems Maternal Grandmother     No Known Problems Maternal Grandfather     No Known Problems Paternal Grandmother     No Known Problems Paternal Grandfather     Other Brother         myocardial ischemia    Hyperlipidemia Brother         Heart Attacks,torn rotator and bicept . arthritis, knees    Autoimmune disease Brother     Hyperlipidemia Brother         Heart Attacks.[place],Arthrites    Autoimmune disease Brother     Ovarian cancer Maternal Aunt     Dementia Maternal Aunt             No Known Problems Maternal Aunt     No Known Problems Maternal Aunt     No Known Problems Maternal Aunt     No Known Problems Maternal Aunt     No Known Problems Maternal Aunt     No Known Problems Paternal Aunt     Breast cancer Neg Hx     Endometrial cancer Neg Hx     Colon cancer Neg Hx     Breast cancer additional onset Neg Hx     BRCA 1/2 Neg Hx     BRCA2 Positive Neg Hx     BRCA2 Negative Neg Hx     BRCA1 Positive Neg Hx     BRCA1 Negative Neg Hx      Past Surgical History:   Procedure Laterality Date    BACK SURGERY      BLADDER SURGERY      BONE MARROW BIOPSY      CARDIAC CATHETERIZATION      EYE SURGERY  1010    Correct angles in both eyes    HEMORRHOID SURGERY      HYSTERECTOMY  2004    KNEE SURGERY      LYMPH NODE BIOPSY  2020     Non Hodgkins Lymphoma    NASAL SEPTUM SURGERY      OOPHORECTOMY Bilateral 2004    ROTATOR CUFF REPAIR      SPINE  SURGERY  12-    Failed microdisectomy L5 S1    THYROIDECTOMY N/A 10/04/2018    Procedure: TOTAL THYROIDECTOMY;  Surgeon: Roger Ortiz MD;  Location: [facility];  Service: Surgical Oncology    TONSILLECTOMY      TOTAL THYROIDECTOMY         Current Outpatient Medications:     ALPRAZolam (XANAX) 0.25 mg tablet, Take 1 tablet (0.25 mg total) by mouth 2 (two) times a day as needed for anxiety, Disp: 60 tablet, Rfl: 0    amphetamine-dextroamphetamine (ADDERALL XR, 30MG,) 30 MG 24 hr capsule, Take 1 capsule (30 mg total) by mouth every morning Max Daily Amount: 30 mg, Disp: 90 capsule, Rfl: 0    amphetamine-dextroamphetamine (ADDERALL, 10MG,) 10 mg tablet, 20 mg every afternoon ~ 2 PM, Disp: 60 tablet, Rfl: 0    buPROPion (WELLBUTRIN XL) 300 mg 24 hr tablet, Take 1 tablet (300 mg total) by mouth every morning, Disp: 90 tablet, Rfl: 1    calcium carbonate-vitamin D 250 mg-3.125 mcg tablet, Take 2 tablets by mouth every evening, Disp: 180 tablet, Rfl: 0    Cholecalciferol (Vitamin D3) 50 MCG (2000 UT) TABS, TAKE 1 TABLET (2,000 UNITS TOTAL) BY MOUTH IN THE MORNING, Disp: 90 tablet, Rfl: 1    esomeprazole (NexIUM) 40 MG capsule, Take 1 capsule (40 mg total) by mouth every morning, Disp: 90 capsule, Rfl: 0    folic acid (FOLVITE) 1 mg tablet, TAKE 2 TABLETS (2 MG TOTAL) BY MOUTH DAILY, Disp: 180 tablet, Rfl: 2    hydroxychloroquine (PLAQUENIL) 200 mg tablet, Take 1 tablet (200 mg total) by mouth daily, Disp: 90 tablet, Rfl: 2    levothyroxine 100 mcg tablet, Take 1 tablet (100 mcg total) by mouth daily, Disp: 90 tablet, Rfl: 1    metroNIDAZOLE (METROGEL) 1 % gel, Apply topically daily, Disp: 45 g, Rfl: 0    Multiple Vitamin (MULTIVITAMIN) tablet, Take 1 tablet by mouth daily, Disp: , Rfl:     mycophenolate (MYFORTIC) 360 MG TBEC, Take 2 in the morning and 1 at night, Disp: 90 tablet, Rfl: 6    ondansetron (ZOFRAN) 4 mg tablet, Take 1 tablet (4 mg total) by mouth every 8 (eight) hours as needed for nausea or  vomiting, Disp: 20 tablet, Rfl: 0    pregabalin (LYRICA) 75 mg capsule, Take 1 capsule (75 mg total) by mouth 2 (two) times a day, Disp: 60 capsule, Rfl: 5    traZODone (DESYREL) 150 mg tablet, Take 1 tablet (150 mg total) by mouth daily at bedtime, Disp: 90 tablet, Rfl: 1    fluticasone (FLONASE) 50 mcg/act nasal spray, ADMINISTER 1 SPRAY INTO EACH NOSTRIL 2 (TWO) TIMES A DAY, Disp: 16 g, Rfl: 3    lidocaine (XYLOCAINE) 5 % ointment, , Disp: , Rfl:     meloxicam (MOBIC) 15 mg tablet, TAKE 1 TABLET (15 MG TOTAL) BY MOUTH DAILY (Patient not taking: Reported on 3/15/2024), Disp: 90 tablet, Rfl: 1    valACYclovir (VALTREX) 1,000 mg tablet, , Disp: , Rfl:   Allergies   Allergen Reactions    Duloxetine Other (See Comments)     Mental psychosis    Alendronate Myalgia, GI Intolerance and Nausea Only    Revatio [Sildenafil] Other (See Comments)     mental status changes    Savella [Milnacipran] Other (See Comments)     Feeling out of it    Sulfamethoxazole-Trimethoprim Rash and GI Intolerance    Tedizolid Rash     Vitals:    03/15/24 1258   BP: 120/70   Pulse: 83   SpO2: 97%   Height: 5' (1.524 m)         Imaging: CT soft tissue neck w contrast    Result Date: 3/11/2024  Narrative: CT NECK WITH CONTRAST INDICATION:   R53.81: Other malaise R53.83: Other fatigue R61: Generalized hyperhidrosis R63.0: Anorexia R59.0: Localized enlarged lymph nodes K11.1: Hypertrophy of salivary gland. History of thyroid cancer and lymphoma. COMPARISON: 2/3/2023; 2/26/2024 TECHNIQUE:  Axial, sagittal, and coronal 2D reformatted images were created from the axial source data and submitted for interpretation. Radiation dose length product (DLP) for this visit:  276 mGy-cm .  This examination, like all CT scans performed in the [facility], was performed utilizing techniques to minimize radiation dose exposure, including the use of iterative reconstruction and automated exposure control. IV Contrast:  85 mL of iohexol (OMNIPAQUE)  IMAGE QUALITY:  Diagnostic. FINDINGS: VISUALIZED BRAIN PARENCHYMA:  No acute intracranial pathology of the visualized brain parenchyma. VISUALIZED ORBITS: Normal visualized orbits. PARANASAL SINUSES: Normal visualized paranasal sinuses. NASAL CAVITY AND NASOPHARYNX:  Normal. SUPRAHYOID NECK:  Normal oral cavity, tongue base, tonsillar fossa and epiglottis. INFRAHYOID NECK:  Aryepiglottic folds and piriform sinuses are normal.  Normal glottis and subglottic airway. THYROID GLAND: Normal thyroid gland is not identified. PAROTID AND SUBMANDIBULAR GLANDS:  Normal. LYMPH NODES:  No pathologic or enlarged adenopathy. VASCULAR STRUCTURES: Atherosclerotic calcifications noted. THORACIC INLET:  Lung apices and upper mediastinum are unremarkable. BONY STRUCTURES: Anterior fusion hardware C5-C7 noted. Orthopedic hardware well seated within bone. Mild spondylotic changes.     Impression: No suspicious lymphadenopathy or mass. Workstation performed: [id number]     US head neck soft tissue    Result Date: 3/4/2024  Narrative: Head and neck ULTRASOUND INDICATION: R53.81: Other malaise R53.83: Other fatigue R11.0: Nausea R61: Generalized hyperhidrosis R63.0: Anorexia R59.0: Localized enlarged lymph nodes. Palpable abnormality near 2; suspected adenopathy; history of thyroid cancer and lymphoma COMPARISON: Study from 9/11/2023 of the thyroid bed; CT from 2/3/2023 of the neck TECHNIQUE: Real-time ultrasound of the submandibular region in the area of palpable concern on the right and left was performed with a linear transducer with both volumetric sweeps and still imaging techniques. FINDINGS: Adjacent to the right submandibular gland there is a 0.7 x 0.2 x 0.5 cm lymph node. A small echogenic center is demonstrated. There is an 0.8 x 0.3 x 0.9 cm node near the left submandibular gland with an echogenic center. The submandibular glands are homogeneous in symmetric in appearance although there may be some ductal prominence noted  bilaterally with each duct reaching approximately 2 mm. The prior CT of the neck did not show any obvious dilatation of the ducts although the ducts were barely visible closer to 1 mm in dimension.. This suggests a possible new finding.     Impression: Small lymph nodes are noted bilaterally in the submandibular region but only reaching 3 mm in short axis. These may not be palpable. There is mild prominence of the submandibular gland ducts. Correlation with tumor markers is advised as well as any salivary symptoms. Follow-up CT or MRI should be considered given the patient's history. The study was marked in EPIC for significant notification. Workstation performed: [id number]       Review of Systems:  Review of Systems   Constitutional:  Positive for fatigue. Negative for activity change, appetite change, chills, diaphoresis, fever and unexpected weight change.   Cardiovascular: Negative.         Epigastric pain, not with exertion   Genitourinary: Negative.    Musculoskeletal:  Positive for arthralgias.   Hematological: Negative.        Physical Exam:    /70   Pulse 83   Ht 5' (1.524 m)   SpO2 97%   BMI 19.63 kg/m²   Physical Exam  Constitutional:       General: She is not in acute distress.     Appearance: She is not ill-appearing, toxic-appearing or diaphoretic.   HENT:      Nose: Nose normal. No congestion or rhinorrhea.   Neck:      Vascular: No carotid bruit.   Cardiovascular:      Pulses: Normal pulses.      Heart sounds: No murmur heard.     No friction rub. No gallop.   Pulmonary:      Effort: Pulmonary effort is normal. No respiratory distress.      Breath sounds: No stridor. No wheezing or rhonchi.   Musculoskeletal:         General: No swelling, tenderness, deformity or signs of injury. Normal range of motion.      Cervical back: Normal range of motion. No rigidity or tenderness.   Lymphadenopathy:      Cervical: No cervical adenopathy.   Skin:     General: Skin is warm.      Coloration: Skin  "is not jaundiced or pale.      Findings: No bruising or erythema.   Neurological:      Mental Status: She is alert.            This note was completed in part utilizing [employer] direct voice recognition software.   Grammatical errors, random word insertion, spelling mistakes, occasional wrong word or \"sound-alike\" substitutions and incomplete sentences may be an occasional consequence of the system secondary to software limitations, ambient noise and hardware issues. At the time of dictation, efforts were made to edit, clarify and /or correct errors.  Please read the chart carefully and recognize, using context, where substitutions have occurred.  If you have any questions or concerns about the context, text or information contained within the body of this dictation, please contact myself, the provider, for further clarification.  "

## 2024-03-15 NOTE — LETTER
Consent: Aga Kim, who was seen by synchronous (real-time) audio-video technology, and/or his healthcare decision maker, is aware that this patient-initiated, Telehealth encounter on 4/17/2020 is a billable service, with coverage as determined by his insurance carrier. He is aware that he may receive a bill and has provided verbal consent to proceed: Yes. He was scheduled for my chart video visit, but as he doesn't have access to my chart , visit was converted to [address] [place] [address]:   Diagnoses and all orders for this visit:    1. Blepharitis of right upper eyelid, unspecified type  -     bacitracin-polymyxin b (POLYSPORIN) ophthalmic ointment; Apply over right upper eye lid two times  -     doxycycline (VIBRAMYCIN) 100 mg capsule; Take 1 Cap by mouth two (2) times a day for 10 days. 2. Vasomotor rhinitis  -     REFERRAL TO ENT-OTOLARYNGOLOGY                I spent at least 15 minutes with this established patient, and >50% of the time was spent counseling and/or coordinating care regarding eye care, diagnosis, treatment options, red flag symptoms  712  Subjective:   Aga Kim is a 75 y.o. male who was seen for Eye Pain (right eye pain for 1 1/2 week no OTC medication )    Eye  Aga Kim is a 75 y.o. male who presents for evaluation of redness and swelling of right upper eyelid. He has noticed the above symptoms in the right eye for 2 weeks. Onset was gradual.   Symptoms have included pain, discharge, erythema, swelling. Patient denies blurred vision, visual field deficit, photophobia. There is a history of allergies    He also has persistent watery nasal dripping. Has been prescribed several nasal spray, without any improvement    Prior to Admission medications    Medication Sig Start Date End Date Taking?  Authorizing Provider   bacitracin-polymyxin b (POLYSPORIN) ophthalmic ointment Apply over right upper eye lid two times 4/17/20  Yes Natali Lowe MD   doxycycline (VIBRAMYCIN) March 15, 2024       No Recipients    Patient: Mimi Biggs   YOB: 1955   Date of Visit: 3/15/2024       Dear Dr. Amaro:    Thank you for referring Mimi Biggs to me for evaluation. Below are my notes for this consultation.    If you have questions, please do not hesitate to call me. I look forward to following your patient along with you.         Sincerely,        Jessica Rendon MD        CC:   No Recipients    Jessica Rendon MD  3/15/2024  1:21 PM  Incomplete                                             Cardiology Consultation     Mimi Biggs  [id number]  1955  [facility] [address] [address]      There are no diagnoses linked to this encounter.    I had the pleasure of seeing Mimi Biggs for a consultation regarding a FH of vascular disease and atherosclerosis requested by     History of the Presenting Illness, Discussion/Summary and my Plan are as follows:::    ***     Latest Reference Range & Units 08/13/19 03:54 02/03/23 08:51 10/18/23 12:05 01/31/24 11:48   Cholesterol See Comment mg/dL 141 226 (H) 236 (H) 243 (H)   Triglycerides See Comment mg/dL 45 82 81 102   HDL >=50 mg/dL 68 (H) 68 66 72   Non-HDL Cholesterol mg/dl    171   LDL Calculated 0 - 100 mg/dL 64 142 (H) 154 (H) 151 (H)   APOLIPOPROTEIN B <90 mg/dL    115 (H)   (H): Data is abnormally high     Latest Reference Range & Units 09/06/23 14:07 10/18/23 12:05 01/31/24 11:48   TSH, POC 0.45 - 5.33 uIU/mL 0.02 (L) (E)     TSH 3RD GENERATON 0.450 - 4.500 uIU/mL  0.981 3.831   (L): Data is abnormally low  (E): External lab result  No diagnosis found.  Patient Active Problem List   Diagnosis   • Bilateral hand numbness   • Methotrexate, long term, current use   • Scleroderma progressive (HCC)   • 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HCC)   • BRCA gene mutation positive   • Allergic rhinitis   • Bartholin gland cyst   • Carpal tunnel syndrome  100 mg capsule Take 1 Cap by mouth two (2) times a day for 10 days. 4/17/20 4/27/20 Yes Jerrica Poole MD   metoprolol tartrate (LOPRESSOR) 100 mg IR tablet Take 1 Tab by mouth two (2) times a day. 4/14/20  Yes Meme Ramirez MD   Januvia 50 mg tablet Take 1 tablet by mouth once daily 3/25/20  Yes Jerrica Poole MD   glipiZIDE (GLUCOTROL) 10 mg tablet Take 1 tablet by mouth twice daily 3/25/20  Yes Jerrica Poole MD   metFORMIN (GLUCOPHAGE) 1,000 mg tablet TAKE 1 TABLET BY MOUTH TWICE DAILY WITH MEALS 3/19/20  Yes Jerrica Poole MD   ipratropium (ATROVENT) 42 mcg (0.06 %) nasal spray 2 Sprays by Both Nostrils route three (3) times daily. 10/24/19  Yes Jerrica Poole MD   traZODone (DESYREL) 50 mg tablet TAKE 1/2 (ONE-HALF) TABLET BY MOUTH NIGHTLY 9/24/19  Yes Jerrica Poole MD   atorvastatin (LIPITOR) 20 mg tablet Take 1 Tab by mouth daily. 5/31/19  Yes Meme Ramirez MD   candesartan (ATACAND) 32 mg tablet Take 1 Tab by mouth daily. 5/3/19  Yes Meme Ramirez MD   hydroCHLOROthiazide (HYDRODIURIL) 50 mg tablet Take 1 Tab by mouth daily (after breakfast). 5/3/19  Yes Meme Ramirez MD   aspirin 81 mg chewable tablet Take 1 Tab by mouth daily. 7/18/18  Yes Cedrick CASANOVA NP   cyanocobalamin (VITAMIN B12) 500 mcg tablet Take 1 Tab by mouth daily.  7/18/18  Yes Quinton Kelley NP   prednisoLONE acetate (PRED FORTE) 1 % ophthalmic suspension INSTILL 1 DROP INTO RIGHT EYE 4 TIMES DAILY 10/31/19   Provider, Historical     No Known Allergies    Patient Active Problem List    Diagnosis Date Noted    Hypercholesteremia 07/30/2017     Priority: 2 - Two    Statin intolerance 07/30/2017     Priority: 5 - Five    S/P CABG x 3 07/09/2018    Coronary artery disease involving native coronary artery of native heart without angina pectoris 07/30/2017    Hypertension, essential 07/30/2017    Colon cancer screening 05/30/2017    Nonrheumatic aortic valve stenosis 09/21/2016    Bruit of of left wrist   • Carpal tunnel syndrome of right wrist   • Dental disorder   • Depression   • Esophageal reflux disease   • Fecal soiling   • Fibromyalgia   • Hearing loss   • Heel spur   • Hematuria   • Herniated lumbar intervertebral disc   • Hyperlipidemia   • Lichen planus   • Lumbar disc herniation   • Osteoarthritis   • Osteoarthritis of both knees   • Osteopenia   • Ovarian cyst   • Pain in joint of left shoulder   • Peripheral neuropathy   • Post concussion syndrome   • Radiculopathy, lumbar region   • Raynaud's syndrome   • Rectal prolapse   • Sjogren's syndrome (HCC)   • Thyroid cancer ([facility])   • Vitamin D deficiency   • Acquired hypothyroidism   • Advanced care planning/counseling discussion   • Chronic pain   • Vasomotor rhinitis   • Necrotizing myopathy   • Pulmonary emphysema (HCC)   • Hypogammaglobulinemia (HCC)   • PAD (peripheral artery disease) ([facility])   • High risk medication use   • Immunocompromised state ([facility])   • Age-related osteoporosis without current pathological fracture   • Primary insomnia   • Post-nasal drip   • Peptic stricture of esophagus   • Attention deficit hyperactivity disorder (ADHD), predominantly inattentive type   • Rosacea   • B12 deficiency     Past Medical History:   Diagnosis Date   • ADHD    • Allergic 3-2020    Bactrim   • Anemia 2018    Latest blood work OK   • Anxiety Since diagnoised 2003    Intermittent related to my life trying to move. My illness   • Arthritis     Very bad especially Right knee   • Cancer ([facility]) 7-    Cut the Lymph Node Out & thyroid out   • Cellulitis of foot     Last assessed 10/24/16   • Change in mental status     Last assessed 12/01/15   • Chronic fatigue    • Depression 2003    I am on meds. Feel good   • Disease of thyroid gland     Cancer Surgical Removal total Thyroid   • Diverticulitis of colon Last Egd    Diverticlum   • PLAZA (dyspnea on exertion)     Last assessed 12/01/15   • Fibromyalgia    • Folliculitis     Last assessed  10/06/14   • GERD (gastroesophageal reflux disease)    • Headache(784.0) 3-2020    In front of head   • Inflammatory bowel disease 1990’s   • Memory loss 2010    New disease neurogentic changes Necrotizing Myopathy   • Non Hodgkin's lymphoma (HCC) 07/13/2012   • Osteopenia    • Osteoporosis 6-2019    Broke Pelvis -left and right pubic ramus,fractured Sacrum   • Raynaud's disease    • Scleroderma (HCC)    • Systemic sclerosis (HCC)    • Thyroid cancer (HCC) 10/04/2018   • Visual impairment 2021    Need new glasses     Social History     Socioeconomic History   • Marital status:      Spouse name: Not on file   • Number of children: Not on file   • Years of education: Not on file   • Highest education level: Associate degree: occupational, technical, or vocational program   Occupational History   • Not on file   Tobacco Use   • Smoking status: Former     Current packs/day: 0.25     Average packs/day: 0.3 packs/day for 5.0 years (1.3 ttl pk-yrs)     Types: Cigarettes   • Smokeless tobacco: Never   • Tobacco comments:     I quit cold turkey when I learned 2nd hand smoke   Vaping Use   • Vaping status: Never Used   Substance and Sexual Activity   • Alcohol use: Yes     Comment: social   • Drug use: No   • Sexual activity: Not Currently     Birth control/protection: Abstinence     Comment: Had a hysterectomy   Other Topics Concern   • Not on file   Social History Narrative   • Not on file     Social Determinants of Health     Financial Resource Strain: Medium Risk (9/19/2023)    Overall Financial Resource Strain (CARDIA)    • Difficulty of Paying Living Expenses: Somewhat hard   Food Insecurity: Food Insecurity Present (7/21/2021)    Hunger Vital Sign    • Worried About Running Out of Food in the Last Year: Sometimes true    • Ran Out of Food in the Last Year: Sometimes true   Transportation Needs: No Transportation Needs (9/19/2023)    PRAPARE - Transportation    • Lack of Transportation (Medical): No    • Lack of  right carotid artery 09/21/2016    Type 2 diabetes mellitus with hyperglycemia ([facility] [address] [facility] 05/26/2015    Deafness 10/28/2012    Cramp of limb 03/26/2012     Current Outpatient Medications   Medication Sig Dispense Refill    bacitracin-polymyxin b (POLYSPORIN) ophthalmic ointment Apply over right upper eye lid two times 1 Tube 1    doxycycline (VIBRAMYCIN) 100 mg capsule Take 1 Cap by mouth two (2) times a day for 10 days. 20 Cap 0    metoprolol tartrate (LOPRESSOR) 100 mg IR tablet Take 1 Tab by mouth two (2) times a day. 180 Tab 3    Januvia 50 mg tablet Take 1 tablet by mouth once daily 30 Tab 0    glipiZIDE (GLUCOTROL) 10 mg tablet Take 1 tablet by mouth twice daily 60 Tab 0    metFORMIN (GLUCOPHAGE) 1,000 mg tablet TAKE 1 TABLET BY MOUTH TWICE DAILY WITH MEALS 180 Tab 0    ipratropium (ATROVENT) 42 mcg (0.06 %) nasal spray 2 Sprays by Both Nostrils route three (3) times daily. 15 mL 0    traZODone (DESYREL) 50 mg tablet TAKE 1/2 (ONE-HALF) TABLET BY MOUTH NIGHTLY 45 Tab 1    atorvastatin (LIPITOR) 20 mg tablet Take 1 Tab by mouth daily. 90 Tab 3    candesartan (ATACAND) 32 mg tablet Take 1 Tab by mouth daily. 90 Tab 3    hydroCHLOROthiazide (HYDRODIURIL) 50 mg tablet Take 1 Tab by mouth daily (after breakfast). 90 Tab 3    aspirin 81 mg chewable tablet Take 1 Tab by mouth daily. 365 Tab 0    cyanocobalamin (VITAMIN B12) 500 mcg tablet Take 1 Tab by mouth daily. 30 Tab 1    prednisoLONE acetate (PRED FORTE) 1 % ophthalmic suspension INSTILL 1 DROP INTO RIGHT EYE 4 TIMES DAILY  0     No Known Allergies  Past Medical History:   Diagnosis Date    CAD (coronary artery disease) 11/10/2016    NSTEMI & 2 stents    Deafness 10/28/2012    [facility]di[facility]     Elevated cholesterol     Hypertension     NSTEMI (non-ST elevated myocardial infarction) ([facility] 11/10/2016       Review of Systems   Constitutional: Negative for chills, fever and malaise/fatigue. HENT: Positive for congestion.  Negative Transportation (Non-Medical): No   Physical Activity: Sufficiently Active (2021)    Exercise Vital Sign    • Days of Exercise per Week: 3 days    • Minutes of Exercise per Session: 60 min   Stress: No Stress Concern Present (2021)    [place] [employer] - Occupational Stress Questionnaire    • Feeling of Stress : Only a little   Social Connections: Socially Isolated (2021)    Social Connection and Isolation Panel [NHANES]    • Frequency of Communication with Friends and Family: More than three times a week    • Frequency of Social Gatherings with Friends and Family: Never    • Attends Judaism Services: Never    • Active Member of Clubs or Organizations: No    • Attends Club or Organization Meetings: Never    • Marital Status:    Intimate Partner Violence: Not on file   Housing Stability: Low Risk  (2021)    Housing Stability Vital Sign    • Unable to Pay for Housing in the Last Year: No    • Number of Places Lived in the Last Year: 1    • Unstable Housing in the Last Year: No      Family History   Problem Relation Age of Onset   • Arthritis Mother    • Diabetes Mother    • Hyperlipidemia Mother         Passed 10- Kidney- Heart Disease   • Thyroid disease Mother         Benign lump removed   • Autoimmune disease Mother    • Coronary artery disease Father    • Hyperlipidemia Father          Massive Heart Attack   • Arthritis Sister    • Asthma Sister    • Crohn's disease Sister    • Autoimmune disease Sister    • Depression Sister    • Hyperlipidemia Sister         Very  bad Chrons Disease   • Autoimmune disease Sister    • Depression Sister    • Hyperlipidemia Sister         Heart disease -Lupus like-Thyroid issues   • Thyroid disease Sister         Seeing Endrocologist dosage for thyroid   • Arthritis Sister         Having many issues this year   • Autoimmune disease Sister    • No Known Problems Daughter    • No Known Problems Maternal Grandmother    •  for ear pain, sore throat and tinnitus. Watery nasal discharge   Eyes: Positive for pain, discharge and redness. Negative for blurred vision and double vision. Right upper eye lid swelling   Respiratory: Negative for cough, shortness of breath and wheezing. Cardiovascular: Negative for chest pain, palpitations and leg swelling. Gastrointestinal: Negative for abdominal pain, blood in stool, constipation, diarrhea, nausea and vomiting. Genitourinary: Negative for dysuria, frequency, hematuria and urgency. Musculoskeletal: Negative for back pain, joint pain and myalgias. Skin: Negative for rash. Neurological: Negative for dizziness, tremors, seizures and headaches. Endo/Heme/Allergies: Negative for polydipsia. Does not bruise/bleed easily. Psychiatric/Behavioral: Negative for depression and substance abuse. The patient is not nervous/anxious. Objective:   Vital Signs: (As obtained by patient/caregiver at home)  There were no vitals taken for this visit.      [INSTRUCTIONS:  \"[x]\" Indicates a positive item  \"[]\" Indicates a negative item  -- DELETE ALL ITEMS NOT EXAMINED]    Constitutional: [x] Appears well-developed and well-nourished [x] No apparent distress      [] Abnormal -     Mental status: [x] Alert and awake  [x] Oriented to person/place/time [x] Able to follow commands    [] Abnormal -     Eyes:   EOM    [x]  Normal    [] Abnormal -   Sclera  [x]  Normal    [] Abnormal -          Discharge []  None visible   [x] Abnormal -   Boggy,erythematous swelling of right upper eye lid, with visible crusting on lid  HENT: [x] Normocephalic, atraumatic  [] Abnormal -   [x] Mouth/Throat: Mucous membranes are moist    External Ears [x] Normal  [] Abnormal -    Neck: [x] No visualized mass [] Abnormal -     Pulmonary/Chest: [x] Respiratory effort normal   [x] No visualized signs of difficulty breathing or respiratory distress        [] Abnormal -      Musculoskeletal:   [x] Normal No Known Problems Maternal Grandfather    • No Known Problems Paternal Grandmother    • No Known Problems Paternal Grandfather    • Other Brother         myocardial ischemia   • Hyperlipidemia Brother         Heart Attacks,torn rotator and bicept . arthritis, knees   • Autoimmune disease Brother    • Hyperlipidemia Brother         Heart Attacks.[place]th[place]   • Autoimmune disease Brother    • Ovarian cancer Maternal Aunt    • Dementia Maternal Aunt            • No Known Problems Maternal Aunt    • No Known Problems Maternal Aunt    • No Known Problems Maternal Aunt    • No Known Problems Maternal Aunt    • No Known Problems Maternal Aunt    • No Known Problems Paternal Aunt    • Breast cancer Neg Hx    • Endometrial cancer Neg Hx    • Colon cancer Neg Hx    • Breast cancer additional onset Neg Hx    • BRCA 1/2 Neg Hx    • BRCA2 Positive Neg Hx    • BRCA2 Negative Neg Hx    • BRCA1 Positive Neg Hx    • BRCA1 Negative Neg Hx      Past Surgical History:   Procedure Laterality Date   • BACK SURGERY     • BLADDER SURGERY     • BONE MARROW BIOPSY     • CARDIAC CATHETERIZATION     • EYE SURGERY  1010    Correct angles in both eyes   • HEMORRHOID SURGERY     • HYSTERECTOMY  2004   • KNEE SURGERY     • LYMPH NODE BIOPSY  2020     Non Hodgkins Lymphoma   • NASAL SEPTUM SURGERY     • OOPHORECTOMY Bilateral 2004   • ROTATOR CUFF REPAIR     • SPINE SURGERY  2016    Failed microdisectomy L5 S1   • THYROIDECTOMY N/A 10/04/2018    Procedure: TOTAL THYROIDECTOMY;  Surgeon: Roger Ortiz MD;  Location: [facility];  Service: Surgical Oncology   • TONSILLECTOMY     • TOTAL THYROIDECTOMY         Current Outpatient Medications:   •  ALPRAZolam (XANAX) 0.25 mg tablet, Take 1 tablet (0.25 mg total) by mouth 2 (two) times a day as needed for anxiety, Disp: 60 tablet, Rfl: 0  •  amphetamine-dextroamphetamine (ADDERALL XR, 30MG,) 30 MG 24 hr capsule, Take 1 capsule (30 mg total) by mouth  gait with no signs of ataxia         [x] Normal range of motion of neck        [] Abnormal -     Neurological:        [x] No Facial Asymmetry (Cranial nerve 7 motor function) (limited exam due to video visit)          [x] No gaze palsy        [] Abnormal -          Skin:        [x] No significant exanthematous lesions or discoloration noted on facial skin         [] Abnormal -            Psychiatric:       [x] Normal Affect [] Abnormal -        [x] No Hallucinations    Other pertinent observable physical exam findings:-        We discussed the expected course, resolution and complications of the diagnosis(es) in detail. Medication risks, benefits, costs, interactions, and alternatives were discussed as indicated. I advised him to contact the office if his condition worsens, changes or fails to improve as anticipated. He expressed understanding with the diagnosis(es) and plan. Peterson Calvillo is a 75 y.o. male being evaluated by a video visit encounter for concerns as above. A caregiver was present when appropriate. Due to this being a TeleHealth encounter (During [facility]- public health emergency), evaluation of the following organ systems was limited: Vitals/Constitutional/EENT/Resp/CV/GI//MS/Neuro/Skin/Heme-Lymph-Imm. Pursuant to the emergency declaration under the [facility] waiver authority and the [employer] and [employer] Act, this Virtual  Visit was conducted, with patient's (and/or legal guardian's) consent, to reduce the patient's risk of exposure to COVID-19 and provide necessary medical care. Services were provided through a video synchronous discussion virtually to substitute for in-person clinic visit.   This service was provided through telehealth, between patient Peterson Calvillo participating from [place] and Joe Strauss MD from [address] [place] [address] St JANICE MD every morning Max Daily Amount: 30 mg, Disp: 90 capsule, Rfl: 0  •  amphetamine-dextroamphetamine (ADDERALL, 10MG,) 10 mg tablet, 20 mg every afternoon ~ 2 PM, Disp: 60 tablet, Rfl: 0  •  buPROPion (WELLBUTRIN XL) 300 mg 24 hr tablet, Take 1 tablet (300 mg total) by mouth every morning, Disp: 90 tablet, Rfl: 1  •  calcium carbonate-vitamin D 250 mg-3.125 mcg tablet, Take 2 tablets by mouth every evening, Disp: 180 tablet, Rfl: 0  •  Cholecalciferol (Vitamin D3) 50 MCG (2000 UT) TABS, TAKE 1 TABLET (2,000 UNITS TOTAL) BY MOUTH IN THE MORNING, Disp: 90 tablet, Rfl: 1  •  esomeprazole (NexIUM) 40 MG capsule, Take 1 capsule (40 mg total) by mouth every morning, Disp: 90 capsule, Rfl: 0  •  folic acid (FOLVITE) 1 mg tablet, TAKE 2 TABLETS (2 MG TOTAL) BY MOUTH DAILY, Disp: 180 tablet, Rfl: 2  •  hydroxychloroquine (PLAQUENIL) 200 mg tablet, Take 1 tablet (200 mg total) by mouth daily, Disp: 90 tablet, Rfl: 2  •  levothyroxine 100 mcg tablet, Take 1 tablet (100 mcg total) by mouth daily, Disp: 90 tablet, Rfl: 1  •  metroNIDAZOLE (METROGEL) 1 % gel, Apply topically daily, Disp: 45 g, Rfl: 0  •  Multiple Vitamin (MULTIVITAMIN) tablet, Take 1 tablet by mouth daily, Disp: , Rfl:   •  mycophenolate (MYFORTIC) 360 MG TBEC, Take 2 in the morning and 1 at night, Disp: 90 tablet, Rfl: 6  •  ondansetron (ZOFRAN) 4 mg tablet, Take 1 tablet (4 mg total) by mouth every 8 (eight) hours as needed for nausea or vomiting, Disp: 20 tablet, Rfl: 0  •  pregabalin (LYRICA) 75 mg capsule, Take 1 capsule (75 mg total) by mouth 2 (two) times a day, Disp: 60 capsule, Rfl: 5  •  traZODone (DESYREL) 150 mg tablet, Take 1 tablet (150 mg total) by mouth daily at bedtime, Disp: 90 tablet, Rfl: 1  •  fluticasone (FLONASE) 50 mcg/act nasal spray, ADMINISTER 1 SPRAY INTO EACH NOSTRIL 2 (TWO) TIMES A DAY, Disp: 16 g, Rfl: 3  •  lidocaine (XYLOCAINE) 5 % ointment, , Disp: , Rfl:   •  meloxicam (MOBIC) 15 mg tablet, TAKE 1 TABLET (15 MG TOTAL) BY MOUTH  DAILY (Patient not taking: Reported on 3/15/2024), Disp: 90 tablet, Rfl: 1  •  valACYclovir (VALTREX) 1,000 mg tablet, , Disp: , Rfl:   Allergies   Allergen Reactions   • Duloxetine Other (See Comments)     Mental psychosis   • Alendronate Myalgia, GI Intolerance and Nausea Only   • Revatio [Sildenafil] Other (See Comments)     mental status changes   • Savella [Milnacipran] Other (See Comments)     Feeling out of it   • Sulfamethoxazole-Trimethoprim Rash and GI Intolerance   • Tedizolid Rash     Vitals:    03/15/24 1258   BP: 120/70   Pulse: 83   SpO2: 97%   Height: 5' (1.524 m)         Imaging: CT soft tissue neck w contrast    Result Date: 3/11/2024  Narrative: CT NECK WITH CONTRAST INDICATION:   R53.81: Other malaise R53.83: Other fatigue R61: Generalized hyperhidrosis R63.0: Anorexia R59.0: Localized enlarged lymph nodes K11.1: Hypertrophy of salivary gland. History of thyroid cancer and lymphoma. COMPARISON: 2/3/2023; 2/26/2024 TECHNIQUE:  Axial, sagittal, and coronal 2D reformatted images were created from the axial source data and submitted for interpretation. Radiation dose length product (DLP) for this visit:  276 mGy-cm .  This examination, like all CT scans performed in the [facility], was performed utilizing techniques to minimize radiation dose exposure, including the use of iterative reconstruction and automated exposure control. IV Contrast:  85 mL of iohexol (OMNIPAQUE) IMAGE QUALITY:  Diagnostic. FINDINGS: VISUALIZED BRAIN PARENCHYMA:  No acute intracranial pathology of the visualized brain parenchyma. VISUALIZED ORBITS: Normal visualized orbits. PARANASAL SINUSES: Normal visualized paranasal sinuses. NASAL CAVITY AND NASOPHARYNX:  Normal. SUPRAHYOID NECK:  Normal oral cavity, tongue base, tonsillar fossa and epiglottis. INFRAHYOID NECK:  Aryepiglottic folds and piriform sinuses are normal.  Normal glottis and subglottic airway. THYROID GLAND: Normal thyroid gland is not  identified. PAROTID AND SUBMANDIBULAR GLANDS:  Normal. LYMPH NODES:  No pathologic or enlarged adenopathy. VASCULAR STRUCTURES: Atherosclerotic calcifications noted. THORACIC INLET:  Lung apices and upper mediastinum are unremarkable. BONY STRUCTURES: Anterior fusion hardware C5-C7 noted. Orthopedic hardware well seated within bone. Mild spondylotic changes.     Impression: No suspicious lymphadenopathy or mass. Workstation performed: [id number]     US head neck soft tissue    Result Date: 3/4/2024  Narrative: Head and neck ULTRASOUND INDICATION: R53.81: Other malaise R53.83: Other fatigue R11.0: Nausea R61: Generalized hyperhidrosis R63.0: Anorexia R59.0: Localized enlarged lymph nodes. Palpable abnormality near 2; suspected adenopathy; history of thyroid cancer and lymphoma COMPARISON: Study from 9/11/2023 of the thyroid bed; CT from 2/3/2023 of the neck TECHNIQUE: Real-time ultrasound of the submandibular region in the area of palpable concern on the right and left was performed with a linear transducer with both volumetric sweeps and still imaging techniques. FINDINGS: Adjacent to the right submandibular gland there is a 0.7 x 0.2 x 0.5 cm lymph node. A small echogenic center is demonstrated. There is an 0.8 x 0.3 x 0.9 cm node near the left submandibular gland with an echogenic center. The submandibular glands are homogeneous in symmetric in appearance although there may be some ductal prominence noted bilaterally with each duct reaching approximately 2 mm. The prior CT of the neck did not show any obvious dilatation of the ducts although the ducts were barely visible closer to 1 mm in dimension.. This suggests a possible new finding.     Impression: Small lymph nodes are noted bilaterally in the submandibular region but only reaching 3 mm in short axis. These may not be palpable. There is mild prominence of the submandibular gland ducts. Correlation with tumor markers is advised as well as any salivary  "symptoms. Follow-up CT or MRI should be considered given the patient's history. The study was marked in EPIC for significant notification. Workstation performed: [id number]       Review of Systems:  Review of Systems   Constitutional:  Positive for fatigue. Negative for activity change, appetite change, chills, diaphoresis, fever and unexpected weight change.   Cardiovascular: Negative.    Genitourinary: Negative.    Musculoskeletal:  Positive for arthralgias.   Hematological: Negative.        Physical Exam:    /70   Pulse 83   Ht 5' (1.524 m)   SpO2 97%   BMI 19.63 kg/m²   Physical Exam  Constitutional:       General: She is not in acute distress.     Appearance: She is not ill-appearing, toxic-appearing or diaphoretic.   HENT:      Nose: Nose normal. No congestion or rhinorrhea.   Neck:      Vascular: No carotid bruit.   Cardiovascular:      Pulses: Normal pulses.      Heart sounds: No murmur heard.     No friction rub. No gallop.   Pulmonary:      Effort: Pulmonary effort is normal. No respiratory distress.      Breath sounds: No stridor. No wheezing or rhonchi.   Musculoskeletal:         General: No swelling, tenderness, deformity or signs of injury. Normal range of motion.      Cervical back: Normal range of motion. No rigidity or tenderness.   Lymphadenopathy:      Cervical: No cervical adenopathy.   Skin:     General: Skin is warm.      Coloration: Skin is not jaundiced or pale.      Findings: No bruising or erythema.   Neurological:      Mental Status: She is alert.            This note was completed in part utilizing [id number] direct voice recognition software.   Grammatical errors, random word insertion, spelling mistakes, occasional wrong word or \"sound-alike\" substitutions and incomplete sentences may be an occasional consequence of the system secondary to software limitations, ambient noise and hardware issues. At the time of dictation, efforts were made to edit, clarify and /or " correct errors.  Please read the chart carefully and recognize, using context, where substitutions have occurred.  If you have any questions or concerns about the context, text or information contained within the body of this dictation, please contact myself, the provider, for further clarification.

## 2024-03-15 NOTE — LETTER
March 15, 2024     Sindy Barker MD  [address] [address]    Patient: Mimi Biggs   YOB: 1955   Date of Visit: 3/15/2024       Dear Dr. Barker:    Thank you for referring Mimi Biggs to me for evaluation. Below are my notes for this consultation.    If you have questions, please do not hesitate to call me. I look forward to following your patient along with you.         Sincerely,        Jessica Rendon MD        CC: MD Patti Leblanc DO Sobhan Kodali, MD  3/15/2024  1:43 PM  Incomplete                                             Cardiology Consultation     Mimi Biggs  [id number]  1955  [facility] [address] [id number]      There are no diagnoses linked to this encounter.    I had the pleasure of seeing Mimi Biggs for a consultation regarding a FH of vascular disease and atherosclerosis requested by     History of the Presenting Illness, Discussion/Summary and my Plan are as follows:::    Mimi is a pleasant 68-year-old without hypertension, diabetes, or tobacco use.  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  In addition she also has limited scleroderma, chronic fatigue/fibromyalgia.    Family history-mother in her 50s, dad  in his 60s of a massive heart attack, both parents were smokers, both of her brothers had coronary artery disease although not premature in onset, both of them were non-smokers.  Because of her family history, she underwent coronary angiography in  that did not demonstrate any obstructive disease.  In terms of ischemic evaluation she also had a nuclear stress test in 2019 without ischemia and  a coronary CTA in 2021 that had a calcium score of 0 without any obstructive coronary artery disease.    She is mainly here for cardiac evaluation with her family history, also occasionally gets some epigastric pain.  However she is active in the gym, walks 2 miles, 3 miles on the elliptical, does free weights and machines without any limitations.    Sometimes she feels fatigued and sometimes she feels it is not from her rheumatologic issues.  In terms of diet, overall low in butter, cheese and meat, was having ice cream every night but has completely cut down now.    Medications-she is not on any antihypertensive medications or dyslipidemic medications, she is on hydroxychloroquine, mycophenolate and trazodone for her rheumatologic issues.    She also had a neck CT recently that showed atherosclerosis also prompting this visit.    Cardiac exam is unremarkable.    Plan:    Family history of premature coronary disease, epigastric pain and fatigue: Will repeat a coronary CTA.  She will take metoprolol-25 mg 1 dose the night before and 1 dose the morning of the procedure to slow down her heart rates to get a good quality study.  In terms of ischemic evaluation, she did have a negative coronary CTA in 2021, negative nuclear stress test in 2019 and negative coronary angiography in 2012.    Dyslipidemia: Most recently LDL in the 150s, goal would be closer to 100, await next lipid profile in 3 months.  She is not a candidate for statins, she will explore if Zetia/ezetimibe is an option for her dyslipidemia.  Otherwise might be a candidate for Leqvio/inclisiran    Atherosclerosis in the neck: Incidentally detected while undergoing a neck CAT scan for pain, will check carotid Dopplers, of note she did have negative carotid Dopplers in 2022.    Rheumatologic issues: Including limited scleroderma and immune mediated necrotizing myopathy: She follows at [facility], she is on mycophenolate, hydroxychloroquine and  trazodone  Trazodone and hydroxychloroquine can cause QT prolongation, her QT interval is normal at 423 corrected QT  Mycophenolate can cause elevated blood pressures, her blood pressures are normal. She is also on Adderall.  None of these should cause any dyslipidemia  No identifiable secondary causes with normal TSH, CMP, urinalysis      Follow-up in about 6 months     Latest Reference Range & Units 08/13/19 03:54 02/03/23 08:51 10/18/23 12:05 01/31/24 11:48   Cholesterol See Comment mg/dL 141 226 (H) 236 (H) 243 (H)   Triglycerides See Comment mg/dL 45 82 81 102   HDL >=50 mg/dL 68 (H) 68 66 72   Non-HDL Cholesterol mg/dl    171   LDL Calculated 0 - 100 mg/dL 64 142 (H) 154 (H) 151 (H)   APOLIPOPROTEIN B <90 mg/dL    115 (H)   (H): Data is abnormally high     Latest Reference Range & Units 09/06/23 14:07 10/18/23 12:05 01/31/24 11:48   TSH, POC 0.45 - 5.33 uIU/mL 0.02 (L) (E)     TSH 3RD GENERATON 0.450 - 4.500 uIU/mL  0.981 3.831   (L): Data is abnormally low  (E): External lab result  No diagnosis found.  Patient Active Problem List   Diagnosis   • Bilateral hand numbness   • Methotrexate, long term, current use   • Scleroderma progressive (HCC)   • 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HCC)   • BRCA gene mutation positive   • Allergic rhinitis   • Bartholin gland cyst   • Carpal tunnel syndrome of left wrist   • Carpal tunnel syndrome of right wrist   • Dental disorder   • Depression   • Esophageal reflux disease   • Fecal soiling   • Fibromyalgia   • Hearing loss   • Heel spur   • Hematuria   • Herniated lumbar intervertebral disc   • Hyperlipidemia   • Lichen planus   • Lumbar disc herniation   • Osteoarthritis   • Osteoarthritis of both knees   • Osteopenia   • Ovarian cyst   • Pain in joint of left shoulder   • Peripheral neuropathy   • Post concussion syndrome   • Radiculopathy, lumbar region   • Raynaud's syndrome   • Rectal prolapse   • Sjogren's syndrome (HCC)   • Thyroid cancer (HCC)   • Vitamin D deficiency   •  Acquired hypothyroidism   • Advanced care planning/counseling discussion   • Chronic pain   • Vasomotor rhinitis   • Necrotizing myopathy   • Pulmonary emphysema (HCC)   • Hypogammaglobulinemia (HCC)   • PAD (peripheral artery disease) ([facility])   • High risk medication use   • Immunocompromised state ([facility])   • Age-related osteoporosis without current pathological fracture   • Primary insomnia   • Post-nasal drip   • Peptic stricture of esophagus   • Attention deficit hyperactivity disorder (ADHD), predominantly inattentive type   • Rosacea   • B12 deficiency     Past Medical History:   Diagnosis Date   • ADHD    • Allergic 3-2020    Bactrim   • Anemia 2018    Latest blood work OK   • Anxiety Since diagnoised 2003    Intermittent related to my life trying to move. My illness   • Arthritis     Very bad especially Right knee   • Cancer ([facility]) 7-    Cut the Lymph Node Out & thyroid out   • Cellulitis of foot     Last assessed 10/24/16   • Change in mental status     Last assessed 12/01/15   • Chronic fatigue    • Depression 2003    I am on meds. Feel good   • Disease of thyroid gland     Cancer Surgical Removal total Thyroid   • Diverticulitis of colon Last Egd    Diverticlum   • PLAZA (dyspnea on exertion)     Last assessed 12/01/15   • Fibromyalgia    • Folliculitis     Last assessed 10/06/14   • GERD (gastroesophageal reflux disease)    • Headache(784.0) 3-2020    In front of head   • Inflammatory bowel disease 1990’s   • Memory loss 2010    New disease neurogentic changes Necrotizing Myopathy   • Non Hodgkin's lymphoma ([facility]) 07/13/2012   • Osteopenia    • Osteoporosis 6-2019    Broke Pelvis -left and right pubic ramus,fractured Sacrum   • Raynaud's disease    • Scleroderma ([facility])    • Systemic sclerosis ([facility])    • Thyroid cancer ([facility]) 10/04/2018   • Visual impairment 2021    Need new glasses     Social History     Socioeconomic History   • Marital status:      Spouse name: Not on file   • Number of  children: Not on file   • Years of education: Not on file   • Highest education level: Associate degree: occupational, technical, or vocational program   Occupational History   • Not on file   Tobacco Use   • Smoking status: Former     Current packs/day: 0.25     Average packs/day: 0.3 packs/day for 5.0 years (1.3 ttl pk-yrs)     Types: Cigarettes   • Smokeless tobacco: Never   • Tobacco comments:     I quit cold turkey when I learned 2nd hand smoke   Vaping Use   • Vaping status: Never Used   Substance and Sexual Activity   • Alcohol use: Yes     Comment: social   • Drug use: No   • Sexual activity: Not Currently     Birth control/protection: Abstinence     Comment: Had a hysterectomy   Other Topics Concern   • Not on file   Social History Narrative   • Not on file     Social Determinants of Health     Financial Resource Strain: Medium Risk (9/19/2023)    Overall Financial Resource Strain (CARDIA)    • Difficulty of Paying Living Expenses: Somewhat hard   Food Insecurity: Food Insecurity Present (7/21/2021)    Hunger Vital Sign    • Worried About Running Out of Food in the Last Year: Sometimes true    • Ran Out of Food in the Last Year: Sometimes true   Transportation Needs: No Transportation Needs (9/19/2023)    PRAPARE - Transportation    • Lack of Transportation (Medical): No    • Lack of Transportation (Non-Medical): No   Physical Activity: Sufficiently Active (7/21/2021)    Exercise Vital Sign    • Days of Exercise per Week: 3 days    • Minutes of Exercise per Session: 60 min   Stress: No Stress Concern Present (7/21/2021)    [employer] [place] [employer] - Occupational Stress Questionnaire    • Feeling of Stress : Only a little   Social Connections: Socially Isolated (7/21/2021)    Social Connection and Isolation Panel [NHANES]    • Frequency of Communication with Friends and Family: More than three times a week    • Frequency of Social Gatherings with Friends and Family: Never    • Attends  Congregation Services: Never    • Active Member of Clubs or Organizations: No    • Attends Club or Organization Meetings: Never    • Marital Status:    Intimate Partner Violence: Not on file   Housing Stability: Low Risk  (2021)    Housing Stability Vital Sign    • Unable to Pay for Housing in the Last Year: No    • Number of Places Lived in the Last Year: 1    • Unstable Housing in the Last Year: No      Family History   Problem Relation Age of Onset   • Arthritis Mother    • Diabetes Mother    • Hyperlipidemia Mother         Passed 10- Kidney- Heart Disease   • Thyroid disease Mother         Benign lump removed   • Autoimmune disease Mother    • Coronary artery disease Father    • Hyperlipidemia Father          Massive Heart Attack   • Arthritis Sister    • Asthma Sister    • Crohn's disease Sister    • Autoimmune disease Sister    • Depression Sister    • Hyperlipidemia Sister         Very  bad Chrons Disease   • Autoimmune disease Sister    • Depression Sister    • Hyperlipidemia Sister         Heart disease -Lupus like-Thyroid issues   • Thyroid disease Sister         Seeing Endrocologist dosage for thyroid   • Arthritis Sister         Having many issues this year   • Autoimmune disease Sister    • No Known Problems Daughter    • No Known Problems Maternal Grandmother    • No Known Problems Maternal Grandfather    • No Known Problems Paternal Grandmother    • No Known Problems Paternal Grandfather    • Other Brother         myocardial ischemia   • Hyperlipidemia Brother         Heart Attacks,torn rotator and bicept . arthritis, knees   • Autoimmune disease Brother    • Hyperlipidemia Brother         Heart Attacks.[place],Arthrites   • Autoimmune disease Brother    • Ovarian cancer Maternal Aunt    • Dementia Maternal Aunt            • No Known Problems Maternal Aunt    • No Known Problems Maternal Aunt    • No Known Problems Maternal Aunt    • No Known Problems Maternal Aunt    •  No Known Problems Maternal Aunt    • No Known Problems Paternal Aunt    • Breast cancer Neg Hx    • Endometrial cancer Neg Hx    • Colon cancer Neg Hx    • Breast cancer additional onset Neg Hx    • BRCA 1/2 Neg Hx    • BRCA2 Positive Neg Hx    • BRCA2 Negative Neg Hx    • BRCA1 Positive Neg Hx    • BRCA1 Negative Neg Hx      Past Surgical History:   Procedure Laterality Date   • BACK SURGERY     • BLADDER SURGERY     • BONE MARROW BIOPSY     • CARDIAC CATHETERIZATION     • EYE SURGERY  Jan 1010    Correct angles in both eyes   • HEMORRHOID SURGERY     • HYSTERECTOMY  02/04/2004   • KNEE SURGERY     • LYMPH NODE BIOPSY  7-     Non Hodgkins Lymphoma   • NASAL SEPTUM SURGERY     • OOPHORECTOMY Bilateral 02/04/2004   • ROTATOR CUFF REPAIR     • SPINE SURGERY  12-    Failed microdisectomy L5 S1   • THYROIDECTOMY N/A 10/04/2018    Procedure: TOTAL THYROIDECTOMY;  Surgeon: Roger Ortiz MD;  Location: [facility];  Service: Surgical Oncology   • TONSILLECTOMY     • TOTAL THYROIDECTOMY         Current Outpatient Medications:   •  ALPRAZolam (XANAX) 0.25 mg tablet, Take 1 tablet (0.25 mg total) by mouth 2 (two) times a day as needed for anxiety, Disp: 60 tablet, Rfl: 0  •  amphetamine-dextroamphetamine (ADDERALL XR, 30MG,) 30 MG 24 hr capsule, Take 1 capsule (30 mg total) by mouth every morning Max Daily Amount: 30 mg, Disp: 90 capsule, Rfl: 0  •  amphetamine-dextroamphetamine (ADDERALL, 10MG,) 10 mg tablet, 20 mg every afternoon ~ 2 PM, Disp: 60 tablet, Rfl: 0  •  buPROPion (WELLBUTRIN XL) 300 mg 24 hr tablet, Take 1 tablet (300 mg total) by mouth every morning, Disp: 90 tablet, Rfl: 1  •  calcium carbonate-vitamin D 250 mg-3.125 mcg tablet, Take 2 tablets by mouth every evening, Disp: 180 tablet, Rfl: 0  •  Cholecalciferol (Vitamin D3) 50 MCG (2000 UT) TABS, TAKE 1 TABLET (2,000 UNITS TOTAL) BY MOUTH IN THE MORNING, Disp: 90 tablet, Rfl: 1  •  esomeprazole (NexIUM) 40 MG capsule, Take 1 capsule  (40 mg total) by mouth every morning, Disp: 90 capsule, Rfl: 0  •  folic acid (FOLVITE) 1 mg tablet, TAKE 2 TABLETS (2 MG TOTAL) BY MOUTH DAILY, Disp: 180 tablet, Rfl: 2  •  hydroxychloroquine (PLAQUENIL) 200 mg tablet, Take 1 tablet (200 mg total) by mouth daily, Disp: 90 tablet, Rfl: 2  •  levothyroxine 100 mcg tablet, Take 1 tablet (100 mcg total) by mouth daily, Disp: 90 tablet, Rfl: 1  •  metroNIDAZOLE (METROGEL) 1 % gel, Apply topically daily, Disp: 45 g, Rfl: 0  •  Multiple Vitamin (MULTIVITAMIN) tablet, Take 1 tablet by mouth daily, Disp: , Rfl:   •  mycophenolate (MYFORTIC) 360 MG TBEC, Take 2 in the morning and 1 at night, Disp: 90 tablet, Rfl: 6  •  ondansetron (ZOFRAN) 4 mg tablet, Take 1 tablet (4 mg total) by mouth every 8 (eight) hours as needed for nausea or vomiting, Disp: 20 tablet, Rfl: 0  •  pregabalin (LYRICA) 75 mg capsule, Take 1 capsule (75 mg total) by mouth 2 (two) times a day, Disp: 60 capsule, Rfl: 5  •  traZODone (DESYREL) 150 mg tablet, Take 1 tablet (150 mg total) by mouth daily at bedtime, Disp: 90 tablet, Rfl: 1  •  fluticasone (FLONASE) 50 mcg/act nasal spray, ADMINISTER 1 SPRAY INTO EACH NOSTRIL 2 (TWO) TIMES A DAY, Disp: 16 g, Rfl: 3  •  lidocaine (XYLOCAINE) 5 % ointment, , Disp: , Rfl:   •  meloxicam (MOBIC) 15 mg tablet, TAKE 1 TABLET (15 MG TOTAL) BY MOUTH DAILY (Patient not taking: Reported on 3/15/2024), Disp: 90 tablet, Rfl: 1  •  valACYclovir (VALTREX) 1,000 mg tablet, , Disp: , Rfl:   Allergies   Allergen Reactions   • Duloxetine Other (See Comments)     Mental psychosis   • Alendronate Myalgia, GI Intolerance and Nausea Only   • Revatio [Sildenafil] Other (See Comments)     mental status changes   • Savella [Milnacipran] Other (See Comments)     Feeling out of it   • Sulfamethoxazole-Trimethoprim Rash and GI Intolerance   • Tedizolid Rash     Vitals:    03/15/24 1258   BP: 120/70   Pulse: 83   SpO2: 97%   Height: 5' (1.524 m)         Imaging: CT soft tissue neck w  contrast    Result Date: 3/11/2024  Narrative: CT NECK WITH CONTRAST INDICATION:   R53.81: Other malaise R53.83: Other fatigue R61: Generalized hyperhidrosis R63.0: Anorexia R59.0: Localized enlarged lymph nodes K11.1: Hypertrophy of salivary gland. History of thyroid cancer and lymphoma. COMPARISON: 2/3/2023; 2/26/2024 TECHNIQUE:  Axial, sagittal, and coronal 2D reformatted images were created from the axial source data and submitted for interpretation. Radiation dose length product (DLP) for this visit:  276 mGy-cm .  This examination, like all CT scans performed in the [facility], was performed utilizing techniques to minimize radiation dose exposure, including the use of iterative reconstruction and automated exposure control. IV Contrast:  85 mL of iohexol (OMNIPAQUE) IMAGE QUALITY:  Diagnostic. FINDINGS: VISUALIZED BRAIN PARENCHYMA:  No acute intracranial pathology of the visualized brain parenchyma. VISUALIZED ORBITS: Normal visualized orbits. PARANASAL SINUSES: Normal visualized paranasal sinuses. NASAL CAVITY AND NASOPHARYNX:  Normal. SUPRAHYOID NECK:  Normal oral cavity, tongue base, tonsillar fossa and epiglottis. INFRAHYOID NECK:  Aryepiglottic folds and piriform sinuses are normal.  Normal glottis and subglottic airway. THYROID GLAND: Normal thyroid gland is not identified. PAROTID AND SUBMANDIBULAR GLANDS:  Normal. LYMPH NODES:  No pathologic or enlarged adenopathy. VASCULAR STRUCTURES: Atherosclerotic calcifications noted. THORACIC INLET:  Lung apices and upper mediastinum are unremarkable. BONY STRUCTURES: Anterior fusion hardware C5-C7 noted. Orthopedic hardware well seated within bone. Mild spondylotic changes.     Impression: No suspicious lymphadenopathy or mass. Workstation performed: [id number]     US head neck soft tissue    Result Date: 3/4/2024  Narrative: Head and neck ULTRASOUND INDICATION: R53.81: Other malaise R53.83: Other fatigue R11.0: Nausea R61: Generalized  hyperhidrosis R63.0: Anorexia R59.0: Localized enlarged lymph nodes. Palpable abnormality near 2; suspected adenopathy; history of thyroid cancer and lymphoma COMPARISON: Study from 9/11/2023 of the thyroid bed; CT from 2/3/2023 of the neck TECHNIQUE: Real-time ultrasound of the submandibular region in the area of palpable concern on the right and left was performed with a linear transducer with both volumetric sweeps and still imaging techniques. FINDINGS: Adjacent to the right submandibular gland there is a 0.7 x 0.2 x 0.5 cm lymph node. A small echogenic center is demonstrated. There is an 0.8 x 0.3 x 0.9 cm node near the left submandibular gland with an echogenic center. The submandibular glands are homogeneous in symmetric in appearance although there may be some ductal prominence noted bilaterally with each duct reaching approximately 2 mm. The prior CT of the neck did not show any obvious dilatation of the ducts although the ducts were barely visible closer to 1 mm in dimension.. This suggests a possible new finding.     Impression: Small lymph nodes are noted bilaterally in the submandibular region but only reaching 3 mm in short axis. These may not be palpable. There is mild prominence of the submandibular gland ducts. Correlation with tumor markers is advised as well as any salivary symptoms. Follow-up CT or MRI should be considered given the patient's history. The study was marked in EPIC for significant notification. Workstation performed: [id number]       Review of Systems:  Review of Systems   Constitutional:  Positive for fatigue. Negative for activity change, appetite change, chills, diaphoresis, fever and unexpected weight change.   Cardiovascular: Negative.         Epigastric pain, not with exertion   Genitourinary: Negative.    Musculoskeletal:  Positive for arthralgias.   Hematological: Negative.        Physical Exam:    /70   Pulse 83   Ht 5' (1.524 m)   SpO2 97%   BMI 19.63 kg/m²  "  Physical Exam  Constitutional:       General: She is not in acute distress.     Appearance: She is not ill-appearing, toxic-appearing or diaphoretic.   HENT:      Nose: Nose normal. No congestion or rhinorrhea.   Neck:      Vascular: No carotid bruit.   Cardiovascular:      Pulses: Normal pulses.      Heart sounds: No murmur heard.     No friction rub. No gallop.   Pulmonary:      Effort: Pulmonary effort is normal. No respiratory distress.      Breath sounds: No stridor. No wheezing or rhonchi.   Musculoskeletal:         General: No swelling, tenderness, deformity or signs of injury. Normal range of motion.      Cervical back: Normal range of motion. No rigidity or tenderness.   Lymphadenopathy:      Cervical: No cervical adenopathy.   Skin:     General: Skin is warm.      Coloration: Skin is not jaundiced or pale.      Findings: No bruising or erythema.   Neurological:      Mental Status: She is alert.            This note was completed in part utilizing [employer] direct voice recognition software.   Grammatical errors, random word insertion, spelling mistakes, occasional wrong word or \"sound-alike\" substitutions and incomplete sentences may be an occasional consequence of the system secondary to software limitations, ambient noise and hardware issues. At the time of dictation, efforts were made to edit, clarify and /or correct errors.  Please read the chart carefully and recognize, using context, where substitutions have occurred.  If you have any questions or concerns about the context, text or information contained within the body of this dictation, please contact myself, the provider, for further clarification.  "

## 2024-03-16 DIAGNOSIS — G72.89 NECROTIZING MYOPATHY: Primary | ICD-10-CM

## 2024-03-18 DIAGNOSIS — F90.0 ATTENTION DEFICIT HYPERACTIVITY DISORDER (ADHD), PREDOMINANTLY INATTENTIVE TYPE: ICD-10-CM

## 2024-03-18 RX ORDER — DEXTROAMPHETAMINE SACCHARATE, AMPHETAMINE ASPARTATE, DEXTROAMPHETAMINE SULFATE AND AMPHETAMINE SULFATE 2.5; 2.5; 2.5; 2.5 MG/1; MG/1; MG/1; MG/1
TABLET ORAL
Qty: 60 TABLET | Refills: 0 | Status: SHIPPED | OUTPATIENT
Start: 2024-03-18

## 2024-03-18 RX ORDER — DEXTROAMPHETAMINE SACCHARATE, AMPHETAMINE ASPARTATE MONOHYDRATE, DEXTROAMPHETAMINE SULFATE AND AMPHETAMINE SULFATE 7.5; 7.5; 7.5; 7.5 MG/1; MG/1; MG/1; MG/1
30 CAPSULE, EXTENDED RELEASE ORAL EVERY MORNING
Qty: 90 CAPSULE | Refills: 0 | Status: CANCELLED | OUTPATIENT
Start: 2024-03-18

## 2024-03-19 DIAGNOSIS — J43.9 PULMONARY EMPHYSEMA, UNSPECIFIED EMPHYSEMA TYPE (HCC): ICD-10-CM

## 2024-03-19 DIAGNOSIS — F51.01 PRIMARY INSOMNIA: ICD-10-CM

## 2024-03-19 DIAGNOSIS — M33.20 POLYMYOSITIS (HCC): ICD-10-CM

## 2024-03-19 DIAGNOSIS — C82.90 FOLLICULAR LYMPHOMA, UNSPECIFIED FOLLICULAR LYMPHOMA TYPE, UNSPECIFIED BODY REGION (HCC): ICD-10-CM

## 2024-03-19 DIAGNOSIS — F41.8 SITUATIONAL ANXIETY: ICD-10-CM

## 2024-03-19 DIAGNOSIS — F98.8 ATTENTION DEFICIT DISORDER, UNSPECIFIED HYPERACTIVITY PRESENCE: ICD-10-CM

## 2024-03-19 DIAGNOSIS — F32.A DEPRESSION, UNSPECIFIED DEPRESSION TYPE: ICD-10-CM

## 2024-03-19 RX ORDER — HYDROXYCHLOROQUINE SULFATE 200 MG/1
200 TABLET, FILM COATED ORAL DAILY
Qty: 30 TABLET | Refills: 0 | Status: SHIPPED | OUTPATIENT
Start: 2024-03-19 | End: 2024-12-14

## 2024-04-02 DIAGNOSIS — F51.01 PRIMARY INSOMNIA: ICD-10-CM

## 2024-04-02 RX ORDER — TRAZODONE HYDROCHLORIDE 150 MG/1
150 TABLET ORAL
Qty: 90 TABLET | Refills: 1 | Status: SHIPPED | OUTPATIENT
Start: 2024-04-02

## 2024-04-09 ENCOUNTER — APPOINTMENT (OUTPATIENT)
Dept: LAB | Facility: MEDICAL CENTER | Age: 69
End: 2024-04-09
Payer: MEDICARE

## 2024-04-09 DIAGNOSIS — E78.49 OTHER HYPERLIPIDEMIA: ICD-10-CM

## 2024-04-09 LAB
ANION GAP SERPL CALCULATED.3IONS-SCNC: 8 MMOL/L (ref 4–13)
BUN SERPL-MCNC: 12 MG/DL (ref 5–25)
CALCIUM SERPL-MCNC: 8.8 MG/DL (ref 8.4–10.2)
CHLORIDE SERPL-SCNC: 98 MMOL/L (ref 96–108)
CO2 SERPL-SCNC: 28 MMOL/L (ref 21–32)
CREAT SERPL-MCNC: 0.9 MG/DL (ref 0.6–1.3)
GFR SERPL CREATININE-BSD FRML MDRD: 65 ML/MIN/1.73SQ M
GLUCOSE P FAST SERPL-MCNC: 105 MG/DL (ref 65–99)
POTASSIUM SERPL-SCNC: 4.5 MMOL/L (ref 3.5–5.3)
SODIUM SERPL-SCNC: 134 MMOL/L (ref 135–147)

## 2024-04-09 PROCEDURE — 36415 COLL VENOUS BLD VENIPUNCTURE: CPT

## 2024-04-09 PROCEDURE — 83516 IMMUNOASSAY NONANTIBODY: CPT

## 2024-04-09 PROCEDURE — 80048 BASIC METABOLIC PNL TOTAL CA: CPT

## 2024-04-10 ENCOUNTER — TELEPHONE (OUTPATIENT)
Dept: CARDIOLOGY CLINIC | Facility: HOSPITAL | Age: 69
End: 2024-04-10

## 2024-04-17 ENCOUNTER — PATIENT MESSAGE (OUTPATIENT)
Dept: FAMILY MEDICINE CLINIC | Facility: CLINIC | Age: 69
End: 2024-04-17

## 2024-04-17 DIAGNOSIS — C82.90 FOLLICULAR LYMPHOMA, UNSPECIFIED FOLLICULAR LYMPHOMA TYPE, UNSPECIFIED BODY REGION (HCC): ICD-10-CM

## 2024-04-17 DIAGNOSIS — J43.9 PULMONARY EMPHYSEMA, UNSPECIFIED EMPHYSEMA TYPE (HCC): ICD-10-CM

## 2024-04-17 DIAGNOSIS — F90.0 ATTENTION DEFICIT HYPERACTIVITY DISORDER (ADHD), PREDOMINANTLY INATTENTIVE TYPE: ICD-10-CM

## 2024-04-17 DIAGNOSIS — F41.8 SITUATIONAL ANXIETY: ICD-10-CM

## 2024-04-17 DIAGNOSIS — F51.01 PRIMARY INSOMNIA: ICD-10-CM

## 2024-04-17 DIAGNOSIS — M33.20 POLYMYOSITIS (HCC): ICD-10-CM

## 2024-04-17 DIAGNOSIS — F98.8 ATTENTION DEFICIT DISORDER, UNSPECIFIED HYPERACTIVITY PRESENCE: ICD-10-CM

## 2024-04-17 DIAGNOSIS — F33.9 RECURRENT DEPRESSION (HCC): ICD-10-CM

## 2024-04-17 DIAGNOSIS — F32.A DEPRESSION, UNSPECIFIED DEPRESSION TYPE: ICD-10-CM

## 2024-04-18 ENCOUNTER — HOSPITAL ENCOUNTER (OUTPATIENT)
Dept: CT IMAGING | Facility: HOSPITAL | Age: 69
Discharge: HOME | End: 2024-04-18
Attending: INTERNAL MEDICINE

## 2024-04-18 RX ORDER — DEXTROAMPHETAMINE SACCHARATE, AMPHETAMINE ASPARTATE, DEXTROAMPHETAMINE SULFATE AND AMPHETAMINE SULFATE 2.5; 2.5; 2.5; 2.5 MG/1; MG/1; MG/1; MG/1
TABLET ORAL
Qty: 60 TABLET | Refills: 0 | Status: SHIPPED | OUTPATIENT
Start: 2024-04-18

## 2024-04-18 RX ORDER — BUPROPION HYDROCHLORIDE 300 MG/1
300 TABLET ORAL EVERY MORNING
Qty: 90 TABLET | Refills: 0 | Status: SHIPPED | OUTPATIENT
Start: 2024-04-18 | End: 2024-10-15

## 2024-04-18 NOTE — PATIENT COMMUNICATION
Patient calling back to schedule an appt regarding knee pain. Appt made for 4/19 at 1020. Patient also states she believes she is due for colorectal screening and will discuss at appt.

## 2024-04-19 ENCOUNTER — OFFICE VISIT (OUTPATIENT)
Dept: FAMILY MEDICINE CLINIC | Facility: CLINIC | Age: 69
End: 2024-04-19
Payer: MEDICARE

## 2024-04-19 VITALS
BODY MASS INDEX: 19.76 KG/M2 | SYSTOLIC BLOOD PRESSURE: 136 MMHG | OXYGEN SATURATION: 98 % | DIASTOLIC BLOOD PRESSURE: 66 MMHG | WEIGHT: 101.19 LBS | HEART RATE: 85 BPM

## 2024-04-19 DIAGNOSIS — M17.11 PRIMARY OSTEOARTHRITIS OF RIGHT KNEE: Primary | ICD-10-CM

## 2024-04-19 PROCEDURE — 99213 OFFICE O/P EST LOW 20 MIN: CPT | Performed by: FAMILY MEDICINE

## 2024-04-19 PROCEDURE — 20610 DRAIN/INJ JOINT/BURSA W/O US: CPT | Performed by: FAMILY MEDICINE

## 2024-04-19 RX ORDER — TRIAMCINOLONE ACETONIDE 40 MG/ML
40 INJECTION, SUSPENSION INTRA-ARTICULAR; INTRAMUSCULAR
Status: COMPLETED | OUTPATIENT
Start: 2024-04-19 | End: 2024-04-19

## 2024-04-19 RX ORDER — FOLIC ACID 1 MG/1
2 TABLET ORAL DAILY
Qty: 180 TABLET | Refills: 1 | Status: SHIPPED | OUTPATIENT
Start: 2024-04-19 | End: 2024-04-23 | Stop reason: SDUPTHER

## 2024-04-19 RX ORDER — LIDOCAINE HYDROCHLORIDE 20 MG/ML
4 INJECTION, SOLUTION EPIDURAL; INFILTRATION; INTRACAUDAL; PERINEURAL
Status: COMPLETED | OUTPATIENT
Start: 2024-04-19 | End: 2024-04-19

## 2024-04-19 RX ADMIN — TRIAMCINOLONE ACETONIDE 40 MG: 40 INJECTION, SUSPENSION INTRA-ARTICULAR; INTRAMUSCULAR at 10:20

## 2024-04-19 RX ADMIN — LIDOCAINE HYDROCHLORIDE 4 ML: 20 INJECTION, SOLUTION EPIDURAL; INFILTRATION; INTRACAUDAL; PERINEURAL at 10:20

## 2024-04-19 NOTE — ASSESSMENT & PLAN NOTE
Acute on chronic flare of R knee OA  We reviewed her Xray results from 2/2023: Severe tricompartmental osteoarthritis as evidenced by joint space narrowing, osteophyte formation and subchondral sclerosis.   I reviewed the images with the patient and agree with radiologic findings for severe tricompartmental OA.   Patient no longer seeing the benefits of mobic. Recommended steroid injection which patient was agreeable to today. Patient encouraged to continue with regular exercise. OA handout provided in AVS. Will follow up prn.

## 2024-04-19 NOTE — PROGRESS NOTES
Large joint arthrocentesis: R knee  Universal Protocol:  Consent: Verbal consent obtained.  Risks and benefits: risks, benefits and alternatives were discussed  Consent given by: patient  Timeout called at: 4/19/2024 10:50 AM.  Patient identity confirmed: verbally with patient  Supporting Documentation  Indications: pain   Procedure Details  Location: knee - R knee  Needle size: 25 G  Ultrasound guidance: no  Approach: anteromedial  Medications administered: 4 mL lidocaine (PF) 2 %; 40 mg triamcinolone acetonide 40 mg/mL    Patient tolerance: patient tolerated the procedure well with no immediate complications  Dressing:  Sterile dressing applied

## 2024-04-19 NOTE — PROGRESS NOTES
Name: Mimi Biggs      : 1955      MRN: [id number]  Encounter Provider: Patti Amaro DO  Encounter Date: 2024   Encounter department: [facility]    Assessment & Plan     1. Primary osteoarthritis of right knee  Assessment & Plan:  Acute on chronic flare of R knee OA  We reviewed her Xray results from 2023: Severe tricompartmental osteoarthritis as evidenced by joint space narrowing, osteophyte formation and subchondral sclerosis.   I reviewed the images with the patient and agree with radiologic findings for severe tricompartmental OA.   Patient no longer seeing the benefits of mobic. Recommended steroid injection which patient was agreeable to today. Patient encouraged to continue with regular exercise. OA handout provided in AVS. Will follow up prn.     Orders:  -     Large joint arthrocentesis: R knee      I have spent a total time of 25 minutes on 24 in caring for this patient including Diagnostic results, Risks and benefits of tx options, Instructions for management, Patient and family education, Importance of tx compliance, Impressions, Counseling / Coordination of care, Documenting in the medical record, Reviewing / ordering tests, medicine, procedures  , and Obtaining or reviewing history  .         Subjective      Chief Complaint   Patient presents with    Follow-up     Knee pain      Acute on chronic right knee pain. History of severe OA. Was managing well with mobic, but recently with gym exercise had worsening of her pain again and mobic is not helping like it used to. Patient maintains good amount of regular exercise. Agreeable to steroid injection today.     Knee Pain   The pain is present in the right knee. The quality of the pain is described as aching and cramping. The pain is moderate. The pain has been Fluctuating since onset. Pertinent negatives include no inability to bear weight, loss of motion, loss of sensation, muscle weakness,  numbness or tingling. She reports no foreign bodies present. The symptoms are aggravated by palpation and movement.       Review of Systems   Musculoskeletal:  Positive for arthralgias.   Neurological:  Negative for tingling, weakness and numbness.       Current Outpatient Medications on File Prior to Visit   Medication Sig    ALPRAZolam (XANAX) 0.25 mg tablet Take 1 tablet (0.25 mg total) by mouth 2 (two) times a day as needed for anxiety    amphetamine-dextroamphetamine (ADDERALL XR, 30MG,) 30 MG 24 hr capsule Take 1 capsule (30 mg total) by mouth every morning Max Daily Amount: 30 mg    amphetamine-dextroamphetamine (ADDERALL, 10MG,) 10 mg tablet 20 mg every afternoon ~ 2 PM    buPROPion (WELLBUTRIN XL) 300 mg 24 hr tablet Take 1 tablet (300 mg total) by mouth every morning    calcium carbonate-vitamin D 250 mg-3.125 mcg tablet Take 2 tablets by mouth every evening    Cholecalciferol (Vitamin D3) 50 MCG (2000 UT) TABS TAKE 1 TABLET (2,000 UNITS TOTAL) BY MOUTH IN THE MORNING    esomeprazole (NexIUM) 40 MG capsule Take 1 capsule (40 mg total) by mouth every morning    fluticasone (FLONASE) 50 mcg/act nasal spray ADMINISTER 1 SPRAY INTO EACH NOSTRIL 2 (TWO) TIMES A DAY    folic acid (FOLVITE) 1 mg tablet Take 2 tablets (2 mg total) by mouth daily    hydroxychloroquine (PLAQUENIL) 200 mg tablet Take 1 tablet (200 mg total) by mouth daily    levothyroxine 100 mcg tablet Take 1 tablet (100 mcg total) by mouth daily    metoprolol tartrate (LOPRESSOR) 25 mg tablet Take 1 tablet (25 mg total) by mouth every 12 (twelve) hours 1 dose the night before and 1 dose the morning of the procedure(coronary CTA)    metroNIDAZOLE (METROGEL) 1 % gel Apply topically daily    Multiple Vitamin (MULTIVITAMIN) tablet Take 1 tablet by mouth daily    mycophenolate (MYFORTIC) 360 MG TBEC Take 2 in the morning and 1 at night    ondansetron (ZOFRAN) 4 mg tablet Take 1 tablet (4 mg total) by mouth every 8 (eight) hours as needed for nausea or  vomiting    pregabalin (LYRICA) 75 mg capsule Take 1 capsule (75 mg total) by mouth 2 (two) times a day    traZODone (DESYREL) 150 mg tablet Take 1 tablet (150 mg total) by mouth daily at bedtime    lidocaine (XYLOCAINE) 5 % ointment  (Patient not taking: Reported on 3/15/2024)    meloxicam (MOBIC) 15 mg tablet TAKE 1 TABLET (15 MG TOTAL) BY MOUTH DAILY (Patient not taking: Reported on 3/15/2024)    valACYclovir (VALTREX) 1,000 mg tablet  (Patient not taking: Reported on 3/15/2024)       Objective     /66 (BP Location: Right arm, Patient Position: Sitting, Cuff Size: Standard)   Pulse 85   Wt 45.9 kg (101 lb 3.1 oz)   SpO2 98%   BMI 19.76 kg/m²     Physical Exam  Musculoskeletal:      Right knee: Crepitus present. No swelling or deformity. Normal range of motion. Tenderness present over the medial joint line and lateral joint line. No patellar tendon tenderness. No LCL laxity, MCL laxity, ACL laxity or PCL laxity. Normal pulse.      Left knee: Normal.         Patti Amaro DO

## 2024-04-23 ENCOUNTER — OFFICE VISIT (OUTPATIENT)
Dept: RHEUMATOLOGY | Facility: CLINIC | Age: 69
End: 2024-04-23
Payer: MEDICARE

## 2024-04-23 ENCOUNTER — APPOINTMENT (OUTPATIENT)
Dept: LAB | Facility: CLINIC | Age: 69
End: 2024-04-23
Payer: MEDICARE

## 2024-04-23 VITALS
DIASTOLIC BLOOD PRESSURE: 64 MMHG | OXYGEN SATURATION: 97 % | BODY MASS INDEX: 19.83 KG/M2 | HEIGHT: 60 IN | HEART RATE: 81 BPM | SYSTOLIC BLOOD PRESSURE: 126 MMHG | WEIGHT: 101 LBS

## 2024-04-23 DIAGNOSIS — F41.8 SITUATIONAL ANXIETY: ICD-10-CM

## 2024-04-23 DIAGNOSIS — J43.9 PULMONARY EMPHYSEMA, UNSPECIFIED EMPHYSEMA TYPE (HCC): ICD-10-CM

## 2024-04-23 DIAGNOSIS — M81.0 AGE-RELATED OSTEOPOROSIS WITHOUT CURRENT PATHOLOGICAL FRACTURE: ICD-10-CM

## 2024-04-23 DIAGNOSIS — M17.11 OSTEOARTHRITIS OF RIGHT KNEE, UNSPECIFIED OSTEOARTHRITIS TYPE: ICD-10-CM

## 2024-04-23 DIAGNOSIS — M51.26 HERNIATED LUMBAR INTERVERTEBRAL DISC: ICD-10-CM

## 2024-04-23 DIAGNOSIS — Z79.899 HIGH RISK MEDICATION USE: ICD-10-CM

## 2024-04-23 DIAGNOSIS — F51.01 PRIMARY INSOMNIA: ICD-10-CM

## 2024-04-23 DIAGNOSIS — M33.20 POLYMYOSITIS (HCC): ICD-10-CM

## 2024-04-23 DIAGNOSIS — C82.90 FOLLICULAR LYMPHOMA, UNSPECIFIED FOLLICULAR LYMPHOMA TYPE, UNSPECIFIED BODY REGION (HCC): ICD-10-CM

## 2024-04-23 DIAGNOSIS — F32.A DEPRESSION, UNSPECIFIED DEPRESSION TYPE: ICD-10-CM

## 2024-04-23 DIAGNOSIS — M54.16 RADICULOPATHY, LUMBAR REGION: ICD-10-CM

## 2024-04-23 DIAGNOSIS — F98.8 ATTENTION DEFICIT DISORDER, UNSPECIFIED HYPERACTIVITY PRESENCE: ICD-10-CM

## 2024-04-23 DIAGNOSIS — M35.1 MCTD (MIXED CONNECTIVE TISSUE DISEASE) (HCC): Primary | ICD-10-CM

## 2024-04-23 DIAGNOSIS — G72.89 NECROTIZING MYOPATHY: ICD-10-CM

## 2024-04-23 DIAGNOSIS — M80.00XS OSTEOPOROSIS WITH CURRENT PATHOLOGICAL FRACTURE, UNSPECIFIED OSTEOPOROSIS TYPE, SEQUELA: ICD-10-CM

## 2024-04-23 PROCEDURE — 36415 COLL VENOUS BLD VENIPUNCTURE: CPT | Performed by: INTERNAL MEDICINE

## 2024-04-23 PROCEDURE — 86140 C-REACTIVE PROTEIN: CPT | Performed by: INTERNAL MEDICINE

## 2024-04-23 PROCEDURE — 99214 OFFICE O/P EST MOD 30 MIN: CPT | Performed by: INTERNAL MEDICINE

## 2024-04-23 RX ORDER — CLOBETASOL/NIACINAMIDE 0.05 %-4 %
OINTMENT (GRAM) TOPICAL
COMMUNITY

## 2024-04-23 RX ORDER — PREGABALIN 75 MG/1
75 CAPSULE ORAL 2 TIMES DAILY
Qty: 180 CAPSULE | Refills: 1 | Status: SHIPPED | OUTPATIENT
Start: 2024-04-23 | End: 2024-10-20

## 2024-04-23 RX ORDER — FOLIC ACID 1 MG/1
2 TABLET ORAL DAILY
Qty: 180 TABLET | Refills: 1 | Status: SHIPPED | OUTPATIENT
Start: 2024-04-23

## 2024-04-23 RX ORDER — HYDROXYCHLOROQUINE SULFATE 200 MG/1
200 TABLET, FILM COATED ORAL DAILY
Qty: 90 TABLET | Refills: 1 | Status: SHIPPED | OUTPATIENT
Start: 2024-04-23 | End: 2024-10-20

## 2024-04-23 RX ORDER — MYCOPHENOLIC ACID 360 MG/1
TABLET, DELAYED RELEASE ORAL
Qty: 180 TABLET | Refills: 1 | Status: SHIPPED | OUTPATIENT
Start: 2024-04-23

## 2024-04-23 RX ORDER — MOMETASONE FUROATE 1 MG/G
CREAM TOPICAL DAILY
COMMUNITY
Start: 2023-12-07

## 2024-04-23 NOTE — PROGRESS NOTES
Assessment and Plan: Mimi Biggs is a 69 y.o.  female who presents for follow up of her MCTD compromising of systemic sclerosis, Sjogren's syndrome, and necrotizing myopathy, all of which seem stable on her current regimen. Patient's rheumatologic disease(s) threaten long-term function if not appropriately treated.  Assessment & Plan  1. Scleroderma and myositis.  The patient's current medication regimen, which includes Lyrica 75 mg twice daily, Myfortic 360 mg twice daily, hydroxychloroquine once daily, and folic acid twice daily, will be refilled. A repeat C-reactive protein test will be conducted during her next blood work.    2. Osteoarthritis.  The patient's T-score in her lumbar spine was satisfactory, however, her hip showed osteoporosis. A referral to [facility] has been made for the patient to consult with an osteoporosis specialist at [facility].    Follow-up  The patient is scheduled for a follow-up visit in 6 months.    Do lab  Continue folic 2 tabs daily  Continue Myfortic 1 tab twice a day  Continue hydroxychloroquine daily; continue regular eye exams  Continue Lyrica 75mg po bid for nerve pain  Continue calcium and Vit. D  Update clinic on how [facility] wants to approach osteoporosis management; if patient needs infusion center, she would want to go to the [facility]  Orthopedics referral made for right knee osteoarthritis    Return to clinic in 6 months    Plan:  Diagnoses and all orders for this visit:    MCTD (mixed connective tissue disease) ([facility])    Situational anxiety  -     folic acid (FOLVITE) 1 mg tablet; Take 2 tablets (2 mg total) by mouth daily  -     hydroxychloroquine (PLAQUENIL) 200 mg tablet; Take 1 tablet (200 mg total) by mouth daily    Attention deficit disorder, unspecified hyperactivity presence  -     folic acid (FOLVITE) 1 mg tablet; Take 2 tablets (2 mg total) by mouth daily  -     hydroxychloroquine (PLAQUENIL) 200 mg tablet; Take 1 tablet (200 mg total)  by mouth daily    Primary insomnia  -     folic acid (FOLVITE) 1 mg tablet; Take 2 tablets (2 mg total) by mouth daily  -     hydroxychloroquine (PLAQUENIL) 200 mg tablet; Take 1 tablet (200 mg total) by mouth daily    Depression, unspecified depression type  -     folic acid (FOLVITE) 1 mg tablet; Take 2 tablets (2 mg total) by mouth daily  -     hydroxychloroquine (PLAQUENIL) 200 mg tablet; Take 1 tablet (200 mg total) by mouth daily    Follicular lymphoma, unspecified follicular lymphoma type, unspecified body region (HCC)  -     folic acid (FOLVITE) 1 mg tablet; Take 2 tablets (2 mg total) by mouth daily  -     hydroxychloroquine (PLAQUENIL) 200 mg tablet; Take 1 tablet (200 mg total) by mouth daily    Polymyositis (HCC)  -     C-reactive protein  -     folic acid (FOLVITE) 1 mg tablet; Take 2 tablets (2 mg total) by mouth daily  -     hydroxychloroquine (PLAQUENIL) 200 mg tablet; Take 1 tablet (200 mg total) by mouth daily    Pulmonary emphysema, unspecified emphysema type ([facility])  -     C-reactive protein  -     folic acid (FOLVITE) 1 mg tablet; Take 2 tablets (2 mg total) by mouth daily  -     hydroxychloroquine (PLAQUENIL) 200 mg tablet; Take 1 tablet (200 mg total) by mouth daily    Necrotizing myopathy  -     mycophenolate (MYFORTIC) 360 MG TBEC; Take 1 in the morning and 1 at night    Herniated lumbar intervertebral disc  -     pregabalin (LYRICA) 75 mg capsule; Take 1 capsule (75 mg total) by mouth 2 (two) times a day    Radiculopathy, lumbar region  -     pregabalin (LYRICA) 75 mg capsule; Take 1 capsule (75 mg total) by mouth 2 (two) times a day    Osteoarthritis of right knee, unspecified osteoarthritis type  -     Ambulatory referral to Orthopedic Surgery; Future    High risk medication use    Osteoporosis with current pathological fracture, unspecified osteoporosis type, sequela    Other orders  -     Clobetasol Prop-Niacinamide (Chlooxia) 0.05-4 % OINT; Apply topically  -     mometasone (ELOCON) 0.1  % cream; Apply topically daily    High risk medication use - Benefits and risks of hydroxychloroquine, including but not limited to retinal toxicity, corneal deposits, gastrointestinal side effects, and headaches were discussed with the patient. The need for a regular eye exam to monitor for ocular toxicity while on this medication was also explained to the patient.      Follow-up plan: RTC in 6 months         Rheumatic Disease Summary      Chief Complaint  follow up of her MCTD compromising of systemic sclerosis, Sjogren's syndrome, and necrotizing myopathy     HPI  Mimi Biggs is a 69 y.o.  female who presents     History of Present Illness  The patient is a 68-year-old female who is here for follow-up.    The patient has made dietary modifications, eliminating sugar, flour, and processed foods from her diet, and has experienced significant weight loss. She has also incorporated more intense cardio exercises into her routine, totaling approximately 40 to 3 miles. Despite these dietary modifications, she has been experiencing severe right knee pain, which she describes as bone-on-bone. She received a steroid injection administered by Dr. Kati Amaro last week, which provided some relief. The patient is scheduled to consult with the bone health department at [facility] on 06/16/2024 and is contemplating whether a bone density test is required there. She has a history of scoliosis, fractured discs, and pelvic fractures. Her last visit was in 12/2023. Her primary concern at present is maintaining her strength and active lifestyle. She continues to take Myfortic 360 mg twice daily, hydroxychloroquine once daily, and Lyrica 75 mg twice daily. Her last consultation with the bone doctor was approximately 6 months ago. She denies any lung disease. Her last eye exam was conducted 6 months ago.    Supplemental Information  She has been having some bladder issues at night. She went to a urologist at [place], who  did her bladder lift. He told her that there is a kind of hormone in her brain that tells her to make feed or not. He is testing her for that. She is to do a 3-day study. She is going to have a heart CT in 06/2024 or 07/2024. She went to a cardiologist. She has a weird boil on her head. She is going to see a dermatologist at [facility] in 05/2024. She has 2 liver cysts.   She has a family history of cardiac issues.    The following portions of the patient's history were reviewed and updated as appropriate: allergies, current medications, past family history, past medical history, past social history, past surgical history and problem list.    Review of Systems:   See HPI    Home Medications:    Current Outpatient Medications:     ALPRAZolam (XANAX) 0.25 mg tablet, Take 1 tablet (0.25 mg total) by mouth 2 (two) times a day as needed for anxiety, Disp: 60 tablet, Rfl: 0    amphetamine-dextroamphetamine (ADDERALL XR, 30MG,) 30 MG 24 hr capsule, Take 1 capsule (30 mg total) by mouth every morning Max Daily Amount: 30 mg, Disp: 90 capsule, Rfl: 0    buPROPion (WELLBUTRIN XL) 300 mg 24 hr tablet, Take 1 tablet (300 mg total) by mouth every morning, Disp: 90 tablet, Rfl: 0    calcium carbonate-vitamin D 250 mg-3.125 mcg tablet, Take 2 tablets by mouth every evening, Disp: 180 tablet, Rfl: 0    Clobetasol Prop-Niacinamide (Chlooxia) 0.05-4 % OINT, Apply topically, Disp: , Rfl:     esomeprazole (NexIUM) 40 MG capsule, Take 1 capsule (40 mg total) by mouth every morning, Disp: 90 capsule, Rfl: 0    fluticasone (FLONASE) 50 mcg/act nasal spray, ADMINISTER 1 SPRAY INTO EACH NOSTRIL 2 (TWO) TIMES A DAY, Disp: 16 g, Rfl: 3    folic acid (FOLVITE) 1 mg tablet, Take 2 tablets (2 mg total) by mouth daily, Disp: 180 tablet, Rfl: 1    hydroxychloroquine (PLAQUENIL) 200 mg tablet, Take 1 tablet (200 mg total) by mouth daily, Disp: 90 tablet, Rfl: 1    levothyroxine 100 mcg tablet, Take 1 tablet (100 mcg total) by  mouth daily, Disp: 90 tablet, Rfl: 1    metroNIDAZOLE (METROGEL) 1 % gel, Apply topically daily, Disp: 45 g, Rfl: 0    mometasone (ELOCON) 0.1 % cream, Apply topically daily, Disp: , Rfl:     Multiple Vitamin (MULTIVITAMIN) tablet, Take 1 tablet by mouth daily, Disp: , Rfl:     mycophenolate (MYFORTIC) 360 MG TBEC, Take 1 in the morning and 1 at night, Disp: 180 tablet, Rfl: 1    ondansetron (ZOFRAN) 4 mg tablet, Take 1 tablet (4 mg total) by mouth every 8 (eight) hours as needed for nausea or vomiting, Disp: 20 tablet, Rfl: 0    pregabalin (LYRICA) 75 mg capsule, Take 1 capsule (75 mg total) by mouth 2 (two) times a day, Disp: 180 capsule, Rfl: 1    traZODone (DESYREL) 150 mg tablet, Take 1 tablet (150 mg total) by mouth daily at bedtime, Disp: 90 tablet, Rfl: 1    amphetamine-dextroamphetamine (ADDERALL, 10MG,) 10 mg tablet, 20 mg every afternoon ~ 2 PM (Patient not taking: Reported on 4/23/2024), Disp: 60 tablet, Rfl: 0    Cholecalciferol (Vitamin D3) 50 MCG (2000 UT) TABS, Take 1 tablet (2,000 Units total) by mouth in the morning, Disp: 90 tablet, Rfl: 1    lidocaine (XYLOCAINE) 5 % ointment, , Disp: , Rfl:     meloxicam (MOBIC) 15 mg tablet, TAKE 1 TABLET (15 MG TOTAL) BY MOUTH DAILY (Patient not taking: Reported on 3/15/2024), Disp: 90 tablet, Rfl: 1    metoprolol tartrate (LOPRESSOR) 25 mg tablet, Take 1 tablet (25 mg total) by mouth every 12 (twelve) hours 1 dose the night before and 1 dose the morning of the procedure(coronary CTA) (Patient not taking: Reported on 4/23/2024), Disp: 5 tablet, Rfl: 3    valACYclovir (VALTREX) 1,000 mg tablet, , Disp: , Rfl:     Objective:    Vitals:    04/23/24 1011   BP: 126/64   Pulse: 81   SpO2: 97%   Weight: 45.8 kg (101 lb)   Height: 5' (1.524 m)       Physical Exam  Constitutional:       General: She is not in acute distress.  HENT:      Head: Normocephalic and atraumatic.   Eyes:      Conjunctiva/sclera: Conjunctivae normal.   Cardiovascular:      Rate and Rhythm:  Normal rate and regular rhythm.      Heart sounds: S1 normal and S2 normal.      No friction rub.   Pulmonary:      Effort: Pulmonary effort is normal. No respiratory distress.      Breath sounds: Normal breath sounds. No wheezing, rhonchi or rales.   Musculoskeletal:      Cervical back: Neck supple.   Skin:     Coloration: Skin is not pale.   Neurological:      Mental Status: She is alert. Mental status is at baseline.   Psychiatric:         Mood and Affect: Mood normal.         Behavior: Behavior normal.   Physical Exam  There is a 0.5 cm lesion on the top of the patient's forehead.    Reviewed labs and imaging.    Imaging:   No results found.    Labs:   Office Visit on 04/23/2024   Component Date Value Ref Range Status    CRP 04/23/2024 <1.0  <3.0 mg/L Final   Appointment on 04/09/2024   Component Date Value Ref Range Status    Sodium 04/09/2024 134 (L)  135 - 147 mmol/L Final    Potassium 04/09/2024 4.5  3.5 - 5.3 mmol/L Final    Chloride 04/09/2024 98  96 - 108 mmol/L Final    CO2 04/09/2024 28  21 - 32 mmol/L Final    ANION GAP 04/09/2024 8  4 - 13 mmol/L Final    BUN 04/09/2024 12  5 - 25 mg/dL Final    Creatinine 04/09/2024 0.90  0.60 - 1.30 mg/dL Final    Standardized to IDMS reference method    Glucose, Fasting 04/09/2024 105 (H)  65 - 99 mg/dL Final    Calcium 04/09/2024 8.8  8.4 - 10.2 mg/dL Final    eGFR 04/09/2024 65  ml/min/1.73sq m Final   Orders Only on 03/16/2024   Component Date Value Ref Range Status    KELLIE-1 Antibody 04/09/2024 <20  <20 Units Final    PL 7 AutoAbs 04/09/2024 Negative  Negative Final    PL 12 AutoAbs 04/09/2024 Negative  Negative Final    EJ AutoAbs 04/09/2024 Negative  Negative Final    OJ AutoAbs 04/09/2024 Negative  Negative Final    SRP AutoAbs 04/09/2024 Negative  Negative Final    MI-2 Autoab 04/09/2024 Negative  Negative Final    Anti-TIF-1 gamma Ab (RDL) 04/09/2024 <20  <20 Units Final    Anti-MDA-5-Ab  04/09/2024 <20  <20 Units Final    ANTI-NXP-2 04/09/2024 <20  <20  Units Final    ANTI-SAE1 AB, IGG (RDL) 04/09/2024 <20  <20 Units Final    PM-SCL Ab 04/09/2024 <20  <20 Units Final    KU AutoAbs 04/09/2024 Negative  Negative Final    ANTI-SS-A 52KD AB, IGG (RDL) 04/09/2024 <20  <20 Units Final    Anti-U1 RNP Ab (RDL) 04/09/2024 <20  <20 Units Final    ANTI-U2 RNP AB (RDL) 04/09/2024 Negative  Negative Final    U3 RNP  04/09/2024 Negative  Negative Final        Interpretation for Anti-Arielle-1, Anti-TIF-1gamma,      Anti-MDA-5, Anti-NXP-2, Anti-SAE1, Anti-PM/Scl-100,      Anti-SS-A 52 kD, Anti-U1 RNP:          Negative:                                <20          Weak Positive:                       20 - 39          Moderate Positive:                   40 - 80          Strong Positive:                         >80   Appointment on 02/23/2024   Component Date Value Ref Range Status    Color, UA 02/23/2024 Yellow   Final    Clarity, UA 02/23/2024 Clear   Final    Specific Gravity, UA 02/23/2024 1.012  1.003 - 1.030 Final    pH, UA 02/23/2024 7.0  4.5, 5.0, 5.5, 6.0, 6.5, 7.0, 7.5, 8.0 Final    Leukocytes, UA 02/23/2024 Negative  Negative Final    Nitrite, UA 02/23/2024 Negative  Negative Final    Protein, UA 02/23/2024 Negative  Negative mg/dl Final    Glucose, UA 02/23/2024 Negative  Negative mg/dl Final    Ketones, UA 02/23/2024 Negative  Negative mg/dl Final    Urobilinogen, UA 02/23/2024 <2.0  <2.0 mg/dl mg/dl Final    Bilirubin, UA 02/23/2024 Negative  Negative Final    Occult Blood, UA 02/23/2024 Negative  Negative Final   Orders Only on 02/23/2024   Component Date Value Ref Range Status    WBC 02/23/2024 4.29 (L)  4.31 - 10.16 Thousand/uL Final    RBC 02/23/2024 4.59  3.81 - 5.12 Million/uL Final    Hemoglobin 02/23/2024 13.1  11.5 - 15.4 g/dL Final    Hematocrit 02/23/2024 39.9  34.8 - 46.1 % Final    MCV 02/23/2024 87  82 - 98 fL Final    MCH 02/23/2024 28.5  26.8 - 34.3 pg Final    MCHC 02/23/2024 32.8  31.4 - 37.4 g/dL Final    RDW 02/23/2024 12.6  11.6 - 15.1 % Final    MPV  02/23/2024 10.6  8.9 - 12.7 fL Final    Platelets 02/23/2024 321  149 - 390 Thousands/uL Final    nRBC 02/23/2024 0  /100 WBCs Final    Segmented % 02/23/2024 50  43 - 75 % Final    Immature Grans % 02/23/2024 1  0 - 2 % Final    Lymphocytes % 02/23/2024 31  14 - 44 % Final    Monocytes % 02/23/2024 10  4 - 12 % Final    Eosinophils Relative 02/23/2024 6  0 - 6 % Final    Basophils Relative 02/23/2024 2 (H)  0 - 1 % Final    Absolute Neutrophils 02/23/2024 2.21  1.85 - 7.62 Thousands/µL Final    Absolute Immature Grans 02/23/2024 0.02  0.00 - 0.20 Thousand/uL Final    Absolute Lymphocytes 02/23/2024 1.31  0.60 - 4.47 Thousands/µL Final    Absolute Monocytes 02/23/2024 0.42  0.17 - 1.22 Thousand/µL Final    Eosinophils Absolute 02/23/2024 0.26  0.00 - 0.61 Thousand/µL Final    Basophils Absolute 02/23/2024 0.07  0.00 - 0.10 Thousands/µL Final    C4, COMPLEMENT 02/23/2024 24  19 - 52 mg/dL Final    C3 Complement 02/23/2024 127  87 - 200 mg/dL Final    ds DNA Ab 02/23/2024 <1  0 - 9 IU/mL Final                                       Negative      <5                                     Equivocal  5 - 9                                     Positive      >9    Sodium 02/23/2024 136  135 - 147 mmol/L Final    Potassium 02/23/2024 5.1  3.5 - 5.3 mmol/L Final    Chloride 02/23/2024 97  96 - 108 mmol/L Final    CO2 02/23/2024 31  21 - 32 mmol/L Final    ANION GAP 02/23/2024 8  mmol/L Final    BUN 02/23/2024 13  5 - 25 mg/dL Final    Creatinine 02/23/2024 0.77  0.60 - 1.30 mg/dL Final    Standardized to IDMS reference method    Glucose 02/23/2024 106  65 - 140 mg/dL Final    If the patient is fasting, the ADA then defines impaired fasting glucose as > 100 mg/dL and diabetes as > or equal to 123 mg/dL.    Calcium 02/23/2024 10.2  8.4 - 10.2 mg/dL Final    AST 02/23/2024 14  13 - 39 U/L Final    ALT 02/23/2024 13  7 - 52 U/L Final    Specimen collection should occur prior to Sulfasalazine administration due to the potential for  falsely depressed results.     Alkaline Phosphatase 02/23/2024 57  34 - 104 U/L Final    Total Protein 02/23/2024 5.8 (L)  6.4 - 8.4 g/dL Final    Albumin 02/23/2024 4.5  3.5 - 5.0 g/dL Final    Total Bilirubin 02/23/2024 0.33  0.20 - 1.00 mg/dL Final    Use of this assay is not recommended for patients undergoing treatment with eltrombopag due to the potential for falsely elevated results.  N-acetyl-p-benzoquinone imine (metabolite of Acetaminophen) will generate erroneously low results in samples for patients that have taken an overdose of Acetaminophen.    eGFR 02/23/2024 79  ml/min/1.73sq m Final    CRP 02/23/2024 22.3 (H)  <3.0 mg/L Final    Creatinine, Ur 02/23/2024 44.9  Reference range not established. mg/dL Final    Protein Urine Random 02/23/2024 4  Reference range not established. mg/dL Final    Prot/Creat Ratio, Ur 02/23/2024 0.09  0.00 - 0.10 Final    Sed Rate 02/23/2024 19  0 - 29 mm/hour Final    Color, UA 02/23/2024 Yellow   Final    Clarity, UA 02/23/2024 Clear   Final    Specific Gravity, UA 02/23/2024 1.012  1.003 - 1.030 Final    pH, UA 02/23/2024 7.0  4.5, 5.0, 5.5, 6.0, 6.5, 7.0, 7.5, 8.0 Final    Leukocytes, UA 02/23/2024 Negative  Negative Final    Nitrite, UA 02/23/2024 Negative  Negative Final    Protein, UA 02/23/2024 Negative  Negative mg/dl Final    Glucose, UA 02/23/2024 Negative  Negative mg/dl Final    Ketones, UA 02/23/2024 Negative  Negative mg/dl Final    Urobilinogen, UA 02/23/2024 <2.0  <2.0 mg/dl mg/dl Final    Bilirubin, UA 02/23/2024 Negative  Negative Final    Occult Blood, UA 02/23/2024 Negative  Negative Final    RBC, UA 02/23/2024 None Seen  None Seen, 1-2 /hpf Final    WBC, UA 02/23/2024 None Seen  None Seen, 1-2 /hpf Final    Epithelial Cells 02/23/2024 Occasional  None Seen, Occasional /hpf Final    Bacteria, UA 02/23/2024 Occasional  None Seen, Occasional /hpf Final    Total CK 02/23/2024 64  26 - 192 U/L Final    Aldolase 02/23/2024 2.4 (L)  3.3 - 10.3 U/L Final   Lab  on 01/31/2024   Component Date Value Ref Range Status    TSH 3RD GENERATON 01/31/2024 3.831  0.450 - 4.500 uIU/mL Final    The recommended reference ranges for TSH during pregnancy are as follows:   First trimester 0.100 to 2.500 uIU/mL   Second trimester  0.200 to 3.000 uIU/mL   Third trimester 0.300 to 3.000 uIU/m    Note: Normal ranges may not apply to patients who are transgender, non-binary, or whose legal sex, sex at birth, and gender identity differ.  Adult TSH (3rd generation) reference range follows the recommended guidelines of the American Thyroid Association, January, 2020.    Vit D, 25-Hydroxy 01/31/2024 74.6  30.0 - 100.0 ng/mL Final    Vitamin D guidelines established by Clinical Guidelines Subcommittee  of the Endocrine Society Task Force, 2011    Deficiency <20ng/ml   Insufficiency 20-30ng/ml   Sufficient  ng/ml     Vitamin B-12 01/31/2024 816  180 - 914 pg/mL Final    Cholesterol 01/31/2024 243 (H)  See Comment mg/dL Final    Cholesterol:         Pediatric <18 Years        Desirable          <170 mg/dL      Borderline High    170-199 mg/dL      High               >=200 mg/dL        Adult >=18 Years            Desirable         <200 mg/dL      Borderline High   200-239 mg/dL      High              >239 mg/dL      Triglycerides 01/31/2024 102  See Comment mg/dL Final    Triglyceride:     0-9Y            <75mg/dL     10Y-17Y         <90 mg/dL       >=18Y     Normal          <150 mg/dL     Borderline High 150-199 mg/dL     High            200-499 mg/dL        Very High       >499 mg/dL    Specimen collection should occur prior to Metamizole administration due to the potential for falsely depressed results.    HDL, Direct 01/31/2024 72  >=50 mg/dL Final    LDL Calculated 01/31/2024 151 (H)  0 - 100 mg/dL Final    LDL Cholesterol:     Optimal           <100 mg/dl     Near Optimal      100-129 mg/dl     Above Optimal       Borderline High 130-159 mg/dl       High            160-189 mg/dl       Very  High       >189 mg/dl         This screening LDL is a calculated result.   It does not have the accuracy of the Direct Measured LDL in the monitoring of patients with hyperlipidemia and/or statin therapy.   Direct Measure LDL (VRH504) must be ordered separately in these patients.    Non-HDL-Chol (CHOL-HDL) 01/31/2024 171  mg/dl Final    WBC 01/31/2024 3.71 (L)  4.31 - 10.16 Thousand/uL Final    RBC 01/31/2024 4.55  3.81 - 5.12 Million/uL Final    Hemoglobin 01/31/2024 13.5  11.5 - 15.4 g/dL Final    Hematocrit 01/31/2024 40.9  34.8 - 46.1 % Final    MCV 01/31/2024 90  82 - 98 fL Final    MCH 01/31/2024 29.7  26.8 - 34.3 pg Final    MCHC 01/31/2024 33.0  31.4 - 37.4 g/dL Final    RDW 01/31/2024 13.7  11.6 - 15.1 % Final    MPV 01/31/2024 10.9  8.9 - 12.7 fL Final    Platelets 01/31/2024 271  149 - 390 Thousands/uL Final    nRBC 01/31/2024 0  /100 WBCs Final    Segmented % 01/31/2024 41 (L)  43 - 75 % Final    Immature Grans % 01/31/2024 0  0 - 2 % Final    Lymphocytes % 01/31/2024 33  14 - 44 % Final    Monocytes % 01/31/2024 15 (H)  4 - 12 % Final    Eosinophils Relative 01/31/2024 9 (H)  0 - 6 % Final    Basophils Relative 01/31/2024 2 (H)  0 - 1 % Final    Absolute Neutrophils 01/31/2024 1.52 (L)  1.85 - 7.62 Thousands/µL Final    Absolute Immature Grans 01/31/2024 0.01  0.00 - 0.20 Thousand/uL Final    Absolute Lymphocytes 01/31/2024 1.23  0.60 - 4.47 Thousands/µL Final    Absolute Monocytes 01/31/2024 0.54  0.17 - 1.22 Thousand/µL Final    Eosinophils Absolute 01/31/2024 0.32  0.00 - 0.61 Thousand/µL Final    Basophils Absolute 01/31/2024 0.09  0.00 - 0.10 Thousands/µL Final    Sodium 01/31/2024 136  135 - 147 mmol/L Final    Potassium 01/31/2024 4.2  3.5 - 5.3 mmol/L Final    Chloride 01/31/2024 99  96 - 108 mmol/L Final    CO2 01/31/2024 28  21 - 32 mmol/L Final    ANION GAP 01/31/2024 9  mmol/L Final    BUN 01/31/2024 12  5 - 25 mg/dL Final    Creatinine 01/31/2024 0.86  0.60 - 1.30 mg/dL Final     Standardized to IDMS reference method    Glucose 01/31/2024 87  65 - 140 mg/dL Final    If the patient is fasting, the ADA then defines impaired fasting glucose as > 100 mg/dL and diabetes as > or equal to 123 mg/dL.    Calcium 01/31/2024 9.5  8.4 - 10.2 mg/dL Final    AST 01/31/2024 19  13 - 39 U/L Final    ALT 01/31/2024 15  7 - 52 U/L Final    Specimen collection should occur prior to Sulfasalazine administration due to the potential for falsely depressed results.     Alkaline Phosphatase 01/31/2024 52  34 - 104 U/L Final    Total Protein 01/31/2024 6.4  6.4 - 8.4 g/dL Final    Albumin 01/31/2024 4.4  3.5 - 5.0 g/dL Final    Total Bilirubin 01/31/2024 0.45  0.20 - 1.00 mg/dL Final    Use of this assay is not recommended for patients undergoing treatment with eltrombopag due to the potential for falsely elevated results.  N-acetyl-p-benzoquinone imine (metabolite of Acetaminophen) will generate erroneously low results in samples for patients that have taken an overdose of Acetaminophen.    eGFR 01/31/2024 69  ml/min/1.73sq m Final    Apolipoprotein B 01/31/2024 115 (H)  <90 mg/dL Final                             Desirable               < 90                           Borderline High     90 -  99                           High               100 - 130                           Very High               >130       --------------------------------------------------            ASCVD RISK              THERAPEUTIC TARGET             CATEGORY                  APO B (mg/dL)          Very High Risk        <80 (if extreme risk <70)          High Risk             <90          Moderate Risk         <90    Iron Saturation 01/31/2024 24  15 - 50 % Final    TIBC 01/31/2024 421  250 - 450 ug/dL Final    Iron 01/31/2024 100  50 - 212 ug/dL Final    Patients treated with metal-binding drugs (ie. Deferoxamine) may have depressed iron values.    UIBC 01/31/2024 321  155 - 355 ug/dL Final    Ferritin 01/31/2024 21  11 - 307 ng/mL Final    Office Visit on 11/15/2023   Component Date Value Ref Range Status    RESULT ALL_PRESC MEDS SP INSTRNS 11/15/2023 Not Applicable   Final    Amphetamine Quantification 11/15/2023 positive-[id number]  100 ng/mL Final    Methamphetamine Quantification 11/15/2023 negative  100 ng/mL Final    Butalbital Quantification 11/15/2023 negative  200 ng/mL Final    Phenobarbital Quantification 11/15/2023 negative  200 ng/mL Final    Secobarbital Quantification 11/15/2023 negative  200 ng/mL Final    Alpha-Hydroxyalprazolam Quantifica* 11/15/2023 positive-106.420  20 ng/mL Final    7-Amino-Clonazepam Quantification * 11/15/2023 negative  20 ng/mL Final    Nordiazepam Quantification 11/15/2023 negative  40 ng/mL Final    Temazepam Quantification 11/15/2023 negative  50 ng/mL Final    Oxazepam Quantification 11/15/2023 negative  40 ng/mL Final    Lorazepam Quantification 11/15/2023 negative  40 ng/mL Final    Gabapentin Quantification 11/15/2023 negative  1000 Final    PREGABALIN QUANTIFICATION 11/15/2023 positive-> 72374  400 Final    Cocaine metabolite Quantification 11/15/2023 negative  50 ng/mL Final    6-MERVIN (Heroin metabolite) Quantifi* 11/15/2023 negative  10 ng/mL Final    Buprenorphine Quantification 11/15/2023 negative  5 ng/mL Final    Norbuprenorphine Quantification 11/15/2023 negative  20 ng/mL Final    Dextrorphan (Dextromethorphan meta* 11/15/2023 negative  50 ng/mL Final    Meperidine Quantification 11/15/2023 negative  50 ng/mL Final    Normeperidine Quantification 11/15/2023 negative  50 ng/mL Final    Naltrexone Quantification 11/15/2023 negative  10 ng/mL Final    NALTREXOL (NALTREXONE METABOLITE) * 11/15/2023 negative  10 ng/mL Final    Fentanyl Quantification 11/15/2023 negative  1 ng/mL Final    Norfentanyl Quantification 11/15/2023 negative  8 ng/mL Final    4-ANPP Quantification 11/15/2023 Fen Neg  2 ng/mL Final    Acetyl fentanyl Quantification 11/15/2023 Fen Neg  2 ng/mL Final    Acetyl norfentanyl  Quantification 11/15/2023 Fen Neg  5 ng/mL Final    Acryl fentanyl Quantification 11/15/2023 Fen Neg  1 ng/mL Final    Carfentanil Quantification 11/15/2023 Fen Neg  2 ng/mL Final    Para-fluorofentanyl Quantification 11/15/2023 Fen Neg  1 ng/mL Final    Methadone Quantification 11/15/2023 negative  100 ng/mL Final    EDDP (Methadone metabolite) Quanti* 11/15/2023 negative  100 ng/mL Final    Oxycodone Quantification 11/15/2023 negative  50 ng/mL Final    Noroxycodone Quantification 11/15/2023 negative  50 ng/mL Final    Oxymorphone Quantification 11/15/2023 negative  50 ng/mL Final    Tapentadol Quantification 11/15/2023 negative  50 ng/mL Final    cTHC (Marijuana metabolite) Quanti* 11/15/2023 negative  15 ng/mL Final    Tramadol Quantification 11/15/2023 negative  100 ng/mL Final    O-desmethyl-tramadol Quantification 11/15/2023 negative  100 ng/mL Final    N-DESMETHYL-TRAMADOL QUANTIFICATION 11/15/2023 negative  100 ng/mL Final    Codeine Quantification 11/15/2023 negative  50 ng/mL Final    Morphine Quantification 11/15/2023 negative  50 ng/mL Final    Hydrocodone Quantification 11/15/2023 negative  50 ng/mL Final    Norhydrocodone Quantification 11/15/2023 negative  50 ng/mL Final    Hydromorphone Quantification 11/15/2023 negative  50 ng/mL Final    EUTYLONE QUANTIFICATION 11/15/2023 negative  10 ng/mL Final    METHYLONE QUANTIFICATION 11/15/2023 negative  3 ng/mL Final    Ethyl Glucuronide Quantification 11/15/2023 negative  500 ng/mL Final    Ethyl Sulfate Quantification 11/15/2023 negative  500 ng/mL Final    Mitragynine (Kratom alkaloid) Haroldo* 11/15/2023 negative  1 ng/mL Final    6-FU-Zcailpjnwln (Kratom alkaloid)* 11/15/2023 negative  1 ng/mL Final    5F-ADB-M7 11/15/2023 negative  10 ng/mL Final    MG-NYDRJQSY-C1 METABOLITE QUANTIFI* 11/15/2023 negative  10 ng/mL Final    NIRU-MDRKKKTA-Y8 METABOLITE QUANTI* 11/15/2023 negative  10 ng/mL Final    JUA144 metabolite Quantification 11/15/2023 negative  10  ng/mL Final    CXI612 metabolite Quantification 11/15/2023 negative  10 ng/mL Final    RCS4 METABOLITE QUANTIFICATION 11/15/2023 negative  10 ng/mL Final    XLR11/ METABOLITE QUANTIFICAT* 11/15/2023 negative  10 ng/mL Final    Methylphenidate Quantification 11/15/2023 negative  50 ng/mL Final    RITALINIC ACID QUANTIFICATION 11/15/2023 negative  50 ng/mL Final    PHENTERMINE QUANTIFICATION 11/15/2023 negative  50 ng/mL Final    OXIDANT 11/15/2023 normal-0  <200 ug/mL ug/mL Final    CREATININE 11/15/2023 normal-58.3  >20 mg/dL mg/dL Final    pH 11/15/2023 normal-6.4  4.5 - 9.5 Final    SPECIFIC GRAVITY URINE 11/15/2023 normal-1.008  1.003 - 1.035 Final

## 2024-04-23 NOTE — PATIENT INSTRUCTIONS
Do lab  Continue folic 2 tabs daily  Continue Myfortic 1 tab twice a day  Continue hydroxychloroquine daily; continue regular eye exams  Continue Lyrica 75mg po bid for nerve pain  Continue calcium and Vit. D  Update us on how [facility] wants to approach osteoporosis management  Orthopedics referral made for right knee osteoarthritis    Return to clinic in 6 months

## 2024-04-24 LAB — CRP SERPL QL: <1 MG/L

## 2024-04-24 RX ORDER — CHOLECALCIFEROL (VITAMIN D3) 125 MCG
2000 CAPSULE ORAL DAILY
Qty: 90 TABLET | Refills: 1 | Status: SHIPPED | OUTPATIENT
Start: 2024-04-24

## 2024-04-25 LAB
EJ AB SER QL: NEGATIVE
ENA JO1 AB SER IA-ACNC: <20 UNITS
ENA PM/SCL AB SER-ACNC: <20 UNITS
ENA SS-A 52KD IGG SER IA-ACNC: <20 UNITS
FIBRILLARIN AB SER QL: NEGATIVE
KU AB SER QL: NEGATIVE
MDA5 AB SER LINE BLOT-ACNC: <20 UNITS
MI2 AB SER QL: NEGATIVE
MJ AB SER LINE BLOT-ACNC: <20 UNITS
OJ AB SER QL: NEGATIVE
PL12 AB SER QL: NEGATIVE
PL7 AB SER QL: NEGATIVE
SAE1 IGG SER QL LINE BLOT: <20 UNITS
SRP AB SERPL QL: NEGATIVE
TIF1-GAMMA AB SER LINE BLOT-ACNC: <20 UNITS
U1 SNRNP AB SER IA-ACNC: <20 UNITS
U2 SNRNP AB SER QL: NEGATIVE

## 2024-05-17 DIAGNOSIS — F90.0 ATTENTION DEFICIT HYPERACTIVITY DISORDER (ADHD), PREDOMINANTLY INATTENTIVE TYPE: ICD-10-CM

## 2024-05-17 RX ORDER — DEXTROAMPHETAMINE SACCHARATE, AMPHETAMINE ASPARTATE, DEXTROAMPHETAMINE SULFATE AND AMPHETAMINE SULFATE 2.5; 2.5; 2.5; 2.5 MG/1; MG/1; MG/1; MG/1
TABLET ORAL
Qty: 60 TABLET | Refills: 0 | Status: SHIPPED | OUTPATIENT
Start: 2024-05-17

## 2024-05-17 NOTE — TELEPHONE ENCOUNTER
[employer] message request for refill. Please review PDMP.    Medication: amphetamine-dextroamphetamine (ADDERALL, 10MG,) 10 mg tablet    Dose/Frequency: 20 mg every afternoon ~ 2 PM    Quantity: 60    Pharmacy: [facility]    Office:   [x] PCP/Provider -   [] Speciality/Provider -     Does the patient have enough for 3 days?   [] Yes   [x] No - Send as HP to POD

## 2024-05-20 ENCOUNTER — APPOINTMENT (RX ONLY)
Dept: URBAN - NONMETROPOLITAN AREA CLINIC 4 | Facility: CLINIC | Age: 69
Setting detail: DERMATOLOGY
End: 2024-05-20

## 2024-05-20 DIAGNOSIS — L71.8 OTHER ROSACEA: ICD-10-CM | Status: WELL CONTROLLED

## 2024-05-20 DIAGNOSIS — L57.0 ACTINIC KERATOSIS: ICD-10-CM

## 2024-05-20 DIAGNOSIS — L82.0 INFLAMED SEBORRHEIC KERATOSIS: ICD-10-CM

## 2024-05-20 PROCEDURE — ? PRESCRIPTION MEDICATION MANAGEMENT

## 2024-05-20 PROCEDURE — 17110 DESTRUCTION B9 LES UP TO 14: CPT

## 2024-05-20 PROCEDURE — 17003 DESTRUCT PREMALG LES 2-14: CPT | Mod: 59

## 2024-05-20 PROCEDURE — ? TREATMENT REGIMEN

## 2024-05-20 PROCEDURE — ? PHOTO-DOCUMENTATION

## 2024-05-20 PROCEDURE — ? SUNSCREEN RECOMMENDATIONS

## 2024-05-20 PROCEDURE — 99213 OFFICE O/P EST LOW 20 MIN: CPT | Mod: 25

## 2024-05-20 PROCEDURE — ? COUNSELING

## 2024-05-20 PROCEDURE — 17000 DESTRUCT PREMALG LESION: CPT | Mod: 59

## 2024-05-20 PROCEDURE — ? PRESCRIPTION

## 2024-05-20 PROCEDURE — ? LIQUID NITROGEN

## 2024-05-20 RX ORDER — METRONIDAZOLE 10 MG/G
1% GEL TOPICAL BID
Qty: 60 | Refills: 3 | Status: ERX | COMMUNITY
Start: 2024-05-20

## 2024-05-20 RX ADMIN — METRONIDAZOLE 1%: 10 GEL TOPICAL at 00:00

## 2024-05-20 ASSESSMENT — LOCATION SIMPLE DESCRIPTION DERM
LOCATION SIMPLE: RIGHT CHEEK
LOCATION SIMPLE: SUPERIOR FOREHEAD
LOCATION SIMPLE: LEFT CHEEK
LOCATION SIMPLE: LEFT HAND

## 2024-05-20 ASSESSMENT — PAIN INTENSITY VAS: HOW INTENSE IS YOUR PAIN 0 BEING NO PAIN, 10 BEING THE MOST SEVERE PAIN POSSIBLE?: NO PAIN

## 2024-05-20 ASSESSMENT — LOCATION DETAILED DESCRIPTION DERM
LOCATION DETAILED: LEFT RADIAL DORSAL HAND
LOCATION DETAILED: RIGHT SUPERIOR LATERAL MALAR CHEEK
LOCATION DETAILED: SUPERIOR MID FOREHEAD
LOCATION DETAILED: RIGHT CENTRAL MALAR CHEEK
LOCATION DETAILED: LEFT INFERIOR CENTRAL MALAR CHEEK

## 2024-05-20 ASSESSMENT — LOCATION ZONE DERM
LOCATION ZONE: HAND
LOCATION ZONE: FACE

## 2024-05-20 ASSESSMENT — TOTAL NUMBER OF LESIONS: # OF LESIONS?: 2

## 2024-05-20 ASSESSMENT — SEVERITY ASSESSMENT OVERALL AMONG ALL PATIENTS: IN YOUR EXPERIENCE, AMONG ALL PATIENTS YOU HAVE SEEN WITH THIS CONDITION, HOW SEVERE IS THIS PATIENT'S CONDITION?: MILD

## 2024-05-20 NOTE — PROCEDURE: LIQUID NITROGEN
Include Z78.9 (Other Specified Conditions Influencing Health Status) As An Associated Diagnosis?: No
Medical Necessity Clause: This procedure was medically necessary because the lesions that were treated were:
Show Spray Paint Technique Variable?: Yes
Post-Care Instructions: I reviewed with the patient in detail post-care instructions. Patient is to wear sunprotection, and avoid picking at any of the treated lesions. Pt may apply Vaseline to crusted or scabbing areas.
Detail Level: Detailed
Duration Of Freeze Thaw-Cycle (Seconds): 3
Consent: The patient's consent was obtained including but not limited to risks of crusting, scabbing, blistering, scarring, darker or lighter pigmentary change, recurrence, incomplete removal and infection.
Spray Paint Text: The liquid nitrogen was applied to the skin utilizing a spray paint frosting technique.
Number Of Freeze-Thaw Cycles: 3 freeze-thaw cycles
Medical Necessity Information: It is in your best interest to select a reason for this procedure from the list below. All of these items fulfill various CMS LCD requirements except the new and changing color options.
Application Tool (Optional): Cry-AC
Detail Level: Zone
Number Of Freeze-Thaw Cycles: 1 freeze-thaw cycle

## 2024-05-20 NOTE — PROCEDURE: PRESCRIPTION MEDICATION MANAGEMENT
Discontinue Regimen: Doxycycline 50mg QD .
Detail Level: Zone
Continue Regimen: Metronidazole 1% topical gel AA BID
Render In Strict Bullet Format?: No

## 2024-05-24 ENCOUNTER — PATIENT MESSAGE (OUTPATIENT)
Dept: FAMILY MEDICINE CLINIC | Facility: CLINIC | Age: 69
End: 2024-05-24

## 2024-05-24 DIAGNOSIS — G89.4 CHRONIC PAIN SYNDROME: Primary | ICD-10-CM

## 2024-05-24 DIAGNOSIS — F90.0 ATTENTION DEFICIT HYPERACTIVITY DISORDER (ADHD), PREDOMINANTLY INATTENTIVE TYPE: ICD-10-CM

## 2024-05-24 RX ORDER — DEXTROAMPHETAMINE SACCHARATE, AMPHETAMINE ASPARTATE MONOHYDRATE, DEXTROAMPHETAMINE SULFATE AND AMPHETAMINE SULFATE 7.5; 7.5; 7.5; 7.5 MG/1; MG/1; MG/1; MG/1
30 CAPSULE, EXTENDED RELEASE ORAL EVERY MORNING
Qty: 90 CAPSULE | Refills: 0 | Status: SHIPPED | OUTPATIENT
Start: 2024-05-24

## 2024-05-24 RX ORDER — NAPROXEN 500 MG/1
500 TABLET ORAL 2 TIMES DAILY PRN
Qty: 30 TABLET | Refills: 1 | Status: SHIPPED | OUTPATIENT
Start: 2024-05-24

## 2024-05-28 ENCOUNTER — TELEPHONE (OUTPATIENT)
Age: 69
End: 2024-05-28

## 2024-05-28 NOTE — TELEPHONE ENCOUNTER
Patient called in stating that a prior authorization is needed for (ADDERALL XR, 30MG and states she spoke with the insurance that that is you put done to expedite it they will start it right away and please provide reasoning for the medication. Patient also requested to be sent to her provider

## 2024-05-29 DIAGNOSIS — Z78.0 POSTMENOPAUSAL: Primary | ICD-10-CM

## 2024-05-29 DIAGNOSIS — C73 THYROID CANCER (HCC): ICD-10-CM

## 2024-05-29 NOTE — TELEPHONE ENCOUNTER
"Per the [facility] rep, patient is \"stressed beyond measure\" over being out of this medication and is feeling the effects without it. Asking that we zully this urgent so the auth can be completed within 24 hours. It was denied because the instruction say can take up to 3 times a day, but this exceeds the daily limit of 2 a day. To appeal call [phone] and fax to [phone]  Uses [facility] in [place] [phone]  "

## 2024-05-30 ENCOUNTER — TELEPHONE (OUTPATIENT)
Dept: FAMILY MEDICINE CLINIC | Facility: CLINIC | Age: 69
End: 2024-05-30

## 2024-05-30 RX ORDER — LEVOTHYROXINE SODIUM 0.1 MG/1
100 TABLET ORAL DAILY
Qty: 90 TABLET | Refills: 0 | Status: SHIPPED | OUTPATIENT
Start: 2024-05-30

## 2024-05-30 NOTE — TELEPHONE ENCOUNTER
Please check to status of this Prior Auth for ADDERALL XR, 30MG. Please read notes in regards to this Prior Auth on 05/28/2024.

## 2024-05-31 DIAGNOSIS — M81.0 AGE-RELATED OSTEOPOROSIS WITHOUT CURRENT PATHOLOGICAL FRACTURE: ICD-10-CM

## 2024-05-31 DIAGNOSIS — R79.89 LOW VITAMIN D LEVEL: ICD-10-CM

## 2024-05-31 RX ORDER — CHOLECALCIFEROL (VITAMIN D3) 125 MCG
2000 CAPSULE ORAL DAILY
Qty: 90 TABLET | Refills: 0 | Status: CANCELLED | OUTPATIENT
Start: 2024-05-31

## 2024-05-31 RX ORDER — LEVOTHYROXINE SODIUM 0.1 MG/1
100 TABLET ORAL DAILY
Qty: 90 TABLET | Refills: 1 | OUTPATIENT
Start: 2024-05-31

## 2024-05-31 NOTE — TELEPHONE ENCOUNTER
PER ENCOUNTER FROM A [employer] MESSAGE FROM PT AN AUTH WAS NOT NEEDED FOR ADDERALL BECAUSE SHE TAKES GENERIC

## 2024-06-01 RX ORDER — CALCIUM CARBONATE-CHOLECALCIFEROL TAB 250 MG-125 UNIT 250-125 MG-UNIT
2 TAB ORAL EVERY EVENING
Qty: 180 TABLET | Refills: 0 | Status: SHIPPED | OUTPATIENT
Start: 2024-06-01 | End: 2024-06-03 | Stop reason: SDUPTHER

## 2024-06-01 RX ORDER — CHOLECALCIFEROL (VITAMIN D3) 125 MCG
2000 CAPSULE ORAL DAILY
Qty: 90 TABLET | Refills: 1 | Status: SHIPPED | OUTPATIENT
Start: 2024-06-01 | End: 2024-06-03 | Stop reason: SDUPTHER

## 2024-06-03 DIAGNOSIS — R79.89 LOW VITAMIN D LEVEL: ICD-10-CM

## 2024-06-03 DIAGNOSIS — M81.0 AGE-RELATED OSTEOPOROSIS WITHOUT CURRENT PATHOLOGICAL FRACTURE: ICD-10-CM

## 2024-06-03 RX ORDER — CALCIUM CARBONATE-CHOLECALCIFEROL TAB 250 MG-125 UNIT 250-125 MG-UNIT
2 TAB ORAL EVERY EVENING
Qty: 180 TABLET | Refills: 0 | Status: SHIPPED | OUTPATIENT
Start: 2024-06-03

## 2024-06-03 RX ORDER — CHOLECALCIFEROL (VITAMIN D3) 125 MCG
2000 CAPSULE ORAL DAILY
Qty: 90 TABLET | Refills: 1 | Status: SHIPPED | OUTPATIENT
Start: 2024-06-03

## 2024-06-06 ENCOUNTER — HOSPITAL ENCOUNTER (OUTPATIENT)
Dept: CT IMAGING | Facility: HOSPITAL | Age: 69
Discharge: HOME | End: 2024-06-06
Attending: INTERNAL MEDICINE
Payer: MEDICARE

## 2024-06-06 VITALS — SYSTOLIC BLOOD PRESSURE: 103 MMHG | RESPIRATION RATE: 20 BRPM | HEART RATE: 56 BPM | DIASTOLIC BLOOD PRESSURE: 56 MMHG

## 2024-06-06 DIAGNOSIS — E78.49 OTHER HYPERLIPIDEMIA: ICD-10-CM

## 2024-06-06 DIAGNOSIS — I73.9 PAD (PERIPHERAL ARTERY DISEASE) (HCC): ICD-10-CM

## 2024-06-06 DIAGNOSIS — R10.13 EPIGASTRIC PAIN: ICD-10-CM

## 2024-06-06 DIAGNOSIS — I70.90 ATHEROSCLEROSIS OF ARTERIES: ICD-10-CM

## 2024-06-06 PROCEDURE — 75574 CT ANGIO HRT W/3D IMAGE: CPT

## 2024-06-06 RX ORDER — NITROGLYCERIN 0.4 MG/1
0.4 TABLET SUBLINGUAL ONCE
Status: COMPLETED | OUTPATIENT
Start: 2024-06-06 | End: 2024-06-06

## 2024-06-06 RX ORDER — METOPROLOL TARTRATE 1 MG/ML
5 INJECTION, SOLUTION INTRAVENOUS
Status: DISCONTINUED | OUTPATIENT
Start: 2024-06-06 | End: 2024-06-07 | Stop reason: HOSPADM

## 2024-06-06 RX ADMIN — NITROGLYCERIN 0.4 MG: 0.4 TABLET SUBLINGUAL at 12:26

## 2024-06-06 NOTE — NURSING NOTE
Patient arrived for coronary CT angiography. Test education reviewed with patient. Medications and allergies verified. Patient took metoprolol 25 mg HS and 1 hr prior as instructed by cardiology. SL nitro given per protocol. See vitals flowsheet. Tolerated test well. Instructed to increase fluid intake remainder of day. Vitals stable, patient asymptomatic upon departure.

## 2024-06-17 ENCOUNTER — TELEPHONE (OUTPATIENT)
Age: 69
End: 2024-06-17

## 2024-06-17 DIAGNOSIS — F51.01 PRIMARY INSOMNIA: ICD-10-CM

## 2024-06-17 DIAGNOSIS — B00.9 HSV INFECTION: Primary | ICD-10-CM

## 2024-06-17 DIAGNOSIS — F90.0 ATTENTION DEFICIT HYPERACTIVITY DISORDER (ADHD), PREDOMINANTLY INATTENTIVE TYPE: ICD-10-CM

## 2024-06-17 RX ORDER — DEXTROAMPHETAMINE SACCHARATE, AMPHETAMINE ASPARTATE, DEXTROAMPHETAMINE SULFATE AND AMPHETAMINE SULFATE 2.5; 2.5; 2.5; 2.5 MG/1; MG/1; MG/1; MG/1
TABLET ORAL
Qty: 60 TABLET | Refills: 0 | Status: SHIPPED | OUTPATIENT
Start: 2024-06-17

## 2024-06-17 RX ORDER — VALACYCLOVIR HYDROCHLORIDE 1 G/1
1000 TABLET, FILM COATED ORAL ONCE AS NEEDED
Qty: 10 TABLET | Refills: 0 | Status: SHIPPED | OUTPATIENT
Start: 2024-06-17 | End: 2024-06-20

## 2024-06-17 RX ORDER — ALPRAZOLAM 0.25 MG/1
0.25 TABLET ORAL 2 TIMES DAILY PRN
Qty: 60 TABLET | Refills: 0 | Status: SHIPPED | OUTPATIENT
Start: 2024-06-17

## 2024-06-17 NOTE — TELEPHONE ENCOUNTER
Pt called stating someone has been leaving messages asking her to complete the lipid panel?   Pt stated she has not been feeling well and will complete when she is able to.

## 2024-06-21 DIAGNOSIS — F98.8 ATTENTION DEFICIT DISORDER, UNSPECIFIED HYPERACTIVITY PRESENCE: ICD-10-CM

## 2024-06-21 DIAGNOSIS — F51.01 PRIMARY INSOMNIA: ICD-10-CM

## 2024-06-21 DIAGNOSIS — F32.A DEPRESSION, UNSPECIFIED DEPRESSION TYPE: ICD-10-CM

## 2024-06-21 DIAGNOSIS — C82.90 FOLLICULAR LYMPHOMA, UNSPECIFIED FOLLICULAR LYMPHOMA TYPE, UNSPECIFIED BODY REGION (HCC): ICD-10-CM

## 2024-06-21 DIAGNOSIS — M33.20 POLYMYOSITIS (HCC): ICD-10-CM

## 2024-06-21 DIAGNOSIS — F41.8 SITUATIONAL ANXIETY: ICD-10-CM

## 2024-06-21 DIAGNOSIS — J43.9 PULMONARY EMPHYSEMA, UNSPECIFIED EMPHYSEMA TYPE (HCC): ICD-10-CM

## 2024-06-21 RX ORDER — HYDROXYCHLOROQUINE SULFATE 200 MG/1
200 TABLET, FILM COATED ORAL DAILY
Qty: 90 TABLET | Refills: 1 | Status: SHIPPED | OUTPATIENT
Start: 2024-06-21 | End: 2024-12-18

## 2024-06-24 DIAGNOSIS — M54.16 RADICULOPATHY, LUMBAR REGION: ICD-10-CM

## 2024-06-24 DIAGNOSIS — M51.26 HERNIATED LUMBAR INTERVERTEBRAL DISC: ICD-10-CM

## 2024-06-24 RX ORDER — PREGABALIN 75 MG/1
75 CAPSULE ORAL 2 TIMES DAILY
Qty: 180 CAPSULE | Refills: 1 | Status: SHIPPED | OUTPATIENT
Start: 2024-06-24 | End: 2024-12-21

## 2024-06-24 NOTE — TELEPHONE ENCOUNTER
Reason for call: pt asking for PCP to refill med instead of Rheum provider. [Pt is out of medication and in a lot of pain   [x] Refill   [] Prior Auth  [] Other:     Office:   [x] PCP/Provider -   [] Specialty/Provider -     Medication:           Does the patient have enough for 3 days?   [] Yes   [x] No - Send as HP to POD

## 2024-06-26 RX ORDER — PREGABALIN 75 MG/1
75 CAPSULE ORAL 2 TIMES DAILY
Qty: 180 CAPSULE | Refills: 1 | Status: SHIPPED | OUTPATIENT
Start: 2024-06-26 | End: 2024-12-23

## 2024-07-05 ENCOUNTER — PATIENT MESSAGE (OUTPATIENT)
Dept: FAMILY MEDICINE CLINIC | Facility: CLINIC | Age: 69
End: 2024-07-05

## 2024-07-05 DIAGNOSIS — E03.9 ACQUIRED HYPOTHYROIDISM: Primary | ICD-10-CM

## 2024-07-05 DIAGNOSIS — M85.80 OSTEOPENIA, UNSPECIFIED LOCATION: ICD-10-CM

## 2024-07-05 DIAGNOSIS — F51.01 PRIMARY INSOMNIA: ICD-10-CM

## 2024-07-05 RX ORDER — TRAZODONE HYDROCHLORIDE 150 MG/1
150 TABLET ORAL
Qty: 90 TABLET | Refills: 1 | Status: SHIPPED | OUTPATIENT
Start: 2024-07-05

## 2024-07-08 RX ORDER — LEVOTHYROXINE SODIUM 0.12 MG/1
125 TABLET ORAL DAILY
Qty: 90 TABLET | Refills: 0 | Status: SHIPPED | OUTPATIENT
Start: 2024-07-08

## 2024-07-08 RX ORDER — LANOLIN ALCOHOL/MO/W.PET/CERES
1 CREAM (GRAM) TOPICAL DAILY
Qty: 90 TABLET | Refills: 1 | Status: SHIPPED | OUTPATIENT
Start: 2024-07-08

## 2024-07-08 RX ORDER — LANOLIN ALCOHOL/MO/W.PET/CERES
1 CREAM (GRAM) TOPICAL DAILY
COMMUNITY
End: 2024-07-08 | Stop reason: SDUPTHER

## 2024-07-15 ENCOUNTER — HOSPITAL ENCOUNTER (OUTPATIENT)
Dept: BONE DENSITY | Facility: HOSPITAL | Age: 69
Discharge: HOME | End: 2024-07-15
Payer: MEDICARE

## 2024-07-15 VITALS — HEIGHT: 59 IN | WEIGHT: 96 LBS | BODY MASS INDEX: 19.35 KG/M2

## 2024-07-15 DIAGNOSIS — Z78.0 POSTMENOPAUSAL: ICD-10-CM

## 2024-07-15 PROCEDURE — 77080 DXA BONE DENSITY AXIAL: CPT

## 2024-07-16 DIAGNOSIS — F90.0 ATTENTION DEFICIT HYPERACTIVITY DISORDER (ADHD), PREDOMINANTLY INATTENTIVE TYPE: ICD-10-CM

## 2024-07-16 DIAGNOSIS — F33.9 RECURRENT DEPRESSION (HCC): ICD-10-CM

## 2024-07-16 RX ORDER — BUPROPION HYDROCHLORIDE 300 MG/1
300 TABLET ORAL EVERY MORNING
Qty: 90 TABLET | Refills: 1 | Status: SHIPPED | OUTPATIENT
Start: 2024-07-16

## 2024-07-16 RX ORDER — DEXTROAMPHETAMINE SACCHARATE, AMPHETAMINE ASPARTATE, DEXTROAMPHETAMINE SULFATE AND AMPHETAMINE SULFATE 2.5; 2.5; 2.5; 2.5 MG/1; MG/1; MG/1; MG/1
TABLET ORAL
Qty: 60 TABLET | Refills: 0 | Status: SHIPPED | OUTPATIENT
Start: 2024-07-16 | End: 2024-07-19

## 2024-07-19 ENCOUNTER — OFFICE VISIT (OUTPATIENT)
Dept: FAMILY MEDICINE CLINIC | Facility: CLINIC | Age: 69
End: 2024-07-19
Payer: MEDICARE

## 2024-07-19 VITALS
WEIGHT: 97.88 LBS | OXYGEN SATURATION: 98 % | SYSTOLIC BLOOD PRESSURE: 146 MMHG | HEART RATE: 74 BPM | BODY MASS INDEX: 19.77 KG/M2 | DIASTOLIC BLOOD PRESSURE: 69 MMHG

## 2024-07-19 DIAGNOSIS — N39.41 URGE INCONTINENCE: Primary | ICD-10-CM

## 2024-07-19 DIAGNOSIS — F90.0 ATTENTION DEFICIT HYPERACTIVITY DISORDER (ADHD), PREDOMINANTLY INATTENTIVE TYPE: ICD-10-CM

## 2024-07-19 DIAGNOSIS — C82.90 FOLLICULAR LYMPHOMA, UNSPECIFIED FOLLICULAR LYMPHOMA TYPE, UNSPECIFIED BODY REGION (HCC): ICD-10-CM

## 2024-07-19 PROCEDURE — 99214 OFFICE O/P EST MOD 30 MIN: CPT | Performed by: FAMILY MEDICINE

## 2024-07-19 PROCEDURE — G2211 COMPLEX E/M VISIT ADD ON: HCPCS | Performed by: FAMILY MEDICINE

## 2024-07-19 RX ORDER — DESMOPRESSIN ACETATE 0.1 MG/1
0.1 TABLET ORAL
Qty: 30 TABLET | Refills: 0 | Status: SHIPPED | OUTPATIENT
Start: 2024-07-19

## 2024-07-19 RX ORDER — DEXTROAMPHETAMINE SACCHARATE, AMPHETAMINE ASPARTATE, DEXTROAMPHETAMINE SULFATE AND AMPHETAMINE SULFATE 2.5; 2.5; 2.5; 2.5 MG/1; MG/1; MG/1; MG/1
20 TABLET ORAL
Qty: 120 TABLET | Refills: 0 | Status: SHIPPED | OUTPATIENT
Start: 2024-07-19

## 2024-07-19 RX ORDER — MELOXICAM 15 MG/1
15 TABLET ORAL DAILY
COMMUNITY

## 2024-07-19 NOTE — PROGRESS NOTES
Ambulatory Visit  Name: Mimi Biggs      : 1955      MRN: [id number]  Encounter Provider: Patti Amaro DO  Encounter Date: 2024   Encounter department: [facility]    Assessment & Plan   1. Urge incontinence  Assessment & Plan:  Chronic, follows with urology. I reviewed her recent urology visit note in which he recommended I start her on Desmopressin at bedtime. Will start at 0.1mg daily hs and titrate up to therapeutic dose. Max dose 0.6mg.     Orders:  -     desmopressin (DDAVP) 0.1 mg tablet; Take 1 tablet (0.1 mg total) by mouth daily at bedtime  2. Attention deficit hyperactivity disorder (ADHD), predominantly inattentive type  Assessment & Plan:  Chronic, current treatment seems to be inadequate with patient not responding well to XR medication. We will shift to twice daily dosing of short acting Adderall, 20mg BID and monitor for improvement. If symptoms continue without improvement, will need to consider alternative treatment.   PDMP is c/w Rx.  Orders:  -     amphetamine-dextroamphetamine (ADDERALL, 10MG,) 10 mg tablet; Take 2 tablets (20 mg total) by mouth 2 (two) times a day Max Daily Amount: 40 mg  3. Follicular lymphoma, unspecified follicular lymphoma type, unspecified body region (HCC)  Assessment & Plan:  Patient saw oncology yesterday, we reviewed this note together today. There is concern with her symptoms that this is recurring. CT scan ordered by oncology, will follow up on their recommendations.        Notes reviewed: Urology 2024, Dr. Dubon; Oncology 24, Dr. Ross; Endocrinology 24, Dr. Sanches    Return in about 2 months (around 2024) for AWV, follow up .      History of Present Illness     Chief Complaint   Patient presents with    sick visit      Body ache    HPI    Patient presents to discuss ongoing concerns of fatigue and lack of energy. This started about 2 months ago and has persisted. She has not gone to the gym  in this timeframe which is very unusual for her. She wanted to go over some of the specialty appointments she has been to in this time frame.     We reviewed her oncology visit appointment from yesterday with Dr. Ross at [facility]. There is some concern that he lymphoma may be active again given some abnormal labs done by her endocrinologist. She is having a repeat CT scan done to further evaluate this.     Additionally, she has seen her urologist for chronic incontinence issues and they recommend I start her on desmopressin 0.1mg daily at bedtime, titrating up monthly to max dose of 0.6mg, stopping when optimal symptoms improvement is obtained. I reviewed the note along with the patient today.     Additionally, patient and I discussed her concern that the morning dose of adderall seems to do nothing for her. She seems to respond much better to the afternoon dose. Currently she take 30mg XR in the mornign and 20mg SR in the afternoon.     We again reviewed her thyroid labs, she is currently on the increased dose of levothyroxine given recent abnormal TSH, and she is to repeat the labs in about 10 days to recheck.     Review of Systems   Constitutional:  Positive for activity change and fatigue. Negative for appetite change, chills, diaphoresis, fever and unexpected weight change.   Eyes:  Negative for visual disturbance.   Respiratory:  Negative for shortness of breath.    Cardiovascular:  Negative for chest pain and leg swelling.   Gastrointestinal:  Negative for constipation and diarrhea.   Endocrine: Negative for polydipsia and polyuria.   Genitourinary:  Positive for urgency. Negative for difficulty urinating, dysuria and frequency.   Neurological:  Negative for dizziness, light-headedness and headaches.   Psychiatric/Behavioral:  Positive for decreased concentration, dysphoric mood and sleep disturbance.      Current Outpatient Medications on File Prior to Visit   Medication Sig Dispense Refill    ALPRAZolam  (XANAX) 0.25 mg tablet Take 1 tablet (0.25 mg total) by mouth 2 (two) times a day as needed for anxiety 60 tablet 0    buPROPion (WELLBUTRIN XL) 300 mg 24 hr tablet Take 1 tablet (300 mg total) by mouth every morning 90 tablet 1    calcium citrate-vitamin D 315 mg-5 mcg tablet Take 1 tablet by mouth daily 90 tablet 1    Cholecalciferol (Vitamin D3) 50 MCG (2000 UT) TABS Take 1 tablet (2,000 Units total) by mouth in the morning 90 tablet 1    Clobetasol Prop-Niacinamide (Chlooxia) 0.05-4 % OINT Apply topically      esomeprazole (NexIUM) 40 MG capsule Take 1 capsule (40 mg total) by mouth every morning 90 capsule 0    fluticasone (FLONASE) 50 mcg/act nasal spray ADMINISTER 1 SPRAY INTO EACH NOSTRIL 2 (TWO) TIMES A DAY 16 g 3    folic acid (FOLVITE) 1 mg tablet Take 2 tablets (2 mg total) by mouth daily 180 tablet 1    hydroxychloroquine (PLAQUENIL) 200 mg tablet Take 1 tablet (200 mg total) by mouth daily 90 tablet 1    levothyroxine 125 mcg tablet Take 1 tablet (125 mcg total) by mouth daily 90 tablet 0    meloxicam (MOBIC) 15 mg tablet Take 15 mg by mouth daily      metroNIDAZOLE (METROGEL) 1 % gel Apply topically daily 45 g 0    mometasone (ELOCON) 0.1 % cream Apply topically daily      Multiple Vitamin (MULTIVITAMIN) tablet Take 1 tablet by mouth daily      mycophenolate (MYFORTIC) 360 MG TBEC Take 1 in the morning and 1 at night 180 tablet 1    naproxen (EC NAPROSYN) 500 MG EC tablet Take 1 tablet (500 mg total) by mouth 2 (two) times a day as needed for mild pain 30 tablet 1    ondansetron (ZOFRAN) 4 mg tablet Take 1 tablet (4 mg total) by mouth every 8 (eight) hours as needed for nausea or vomiting 20 tablet 0    pregabalin (LYRICA) 75 mg capsule Take 1 capsule (75 mg total) by mouth 2 (two) times a day 180 capsule 1    traZODone (DESYREL) 150 mg tablet Take 1 tablet (150 mg total) by mouth daily at bedtime 90 tablet 1    [DISCONTINUED] amphetamine-dextroamphetamine (ADDERALL XR, 30MG,) 30 MG 24 hr capsule Take  1 capsule (30 mg total) by mouth every morning Max Daily Amount: 30 mg 90 capsule 0    [DISCONTINUED] amphetamine-dextroamphetamine (ADDERALL, 10MG,) 10 mg tablet 20 mg every afternoon ~ 2 PM 60 tablet 0    valACYclovir (VALTREX) 1,000 mg tablet Take 1 tablet (1,000 mg total) by mouth once as needed (HSV flare) for up to 3 days 10 tablet 0    [DISCONTINUED] lidocaine (XYLOCAINE) 5 % ointment  (Patient not taking: Reported on 3/15/2024)      [DISCONTINUED] metoprolol tartrate (LOPRESSOR) 25 mg tablet Take 1 tablet (25 mg total) by mouth every 12 (twelve) hours 1 dose the night before and 1 dose the morning of the procedure(coronary CTA) (Patient not taking: Reported on 4/23/2024) 5 tablet 3    [DISCONTINUED] pregabalin (LYRICA) 75 mg capsule Take 1 capsule (75 mg total) by mouth 2 (two) times a day 180 capsule 1     No current facility-administered medications on file prior to visit.      Social History     Tobacco Use    Smoking status: Former     Current packs/day: 0.25     Average packs/day: 0.3 packs/day for 5.0 years (1.3 ttl pk-yrs)     Types: Cigarettes    Smokeless tobacco: Never    Tobacco comments:     I quit cold turkey when I learned 2nd hand smoke   Vaping Use    Vaping status: Never Used   Substance and Sexual Activity    Alcohol use: Yes     Comment: social    Drug use: No    Sexual activity: Not Currently     Birth control/protection: Abstinence     Comment: Had a hysterectomy     Objective     /69 (BP Location: Right arm, Patient Position: Sitting, Cuff Size: Standard)   Pulse 74   Wt 44.4 kg (97 lb 14.2 oz)   SpO2 98%   BMI 19.77 kg/m²     Physical Exam  Vitals reviewed.   Constitutional:       General: She is not in acute distress.     Appearance: She is normal weight. She is not ill-appearing.   HENT:      Head: Normocephalic and atraumatic.      Right Ear: External ear normal.      Left Ear: External ear normal.   Eyes:      Extraocular Movements: Extraocular movements intact.       Conjunctiva/sclera: Conjunctivae normal.   Cardiovascular:      Rate and Rhythm: Normal rate.   Pulmonary:      Effort: Pulmonary effort is normal.   Musculoskeletal:      Right lower leg: No edema.      Left lower leg: No edema.   Neurological:      General: No focal deficit present.      Mental Status: She is alert.   Psychiatric:         Mood and Affect: Mood normal.         Behavior: Behavior normal.

## 2024-07-19 NOTE — ASSESSMENT & PLAN NOTE
Chronic, current treatment seems to be inadequate with patient not responding well to XR medication. We will shift to twice daily dosing of short acting Adderall, 20mg BID and monitor for improvement. If symptoms continue without improvement, will need to consider alternative treatment.   PDMP is c/w Rx.

## 2024-07-19 NOTE — ASSESSMENT & PLAN NOTE
Chronic, follows with urology. I reviewed her recent urology visit note in which he recommended I start her on Desmopressin at bedtime. Will start at 0.1mg daily hs and titrate up to therapeutic dose. Max dose 0.6mg.

## 2024-07-19 NOTE — ASSESSMENT & PLAN NOTE
Patient saw oncology yesterday, we reviewed this note together today. There is concern with her symptoms that this is recurring. CT scan ordered by oncology, will follow up on their recommendations.

## 2024-07-24 ENCOUNTER — TELEPHONE (OUTPATIENT)
Dept: FAMILY MEDICINE CLINIC | Facility: CLINIC | Age: 69
End: 2024-07-24

## 2024-07-24 LAB
CHOLEST SERPL-MCNC: 212 MG/DL
CHOLEST/HDLC SERPL: 3.1 {RATIO}
HDLC SERPL-MCNC: 68 MG/DL (ref 23–92)
LDLC SERPL CALC-MCNC: 125 MG/DL
NONHDLC SERPL-MCNC: 144 MG/DL
TRIGL SERPL-MCNC: 97 MG/DL

## 2024-07-26 DIAGNOSIS — E78.5 HYPERLIPIDEMIA, UNSPECIFIED HYPERLIPIDEMIA TYPE: Primary | ICD-10-CM

## 2024-07-26 RX ORDER — EZETIMIBE 10 MG/1
10 TABLET ORAL DAILY
Qty: 90 TABLET | Refills: 3 | Status: SHIPPED | OUTPATIENT
Start: 2024-07-26

## 2024-07-29 ENCOUNTER — APPOINTMENT (OUTPATIENT)
Dept: LAB | Facility: MEDICAL CENTER | Age: 69
End: 2024-07-29
Payer: MEDICARE

## 2024-07-29 DIAGNOSIS — G89.4 CHRONIC PAIN SYNDROME: ICD-10-CM

## 2024-07-29 DIAGNOSIS — E03.9 ACQUIRED HYPOTHYROIDISM: ICD-10-CM

## 2024-07-29 LAB — TSH SERPL DL<=0.05 MIU/L-ACNC: 3.37 UIU/ML (ref 0.45–4.5)

## 2024-07-29 PROCEDURE — 84443 ASSAY THYROID STIM HORMONE: CPT

## 2024-07-29 PROCEDURE — 36415 COLL VENOUS BLD VENIPUNCTURE: CPT

## 2024-07-30 RX ORDER — NAPROXEN 500 MG/1
500 TABLET ORAL 2 TIMES DAILY PRN
Qty: 30 TABLET | Refills: 1 | Status: SHIPPED | OUTPATIENT
Start: 2024-07-30

## 2024-08-02 ENCOUNTER — PATIENT MESSAGE (OUTPATIENT)
Dept: FAMILY MEDICINE CLINIC | Facility: CLINIC | Age: 69
End: 2024-08-02

## 2024-08-02 DIAGNOSIS — B37.0 ORAL THRUSH: Primary | ICD-10-CM

## 2024-08-02 RX ORDER — FLUCONAZOLE 150 MG/1
150 TABLET ORAL DAILY
Qty: 3 TABLET | Refills: 0 | Status: SHIPPED | OUTPATIENT
Start: 2024-08-02 | End: 2024-08-05

## 2024-08-02 NOTE — PATIENT COMMUNICATION
Patient calling to confirm that mychart message was received. She is worried about having to go through the weekend without any medication. Made patient aware that message had been sent to provider to review.

## 2024-08-05 ENCOUNTER — TELEPHONE (OUTPATIENT)
Age: 69
End: 2024-08-05

## 2024-08-05 DIAGNOSIS — B37.0 ORAL THRUSH: Primary | ICD-10-CM

## 2024-08-05 DIAGNOSIS — B37.0 ORAL THRUSH: ICD-10-CM

## 2024-08-05 RX ORDER — CLOTRIMAZOLE 10 MG/1
10 LOZENGE ORAL 3 TIMES DAILY
Qty: 70 TROCHE | Refills: 0 | Status: SHIPPED | OUTPATIENT
Start: 2024-08-05 | End: 2024-08-15

## 2024-08-05 RX ORDER — FLUCONAZOLE 150 MG/1
150 TABLET ORAL DAILY
Qty: 3 TABLET | Refills: 0 | Status: SHIPPED | OUTPATIENT
Start: 2024-08-05 | End: 2024-08-08

## 2024-08-05 NOTE — TELEPHONE ENCOUNTER
Please send medication too     [id number] [place] - [place] - [address]  [address] [place] [address]  Phone: [phone]  Fax: [phone]

## 2024-08-05 NOTE — TELEPHONE ENCOUNTER
Patient was ordered oral nystatin, is backordered and they have not been able to receive it for months. They have been recommending clotrimazole lozenges/10mg.  Please follow up with pharmacy.

## 2024-08-06 ENCOUNTER — NURSE TRIAGE (OUTPATIENT)
Age: 69
End: 2024-08-06

## 2024-08-06 NOTE — TELEPHONE ENCOUNTER
Pt contacted the Rx Refill Line returning from CTS RN Helen Cline.    Advised pt that she was on a call with another patient and asked if she would be willing to wait for a return phone call and she agreed.    Pt contact 3: [phone]

## 2024-08-06 NOTE — TELEPHONE ENCOUNTER
Regarding: no taste/smell, sore throat, body aches  ----- Message from Arcelia MELTON sent at 8/6/2024  4:32 PM EDT -----  Lost sense of taste and smell. Sore throat. Body aches. Said she feels awful. Has had these symptoms 2-3 days. She took home Covid test today, 08/06/24, and results are negative.    Call back #[phone]

## 2024-08-06 NOTE — TELEPHONE ENCOUNTER
"Patient is concerned that she has a sinus infection. Patient has had symptoms x 1-2 days. Initial Covid test was negative. We did review all the home care. Patient is going to re-test tomorrow.   Reason for Disposition   Colds with no complications    Answer Assessment - Initial Assessment Questions  1. ONSET: \"When did the nasal discharge start?\"       1-2 days  2. AMOUNT: \"How much discharge is there?\"       moderate  3. COUGH: \"Do you have a cough?\" If yes, ask: \"Describe the color of your sputum\" (clear, white, yellow, green)      No cough  4. RESPIRATORY DISTRESS: \"Describe your breathing.\"       denies  5. FEVER: \"Do you have a fever?\" If Yes, ask: \"What is your temperature, how was it measured, and when did it start?\"      acetaminophen  6. SEVERITY: \"Overall, how bad are you feeling right now?\" (e.g., doesn't interfere with normal activities, staying home from school/work, staying in bed)       Mild to moderate illness  7. OTHER SYMPTOMS: \"Do you have any other symptoms?\" (e.g., sore throat, earache, wheezing, vomiting)      Sore throat, nasal congestion  8. PREGNANCY: \"Is there any chance you are pregnant?\" \"When was your last menstrual period?\"      N/A    Protocols used: Common Cold-ADULT-[place]    "

## 2024-08-07 ENCOUNTER — PATIENT MESSAGE (OUTPATIENT)
Dept: FAMILY MEDICINE CLINIC | Facility: CLINIC | Age: 69
End: 2024-08-07

## 2024-08-07 DIAGNOSIS — J30.1 SEASONAL ALLERGIC RHINITIS DUE TO POLLEN: ICD-10-CM

## 2024-08-07 NOTE — TELEPHONE ENCOUNTER
"Attempted to reach Mimi to relate the message per Prem \"Thank you for letting me know. Please check in with the patient today. If she does test positive for covid, I would recommend the anti-viral Paxlovid for her. Otherwise, I will send her a mychart message with some of the over the counter and home remedies I recommend.\" Patient didn't answer/ left a voicemail.   "

## 2024-08-08 RX ORDER — FLUTICASONE PROPIONATE 50 MCG
2 SPRAY, SUSPENSION (ML) NASAL DAILY
Qty: 16 G | Refills: 3 | Status: SHIPPED | OUTPATIENT
Start: 2024-08-08

## 2024-08-15 ENCOUNTER — OFFICE VISIT (OUTPATIENT)
Dept: GASTROENTEROLOGY | Facility: CLINIC | Age: 69
End: 2024-08-15
Payer: MEDICARE

## 2024-08-15 ENCOUNTER — TELEPHONE (OUTPATIENT)
Dept: FAMILY MEDICINE CLINIC | Facility: CLINIC | Age: 69
End: 2024-08-15

## 2024-08-15 VITALS
TEMPERATURE: 99.2 F | WEIGHT: 98.6 LBS | DIASTOLIC BLOOD PRESSURE: 75 MMHG | OXYGEN SATURATION: 97 % | HEIGHT: 59 IN | HEART RATE: 76 BPM | RESPIRATION RATE: 16 BRPM | BODY MASS INDEX: 19.88 KG/M2 | SYSTOLIC BLOOD PRESSURE: 115 MMHG

## 2024-08-15 DIAGNOSIS — R13.10 DYSPHAGIA, UNSPECIFIED TYPE: Primary | ICD-10-CM

## 2024-08-15 DIAGNOSIS — K21.9 GASTROESOPHAGEAL REFLUX DISEASE, UNSPECIFIED WHETHER ESOPHAGITIS PRESENT: ICD-10-CM

## 2024-08-15 DIAGNOSIS — K22.2 ESOPHAGEAL STRICTURE: ICD-10-CM

## 2024-08-15 DIAGNOSIS — K59.09 CHRONIC CONSTIPATION: ICD-10-CM

## 2024-08-15 DIAGNOSIS — K76.9 LIVER LESION: ICD-10-CM

## 2024-08-15 DIAGNOSIS — R30.0 DYSURIA: Primary | ICD-10-CM

## 2024-08-15 DIAGNOSIS — B37.0 ORAL THRUSH: Primary | ICD-10-CM

## 2024-08-15 DIAGNOSIS — Z86.010 PERSONAL HISTORY OF COLONIC POLYPS: ICD-10-CM

## 2024-08-15 PROCEDURE — 99204 OFFICE O/P NEW MOD 45 MIN: CPT | Performed by: PHYSICIAN ASSISTANT

## 2024-08-15 RX ORDER — NYSTATIN 100000/ML
500000 SUSPENSION, ORAL (FINAL DOSE FORM) ORAL 4 TIMES DAILY
Qty: 473 ML | Refills: 1 | Status: SHIPPED | OUTPATIENT
Start: 2024-08-15

## 2024-08-15 NOTE — TELEPHONE ENCOUNTER
I called Mimi to get clarity about what she is needing as far as meds go. Patient request medication refill from Dr. Amaro   Nystatin Oral Rinse 4 times as day as needed for yeast infection in mouth.  Please Advise

## 2024-08-15 NOTE — PATIENT INSTRUCTIONS
Because of your upper GI symptoms and history of an esophagus stricture, we are going to repeat an upper endoscopy now.  Stay on the esomeprazole every morning, though I suspect we may need to adjust your upper GI regimen.  Small frequent meals and avoid triggers for reflux, these typically include citrus, tomato products, caffeine, chocolate, coffee, etc. do not recline within a couple of hours of your last meal so that things do not reflux back up.    It sounds as though you need to be on a stronger bowel regimen.  The first step is going to be increasing the MiraLAX to twice daily dosing.  Try to stay as well-hydrated as you can be.  It may come to the point where we we will need to trial a stronger pooping pill such as Linzess or Amitiza or Motegrity.

## 2024-08-15 NOTE — PROGRESS NOTES
[facility] - Outpatient Consultation  Mimi Biggs 69 y.o. female MRN: [id number]  Encounter: [id number]    ASSESSMENT AND PLAN:      1. Dysphagia, unspecified type  2. Esophageal stricture  3. Gastroesophageal reflux disease, unspecified whether esophagitis present    Pt with numerous chronic conditions including autoimmune dz who is here to establish care from GI group in [place].   We do not have all records at time of evaluation however pt describes recurrent dysphagia and hx of esophageal stricture.   She has hx of GERD On PPI, though symptoms do not sound perfectly controlled.   Ddx includes reflux esophagitis, EOE, web/ring/stricture, dysmotility, infectious etiology, medication SE, symptoms secondary to scleroderma, etc.  Recommend repeat EGD now.   Offered escalation of PPI therapy, addition of H2RA, pt would like to wait until procedures are completed.   Continue with small frequent meals, dysphagia precautions.     I discussed informed consent with the patient. The risks/benefits/alternatives of the procedure were discussed with the patient. Risks included, but not limited to, infection, bleeding, perforation, injury to organs in the abdomen, missed lesion and incomplete procedure were discussed. Patient was agreeable.    - EGD; Future    4. Chronic constipation    History of such, more recent within the past 6 to 12 months.  No obvious precipitants identified.  Large stool burden on CT from 07/2024. Differential includes IBS, slow transit, medication side effect, infection, defecatory dysfunction, other etiol  Pt would like to continue on current current over-the-counter regimen, though I did encourage her to titrate MiraLAX to twice daily dosing.  We did review potential lack of efficacy of stool softeners, though patient feels they are helpful and would like to continue.  I did broach the subject of a more potent cathartic though at this time she is not interested.  We  may need to consider Linzess in the future. High fiber diet and excellent hydration    5. Personal history of colonic polyps    Pt shares hx of colon polyps on distant colonoscopy.   Last colonoscopy 5 years ago.   Recommend repeat now for surveillance purposes.   We will attempt to obtain records from pt.   Patient does not feel she can complete a GoLytely bowel prep, though I am concerned the MiraLAX bowel prep will not be potent enough.  We mutually agreed to complete a MiraLAX bowel prep 2 days prior to the colonoscopy and repeat this    1 day prior to the colonoscopy.  She is agreeable to this plan.    - Colonoscopy; Future  - miralax/dulcolax/gatorade prep x 2     6. Liver lesion     Ultrasound from 02/2024 with small simple hepatic cyst, CT from 07/2024 with a 3.5 cm hepatic cyst.  Repeat ultrasound in 6 to 12 months to surveil this area.    We will follow up after bidirectional endoscopic evaluation. We need to obtain complete records.   ______________________________________________________________________    HPI: Patient is a 69 y.o. female who presents today for a consultation regarding GERD. Pmhx sig for follicular lymphoma, MGUS, autoimmune necrotizing myopathy, scleroderma, Raynaud's, Sjogren's, HLD, hypothyroidism, fibromyalgia. Surgical hx sig for hemorrhoidectomy.     Patient is new to clinic.  She previously followed with a team in [place] for her GI symptoms for many years.  She shares that she has a long history of heartburn/reflux.  She develops intermittent dysphagia symptoms as well.  She shares a history of esophageal stricture requiring dilation.  She deals with dry mouth and recurrent oral yeast infections.  Has questions regarding acid indigestion.  No nausea or vomiting.  Has lost 10 pounds over the past year.  She has some postprandial abdominal pain at times.  She does take digestive enzymes, type unknown.     Deals with sluggish stool output and constipation chronically.  Needs to take  "cocktail of over-the-counter medications in order to stimulate a bowel movement.  Takes a combination of a stool softener, Metamucil, and MiraLAX.  Despite this, she shares that she will need to strain and has mostly a [place] type I stool caliber.  However at times she also describes her stool as \"sticky\".  She feels that her constipation really started to worsen about 6 to 12 months ago, as she has been unable to identify any obvious precipitant.  She denies any BRBPR or melena.  No steatorrhea.  She has a history of hemorrhoids as well as a rectal prolapse.  At times she feels a pressure and bulge in her rectum.  She denies any family history of colon cancer.  She has a history of colon polyps.    She follows with [facility] for her autoimmune disease.  She was diagnosed with limited scleroderma in 2004 and necrotizing myopathy on muscle biopsy and 11/2019.  She takes CellCept and hydroxychloroquine.  Previous rheumatology notes from 2024 indicate no evidence of active skin, joint, cardiopulmonary or GI concerns related to scleroderma.    NSAIDs: prn meloxicam   Etoh: rare  Tobacco: none     07/2023: CT A/P: 3.5 cm segment 7 hepatic cyst. Other subcentimeter hypodensities noted to small to characterize. There is significant fecal content throughout the colon. No bowel obstruction. No mucosal thickening. The appendix is normal.     Endoscopic history:   EGD: 1 year ago  Colon: 5 years ago     Review of Systems   Constitutional:  Negative for appetite change and unexpected weight change.   HENT:  Positive for mouth sores and trouble swallowing.    Gastrointestinal:  Positive for abdominal pain and constipation. Negative for diarrhea.   Genitourinary:  Positive for dysuria and frequency.   Musculoskeletal:  Positive for arthralgias and myalgias.   Neurological:  Negative for seizures and syncope.   Otherwise Per HPI    Historical Information   Past Medical History:   Diagnosis Date    ADHD     Allergic 3-2020    " Bactrim    Anemia 2018    Latest blood work OK    Anxiety Since diagnoised 2003    Intermittent related to my life trying to move. My illness    Arthritis     Very bad especially Right knee    Cancer (HCC) 7-    Cut the Lymph Node Out & thyroid out    Cellulitis of foot     Last assessed 10/24/16    Change in mental status     Last assessed 12/01/15    Chronic fatigue     Depression 2003    I am on meds. Feel good    Disease of thyroid gland     Cancer Surgical Removal total Thyroid    Diverticulitis of colon Last Egd    Diverticlum    PLAZA (dyspnea on exertion)     Last assessed 12/01/15    Fibromyalgia     Folliculitis     Last assessed 10/06/14    GERD (gastroesophageal reflux disease)     Headache(784.0) 3-2020    In front of head    Inflammatory bowel disease 1990’s    Memory loss 2010    New disease neurogentic changes Necrotizing Myopathy    Non Hodgkin's lymphoma (HCC) 07/13/2012    Osteopenia     Osteoporosis 6-2019    Broke Pelvis -left and right pubic ramus,fractured Sacrum    Raynaud's disease     Scleroderma (HCC)     Systemic sclerosis (HCC)     Thyroid cancer (HCC) 10/04/2018    Visual impairment 2021    Need new glasses     Past Surgical History:   Procedure Laterality Date    BACK SURGERY      BLADDER SURGERY      BONE MARROW BIOPSY      CARDIAC CATHETERIZATION      EYE SURGERY  Jan 1010    Correct angles in both eyes    HEMORRHOID SURGERY      HYSTERECTOMY  02/04/2004    KNEE SURGERY      LYMPH NODE BIOPSY  7-     Non Hodgkins Lymphoma    NASAL SEPTUM SURGERY      OOPHORECTOMY Bilateral 02/04/2004    ROTATOR CUFF REPAIR      SPINE SURGERY  12-    Failed microdisectomy L5 S1    THYROIDECTOMY N/A 10/04/2018    Procedure: TOTAL THYROIDECTOMY;  Surgeon: Roger Ortiz MD;  Location: [facility];  Service: Surgical Oncology    TONSILLECTOMY      TOTAL THYROIDECTOMY       Social History   Social History     Substance and Sexual Activity   Alcohol Use Yes    Comment:  social     Social History     Substance and Sexual Activity   Drug Use No     Social History     Tobacco Use   Smoking Status Former    Current packs/day: 0.25    Average packs/day: 0.3 packs/day for 5.0 years (1.3 ttl pk-yrs)    Types: Cigarettes   Smokeless Tobacco Never   Tobacco Comments    I quit cold turkey when I learned 2nd hand smoke     Family History   Problem Relation Age of Onset    Arthritis Mother     Diabetes Mother     Hyperlipidemia Mother         Passed 10- Kidney- Heart Disease    Thyroid disease Mother         Benign lump removed    Autoimmune disease Mother     Coronary artery disease Father     Hyperlipidemia Father          Massive Heart Attack    Arthritis Sister     Asthma Sister     Crohn's disease Sister     Autoimmune disease Sister     Depression Sister     Hyperlipidemia Sister         Very  bad Chrons Disease    Autoimmune disease Sister     Depression Sister     Hyperlipidemia Sister         Heart disease -Lupus like-Thyroid issues    Thyroid disease Sister         Seeing Endrocologist dosage for thyroid    Arthritis Sister         Having many issues this year    Autoimmune disease Sister     No Known Problems Daughter     No Known Problems Maternal Grandmother     No Known Problems Maternal Grandfather     No Known Problems Paternal Grandmother     No Known Problems Paternal Grandfather     Other Brother         myocardial ischemia    Hyperlipidemia Brother         Heart Attacks,torn rotator and bicept . arthritis, knees    Autoimmune disease Brother     Hyperlipidemia Brother         Heart Attacks.bad kness,Arthrites    Autoimmune disease Brother     Ovarian cancer Maternal Aunt     Dementia Maternal Aunt             No Known Problems Maternal Aunt     No Known Problems Maternal Aunt     No Known Problems Maternal Aunt     No Known Problems Maternal Aunt     No Known Problems Maternal Aunt     No Known Problems Paternal Aunt     Breast cancer Neg Hx      "Endometrial cancer Neg Hx     Colon cancer Neg Hx     Breast cancer additional onset Neg Hx     BRCA 1/2 Neg Hx     BRCA2 Positive Neg Hx     BRCA2 Negative Neg Hx     BRCA1 Positive Neg Hx     BRCA1 Negative Neg Hx        Meds/Allergies       Current Outpatient Medications:     ALPRAZolam (XANAX) 0.25 mg tablet    amphetamine-dextroamphetamine (ADDERALL, 10MG,) 10 mg tablet    buPROPion (WELLBUTRIN XL) 300 mg 24 hr tablet    calcium citrate-vitamin D 315 mg-5 mcg tablet    Cholecalciferol (Vitamin D3) 50 MCG (2000 UT) TABS    Clobetasol Prop-Niacinamide (Chlooxia) 0.05-4 % OINT    desmopressin (DDAVP) 0.1 mg tablet    esomeprazole (NexIUM) 40 MG capsule    ezetimibe (ZETIA) 10 mg tablet    fluticasone (FLONASE) 50 mcg/act nasal spray    folic acid (FOLVITE) 1 mg tablet    hydroxychloroquine (PLAQUENIL) 200 mg tablet    levothyroxine 125 mcg tablet    meloxicam (MOBIC) 15 mg tablet    metroNIDAZOLE (METROGEL) 1 % gel    mometasone (ELOCON) 0.1 % cream    Multiple Vitamin (MULTIVITAMIN) tablet    mycophenolate (MYFORTIC) 360 MG TBEC    naproxen (EC NAPROSYN) 500 MG EC tablet    nystatin (MYCOSTATIN) 500,000 units/5 mL suspension    ondansetron (ZOFRAN) 4 mg tablet    pregabalin (LYRICA) 75 mg capsule    traZODone (DESYREL) 150 mg tablet    valACYclovir (VALTREX) 1,000 mg tablet    Allergies   Allergen Reactions    Duloxetine Other (See Comments)     Mental psychosis    Alendronate GI Intolerance, Myalgia and Nausea Only    Revatio [Sildenafil] Other (See Comments)     mental status changes    Savella [Milnacipran] Other (See Comments)     Feeling out of it    Sulfamethoxazole-Trimethoprim Rash and GI Intolerance    Tedizolid Rash       Objective     Pulse 76, temperature 99.2 °F (37.3 °C), temperature source Temporal, resp. rate 16, height 4' 11\" (1.499 m), weight 44.7 kg (98 lb 9.6 oz), SpO2 97%, not currently breastfeeding. Body mass index is 19.91 kg/m².    Physical Exam  Vitals and nursing note reviewed. "   Constitutional:       General: She is not in acute distress.     Appearance: She is well-developed.   HENT:      Head: Normocephalic and atraumatic.   Eyes:      General: No scleral icterus.     Conjunctiva/sclera: Conjunctivae normal.   Cardiovascular:      Rate and Rhythm: Normal rate and regular rhythm.      Heart sounds: No murmur heard.  Pulmonary:      Effort: Pulmonary effort is normal. No respiratory distress.      Breath sounds: Normal breath sounds.   Abdominal:      Palpations: Abdomen is soft.      Tenderness: There is no abdominal tenderness.   Musculoskeletal:      Cervical back: Neck supple.   Skin:     General: Skin is warm and dry.      Coloration: Skin is not jaundiced.   Neurological:      General: No focal deficit present.      Mental Status: She is alert.   Psychiatric:         Mood and Affect: Mood normal.          Lab Results:   No visits with results within 1 Day(s) from this visit.   Latest known visit with results is:   Appointment on 07/29/2024   Component Date Value    TSH 3RD GENERATON 07/29/2024 3.370        Radiology Results:   CT chest abdomen pelvis w contrast    Result Date: 7/31/2024  Narrative: History: Monoclonal gammopathy. History of follicular lymphoma.   Exam: CT of the chest, abdomen, and pelvis with IV contrast.   Technique: Using helical technique, axial images were obtained through the chest, abdomen, and pelvis following administration of 68 cc of Omnipaque 350 intravenous contrast. Coronal and sagittal reformations were performed.   Comparison: MRI of the pelvis from 9/22/2021.   Chest CT. Lungs/Pleura: There is a 3 mm right lower lobe nodule within the posterior sulcus image 101 series 204. There are several pleural-based nodular densities within the posterior left hemithorax. For example on image 43 series 204 measuring 4 x 7.5 mm. No pleural fluid or pneumothorax. Mediastinum/Lymph nodes: There is no mediastinal mass or lymphadenopathy. Heart/Vessels: The heart is  not enlarged. There is no pericardial effusion. No aortic aneurysm or dissection. No pulmonary embolus. Chest Wall: There is no acute chest wall finding. There is severe degenerative disc disease at T1-2. There are age-indeterminate mild compression deformities of T3, T4 and T8. There is severe degenerative change of the left glenohumeral joint with mild to moderate degenerative change of the right glenohumeral joint.   Abdomen CT. Liver: There is a 3.5 cm segment 7 cyst. Other small hypodense lesions are noted throughout the liver too small to fully characterize. Gallbladder/Bile ducts: Normal. Spleen: Normal. Pancreas: Normal. Adrenal glands: Normal. Kidneys/Ureters: Unremarkable. Bowel/Mesentery: There is significant fecal content throughout the colon. No bowel obstruction. No mucosal thickening. The appendix is normal. Lymph nodes: There are no pathologically enlarged abdominal lymph nodes. Vessels: There is mild calcific atherosclerotic disease without aneurysm or dissection. Abdominal wall: Intact. Pelvis CT. Pelvis: The uterus is absent. There is no mass or free fluid. Urinary Bladder:  Normal. Lymph nodes:  There are no pathologically enlarged pelvic lymph nodes.   Bones: There is a scoliosis of the lumbar spine convex left. Moderate to severe degenerative disc disease and facet arthropathy is noted. No acute osseous abnormality. Fluidlike density involving the distal right gluteus medius tendon with associated abundant calcifications affecting the gluteus medius tendon and the trochanteric bursa. Similar findings of the right hip are noted on the prior MR examination.    Impression: Impression: 1. 3 mm right lower lobe pulmonary nodule. Pleural-based nodular densities posterior left hemithorax measuring up to 4 x 7 mm. For patients at low risk (minimal or absent history of smoking and of other known risk factors), recommend CT Chest at 3-6 months, then consider CT Chest at 18-24 months. For patients at high  risk (history of smoking or of other known risk factors), recommend CT Chest at 3-6 months, then CT Chest at 18-24 months. Reference: Ced H, et al. Guidelines for Management of Incidental Pulmonary Nodules Detected on CT Images: From the Fleischner Society 2017. Radiology. 2017; 284(1): 228-243. 2. Age-indeterminate compression deformities of T3, T4 and T8. 3. 3.5 cm segment 7 hepatic cyst. Other subcentimeter hypodensities noted to small to characterize. In a low risk patient (no known malignancy, no hepatic dysfunction, no hepatic risk factors), these are most likely to be benign and no further follow-up is recommended. In a high-risk patient (known malignancy with a propensity to metastasize to the liver, cirrhosis or presence of hepatic risk factors), follow-up MRI in 3-6 months is recommended (or earlier if warranted by the patient's specific clinical circumstances). 4. Chronic changes involving the right gluteus medius medius tendon and greater trochanteric bursal region. Workstation:[id number]    Ann Brooks PA-C    **Please note:  Dictation voice to text software may have been used in the creation of this record.  Occasional wrong word or “sound alike” substitutions may have occurred due to the inherent limitations of voice recognition software.  Read the chart carefully and recognize, using context, where substitutions have occurred.**

## 2024-08-16 ENCOUNTER — TELEPHONE (OUTPATIENT)
Age: 69
End: 2024-08-16

## 2024-08-16 DIAGNOSIS — Z00.6 ENCOUNTER FOR EXAMINATION FOR NORMAL COMPARISON OR CONTROL IN CLINICAL RESEARCH PROGRAM: ICD-10-CM

## 2024-08-16 NOTE — TELEPHONE ENCOUNTER
Patient requesting UA labs to be faxed to [facility] at [phone].  Faxed via [address]

## 2024-08-16 NOTE — TELEPHONE ENCOUNTER
Pt calling because she has decided not to transfer to [facility] due to long wait time for colonoscopy.  Forwarded to clerical team.   Detail Level: Generalized Detail Level: Detailed Patient Specific Counseling (Will Not Stick From Patient To Patient): Will schedule excision

## 2024-08-23 ENCOUNTER — PATIENT MESSAGE (OUTPATIENT)
Dept: FAMILY MEDICINE CLINIC | Facility: CLINIC | Age: 69
End: 2024-08-23

## 2024-08-23 DIAGNOSIS — N39.41 URGE INCONTINENCE: ICD-10-CM

## 2024-08-23 DIAGNOSIS — F90.0 ATTENTION DEFICIT HYPERACTIVITY DISORDER (ADHD), PREDOMINANTLY INATTENTIVE TYPE: ICD-10-CM

## 2024-08-23 RX ORDER — DEXTROAMPHETAMINE SACCHARATE, AMPHETAMINE ASPARTATE MONOHYDRATE, DEXTROAMPHETAMINE SULFATE AND AMPHETAMINE SULFATE 7.5; 7.5; 7.5; 7.5 MG/1; MG/1; MG/1; MG/1
30 CAPSULE, EXTENDED RELEASE ORAL EVERY MORNING
Qty: 30 CAPSULE | Refills: 0 | Status: SHIPPED | OUTPATIENT
Start: 2024-08-23

## 2024-08-23 RX ORDER — DESMOPRESSIN ACETATE 0.2 MG/1
0.2 TABLET ORAL
Qty: 30 TABLET | Refills: 1 | Status: SHIPPED | OUTPATIENT
Start: 2024-08-23

## 2024-08-23 RX ORDER — DEXTROAMPHETAMINE SACCHARATE, AMPHETAMINE ASPARTATE, DEXTROAMPHETAMINE SULFATE AND AMPHETAMINE SULFATE 2.5; 2.5; 2.5; 2.5 MG/1; MG/1; MG/1; MG/1
20 TABLET ORAL SEE ADMIN INSTRUCTIONS
Qty: 60 TABLET | Refills: 0 | Status: SHIPPED | OUTPATIENT
Start: 2024-08-23

## 2024-08-27 ENCOUNTER — APPOINTMENT (OUTPATIENT)
Dept: LAB | Facility: CLINIC | Age: 69
End: 2024-08-27

## 2024-08-27 ENCOUNTER — PROCEDURE VISIT (OUTPATIENT)
Dept: FAMILY MEDICINE CLINIC | Facility: CLINIC | Age: 69
End: 2024-08-27
Payer: MEDICARE

## 2024-08-27 VITALS
WEIGHT: 102.29 LBS | OXYGEN SATURATION: 98 % | DIASTOLIC BLOOD PRESSURE: 75 MMHG | BODY MASS INDEX: 20.66 KG/M2 | HEART RATE: 80 BPM | SYSTOLIC BLOOD PRESSURE: 164 MMHG

## 2024-08-27 DIAGNOSIS — M17.0 PRIMARY OSTEOARTHRITIS OF BOTH KNEES: Primary | ICD-10-CM

## 2024-08-27 DIAGNOSIS — Z00.6 ENCOUNTER FOR EXAMINATION FOR NORMAL COMPARISON OR CONTROL IN CLINICAL RESEARCH PROGRAM: ICD-10-CM

## 2024-08-27 DIAGNOSIS — G72.89 NECROTIZING MYOPATHY: ICD-10-CM

## 2024-08-27 PROCEDURE — 36415 COLL VENOUS BLD VENIPUNCTURE: CPT

## 2024-08-27 PROCEDURE — 20610 DRAIN/INJ JOINT/BURSA W/O US: CPT | Performed by: FAMILY MEDICINE

## 2024-08-27 RX ORDER — LIDOCAINE HYDROCHLORIDE 20 MG/ML
4 INJECTION, SOLUTION EPIDURAL; INFILTRATION; INTRACAUDAL; PERINEURAL
Status: COMPLETED | OUTPATIENT
Start: 2024-08-27 | End: 2024-08-27

## 2024-08-27 RX ORDER — TRIAMCINOLONE ACETONIDE 40 MG/ML
40 INJECTION, SUSPENSION INTRA-ARTICULAR; INTRAMUSCULAR
Status: COMPLETED | OUTPATIENT
Start: 2024-08-27 | End: 2024-08-27

## 2024-08-27 RX ORDER — MYCOPHENOLIC ACID 360 MG/1
TABLET, DELAYED RELEASE ORAL
Qty: 180 TABLET | Refills: 2 | Status: SHIPPED | OUTPATIENT
Start: 2024-08-27

## 2024-08-27 RX ADMIN — TRIAMCINOLONE ACETONIDE 40 MG: 40 INJECTION, SUSPENSION INTRA-ARTICULAR; INTRAMUSCULAR at 13:40

## 2024-08-27 RX ADMIN — LIDOCAINE HYDROCHLORIDE 4 ML: 20 INJECTION, SOLUTION EPIDURAL; INFILTRATION; INTRACAUDAL; PERINEURAL at 13:40

## 2024-08-27 NOTE — PROGRESS NOTES
Large joint arthrocentesis: R knee  [place] Protocol:  procedure performed by consultantConsent: Verbal consent obtained.  Risks and benefits: risks, benefits and alternatives were discussed  Consent given by: patient  Patient identity confirmed: verbally with patient  Supporting Documentation  Indications: pain   Procedure Details  Location: knee - R knee  Needle size: 22 G  Ultrasound guidance: no  Approach: anteromedial  Medications administered: 4 mL lidocaine (PF) 2 %; 40 mg triamcinolone acetonide 40 mg/mL    Aspirate amount: 0 mL  Patient tolerance: patient tolerated the procedure well with no immediate complications  Dressing:  Sterile dressing applied

## 2024-08-29 ENCOUNTER — TELEPHONE (OUTPATIENT)
Age: 69
End: 2024-08-29

## 2024-08-29 NOTE — TELEPHONE ENCOUNTER
Spoke with patient, states she missed a call from Dr Rendon yesterday and is returning his call.    When you have time to return her call she would appreciate it. She is currently in [place] at [facility] but she will try to answer your call.     [phone]    States she has stopped the Zetia.

## 2024-08-30 ENCOUNTER — DOCUMENTATION (OUTPATIENT)
Dept: CARDIOLOGY CLINIC | Facility: CLINIC | Age: 69
End: 2024-08-30

## 2024-08-30 NOTE — PROGRESS NOTES
Patient was convinced that that he had caused muscle aches but she also had sinus congestion at that time and I wonder if she might have had a viral illness around the same time.  She is willing to try Zetia 1 more time and if she has similar symptoms, will take it out off her options entirely.  Discussed with her.

## 2024-09-07 LAB
APOB+LDLR+PCSK9 GENE MUT ANL BLD/T: NOT DETECTED
BRCA1+BRCA2 DEL+DUP + FULL MUT ANL BLD/T: NOT DETECTED
MLH1+MSH2+MSH6+PMS2 GN DEL+DUP+FUL M: NOT DETECTED

## 2024-09-16 ENCOUNTER — HOSPITAL ENCOUNTER (OUTPATIENT)
Dept: NON INVASIVE DIAGNOSTICS | Facility: HOSPITAL | Age: 69
Discharge: HOME | End: 2024-09-16
Attending: INTERNAL MEDICINE
Payer: MEDICARE

## 2024-09-16 DIAGNOSIS — I70.90 ATHEROSCLEROSIS OF ARTERIES: ICD-10-CM

## 2024-09-16 DIAGNOSIS — I67.89 OTHER CEREBROVASCULAR DISEASE: ICD-10-CM

## 2024-09-16 PROCEDURE — 93880 EXTRACRANIAL BILAT STUDY: CPT

## 2024-09-16 PROCEDURE — 93880 EXTRACRANIAL BILAT STUDY: CPT | Performed by: INTERNAL MEDICINE

## 2024-09-19 ENCOUNTER — PATIENT MESSAGE (OUTPATIENT)
Dept: FAMILY MEDICINE CLINIC | Facility: CLINIC | Age: 69
End: 2024-09-19

## 2024-09-19 ENCOUNTER — TELEPHONE (OUTPATIENT)
Age: 69
End: 2024-09-19

## 2024-09-19 ENCOUNTER — TELEPHONE (OUTPATIENT)
Dept: FAMILY MEDICINE CLINIC | Facility: CLINIC | Age: 69
End: 2024-09-19

## 2024-09-19 ENCOUNTER — TELEPHONE (OUTPATIENT)
Dept: CARDIOLOGY CLINIC | Facility: HOSPITAL | Age: 69
End: 2024-09-19

## 2024-09-19 DIAGNOSIS — M81.0 AGE-RELATED OSTEOPOROSIS WITHOUT CURRENT PATHOLOGICAL FRACTURE: ICD-10-CM

## 2024-09-19 RX ORDER — CHOLECALCIFEROL (VITAMIN D3) 50 MCG
2000 TABLET ORAL DAILY
Qty: 90 TABLET | Refills: 0 | Status: SHIPPED | OUTPATIENT
Start: 2024-09-19

## 2024-09-19 NOTE — TELEPHONE ENCOUNTER
09/19/24 lvm with Dr. Rendon's message for pt below verbatim.        In basket message:     Message  Received: Today  Jessica Rendon MD sent to [facility]  Nothing else for now we will just check it again in 3 months          Previous Messages    VAS carotid complete study  Order: [id number]   Status: Final result       Visible to patient: Yes (not seen)       Dx: Atherosclerosis of arteries; Other ce...    1 Result Note  Details    Reading Physician Reading Date Result Priority   Unknown Provider 9/16/2024    Kiara Yeboah MD  [phone] 9/16/2024      Result Text     [facility] REPORT  CLINICAL:  Indications:  Patient presents with episodes of headaches, dizziness and feeling of being off  balance.  She is currently asymptomatic.  Operative History:  Cardiac Catheterization (Date unknown)  Risk Factors  The patient has history of Hyperlipidemia.  Clinical  Right Pressure:  132/ mm Hg, Left Pressure:  126/ mm Hg.     FINDINGS:     Right        PSV  EDV (cm/s)  Direction of Flow  Ratio    Dist. ICA    100          28                      1.46    Mid. ICA      97          33                      1.41    Prox. ICA     60          12                      0.88    Dist CCA      60          12                              Mid CCA       69          14                      1.13    Prox CCA      61           8                      0.58    Ext Carotid   80           0                      1.17    Prox Vert     75          23  Antegrade                   Subclavian    99           0                              Innominate   105          14                                 Left         Impression  PSV  EDV (cm/s)  Direction of Flow  Ratio    Dist. ICA                103          37                      1.71    Mid. ICA     50 - 69%    157          31                      2.61    Prox. ICA                 67          25                      1.11    Dist CCA                  57          14                               Mid CCA                   60          12                      0.95    Prox CCA                  63          17                              Ext Carotid               54           0                      0.90    Prox Vert                 59          16  Antegrade                   Subclavian               110           0                                       CONCLUSION:  Impression  RIGHT:  There is no evidence of arterial disease throughout the extracranial carotid  system.  Vertebral artery flow is antegrade.  There is no significant subclavian artery disease.     LEFT:  There is 50-69% stenosis noted in the internal carotid artery. Plaque is  heterogenous and irregular.  Vertebral artery flow is antegrade.  There is no significant subclavian artery disease.     SIGNATURE:  Electronically Signed by: BOBBY MEDEROS MD FACP [facility] on 2024-09-16 12:04:38 PM           Exam Ended: 09/16/24 11:48 AM Last Resulted: 09/16/24 12:04 PM       Order Details        View Encounter        Lab and Collection Details        Routing        Result History     View All Conversations on this Encounter           Linked Documents    View Image   View Image      Result Care Coordination      Result Notes     Jessica Rendon MD  9/17/2024  1:32 PM EDT Back to Top      Carotid Dopplers does suggest a left-sided blockage that will need to be watched, recheck in 6 months, please let her know     in the interim, we need good blood pressure and cholesterol control, if she on the Zetia that she was going to try again?

## 2024-09-19 NOTE — TELEPHONE ENCOUNTER
Patient needs an appt for manipulation per Dr. Amaro.  Unable to schedule, Warm transferred to the office.

## 2024-09-20 DIAGNOSIS — F90.0 ATTENTION DEFICIT HYPERACTIVITY DISORDER (ADHD), PREDOMINANTLY INATTENTIVE TYPE: ICD-10-CM

## 2024-09-20 RX ORDER — DEXTROAMPHETAMINE SACCHARATE, AMPHETAMINE ASPARTATE MONOHYDRATE, DEXTROAMPHETAMINE SULFATE AND AMPHETAMINE SULFATE 7.5; 7.5; 7.5; 7.5 MG/1; MG/1; MG/1; MG/1
30 CAPSULE, EXTENDED RELEASE ORAL EVERY MORNING
Qty: 30 CAPSULE | Refills: 0 | Status: SHIPPED | OUTPATIENT
Start: 2024-09-20

## 2024-09-21 DIAGNOSIS — M85.80 OSTEOPENIA, UNSPECIFIED LOCATION: ICD-10-CM

## 2024-09-23 ENCOUNTER — PROCEDURE VISIT (OUTPATIENT)
Dept: FAMILY MEDICINE CLINIC | Facility: CLINIC | Age: 69
End: 2024-09-23
Payer: MEDICARE

## 2024-09-23 DIAGNOSIS — F90.0 ATTENTION DEFICIT HYPERACTIVITY DISORDER (ADHD), PREDOMINANTLY INATTENTIVE TYPE: ICD-10-CM

## 2024-09-23 DIAGNOSIS — N39.41 URGE INCONTINENCE: ICD-10-CM

## 2024-09-23 DIAGNOSIS — M99.02 SOMATIC DYSFUNCTION OF THORACIC REGION: ICD-10-CM

## 2024-09-23 DIAGNOSIS — M99.06 SOMATIC DYSFUNCTION OF LOWER EXTREMITY: ICD-10-CM

## 2024-09-23 DIAGNOSIS — M99.03 SOMATIC DYSFUNCTION OF LUMBAR REGION: ICD-10-CM

## 2024-09-23 DIAGNOSIS — M99.04 SOMATIC DYSFUNCTION OF SACRAL REGION: ICD-10-CM

## 2024-09-23 DIAGNOSIS — M99.08 SOMATIC DYSFUNCTION OF RIB CAGE REGION: ICD-10-CM

## 2024-09-23 DIAGNOSIS — F51.01 PRIMARY INSOMNIA: ICD-10-CM

## 2024-09-23 DIAGNOSIS — M99.05 SOMATIC DYSFUNCTION OF PELVIS REGION: ICD-10-CM

## 2024-09-23 DIAGNOSIS — M54.16 RADICULOPATHY, LUMBAR REGION: Primary | ICD-10-CM

## 2024-09-23 PROCEDURE — 98928 OSTEOPATH MANJ 7-8 REGIONS: CPT | Performed by: FAMILY MEDICINE

## 2024-09-23 RX ORDER — ALPRAZOLAM 0.25 MG
0.25 TABLET ORAL 2 TIMES DAILY PRN
Qty: 60 TABLET | Refills: 0 | Status: SHIPPED | OUTPATIENT
Start: 2024-09-23

## 2024-09-23 RX ORDER — DEXTROAMPHETAMINE SACCHARATE, AMPHETAMINE ASPARTATE, DEXTROAMPHETAMINE SULFATE AND AMPHETAMINE SULFATE 2.5; 2.5; 2.5; 2.5 MG/1; MG/1; MG/1; MG/1
20 TABLET ORAL SEE ADMIN INSTRUCTIONS
Qty: 60 TABLET | Refills: 0 | Status: SHIPPED | OUTPATIENT
Start: 2024-09-23

## 2024-09-23 RX ORDER — LANOLIN ALCOHOL/MO/W.PET/CERES
1 CREAM (GRAM) TOPICAL DAILY
Qty: 90 TABLET | Refills: 0 | Status: SHIPPED | OUTPATIENT
Start: 2024-09-23

## 2024-09-23 RX ORDER — DESMOPRESSIN ACETATE 0.2 MG/1
0.2 TABLET ORAL
Qty: 30 TABLET | Refills: 1 | Status: SHIPPED | OUTPATIENT
Start: 2024-09-23

## 2024-09-23 RX ORDER — TRAZODONE HYDROCHLORIDE 150 MG/1
150 TABLET ORAL
Qty: 90 TABLET | Refills: 1 | Status: SHIPPED | OUTPATIENT
Start: 2024-09-23

## 2024-09-23 NOTE — TELEPHONE ENCOUNTER
Patient will be out of town for 7 days starting at noon today.  Requests prescription be filled ASAP

## 2024-09-23 NOTE — PROGRESS NOTES
The Assessment & Plan     This is a 69 y.o. female who presents for OMT follow-up for:  1. Radiculopathy, lumbar region  OMT      2. Somatic dysfunction of thoracic region  OMT      3. Somatic dysfunction of lumbar region  OMT      4. Somatic dysfunction of sacral region  OMT      5. Somatic dysfunction of pelvis region  OMT      6. Somatic dysfunction of lower extremity  OMT      7. Somatic dysfunction of rib cage region  OMT           1. Patient tolerated OMT well for the above problems,  advised patient to drink fluids and can use NSAID for soreness after treatment     2. OMT Follow up in 4 weeks.    Subjective     Mimi Biggs is a 69 y.o. female and is here for a OMT initial. The patient reports feeling like her pelvis is not aligned properly and needing an adjustment. She had seen her previous PCP for OMT but has not had any treatment in over a year. She continue with chronic back pain.     Is the patient taking Pain medication? yes Mobic, Lyrica  Has the patient completed physical therapy for this condition? yes  The following portions of the patient's history were reviewed and updated as appropriate: allergies, current medications, past family history, past medical history, past social history, past surgical history, and problem list.    Review of Systems  Review of Systems   Musculoskeletal:  Positive for back pain and gait problem.         Objective     OMT Exam     OMT    Performed by: Patti Amaro DO  Authorized by: Patti Amaro DO  Universal Protocol:  procedure performed by consultantConsent: Verbal consent obtained.  Risks and benefits: risks, benefits and alternatives were discussed  Consent given by: patient  Patient identity confirmed: verbally with patient      Procedure Details:     Region evaluated and treated:  Lumbar, Sacrum/Pelvis, Pelvis Innominate, Ribs, Thoracic, Right Extremities and Left Extremities    Extremity Information  Extremities: left lower extremity    Extremity  Information  Extremities: right lower extremity    Lumbar details:     Examination Method:  Tissue Texture Change, Stability, Laxity, Effusions, Tone, Asymmetry, Misalignment, Crepitation, Defects, Masses and Range of Motion, Contracture    Severity:  Moderate    Osteopathic Findings:  L4-L5 RL SR  Left sided paraspinal hypertonicity     Treatment Method:  Soft Tissue Treatment, Myofascial Release Treatment, Muscle Energy Treatment, Direct Treatment and Indirect Treatment    Response:  Improved - The somatic dysfunction is improved but not completely resolved.    Sacrum/Pelvis details:     Examination Method:  Asymmetry, Misalignment, Crepitation, Defects, Masses and Tissue Texture Change, Stability, Laxity, Effusions, Tone    Severity:  Mild    Osteopathic Findings:  LonL sacral torsion    Treatment Method:  Articulatory Treatment, Myofascial Release Treatment, Soft Tissue Treatment, Indirect Treatment and Direct Treatment    Response:  Improved - The somatic dysfunction is improved but not completely resolved.    Pelvis Innominate details:     Examination Method:  Asymmetry, Misalignment, Crepitation, Defects, Masses and Tissue Texture Change, Stability, Laxity, Effusions, Tone    Severity:  Moderate    Osteopathic Findings:  Right superior innominate    Treatment Method:  High Velocity, Low Amplitude Treatment, Direct Treatment, Myofascial Release Treatment, Soft Tissue Treatment, Muscle Energy Treatment and Indirect Treatment    Response:  Improved - The somatic dysfunction is improved but not completely resolved.    Right Lower Extremity details:     Examination Method:  Tenderness, Pain and Asymmetry, Misalignment, Crepitation, Defects, Masses    Severity:  Mild    Osteopathic Findings:  ITB tenstion    Treatment Method:  Indirect Treatment, Myofascial Release Treatment and Soft Tissue Treatment    Response:  Improved - The somatic dysfunction is improved but not completely resolved.    Left Lower Extremity  details:     Examination Method:  Tenderness, Pain    Severity:  Mild    Osteopathic Findings:  ITB tension    Treatment Method:  Indirect Treatment, Muscle Energy Treatment and Myofascial Release Treatment    Response:  Improved - The somatic dysfunction is improved but not completely resolved.    Ribs details:     Examination Method:  Asymmetry, Misalignment, Crepitation, Defects, Masses and Tissue Texture Change, Stability, Laxity, Effusions, Tone    Severity:  Mild    Osteopathic Findings:  Muscle hypertonicity left Ribs 6-9 inhalation dysfunction    Treatment Method:  Soft Tissue Treatment, Muscle Energy Treatment, Myofascial Release Treatment, Indirect Treatment and Direct Treatment    Response:  Improved - The somatic dysfunction is improved but not completely resolved.    Total Regions Treated:  7

## 2024-10-17 DIAGNOSIS — M17.0 PRIMARY OSTEOARTHRITIS OF BOTH KNEES: Primary | ICD-10-CM

## 2024-10-17 DIAGNOSIS — G89.4 CHRONIC PAIN SYNDROME: ICD-10-CM

## 2024-10-17 DIAGNOSIS — F90.0 ATTENTION DEFICIT HYPERACTIVITY DISORDER (ADHD), PREDOMINANTLY INATTENTIVE TYPE: ICD-10-CM

## 2024-10-17 DIAGNOSIS — E03.9 ACQUIRED HYPOTHYROIDISM: ICD-10-CM

## 2024-10-17 DIAGNOSIS — N39.41 URGE INCONTINENCE: ICD-10-CM

## 2024-10-17 RX ORDER — LEVOTHYROXINE SODIUM 125 UG/1
125 TABLET ORAL DAILY
Qty: 90 TABLET | Refills: 0 | Status: SHIPPED | OUTPATIENT
Start: 2024-10-17 | End: 2024-10-22 | Stop reason: SDUPTHER

## 2024-10-17 NOTE — TELEPHONE ENCOUNTER
Mimi call into the office today someone called her from the office she was nt sure why. There is no telephone encounter for her and noone In the office didn't call. I said that if anything was needed we can give her a call back

## 2024-10-17 NOTE — TELEPHONE ENCOUNTER
Patient called in returning a call from the office I was unable to locate a note stating the reason for the call, I did contact the office to follow up and office staff is also unsure who contacted the patient at this time. Patient was notified and will contact her back if necessary

## 2024-10-18 RX ORDER — MELOXICAM 15 MG/1
15 TABLET ORAL DAILY
Qty: 90 TABLET | Refills: 0 | Status: SHIPPED | OUTPATIENT
Start: 2024-10-18

## 2024-10-18 RX ORDER — DEXTROAMPHETAMINE SACCHARATE, AMPHETAMINE ASPARTATE, DEXTROAMPHETAMINE SULFATE AND AMPHETAMINE SULFATE 2.5; 2.5; 2.5; 2.5 MG/1; MG/1; MG/1; MG/1
20 TABLET ORAL SEE ADMIN INSTRUCTIONS
Qty: 60 TABLET | Refills: 0 | Status: SHIPPED | OUTPATIENT
Start: 2024-10-18

## 2024-10-18 RX ORDER — DESMOPRESSIN ACETATE 0.2 MG/1
0.2 TABLET ORAL
Qty: 30 TABLET | Refills: 0 | Status: SHIPPED | OUTPATIENT
Start: 2024-10-18

## 2024-10-18 RX ORDER — DEXTROAMPHETAMINE SACCHARATE, AMPHETAMINE ASPARTATE MONOHYDRATE, DEXTROAMPHETAMINE SULFATE AND AMPHETAMINE SULFATE 7.5; 7.5; 7.5; 7.5 MG/1; MG/1; MG/1; MG/1
30 CAPSULE, EXTENDED RELEASE ORAL EVERY MORNING
Qty: 30 CAPSULE | Refills: 0 | Status: SHIPPED | OUTPATIENT
Start: 2024-10-18

## 2024-10-18 RX ORDER — NAPROXEN 500 MG/1
500 TABLET ORAL 2 TIMES DAILY PRN
Qty: 30 TABLET | Refills: 0 | Status: SHIPPED | OUTPATIENT
Start: 2024-10-18

## 2024-10-21 ENCOUNTER — RX ONLY (OUTPATIENT)
Age: 69
Setting detail: RX ONLY
End: 2024-10-21

## 2024-10-21 RX ORDER — DOXYCYCLINE HYCLATE 50 MG/1
50MG CAPSULE, GELATIN COATED ORAL BID
Qty: 28 | Refills: 0 | Status: ERX

## 2024-11-05 ENCOUNTER — APPOINTMENT (RX ONLY)
Dept: URBAN - NONMETROPOLITAN AREA CLINIC 4 | Facility: CLINIC | Age: 69
Setting detail: DERMATOLOGY
End: 2024-11-05

## 2024-11-05 ENCOUNTER — PROCEDURE VISIT (OUTPATIENT)
Dept: FAMILY MEDICINE CLINIC | Facility: CLINIC | Age: 69
End: 2024-11-05
Payer: MEDICARE

## 2024-11-05 VITALS — WEIGHT: 99.43 LBS | BODY MASS INDEX: 20.08 KG/M2

## 2024-11-05 DIAGNOSIS — L71.8 OTHER ROSACEA: ICD-10-CM

## 2024-11-05 DIAGNOSIS — L72.0 EPIDERMAL CYST: ICD-10-CM

## 2024-11-05 DIAGNOSIS — L57.0 ACTINIC KERATOSIS: ICD-10-CM

## 2024-11-05 DIAGNOSIS — E03.9 ACQUIRED HYPOTHYROIDISM: ICD-10-CM

## 2024-11-05 DIAGNOSIS — M99.01 SOMATIC DYSFUNCTION OF CERVICAL REGION: ICD-10-CM

## 2024-11-05 DIAGNOSIS — M54.2 NECK PAIN: ICD-10-CM

## 2024-11-05 DIAGNOSIS — M99.08 SOMATIC DYSFUNCTION OF RIB CAGE REGION: ICD-10-CM

## 2024-11-05 DIAGNOSIS — M99.02 SOMATIC DYSFUNCTION OF THORACIC REGION: ICD-10-CM

## 2024-11-05 DIAGNOSIS — M54.16 RADICULOPATHY, LUMBAR REGION: Primary | ICD-10-CM

## 2024-11-05 DIAGNOSIS — M99.03 SOMATIC DYSFUNCTION OF LUMBAR REGION: ICD-10-CM

## 2024-11-05 DIAGNOSIS — M99.05 SOMATIC DYSFUNCTION OF PELVIS REGION: ICD-10-CM

## 2024-11-05 DIAGNOSIS — M99.04 SOMATIC DYSFUNCTION OF SACRAL REGION: ICD-10-CM

## 2024-11-05 DIAGNOSIS — M99.06 SOMATIC DYSFUNCTION OF LOWER EXTREMITY: ICD-10-CM

## 2024-11-05 DIAGNOSIS — M99.00 SOMATIC DYSFUNCTION OF HEAD REGION: ICD-10-CM

## 2024-11-05 PROCEDURE — 17110 DESTRUCTION B9 LES UP TO 14: CPT

## 2024-11-05 PROCEDURE — ? ELECTRODESICCATION

## 2024-11-05 PROCEDURE — ? LIQUID NITROGEN

## 2024-11-05 PROCEDURE — 17003 DESTRUCT PREMALG LES 2-14: CPT | Mod: 59

## 2024-11-05 PROCEDURE — 98929 OSTEOPATH MANJ 9-10 REGIONS: CPT | Performed by: FAMILY MEDICINE

## 2024-11-05 PROCEDURE — ? COUNSELING

## 2024-11-05 PROCEDURE — 17000 DESTRUCT PREMALG LESION: CPT | Mod: 59

## 2024-11-05 PROCEDURE — ? PRESCRIPTION MEDICATION MANAGEMENT

## 2024-11-05 PROCEDURE — 99213 OFFICE O/P EST LOW 20 MIN: CPT | Mod: 25

## 2024-11-05 RX ORDER — LEVOTHYROXINE SODIUM 150 UG/1
150 TABLET ORAL DAILY
Qty: 90 TABLET | Refills: 1 | Status: SHIPPED | OUTPATIENT
Start: 2024-11-05

## 2024-11-05 ASSESSMENT — LOCATION ZONE DERM
LOCATION ZONE: FACE
LOCATION ZONE: HAND
LOCATION ZONE: LIP

## 2024-11-05 ASSESSMENT — LOCATION SIMPLE DESCRIPTION DERM
LOCATION SIMPLE: LEFT CHEEK
LOCATION SIMPLE: RIGHT HAND
LOCATION SIMPLE: RIGHT CHEEK
LOCATION SIMPLE: LEFT LIP

## 2024-11-05 ASSESSMENT — LOCATION DETAILED DESCRIPTION DERM
LOCATION DETAILED: LEFT INFERIOR VERMILION LIP
LOCATION DETAILED: LEFT INFERIOR CENTRAL MALAR CHEEK
LOCATION DETAILED: RIGHT INFERIOR CENTRAL MALAR CHEEK
LOCATION DETAILED: RIGHT ULNAR DORSAL HAND
LOCATION DETAILED: RIGHT RADIAL DORSAL HAND

## 2024-11-05 ASSESSMENT — TOTAL NUMBER OF LESIONS: # OF LESIONS?: 3

## 2024-11-05 NOTE — PROCEDURE: PRESCRIPTION MEDICATION MANAGEMENT
Detail Level: Zone
Continue Regimen: Metronidazole 1% topical gel BID.
Render In Strict Bullet Format?: No

## 2024-11-05 NOTE — PROCEDURE: LIQUID NITROGEN
Show Applicator Variable?: Yes
Consent: The patient's consent was obtained including but not limited to risks of crusting, scabbing, blistering, scarring, darker or lighter pigmentary change, recurrence, incomplete removal and infection.
Application Tool (Optional): Cry-AC
Render Note In Bullet Format When Appropriate: No
Number Of Freeze-Thaw Cycles: 1 freeze-thaw cycle
Duration Of Freeze Thaw-Cycle (Seconds): 3
Post-Care Instructions: I reviewed with the patient in detail post-care instructions. Patient is to wear sunprotection, and avoid picking at any of the treated lesions. Pt may apply Vaseline to crusted or scabbing areas.
Detail Level: Zone

## 2024-11-05 NOTE — PROCEDURE: ELECTRODESICCATION
Post-Care Instructions: I reviewed with the patient in detail post-care instructions. Patient is to wear sunprotection, and avoid picking at any of the treated lesions. Pt may apply Vaseline to crusted or scabbing areas
Include Z78.9 (Other Specified Conditions Influencing Health Status) As An Associated Diagnosis?: No
Consent: The patient's consent was obtained including but not limited to risks of crusting, scabbing, blistering, scarring, darker or lighter pigmentary change, recurrence, incomplete removal and infection.
Cook: 2
Medical Necessity Information: It is in your best interest to select a reason for this procedure from the list below. All of these items fulfill various CMS LCD requirements except the new and changing color options.
Medical Necessity Clause: This procedure was medically necessary because the lesions that were treated were:
Detail Level: Simple

## 2024-11-05 NOTE — PROGRESS NOTES
The Assessment & Plan     This is a 69 y.o. female who presents for OMT follow-up for:  1. Radiculopathy, lumbar region  OMT      2. Neck pain        3. Somatic dysfunction of thoracic region  OMT      4. Somatic dysfunction of lumbar region  OMT      5. Somatic dysfunction of pelvis region  OMT      6. Somatic dysfunction of sacral region  OMT      7. Somatic dysfunction of lower extremity  OMT      8. Somatic dysfunction of rib cage region  OMT      9. Acquired hypothyroidism  levothyroxine 150 mcg tablet      10. Somatic dysfunction of cervical region        11. Somatic dysfunction of head region               1. Patient tolerated OMT well for the above problems,  advised patient to drink fluids and can use NSAID for soreness after treatment     2. OMT Follow up in 4 weeks.    Subjective     Mimi Biggs is a 69 y.o. female and is here for a OMT initial. The patient reports feeling like her pelvis is not aligned properly and needing an adjustment. She had seen her previous PCP for OMT but has not had any treatment in over a year. She continue with chronic back pain. She also reports some neck pain and tightness today and requests we spend some time on that as well.     Is the patient taking Pain medication? yes Mobic, Lyrica  Has the patient completed physical therapy for this condition? yes  The following portions of the patient's history were reviewed and updated as appropriate: allergies, current medications, past family history, past medical history, past social history, past surgical history, and problem list.    Review of Systems  Review of Systems   Musculoskeletal:  Positive for back pain and gait problem.         Objective     OMT Exam     OMT    Performed by: Patti Amaro DO  Authorized by: Patti Amaro DO  Universal Protocol:  procedure performed by consultantConsent: Verbal consent obtained.  Risks and benefits: risks, benefits and alternatives were discussed  Consent given by:  patient  Patient identity confirmed: verbally with patient      Procedure Details:     Region evaluated and treated:  Lumbar, Sacrum/Pelvis, Pelvis Innominate, Ribs, Thoracic, Right Extremities, Left Extremities, Cervical and Head    Extremity Information  Extremities: left lower extremity    Extremity Information  Extremities: right lower extremity    Thoracic Information  Thoracic Region: T5 - T9  Head Details:     Examination Method:  Tissue Texture Change, Stability, Laxity, Effusions, Tone and Asymmetry, Misalignment, Crepitation, Defects, Masses    Severity:  Mild    Treatment Method:  Cranial Treatment, Osteopathy in the Cranial Field, Cranial Osteopathy    Response:  Improved - The somatic dysfunction is improved but not completely resolved.    Cervical Details:     Examination Method:  Tissue Texture Change, Stability, Laxity, Effusions, Tone, Tenderness, Pain and Asymmetry, Misalignment, Crepitation, Defects, Masses    Severity:  Moderate    Osteopathic Findings:  Bilateral L>R cervical muscle tension     Treatment Method:  Counterstrain Treatment, Myofascial Release Treatment, Soft Tissue Treatment and Indirect Treatment    Response:  Improved - The somatic dysfunction is improved but not completely resolved.    Thoracic T5 - T9 details:     Examination Method:  Tissue Texture Change, Stability, Laxity, Effusions, Tone, Asymmetry, Misalignment, Crepitation, Defects, Masses and Tenderness, Pain    Severity:  Moderate    Osteopathic Findings:  Left sided paraspinal muscle spasm     Treatment Method:  Soft Tissue Treatment, Myofascial Release Treatment, Indirect Treatment and Facilitated Positional Release Treatment    Response:  Improved - The somatic dysfunction is improved but not completely resolved.    Lumbar details:     Examination Method:  Tissue Texture Change, Stability, Laxity, Effusions, Tone, Asymmetry, Misalignment, Crepitation, Defects, Masses and Range of Motion, Contracture    Severity:   Moderate    Osteopathic Findings:  L4-L5 RL SR  Left sided paraspinal hypertonicity     Treatment Method:  Soft Tissue Treatment, Myofascial Release Treatment, Muscle Energy Treatment, Direct Treatment and Indirect Treatment    Response:  Improved - The somatic dysfunction is improved but not completely resolved.    Sacrum/Pelvis details:     Examination Method:  Asymmetry, Misalignment, Crepitation, Defects, Masses and Tissue Texture Change, Stability, Laxity, Effusions, Tone    Severity:  Mild    Osteopathic Findings:  LonL sacral torsion    Treatment Method:  Articulatory Treatment, Myofascial Release Treatment, Soft Tissue Treatment, Indirect Treatment and Direct Treatment    Response:  Improved - The somatic dysfunction is improved but not completely resolved.    Pelvis Innominate details:     Examination Method:  Asymmetry, Misalignment, Crepitation, Defects, Masses and Tissue Texture Change, Stability, Laxity, Effusions, Tone    Severity:  Moderate    Osteopathic Findings:  Right superior innominate    Treatment Method:  High Velocity, Low Amplitude Treatment, Direct Treatment, Myofascial Release Treatment, Soft Tissue Treatment, Muscle Energy Treatment and Indirect Treatment    Response:  Improved - The somatic dysfunction is improved but not completely resolved.    Right Lower Extremity details:     Examination Method:  Tenderness, Pain and Asymmetry, Misalignment, Crepitation, Defects, Masses    Severity:  Mild    Osteopathic Findings:  ITB tenstion    Treatment Method:  Indirect Treatment, Myofascial Release Treatment and Soft Tissue Treatment    Response:  Improved - The somatic dysfunction is improved but not completely resolved.    Left Lower Extremity details:     Examination Method:  Tenderness, Pain    Severity:  Mild    Osteopathic Findings:  ITB tension    Treatment Method:  Indirect Treatment, Muscle Energy Treatment and Myofascial Release Treatment    Response:  Improved - The somatic  dysfunction is improved but not completely resolved.    Ribs details:     Examination Method:  Asymmetry, Misalignment, Crepitation, Defects, Masses and Tissue Texture Change, Stability, Laxity, Effusions, Tone    Severity:  Mild    Osteopathic Findings:  Muscle hypertonicity left Ribs 6-9 inhalation dysfunction  Left rib 1 inhalation dysfunction    Treatment Method:  Soft Tissue Treatment, Muscle Energy Treatment, Myofascial Release Treatment, Indirect Treatment and Direct Treatment    Response:  Improved - The somatic dysfunction is improved but not completely resolved.    Total Regions Treated:  9

## 2024-11-11 ENCOUNTER — RX ONLY (OUTPATIENT)
Age: 69
Setting detail: RX ONLY
End: 2024-11-11

## 2024-11-11 ENCOUNTER — PATIENT MESSAGE (OUTPATIENT)
Dept: FAMILY MEDICINE CLINIC | Facility: CLINIC | Age: 69
End: 2024-11-11

## 2024-11-11 ENCOUNTER — OFFICE VISIT (OUTPATIENT)
Dept: FAMILY MEDICINE CLINIC | Facility: CLINIC | Age: 69
End: 2024-11-11
Payer: MEDICARE

## 2024-11-11 VITALS
DIASTOLIC BLOOD PRESSURE: 62 MMHG | WEIGHT: 100.31 LBS | SYSTOLIC BLOOD PRESSURE: 122 MMHG | OXYGEN SATURATION: 98 % | BODY MASS INDEX: 20.22 KG/M2 | HEIGHT: 59 IN | HEART RATE: 80 BPM

## 2024-11-11 DIAGNOSIS — Z12.31 ENCOUNTER FOR SCREENING MAMMOGRAM FOR BREAST CANCER: ICD-10-CM

## 2024-11-11 DIAGNOSIS — E03.9 ACQUIRED HYPOTHYROIDISM: ICD-10-CM

## 2024-11-11 DIAGNOSIS — D84.9 IMMUNOCOMPROMISED STATE (HCC): ICD-10-CM

## 2024-11-11 DIAGNOSIS — E53.8 B12 DEFICIENCY: ICD-10-CM

## 2024-11-11 DIAGNOSIS — F90.0 ATTENTION DEFICIT HYPERACTIVITY DISORDER (ADHD), PREDOMINANTLY INATTENTIVE TYPE: ICD-10-CM

## 2024-11-11 DIAGNOSIS — Z00.00 MEDICARE ANNUAL WELLNESS VISIT, SUBSEQUENT: Primary | ICD-10-CM

## 2024-11-11 DIAGNOSIS — E78.2 MIXED HYPERLIPIDEMIA: ICD-10-CM

## 2024-11-11 DIAGNOSIS — E55.9 VITAMIN D DEFICIENCY: ICD-10-CM

## 2024-11-11 PROCEDURE — G0439 PPPS, SUBSEQ VISIT: HCPCS | Performed by: FAMILY MEDICINE

## 2024-11-11 PROCEDURE — 99214 OFFICE O/P EST MOD 30 MIN: CPT | Performed by: FAMILY MEDICINE

## 2024-11-11 RX ORDER — DOXYCYCLINE HYCLATE 50 MG/1
50 CAPSULE ORAL 2 TIMES DAILY
COMMUNITY
Start: 2024-10-21

## 2024-11-11 RX ORDER — DOXYCYCLINE HYCLATE 50 MG/1
50MG CAPSULE, GELATIN COATED ORAL BID
Qty: 28 | Refills: 0 | Status: ERX

## 2024-11-11 NOTE — ASSESSMENT & PLAN NOTE
Orders:    CBC and differential; Future    Comprehensive metabolic panel; Future    Lipid panel; Future    TSH, 3rd generation with Free T4 reflex; Future    Vitamin D 25 hydroxy; Future

## 2024-11-11 NOTE — PROGRESS NOTES
Ambulatory Visit  Name: Mimi Biggs      : 1955      MRN: [id number]  Encounter Provider: Patti Amaro DO  Encounter Date: 2024   Encounter department: [facility]    Assessment & Plan  Medicare annual wellness visit, subsequent         Encounter for screening mammogram for breast cancer    Orders:    Mammo screening bilateral w 3d and cad; Future    Immunocompromised state (HCC)  Follows with rheumatology for AI condition and med mgmt   follow up any specialist recommendations          B12 deficiency    Orders:    CBC and differential; Future    Vitamin B12; Future    Vitamin D deficiency    Orders:    CBC and differential; Future    Vitamin D 25 hydroxy; Future    Mixed hyperlipidemia    Orders:    CBC and differential; Future    Comprehensive metabolic panel; Future    Lipid panel; Future    TSH, 3rd generation with Free T4 reflex; Future    Vitamin D 25 hydroxy; Future    Acquired hypothyroidism    Orders:    CBC and differential; Future    Comprehensive metabolic panel; Future    TSH, 3rd generation with Free T4 reflex; Future      Depression Screening and Follow-up Plan: Patient was screened for depression during today's encounter. They screened negative with a PHQ-9 score of 4.    Urinary Incontinence Plan of Care: counseling topics discussed: practice Kegel (pelvic floor strengthening) exercises, use restroom every 2 hours and limit alcohol, caffeine, spicy foods, and acidic foods. Patient follows with urology and is on medication mgmt.   .       Preventive health issues were discussed with patient, and age appropriate screening tests were ordered as noted in patient's After Visit Summary. Personalized health advice and appropriate referrals for health education or preventive services given if needed, as noted in patient's After Visit Summary.    Return in about 3 months (around 2025) for Recheck Chronic health conditions .      History of Present  Illness     HPI   Patient Care Team:  Patti Amaro DO as PCP - General (Family Medicine)  Melita Teague MD as PCP - Endocrinology (Endocrinology)    Review of Systems   Constitutional:  Negative for appetite change, chills, diaphoresis, fever and unexpected weight change.   Eyes:  Negative for visual disturbance.   Respiratory:  Negative for shortness of breath.    Cardiovascular:  Negative for chest pain and leg swelling.   Gastrointestinal:  Negative for constipation and diarrhea.   Endocrine: Negative for polydipsia and polyuria.   Genitourinary:  Negative for frequency.   Neurological:  Negative for dizziness, light-headedness and headaches.     Medical History Reviewed by provider this encounter:  Tobacco  Allergies  Meds  Problems  Med Hx  Surg Hx  Fam Hx       Annual Wellness Visit Questionnaire   Mimi is here for her Subsequent Wellness visit.     Health Risk Assessment:   Patient rates overall health as good. Patient feels that their physical health rating is slightly worse. Patient is satisfied with their life. Eyesight was rated as slightly worse. Hearing was rated as same. Patient feels that their emotional and mental health rating is slightly better. Patients states they are never, rarely angry. Patient states they are often unusually tired/fatigued. Pain experienced in the last 7 days has been some. Patient's pain rating has been 6/10. Patient states that she has experienced no weight loss or gain in last 6 months.     Depression Screening:   PHQ-9 Score: 4      Fall Risk Screening:   In the past year, patient has experienced: history of falling in past year    Number of falls: 1  Injured during fall?: No    Feels unsteady when standing or walking?: No    Worried about falling?: No      Urinary Incontinence Screening:   Patient has leaked urine accidently in the last six months. Got medicine. PT getting better. Follows with urology     Home Safety:  Patient does not have trouble with  stairs inside or outside of their home. Patient has working smoke alarms and has working carbon monoxide detector. Home safety hazards include: none.     Nutrition:   Current diet is Other (please comment). Trying to eat healthy.    Medications:   Patient is currently taking over-the-counter supplements. OTC medications include: see medication list. Patient is able to manage medications.     Activities of Daily Living (ADLs)/Instrumental Activities of Daily Living (IADLs):   Walk and transfer into and out of bed and chair?: Yes  Dress and groom yourself?: Yes    Bathe or shower yourself?: Yes    Feed yourself? Yes  Do your laundry/housekeeping?: Yes  Manage your money, pay your bills and track your expenses?: Yes  Make your own meals?: Yes    Do your own shopping?: Yes    Previous Hospitalizations:   Any hospitalizations or ED visits within the last 12 months?: No      Advance Care Planning:   Living will: No    Durable POA for healthcare: No    Advanced directive: No      Cognitive Screening:   Provider or family/friend/caregiver concerned regarding cognition?: No    PREVENTIVE SCREENINGS      Cardiovascular Screening:    General: Screening Not Indicated and History Lipid Disorder      Diabetes Screening:     General: Screening Current      Colorectal Cancer Screening:     General: Screening Current      Breast Cancer Screening:     General: Screening Current    Due for: Mammogram        Cervical Cancer Screening:    General: Screening Not Indicated      Osteoporosis Screening:    General: Screening Not Indicated and History Osteoporosis      Lung Cancer Screening:     General: Screening Not Indicated      Hepatitis C Screening:    General: Screening Current    Screening, Brief Intervention, and Referral to Treatment (SBIRT)    Screening  Typical number of drinks in a day: 0  Typical number of drinks in a week: 0  Interpretation: Low risk drinking behavior.    AUDIT-C Screenin) How often did you have a drink  "containing alcohol in the past year? monthly or less  2) How many drinks did you have on a typical day when you were drinking in the past year? 1 to 2  3) How often did you have 6 or more drinks on one occasion in the past year? never    AUDIT-C Score: 1  Interpretation: Score 0-2 (female): Negative screen for alcohol misuse    Single Item Drug Screening:  How often have you used an illegal drug (including marijuana) or a prescription medication for non-medical reasons in the past year? never    Single Item Drug Screen Score: 0  Interpretation: Negative screen for possible drug use disorder    Other Counseling Topics:   Calcium and vitamin D intake and regular weightbearing exercise.     Social Determinants of Health     Financial Resource Strain: High Risk (7/17/2024)    Received from [facility]    Overall Financial Resource Strain (CARDIA)     Difficulty of Paying Living Expenses: Hard   Food Insecurity: No Food Insecurity (11/8/2024)    Hunger Vital Sign     Worried About Running Out of Food in the Last Year: Never true     Ran Out of Food in the Last Year: Never true   Transportation Needs: No Transportation Needs (11/8/2024)    PRAPARE - Transportation     Lack of Transportation (Medical): No     Lack of Transportation (Non-Medical): No   Housing Stability: Low Risk  (11/8/2024)    Housing Stability Vital Sign     Unable to Pay for Housing in the Last Year: No     Number of Times Moved in the Last Year: 0     Homeless in the Last Year: No   Utilities: Not At Risk (11/8/2024)    [facility] Utilities     Threatened with loss of utilities: No     No results found.    Objective     /62 (BP Location: Right arm, Patient Position: Sitting, Cuff Size: Standard)   Pulse 80   Ht 4' 11\" (1.499 m)   Wt 45.5 kg (100 lb 5 oz)   SpO2 98%   BMI 20.26 kg/m²     Physical Exam  Vitals reviewed.   Constitutional:       General: She is not in acute distress.     Appearance: She is well-developed and normal " weight. She is not ill-appearing.   HENT:      Head: Normocephalic and atraumatic.      Right Ear: External ear normal.      Left Ear: External ear normal.      Nose: Nose normal.   Eyes:      Extraocular Movements: Extraocular movements intact.      Conjunctiva/sclera: Conjunctivae normal.   Neck:      Vascular: No carotid bruit or JVD.   Cardiovascular:      Rate and Rhythm: Normal rate and regular rhythm.      Heart sounds: Normal heart sounds. No murmur heard.  Pulmonary:      Effort: Pulmonary effort is normal.      Breath sounds: Normal breath sounds.   Abdominal:      General: Abdomen is flat.      Palpations: Abdomen is soft.   Musculoskeletal:      Cervical back: Neck supple.      Right lower leg: No edema.      Left lower leg: No edema.   Neurological:      General: No focal deficit present.      Mental Status: She is alert.   Psychiatric:         Mood and Affect: Mood normal.         Behavior: Behavior normal.

## 2024-11-11 NOTE — PATIENT INSTRUCTIONS
Medicare Preventive Visit Patient Instructions  Thank you for completing your Welcome to Medicare Visit or Medicare Annual Wellness Visit today. Your next wellness visit will be due in one year (11/12/2025).  The screening/preventive services that you may require over the next 5-10 years are detailed below. Some tests may not apply to you based off risk factors and/or age. Screening tests ordered at today's visit but not completed yet may show as past due. Also, please note that scanned in results may not display below.  Preventive Screenings:  Service Recommendations Previous Testing/Comments   Colorectal Cancer Screening  * Colonoscopy    * Fecal Occult Blood Test (FOBT)/Fecal Immunochemical Test (FIT)  * Fecal DNA/Cologuard Test  * Flexible Sigmoidoscopy Age: 45-75 years old   Colonoscopy: every 10 years (may be performed more frequently if at higher risk)  OR  FOBT/FIT: every 1 year  OR  Cologuard: every 3 years  OR  Sigmoidoscopy: every 5 years  Screening may be recommended earlier than age 45 if at higher risk for colorectal cancer. Also, an individualized decision between you and your healthcare provider will decide whether screening between the ages of 76-85 would be appropriate. Colonoscopy: 10/07/2024  FOBT/FIT: Not on file  Cologuard: Not on file  Sigmoidoscopy: Not on file    Screening Current     Breast Cancer Screening Age: 40+ years old  Frequency: every 1-2 years  Not required if history of left and right mastectomy Mammogram: 09/11/2023    Screening Current   Cervical Cancer Screening Between the ages of 21-29, pap smear recommended once every 3 years.   Between the ages of 30-65, can perform pap smear with HPV co-testing every 5 years.   Recommendations may differ for women with a history of total hysterectomy, cervical cancer, or abnormal pap smears in past. Pap Smear: 12/08/2020    Screening Not Indicated   Hepatitis C Screening Once for adults born between 1945 and 1965  More frequently in  patients at high risk for Hepatitis C Hep C Antibody: 02/19/2019    Screening Current   Diabetes Screening 1-2 times per year if you're at risk for diabetes or have pre-diabetes Fasting glucose: 105 mg/dL (4/9/2024)  A1C: No results in last 5 years (No results in last 5 years)  Screening Current   Cholesterol Screening Once every 5 years if you don't have a lipid disorder. May order more often based on risk factors. Lipid panel: 07/24/2024    Screening Not Indicated  History Lipid Disorder     Other Preventive Screenings Covered by Medicare:  Abdominal Aortic Aneurysm (AAA) Screening: covered once if your at risk. You're considered to be at risk if you have a family history of AAA.  Lung Cancer Screening: covers low dose CT scan once per year if you meet all of the following conditions: (1) Age 55-77; (2) No signs or symptoms of lung cancer; (3) Current smoker or have quit smoking within the last 15 years; (4) You have a tobacco smoking history of at least 20 pack years (packs per day multiplied by number of years you smoked); (5) You get a written order from a healthcare provider.  Glaucoma Screening: covered annually if you're considered high risk: (1) You have diabetes OR (2) Family history of glaucoma OR (3)  aged 50 and older OR (4)  American aged 65 and older  Osteoporosis Screening: covered every 2 years if you meet one of the following conditions: (1) You're estrogen deficient and at risk for osteoporosis based off medical history and other findings; (2) Have a vertebral abnormality; (3) On glucocorticoid therapy for more than 3 months; (4) Have primary hyperparathyroidism; (5) On osteoporosis medications and need to assess response to drug therapy.   Last bone density test (DXA Scan): 07/15/2024.  HIV Screening: covered annually if you're between the age of 15-65. Also covered annually if you are younger than 15 and older than 65 with risk factors for HIV infection. For pregnant  patients, it is covered up to 3 times per pregnancy.    Immunizations:  Immunization Recommendations   Influenza Vaccine Annual influenza vaccination during flu season is recommended for all persons aged >= 6 months who do not have contraindications   Pneumococcal Vaccine   * Pneumococcal conjugate vaccine = PCV13 (Prevnar 13), PCV15 (Vaxneuvance), PCV20 (Prevnar 20)  * Pneumococcal polysaccharide vaccine = PPSV23 (Pneumovax) Adults 19-65 yo with certain risk factors or if 65+ yo  If never received any pneumonia vaccine: recommend Prevnar 20 (PCV20)  Give PCV20 if previously received 1 dose of PCV13 or PPSV23   Hepatitis B Vaccine 3 dose series if at intermediate or high risk (ex: diabetes, end stage renal disease, liver disease)   Respiratory syncytial virus (RSV) Vaccine - COVERED BY MEDICARE PART D  * RSVPreF3 (Arexvy) CDC recommends that adults 60 years of age and older may receive a single dose of RSV vaccine using shared clinical decision-making (SCDM)   Tetanus (Td) Vaccine - COST NOT COVERED BY MEDICARE PART B Following completion of primary series, a booster dose should be given every 10 years to maintain immunity against tetanus. Td may also be given as tetanus wound prophylaxis.   Tdap Vaccine - COST NOT COVERED BY MEDICARE PART B Recommended at least once for all adults. For pregnant patients, recommended with each pregnancy.   Shingles Vaccine (Shingrix) - COST NOT COVERED BY MEDICARE PART B  2 shot series recommended in those 19 years and older who have or will have weakened immune systems or those 50 years and older     Health Maintenance Due:      Topic Date Due   • Breast Cancer Screening: Mammogram  09/11/2024   • Colorectal Cancer Screening  10/07/2029   • Hepatitis C Screening  Completed   • Cervical Cancer Screening  Discontinued     Immunizations Due:      Topic Date Due   • Influenza Vaccine (1) 09/01/2024   • COVID-19 Vaccine (7 - 2023-24 season) 09/01/2024     Advance Directives   What are  advance directives?  Advance directives are legal documents that state your wishes and plans for medical care. These plans are made ahead of time in case you lose your ability to make decisions for yourself. Advance directives can apply to any medical decision, such as the treatments you want, and if you want to donate organs.   What are the types of advance directives?  There are many types of advance directives, and each state has rules about how to use them. You may choose a combination of any of the following:  Living will:  This is a written record of the treatment you want. You can also choose which treatments you do not want, which to limit, and which to stop at a certain time. This includes surgery, medicine, IV fluid, and tube feedings.   Durable power of  for healthcare (DPAHC):  This is a written record that states who you want to make healthcare choices for you when you are unable to make them for yourself. This person, called a proxy, is usually a family member or a friend. You may choose more than 1 proxy.  Do not resuscitate (DNR) order:  A DNR order is used in case your heart stops beating or you stop breathing. It is a request not to have certain forms of treatment, such as CPR. A DNR order may be included in other types of advance directives.  Medical directive:  This covers the care that you want if you are in a coma, near death, or unable to make decisions for yourself. You can list the treatments you want for each condition. Treatment may include pain medicine, surgery, blood transfusions, dialysis, IV or tube feedings, and a ventilator (breathing machine).  Values history:  This document has questions about your views, beliefs, and how you feel and think about life. This information can help others choose the care that you would choose.  Why are advance directives important?  An advance directive helps you control your care. Although spoken wishes may be used, it is better to have your  wishes written down. Spoken wishes can be misunderstood, or not followed. Treatments may be given even if you do not want them. An advance directive may make it easier for your family to make difficult choices about your care.   Fall Prevention    Fall prevention  includes ways to make your home and other areas safer. It also includes ways you can move more carefully to prevent a fall. Health conditions that cause changes in your blood pressure, vision, or muscle strength and coordination may increase your risk for falls. Medicines may also increase your risk for falls if they make you dizzy, weak, or sleepy.   Fall prevention tips:   Stand or sit up slowly.    Use assistive devices as directed.    Wear shoes that fit well and have soles that .    Wear a personal alarm.    Stay active.    Manage your medical conditions.    Home Safety Tips:  Add items to prevent falls in the bathroom.    Keep paths clear.    Install bright lights in your home.    Keep items you use often on shelves within reach.    Paint or place reflective tape on the edges of your stairs.    Urinary Incontinence   Urinary incontinence (UI)  is when you lose control of your bladder. UI develops because your bladder cannot store or empty urine properly. The 3 most common types of UI are stress incontinence, urge incontinence, or both.  Medicines:   May be given to help strengthen your bladder control. Report any side effects of medication to your healthcare provider.  Do pelvic muscle exercises often:  Your pelvic muscles help you stop urinating. Squeeze these muscles tight for 5 seconds, then relax for 5 seconds. Gradually work up to squeezing for 10 seconds. Do 3 sets of 15 repetitions a day, or as directed. This will help strengthen your pelvic muscles and improve bladder control.  Train your bladder:  Go to the bathroom at set times, such as every 2 hours, even if you do not feel the urge to go. You can also try to hold your urine when you  feel the urge to go. For example, hold your urine for 5 minutes when you feel the urge to go. As that becomes easier, hold your urine for 10 minutes.   Self-care:   Keep a UI record.  Write down how often you leak urine and how much you leak. Make a note of what you were doing when you leaked urine.  Drink liquids as directed. You may need to limit the amount of liquid you drink to help control your urine leakage. Do not drink any liquid right before you go to bed. Limit or do not have drinks that contain caffeine or alcohol.   Prevent constipation.  Eat a variety of high-fiber foods. Good examples are high-fiber cereals, beans, vegetables, and whole-grain breads. Walking is the best way to trigger your intestines to have a bowel movement.  Exercise regularly and maintain a healthy weight.  Weight loss and exercise will decrease pressure on your bladder and help you control your leakage.   Use a catheter as directed  to help empty your bladder. A catheter is a tiny, plastic tube that is put into your bladder to drain your urine.   Go to behavior therapy as directed.  Behavior therapy may be used to help you learn to control your urge to urinate.     © Copyright [employer] 2018 Information is for End User's use only and may not be sold, redistributed or otherwise used for commercial purposes. All illustrations and images included in CareNotes® are the copyrighted property of [employer].D.A[employer]M., Inc[employer] or [employer]

## 2024-11-11 NOTE — ASSESSMENT & PLAN NOTE
Follows with rheumatology for AI condition and med mgmt   follow up any specialist recommendations

## 2024-11-12 RX ORDER — DEXTROAMPHETAMINE SACCHARATE, AMPHETAMINE ASPARTATE MONOHYDRATE, DEXTROAMPHETAMINE SULFATE AND AMPHETAMINE SULFATE 7.5; 7.5; 7.5; 7.5 MG/1; MG/1; MG/1; MG/1
30 CAPSULE, EXTENDED RELEASE ORAL EVERY MORNING
Qty: 30 CAPSULE | Refills: 0 | Status: SHIPPED | OUTPATIENT
Start: 2024-11-12

## 2024-11-12 RX ORDER — DEXTROAMPHETAMINE SACCHARATE, AMPHETAMINE ASPARTATE, DEXTROAMPHETAMINE SULFATE AND AMPHETAMINE SULFATE 2.5; 2.5; 2.5; 2.5 MG/1; MG/1; MG/1; MG/1
20 TABLET ORAL SEE ADMIN INSTRUCTIONS
Qty: 60 TABLET | Refills: 0 | Status: SHIPPED | OUTPATIENT
Start: 2024-11-12

## 2024-11-15 ENCOUNTER — HOSPITAL ENCOUNTER (OUTPATIENT)
Dept: RADIOLOGY | Facility: CLINIC | Age: 69
End: 2024-11-15
Payer: MEDICARE

## 2024-11-15 VITALS — WEIGHT: 100 LBS | BODY MASS INDEX: 19.63 KG/M2 | HEIGHT: 60 IN

## 2024-11-15 DIAGNOSIS — N39.41 URGE INCONTINENCE: ICD-10-CM

## 2024-11-15 DIAGNOSIS — Z12.31 ENCOUNTER FOR SCREENING MAMMOGRAM FOR BREAST CANCER: ICD-10-CM

## 2024-11-15 PROCEDURE — 77067 SCR MAMMO BI INCL CAD: CPT

## 2024-11-15 PROCEDURE — 77063 BREAST TOMOSYNTHESIS BI: CPT

## 2024-11-15 RX ORDER — DESMOPRESSIN ACETATE 0.2 MG/1
0.2 TABLET ORAL
Qty: 30 TABLET | Refills: 2 | Status: SHIPPED | OUTPATIENT
Start: 2024-11-15

## 2024-11-15 RX ORDER — DESMOPRESSIN ACETATE 0.2 MG/1
0.2 TABLET ORAL
Qty: 30 TABLET | Refills: 5 | Status: SHIPPED | OUTPATIENT
Start: 2024-11-15

## 2024-11-26 ENCOUNTER — OFFICE VISIT (OUTPATIENT)
Dept: FAMILY MEDICINE CLINIC | Facility: CLINIC | Age: 69
End: 2024-11-26
Payer: MEDICARE

## 2024-11-26 ENCOUNTER — TELEPHONE (OUTPATIENT)
Dept: FAMILY MEDICINE CLINIC | Facility: CLINIC | Age: 69
End: 2024-11-26

## 2024-11-26 ENCOUNTER — RESULTS FOLLOW-UP (OUTPATIENT)
Dept: FAMILY MEDICINE CLINIC | Facility: CLINIC | Age: 69
End: 2024-11-26

## 2024-11-26 ENCOUNTER — APPOINTMENT (OUTPATIENT)
Dept: RADIOLOGY | Facility: CLINIC | Age: 69
End: 2024-11-26
Payer: MEDICARE

## 2024-11-26 VITALS
BODY MASS INDEX: 19.91 KG/M2 | HEART RATE: 72 BPM | WEIGHT: 101.41 LBS | HEIGHT: 60 IN | SYSTOLIC BLOOD PRESSURE: 112 MMHG | OXYGEN SATURATION: 97 % | DIASTOLIC BLOOD PRESSURE: 60 MMHG

## 2024-11-26 DIAGNOSIS — M54.50 ACUTE MIDLINE LOW BACK PAIN, UNSPECIFIED WHETHER SCIATICA PRESENT: ICD-10-CM
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
## 2024-11-26 PROCEDURE — G2211 COMPLEX E/M VISIT ADD ON: HCPCS | Performed by: FAMILY MEDICINE

## 2024-11-26 PROCEDURE — 73521 X-RAY EXAM HIPS BI 2 VIEWS: CPT

## 2024-11-26 PROCEDURE — 99214 OFFICE O/P EST MOD 30 MIN: CPT | Performed by: FAMILY MEDICINE

## 2024-11-26 PROCEDURE — 72080 X-RAY EXAM THORACOLMB 2/> VW: CPT

## 2024-11-26 NOTE — TELEPHONE ENCOUNTER
Patient is coming since she fell down 6 stairs and is having pain on her bottom and mid and lower back pain and has cuts on both elbows and did not hit her head. Patient is leaving soon to head to upcoming appointment.

## 2024-11-26 NOTE — PROGRESS NOTES
Name: Mimi Biggs      : 1955      MRN: [id number]  Encounter Provider: Patti Amaro DO  Encounter Date: 2024   Encounter department: [facility] PRIMARY CARE  :  Assessment & Plan  Fall, initial encounter  69-year-old female with known osteopenia had a fall down 6 stairs landing on her buttocks and low back area as well as bilateral elbows.  Presenting with 2 days of mid to low back and buttocks pain.  Does improve temporarily with NSAIDs and Tylenol.  Recommend x-rays of thoracic, lumbar, pelvic and hips to rule out fracture given known osteopenia and pain.  Continue with home exercises, ice, and NSAIDs and Tylenol.  If x-rays are normal, continue with conservative treatment only.  If x-rays have abnormality, consider referral to orthopedics.      Orders:    XR spine thoracolumbar 2 vw; Future    XR hips bilateral 2 vw w pelvis if performed; Future    Acute midline low back pain, unspecified whether sciatica present  See above   Orders:    XR spine thoracolumbar 2 vw; Future    XR hips bilateral 2 vw w pelvis if performed; Future      Return for Next scheduled follow up.       History of Present Illness   Chief Complaint   Patient presents with    Fall     Happened Saturday   [employer] steps railing broke and fell down 6 steps     Back Pain     Patient was helping a friend move. She was walking down the steps when the railing broke and she fell down about 6 steps. She landed on her buttocks, low back and elbows. She rested at their home for a few days and now the pain is worsening. She has not been medically evaluated for this yet. She has soreness in her but and low back. She just drove back here form there to be seen today. The drive was about 1.5 hours. She has been taking mobic with extra strength tylenol and this helps but does not last long.     Fall  The accident occurred 2 days ago. The fall occurred while standing. She landed on Hard floor. There was no blood  loss. The point of impact was the buttocks, left elbow and right elbow. The pain is present in the buttocks and back. The pain is moderate. The symptoms are aggravated by sitting. Pertinent negatives include no abdominal pain, bowel incontinence, fever, headaches, hearing loss, hematuria, loss of consciousness, nausea, numbness, tingling, visual change or vomiting. She has tried NSAID, ice and rest for the symptoms. The treatment provided moderate relief.   Back Pain  Pertinent negatives include no abdominal pain, bowel incontinence, fever, headaches, numbness or tingling.     Review of Systems   Constitutional:  Negative for fever.   Gastrointestinal:  Negative for abdominal pain, bowel incontinence, nausea and vomiting.   Genitourinary:  Negative for hematuria.   Musculoskeletal:  Positive for back pain.   Neurological:  Negative for tingling, loss of consciousness, numbness and headaches.     Current Outpatient Medications on File Prior to Visit   Medication Sig Dispense Refill    ALPRAZolam (XANAX) 0.25 mg tablet TAKE 1 TABLET (0.25 MG TOTAL) BY MOUTH 2 (TWO) TIMES A DAY AS NEEDED FOR ANXIETY 60 tablet 0    amphetamine-dextroamphetamine (ADDERALL XR, 30MG,) 30 MG 24 hr capsule Take 1 capsule (30 mg total) by mouth every morning Max Daily Amount: 30 mg 30 capsule 0    amphetamine-dextroamphetamine (ADDERALL, 10MG,) 10 mg tablet Take 2 tablets (20 mg total) by mouth see administration instructions Take 20mg daily in the afternoon around 2pm. 60 tablet 0    buPROPion (WELLBUTRIN XL) 300 mg 24 hr tablet Take 1 tablet (300 mg total) by mouth every morning 90 tablet 1    calcium citrate-vitamin D 315 mg-5 mcg tablet Take 1 tablet by mouth daily 90 tablet 0    Cholecalciferol (Vitamin D3) 50 MCG (2000 UT) TABS Take 1 tablet (2,000 Units total) by mouth in the morning 90 tablet 0    Clobetasol Prop-Niacinamide (Chlooxia) 0.05-4 % OINT Apply topically      desmopressin (DDAVP) 0.2 mg tablet Take 1 tablet (0.2 mg total)  by mouth daily at bedtime 30 tablet 5    desmopressin (DDAVP) 0.2 mg tablet Take 1 tablet (0.2 mg total) by mouth daily at bedtime 30 tablet 2    doxycycline hyclate (VIBRAMYCIN) 50 mg capsule Take 50 mg by mouth 2 (two) times a day      esomeprazole (NexIUM) 40 MG capsule Take 1 capsule (40 mg total) by mouth every morning 90 capsule 0    ezetimibe (ZETIA) 10 mg tablet Take 1 tablet (10 mg total) by mouth daily 90 tablet 3    fluticasone (FLONASE) 50 mcg/act nasal spray 2 sprays into each nostril daily 16 g 3    folic acid (FOLVITE) 1 mg tablet Take 2 tablets (2 mg total) by mouth daily 180 tablet 1    hydroxychloroquine (PLAQUENIL) 200 mg tablet Take 1 tablet (200 mg total) by mouth daily 90 tablet 1    levothyroxine 150 mcg tablet Take 1 tablet (150 mcg total) by mouth daily 90 tablet 1    meloxicam (MOBIC) 15 mg tablet Take 1 tablet (15 mg total) by mouth daily 90 tablet 0    metroNIDAZOLE (METROGEL) 1 % gel Apply topically daily 45 g 0    mometasone (ELOCON) 0.1 % cream Apply topically daily      Multiple Vitamin (MULTIVITAMIN) tablet Take 1 tablet by mouth daily      mycophenolate (MYFORTIC) 360 MG TBEC TAKE 1 TABLET IN THE MORNING AND 1 TABLET AT NIGHT 180 tablet 2    naproxen (EC NAPROSYN) 500 MG EC tablet Take 1 tablet (500 mg total) by mouth 2 (two) times a day as needed for mild pain 30 tablet 0    nystatin (MYCOSTATIN) 500,000 units/5 mL suspension Apply 5 mL (500,000 Units total) to the mouth or throat 4 (four) times a day 473 mL 1    ondansetron (ZOFRAN) 4 mg tablet Take 1 tablet (4 mg total) by mouth every 8 (eight) hours as needed for nausea or vomiting 20 tablet 0    pregabalin (LYRICA) 75 mg capsule Take 1 capsule (75 mg total) by mouth 2 (two) times a day 180 capsule 1    traZODone (DESYREL) 150 mg tablet TAKE 1 TABLET (150 MG TOTAL) BY MOUTH DAILY AT BEDTIME 90 tablet 1    valACYclovir (VALTREX) 1,000 mg tablet Take 1 tablet (1,000 mg total) by mouth once as needed (HSV flare) for up to 3 days 10  tablet 0     No current facility-administered medications on file prior to visit.      Social History     Tobacco Use    Smoking status: Former     Current packs/day: 0.25     Average packs/day: 0.3 packs/day for 5.0 years (1.3 ttl pk-yrs)     Types: Cigarettes    Smokeless tobacco: Never    Tobacco comments:     I quit cold turkey when I learned 2nd hand smoke   Vaping Use    Vaping status: Never Used   Substance and Sexual Activity    Alcohol use: Yes     Comment: social    Drug use: No    Sexual activity: Not Currently     Birth control/protection: Abstinence     Comment: Had a hysterectomy        Objective   /60 (BP Location: Right arm, Patient Position: Sitting, Cuff Size: Standard)   Pulse 72   Ht 5' (1.524 m)   Wt 46 kg (101 lb 6.6 oz)   SpO2 97%   BMI 19.81 kg/m²      Physical Exam  Vitals reviewed.   Constitutional:       General: She is not in acute distress.     Appearance: She is normal weight. She is not ill-appearing.   HENT:      Head: Normocephalic and atraumatic.   Cardiovascular:      Rate and Rhythm: Normal rate.   Pulmonary:      Effort: Pulmonary effort is normal.   Musculoskeletal:         General: Tenderness (lumbar on the left side.) present. No swelling or deformity. Normal range of motion.   Skin:     Findings: Bruising (midline at LS junction just above buttocks.) present.   Neurological:      General: No focal deficit present.      Mental Status: She is alert.      Sensory: No sensory deficit.      Motor: No weakness.      Gait: Gait normal.   Psychiatric:         Mood and Affect: Mood normal.         Behavior: Behavior normal.

## 2024-11-29 DIAGNOSIS — K59.00 CONSTIPATION, UNSPECIFIED CONSTIPATION TYPE: Primary | ICD-10-CM

## 2024-11-29 RX ORDER — BISACODYL 10 MG
10 SUPPOSITORY, RECTAL RECTAL DAILY PRN
Qty: 12 SUPPOSITORY | Refills: 0 | Status: SHIPPED | OUTPATIENT
Start: 2024-11-29

## 2024-11-29 RX ORDER — POLYETHYLENE GLYCOL 3350 17 G/17G
17 POWDER, FOR SOLUTION ORAL DAILY PRN
Qty: 10 EACH | Refills: 0 | Status: SHIPPED | OUTPATIENT
Start: 2024-11-29

## 2024-12-02 ENCOUNTER — TELEPHONE (OUTPATIENT)
Age: 69
End: 2024-12-02

## 2024-12-02 DIAGNOSIS — S32.512D CLOSED FRACTURE OF SUPERIOR RAMUS OF LEFT PUBIS WITH ROUTINE HEALING, SUBSEQUENT ENCOUNTER: Primary | ICD-10-CM

## 2024-12-02 PROBLEM — S32.512A CLOSED FRACTURE OF LEFT SUPERIOR RIM OF PUBIS (HCC): Status: ACTIVE | Noted: 2024-12-02

## 2024-12-02 RX ORDER — LIDOCAINE 50 MG/G
1 PATCH TOPICAL DAILY
Qty: 30 PATCH | Refills: 0 | Status: SHIPPED | OUTPATIENT
Start: 2024-12-02 | End: 2024-12-05 | Stop reason: ALTCHOICE

## 2024-12-02 NOTE — TELEPHONE ENCOUNTER
"Spoke with Mimi and related the message per Prem \"I do not prescribe pain patches, Unless she is asking for something like a lidocaine patch. Let me know. I reviewed her xrays. She has evidence of an old pelvic fracture, not likely from this most recent fall. She should see an orthopedic. Please ask if she has one she already sees or if she needs a referral.     Dr. Amaro \" Patient understood and will like a referral put in for Ortho and also a Rx sent to pharmacy for lidocaine patches.   "

## 2024-12-02 NOTE — TELEPHONE ENCOUNTER
Patient called in regards to wanting provider to look at her x-rays results and would like providers advice. Patient would also like provider to put in an order for pain patches.

## 2024-12-03 ENCOUNTER — TELEPHONE (OUTPATIENT)
Dept: FAMILY MEDICINE CLINIC | Facility: CLINIC | Age: 69
End: 2024-12-03

## 2024-12-03 NOTE — TELEPHONE ENCOUNTER
PA for lidocaine 5% patches SUBMITTED to [facility]     via    [x]CMM-KEY: (Key: [id number])  []Surescripts-Case ID #    []Availity-Auth ID #  NDC #    []Faxed to plan   []Other website    []Phone call Case ID #      [x]PA sent as URGENT    All office notes, labs and other pertaining documents and studies sent. Clinical questions answered. Awaiting determination from insurance company.     Turnaround time for your insurance to make a decision on your Prior Authorization can take 7-21 business days.

## 2024-12-06 ENCOUNTER — OFFICE VISIT (OUTPATIENT)
Dept: OBGYN CLINIC | Facility: CLINIC | Age: 69
End: 2024-12-06
Payer: MEDICARE

## 2024-12-06 VITALS
SYSTOLIC BLOOD PRESSURE: 94 MMHG | TEMPERATURE: 97.8 F | HEIGHT: 60 IN | DIASTOLIC BLOOD PRESSURE: 60 MMHG | WEIGHT: 97.4 LBS | HEART RATE: 95 BPM | BODY MASS INDEX: 19.12 KG/M2 | OXYGEN SATURATION: 97 %

## 2024-12-06 DIAGNOSIS — S30.0XXA CONTUSION OF LOWER BACK AND PELVIS, INITIAL ENCOUNTER: Primary | ICD-10-CM

## 2024-12-06 DIAGNOSIS — M53.3 COCCYDYNIA: ICD-10-CM

## 2024-12-06 PROCEDURE — 99204 OFFICE O/P NEW MOD 45 MIN: CPT | Performed by: ORTHOPAEDIC SURGERY

## 2024-12-06 NOTE — PROGRESS NOTES
ASSESSMENT/PLAN:    Diagnoses and all orders for this visit:    Contusion of lower back and pelvis, initial encounter  -     Ambulatory referral to Physical Therapy; Future    Coccydynia  -     Ambulatory Referral to Orthopedic Surgery    Other orders  -     MAGNESIUM PO; Take by mouth Resveratrol, berberine & quercetin  -     Docusate Calcium (STOOL SOFTENER PO); Take by mouth    Reviewed with patient at time of visit that physical exam and imaging demonstrate contusion of her low back and hips sustained from her fall.. Discussed  treatment options including physical therapy. An order was placed for her to begin formal physical therapy. She will follow up in 4-6 weeks for a re-evaluation. She may contact the office with any questions or concerns. The patient expresses understanding and is in agreement with today's treatment plan.       Return for 4-6 weeks, Strength and Motion Check.      _____________________________________________________  CHIEF COMPLAINT:  Chief Complaint   Patient presents with    Left Hip - Pain         SUBJECTIVE:  Mimi Biggs is a 69 y.o. year old female who presents for initial evaluation of low back, hip and pelvis pain. She stats that on 11/23/2024 she fell down steps at a friends house. She was seen by PCP who ordered xrays and referred her to our office. She feels her trunk and coccyx are more painful now compared to right after her fall. She denies numbness and tingling. She has no radiating pains.  She does admit that she has had issues of low back pain in the past.  She currently localizes pain primarily to the coccyx with pain localized both to the right and left trunk from the distal rib cage distally to the iliac crest.  She denies any groin pain or localized hip pain.    PAST MEDICAL HISTORY:  Past Medical History:   Diagnosis Date    ADHD     Allergic 3-2020    Bactrim    Anemia 2018    Latest blood work OK    Anxiety Since diagnoised 2003    Intermittent related to my life  trying to move. My illness    Arthritis     Very bad especially Right knee    Cancer (HCC) 7-    Cut the Lymph Node Out & thyroid out    Cellulitis of foot     Last assessed 10/24/16    Change in mental status     Last assessed 12/01/15    Chronic fatigue     Depression 2003    I am on meds. Feel good    Disease of thyroid gland     Cancer Surgical Removal total Thyroid    Diverticulitis of colon Last Egd    Diverticlum    PLAZA (dyspnea on exertion)     Last assessed 12/01/15    Fibromyalgia     Folliculitis     Last assessed 10/06/14    Fractures     GERD (gastroesophageal reflux disease)     Headache(784.0) 3-2020    In front of head    Inflammatory bowel disease 1990’s    Memory loss 2010    New disease neurogentic changes Necrotizing Myopathy    Non Hodgkin's lymphoma (HCC) 07/13/2012    Osteopenia     Osteoporosis 6-2019    Broke Pelvis -left and right pubic ramus,fractured Sacrum    Raynaud's disease     Rheumatic disease     Scleroderma (HCC)     Stomach disorder     Systemic sclerosis (HCC)     Thyroid cancer (HCC) 10/04/2018    Visual impairment 2021    Need new glasses       PAST SURGICAL HISTORY:  Past Surgical History:   Procedure Laterality Date    BACK SURGERY      BLADDER SURGERY      BONE MARROW BIOPSY      CARDIAC CATHETERIZATION      EYE SURGERY  Jan 1010    Correct angles in both eyes    HEMORRHOID SURGERY      HYSTERECTOMY  02/04/2004    KNEE SURGERY      LYMPH NODE BIOPSY  7-     Non Hodgkins Lymphoma    NASAL SEPTUM SURGERY      NECK SURGERY      OOPHORECTOMY Bilateral 02/04/2004    ROTATOR CUFF REPAIR      SHOULDER SURGERY      SPINE SURGERY  12-    Failed microdisectomy L5 S1    TENDON REPAIR      THYROIDECTOMY N/A 10/04/2018    Procedure: TOTAL THYROIDECTOMY;  Surgeon: Roger Ortiz MD;  Location: [facility];  Service: Surgical Oncology    TONSILLECTOMY      TOTAL THYROIDECTOMY      WRIST SURGERY         FAMILY HISTORY:  Family History   Problem  Relation Age of Onset    Arthritis Mother     Diabetes Mother     Hyperlipidemia Mother         Passed 10- Kidney- Heart Disease    Thyroid disease Mother         Benign lump removed    Autoimmune disease Mother     ADD / ADHD Mother     Osteoporosis Mother         1x week    Coronary artery disease Father     Hyperlipidemia Father          Massive Heart Attack    Arthritis Sister     Asthma Sister     Crohn's disease Sister     Autoimmune disease Sister     Depression Sister     Hyperlipidemia Sister         Very  bad Chrons Disease    Autoimmune disease Sister     Depression Sister     Hyperlipidemia Sister         Heart disease -Lupus like-Thyroid issues    Thyroid disease Sister         Seeing Endrocologist dosage for thyroid    Arthritis Sister         Having many issues this year    Autoimmune disease Sister     Diabetes Sister     Rheumatologic disease Sister     No Known Problems Daughter     No Known Problems Maternal Grandmother     No Known Problems Maternal Grandfather     No Known Problems Paternal Grandmother     No Known Problems Paternal Grandfather     Hyperlipidemia Brother         Heart Attacks,torn rotator and bicept . arthritis, knees    Autoimmune disease Brother     Hyperlipidemia Brother         Heart Attacks.bad kness,Arthrites    Autoimmune disease Brother     Diabetes Brother     Ovarian cancer Maternal Aunt     Dementia Maternal Aunt             Cancer Maternal Aunt     No Known Problems Maternal Aunt     No Known Problems Maternal Aunt     Cancer Maternal Aunt     No Known Problems Maternal Aunt     No Known Problems Paternal Aunt     Cancer Maternal Aunt     Cancer Maternal Uncle     Cancer Maternal Uncle     Breast cancer Neg Hx     Endometrial cancer Neg Hx     Colon cancer Neg Hx     Breast cancer additional onset Neg Hx     BRCA 1/2 Neg Hx     BRCA2 Positive Neg Hx     BRCA2 Negative Neg Hx     BRCA1 Positive Neg Hx     BRCA1 Negative Neg Hx        SOCIAL  HISTORY:  Social History     Tobacco Use    Smoking status: Former     Current packs/day: 0.25     Average packs/day: 0.3 packs/day for 5.2 years (1.3 ttl pk-yrs)     Types: Cigarettes    Smokeless tobacco: Never    Tobacco comments:     I quit cold turkey when I learned 2nd hand smoke   Vaping Use    Vaping status: Never Used   Substance Use Topics    Alcohol use: Yes     Comment: I have a cocktail every month ,beer sometimes    Drug use: No       MEDICATIONS:    Current Outpatient Medications:     ALPRAZolam (XANAX) 0.25 mg tablet, TAKE 1 TABLET (0.25 MG TOTAL) BY MOUTH 2 (TWO) TIMES A DAY AS NEEDED FOR ANXIETY, Disp: 60 tablet, Rfl: 0    amphetamine-dextroamphetamine (ADDERALL XR, 30MG,) 30 MG 24 hr capsule, Take 1 capsule (30 mg total) by mouth every morning Max Daily Amount: 30 mg, Disp: 30 capsule, Rfl: 0    amphetamine-dextroamphetamine (ADDERALL, 10MG,) 10 mg tablet, Take 2 tablets (20 mg total) by mouth see administration instructions Take 20mg daily in the afternoon around 2pm., Disp: 60 tablet, Rfl: 0    buPROPion (WELLBUTRIN XL) 300 mg 24 hr tablet, Take 1 tablet (300 mg total) by mouth every morning, Disp: 90 tablet, Rfl: 1    calcium citrate-vitamin D 315 mg-5 mcg tablet, Take 1 tablet by mouth daily, Disp: 90 tablet, Rfl: 0    Cholecalciferol (Vitamin D3) 50 MCG (2000 UT) TABS, Take 1 tablet (2,000 Units total) by mouth in the morning, Disp: 90 tablet, Rfl: 0    Docusate Calcium (STOOL SOFTENER PO), Take by mouth, Disp: , Rfl:     doxycycline hyclate (VIBRAMYCIN) 50 mg capsule, Take 50 mg by mouth 2 (two) times a day (Patient taking differently: Take 100 mg by mouth daily), Disp: , Rfl:     esomeprazole (NexIUM) 40 MG capsule, Take 1 capsule (40 mg total) by mouth every morning, Disp: 90 capsule, Rfl: 0    folic acid (FOLVITE) 1 mg tablet, Take 2 tablets (2 mg total) by mouth daily, Disp: 180 tablet, Rfl: 1    hydroxychloroquine (PLAQUENIL) 200 mg tablet, Take 1 tablet (200 mg total) by mouth daily,  Disp: 90 tablet, Rfl: 1    levothyroxine 150 mcg tablet, Take 1 tablet (150 mcg total) by mouth daily (Patient taking differently: Take 100 mcg by mouth daily), Disp: 90 tablet, Rfl: 1    MAGNESIUM PO, Take by mouth Resveratrol, berberine & quercetin, Disp: , Rfl:     meloxicam (MOBIC) 15 mg tablet, Take 1 tablet (15 mg total) by mouth daily, Disp: 90 tablet, Rfl: 0    mycophenolate (MYFORTIC) 360 MG TBEC, TAKE 1 TABLET IN THE MORNING AND 1 TABLET AT NIGHT, Disp: 180 tablet, Rfl: 2    pregabalin (LYRICA) 75 mg capsule, Take 1 capsule (75 mg total) by mouth 2 (two) times a day, Disp: 180 capsule, Rfl: 1    traZODone (DESYREL) 150 mg tablet, TAKE 1 TABLET (150 MG TOTAL) BY MOUTH DAILY AT BEDTIME, Disp: 90 tablet, Rfl: 1    bisacodyl (DULCOLAX) 10 mg suppository, Insert 1 suppository (10 mg total) into the rectum daily as needed for constipation (Patient not taking: Reported on 12/6/2024), Disp: 12 suppository, Rfl: 0    Clobetasol Prop-Niacinamide (Chlooxia) 0.05-4 % OINT, Apply topically (Patient not taking: Reported on 12/6/2024), Disp: , Rfl:     desmopressin (DDAVP) 0.2 mg tablet, Take 1 tablet (0.2 mg total) by mouth daily at bedtime (Patient not taking: Reported on 12/6/2024), Disp: 30 tablet, Rfl: 5    desmopressin (DDAVP) 0.2 mg tablet, Take 1 tablet (0.2 mg total) by mouth daily at bedtime (Patient not taking: Reported on 12/6/2024), Disp: 30 tablet, Rfl: 2    ezetimibe (ZETIA) 10 mg tablet, Take 1 tablet (10 mg total) by mouth daily (Patient not taking: Reported on 12/6/2024), Disp: 90 tablet, Rfl: 3    fluticasone (FLONASE) 50 mcg/act nasal spray, 2 sprays into each nostril daily (Patient not taking: Reported on 12/6/2024), Disp: 16 g, Rfl: 3    metroNIDAZOLE (METROGEL) 1 % gel, Apply topically daily (Patient not taking: Reported on 12/6/2024), Disp: 45 g, Rfl: 0    mometasone (ELOCON) 0.1 % cream, Apply topically daily (Patient not taking: Reported on 12/6/2024), Disp: , Rfl:     Multiple Vitamin  (MULTIVITAMIN) tablet, Take 1 tablet by mouth daily (Patient not taking: Reported on 12/6/2024), Disp: , Rfl:     naproxen (EC NAPROSYN) 500 MG EC tablet, Take 1 tablet (500 mg total) by mouth 2 (two) times a day as needed for mild pain (Patient not taking: Reported on 12/6/2024), Disp: 30 tablet, Rfl: 0    nystatin (MYCOSTATIN) 500,000 units/5 mL suspension, Apply 5 mL (500,000 Units total) to the mouth or throat 4 (four) times a day (Patient not taking: Reported on 12/6/2024), Disp: 473 mL, Rfl: 1    ondansetron (ZOFRAN) 4 mg tablet, Take 1 tablet (4 mg total) by mouth every 8 (eight) hours as needed for nausea or vomiting (Patient not taking: Reported on 12/6/2024), Disp: 20 tablet, Rfl: 0    polyethylene glycol (MIRALAX) 17 g packet, Take 17 g by mouth daily as needed (constipation) (Patient not taking: Reported on 12/6/2024), Disp: 10 each, Rfl: 0    valACYclovir (VALTREX) 1,000 mg tablet, Take 1 tablet (1,000 mg total) by mouth once as needed (HSV flare) for up to 3 days (Patient not taking: Reported on 12/6/2024), Disp: 10 tablet, Rfl: 0    ALLERGIES:  Allergies   Allergen Reactions    Thiazide-Type Diuretics Other (See Comments)     Hyponatremia    Duloxetine Other (See Comments)     Mental psychosis    Alendronate GI Intolerance, Myalgia, Nausea Only and Other (See Comments)    Revatio [Sildenafil] Other (See Comments)     mental status changes    Savella [Milnacipran] Other (See Comments)     Feeling out of it    Sulfamethoxazole-Trimethoprim Rash and GI Intolerance    Tedizolid Rash       Review of systems:   Constitutional: Negative for fatigue, fever or loss of apetite.   HENT: Negative.    Respiratory: Negative for shortness of breath, dyspnea.    Cardiovascular: Negative for chest pain/tightness.   Gastrointestinal: Negative for abdominal pain, N/V.   Endocrine: Negative for cold/heat intolerance, unexplained weight loss/gain.   Genitourinary: Negative for flank pain, dysuria, hematuria.    Musculoskeletal: As noted in HPI   Skin: Negative for rash.    Neurological: Negative  Psychiatric/Behavioral: Negative for agitation.  _____________________________________________________  PHYSICAL EXAMINATION:    Blood pressure 94/60, pulse 95, temperature 97.8 °F (36.6 °C), temperature source Temporal, height 5' (1.524 m), weight 44.2 kg (97 lb 6.4 oz), SpO2 97%, not currently breastfeeding.    General: well developed and well nourished, alert, oriented times 3, and appears comfortable  Psychiatric: Normal  HEENT: Benign  Cardiovascular: Regular    Pulmonary: No wheezing or stridor  Abdomen: Soft, Nontender  Skin: No masses, erythema, lacerations, fluctation, ulcerations  Neurovascular: Sensory and motor grossly intact    MUSCULOSKELETAL EXAMINATION:  The lower extremity exam demonstrates good range of motion of the hips, knees, ankles and feet without complaints of lower extremity pain.  She did complain of some lower back pain during range of motion of the lower extremities.  Straight leg raising is negative for radicular symptoms but did cause increase in her lower back pain.  There is no localized tenderness throughout the lower extremities.    She does have some tenderness over the midline at the coccygeal level.  She had no tenderness over the sacrum, spinous processes of the thoracic or lumbar spine.  She did have some paraspinal muscular tenderness.  The ribs were nontender.  Sciatic nerves in the buttocks and thighs were nontender and the SI joints were nontender.    _____________________________________________________  STUDIES REVIEWED:  X-Ray of bilateral hips and thoracic spine obtained on 11/26/2024 were reviewed and demonstrate  some degenerative disease of the hips with evidence of previous left pelvis fracture.  There are degenerative changes noted throughout the spine on the imaged portions with an associated scoliosis .    X-ray reports were reviewed were available.    Her primary care  physician's note dated 11/26/2024 was reviewed.    Scribe Attestation      I,:  Meena Jorgensen am acting as a scribe while in the presence of the attending physician.:       I,:  Daryl Torres, DO personally performed the services described in this documentation    as scribed in my presence.:

## 2024-12-10 NOTE — TELEPHONE ENCOUNTER
PA for lidocaine 5% patches  DENIED    Reason:(Screenshot if applicable)        Message sent to office clinical pool Yes    Denial letter scanned into Media No see above    Appeal started No (Provider will need to decide if appeal is warranted and send clinical documentation to Prior Authorization Team for initiation.)    **Please follow up with your patient regarding denial and next steps**

## 2024-12-10 NOTE — TELEPHONE ENCOUNTER
Spoke to patient in regards of script denial and informed her that she could get themover the counter. She said she already did get them she is still in a lot of pain and she will try physical therapy and let us know how that goes

## 2024-12-19 DIAGNOSIS — F90.0 ATTENTION DEFICIT HYPERACTIVITY DISORDER (ADHD), PREDOMINANTLY INATTENTIVE TYPE: ICD-10-CM

## 2024-12-20 RX ORDER — DEXTROAMPHETAMINE SACCHARATE, AMPHETAMINE ASPARTATE, DEXTROAMPHETAMINE SULFATE AND AMPHETAMINE SULFATE 2.5; 2.5; 2.5; 2.5 MG/1; MG/1; MG/1; MG/1
20 TABLET ORAL SEE ADMIN INSTRUCTIONS
Qty: 60 TABLET | Refills: 0 | Status: SHIPPED | OUTPATIENT
Start: 2024-12-20

## 2024-12-20 RX ORDER — DEXTROAMPHETAMINE SACCHARATE, AMPHETAMINE ASPARTATE MONOHYDRATE, DEXTROAMPHETAMINE SULFATE AND AMPHETAMINE SULFATE 7.5; 7.5; 7.5; 7.5 MG/1; MG/1; MG/1; MG/1
30 CAPSULE, EXTENDED RELEASE ORAL EVERY MORNING
Qty: 30 CAPSULE | Refills: 0 | Status: SHIPPED | OUTPATIENT
Start: 2024-12-20

## 2025-01-03 DIAGNOSIS — F51.01 PRIMARY INSOMNIA: ICD-10-CM

## 2025-01-03 RX ORDER — ALPRAZOLAM 0.25 MG/1
0.25 TABLET ORAL 2 TIMES DAILY PRN
Qty: 60 TABLET | Refills: 0 | Status: SHIPPED | OUTPATIENT
Start: 2025-01-03

## 2025-01-03 RX ORDER — TRAZODONE HYDROCHLORIDE 150 MG/1
150 TABLET ORAL
Qty: 90 TABLET | Refills: 1 | Status: SHIPPED | OUTPATIENT
Start: 2025-01-03

## 2025-01-08 DIAGNOSIS — R93.1 ELEVATED CORONARY ARTERY CALCIUM SCORE: ICD-10-CM

## 2025-01-08 DIAGNOSIS — E78.5 HYPERLIPIDEMIA, UNSPECIFIED HYPERLIPIDEMIA TYPE: ICD-10-CM

## 2025-01-08 DIAGNOSIS — I73.9 PAD (PERIPHERAL ARTERY DISEASE) (HCC): Primary | ICD-10-CM

## 2025-01-08 NOTE — PROGRESS NOTES
To whom it may concern    Mimi is a 69-year-old lady with a history of limited scleroderma, longstanding hyperlipidemia, elevated calcium score, immune mediated necrotizing myopathy and thereby not a candidate for statin therapy.  She has tried ezetimibe twice-and she reported hair loss on it.  She also has a family history of premature coronary artery disease.  In the setting She would meet candidacy for further lipid-lowering as per the ACC/AHA guidelines, for secondary prevention of coronary artery disease. and this authorization is for Leqvio/Inclisiran 284 mg q 6mo SC Inj.      Please contact us with any questions or clarifications.    Sincerely,    Jessica Rendon MD  Non-invasive Cardiology and Clinical Lipidology  [facility],  [place], [place]  Member, [employer] Association and [employer]

## 2025-01-17 ENCOUNTER — APPOINTMENT (EMERGENCY)
Dept: CT IMAGING | Facility: HOSPITAL | Age: 70
End: 2025-01-17
Payer: MEDICARE

## 2025-01-17 ENCOUNTER — HOSPITAL ENCOUNTER (EMERGENCY)
Facility: HOSPITAL | Age: 70
End: 2025-01-17
Attending: EMERGENCY MEDICINE
Payer: MEDICARE

## 2025-01-17 ENCOUNTER — HOSPITAL ENCOUNTER (INPATIENT)
Facility: HOSPITAL | Age: 70
LOS: 7 days | Discharge: HOME | End: 2025-01-24
Attending: EMERGENCY MEDICINE | Admitting: EMERGENCY MEDICINE
Payer: MEDICARE

## 2025-01-17 VITALS
TEMPERATURE: 98.7 F | SYSTOLIC BLOOD PRESSURE: 120 MMHG | BODY MASS INDEX: 19.04 KG/M2 | HEIGHT: 60 IN | WEIGHT: 97 LBS | RESPIRATION RATE: 15 BRPM | DIASTOLIC BLOOD PRESSURE: 60 MMHG | OXYGEN SATURATION: 98 % | HEART RATE: 75 BPM

## 2025-01-17 DIAGNOSIS — S22.080A COMPRESSION FRACTURE OF T12 VERTEBRA, INITIAL ENCOUNTER (HCC): ICD-10-CM

## 2025-01-17 DIAGNOSIS — S22.080A CLOSED WEDGE COMPRESSION FRACTURE OF T12 VERTEBRA, INITIAL ENCOUNTER (HCC): ICD-10-CM

## 2025-01-17 DIAGNOSIS — S22.080A T12 COMPRESSION FRACTURE, INITIAL ENCOUNTER (HCC): ICD-10-CM

## 2025-01-17 DIAGNOSIS — S36.115A LIVER LACERATION, GRADE II, WITHOUT OPEN WOUND INTO CAVITY, INITIAL ENCOUNTER: ICD-10-CM
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
## 2025-01-17 DIAGNOSIS — S22.41XA CLOSED FRACTURE OF MULTIPLE RIBS OF RIGHT SIDE, INITIAL ENCOUNTER: Primary | ICD-10-CM

## 2025-01-17 DIAGNOSIS — K76.89 LIVER CYST: ICD-10-CM
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
## 2025-01-17 PROBLEM — N32.89 BLADDER WALL THICKENING: Status: ACTIVE | Noted: 2025-01-17

## 2025-01-17 LAB
ALBUMIN SERPL BCG-MCNC: 4 G/DL (ref 3.5–5)
ALP SERPL-CCNC: 61 U/L (ref 34–104)
ALT SERPL W P-5'-P-CCNC: 36 U/L (ref 7–52)
ANION GAP SERPL CALCULATED.3IONS-SCNC: 7 MMOL/L (ref 4–13)
AST SERPL W P-5'-P-CCNC: 31 U/L (ref 13–39)
BASOPHILS # BLD AUTO: 0.05 THOUSANDS/ΜL (ref 0–0.1)
BASOPHILS NFR BLD AUTO: 1 % (ref 0–1)
BILIRUB SERPL-MCNC: 0.45 MG/DL (ref 0.2–1)
BUN SERPL-MCNC: 21 MG/DL (ref 5–25)
CALCIUM SERPL-MCNC: 8.9 MG/DL (ref 8.4–10.2)
CHLORIDE SERPL-SCNC: 102 MMOL/L (ref 96–108)
CO2 SERPL-SCNC: 27 MMOL/L (ref 21–32)
CREAT SERPL-MCNC: 0.77 MG/DL (ref 0.6–1.3)
EOSINOPHIL # BLD AUTO: 0.22 THOUSAND/ΜL (ref 0–0.61)
EOSINOPHIL NFR BLD AUTO: 4 % (ref 0–6)
ERYTHROCYTE [DISTWIDTH] IN BLOOD BY AUTOMATED COUNT: 13.2 % (ref 11.6–15.1)
GFR SERPL CREATININE-BSD FRML MDRD: 78 ML/MIN/1.73SQ M
GLUCOSE SERPL-MCNC: 123 MG/DL (ref 65–140)
HCT VFR BLD AUTO: 34.7 % (ref 34.8–46.1)
HGB BLD-MCNC: 11.3 G/DL (ref 11.5–15.4)
IMM GRANULOCYTES # BLD AUTO: 0.01 THOUSAND/UL (ref 0–0.2)
IMM GRANULOCYTES NFR BLD AUTO: 0 % (ref 0–2)
LIPASE SERPL-CCNC: 47 U/L (ref 11–82)
LYMPHOCYTES # BLD AUTO: 0.92 THOUSANDS/ΜL (ref 0.6–4.47)
LYMPHOCYTES NFR BLD AUTO: 18 % (ref 14–44)
MCH RBC QN AUTO: 28.8 PG (ref 26.8–34.3)
MCHC RBC AUTO-ENTMCNC: 32.6 G/DL (ref 31.4–37.4)
MCV RBC AUTO: 89 FL (ref 82–98)
MONOCYTES # BLD AUTO: 0.59 THOUSAND/ΜL (ref 0.17–1.22)
MONOCYTES NFR BLD AUTO: 11 % (ref 4–12)
NEUTROPHILS # BLD AUTO: 3.43 THOUSANDS/ΜL (ref 1.85–7.62)
NEUTS SEG NFR BLD AUTO: 66 % (ref 43–75)
NRBC BLD AUTO-RTO: 0 /100 WBCS
PLATELET # BLD AUTO: 206 THOUSANDS/UL (ref 149–390)
PMV BLD AUTO: 9.3 FL (ref 8.9–12.7)
POTASSIUM SERPL-SCNC: 4 MMOL/L (ref 3.5–5.3)
PROT SERPL-MCNC: 5.6 G/DL (ref 6.4–8.4)
RBC # BLD AUTO: 3.92 MILLION/UL (ref 3.81–5.12)
SODIUM SERPL-SCNC: 136 MMOL/L (ref 135–147)
WBC # BLD AUTO: 5.22 THOUSAND/UL (ref 4.31–10.16)

## 2025-01-17 PROCEDURE — 99284 EMERGENCY DEPT VISIT MOD MDM: CPT

## 2025-01-17 PROCEDURE — 96375 TX/PRO/DX INJ NEW DRUG ADDON: CPT

## 2025-01-17 PROCEDURE — 74177 CT ABD & PELVIS W/CONTRAST: CPT

## 2025-01-17 PROCEDURE — 94664 DEMO&/EVAL PT USE INHALER: CPT

## 2025-01-17 PROCEDURE — 71260 CT THORAX DX C+: CPT

## 2025-01-17 PROCEDURE — 96365 THER/PROPH/DIAG IV INF INIT: CPT

## 2025-01-17 PROCEDURE — 80053 COMPREHEN METABOLIC PANEL: CPT | Performed by: EMERGENCY MEDICINE

## 2025-01-17 PROCEDURE — 99223 1ST HOSP IP/OBS HIGH 75: CPT | Performed by: EMERGENCY MEDICINE

## 2025-01-17 PROCEDURE — 99285 EMERGENCY DEPT VISIT HI MDM: CPT | Performed by: EMERGENCY MEDICINE

## 2025-01-17 PROCEDURE — 83690 ASSAY OF LIPASE: CPT | Performed by: EMERGENCY MEDICINE

## 2025-01-17 PROCEDURE — 94760 N-INVAS EAR/PLS OXIMETRY 1: CPT

## 2025-01-17 PROCEDURE — 99223 1ST HOSP IP/OBS HIGH 75: CPT | Performed by: PHYSICIAN ASSISTANT

## 2025-01-17 PROCEDURE — 96376 TX/PRO/DX INJ SAME DRUG ADON: CPT

## 2025-01-17 PROCEDURE — 36415 COLL VENOUS BLD VENIPUNCTURE: CPT | Performed by: EMERGENCY MEDICINE

## 2025-01-17 PROCEDURE — 96361 HYDRATE IV INFUSION ADD-ON: CPT

## 2025-01-17 PROCEDURE — 85025 COMPLETE CBC W/AUTO DIFF WBC: CPT | Performed by: EMERGENCY MEDICINE

## 2025-01-17 RX ORDER — HYDROMORPHONE HCL IN WATER/PF 6 MG/30 ML
0.2 PATIENT CONTROLLED ANALGESIA SYRINGE INTRAVENOUS EVERY 2 HOUR PRN
Refills: 0 | Status: DISCONTINUED | OUTPATIENT
Start: 2025-01-17 | End: 2025-01-22

## 2025-01-17 RX ORDER — LANOLIN ALCOHOL/MO/W.PET/CERES
1 CREAM (GRAM) TOPICAL
Status: DISCONTINUED | OUTPATIENT
Start: 2025-01-18 | End: 2025-01-24 | Stop reason: HOSPADM

## 2025-01-17 RX ORDER — MYCOPHENOLIC ACID 180 MG/1
360 TABLET, DELAYED RELEASE ORAL 2 TIMES DAILY
Status: DISCONTINUED | OUTPATIENT
Start: 2025-01-17 | End: 2025-01-24 | Stop reason: HOSPADM

## 2025-01-17 RX ORDER — ONDANSETRON 2 MG/ML
4 INJECTION INTRAMUSCULAR; INTRAVENOUS EVERY 4 HOURS PRN
Status: DISCONTINUED | OUTPATIENT
Start: 2025-01-17 | End: 2025-01-24 | Stop reason: HOSPADM

## 2025-01-17 RX ORDER — METHOCARBAMOL 500 MG/1
500 TABLET, FILM COATED ORAL EVERY 6 HOURS SCHEDULED
Status: DISCONTINUED | OUTPATIENT
Start: 2025-01-17 | End: 2025-01-22

## 2025-01-17 RX ORDER — PANTOPRAZOLE SODIUM 40 MG/1
40 TABLET, DELAYED RELEASE ORAL
Status: DISCONTINUED | OUTPATIENT
Start: 2025-01-18 | End: 2025-01-24 | Stop reason: HOSPADM

## 2025-01-17 RX ORDER — DIPHENHYDRAMINE HCL 25 MG
12.5 TABLET ORAL EVERY 8 HOURS PRN
Status: DISCONTINUED | OUTPATIENT
Start: 2025-01-17 | End: 2025-01-21

## 2025-01-17 RX ORDER — GABAPENTIN 100 MG/1
100 CAPSULE ORAL 3 TIMES DAILY
Status: DISCONTINUED | OUTPATIENT
Start: 2025-01-17 | End: 2025-01-17

## 2025-01-17 RX ORDER — HYDROXYCHLOROQUINE SULFATE 200 MG/1
200 TABLET, FILM COATED ORAL DAILY
Status: DISCONTINUED | OUTPATIENT
Start: 2025-01-18 | End: 2025-01-24 | Stop reason: HOSPADM

## 2025-01-17 RX ORDER — DOCUSATE SODIUM 100 MG/1
100 CAPSULE, LIQUID FILLED ORAL DAILY
Status: DISCONTINUED | OUTPATIENT
Start: 2025-01-18 | End: 2025-01-22

## 2025-01-17 RX ORDER — DEXTROAMPHETAMINE SACCHARATE, AMPHETAMINE ASPARTATE, DEXTROAMPHETAMINE SULFATE AND AMPHETAMINE SULFATE 2.5; 2.5; 2.5; 2.5 MG/1; MG/1; MG/1; MG/1
15 TABLET ORAL
Refills: 0 | Status: DISCONTINUED | OUTPATIENT
Start: 2025-01-18 | End: 2025-01-24 | Stop reason: HOSPADM

## 2025-01-17 RX ORDER — BUPROPION HYDROCHLORIDE 150 MG/1
300 TABLET ORAL EVERY MORNING
Status: DISCONTINUED | OUTPATIENT
Start: 2025-01-18 | End: 2025-01-24 | Stop reason: HOSPADM

## 2025-01-17 RX ORDER — ACETAMINOPHEN 325 MG/1
650 TABLET ORAL EVERY 6 HOURS SCHEDULED
Status: DISCONTINUED | OUTPATIENT
Start: 2025-01-17 | End: 2025-01-24 | Stop reason: HOSPADM

## 2025-01-17 RX ORDER — SODIUM CHLORIDE, SODIUM GLUCONATE, SODIUM ACETATE, POTASSIUM CHLORIDE, MAGNESIUM CHLORIDE, SODIUM PHOSPHATE, DIBASIC, AND POTASSIUM PHOSPHATE .53; .5; .37; .037; .03; .012; .00082 G/100ML; G/100ML; G/100ML; G/100ML; G/100ML; G/100ML; G/100ML
1000 INJECTION, SOLUTION INTRAVENOUS ONCE
Status: COMPLETED | OUTPATIENT
Start: 2025-01-17 | End: 2025-01-17

## 2025-01-17 RX ORDER — OXYCODONE HYDROCHLORIDE 5 MG/1
5 TABLET ORAL EVERY 4 HOURS PRN
Refills: 0 | Status: DISCONTINUED | OUTPATIENT
Start: 2025-01-17 | End: 2025-01-22

## 2025-01-17 RX ORDER — FOLIC ACID 1 MG/1
2 TABLET ORAL DAILY
Status: DISCONTINUED | OUTPATIENT
Start: 2025-01-18 | End: 2025-01-24 | Stop reason: HOSPADM

## 2025-01-17 RX ORDER — PREGABALIN 75 MG/1
75 CAPSULE ORAL 2 TIMES DAILY
Status: DISCONTINUED | OUTPATIENT
Start: 2025-01-17 | End: 2025-01-24 | Stop reason: HOSPADM

## 2025-01-17 RX ORDER — ENOXAPARIN SODIUM 100 MG/ML
30 INJECTION SUBCUTANEOUS EVERY 12 HOURS
Status: COMPLETED | OUTPATIENT
Start: 2025-01-17 | End: 2025-01-17

## 2025-01-17 RX ORDER — LEVOTHYROXINE SODIUM 75 UG/1
150 TABLET ORAL DAILY
Status: DISCONTINUED | OUTPATIENT
Start: 2025-01-18 | End: 2025-01-24 | Stop reason: HOSPADM

## 2025-01-17 RX ORDER — LIDOCAINE 50 MG/G
1 PATCH TOPICAL DAILY
Status: DISCONTINUED | OUTPATIENT
Start: 2025-01-18 | End: 2025-01-24 | Stop reason: HOSPADM

## 2025-01-17 RX ORDER — ALPRAZOLAM 0.25 MG/1
0.25 TABLET ORAL 2 TIMES DAILY PRN
Status: DISCONTINUED | OUTPATIENT
Start: 2025-01-17 | End: 2025-01-24 | Stop reason: HOSPADM

## 2025-01-17 RX ORDER — SODIUM CHLORIDE, SODIUM LACTATE, POTASSIUM CHLORIDE, CALCIUM CHLORIDE 600; 310; 30; 20 MG/100ML; MG/100ML; MG/100ML; MG/100ML
75 INJECTION, SOLUTION INTRAVENOUS CONTINUOUS
Status: DISCONTINUED | OUTPATIENT
Start: 2025-01-17 | End: 2025-01-17 | Stop reason: HOSPADM

## 2025-01-17 RX ADMIN — HYDROMORPHONE HYDROCHLORIDE 0.2 MG: 0.2 INJECTION, SOLUTION INTRAMUSCULAR; INTRAVENOUS; SUBCUTANEOUS at 20:04

## 2025-01-17 RX ADMIN — OXYCODONE HYDROCHLORIDE 5 MG: 5 TABLET ORAL at 18:14

## 2025-01-17 RX ADMIN — METHOCARBAMOL 500 MG: 500 TABLET ORAL at 18:14

## 2025-01-17 RX ADMIN — MORPHINE SULFATE 2 MG: 2 INJECTION, SOLUTION INTRAMUSCULAR; INTRAVENOUS at 12:44

## 2025-01-17 RX ADMIN — MORPHINE SULFATE 2 MG: 2 INJECTION, SOLUTION INTRAMUSCULAR; INTRAVENOUS at 13:57

## 2025-01-17 RX ADMIN — IOHEXOL 100 ML: 350 INJECTION, SOLUTION INTRAVENOUS at 11:09

## 2025-01-17 RX ADMIN — ENOXAPARIN SODIUM 30 MG: 30 INJECTION SUBCUTANEOUS at 20:04

## 2025-01-17 RX ADMIN — TRAZODONE HYDROCHLORIDE 150 MG: 100 TABLET ORAL at 21:29

## 2025-01-17 RX ADMIN — MYCOPHENOLIC ACID 360 MG: 180 TABLET, DELAYED RELEASE ORAL at 20:04

## 2025-01-17 RX ADMIN — ACETAMINOPHEN 650 MG: 325 TABLET, FILM COATED ORAL at 18:14

## 2025-01-17 RX ADMIN — ACETAMINOPHEN 650 MG: 325 TABLET, FILM COATED ORAL at 23:16

## 2025-01-17 RX ADMIN — SODIUM CHLORIDE, SODIUM LACTATE, POTASSIUM CHLORIDE, AND CALCIUM CHLORIDE 75 ML/HR: .6; .31; .03; .02 INJECTION, SOLUTION INTRAVENOUS at 13:00

## 2025-01-17 RX ADMIN — METHOCARBAMOL 500 MG: 500 TABLET ORAL at 23:16

## 2025-01-17 RX ADMIN — SODIUM CHLORIDE, SODIUM GLUCONATE, SODIUM ACETATE, POTASSIUM CHLORIDE, MAGNESIUM CHLORIDE, SODIUM PHOSPHATE, DIBASIC, AND POTASSIUM PHOSPHATE 1000 ML: .53; .5; .37; .037; .03; .012; .00082 INJECTION, SOLUTION INTRAVENOUS at 10:39

## 2025-01-17 RX ADMIN — PREGABALIN 75 MG: 75 CAPSULE ORAL at 18:13

## 2025-01-17 NOTE — ASSESSMENT & PLAN NOTE
- Acute nondisplaced fractures of the right lateral seventh and eighth ribs, present on admission.  - Continue rib fracture protocol.  - Continue to encourage incentive spirometer use and adequate pulmonary hygiene.    - PIC score   - Appreciate APS evaluation and recommendations.  - Continue multimodal analgesic regimen.  - Supplemental oxygen via nasal cannula as needed to maintain saturations greater than or equal to 94%.  - PT and OT evaluation and treatment as indicated.  - Outpatient follow-up in the trauma clinic for re-evaluation in approximately 2 weeks.  - Multimodal pain control

## 2025-01-17 NOTE — H&P
H&P - Trauma   Name: Mimi Biggs 69 y.o. female I MRN: [id number]  Unit/Bed#: ED 17 I Date of Admission: 1/17/2025   Date of Service: 1/17/2025 I Hospital Day: 0     Assessment & Plan  Closed fracture of multiple ribs of right side, initial encounter  - Acute nondisplaced fractures of the right lateral seventh and eighth ribs, present on admission.  - Continue rib fracture protocol.  - Continue to encourage incentive spirometer use and adequate pulmonary hygiene.    - PIC score   - Appreciate APS evaluation and recommendations.  - Continue multimodal analgesic regimen.  - Supplemental oxygen via nasal cannula as needed to maintain saturations greater than or equal to 94%.  - PT and OT evaluation and treatment as indicated.  - Outpatient follow-up in the trauma clinic for re-evaluation in approximately 2 weeks.  - Multimodal pain control    Closed wedge compression fracture of T12 vertebra, initial encounter ([facility])  T12 compression fracture, likely subacute as there is due appear to be subtle changes along the superior endplate on the recent prior study. There has been significant progression from the prior study and correlation with tenderness in this region is recommended.  - Pt w/ no new neurological deficits, ambulatory since the event  - Appreciate Neurosurgical evaluation  - Regular neuro exams  - Pain control as above  Liver laceration, grade II, without open wound into cavity, initial encounter  - Hemorrhage of a posterior right lobe cyst both internally and into the adjacent parenchyma and subcapsular space secondary to a grade 2 liver laceration.  - Serial abdominal exams  - Trend H/H   - Monitor Vitals  - Repeat scan + transfuse as needed for acute change in exam or rapid drop in Hgb  - Regular diet  - Pain control as above    Trauma Alert: Evaluation; trauma team notified at 4pm via text   Model of Arrival: Ambulance    Trauma Team: Attending Shahriar and Residents Mathieu  Consultants:     Neurosurgery:  routine consult; Epic consult order placed; Gerontology, Epic consult order placed    History of Present Illness   Chief Complaint: R Rib pain  Mechanism:Fall     Mimi Biggs is a 69 y.o. female who presents to the [facility] emergency department following a fall at home yesterday, patient reports she was ambulating down a stepladder, reports she caught her foot on the second step, she fell onto her right side, striking her right ribs on a dresser, reports she did not fall to the ground or strike her head, reports immediate pain in the right ribs, no history of blood thinner use, no loss of consciousness, does not take aspirin at baseline, reports overnight she tolerated at home, but the pain did not improve causing her to present to the emergency department, on presentation to the [facility] emergency department she received a CT which demonstrated a hemorrhage from a previous hepatic cyst as well as a grade 2 liver laceration, trauma team at [facility] was consulted and the patient was transferred for further evaluation.    Review of Systems   Constitutional:  Negative for fever.   Respiratory:  Negative for shortness of breath.    Gastrointestinal:  Negative for nausea and vomiting.   Genitourinary:  Negative for difficulty urinating.   Musculoskeletal:  Negative for neck pain.   Skin:  Negative for rash.   Neurological:  Negative for dizziness and headaches.     I have reviewed the patient's PMH, PSH, Social History, Family History, Meds, and Allergies  Immunization History   Administered Date(s) Administered    COVID-19 MODERNA VACC 0.5 ML IM 01/26/2021, 02/23/2021, 08/16/2021, 03/16/2022    COVID-19 Moderna mRNA Vaccine 12 Yr+ 50 mcg/0.5 mL (Spikevax) 11/02/2023    COVID-19 Pfizer Vac BIVALENT Alejandro-sucrose 12 Yr+ IM 11/17/2022    INFLUENZA 01/06/2006, 01/06/2006, 10/22/2013, 11/10/2014, 11/10/2014, 10/18/2016, 10/18/2016, 10/04/2017, 10/04/2017, 09/21/2018, 10/07/2022    Influenza Quadrivalent  Preservative Free 3 years and older IM 10/18/2016    Influenza, high dose seasonal 0.7 mL 11/12/2019, 10/29/2020, 10/29/2020, 12/23/2021, 10/07/2022, 09/20/2023, 10/30/2023    Influenza, recombinant, quadrivalent,injectable, preservative free 09/21/2018    Influenza, seasonal, injectable 10/04/2017    Pneumococcal Conjugate 13-Valent 02/15/2019    Pneumococcal Polysaccharide PPV23 01/01/2006, 10/29/2020, 10/29/2020    Tdap 10/01/2013    Zoster Vaccine Recombinant 08/16/2021       1. Before the illness or injury that brought you to the Emergency, did you need someone to help you on a regular basis? 0=No   2. Since the illness or injury that brought you to the Emergency, have you needed more help than usual to take care of yourself? 1=Yes   3. Have you been hospitalized for one or more nights during the past 6 months (excluding a stay in the Emergency Department)? 1=Yes   4. In general, do you see well? 0=Yes   5. In general, do you have serious problems with your memory? 0=No   6. Do you take more than three different medications everyday? 1=Yes   TOTAL   3     Did you order a geriatric consult if the score was 2 or greater?: yes       Objective :  Temp:  [98.7 °F (37.1 °C)] 98.7 °F (37.1 °C)  HR:  [72-84] 72  BP: (112-148)/(58-65) 131/63  Resp:  [14-16] 16  SpO2:  [96 %-98 %] 97 %  O2 Device: None (Room air)    Initial Vitals:   Pulse: 72 (01/17/25 1541)  Respirations: 16 (01/17/25 1541)  Blood Pressure: 131/63 (01/17/25 1541)    Primary Survey:   Airway:        Status: patent;        Pre-hospital Interventions: none        Hospital Interventions: none  Breathing:        Pre-hospital Interventions: none       Effort: normal       Right breath sounds: normal       Left breath sounds: normal  Circulation:        Rhythm: regular no murmur       Rate: regular no pericardial rub   Right Pulses Left Pulses    R radial: 2+  R femoral: 2+  R pedal: 2+     L radial: 2+  L femoral: 2+  L pedal: 2+       Disability:         GCS: Eye: 4; Verbal: 5 Motor: 6 Total: 15       Right Pupil: round;  reactive         Left Pupil:  round;  reactive      R Motor Strength L Motor Strength    R : 5/5  R dorsiflex: 5/5   L : 5/5  L dorsiflex: 5/5          Sensory:  No sensory deficit  Exposure:       Completed: Yes      Secondary Survey:  Physical Exam  Constitutional:       Appearance: She is not ill-appearing, toxic-appearing or diaphoretic.   HENT:      Head: Normocephalic and atraumatic.   Eyes:      Extraocular Movements: Extraocular movements intact.      Pupils: Pupils are equal, round, and reactive to light.   Cardiovascular:      Rate and Rhythm: Normal rate and regular rhythm.   Pulmonary:      Effort: No respiratory distress.      Breath sounds: No wheezing, rhonchi or rales.   Chest:      Chest wall: No tenderness.   Abdominal:      General: Abdomen is flat.      Tenderness: There is no abdominal tenderness. There is no guarding or rebound.   Musculoskeletal:         General: Tenderness present. No swelling, deformity or signs of injury.      Cervical back: No rigidity.      Comments: Pt displays point tenderness to palpation of the right lateral ribs, no evidence of deformity, no evidence of ecchymosis, no evidence of flail segment   Skin:     General: Skin is warm and dry.      Coloration: Skin is not pale.      Findings: No bruising, erythema or rash.   Neurological:      General: No focal deficit present.      Mental Status: She is alert and oriented to person, place, and time.      Sensory: No sensory deficit.      Motor: No weakness.             Lab Results: I have reviewed the following results:  Recent Labs     01/17/25  1034   WBC 5.22   HGB 11.3*   HCT 34.7*      SODIUM 136   K 4.0      CO2 27   BUN 21   CREATININE 0.77   GLUC 123   AST 31   ALT 36   ALB 4.0   TBILI 0.45   ALKPHOS 61       Imaging Results: I have personally reviewed pertinent images saved in PACS. CT scan findings (and other pertinent  positive findings on images) were discussed with radiology. My interpretation of the images/reports are as follows:  Chest Xray(s): N/A   FAST exam(s): N/A   CT Scan(s): positive for acute findings: Hemorrhage of hepatic cyst, Grade 2 liver lac, nondisplaced rib fractures, R 7/8th,  T12 compression fracture   Additional Xray(s): N/A     Other Studies: Other Study Results Review: No additional pertinent studies reviewed.

## 2025-01-17 NOTE — ED NOTES
Pt left with transport team in stable condition. Report called to receiving facility      Nishi Bell RN  01/17/25 [id number]

## 2025-01-17 NOTE — ASSESSMENT & PLAN NOTE
- Hemorrhage of a posterior right lobe cyst both internally and into the adjacent parenchyma and subcapsular space secondary to a grade 2 liver laceration.  - Serial abdominal exams  - Trend H/H   - Monitor Vitals  - Repeat scan + transfuse as needed for acute change in exam or rapid drop in Hgb  - Regular diet  - Pain control as above

## 2025-01-17 NOTE — ASSESSMENT & PLAN NOTE
- Status post fall off ladder 1/16 and hit a dresser on the way down with the below noted injuries.  - No head strike, LOC, or use of AC/AP medications  - Fall precautions.  - Geriatric Medicine consultation for evaluation, medication review and recommendations.  - PT and OT evaluation and treatment as indicated.  - Case Management consultation for disposition planning.

## 2025-01-17 NOTE — CONSULTS
Consultation - Neurosurgery   Name: Mimi Biggs 69 y.o. female I MRN: [id number]  Unit/Bed#: ED 17 I Date of Admission: 2025   Date of Service: 2025 I Hospital Day: 0   Inpatient consult to Neurosurgery  Consult performed by: Laure You PA-C  Consult ordered by: Eddie Murdock DO        Physician Requesting Evaluation: Antonio Bess MD   Reason for Evaluation / Principal Problem: T12 compression fx     Assessment & Plan  Closed wedge compression fracture of T12 vertebra (HCC)  T12 SEP compression fx   S/p fall from a stepladder striking her R side on a dresser one day prior to arrival   Denies any head strike or LOC   Presented to the ER today due to persistent, severe pain   Also with R sided rib fx and a grade 2 liver laceration   Pt intact on exam, reports chronic LBP, unchanged from baseline. Biggest complaint is R sided rib pain   Neuro intact on exam     Imagin/17 CT c/a/p: Hemorrhage of a posterior right lobe cyst both internally and into the adjacent parenchyma and subcapsular space secondary to a grade 2 liver laceration. Acute nondisplaced fractures of the right lateral seventh and eighth ribs. T12 compression fracture, likely subacute as there is due appear to be subtle changes along the superior endplate on the recent prior study. There has been significant progression from the prior study and correlation with tenderness in this region is recommended.    Plan:   Continue to closely monitor neuro exam   Frequent neuro checks per primary team   Maintain normotensive BP goals, SBP < 160, MAP > 65   No acute neuro surgical intervention indicated at this time   Case and imaging reviewed this am on rounds   Recommend conservative management   TLSO brace as needed for comfort   Maintain thoracolumbar spinal precautions   No bending, twisting, lifting > 5-10lbs   No driving if wearing the brace   Please obtain upright thoracolumbar x-rays   Pain control per primary team   PT/OT    DVT ppx: shelia Chambers for chem dvt ppx from a nsgy standpoint   Medical management per primary team    Social work following for assistance with dispo once medically cleared     Neurosurgery will follow from the periphery and review upright xrays once completed. Please reach out with any further questions or concerns.     Please contact the SecureChat role for the Neurosurgery service with any questions/concerns.    History of Present Illness   HPI: Mimi Biggs is a 69 y.o. year old female with a PMH significant for follicular lymphoma, MGUS, autoimmune necrotizing myopathy, scleroderma, Raynaud's, Sjogren's, HLD, hypothyroidism, and fibromyalgia who presented to the [facility] emergency department earlier today with complaints of persistent right-sided chest wall pain/rib pain.  Patient states she was using a stepladder in her house yesterday when she tripped and fell striking her right side into the dresser.  Patient denies any head strike or LOC.  Patient states she instantly felt right sided pain but it was initially somewhat tolerable.  Unfortunately through the night and into today, the patient's pain persisted/worsened so she presented to the local ER for further evaluation and workup.  She underwent CT chest/abdomen/pelvis which revealed right-sided rib fractures, a right sided liver laceration, as well as a subacute/worsening T12 compression fracture.  Patient was transferred to [facility] for admission to the trauma service.  Neurosurgery was consulted given this T12 compression fracture.  Patient admits to a longtime history of chronic low back pain that was worsened after a fall in November.  Patient states she is been working with physical therapy and has significantly improved her overall strengthening but this left-sided low back pain/left hip pain continues to persist.  She denies any worsening back pain since this recent fall.  Patient denies any lower extremity radiculopathy, new weakness,  numbness, bowel/bladder incontinence, urinary retention, saddle anesthesia.  Patient denies any previous spine surgeries.    Review of Systems   Constitutional:  Negative for fatigue and fever.   Eyes:  Negative for photophobia and visual disturbance.   Respiratory:  Negative for cough and shortness of breath.    Cardiovascular:  Positive for chest pain.        Right sided rib pain   Gastrointestinal:  Negative for nausea and vomiting.   Musculoskeletal:  Positive for myalgias. Negative for back pain.   Neurological:  Negative for weakness, numbness and headaches.   Psychiatric/Behavioral:  Negative for agitation, behavioral problems, confusion and decreased concentration.      I have reviewed the patient's PMH, PSH, Social History, Family History, Meds, and Allergies    Objective :  Temp:  [98.7 °F (37.1 °C)] 98.7 °F (37.1 °C)  HR:  [72-84] 72  BP: (112-148)/(58-65) 131/63  Resp:  [14-16] 16  SpO2:  [96 %-98 %] 97 %  O2 Device: None (Room air)    Physical Exam  General appearance: alert, appears stated age, cooperative and no distress  Head: Normocephalic, without obvious abnormality, atraumatic  Eyes: EOMI, PERRL  Neck: supple, symmetrical, trachea midline and NT  Back:   - no significant midline tenderness appreciated   - tenderness noted over L low/lumbar paraspinal muscles and L hip   - tenderness along R sided ribs   Lungs: non labored breathing, no resp distress on room air   Heart: regular heart rate  Neurologic:   Mental status: Alert, oriented x3, thought content appropriate  Cranial nerves: grossly intact (Cranial nerves II-XII)  Sensory: normal to LT suad throughout   Motor: moving all extremities without focal weakness   - strength 5/5 throughout suad LE       Lab Results: I have reviewed the following results:  Recent Labs     01/17/25  1034   WBC 5.22   HGB 11.3*   HCT 34.7*      SODIUM 136   K 4.0      CO2 27   BUN 21   CREATININE 0.77   GLUC 123   AST 31   ALT 36   ALB 4.0   TBILI 0.45    ALKPHOS 61     VTE Pharmacologic Prophylaxis: Sequential compression device (Venodyne)  and Enoxaparin (Lovenox)

## 2025-01-17 NOTE — PLAN OF CARE
Problem: PAIN - ADULT  Goal: Verbalizes/displays adequate comfort level or baseline comfort level  Description: Interventions:  - Encourage patient to monitor pain and request assistance  - Assess pain using appropriate pain scale  - Administer analgesics based on type and severity of pain and evaluate response  - Implement non-pharmacological measures as appropriate and evaluate response  - Consider cultural and social influences on pain and pain management  - Notify physician/advanced practitioner if interventions unsuccessful or patient reports new pain  Outcome: Progressing     Problem: INFECTION - ADULT  Goal: Absence or prevention of progression during hospitalization  Description: INTERVENTIONS:  - Assess and monitor for signs and symptoms of infection  - Monitor lab/diagnostic results  - Monitor all insertion sites, i.e. indwelling lines, tubes, and drains  - Monitor endotracheal if appropriate and nasal secretions for changes in amount and color  - [place] appropriate cooling/warming therapies per order  - Administer medications as ordered  - Instruct and encourage patient and family to use good hand hygiene technique  - Identify and instruct in appropriate isolation precautions for identified infection/condition  Outcome: Progressing  Goal: Absence of fever/infection during neutropenic period  Description: INTERVENTIONS:  - Monitor WBC    Outcome: Progressing     Problem: DISCHARGE PLANNING  Goal: Discharge to home or other facility with appropriate resources  Description: INTERVENTIONS:  - Identify barriers to discharge w/patient and caregiver  - Arrange for needed discharge resources and transportation as appropriate  - Identify discharge learning needs (meds, wound care, etc.)  - Arrange for interpretive services to assist at discharge as needed  - Refer to Case Management Department for coordinating discharge planning if the patient needs post-hospital services based on physician/advanced  practitioner order or complex needs related to functional status, cognitive ability, or social support system  Outcome: Progressing     Problem: Knowledge Deficit  Goal: Patient/family/caregiver demonstrates understanding of disease process, treatment plan, medications, and discharge instructions  Description: Complete learning assessment and assess knowledge base.  Interventions:  - Provide teaching at level of understanding  - Provide teaching via preferred learning methods  Outcome: Progressing

## 2025-01-17 NOTE — ASSESSMENT & PLAN NOTE
T12 SEP compression fx   S/p fall from a stepladder striking her R side on a dresser one day prior to arrival   Denies any head strike or LOC   Presented to the ER today due to persistent, severe pain   Also with R sided rib fx and a grade 2 liver laceration   Pt intact on exam, reports chronic LBP, unchanged from baseline. Biggest complaint is R sided rib pain   Neuro intact on exam     Imagin/17 CT c/a/p: Hemorrhage of a posterior right lobe cyst both internally and into the adjacent parenchyma and subcapsular space secondary to a grade 2 liver laceration. Acute nondisplaced fractures of the right lateral seventh and eighth ribs. T12 compression fracture, likely subacute as there is due appear to be subtle changes along the superior endplate on the recent prior study. There has been significant progression from the prior study and correlation with tenderness in this region is recommended.    Plan:   Continue to closely monitor neuro exam   Frequent neuro checks per primary team   Maintain normotensive BP goals, SBP < 160, MAP > 65   No acute neuro surgical intervention indicated at this time   Case and imaging reviewed this am on rounds   Recommend conservative management   TLSO brace as needed for comfort   Maintain thoracolumbar spinal precautions   No bending, twisting, lifting > 5-10lbs   No driving if wearing the brace   Please obtain upright thoracolumbar x-rays   Pain control per primary team   PT/OT   DVT ppx: shelia Chambers for chem dvt ppx from a nsgy standpoint   Medical management per primary team    Social work following for assistance with dispo once medically cleared     Neurosurgery will follow from the periphery and review upright xrays once completed. Please reach out with any further questions or concerns.

## 2025-01-17 NOTE — EMTALA/ACUTE CARE TRANSFER
[facility] EMERGENCY DEPARTMENT  [address] [address]  Dept: [phone]      EMTALA TRANSFER CONSENT    NAME Mimi Biggs                                         1955                              MRN [id number]    I have been informed of my rights regarding examination, treatment, and transfer   by Dr. Marin Blake,*    Benefits:   Trauma service availability    Risks:   Delay in care, crash during transfer      Transfer Request   I acknowledge that my medical condition has been evaluated and explained to me by the emergency department physician or other qualified medical person and/or my attending physician who has recommended and offered to me further medical examination and treatment. I understand the Hospital's obligation with respect to the treatment and stabilization of my emergency medical condition. I nevertheless request to be transferred. I release the Hospital, the doctor, and any other persons caring for me from all responsibility or liability for any injury or ill effects that may result from my transfer and agree to accept all responsibility for the consequences of my choice to transfer, rather than receive stabilizing treatment at the Hospital. I understand that because the transfer is my request, my insurance may not provide reimbursement for the services.  The Hospital will assist and direct me and my family in how to make arrangements for transfer, but the hospital is not liable for any fees charged by the transport service.  In spite of this understanding, I refuse to consent to further medical examination and treatment which has been offered to me, and request transfer to  . I authorize the performance of emergency medical procedures and treatments upon me in both transit and upon arrival at the receiving facility.  Additionally, I authorize the release of any and all medical records to the receiving facility and request they be  transported with me, if possible.    I authorize the performance of emergency medical procedures and treatments upon me in both transit and upon arrival at the receiving facility.  Additionally, I authorize the release of any and all medical records to the receiving facility and request they be transported with me, if possible.  I understand that the safest mode of transportation during a medical emergency is an ambulance and that the Hospital advocates the use of this mode of transport. Risks of traveling to the receiving facility by car, including absence of medical control, life sustaining equipment, such as oxygen, and medical personnel has been explained to me and I fully understand them.    (SOCORRO CORRECT BOX BELOW)  [ X ]  I consent to the stated transfer and to be transported by ambulance/helicopter.  [  ]  I consent to the stated transfer, but refuse transportation by ambulance and accept full responsibility for my transportation by car.  I understand the risks of non-ambulance transfers and I exonerate the Hospital and its staff from any deterioration in my condition that results from this refusal.    X___________________________________________    DATE  25  TIME________  Signature of patient or legally responsible individual signing on patient behalf           RELATIONSHIP TO PATIENT_________________________          Provider Certification    NAME Mimi Biggs                                         1955                              MRN [id number]    A medical screening exam was performed on the above named patient.  Based on the examination:    Condition Necessitating Transfer The primary encounter diagnosis was Fall, initial encounter. Diagnoses of Liver laceration, grade II, without open wound into cavity, initial encounter, Liver cyst, and Compression fracture of T12 vertebra, initial encounter (HCC) were also pertinent to this visit.    Patient Condition:   Stable    Reason for  Transfer:    Trauma service    Transfer Requirements: Facility    hospitals  Space available and qualified personnel available for treatment as acknowledged by   PACS  Agreed to accept transfer and to provide appropriate medical treatment as acknowledged by Dr. Bess (Trauma)          Appropriate medical records of the examination and treatment of the patient are provided at the time of transfer   STAFF INITIAL WHEN COMPLETED _______  Transfer will be performed by qualified personnel from    and appropriate transfer equipment as required, including the use of necessary and appropriate life support measures.    Provider Certification: I have examined the patient and explained the following risks and benefits of being transferred/refusing transfer to the patient/family:         Based on these reasonable risks and benefits to the patient and/or the unborn child(mark), and based upon the information available at the time of the patient’s examination, I certify that the medical benefits reasonably to be expected from the provision of appropriate medical treatments at another medical facility outweigh the increasing risks, if any, to the individual’s medical condition, and in the case of labor to the unborn child, from effecting the transfer.    X____________________________________________ DATE 01/17/25        TIME_______      ORIGINAL - SEND TO MEDICAL RECORDS   COPY - SEND WITH PATIENT DURING TRANSFER

## 2025-01-17 NOTE — ASSESSMENT & PLAN NOTE
"- CT CAP  \"Irregular bladder wall thickening without adjacent stranding. This is unlikely acute cystitis though was not present previously and clinical correlation for urinary tract symptoms recommended.\"  - UA pending  "

## 2025-01-17 NOTE — ED PROVIDER NOTES
Time reflects when diagnosis was documented in both MDM as applicable and the Disposition within this note       Time User Action Codes Description Comment    1/17/2025 12:59 PM Binsjm, Marin T Add [W19.XXXA] Fall, initial encounter     1/17/2025  1:04 PM Binsjm, Marin T Add [S36.113A] Laceration of liver, initial encounter     1/17/2025  1:04 PM Binstead, Marin T Remove [S36.113A] Laceration of liver, initial encounter     1/17/2025  1:04 PM Binstead, Marin T Add [S36.115A] Liver laceration, grade II, without open wound into cavity, initial encounter     1/17/2025  1:04 PM Binstead, Marin T Add [K76.89] Liver cyst     1/17/2025  1:05 PM Binstead, Marin T Modify [K76.89] Liver cyst with hematoma     1/17/2025  1:05 PM Binstead, Marin T Add [S22.080A] Compression fracture of T12 vertebra, initial encounter (HCC)           ED Disposition       ED Disposition   Transfer to Another Facility-In Network    Condition   --    Date/Time   Fri Jan 17, 2025 12:59 PM    Comment   Mimi Biggs should be transferred out to [facility].               Assessment & Plan       Medical Decision Making  69-year-old female presenting with right sided rib cage pain after she struck that area against the edge of a dresser last night.    Problems Addressed:  Fall, initial encounter: acute illness or injury  Liver cyst: chronic illness or injury     Details: The hematoma within the liver cyst is new secondary to trauma    Amount and/or Complexity of Data Reviewed  Labs: ordered. Decision-making details documented in ED Course.  Radiology: ordered and independent interpretation performed. Decision-making details documented in ED Course.     Details: Discussed with Dr. Amezquita of radiology  Discussion of management or test interpretation with external provider(s): Discussed with Dr. Bess of trauma surgery who accepted patient in transfer.    Risk  Prescription drug management.  Decision regarding hospitalization.  Risk Details: Patient  to be transferred to [facility] under the trauma service.  EMTALA forms completed and placed on chart.  Patient updated and agrees with care plan.             Medications   lactated ringers infusion (75 mL/hr Intravenous New Bag 1/17/25 1300)   multi-electrolyte (ISOLYTE-S PH 7.4) bolus 1,000 mL (0 mL Intravenous Stopped 1/17/25 1140)   iohexol (OMNIPAQUE) 350 MG/ML injection (MULTI-DOSE) 100 mL (100 mL Intravenous Given 1/17/25 1109)   morphine injection 2 mg (2 mg Intravenous Given 1/17/25 1244)   morphine injection 2 mg (2 mg Intravenous Given 1/17/25 1357)       ED Risk Strat Scores                          SBIRT 20yo+      Flowsheet Row Most Recent Value   Initial Alcohol Screen: US AUDIT-C     1. How often do you have a drink containing alcohol? 0 Filed at: 01/17/2025 1005   2. How many drinks containing alcohol do you have on a typical day you are drinking?  0 Filed at: 01/17/2025 1005   3a. Male UNDER 65: How often do you have five or more drinks on one occasion? 0 Filed at: 01/17/2025 1005   3b. FEMALE Any Age, or MALE 65+: How often do you have 4 or more drinks on one occassion? 0 Filed at: 01/17/2025 1005   Audit-C Score 0 Filed at: 01/17/2025 1005   MARLENY: How many times in the past year have you...    Used an illegal drug or used a prescription medication for non-medical reasons? Never Filed at: 01/17/2025 1005                            History of Present Illness       Chief Complaint   Patient presents with    Fall     Patient reports that she tripped over curtains last night and fell into furniture. Pt struck right rib cage. No loc. No head injury, no thinners.        Past Medical History:   Diagnosis Date    ADHD     Allergic 3-2020    Bactrim    Anemia 2018    Latest blood work OK    Anxiety Since diagnoised 2003    Intermittent related to my life trying to move. My illness    Arthritis     Very bad especially Right knee    Cancer (HCC) 7-    Cut the Lymph Node Out & thyroid out     Cellulitis of foot     Last assessed 10/24/16    Change in mental status     Last assessed 12/01/15    Chronic fatigue     Depression 2003    I am on meds. Feel good    Disease of thyroid gland     Cancer Surgical Removal total Thyroid    Diverticulitis of colon Last Egd    Diverticlum    PLAZA (dyspnea on exertion)     Last assessed 12/01/15    Fibromyalgia     Folliculitis     Last assessed 10/06/14    Fractures     GERD (gastroesophageal reflux disease)     Headache(784.0) 3-2020    In front of head    Inflammatory bowel disease 1990’s    Memory loss 2010    New disease neurogentic changes Necrotizing Myopathy    Non Hodgkin's lymphoma (HCC) 07/13/2012    Osteopenia     Osteoporosis 6-2019    Broke Pelvis -left and right pubic ramus,fractured Sacrum    Raynaud's disease     Rheumatic disease     Scleroderma (HCC)     Stomach disorder     Systemic sclerosis (HCC)     Thyroid cancer (HCC) 10/04/2018    Visual impairment 2021    Need new glasses      Past Surgical History:   Procedure Laterality Date    BACK SURGERY      BLADDER SURGERY      BONE MARROW BIOPSY      CARDIAC CATHETERIZATION      EYE SURGERY  Jan 1010    Correct angles in both eyes    HEMORRHOID SURGERY      HYSTERECTOMY  02/04/2004    KNEE SURGERY      LYMPH NODE BIOPSY  7-     Non Hodgkins Lymphoma    NASAL SEPTUM SURGERY      NECK SURGERY      OOPHORECTOMY Bilateral 02/04/2004    ROTATOR CUFF REPAIR      SHOULDER SURGERY      SPINE SURGERY  12-    Failed microdisectomy L5 S1    TENDON REPAIR      THYROIDECTOMY N/A 10/04/2018    Procedure: TOTAL THYROIDECTOMY;  Surgeon: Roger Ortiz MD;  Location: [facility];  Service: Surgical Oncology    TONSILLECTOMY      TOTAL THYROIDECTOMY      WRIST SURGERY        Family History   Problem Relation Age of Onset    Arthritis Mother     Diabetes Mother     Hyperlipidemia Mother         Passed 10- Kidney- Heart Disease    Thyroid disease Mother         Benign lump removed     Autoimmune disease Mother     ADD / ADHD Mother     Osteoporosis Mother         1x week    Coronary artery disease Father     Hyperlipidemia Father          Massive Heart Attack    Arthritis Sister     Asthma Sister     Crohn's disease Sister     Autoimmune disease Sister     Depression Sister     Hyperlipidemia Sister         Very  bad Chrons Disease    Autoimmune disease Sister     Depression Sister     Hyperlipidemia Sister         Heart disease -Lupus like-Thyroid issues    Thyroid disease Sister         Seeing Endrocologist dosage for thyroid    Arthritis Sister         Having many issues this year    Autoimmune disease Sister     Diabetes Sister     Rheumatologic disease Sister     No Known Problems Daughter     No Known Problems Maternal Grandmother     No Known Problems Maternal Grandfather     No Known Problems Paternal Grandmother     No Known Problems Paternal Grandfather     Hyperlipidemia Brother         Heart Attacks,torn rotator and bicept . arthritis, knees    Autoimmune disease Brother     Hyperlipidemia Brother         Heart Attacks.[place] kn[place],Arthrites    Autoimmune disease Brother     Diabetes Brother     Ovarian cancer Maternal Aunt     Dementia Maternal Aunt             Cancer Maternal Aunt     No Known Problems Maternal Aunt     No Known Problems Maternal Aunt     Cancer Maternal Aunt     No Known Problems Maternal Aunt     No Known Problems Paternal Aunt     Cancer Maternal Aunt     Cancer Maternal Uncle     Cancer Maternal Uncle     Breast cancer Neg Hx     Endometrial cancer Neg Hx     Colon cancer Neg Hx     Breast cancer additional onset Neg Hx     BRCA 1/2 Neg Hx     BRCA2 Positive Neg Hx     BRCA2 Negative Neg Hx     BRCA1 Positive Neg Hx     BRCA1 Negative Neg Hx       Social History     Tobacco Use    Smoking status: Former     Current packs/day: 0.25     Average packs/day: 0.3 packs/day for 5.2 years (1.3 ttl pk-yrs)     Types: Cigarettes    Smokeless tobacco: Never     Tobacco comments:     I quit cold turkey when I learned 2nd hand smoke   Vaping Use    Vaping status: Never Used   Substance Use Topics    Alcohol use: Yes     Comment: I have a cocktail every month ,beer sometimes    Drug use: No      E-Cigarette/Vaping    E-Cigarette Use Never User       E-Cigarette/Vaping Substances    Nicotine No     THC No     CBD No     Flavoring No     Other No     Unknown No       I have reviewed and agree with the history as documented.     Patient is a very pleasant 69-year-old female presenting for right rib cage pain.  Last night as patient was working in her house changing curtains, she stepped off of a stepstool and as she stepped down the 1 step she lost her balance and struck the right side of her rib cage against the edge of a dresser.  Complained of increased pain in her right upper quadrant and right lower lateral rib cage today.  Pain worse with movement and twisting and palpation.  Patient is not on any anticoagulation nor any aspirin.          Review of Systems   Constitutional:  Negative for appetite change, chills, fatigue, fever and unexpected weight change.   HENT:  Negative for congestion, ear pain, rhinorrhea and sore throat.    Eyes:  Negative for pain and visual disturbance.   Respiratory:  Negative for cough, chest tightness, shortness of breath and wheezing.    Cardiovascular:  Negative for chest pain, palpitations and leg swelling.   Gastrointestinal:  Negative for abdominal pain, constipation, diarrhea, nausea and vomiting.   Genitourinary:  Negative for difficulty urinating, dysuria, frequency, hematuria, menstrual problem, pelvic pain, vaginal bleeding and vaginal discharge.   Musculoskeletal:  Negative for arthralgias, back pain and neck pain.   Skin:  Negative for color change and rash.   Neurological:  Negative for dizziness, seizures, syncope, light-headedness and headaches.   Psychiatric/Behavioral:  Negative for sleep disturbance.    All other systems  reviewed and are negative.          Objective       ED Triage Vitals [01/17/25 1005]   Temperature Pulse Blood Pressure Respirations SpO2 Patient Position - Orthostatic VS   98.7 °F (37.1 °C) 84 148/65 14 98 % Lying      Temp Source Heart Rate Source BP Location FiO2 (%) Pain Score    Temporal Monitor Left arm -- 8      Vitals      Date and Time Temp Pulse SpO2 Resp BP Pain Score FACES Pain Rating User   01/17/25 1401 98.7 °F (37.1 °C) 75 98 % 15 120/60 9 -- AB   01/17/25 1357 -- -- -- -- -- 10 - Worst Possible Pain -- AB   01/17/25 1259 -- -- -- -- -- 9 -- KTR   01/17/25 1244 -- -- -- -- -- 9 -- KTR   01/17/25 1205 98.7 °F (37.1 °C) 78 96 % 14 112/58 9 -- AB   01/17/25 1005 98.7 °F (37.1 °C) 84 98 % 14 148/65 8 -- PP            Physical Exam  Vitals and nursing note reviewed.   Constitutional:       General: She is not in acute distress.     Appearance: Normal appearance. She is well-developed and normal weight. She is not ill-appearing, toxic-appearing or diaphoretic.   HENT:      Head: Normocephalic and atraumatic.      Nose: Nose normal.      Mouth/Throat:      Mouth: Mucous membranes are moist.      Pharynx: Oropharynx is clear.   Eyes:      General: No scleral icterus.     Extraocular Movements: Extraocular movements intact.      Conjunctiva/sclera: Conjunctivae normal.   Cardiovascular:      Rate and Rhythm: Normal rate and regular rhythm.      Pulses: Normal pulses.      Heart sounds: Normal heart sounds. No murmur heard.     No friction rub. No gallop.   Pulmonary:      Effort: Pulmonary effort is normal. No respiratory distress.      Breath sounds: Normal breath sounds. No wheezing or rales.   Chest:       Abdominal:      Palpations: Abdomen is soft. There is no mass.      Tenderness: There is no abdominal tenderness. There is no right CVA tenderness, left CVA tenderness, guarding or rebound.      Hernia: No hernia is present.       Musculoskeletal:         General: Tenderness and signs of injury present.  No swelling. Normal range of motion.      Cervical back: Normal range of motion and neck supple. No rigidity or tenderness.      Right lower leg: No edema.      Left lower leg: No edema.   Lymphadenopathy:      Cervical: No cervical adenopathy.   Skin:     General: Skin is warm and dry.      Capillary Refill: Capillary refill takes less than 2 seconds.      Coloration: Skin is not jaundiced or pale.      Findings: No lesion or rash.   Neurological:      General: No focal deficit present.      Mental Status: She is alert and oriented to person, place, and time.   Psychiatric:         Mood and Affect: Mood normal.         Behavior: Behavior normal.         Results Reviewed       Procedure Component Value Units Date/Time    Comprehensive metabolic panel [[id number]]  (Abnormal) Collected: 01/17/25 1034    Lab Status: Final result Specimen: Blood from Arm, Right Updated: 01/17/25 1057     Sodium 136 mmol/L      Potassium 4.0 mmol/L      Chloride 102 mmol/L      CO2 27 mmol/L      ANION GAP 7 mmol/L      BUN 21 mg/dL      Creatinine 0.77 mg/dL      Glucose 123 mg/dL      Calcium 8.9 mg/dL      AST 31 U/L      ALT 36 U/L      Alkaline Phosphatase 61 U/L      Total Protein 5.6 g/dL      Albumin 4.0 g/dL      Total Bilirubin 0.45 mg/dL      eGFR 78 ml/min/1.73sq m     Narrative:      National Kidney Disease Foundation guidelines for Chronic Kidney Disease (CKD):     Stage 1 with normal or high GFR (GFR > 90 mL/min/1.73 square meters)    Stage 2 Mild CKD (GFR = 60-89 mL/min/1.73 square meters)    Stage 3A Moderate CKD (GFR = 45-59 mL/min/1.73 square meters)    Stage 3B Moderate CKD (GFR = 30-44 mL/min/1.73 square meters)    Stage 4 Severe CKD (GFR = 15-29 mL/min/1.73 square meters)    Stage 5 End Stage CKD (GFR <15 mL/min/1.73 square meters)  Note: GFR calculation is accurate only with a steady state creatinine    Lipase [[id number]]  (Normal) Collected: 01/17/25 1034    Lab Status: Final result Specimen: Blood from Arm, Right  Updated: 01/17/25 1057     Lipase 47 u/L     CBC and differential [[id number]]  (Abnormal) Collected: 01/17/25 1034    Lab Status: Final result Specimen: Blood from Arm, Right Updated: 01/17/25 1041     WBC 5.22 Thousand/uL      RBC 3.92 Million/uL      Hemoglobin 11.3 g/dL      Hematocrit 34.7 %      MCV 89 fL      MCH 28.8 pg      MCHC 32.6 g/dL      RDW 13.2 %      MPV 9.3 fL      Platelets 206 Thousands/uL      nRBC 0 /100 WBCs      Segmented % 66 %      Immature Grans % 0 %      Lymphocytes % 18 %      Monocytes % 11 %      Eosinophils Relative 4 %      Basophils Relative 1 %      Absolute Neutrophils 3.43 Thousands/µL      Absolute Immature Grans 0.01 Thousand/uL      Absolute Lymphocytes 0.92 Thousands/µL      Absolute Monocytes 0.59 Thousand/µL      Eosinophils Absolute 0.22 Thousand/µL      Basophils Absolute 0.05 Thousands/µL             CT chest abdomen pelvis w contrast   Final Interpretation by Papi Amezquita MD (01/17 1255)      1.  Hemorrhage of a posterior right lobe cyst both internally and into the adjacent parenchyma and subcapsular space secondary to a grade 2 liver laceration.      2.  Acute nondisplaced fractures of the right lateral seventh and eighth ribs.      3.  T12 compression fracture, likely subacute as there is due appear to be subtle changes along the superior endplate on the recent prior study. There has been significant progression from the prior study and correlation with tenderness in this region is    recommended.      4.  Irregular bladder wall thickening without adjacent stranding. This is unlikely acute cystitis though was not present previously and clinical correlation for urinary tract symptoms recommended.      I personally discussed this study with BRENDA MATUTE on 1/17/2025 12:35 PM.                  Workstation performed: [id number]             Procedures    ED Medication and Procedure Management   Prior to Admission Medications   Prescriptions Last Dose  Informant Patient Reported? Taking?   ALPRAZolam (XANAX) 0.25 mg tablet   No No   Sig: Take 1 tablet (0.25 mg total) by mouth 2 (two) times a day as needed for anxiety   Cholecalciferol (Vitamin D3) 50 MCG (2000 UT) TABS   No No   Sig: Take 1 tablet (2,000 Units total) by mouth in the morning   Clobetasol Prop-Niacinamide (Chlooxia) 0.05-4 % OINT   Yes No   Sig: Apply topically   Patient not taking: Reported on 12/6/2024   Docusate Calcium (STOOL SOFTENER PO)   Yes No   Sig: Take by mouth   MAGNESIUM PO   Yes No   Sig: Take by mouth Resveratrol, berberine & quercetin   Multiple Vitamin (MULTIVITAMIN) tablet   Yes No   Sig: Take 1 tablet by mouth daily   Patient not taking: Reported on 12/6/2024   amphetamine-dextroamphetamine (ADDERALL XR, 30MG,) 30 MG 24 hr capsule   No No   Sig: Take 1 capsule (30 mg total) by mouth every morning Max Daily Amount: 30 mg   amphetamine-dextroamphetamine (ADDERALL, 10MG,) 10 mg tablet   No No   Sig: Take 2 tablets (20 mg total) by mouth see administration instructions Take 20mg daily in the afternoon around 2pm.   bisacodyl (DULCOLAX) 10 mg suppository   No No   Sig: Insert 1 suppository (10 mg total) into the rectum daily as needed for constipation   Patient not taking: Reported on 12/6/2024   buPROPion (WELLBUTRIN XL) 300 mg 24 hr tablet   No No   Sig: Take 1 tablet (300 mg total) by mouth every morning   calcium citrate-vitamin D 315 mg-5 mcg tablet   No No   Sig: Take 1 tablet by mouth daily   desmopressin (DDAVP) 0.2 mg tablet   No No   Sig: Take 1 tablet (0.2 mg total) by mouth daily at bedtime   Patient not taking: Reported on 12/6/2024   desmopressin (DDAVP) 0.2 mg tablet   No No   Sig: Take 1 tablet (0.2 mg total) by mouth daily at bedtime   Patient not taking: Reported on 12/6/2024   doxycycline hyclate (VIBRAMYCIN) 50 mg capsule   Yes No   Sig: Take 50 mg by mouth 2 (two) times a day   Patient taking differently: Take 100 mg by mouth daily   esomeprazole (NexIUM) 40 MG  capsule   No No   Sig: Take 1 capsule (40 mg total) by mouth every morning   ezetimibe (ZETIA) 10 mg tablet   No No   Sig: Take 1 tablet (10 mg total) by mouth daily   Patient not taking: Reported on 2024   fluticasone (FLONASE) 50 mcg/act nasal spray   No No   Si sprays into each nostril daily   Patient not taking: Reported on 2024   folic acid (FOLVITE) 1 mg tablet   No No   Sig: Take 2 tablets (2 mg total) by mouth daily   hydroxychloroquine (PLAQUENIL) 200 mg tablet   No No   Sig: Take 1 tablet (200 mg total) by mouth daily   levothyroxine 150 mcg tablet   No No   Sig: Take 1 tablet (150 mcg total) by mouth daily   Patient taking differently: Take 100 mcg by mouth daily   meloxicam (MOBIC) 15 mg tablet   No No   Sig: Take 1 tablet (15 mg total) by mouth daily   metroNIDAZOLE (METROGEL) 1 % gel   No No   Sig: Apply topically daily   Patient not taking: Reported on 2024   mometasone (ELOCON) 0.1 % cream   Yes No   Sig: Apply topically daily   Patient not taking: Reported on 2024   mycophenolate (MYFORTIC) 360 MG TBEC   No No   Sig: TAKE 1 TABLET IN THE MORNING AND 1 TABLET AT NIGHT   naproxen (EC NAPROSYN) 500 MG EC tablet   No No   Sig: Take 1 tablet (500 mg total) by mouth 2 (two) times a day as needed for mild pain   Patient not taking: Reported on 2024   nystatin (MYCOSTATIN) 500,000 units/5 mL suspension   No No   Sig: Apply 5 mL (500,000 Units total) to the mouth or throat 4 (four) times a day   Patient not taking: Reported on 2024   ondansetron (ZOFRAN) 4 mg tablet   No No   Sig: Take 1 tablet (4 mg total) by mouth every 8 (eight) hours as needed for nausea or vomiting   Patient not taking: Reported on 2024   polyethylene glycol (MIRALAX) 17 g packet   No No   Sig: Take 17 g by mouth daily as needed (constipation)   Patient not taking: Reported on 2024   pregabalin (LYRICA) 75 mg capsule   No No   Sig: Take 1 capsule (75 mg total) by mouth 2 (two) times a day    traZODone (DESYREL) 150 mg tablet   No No   Sig: Take 1 tablet (150 mg total) by mouth daily at bedtime   valACYclovir (VALTREX) 1,000 mg tablet   No No   Sig: Take 1 tablet (1,000 mg total) by mouth once as needed (HSV flare) for up to 3 days   Patient not taking: Reported on 12/6/2024      Facility-Administered Medications: None     Discharge Medication List as of 1/17/2025  2:09 PM        CONTINUE these medications which have NOT CHANGED    Details   ALPRAZolam (XANAX) 0.25 mg tablet Take 1 tablet (0.25 mg total) by mouth 2 (two) times a day as needed for anxiety, Starting Fri 1/3/2025, Normal      amphetamine-dextroamphetamine (ADDERALL XR, 30MG,) 30 MG 24 hr capsule Take 1 capsule (30 mg total) by mouth every morning Max Daily Amount: 30 mg, Starting Fri 12/20/2024, Normal      amphetamine-dextroamphetamine (ADDERALL, 10MG,) 10 mg tablet Take 2 tablets (20 mg total) by mouth see administration instructions Take 20mg daily in the afternoon around 2pm., Starting Fri 12/20/2024, Normal      bisacodyl (DULCOLAX) 10 mg suppository Insert 1 suppository (10 mg total) into the rectum daily as needed for constipation, Starting Fri 11/29/2024, Normal      buPROPion (WELLBUTRIN XL) 300 mg 24 hr tablet Take 1 tablet (300 mg total) by mouth every morning, Starting Tue 7/16/2024, Normal      calcium citrate-vitamin D 315 mg-5 mcg tablet Take 1 tablet by mouth daily, Starting Mon 9/23/2024, Normal      Cholecalciferol (Vitamin D3) 50 MCG (2000 UT) TABS Take 1 tablet (2,000 Units total) by mouth in the morning, Starting Thu 9/19/2024, Normal      Clobetasol Prop-Niacinamide (Chlooxia) 0.05-4 % OINT Apply topically, Historical Med      !! desmopressin (DDAVP) 0.2 mg tablet Take 1 tablet (0.2 mg total) by mouth daily at bedtime, Starting Fri 11/15/2024, Normal      !! desmopressin (DDAVP) 0.2 mg tablet Take 1 tablet (0.2 mg total) by mouth daily at bedtime, Starting Fri 11/15/2024, Normal      Docusate Calcium (STOOL SOFTENER  PO) Take by mouth, Historical Med      doxycycline hyclate (VIBRAMYCIN) 50 mg capsule Take 50 mg by mouth 2 (two) times a day, Starting Mon 10/21/2024, Historical Med      esomeprazole (NexIUM) 40 MG capsule Take 1 capsule (40 mg total) by mouth every morning, Starting Tue 5/2/2023, No Print      ezetimibe (ZETIA) 10 mg tablet Take 1 tablet (10 mg total) by mouth daily, Starting Fri 7/26/2024, Normal      fluticasone (FLONASE) 50 mcg/act nasal spray 2 sprays into each nostril daily, Starting Thu 8/8/2024, Normal      folic acid (FOLVITE) 1 mg tablet Take 2 tablets (2 mg total) by mouth daily, Starting Tue 4/23/2024, Normal      hydroxychloroquine (PLAQUENIL) 200 mg tablet Take 1 tablet (200 mg total) by mouth daily, Starting Fri 6/21/2024, Until Wed 12/18/2024, Normal      levothyroxine 150 mcg tablet Take 1 tablet (150 mcg total) by mouth daily, Starting Tue 11/5/2024, Normal      MAGNESIUM PO Take by mouth Resveratrol, berberine & quercetin, Historical Med      meloxicam (MOBIC) 15 mg tablet Take 1 tablet (15 mg total) by mouth daily, Starting Fri 10/18/2024, Normal      metroNIDAZOLE (METROGEL) 1 % gel Apply topically daily, Starting Thu 8/10/2023, Normal      mometasone (ELOCON) 0.1 % cream Apply topically daily, Starting Thu 12/7/2023, Historical Med      Multiple Vitamin (MULTIVITAMIN) tablet Take 1 tablet by mouth daily, Historical Med      mycophenolate (MYFORTIC) 360 MG TBEC TAKE 1 TABLET IN THE MORNING AND 1 TABLET AT NIGHT, Normal      naproxen (EC NAPROSYN) 500 MG EC tablet Take 1 tablet (500 mg total) by mouth 2 (two) times a day as needed for mild pain, Starting Fri 10/18/2024, Normal      nystatin (MYCOSTATIN) 500,000 units/5 mL suspension Apply 5 mL (500,000 Units total) to the mouth or throat 4 (four) times a day, Starting Thu 8/15/2024, Normal      ondansetron (ZOFRAN) 4 mg tablet Take 1 tablet (4 mg total) by mouth every 8 (eight) hours as needed for nausea or vomiting, Starting Wed 1/31/2024,  Normal      polyethylene glycol (MIRALAX) 17 g packet Take 17 g by mouth daily as needed (constipation), Starting Fri 11/29/2024, Normal      pregabalin (LYRICA) 75 mg capsule Take 1 capsule (75 mg total) by mouth 2 (two) times a day, Starting Wed 6/26/2024, Until Mon 12/23/2024, Normal      traZODone (DESYREL) 150 mg tablet Take 1 tablet (150 mg total) by mouth daily at bedtime, Starting Fri 1/3/2025, Normal      valACYclovir (VALTREX) 1,000 mg tablet Take 1 tablet (1,000 mg total) by mouth once as needed (HSV flare) for up to 3 days, Starting Mon 6/17/2024, Until Tue 11/26/2024 at 2359, Normal       !! - Potential duplicate medications found. Please discuss with provider.        No discharge procedures on file.  ED SEPSIS DOCUMENTATION   Time reflects when diagnosis was documented in both MDM as applicable and the Disposition within this note       Time User Action Codes Description Comment    1/17/2025 12:59 PM Marin Blake Add [W19.XXXA] Fall, initial encounter     1/17/2025  1:04 PM Marin Blake Add [S36.113A] Laceration of liver, initial encounter     1/17/2025  1:04 PM Marin Blake Remove [S36.113A] Laceration of liver, initial encounter     1/17/2025  1:04 PM Marin Blake Add [S36.115A] Liver laceration, grade II, without open wound into cavity, initial encounter     1/17/2025  1:04 PM Marin Blake Add [K76.89] Liver cyst     1/17/2025  1:05 PM Marin Blake Modify [K76.89] Liver cyst with hematoma     1/17/2025  1:05 PM Marin Blake Add [S22.080A] Compression fracture of T12 vertebra, initial encounter (HCC)                  Marin Blake, DO  01/17/25 8661

## 2025-01-17 NOTE — ASSESSMENT & PLAN NOTE
T12 compression fracture, likely subacute as there is due appear to be subtle changes along the superior endplate on the recent prior study. There has been significant progression from the prior study and correlation with tenderness in this region is recommended.  - Pt w/ no new neurological deficits, ambulatory since the event  - Appreciate Neurosurgical evaluation  - Regular neuro exams  - Pain control as above

## 2025-01-18 ENCOUNTER — ANESTHESIA (INPATIENT)
Dept: ANESTHESIOLOGY | Facility: HOSPITAL | Age: 70
End: 2025-01-18
Payer: MEDICARE

## 2025-01-18 ENCOUNTER — APPOINTMENT (INPATIENT)
Dept: RADIOLOGY | Facility: HOSPITAL | Age: 70
End: 2025-01-18
Payer: MEDICARE

## 2025-01-18 ENCOUNTER — ANESTHESIA EVENT (INPATIENT)
Dept: ANESTHESIOLOGY | Facility: HOSPITAL | Age: 70
End: 2025-01-18
Payer: MEDICARE

## 2025-01-18 ENCOUNTER — APPOINTMENT (INPATIENT)
Dept: SURGERY | Facility: HOSPITAL | Age: 70
End: 2025-01-18
Payer: MEDICARE

## 2025-01-18 LAB
ALBUMIN SERPL BCG-MCNC: 3.7 G/DL (ref 3.5–5)
ALP SERPL-CCNC: 61 U/L (ref 34–104)
ALT SERPL W P-5'-P-CCNC: 31 U/L (ref 7–52)
ANION GAP SERPL CALCULATED.3IONS-SCNC: 6 MMOL/L (ref 4–13)
APTT PPP: 27 SECONDS (ref 23–34)
AST SERPL W P-5'-P-CCNC: 23 U/L (ref 13–39)
BILIRUB SERPL-MCNC: 0.29 MG/DL (ref 0.2–1)
BILIRUB UR QL STRIP: NEGATIVE
BUN SERPL-MCNC: 15 MG/DL (ref 5–25)
CALCIUM SERPL-MCNC: 8.6 MG/DL (ref 8.4–10.2)
CHLORIDE SERPL-SCNC: 105 MMOL/L (ref 96–108)
CLARITY UR: CLEAR
CO2 SERPL-SCNC: 29 MMOL/L (ref 21–32)
COLOR UR: NORMAL
CREAT SERPL-MCNC: 0.77 MG/DL (ref 0.6–1.3)
ERYTHROCYTE [DISTWIDTH] IN BLOOD BY AUTOMATED COUNT: 13.4 % (ref 11.6–15.1)
FLUAV RNA RESP QL NAA+PROBE: NEGATIVE
FLUBV RNA RESP QL NAA+PROBE: NEGATIVE
GFR SERPL CREATININE-BSD FRML MDRD: 78 ML/MIN/1.73SQ M
GLUCOSE SERPL-MCNC: 100 MG/DL (ref 65–140)
GLUCOSE UR STRIP-MCNC: NEGATIVE MG/DL
HCT VFR BLD AUTO: 35.1 % (ref 34.8–46.1)
HCT VFR BLD AUTO: 36.2 % (ref 34.8–46.1)
HGB BLD-MCNC: 11 G/DL (ref 11.5–15.4)
HGB BLD-MCNC: 11.9 G/DL (ref 11.5–15.4)
HGB UR QL STRIP.AUTO: NEGATIVE
INR PPP: 1.04 (ref 0.85–1.19)
KETONES UR STRIP-MCNC: NEGATIVE MG/DL
LEUKOCYTE ESTERASE UR QL STRIP: NEGATIVE
MCH RBC QN AUTO: 27.8 PG (ref 26.8–34.3)
MCHC RBC AUTO-ENTMCNC: 31.3 G/DL (ref 31.4–37.4)
MCV RBC AUTO: 89 FL (ref 82–98)
NITRITE UR QL STRIP: NEGATIVE
PH UR STRIP.AUTO: 6.5 [PH]
PLATELET # BLD AUTO: 194 THOUSANDS/UL (ref 149–390)
PMV BLD AUTO: 10.3 FL (ref 8.9–12.7)
POTASSIUM SERPL-SCNC: 4.3 MMOL/L (ref 3.5–5.3)
PROT SERPL-MCNC: 5.4 G/DL (ref 6.4–8.4)
PROT UR STRIP-MCNC: NEGATIVE MG/DL
PROTHROMBIN TIME: 13.9 SECONDS (ref 12.3–15)
RBC # BLD AUTO: 3.95 MILLION/UL (ref 3.81–5.12)
RSV RNA RESP QL NAA+PROBE: NEGATIVE
SARS-COV-2 RNA RESP QL NAA+PROBE: NEGATIVE
SODIUM SERPL-SCNC: 140 MMOL/L (ref 135–147)
SP GR UR STRIP.AUTO: 1.02 (ref 1–1.03)
UROBILINOGEN UR STRIP-ACNC: <2 MG/DL
WBC # BLD AUTO: 3.17 THOUSAND/UL (ref 4.31–10.16)

## 2025-01-18 PROCEDURE — 99222 1ST HOSP IP/OBS MODERATE 55: CPT | Performed by: ANESTHESIOLOGY

## 2025-01-18 PROCEDURE — 0241U HB NFCT DS VIR RESP RNA 4 TRGT: CPT

## 2025-01-18 PROCEDURE — 85730 THROMBOPLASTIN TIME PARTIAL: CPT

## 2025-01-18 PROCEDURE — 81003 URINALYSIS AUTO W/O SCOPE: CPT

## 2025-01-18 PROCEDURE — 99232 SBSQ HOSP IP/OBS MODERATE 35: CPT | Performed by: SURGERY

## 2025-01-18 PROCEDURE — 72080 X-RAY EXAM THORACOLMB 2/> VW: CPT

## 2025-01-18 PROCEDURE — 85018 HEMOGLOBIN: CPT | Performed by: NURSE PRACTITIONER

## 2025-01-18 PROCEDURE — 80053 COMPREHEN METABOLIC PANEL: CPT

## 2025-01-18 PROCEDURE — 85027 COMPLETE CBC AUTOMATED: CPT

## 2025-01-18 PROCEDURE — 94664 DEMO&/EVAL PT USE INHALER: CPT

## 2025-01-18 PROCEDURE — 85014 HEMATOCRIT: CPT | Performed by: NURSE PRACTITIONER

## 2025-01-18 PROCEDURE — 99223 1ST HOSP IP/OBS HIGH 75: CPT | Performed by: STUDENT IN AN ORGANIZED HEALTH CARE EDUCATION/TRAINING PROGRAM

## 2025-01-18 PROCEDURE — 85610 PROTHROMBIN TIME: CPT

## 2025-01-18 PROCEDURE — 3E0R3NZ INTRODUCTION OF ANALGESICS, HYPNOTICS, SEDATIVES INTO SPINAL CANAL, PERCUTANEOUS APPROACH: ICD-10-PCS | Performed by: EMERGENCY MEDICINE

## 2025-01-18 RX ORDER — LIDOCAINE HYDROCHLORIDE AND EPINEPHRINE 15; 5 MG/ML; UG/ML
INJECTION, SOLUTION EPIDURAL
Status: COMPLETED | OUTPATIENT
Start: 2025-01-18 | End: 2025-01-18

## 2025-01-18 RX ADMIN — PREGABALIN 75 MG: 75 CAPSULE ORAL at 09:45

## 2025-01-18 RX ADMIN — MYCOPHENOLIC ACID 360 MG: 180 TABLET, DELAYED RELEASE ORAL at 17:52

## 2025-01-18 RX ADMIN — LIDOCAINE HYDROCHLORIDE AND EPINEPHRINE 5 ML: 15; 5 INJECTION, SOLUTION EPIDURAL at 13:25

## 2025-01-18 RX ADMIN — PANTOPRAZOLE SODIUM 40 MG: 40 TABLET, DELAYED RELEASE ORAL at 05:42

## 2025-01-18 RX ADMIN — TRAZODONE HYDROCHLORIDE 150 MG: 100 TABLET ORAL at 21:28

## 2025-01-18 RX ADMIN — DIPHENHYDRAMINE HCL 12.5 MG: 25 TABLET ORAL at 17:52

## 2025-01-18 RX ADMIN — OXYCODONE HYDROCHLORIDE 5 MG: 5 TABLET ORAL at 11:11

## 2025-01-18 RX ADMIN — ACETAMINOPHEN 650 MG: 325 TABLET, FILM COATED ORAL at 05:41

## 2025-01-18 RX ADMIN — ACETAMINOPHEN 650 MG: 325 TABLET, FILM COATED ORAL at 11:11

## 2025-01-18 RX ADMIN — OXYCODONE HYDROCHLORIDE 5 MG: 5 TABLET ORAL at 02:50

## 2025-01-18 RX ADMIN — HYDROXYCHLOROQUINE SULFATE 200 MG: 200 TABLET, FILM COATED ORAL at 09:47

## 2025-01-18 RX ADMIN — METHOCARBAMOL 500 MG: 500 TABLET ORAL at 11:11

## 2025-01-18 RX ADMIN — Medication 1 TABLET: at 09:45

## 2025-01-18 RX ADMIN — BUPROPION HYDROCHLORIDE 300 MG: 150 TABLET, EXTENDED RELEASE ORAL at 09:45

## 2025-01-18 RX ADMIN — LIDOCAINE 5% 1 PATCH: 700 PATCH TOPICAL at 09:46

## 2025-01-18 RX ADMIN — DOCUSATE SODIUM 100 MG: 100 CAPSULE, LIQUID FILLED ORAL at 09:46

## 2025-01-18 RX ADMIN — MYCOPHENOLIC ACID 360 MG: 180 TABLET, DELAYED RELEASE ORAL at 09:48

## 2025-01-18 RX ADMIN — FOLIC ACID 2 MG: 1 TABLET ORAL at 09:44

## 2025-01-18 RX ADMIN — METHOCARBAMOL 500 MG: 500 TABLET ORAL at 05:41

## 2025-01-18 RX ADMIN — LEVOTHYROXINE SODIUM 150 MCG: 75 TABLET ORAL at 09:45

## 2025-01-18 RX ADMIN — OXYCODONE HYDROCHLORIDE 5 MG: 5 TABLET ORAL at 06:55

## 2025-01-18 RX ADMIN — PREGABALIN 75 MG: 75 CAPSULE ORAL at 17:52

## 2025-01-18 RX ADMIN — DEXTROAMPHETAMINE SACCHARATE, AMPHETAMINE ASPARTATE, DEXTROAMPHETAMINE SULFATE AND AMPHETAMINE SULFATE 15 MG: 2.5; 2.5; 2.5; 2.5 TABLET ORAL at 06:21

## 2025-01-18 NOTE — PHYSICAL THERAPY NOTE
Physical Therapy Cancellation Note             01/18/25 0850   Note Type   Note type Cancelled Session   Cancel Reasons Medical status     PT order received; chart reviewed; attempted to see pt in AM; spoke to trauma team who advised to hold therapy/mobilization for now as pt may potentially go to OR pending Hgb status; will follow as clinical course allows.    [place]

## 2025-01-18 NOTE — RESTORATIVE TECHNICIAN NOTE
Restorative Technician Note      Patient Name: Mimi Biggs     Restorative Tech Visit Date: 01/18/25  Note Type: Bracing, Initial consult  Patient Position Upon Consult: Supine  Brace Applied: Aspen Horizon 456 Back Pack TLSO  Additional Brace Ordered: No  Patient Position When Brace Applied: Seated  Education Provided: Yes; Other (comment) (handout given)  Patient Position at End of Consult: Supine  Nurse Communication: Nurse aware of consult, application of brace      Please contact [facility] PT Restorative tech on Epic Secure Chat  in regards to bracing instruction and/or adjustment.       CFo Saul

## 2025-01-18 NOTE — OCCUPATIONAL THERAPY NOTE
Occupational Therapy Cancel Note        Patient Name: Mimi Biggs  Today's Date: 1/18/2025 01/18/25 0849   OT Last Visit   OT Visit Date 01/18/25   Note Type   Note type Cancelled Session   Cancel Reasons Medical status   Additional Comments OT consult received, chart reviewed. Trauma team requests holding at this time due to medical status. Will hold and continue to follow as medically appropriate.         Nishi Stallings, HERSON, OTR/L

## 2025-01-18 NOTE — ANESTHESIA PROCEDURE NOTES
Epidural Block    Patient location during procedure: holding area  Start time: 1/18/2025 1:10 PM  Reason for block: procedure for pain  Staffing  Performed by: Kam Garzon MD  Authorized by: Kam Garzon MD    Preanesthetic Checklist  Completed: patient identified, IV checked, site marked, risks and benefits discussed, surgical consent, monitors and equipment checked, pre-op evaluation and timeout performed  Epidural  Patient position: sitting  Prep: ChloraPrep  Sedation Level: no sedation  Patient monitoring: frequent blood pressure checks, continuous pulse oximetry and heart rate  Approach: midline  Location: thoracic, T7-8  Injection technique: GABE saline  Needle  Needle type: Tuohy   Needle gauge: 18 G  Needle insertion depth: 6 cm  Catheter type: multi-orifice  Catheter size: 20 G  Catheter at skin depth: 15 cm  Catheter securement method: stabilization device, clear occlusive dressing and tape  Test dose: negativelidocaine-epinephrine (XYLOCAINE-MPF/EPINEPHRINE) 1.5 %-1:200,000 injection 3 mL - Epidural   5 mL - 1/18/2025 1:25:00 PM  Assessment  Sensory level: T4  Number of attempts: 2negative aspiration for CSF, negative aspiration for heme and no paresthesia on injection  patient tolerated the procedure well with no immediate complications  Additional Notes  Hitting oss at T6-7 level so went down 1 level

## 2025-01-18 NOTE — QUICK NOTE
Upright thoracolumbar x-rays completed and reviewed as noted below for further evaluation of a T12 compression fracture.    Imagin/18 upright thoracolumbar x-rays: pending final radiology read  Per my review, noted mild T12 compression fracture.  Appears stable in comparison to CT.  Overall alignment remains stable without any significant retropulsion, distraction, or obvious instability.    Plan:   Continue to closely monitor neuro exam   Maintain normotensive BP goals, SBP < 160   Recommend ongoing conservative management as noted in initial consult note   TLSO brace as needed for comfort given presence of rib fx   Maintain thoracolumbar spinal precautions   No bending, twisting, lifting > 5-10lbs   No driving if wearing the brace   Pain control per primary team   PT/OT  DVT ppx: Reyes, okay for chem dvt ppx from a nsgy standpoint   Medical management per primary team   Social work following for assistance with dispo once medically cleared     Neurosurgery will sign off at this time.  Will plan to follow with the patient again in approximately 2 weeks with repeat upright thoracolumbar x-rays.. Please reach out with any further questions or concerns.

## 2025-01-18 NOTE — PLAN OF CARE
Problem: PAIN - ADULT  Goal: Verbalizes/displays adequate comfort level or baseline comfort level  Description: Interventions:  - Encourage patient to monitor pain and request assistance  - Assess pain using appropriate pain scale  - Administer analgesics based on type and severity of pain and evaluate response  - Implement non-pharmacological measures as appropriate and evaluate response  - Consider cultural and social influences on pain and pain management  - Notify physician/advanced practitioner if interventions unsuccessful or patient reports new pain  Outcome: Progressing     Problem: INFECTION - ADULT  Goal: Absence or prevention of progression during hospitalization  Description: INTERVENTIONS:  - Assess and monitor for signs and symptoms of infection  - Monitor lab/diagnostic results  - Monitor all insertion sites, i.e. indwelling lines, tubes, and drains  - Monitor endotracheal if appropriate and nasal secretions for changes in amount and color  - [place] appropriate cooling/warming therapies per order  - Administer medications as ordered  - Instruct and encourage patient and family to use good hand hygiene technique  - Identify and instruct in appropriate isolation precautions for identified infection/condition  Outcome: Progressing  Goal: Absence of fever/infection during neutropenic period  Description: INTERVENTIONS:  - Monitor WBC    Outcome: Progressing     Problem: DISCHARGE PLANNING  Goal: Discharge to home or other facility with appropriate resources  Description: INTERVENTIONS:  - Identify barriers to discharge w/patient and caregiver  - Arrange for needed discharge resources and transportation as appropriate  - Identify discharge learning needs (meds, wound care, etc.)  - Arrange for interpretive services to assist at discharge as needed  - Refer to Case Management Department for coordinating discharge planning if the patient needs post-hospital services based on physician/advanced  practitioner order or complex needs related to functional status, cognitive ability, or social support system  Outcome: Progressing     Problem: Knowledge Deficit  Goal: Patient/family/caregiver demonstrates understanding of disease process, treatment plan, medications, and discharge instructions  Description: Complete learning assessment and assess knowledge base.  Interventions:  - Provide teaching at level of understanding  - Provide teaching via preferred learning methods  Outcome: Progressing     Problem: NEUROSENSORY - ADULT  Goal: Achieves stable or improved neurological status  Description: INTERVENTIONS  - Monitor and report changes in neurological status  - Monitor vital signs such as temperature, blood pressure, glucose, and any other labs ordered   - Initiate measures to prevent increased intracranial pressure  - Monitor for seizure activity and implement precautions if appropriate      Outcome: Progressing  Goal: Achieves maximal functionality and self care  Description: INTERVENTIONS  - Monitor swallowing and airway patency with patient fatigue and changes in neurological status  - Encourage and assist patient to increase activity and self care.   - Encourage visually impaired, hearing impaired and aphasic patients to use assistive/communication devices  Outcome: Progressing     Problem: RESPIRATORY - ADULT  Goal: Achieves optimal ventilation and oxygenation  Description: INTERVENTIONS:  - Assess for changes in respiratory status  - Assess for changes in mentation and behavior  - Position to facilitate oxygenation and minimize respiratory effort  - Oxygen administered by appropriate delivery if ordered  - Initiate smoking cessation education as indicated  - Encourage broncho-pulmonary hygiene including cough, deep breathe, Incentive Spirometry  - Assess the need for suctioning and aspirate as needed  - Assess and instruct to report SOB or any respiratory difficulty  - Respiratory Therapy support as  indicated  Outcome: Progressing     Problem: HEMATOLOGIC - ADULT  Goal: Maintains hematologic stability  Description: INTERVENTIONS  - Assess for signs and symptoms of bleeding or hemorrhage  - Monitor labs  - Administer supportive blood products/factors as ordered and appropriate  Outcome: Progressing     Problem: MUSCULOSKELETAL - ADULT  Goal: Maintain or return mobility to safest level of function  Description: INTERVENTIONS:  - Assess patient's ability to carry out ADLs; assess patient's baseline for ADL function and identify physical deficits which impact ability to perform ADLs (bathing, care of mouth/teeth, toileting, grooming, dressing, etc.)  - Assess/evaluate cause of self-care deficits   - Assess range of motion  - Assess patient's mobility  - Assess patient's need for assistive devices and provide as appropriate  - Encourage maximum independence but intervene and supervise when necessary  - Involve family in performance of ADLs  - Assess for home care needs following discharge   - Consider OT consult to assist with ADL evaluation and planning for discharge  - Provide patient education as appropriate  Outcome: Progressing  Goal: Maintain proper alignment of affected body part  Description: INTERVENTIONS:  - Support, maintain and protect limb and body alignment  - Provide patient/ family with appropriate education  Outcome: Progressing

## 2025-01-19 LAB
ANION GAP SERPL CALCULATED.3IONS-SCNC: 4 MMOL/L (ref 4–13)
BASOPHILS # BLD AUTO: 0.05 THOUSANDS/ΜL (ref 0–0.1)
BASOPHILS NFR BLD AUTO: 1 % (ref 0–1)
BUN SERPL-MCNC: 14 MG/DL (ref 5–25)
CALCIUM SERPL-MCNC: 8.9 MG/DL (ref 8.4–10.2)
CHLORIDE SERPL-SCNC: 103 MMOL/L (ref 96–108)
CO2 SERPL-SCNC: 31 MMOL/L (ref 21–32)
CREAT SERPL-MCNC: 0.86 MG/DL (ref 0.6–1.3)
EOSINOPHIL # BLD AUTO: 0.32 THOUSAND/ΜL (ref 0–0.61)
EOSINOPHIL NFR BLD AUTO: 7 % (ref 0–6)
ERYTHROCYTE [DISTWIDTH] IN BLOOD BY AUTOMATED COUNT: 13.2 % (ref 11.6–15.1)
GFR SERPL CREATININE-BSD FRML MDRD: 68 ML/MIN/1.73SQ M
GLUCOSE SERPL-MCNC: 94 MG/DL (ref 65–140)
HCT VFR BLD AUTO: 36.2 % (ref 34.8–46.1)
HGB BLD-MCNC: 11.3 G/DL (ref 11.5–15.4)
IMM GRANULOCYTES # BLD AUTO: 0.02 THOUSAND/UL (ref 0–0.2)
IMM GRANULOCYTES NFR BLD AUTO: 1 % (ref 0–2)
LYMPHOCYTES # BLD AUTO: 1.71 THOUSANDS/ΜL (ref 0.6–4.47)
LYMPHOCYTES NFR BLD AUTO: 39 % (ref 14–44)
MCH RBC QN AUTO: 28.2 PG (ref 26.8–34.3)
MCHC RBC AUTO-ENTMCNC: 31.2 G/DL (ref 31.4–37.4)
MCV RBC AUTO: 90 FL (ref 82–98)
MONOCYTES # BLD AUTO: 0.48 THOUSAND/ΜL (ref 0.17–1.22)
MONOCYTES NFR BLD AUTO: 11 % (ref 4–12)
NEUTROPHILS # BLD AUTO: 1.8 THOUSANDS/ΜL (ref 1.85–7.62)
NEUTS SEG NFR BLD AUTO: 41 % (ref 43–75)
NRBC BLD AUTO-RTO: 0 /100 WBCS
PLATELET # BLD AUTO: 209 THOUSANDS/UL (ref 149–390)
PMV BLD AUTO: 10 FL (ref 8.9–12.7)
POTASSIUM SERPL-SCNC: 4.7 MMOL/L (ref 3.5–5.3)
RBC # BLD AUTO: 4.01 MILLION/UL (ref 3.81–5.12)
SODIUM SERPL-SCNC: 138 MMOL/L (ref 135–147)
WBC # BLD AUTO: 4.38 THOUSAND/UL (ref 4.31–10.16)

## 2025-01-19 PROCEDURE — 99232 SBSQ HOSP IP/OBS MODERATE 35: CPT | Performed by: STUDENT IN AN ORGANIZED HEALTH CARE EDUCATION/TRAINING PROGRAM

## 2025-01-19 PROCEDURE — 97166 OT EVAL MOD COMPLEX 45 MIN: CPT

## 2025-01-19 PROCEDURE — 80048 BASIC METABOLIC PNL TOTAL CA: CPT

## 2025-01-19 PROCEDURE — 94664 DEMO&/EVAL PT USE INHALER: CPT

## 2025-01-19 PROCEDURE — 85025 COMPLETE CBC W/AUTO DIFF WBC: CPT

## 2025-01-19 PROCEDURE — 97163 PT EVAL HIGH COMPLEX 45 MIN: CPT

## 2025-01-19 RX ORDER — HEPARIN SODIUM 5000 [USP'U]/ML
5000 INJECTION, SOLUTION INTRAVENOUS; SUBCUTANEOUS EVERY 8 HOURS SCHEDULED
Status: DISCONTINUED | OUTPATIENT
Start: 2025-01-19 | End: 2025-01-24 | Stop reason: HOSPADM

## 2025-01-19 RX ADMIN — HYDROXYCHLOROQUINE SULFATE 200 MG: 200 TABLET, FILM COATED ORAL at 08:24

## 2025-01-19 RX ADMIN — PREGABALIN 75 MG: 75 CAPSULE ORAL at 17:27

## 2025-01-19 RX ADMIN — ACETAMINOPHEN 650 MG: 325 TABLET, FILM COATED ORAL at 17:27

## 2025-01-19 RX ADMIN — DIPHENHYDRAMINE HCL 12.5 MG: 25 TABLET ORAL at 15:48

## 2025-01-19 RX ADMIN — METHOCARBAMOL 500 MG: 500 TABLET ORAL at 23:30

## 2025-01-19 RX ADMIN — Medication 1 TABLET: at 08:22

## 2025-01-19 RX ADMIN — BUPROPION HYDROCHLORIDE 300 MG: 150 TABLET, EXTENDED RELEASE ORAL at 08:23

## 2025-01-19 RX ADMIN — PREGABALIN 75 MG: 75 CAPSULE ORAL at 08:23

## 2025-01-19 RX ADMIN — ACETAMINOPHEN 650 MG: 325 TABLET, FILM COATED ORAL at 00:26

## 2025-01-19 RX ADMIN — PANTOPRAZOLE SODIUM 40 MG: 40 TABLET, DELAYED RELEASE ORAL at 04:55

## 2025-01-19 RX ADMIN — HEPARIN SODIUM 5000 UNITS: 5000 INJECTION INTRAVENOUS; SUBCUTANEOUS at 14:38

## 2025-01-19 RX ADMIN — FOLIC ACID 2 MG: 1 TABLET ORAL at 08:24

## 2025-01-19 RX ADMIN — ROPIVACAINE HYDROCHLORIDE: 5 INJECTION EPIDURAL; INFILTRATION; PERINEURAL at 20:01

## 2025-01-19 RX ADMIN — DIPHENHYDRAMINE HCL 12.5 MG: 25 TABLET ORAL at 04:55

## 2025-01-19 RX ADMIN — ACETAMINOPHEN 650 MG: 325 TABLET, FILM COATED ORAL at 23:30

## 2025-01-19 RX ADMIN — METHOCARBAMOL 500 MG: 500 TABLET ORAL at 12:19

## 2025-01-19 RX ADMIN — HEPARIN SODIUM 5000 UNITS: 5000 INJECTION INTRAVENOUS; SUBCUTANEOUS at 21:09

## 2025-01-19 RX ADMIN — DOCUSATE SODIUM 100 MG: 100 CAPSULE, LIQUID FILLED ORAL at 08:22

## 2025-01-19 RX ADMIN — METHOCARBAMOL 500 MG: 500 TABLET ORAL at 04:56

## 2025-01-19 RX ADMIN — TRAZODONE HYDROCHLORIDE 150 MG: 100 TABLET ORAL at 21:09

## 2025-01-19 RX ADMIN — MYCOPHENOLIC ACID 360 MG: 180 TABLET, DELAYED RELEASE ORAL at 17:27

## 2025-01-19 RX ADMIN — LEVOTHYROXINE SODIUM 150 MCG: 75 TABLET ORAL at 08:23

## 2025-01-19 RX ADMIN — METHOCARBAMOL 500 MG: 500 TABLET ORAL at 17:27

## 2025-01-19 RX ADMIN — METHOCARBAMOL 500 MG: 500 TABLET ORAL at 00:27

## 2025-01-19 RX ADMIN — DEXTROAMPHETAMINE SACCHARATE, AMPHETAMINE ASPARTATE, DEXTROAMPHETAMINE SULFATE AND AMPHETAMINE SULFATE 15 MG: 2.5; 2.5; 2.5; 2.5 TABLET ORAL at 06:04

## 2025-01-19 RX ADMIN — ACETAMINOPHEN 650 MG: 325 TABLET, FILM COATED ORAL at 12:19

## 2025-01-19 RX ADMIN — MYCOPHENOLIC ACID 360 MG: 180 TABLET, DELAYED RELEASE ORAL at 08:24

## 2025-01-19 RX ADMIN — ACETAMINOPHEN 650 MG: 325 TABLET, FILM COATED ORAL at 04:55

## 2025-01-19 NOTE — ASSESSMENT & PLAN NOTE
"-Hemorrhage of a posterior right lobe cyst both internally and into the adjacent parenchyma and subcapsular space secondary to a grade 2 liver laceration.    1/17  CT abdomen: \"There is a 2.5 x 3.4 cm hypodensity in the posterior right lobe of the liver corresponding to a cyst on the previous study measuring 2.4 x 3.2 cm. There is new central hyperdensity consistent with hematoma which extends into the adjacent parenchyma and subcapsular space. This measures 2.1 x 4.6 cm in total on series 604 image 75. There is also linear hypodensity extending from the cyst more centrally measuring approximately 1.8 cm suspicious for a grade 2 liver laceration. There are multiple additional cysts within the liver.\"     Plan:  - Trend H&H  - Monitor abdominal exam  - Monitor for further signs of bleeding  - Monitor vitals  - Repeat scan + transfuse as needed for acute change in exam or rapid drop in Hgb  - No need for acute surgical intervention at this time  - If concern for ongoing bleeding could consider engaging IR for embolization prior to surgical liver resection   - Rest of care per primary    " Patient due in June 2023 with Dr Carrasco.  Schedule for Dr Carrasco is not out that far. Patient added to recall list and message postponed.

## 2025-01-19 NOTE — CONSULTS
Consultation - Acute Pain   Name: Mimi Biggs 69 y.o. female I MRN: [id number]  Unit/Bed#: [facility] 616-01 I Date of Admission: 1/17/2025   Date of Service: 1/18/2025 I Hospital Day: 1   Consult Acute Pain Service (>/= 66 y/o with at Least 1 Rib Fracture)  Consult performed by: Kam Garzon MD  Consult ordered by: Eddie Murdock DO        Physician Requesting Evaluation: Antonio Bess MD   Reason for Evaluation / Principal Problem: Rib fracture pain     Assessment & Plan  Liver laceration, grade II, without open wound into cavity    Accidental fall from ladder    Closed fracture of multiple ribs of right side  S/p Rt. 7th & 8th rib fractures   Rib Fracture Evaluation:  Chest tube: NA  Respiratory Co-morbidities: General debility  SpO2:   SPO2 RA Rest      Flowsheet Row ED to Hosp-Admission (Current) from 1/17/2025 in [facility] 6   SpO2 95 %   SpO2 Activity At Rest   O2 Device None (Room air)   O2 Flow Rate --                                                                             Incentive Spirometer: Incentive Spirometry Achieved (mL): 2000 mL   Platelet Count:   Results from last 7 days   Lab Units 01/18/25  0546   PLATELETS Thousands/uL 194     Coags:   Results from last 7 days   Lab Units 01/18/25  0546   INR  1.04   PROTIME seconds 13.9     Home anticoagulants: None    VTE covered by:    None       Discussed risks and benefits of epidural placement for rib fracture pain with patient, she wanted to get epidural placed for better analgesia especially with coughing and taking a deep breath so will plan on epidural placement.     - Continue PCEA @ 6/4/10/3   - Tylenol 650mg Q6H   - Lidocaine patches Q12 HR   - Robaxin 500mg Q6H   - Lyrica 75mg BID   - IV dilaudid 0.2mg Q2H prn for breakthrough pain   - Oxy 2.5/5mg Q4H prn for moderate/severe pain     Closed wedge compression fracture of T12 vertebra (HCC)    Bladder wall thickening          APS will continue to follow.  Please contact Acute Pain Service - via SecureChat from [phone] with additional questions or concerns. See SecureChat or iSites for additional contacts and after hours information.     History of Present Illness    HPI: Mimi Biggs is a 69 y.o. year old female who presents after fall with Rt. Sided rib fractures and other injuries. APS team consulted for rib fracture pain.     Current pain location(s): Pain Score: 1  Pain Location/Orientation: Orientation: Right, Location: Rib Cage  Pain Scale: Pain Assessment Tool: 0-10  Current Analgesic regimen:  PO tylenol, IV dilaudid prn, oxy prn, lyrica, po robaxin, lidocaine patches     Pain History: none   Pain Management Physician:  NA  I have reviewed the patient's controlled substance dispensing history in the Prescription Drug Monitoring Program in compliance with the [facility] regulations before prescribing any controlled substances.     Review of Systems  I have reviewed the patient's PMH, PSH, Social History, Family History, Meds, and Allergies    Objective :  Temp:  [97.4 °F (36.3 °C)-100.3 °F (37.9 °C)] 100.3 °F (37.9 °C)  HR:  [64-76] 76  BP: ()/(48-57) 109/54  Resp:  [16-19] 16  SpO2:  [95 %-98 %] 95 %  O2 Device: None (Room air)    Physical Exam  Vitals and nursing note reviewed.   Constitutional:       Appearance: Normal appearance.   HENT:      Head: Normocephalic and atraumatic.      Nose: Nose normal.   Cardiovascular:      Rate and Rhythm: Normal rate.   Pulmonary:      Effort: Pulmonary effort is normal.   Musculoskeletal:         General: Normal range of motion.      Cervical back: Normal range of motion.   Skin:     General: Skin is warm and dry.   Neurological:      General: No focal deficit present.      Mental Status: She is alert and oriented to person, place, and time.          Lab Results: I have reviewed the following results:  Estimated Creatinine Clearance: 47.2 mL/min (by C-G formula based on SCr of 0.77 mg/dL).  Lab Results   Component  Value Date    WBC 3.17 (L) 01/18/2025    HGB 11.9 01/18/2025    HCT 36.2 01/18/2025     01/18/2025         Component Value Date/Time    K 4.3 01/18/2025 0546    K 4.7 12/23/2024 0849     01/18/2025 0546     12/23/2024 0849    CO2 29 01/18/2025 0546    CO2 32 (H) 12/23/2024 0849    BUN 15 01/18/2025 0546    BUN 16 12/23/2024 0849    CREATININE 0.77 01/18/2025 0546    CREATININE 0.73 12/23/2024 0849         Component Value Date/Time    CALCIUM 8.6 01/18/2025 0546    CALCIUM 9.2 12/23/2024 0849    ALKPHOS 61 01/18/2025 0546    ALKPHOS 117 12/23/2024 0849    AST 23 01/18/2025 0546    AST 15 12/23/2024 0849    ALT 31 01/18/2025 0546    ALT 13 12/23/2024 0849    TP 5.4 (L) 01/18/2025 0546    TP 5.7 (L) 12/23/2024 0849    ALB 3.7 01/18/2025 0546    ALB 4.1 12/23/2024 0849    ALB 4.2 06/26/2024 0839

## 2025-01-19 NOTE — ASSESSMENT & PLAN NOTE
- Acute nondisplaced fractures of the right lateral seventh and eighth ribs, present on admission.  - Continue rib fracture protocol.  - Continue to encourage incentive spirometer use and adequate pulmonary hygiene.    - PIC score   - Appreciate APS evaluation and recommendations.  - Continue multimodal analgesic regimen.  - Supplemental oxygen via nasal cannula as needed to maintain saturations greater than or equal to 94%.  - PT and OT evaluation and treatment as indicated.  - Outpatient follow-up in the trauma clinic for re-evaluation in approximately 2 weeks.  - Multimodal pain control including Epidural pump  - APS consulted, recs appreciated

## 2025-01-19 NOTE — ASSESSMENT & PLAN NOTE
"-Hemorrhage of a posterior right lobe cyst both internally and into the adjacent parenchyma and subcapsular space secondary to a grade 2 liver laceration.    1/17  CT abdomen: \"There is a 2.5 x 3.4 cm hypodensity in the posterior right lobe of the liver corresponding to a cyst on the previous study measuring 2.4 x 3.2 cm. There is new central hyperdensity consistent with hematoma which extends into the adjacent parenchyma and subcapsular space. This measures 2.1 x 4.6 cm in total on series 604 image 75. There is also linear hypodensity extending from the cyst more centrally measuring approximately 1.8 cm suspicious for a grade 2 liver laceration. There are multiple additional cysts within the liver.\"     Hgb 11.3 from 11.9 from 11.0     Plan:  - Trend H&H  - Monitor abdominal exam  - Monitor for further signs of bleeding  - Monitor vitals  - Repeat scan + transfuse as needed for acute change in exam or rapid drop in Hgb  - No need for acute surgical intervention at this time  - If concern for ongoing bleeding could consider engaging IR for embolization prior to surgical liver resection   - Rest of care per primary    "

## 2025-01-19 NOTE — CONSULTS
"Consultation - Surgery-General   Name: Mimi Biggs 69 y.o. female I MRN: [id number]  Unit/Bed#: [facility] 616-01 I Date of Admission: 1/17/2025   Date of Service: 1/18/2025 I Hospital Day: 1   Inpatient Consult to Surgical Oncology  Consult performed by: Catalina Perera MD  Consult ordered by: Eddie Murdock DO        Physician Requesting Evaluation: Antonio Bess MD   Reason for Evaluation / Principal Problem: Liver laceration in setting of hematoma, cysts    Assessment & Plan  Liver laceration, grade II, without open wound into cavity  -Hemorrhage of a posterior right lobe cyst both internally and into the adjacent parenchyma and subcapsular space secondary to a grade 2 liver laceration.    1/17  CT abdomen: \"There is a 2.5 x 3.4 cm hypodensity in the posterior right lobe of the liver corresponding to a cyst on the previous study measuring 2.4 x 3.2 cm. There is new central hyperdensity consistent with hematoma which extends into the adjacent parenchyma and subcapsular space. This measures 2.1 x 4.6 cm in total on series 604 image 75. There is also linear hypodensity extending from the cyst more centrally measuring approximately 1.8 cm suspicious for a grade 2 liver laceration. There are multiple additional cysts within the liver.\"     Plan:  - Trend H&H  - Monitor abdominal exam  - Monitor for further signs of bleeding  - Monitor vitals  - Repeat scan + transfuse as needed for acute change in exam or rapid drop in Hgb  - No need for acute surgical intervention at this time  - If concern for ongoing bleeding could consider engaging IR for embolization prior to surgical liver resection   - Rest of care per primary    Accidental fall from ladder  Management per trauma service  Closed fracture of multiple ribs of right side  Management per trauma service  Closed wedge compression fracture of T12 vertebra (HCC)  Management per trauma service, neurosurgery  Please contact the SecureChat role for the Surgery-General " "service with any questions/concerns.    History of Present Illness   Mimi Biggs is a 69 y.o. female who presents with a liver lac with hematoma in setting of known hepatic cysts. Patient presented as a transfer from [facility] after sustaining a fall yesterday where she struck her right side on the dresser. She sustained several fib fractures and vertebral fractures also. She reports significant right sided pain initially, that is now well-controlled with an epidural. She is tolerating a diet and denies nausea or vomiting. She denies lightheadedness or dizziness. She denies taking any antiplatelet or anticoagulant medication. She reports a cancer history of non-Hodgkin lymphoma as well as thyroid cancer for which she had thyroidectomy. Her vitals are largely stable on room air, /50. Urine output 1 L. Labs show hemoglobin 11.9 from 11.0 from 11.3. CT abdomen states \"There is a 2.5 x 3.4 cm hypodensity in the posterior right lobe of the liver corresponding to a cyst on the previous study measuring 2.4 x 3.2 cm. There is new central hyperdensity consistent with hematoma which extends into the adjacent parenchyma and subcapsular space. This measures 2.1 x 4.6 cm in total on series 604 image 75. There is also linear hypodensity extending from the cyst more centrally measuring approximately 1.8 cm suspicious for a grade 2 liver laceration. There are multiple additional cysts within the liver.\" On exam she is soft, tender on the right side, nondistended. Surgical oncology service consulted for evaluation.      Review of Systems   Constitutional:  Negative for fever.   HENT:  Negative for congestion.    Eyes:  Negative for discharge.   Respiratory:  Negative for chest tightness and shortness of breath.    Cardiovascular:  Negative for chest pain.   Gastrointestinal:  Positive for abdominal pain. Negative for nausea and vomiting.   Genitourinary:  Negative for difficulty urinating.   Musculoskeletal:  Positive for " arthralgias.   Skin:  Negative for color change.   Neurological:  Negative for dizziness, facial asymmetry and light-headedness.   Psychiatric/Behavioral:  Negative for agitation.      I have reviewed the patient's PMH, PSH, Social History, Family History, Meds, and Allergies  Historical Information   Past Medical History:   Diagnosis Date    ADHD     Allergic 3-2020    Bactrim    Anemia 2018    Latest blood work OK    Anxiety Since diagnoised 2003    Intermittent related to my life trying to move. My illness    Arthritis     Very bad especially Right knee    Cancer (HCC) 7-    Cut the Lymph Node Out & thyroid out    Cellulitis of foot     Last assessed 10/24/16    Change in mental status     Last assessed 12/01/15    Chronic fatigue     Depression 2003    I am on meds. Feel good    Disease of thyroid gland     Cancer Surgical Removal total Thyroid    Diverticulitis of colon Last Egd    Diverticlum    PLAZA (dyspnea on exertion)     Last assessed 12/01/15    Fibromyalgia     Folliculitis     Last assessed 10/06/14    Fractures     GERD (gastroesophageal reflux disease)     Headache(784.0) 3-2020    In front of head    Inflammatory bowel disease 1990’s    Memory loss 2010    New disease neurogentic changes Necrotizing Myopathy    Non Hodgkin's lymphoma (HCC) 07/13/2012    Osteopenia     Osteoporosis 6-2019    Broke Pelvis -left and right pubic ramus,fractured Sacrum    Raynaud's disease     Rheumatic disease     Scleroderma (HCC)     Stomach disorder     Systemic sclerosis (HCC)     Thyroid cancer (HCC) 10/04/2018    Visual impairment 2021    Need new glasses     Past Surgical History:   Procedure Laterality Date    BACK SURGERY      BLADDER SURGERY      BONE MARROW BIOPSY      CARDIAC CATHETERIZATION      EYE SURGERY  Jan 1010    Correct angles in both eyes    HEMORRHOID SURGERY      HYSTERECTOMY  02/04/2004    KNEE SURGERY      LYMPH NODE BIOPSY  7-     Non Hodgkins Lymphoma    NASAL  SEPTUM SURGERY      NECK SURGERY      OOPHORECTOMY Bilateral 2004    ROTATOR CUFF REPAIR      SHOULDER SURGERY      SPINE SURGERY  2016    Failed microdisectomy L5 S1    TENDON REPAIR      THYROIDECTOMY N/A 10/04/2018    Procedure: TOTAL THYROIDECTOMY;  Surgeon: Roger Ortiz MD;  Location: [facility];  Service: Surgical Oncology    TONSILLECTOMY      TOTAL THYROIDECTOMY      WRIST SURGERY       Social History     Tobacco Use    Smoking status: Former     Current packs/day: 0.25     Average packs/day: 0.3 packs/day for 5.2 years (1.3 ttl pk-yrs)     Types: Cigarettes    Smokeless tobacco: Never    Tobacco comments:     I quit cold turkey when I learned 2nd hand smoke   Vaping Use    Vaping status: Never Used   Substance and Sexual Activity    Alcohol use: Yes     Comment: I have a cocktail every month ,beer sometimes    Drug use: No    Sexual activity: Not Currently     Partners: Male     Birth control/protection: Abstinence     Comment: Had a hysterectomy     E-Cigarette/Vaping    E-Cigarette Use Never User      E-Cigarette/Vaping Substances    Nicotine No     THC No     CBD No     Flavoring No     Other No     Unknown No      Family History   Problem Relation Age of Onset    Arthritis Mother     Diabetes Mother     Hyperlipidemia Mother         Passed 10- Kidney- Heart Disease    Thyroid disease Mother         Benign lump removed    Autoimmune disease Mother     ADD / ADHD Mother     Osteoporosis Mother         1x week    Coronary artery disease Father     Hyperlipidemia Father          Massive Heart Attack    Arthritis Sister     Asthma Sister     Crohn's disease Sister     Autoimmune disease Sister     Depression Sister     Hyperlipidemia Sister         Very  bad Chrons Disease    Autoimmune disease Sister     Depression Sister     Hyperlipidemia Sister         Heart disease -Lupus like-Thyroid issues    Thyroid disease Sister         Seeing Endrocologist dosage for thyroid    Arthritis  Sister         Having many issues this year    Autoimmune disease Sister     Diabetes Sister     Rheumatologic disease Sister     No Known Problems Daughter     No Known Problems Maternal Grandmother     No Known Problems Maternal Grandfather     No Known Problems Paternal Grandmother     No Known Problems Paternal Grandfather     Hyperlipidemia Brother         Heart Attacks,torn rotator and bicept . arthritis, knees    Autoimmune disease Brother     Hyperlipidemia Brother         Heart Attacks.bad kness,Arthrites    Autoimmune disease Brother     Diabetes Brother     Ovarian cancer Maternal Aunt     Dementia Maternal Aunt             Cancer Maternal Aunt     No Known Problems Maternal Aunt     No Known Problems Maternal Aunt     Cancer Maternal Aunt     No Known Problems Maternal Aunt     No Known Problems Paternal Aunt     Cancer Maternal Aunt     Cancer Maternal Uncle     Cancer Maternal Uncle     Breast cancer Neg Hx     Endometrial cancer Neg Hx     Colon cancer Neg Hx     Breast cancer additional onset Neg Hx     BRCA 1/2 Neg Hx     BRCA2 Positive Neg Hx     BRCA2 Negative Neg Hx     BRCA1 Positive Neg Hx     BRCA1 Negative Neg Hx      Social History     Tobacco Use    Smoking status: Former     Current packs/day: 0.25     Average packs/day: 0.3 packs/day for 5.2 years (1.3 ttl pk-yrs)     Types: Cigarettes    Smokeless tobacco: Never    Tobacco comments:     I quit cold turkey when I learned 2nd hand smoke   Vaping Use    Vaping status: Never Used   Substance and Sexual Activity    Alcohol use: Yes     Comment: I have a cocktail every month ,beer sometimes    Drug use: No    Sexual activity: Not Currently     Partners: Male     Birth control/protection: Abstinence     Comment: Had a hysterectomy       Current Facility-Administered Medications:     acetaminophen (TYLENOL) tablet 650 mg, Q6H RANJIT    ALPRAZolam (XANAX) tablet 0.25 mg, BID PRN    amphetamine-dextroamphetamine (ADDERALL) tablet 15 mg, BID  before breakfast/lunch    buPROPion (WELLBUTRIN XL) 24 hr tablet 300 mg, QAM    calcium carbonate-vitamin D 500 mg-5 mcg tablet 1 tablet, Daily With Breakfast    diphenhydrAMINE (BENADRYL) tablet 12.5 mg, Q8H PRN    docusate sodium (COLACE) capsule 100 mg, Daily    folic acid (FOLVITE) tablet 2 mg, Daily    HYDROmorphone HCl (DILAUDID) injection 0.2 mg, Q2H PRN    hydroxychloroquine (PLAQUENIL) tablet 200 mg, Daily    levothyroxine tablet 150 mcg, Daily    lidocaine (LIDODERM) 5 % patch 1 patch, Daily    methocarbamol (ROBAXIN) tablet 500 mg, Q6H RANJIT    mycophenolic acid (MYFORTIC) EC tablet 360 mg, BID    ondansetron (ZOFRAN) injection 4 mg, Q4H PRN    oxyCODONE (ROXICODONE) split tablet 2.5 mg, Q4H PRN **OR** oxyCODONE (ROXICODONE) IR tablet 5 mg, Q4H PRN    pantoprazole (PROTONIX) EC tablet 40 mg, Early Morning    pregabalin (LYRICA) capsule 75 mg, BID    ropivacaine 0.1% and fentaNYL 2 mcg/mL PCEA, Continuous    traZODone (DESYREL) tablet 150 mg, HS  Prior to Admission Medications   Prescriptions Last Dose Informant Patient Reported? Taking?   ALPRAZolam (XANAX) 0.25 mg tablet   No No   Sig: Take 1 tablet (0.25 mg total) by mouth 2 (two) times a day as needed for anxiety   Clobetasol Prop-Niacinamide (Chlooxia) 0.05-4 % OINT   Yes No   Sig: Apply topically   Patient not taking: Reported on 12/6/2024   Docusate Calcium (STOOL SOFTENER PO)   Yes No   Sig: Take by mouth   MAGNESIUM PO   Yes No   Sig: Take by mouth Resveratrol, berberine & quercetin   Multiple Vitamin (MULTIVITAMIN) tablet   Yes No   Sig: Take 1 tablet by mouth daily   Patient not taking: Reported on 12/6/2024   amphetamine-dextroamphetamine (ADDERALL XR, 30MG,) 30 MG 24 hr capsule   No No   Sig: Take 1 capsule (30 mg total) by mouth every morning Max Daily Amount: 30 mg   amphetamine-dextroamphetamine (ADDERALL, 10MG,) 10 mg tablet   No No   Sig: Take 2 tablets (20 mg total) by mouth see administration instructions Take 20mg daily in the afternoon  around 2pm.   bisacodyl (DULCOLAX) 10 mg suppository   No No   Sig: Insert 1 suppository (10 mg total) into the rectum daily as needed for constipation   Patient not taking: Reported on 2024   buPROPion (WELLBUTRIN XL) 300 mg 24 hr tablet   No No   Sig: Take 1 tablet (300 mg total) by mouth every morning   calcium citrate-vitamin D 315 mg-5 mcg tablet   No No   Sig: Take 1 tablet by mouth daily   desmopressin (DDAVP) 0.2 mg tablet   No No   Sig: Take 1 tablet (0.2 mg total) by mouth daily at bedtime   Patient not taking: Reported on 2024   desmopressin (DDAVP) 0.2 mg tablet   No No   Sig: Take 1 tablet (0.2 mg total) by mouth daily at bedtime   Patient not taking: Reported on 2024   doxycycline hyclate (VIBRAMYCIN) 50 mg capsule   Yes No   Sig: Take 50 mg by mouth 2 (two) times a day   Patient taking differently: Take 100 mg by mouth daily   esomeprazole (NexIUM) 40 MG capsule   No No   Sig: Take 1 capsule (40 mg total) by mouth every morning   ezetimibe (ZETIA) 10 mg tablet   No No   Sig: Take 1 tablet (10 mg total) by mouth daily   Patient not taking: Reported on 2024   fluticasone (FLONASE) 50 mcg/act nasal spray   No No   Si sprays into each nostril daily   Patient not taking: Reported on 2024   folic acid (FOLVITE) 1 mg tablet   No No   Sig: Take 2 tablets (2 mg total) by mouth daily   hydroxychloroquine (PLAQUENIL) 200 mg tablet   No No   Sig: Take 1 tablet (200 mg total) by mouth daily   levothyroxine 150 mcg tablet   No No   Sig: Take 1 tablet (150 mcg total) by mouth daily   metroNIDAZOLE (METROGEL) 1 % gel   No No   Sig: Apply topically daily   Patient not taking: Reported on 2024   mometasone (ELOCON) 0.1 % cream   Yes No   Sig: Apply topically daily   Patient not taking: Reported on 2024   mycophenolate (MYFORTIC) 360 MG TBEC   No No   Sig: TAKE 1 TABLET IN THE MORNING AND 1 TABLET AT NIGHT   naproxen (EC NAPROSYN) 500 MG EC tablet   No No   Sig: Take 1 tablet (500  mg total) by mouth 2 (two) times a day as needed for mild pain   Patient not taking: Reported on 12/6/2024   nystatin (MYCOSTATIN) 500,000 units/5 mL suspension   No No   Sig: Apply 5 mL (500,000 Units total) to the mouth or throat 4 (four) times a day   Patient not taking: Reported on 12/6/2024   ondansetron (ZOFRAN) 4 mg tablet   No No   Sig: Take 1 tablet (4 mg total) by mouth every 8 (eight) hours as needed for nausea or vomiting   Patient not taking: Reported on 12/6/2024   polyethylene glycol (MIRALAX) 17 g packet   No No   Sig: Take 17 g by mouth daily as needed (constipation)   Patient not taking: Reported on 12/6/2024   pregabalin (LYRICA) 75 mg capsule   No No   Sig: Take 1 capsule (75 mg total) by mouth 2 (two) times a day   traZODone (DESYREL) 150 mg tablet   No No   Sig: Take 1 tablet (150 mg total) by mouth daily at bedtime   valACYclovir (VALTREX) 1,000 mg tablet   No No   Sig: Take 1 tablet (1,000 mg total) by mouth once as needed (HSV flare) for up to 3 days   Patient not taking: Reported on 12/6/2024      Facility-Administered Medications: None     Thiazide-type diuretics, Duloxetine, Alendronate, Revatio [sildenafil], Savella [milnacipran], Sulfamethoxazole-trimethoprim, and Tedizolid    Objective :  Temp:  [97.4 °F (36.3 °C)-100.3 °F (37.9 °C)] 100.3 °F (37.9 °C)  HR:  [64-76] 76  BP: ()/(48-57) 109/54  Resp:  [16-19] 16  SpO2:  [95 %-98 %] 95 %  O2 Device: None (Room air)    Lines/Drains/Airways       Active Status       Name Placement date Placement time Site Days    Epidural Catheter 01/18/25 01/18/25  1310  -- less than 1                  Physical Exam    Lab Results: I have reviewed the following results:  Recent Labs     01/18/25  0546 01/18/25  1413   WBC 3.17*  --    HGB 11.0* 11.9   HCT 35.1 36.2     --    SODIUM 140  --    K 4.3  --      --    CO2 29  --    BUN 15  --    CREATININE 0.77  --    GLUC 100  --    AST 23  --    ALT 31  --    ALB 3.7  --    TBILI 0.29  --     ALKPHOS 61  --    PTT 27  --    INR 1.04  --        Imaging Results Review: I reviewed radiology reports from this admission including: CT abdomen/pelvis.  Other Study Results Review: No additional pertinent studies reviewed.    VTE Pharmacologic Prophylaxis: VTE covered by:    None     VTE Mechanical Prophylaxis: sequential compression device

## 2025-01-19 NOTE — ASSESSMENT & PLAN NOTE
S/p Rt. 7th & 8th rib fractures   Rib Fracture Evaluation:  Chest tube: NA  Respiratory Co-morbidities: General debility  SpO2:   SPO2 RA Rest      Flowsheet Row ED to Hosp-Admission (Current) from 1/17/2025 in [facility] 6   SpO2 95 %   SpO2 Activity At Rest   O2 Device None (Room air)   O2 Flow Rate --                                                                             Incentive Spirometer: Incentive Spirometry Achieved (mL): 2000 mL   Platelet Count:   Results from last 7 days   Lab Units 01/18/25  0546   PLATELETS Thousands/uL 194     Coags:   Results from last 7 days   Lab Units 01/18/25  0546   INR  1.04   PROTIME seconds 13.9     Home anticoagulants: None    VTE covered by:    None       Discussed risks and benefits of epidural placement for rib fracture pain with patient, she wanted to get epidural placed for better analgesia especially with coughing and taking a deep breath so will plan on epidural placement.     - Continue PCEA @ 6/4/10/3   - Tylenol 650mg Q6H   - Lidocaine patches Q12 HR   - Robaxin 500mg Q6H   - Lyrica 75mg BID   - IV dilaudid 0.2mg Q2H prn for breakthrough pain   - Oxy 2.5/5mg Q4H prn for moderate/severe pain

## 2025-01-19 NOTE — PROGRESS NOTES
"Progress Note - Surgery-General   Name: Mimi Biggs 69 y.o. female I MRN: [id number]  Unit/Bed#: [facility] 616-01 I Date of Admission: 1/17/2025   Date of Service: 1/19/2025 I Hospital Day: 2    Assessment & Plan  Liver laceration, grade II, without open wound into cavity  -Hemorrhage of a posterior right lobe cyst both internally and into the adjacent parenchyma and subcapsular space secondary to a grade 2 liver laceration.    1/17  CT abdomen: \"There is a 2.5 x 3.4 cm hypodensity in the posterior right lobe of the liver corresponding to a cyst on the previous study measuring 2.4 x 3.2 cm. There is new central hyperdensity consistent with hematoma which extends into the adjacent parenchyma and subcapsular space. This measures 2.1 x 4.6 cm in total on series 604 image 75. There is also linear hypodensity extending from the cyst more centrally measuring approximately 1.8 cm suspicious for a grade 2 liver laceration. There are multiple additional cysts within the liver.\"     Hgb 11.3 from 11.9 from 11.0     Plan:  - Trend H&H  - Monitor abdominal exam  - Monitor for further signs of bleeding  - Monitor vitals  - Repeat scan + transfuse as needed for acute change in exam or rapid drop in Hgb  - No need for acute surgical intervention at this time  - If concern for ongoing bleeding could consider engaging IR for embolization prior to surgical liver resection   - Rest of care per primary    Accidental fall from ladder  Management per trauma service  Closed fracture of multiple ribs of right side  Management per trauma service  Closed wedge compression fracture of T12 vertebra (HCC)  Management per trauma service, neurosurgery    Please contact the SecureChat role for the Surgery-General service with any questions/concerns.    Subjective   No acute events overnight. Patient reports pain is well controlled with PCEA. They are tolerating their diet. They are using their IS to 2000. They deny nausea, vomiting, chest pain, " shortness of breath, fevers, chills.      Objective :  Temp:  [97.4 °F (36.3 °C)-100.3 °F (37.9 °C)] 99.1 °F (37.3 °C)  HR:  [64-76] 68  BP: ()/(46-57) 102/46  Resp:  [16-19] 16  SpO2:  [95 %-98 %] 96 %  O2 Device: None (Room air)    I/O         01/17 0701 01/18 0700 01/18 0701 01/19 0700    P.O.  420    I.V. (mL/kg)  36.5 (0.8)    Total Intake(mL/kg)  456.5 (10.4)    Urine (mL/kg/hr) 400 1000 (0.9)    Stool 0     Total Output 400 1000    Net -400 -543.5          Unmeasured Urine Occurrence 1 x 1 x    Unmeasured Stool Occurrence 1 x           Lines/Drains/Airways       Active Status       Name Placement date Placement time Site Days    Epidural Catheter 01/18/25 01/18/25  1310  -- less than 1                  Physical Exam  Constitutional:       General: She is not in acute distress.  HENT:      Head: Normocephalic and atraumatic.      Right Ear: External ear normal.      Left Ear: External ear normal.      Nose: Nose normal.      Mouth/Throat:      Pharynx: Oropharynx is clear.   Eyes:      Extraocular Movements: Extraocular movements intact.   Cardiovascular:      Rate and Rhythm: Normal rate.   Pulmonary:      Effort: Pulmonary effort is normal.   Abdominal:      General: There is no distension.      Palpations: Abdomen is soft.      Tenderness: There is abdominal tenderness (Minimally tender R side).   Musculoskeletal:      Cervical back: Normal range of motion.   Skin:     General: Skin is warm and dry.   Neurological:      Mental Status: She is alert. Mental status is at baseline.      Comments: Epidural in place   Psychiatric:         Mood and Affect: Mood normal.           Lab Results: I have reviewed the following results:  Recent Labs     01/18/25  0546 01/18/25  1413   WBC 3.17*  --    HGB 11.0* 11.9   HCT 35.1 36.2     --    SODIUM 140  --    K 4.3  --      --    CO2 29  --    BUN 15  --    CREATININE 0.77  --    GLUC 100  --    AST 23  --    ALT 31  --    ALB 3.7  --    TBILI 0.29   --    ALKPHOS 61  --    PTT 27  --    INR 1.04  --        Imaging Results Review: I reviewed radiology reports from this admission including: CT abdomen/pelvis.  Other Study Results Review: No additional pertinent studies reviewed.    VTE Pharmacologic Prophylaxis: VTE covered by:    None     VTE Mechanical Prophylaxis: sequential compression device

## 2025-01-19 NOTE — ASSESSMENT & PLAN NOTE
S/p Rt. 7th & 8th rib fractures   Rib Fracture Evaluation:  Chest tube: NA  Respiratory Co-morbidities: General debility  SpO2:   SPO2 RA Rest      Flowsheet Row ED to Hosp-Admission (Current) from 1/17/2025 in [facility]PHP 6   SpO2 95 %   SpO2 Activity At Rest   O2 Device None (Room air)   O2 Flow Rate --                                                                             Incentive Spirometer: Incentive Spirometry Achieved (mL): 1750 mL   Platelet Count:   Results from last 7 days   Lab Units 01/19/25  0508   PLATELETS Thousands/uL 209     Coags:   Results from last 7 days   Lab Units 01/18/25  0546   INR  1.04   PROTIME seconds 13.9     Home anticoagulants: None    VTE covered by:  heparin (porcine), Subcutaneous       Upon bedside evaluation, Mimi was resting in bed without acute distress. She reports significant improvement in pain after epidural placement on 1/18. No evidence of excessive sympathectomy-induced hypotension/pressor requirements/abnormal sensorimotor deficits. Continue epidural PCEA along with MMA. Able to inspire >2L inspirometry while being on RA    Plan:  Continue thoracic epidural 0.1% ropivacaine/2mcg/ml fentanyl @6/4/10/3  Continue PO oxycodone 2.5/5mg q4hr PRN for mod-severe pain, IV dilaudid 0.2mg q2hr PRN for breakthrough  Continue other MMA  PO Tylenol 650mg q6hr ruthie  Lidoderm  Lyrica 75mg BID  Robaxin 500mg q6hr ruthie  Encourage PT/OT, OOB

## 2025-01-19 NOTE — PROGRESS NOTES
Progress Note - Trauma   Name: Mimi Biggs 69 y.o. female I MRN: [id number]  Unit/Bed#: [facility] 616-01 I Date of Admission: 1/17/2025   Date of Service: 1/19/2025 I Hospital Day: 2     Assessment & Plan  Liver laceration, grade II, without open wound into cavity  - Hemorrhage of a posterior right lobe cyst both internally and into the adjacent parenchyma and subcapsular space secondary to a grade 2 liver laceration.  - Serial abdominal exams  - Trend H/H   - Monitor Vitals  - Repeat scan + transfuse as needed for acute change in exam or rapid drop in Hgb  - Regular diet  - Pain control as below  - Surgical Oncology consultation obtained  - Recommendation for trend H/H, no acute surgical intervention required  Accidental fall from ladder  - Status post fall off ladder 1/16 and hit a dresser on the way down with the below noted injuries.  - No head strike, LOC, or use of AC/AP medications  - Fall precautions.  - Geriatric Medicine consultation for evaluation, medication review and recommendations.  - PT and OT evaluation and treatment as indicated.  - Case Management consultation for disposition planning.    Closed fracture of multiple ribs of right side  - Acute nondisplaced fractures of the right lateral seventh and eighth ribs, present on admission.  - Continue rib fracture protocol.  - Continue to encourage incentive spirometer use and adequate pulmonary hygiene.    - PIC score   - Appreciate APS evaluation and recommendations.  - Continue multimodal analgesic regimen.  - Supplemental oxygen via nasal cannula as needed to maintain saturations greater than or equal to 94%.  - PT and OT evaluation and treatment as indicated.  - Outpatient follow-up in the trauma clinic for re-evaluation in approximately 2 weeks.  - Multimodal pain control including Epidural pump  - APS consulted, recs appreciated    Closed wedge compression fracture of T12 vertebra (HCC)  T12 compression fracture, likely subacute as there is due  "appear to be subtle changes along the superior endplate on the recent prior study. There has been significant progression from the prior study and correlation with tenderness in this region is recommended.  - Pt w/ no new neurological deficits, ambulatory since the event  - Upright Xrays pending official read  - Appreciate Neurosurgical evaluation  - TSLO brace as needed for comfort  - Thoracolumbar spinal precautions  - Regular neuro exams  - Pain control as above  Bladder wall thickening  - CT CAP  \"Irregular bladder wall thickening without adjacent stranding. This is unlikely acute cystitis though was not present previously and clinical correlation for urinary tract symptoms recommended.\"  - UA pending    Bowel Regimen: Colace  VTE Prophylaxis:Heparin     Disposition: Continue current level of care w/ pain management reevaluation by APS     24 Hour Events : No acute overnight events  Subjective : Patient reporting that with the epidural in place her pain is well-controlled, reporting her only concern at this time is whether or not she can be discharged with confidence regarding whether or not there is any additional bleeding, patient was counseled that we have been monitoring her hemoglobin daily, highly unlikely that she is having continued bleeding in the setting of these normal labs, patient accepting of this answer    Objective :  Temp:  [97.6 °F (36.4 °C)-100.3 °F (37.9 °C)] 97.6 °F (36.4 °C)  HR:  [66-76] 66  BP: ()/(38-57) 98/52  Resp:  [16-18] 16  SpO2:  [95 %-97 %] 96 %  O2 Device: None (Room air)    I/O         01/17 0701  01/18 0700 01/18 0701  01/19 0700 01/19 0701  01/20 0700    P.O.  420     I.V. (mL/kg)  36.5 (0.8) 84.2 (1.9)    Total Intake(mL/kg)  456.5 (10.4) 84.2 (1.9)    Urine (mL/kg/hr) 400 1800 (1.7)     Stool 0      Total Output 400 1800     Net -400 -1343.5 +84.2           Unmeasured Urine Occurrence 1 x 1 x     Unmeasured Stool Occurrence 1 x            Lines/Drains/Airways       " Active Status       Name Placement date Placement time Site Days    Epidural Catheter 01/18/25 01/18/25  1310  -- less than 1                  Physical Exam  Vitals and nursing note reviewed.   Constitutional:       General: She is not in acute distress.     Appearance: She is well-developed. She is not ill-appearing or toxic-appearing.   HENT:      Head: Normocephalic and atraumatic.      Nose: No congestion or rhinorrhea.      Mouth/Throat:      Pharynx: No oropharyngeal exudate or posterior oropharyngeal erythema.   Eyes:      Conjunctiva/sclera: Conjunctivae normal.   Cardiovascular:      Rate and Rhythm: Normal rate and regular rhythm.      Heart sounds: No murmur heard.  Pulmonary:      Effort: Pulmonary effort is normal. No respiratory distress.      Breath sounds: Normal breath sounds. No wheezing, rhonchi or rales.   Abdominal:      Palpations: Abdomen is soft.      Tenderness: There is no abdominal tenderness. There is no guarding or rebound.   Musculoskeletal:         General: Tenderness present. No swelling or deformity.      Cervical back: Neck supple. No rigidity.      Comments: Patient has tenderness to palpation of the right lateral ribs, she is able to move her uppers and lower extremities without issue/difficulty, no evidence of new onset bruising or swelling, she has a epidural pain pump in place in the thoracic spine without evidence of overlying infection, pump appears to be functioning appropriately   Skin:     General: Skin is warm and dry.      Capillary Refill: Capillary refill takes less than 2 seconds.      Findings: No bruising, erythema or rash.   Neurological:      General: No focal deficit present.      Mental Status: She is alert and oriented to person, place, and time.   Psychiatric:         Mood and Affect: Mood normal.        PIC Score  PIC Pain Score: 3 (1/19/2025  9:10 AM)  PIC Incentive Spirometry Score: 3 (1/19/2025  7:30 AM)  PIC Cough Description: 2 (1/19/2025  7:30 AM)  PIC  Total Score: 8 (1/19/2025  7:30 AM)       If the Total PIC Score </=5, did you consult APS and evaluate patient for further intervention?: yes      Pain:    Incentive Spirometry  Cough  3 = Controlled  4 = Above goal volume 3 = Strong  2 = Moderate  3 = Goal to alert volume 2 = Weak  1 = Severe  2 = Below alert volume 1 = Absent     1 = Unable to perform IS           Lab Results: I have reviewed the following results:  Recent Labs     01/18/25  0546 01/18/25  1413 01/19/25  0508   WBC 3.17*  --  4.38   HGB 11.0*   < > 11.3*   HCT 35.1   < > 36.2     --  209   SODIUM 140  --  138   K 4.3  --  4.7     --  103   CO2 29  --  31   BUN 15  --  14   CREATININE 0.77  --  0.86   GLUC 100  --  94   AST 23  --   --    ALT 31  --   --    ALB 3.7  --   --    TBILI 0.29  --   --    ALKPHOS 61  --   --    PTT 27  --   --    INR 1.04  --   --     < > = values in this interval not displayed.

## 2025-01-19 NOTE — ASSESSMENT & PLAN NOTE
- Hemorrhage of a posterior right lobe cyst both internally and into the adjacent parenchyma and subcapsular space secondary to a grade 2 liver laceration.  - Serial abdominal exams  - Trend H/H   - Monitor Vitals  - Repeat scan + transfuse as needed for acute change in exam or rapid drop in Hgb  - Regular diet  - Pain control as below  - Surgical Oncology consultation obtained  - Recommendation for trend H/H, no acute surgical intervention required

## 2025-01-19 NOTE — ASSESSMENT & PLAN NOTE
T12 compression fracture, likely subacute as there is due appear to be subtle changes along the superior endplate on the recent prior study. There has been significant progression from the prior study and correlation with tenderness in this region is recommended.  - Pt w/ no new neurological deficits, ambulatory since the event  - Upright Xrays pending official read  - Appreciate Neurosurgical evaluation  - TSLO brace as needed for comfort  - Thoracolumbar spinal precautions  - Regular neuro exams  - Pain control as above

## 2025-01-19 NOTE — PHYSICAL THERAPY NOTE
Physical Therapy Evaluation    Patient's Name: Mimi Biggs    Admitting Diagnosis  T12 compression fracture, initial encounter ([facility]) [S22.080A]  Liver laceration, grade II, without open wound into cavity, initial encounter [S36.115A]  Closed wedge compression fracture of T12 vertebra, initial encounter ([facility]) [S22.080A]  Closed fracture of multiple ribs of right side, initial encounter [S22.41XA]  Unspecified multiple injuries, initial encounter [T07.XXXA]    Problem List  Patient Active Problem List   Diagnosis    Bilateral hand numbness    Methotrexate, long term, current use    Scleroderma progressive (HCC)    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HCC)    BRCA gene mutation positive    Allergic rhinitis    Bartholin gland cyst    Carpal tunnel syndrome of left wrist    Carpal tunnel syndrome of right wrist    Dental disorder    Depression    Esophageal reflux disease    Fecal soiling    Fibromyalgia    Hearing loss    Heel spur    Hematuria    Herniated lumbar intervertebral disc    Hyperlipidemia    Lichen planus    Lumbar disc herniation    Osteoarthritis    Osteoarthritis of both knees    Osteopenia    Ovarian cyst    Pain in joint of left shoulder    Peripheral neuropathy    Post concussion syndrome    Radiculopathy, lumbar region    Raynaud's syndrome    Rectal prolapse    Sjogren's syndrome (HCC)    Thyroid cancer (HCC)    Vitamin D deficiency    Acquired hypothyroidism    Advanced care planning/counseling discussion    Chronic pain    Vasomotor rhinitis    Necrotizing myopathy    Pulmonary emphysema (HCC)    Hypogammaglobulinemia (HCC)    PAD (peripheral artery disease) (HCC)    High risk medication use    Immunocompromised state (HCC)    Age-related osteoporosis without current pathological fracture    Primary insomnia    Post-nasal drip    Peptic stricture of esophagus    Attention deficit hyperactivity disorder (ADHD), predominantly inattentive type    Rosacea    B12 deficiency    Urge incontinence    Closed fracture  of left superior rim of pubis (HCC)    Accidental fall from ladder    Liver laceration, grade II, without open wound into cavity    Closed fracture of multiple ribs of right side    Closed wedge compression fracture of T12 vertebra (HCC)    Bladder wall thickening       Past Medical History  Past Medical History:   Diagnosis Date    ADHD     Allergic 3-2020    Bactrim    Anemia 2018    Latest blood work OK    Anxiety Since diagnoised 2003    Intermittent related to my life trying to move. My illness    Arthritis     Very bad especially Right knee    Cancer (HCC) 7-    Cut the Lymph Node Out & thyroid out    Cellulitis of foot     Last assessed 10/24/16    Change in mental status     Last assessed 12/01/15    Chronic fatigue     Depression 2003    I am on meds. Feel good    Disease of thyroid gland     Cancer Surgical Removal total Thyroid    Diverticulitis of colon Last Egd    Diverticlum    PLAZA (dyspnea on exertion)     Last assessed 12/01/15    Fibromyalgia     Folliculitis     Last assessed 10/06/14    Fractures     GERD (gastroesophageal reflux disease)     Headache(784.0) 3-2020    In front of head    Inflammatory bowel disease 1990’s    Memory loss 2010    New disease neurogentic changes Necrotizing Myopathy    Non Hodgkin's lymphoma (HCC) 07/13/2012    Osteopenia     Osteoporosis 6-2019    Broke Pelvis -left and right pubic ramus,fractured Sacrum    Raynaud's disease     Rheumatic disease     Scleroderma (HCC)     Stomach disorder     Systemic sclerosis (HCC)     Thyroid cancer (HCC) 10/04/2018    Visual impairment 2021    Need new glasses       Past Surgical History  Past Surgical History:   Procedure Laterality Date    BACK SURGERY      BLADDER SURGERY      BONE MARROW BIOPSY      CARDIAC CATHETERIZATION      EYE SURGERY  Jan 1010    Correct angles in both eyes    HEMORRHOID SURGERY      HYSTERECTOMY  02/04/2004    KNEE SURGERY      LYMPH NODE BIOPSY  7-     Non Hodgkins  Lymphoma    NASAL SEPTUM SURGERY      NECK SURGERY      OOPHORECTOMY Bilateral 02/04/2004    ROTATOR CUFF REPAIR      SHOULDER SURGERY      SPINE SURGERY  12-    Failed microdisectomy L5 S1    TENDON REPAIR      THYROIDECTOMY N/A 10/04/2018    Procedure: TOTAL THYROIDECTOMY;  Surgeon: Roger Ortiz MD;  Location: [facility];  Service: Surgical Oncology    TONSILLECTOMY      TOTAL THYROIDECTOMY      WRIST SURGERY          01/19/25 0910   PT Last Visit   PT Visit Date 01/19/25   Note Type   Note type Evaluation   Pain Assessment   Pain Assessment Tool 0-10   Pain Score 1   Pain Location/Orientation Orientation: Left;Location: Rib Cage   Hospital Pain Intervention(s) Repositioned;Ambulation/increased activity   Restrictions/Precautions   Weight Bearing Precautions Per Order No   Braces or Orthoses TLSO  (Backpack for comfort per neurosurgery)   Other Precautions Chair Alarm;Bed Alarm;Spinal precautions;Fall Risk   Home Living   Type of Home Apartment  (1st floor, 0 SPEEDY)   Prior Function   Level of [place] Independent with functional mobility;Independent with ADLs   Lives With (S)  Alone   Receives Help From   (local family can assist as needed, sister lives 2 blocks away)   Falls in the last 6 months 1 to 4  (2)   Comments Pt reports no AD at baseline, very active.   General   Family/Caregiver Present No   Cognition   Overall Cognitive Status WFL   Arousal/Participation Alert   Orientation Level Oriented X4   Following Commands Follows all commands and directions without difficulty   Comments Pt pleasant and cooperative throughout session, required frequent cues for safety and awareness   RLE Assessment   RLE Assessment WFL  (Grossly 4/5)   LLE Assessment   LLE Assessment WFL  (Grossly 4/5)   Light Touch   RLE Light Touch Grossly intact   LLE Light Touch Grossly intact   Bed Mobility   Supine to Sit 5  Supervision   Additional items HOB elevated   Transfers   Sit to Stand 5  Supervision   Stand to Sit 5   Supervision   Additional Comments from bed and toilet   Ambulation/Elevation   Gait pattern Decreased foot clearance;Inconsistent anais;Narrow JEFFRY  (reports hx hip fx and LLD - lateral sway, L lateral lean at times)   Gait Assistance 5  Supervision   Assistive Device None   Distance 300 ft   Balance   Static Sitting Normal   Dynamic Sitting Normal   Static Standing Fair +   Dynamic Standing Fair   Ambulatory Fair -   Activity Tolerance   Activity Tolerance Patient tolerated treatment well   Medical Staff Made Aware Co-evaluation with OT given medical complextiy   Nurse Made Aware RN updated. Chair alarm engaged at end of session   Assessment   Assessment Pt is a 69 y.o. female seen for PT evaluation s/p admit to [facility] on 1/17/2025. Pt was admitted with a primary dx of: Fall from ladder resulting in liver laceration, R 7-8 rib fx, T12 fx.  Pt evaluated by neurosurgery, TLSO as needed for comfort.  PT now consulted for assessment of mobility and d/c needs. Pt with Up in chair orders.  Pts current comorbidities and personal factors effecting treatment include: Follicular lymphoma, MGUS, Myopathy, Scleroderma, Raynaud's, Fibromyalgia. Pts current clinical presentation is Unstable/Unpredictable (high complexity) due to Ongoing medical management for primary dx, Fall risk, Increased assistance needed from caregiver at current time, Spinal precautions at current time. Prior to admission, pt was independent without AD. Upon evaluation, pt currently is requiring Supervision for bed mobility; Supervision for transfers and Supervision for ambulation 300 ft w/ no AD. Pt denies back pain throughout activity, 1/10 R rib pain unchanged with activity.  Pt educated on spinal precautions, TLSO as needed for back pain, however given presence of rib fx's and no pain with activity, TLSO not utilized this session.  Pt educated on home safety, decreased pace with activity, verbalized understanding.  No further acute PT  needs at this time.  Recommend ambulation 2-3x/day with nursing staff during hospital stay.  At conclusion of PT session pt returned back in chair and chair alarm engaged with phone and call bell within reach. Pt denies any further questions at this time. Recommend Level III (Minimum Resource Intensity)  upon hospital D/C.   Goals   Patient Goals to go home   Plan   PT Frequency Other (Comment)  (one time evaluation)   Discharge Recommendation   Rehab Resource Intensity Level, PT III (Minimum Resource Intensity)   AM-PAC Basic Mobility Inpatient   Turning in Flat Bed Without Bedrails 4   Lying on Back to Sitting on Edge of Flat Bed Without Bedrails 4   Moving Bed to Chair 3   Standing Up From Chair Using Arms 3   Walk in Room 3   Climb 3-5 Stairs With Railing 3   Basic Mobility Inpatient Raw Score 20   Basic Mobility Standardized Score 43.99   [facility] Highest Level Of Mobility   -HLM Goal 6: Walk 10 steps or more   JH-HLM Achieved 8: Walk 250 feet ot more         Roxanna Cleveland, PT, DPT, GCS

## 2025-01-19 NOTE — PLAN OF CARE
Problem: PAIN - ADULT  Goal: Verbalizes/displays adequate comfort level or baseline comfort level  Description: Interventions:  - Encourage patient to monitor pain and request assistance  - Assess pain using appropriate pain scale  - Administer analgesics based on type and severity of pain and evaluate response  - Implement non-pharmacological measures as appropriate and evaluate response  - Consider cultural and social influences on pain and pain management  - Notify physician/advanced practitioner if interventions unsuccessful or patient reports new pain  Outcome: Progressing     Problem: INFECTION - ADULT  Goal: Absence or prevention of progression during hospitalization  Description: INTERVENTIONS:  - Assess and monitor for signs and symptoms of infection  - Monitor lab/diagnostic results  - Monitor all insertion sites, i.e. indwelling lines, tubes, and drains  - Monitor endotracheal if appropriate and nasal secretions for changes in amount and color  - [place] appropriate cooling/warming therapies per order  - Administer medications as ordered  - Instruct and encourage patient and family to use good hand hygiene technique  - Identify and instruct in appropriate isolation precautions for identified infection/condition  Outcome: Progressing  Goal: Absence of fever/infection during neutropenic period  Description: INTERVENTIONS:  - Monitor WBC    Outcome: Progressing     Problem: DISCHARGE PLANNING  Goal: Discharge to home or other facility with appropriate resources  Description: INTERVENTIONS:  - Identify barriers to discharge w/patient and caregiver  - Arrange for needed discharge resources and transportation as appropriate  - Identify discharge learning needs (meds, wound care, etc.)  - Arrange for interpretive services to assist at discharge as needed  - Refer to Case Management Department for coordinating discharge planning if the patient needs post-hospital services based on physician/advanced  practitioner order or complex needs related to functional status, cognitive ability, or social support system  Outcome: Progressing     Problem: Knowledge Deficit  Goal: Patient/family/caregiver demonstrates understanding of disease process, treatment plan, medications, and discharge instructions  Description: Complete learning assessment and assess knowledge base.  Interventions:  - Provide teaching at level of understanding  - Provide teaching via preferred learning methods  Outcome: Progressing     Problem: NEUROSENSORY - ADULT  Goal: Achieves stable or improved neurological status  Description: INTERVENTIONS  - Monitor and report changes in neurological status  - Monitor vital signs such as temperature, blood pressure, glucose, and any other labs ordered   - Initiate measures to prevent increased intracranial pressure  - Monitor for seizure activity and implement precautions if appropriate      Outcome: Progressing  Goal: Achieves maximal functionality and self care  Description: INTERVENTIONS  - Monitor swallowing and airway patency with patient fatigue and changes in neurological status  - Encourage and assist patient to increase activity and self care.   - Encourage visually impaired, hearing impaired and aphasic patients to use assistive/communication devices  Outcome: Progressing     Problem: RESPIRATORY - ADULT  Goal: Achieves optimal ventilation and oxygenation  Description: INTERVENTIONS:  - Assess for changes in respiratory status  - Assess for changes in mentation and behavior  - Position to facilitate oxygenation and minimize respiratory effort  - Oxygen administered by appropriate delivery if ordered  - Initiate smoking cessation education as indicated  - Encourage broncho-pulmonary hygiene including cough, deep breathe, Incentive Spirometry  - Assess the need for suctioning and aspirate as needed  - Assess and instruct to report SOB or any respiratory difficulty  - Respiratory Therapy support as  indicated  Outcome: Progressing     Problem: HEMATOLOGIC - ADULT  Goal: Maintains hematologic stability  Description: INTERVENTIONS  - Assess for signs and symptoms of bleeding or hemorrhage  - Monitor labs  - Administer supportive blood products/factors as ordered and appropriate  Outcome: Progressing     Problem: MUSCULOSKELETAL - ADULT  Goal: Maintain or return mobility to safest level of function  Description: INTERVENTIONS:  - Assess patient's ability to carry out ADLs; assess patient's baseline for ADL function and identify physical deficits which impact ability to perform ADLs (bathing, care of mouth/teeth, toileting, grooming, dressing, etc.)  - Assess/evaluate cause of self-care deficits   - Assess range of motion  - Assess patient's mobility  - Assess patient's need for assistive devices and provide as appropriate  - Encourage maximum independence but intervene and supervise when necessary  - Involve family in performance of ADLs  - Assess for home care needs following discharge   - Consider OT consult to assist with ADL evaluation and planning for discharge  - Provide patient education as appropriate  Outcome: Progressing  Goal: Maintain proper alignment of affected body part  Description: INTERVENTIONS:  - Support, maintain and protect limb and body alignment  - Provide patient/ family with appropriate education  Outcome: Progressing

## 2025-01-19 NOTE — PLAN OF CARE
Problem: OCCUPATIONAL THERAPY ADULT  Goal: Performs self-care activities at highest level of function for planned discharge setting.  See evaluation for individualized goals.  Description:            See flowsheet documentation for full assessment, interventions and recommendations.   1/19/2025 1139 by Kate Laureano OT  Outcome: Progressing  Note:    Prognosis: Good  Assessment: Pt is a 68 y/o female that was admitted to [facility] 1/17/2025 with a fall. Pt with spinal precautions and TLSO for comfort. Pt with active OT orders and activity orders. Pt  has a past medical history of ADHD, Allergic, Anemia, Anxiety, Arthritis, Cancer (HCC), Cellulitis of foot, Change in mental status, Chronic fatigue, Depression, Disease of thyroid gland, Diverticulitis of colon, PLAZA (dyspnea on exertion), Fibromyalgia, Folliculitis, Fractures, GERD (gastroesophageal reflux disease), Headache(784.0), Inflammatory bowel disease, Memory loss, Non Hodgkin's lymphoma (HCC), Osteopenia, Osteoporosis, Raynaud's disease, Rheumatic disease, Scleroderma (HCC), Stomach disorder, Systemic sclerosis (HCC), Thyroid cancer ([facility]), and Visual impairment. Pt lives alone in a one level apartment with elevator access, raised toilet with grab bars and walk in shower with grab bars and shower chair. Pt reports using no AD at baseline. Prior to admission pt (I) ADLs, IADLs, and functional mobility. Pt currently MOD IND to supervision to complete ADLs, functional transfers and functional mobility without AD. Pt supine in bed at begning of session, pt seated in bedside chair at end of session with alarm set and items within reach. The patient's raw score on the AM-PAC Daily Activity Inpatient Short Form is 21. A raw score of greater than or equal to 19 suggests the patient may benefit from discharge to home. Please refer to the recommendation of the Occupational Therapist for safe discharge planning. Pt appears to be functioning at/close to  baseline, further OT services not indicated at this time. Will d/c OT services, please re-consult if OT needs arise.     Rehab Resource Intensity Level, OT: No post-acute rehabilitation needs       1/19/2025 1139 by Kate Laureano OT  Note:    Prognosis: Good  Assessment: Pt is a 68 y/o female that was admitted to [facility] 1/17/2025 with a fall. Pt with spinal precautions and TLSO for comfort. Pt with active OT orders and activity orders. Pt  has a past medical history of ADHD, Allergic, Anemia, Anxiety, Arthritis, Cancer (HCC), Cellulitis of foot, Change in mental status, Chronic fatigue, Depression, Disease of thyroid gland, Diverticulitis of colon, PLAZA (dyspnea on exertion), Fibromyalgia, Folliculitis, Fractures, GERD (gastroesophageal reflux disease), Headache(784.0), Inflammatory bowel disease, Memory loss, Non Hodgkin's lymphoma (HCC), Osteopenia, Osteoporosis, Raynaud's disease, Rheumatic disease, Scleroderma (HCC), Stomach disorder, Systemic sclerosis (HCC), Thyroid cancer (HCC), and Visual impairment. Pt lives alone in a one level apartment with elevator access, raised toilet with grab bars and walk in shower with grab bars and shower chair. Pt reports using no AD at baseline. Prior to admission pt (I) ADLs, IADLs, and functional mobility. Pt currently MOD IND to supervision to complete ADLs, functional transfers and functional mobility without AD. Pt supine in bed at begning of session, pt seated in bedside chair at end of session with alarm set and items within reach. The patient's raw score on the -PAC Daily Activity Inpatient Short Form is 21. A raw score of greater than or equal to 19 suggests the patient may benefit from discharge to home. Please refer to the recommendation of the Occupational Therapist for safe discharge planning. Pt appears to be functioning at/close to baseline, further OT services not indicated at this time. Will d/c OT services, please re-consult if OT needs arise.      Rehab Resource Intensity Level, OT: No post-acute rehabilitation needs

## 2025-01-19 NOTE — PROGRESS NOTES
Progress Note - Acute Pain   Name: Mimi Biggs 69 y.o. female I MRN: [id number]  Unit/Bed#: [facility] 616-01 I Date of Admission: 1/17/2025   Date of Service: 1/19/2025 I Hospital Day: 2    Assessment & Plan  Liver laceration, grade II, without open wound into cavity    Accidental fall from ladder    Closed fracture of multiple ribs of right side  S/p Rt. 7th & 8th rib fractures   Rib Fracture Evaluation:  Chest tube: NA  Respiratory Co-morbidities: General debility  SpO2:   SPO2 RA Rest      Flowsheet Row ED to Hosp-Admission (Current) from 1/17/2025 in [facility]   SpO2 95 %   SpO2 Activity At Rest   O2 Device None (Room air)   O2 Flow Rate --                                                                             Incentive Spirometer: Incentive Spirometry Achieved (mL): 1750 mL   Platelet Count:   Results from last 7 days   Lab Units 01/19/25  0508   PLATELETS Thousands/uL 209     Coags:   Results from last 7 days   Lab Units 01/18/25  0546   INR  1.04   PROTIME seconds 13.9     Home anticoagulants: None    VTE covered by:  heparin (porcine), Subcutaneous       Upon bedside evaluation, Mimi was resting in bed without acute distress. She reports significant improvement in pain after epidural placement on 1/18. No evidence of excessive sympathectomy-induced hypotension/pressor requirements/abnormal sensorimotor deficits. Continue epidural PCEA along with MMA. Able to inspire >2L inspirometry while being on RA    Plan:  Continue thoracic epidural 0.1% ropivacaine/2mcg/ml fentanyl @6/4/10/3  Continue PO oxycodone 2.5/5mg q4hr PRN for mod-severe pain, IV dilaudid 0.2mg q2hr PRN for breakthrough  Continue other MMA  PO Tylenol 650mg q6hr ruthie  Lidoderm  Lyrica 75mg BID  Robaxin 500mg q6hr ruthie  Encourage PT/OT, OOB      Closed wedge compression fracture of T12 vertebra (HCC)    Bladder wall thickening      APS will continue to follow. Please contact Acute Pain Service - via SecureChat from  [phone] with additional questions or concerns. See [employer] or [employer] for additional contacts and after hours information.     Subjective   Mimi Biggs is a 69 y.o. female who presents after fall with Rt. Sided rib fractures and other injuries. APS team consulted for rib fracture pain. A thoracic epidural was placed on 1/18 for post-traumatic analgesia.    Pain History  Current pain location(s):  Pain Score: 1  Pain Location/Orientation: Orientation: Left, Location: Rib Cage  Pain Scale: Pain Assessment Tool: 0-10  24 hour history: See above    Opioid requirement previous 24 hours: Oxycodone 5mg  Meds/Allergies   all current active meds have been reviewed  Allergies   Allergen Reactions    Thiazide-Type Diuretics Other (See Comments)     Hyponatremia    Duloxetine Other (See Comments)     Mental psychosis    Alendronate GI Intolerance, Myalgia, Nausea Only and Other (See Comments)    Revatio [Sildenafil] Other (See Comments)     mental status changes    Savella [Milnacipran] Other (See Comments)     Feeling out of it    Sulfamethoxazole-Trimethoprim Rash and GI Intolerance    Tedizolid Rash     Objective :  Temp:  [97.6 °F (36.4 °C)-100.3 °F (37.9 °C)] 97.6 °F (36.4 °C)  HR:  [66-76] 66  BP: ()/(38-57) 98/52  Resp:  [16-18] 16  SpO2:  [95 %-97 %] 96 %  O2 Device: None (Room air)    Physical Exam  Constitutional:       Appearance: Normal appearance.   HENT:      Head: Normocephalic and atraumatic.      Mouth/Throat:      Mouth: Mucous membranes are moist.   Eyes:      Pupils: Pupils are equal, round, and reactive to light.   Cardiovascular:      Rate and Rhythm: Regular rhythm.      Pulses: Normal pulses.   Pulmonary:      Effort: Pulmonary effort is normal.   Chest:      Chest wall: Tenderness (Right) present.   Abdominal:      Palpations: Abdomen is soft.   Skin:     General: Skin is dry.   Neurological:      General: No focal deficit present.      Mental Status: She is alert and oriented to person,  place, and time.   Psychiatric:         Mood and Affect: Mood normal.      Epidural: Site clean/dry/intact, no surrounding erythema/edema/induration, infusion functioning appropriately        Lab Results: I have reviewed the following results:  Estimated Creatinine Clearance: 42.3 mL/min (by C-G formula based on SCr of 0.86 mg/dL).  Lab Results   Component Value Date    WBC 4.38 01/19/2025    HGB 11.3 (L) 01/19/2025    HCT 36.2 01/19/2025     01/19/2025         Component Value Date/Time    K 4.7 01/19/2025 0508    K 4.7 12/23/2024 0849     01/19/2025 0508     12/23/2024 0849    CO2 31 01/19/2025 0508    CO2 32 (H) 12/23/2024 0849    BUN 14 01/19/2025 0508    BUN 16 12/23/2024 0849    CREATININE 0.86 01/19/2025 0508    CREATININE 0.73 12/23/2024 0849         Component Value Date/Time    CALCIUM 8.9 01/19/2025 0508    CALCIUM 9.2 12/23/2024 0849    ALKPHOS 61 01/18/2025 0546    ALKPHOS 117 12/23/2024 0849    AST 23 01/18/2025 0546    AST 15 12/23/2024 0849    ALT 31 01/18/2025 0546    ALT 13 12/23/2024 0849    TP 5.4 (L) 01/18/2025 0546    TP 5.7 (L) 12/23/2024 0849    ALB 3.7 01/18/2025 0546    ALB 4.1 12/23/2024 0849    ALB 4.2 06/26/2024 0839       Imaging Results Review: No pertinent imaging studies reviewed.  Other Study Results Review: No additional pertinent studies reviewed.

## 2025-01-19 NOTE — OCCUPATIONAL THERAPY NOTE
Occupational Therapy Evaluation     Patient Name: Mimi Biggs  Today's Date: 1/19/2025  Problem List  Principal Problem:    Liver laceration, grade II, without open wound into cavity  Active Problems:    Accidental fall from ladder    Closed fracture of multiple ribs of right side    Closed wedge compression fracture of T12 vertebra (HCC)    Bladder wall thickening    Past Medical History  Past Medical History:   Diagnosis Date    ADHD     Allergic 3-2020    Bactrim    Anemia 2018    Latest blood work OK    Anxiety Since diagnoised 2003    Intermittent related to my life trying to move. My illness    Arthritis     Very bad especially Right knee    Cancer (HCC) 7-    Cut the Lymph Node Out & thyroid out    Cellulitis of foot     Last assessed 10/24/16    Change in mental status     Last assessed 12/01/15    Chronic fatigue     Depression 2003    I am on meds. Feel good    Disease of thyroid gland     Cancer Surgical Removal total Thyroid    Diverticulitis of colon Last Egd    Diverticlum    PLAZA (dyspnea on exertion)     Last assessed 12/01/15    Fibromyalgia     Folliculitis     Last assessed 10/06/14    Fractures     GERD (gastroesophageal reflux disease)     Headache(784.0) 3-2020    In front of head    Inflammatory bowel disease 1990’s    Memory loss 2010    New disease neurogentic changes Necrotizing Myopathy    Non Hodgkin's lymphoma (HCC) 07/13/2012    Osteopenia     Osteoporosis 6-2019    Broke Pelvis -left and right pubic ramus,fractured Sacrum    Raynaud's disease     Rheumatic disease     Scleroderma (HCC)     Stomach disorder     Systemic sclerosis (HCC)     Thyroid cancer (HCC) 10/04/2018    Visual impairment 2021    Need new glasses     Past Surgical History  Past Surgical History:   Procedure Laterality Date    BACK SURGERY      BLADDER SURGERY      BONE MARROW BIOPSY      CARDIAC CATHETERIZATION      EYE SURGERY  Jan 1010    Correct angles in both eyes    HEMORRHOID SURGERY       HYSTERECTOMY  02/04/2004    KNEE SURGERY      LYMPH NODE BIOPSY  7-     Non Hodgkins Lymphoma    NASAL SEPTUM SURGERY      NECK SURGERY      OOPHORECTOMY Bilateral 02/04/2004    ROTATOR CUFF REPAIR      SHOULDER SURGERY      SPINE SURGERY  12-    Failed microdisectomy L5 S1    TENDON REPAIR      THYROIDECTOMY N/A 10/04/2018    Procedure: TOTAL THYROIDECTOMY;  Surgeon: Roger Ortiz MD;  Location: [facility];  Service: Surgical Oncology    TONSILLECTOMY      TOTAL THYROIDECTOMY      WRIST SURGERY           01/19/25 0911   OT Last Visit   OT Visit Date 01/19/25   Note Type   Note type Evaluation   Pain Assessment   Pain Assessment Tool 0-10   Pain Score 1   Pain Location/Orientation Location: Rib Cage   Hospital Pain Intervention(s) Repositioned;Ambulation/increased activity   Restrictions/Precautions   Weight Bearing Precautions Per Order No   Braces or Orthoses TLSO  (for comfort per neurosurgery)   Other Precautions Chair Alarm;Bed Alarm;Fall Risk;Spinal precautions   Home Living   Type of Home Apartment   Home Layout One level;Elevator  (0 SPEEDY)   Bathroom Shower/Tub Walk-in shower   Bathroom Toilet Raised   Bathroom Equipment Grab bars in shower;Shower chair;Grab bars around toilet   Home Equipment   (denies)   Prior Function   Level of [place] Independent with ADLs;Independent with functional mobility;Independent with IADLS   Lives With Alone   Receives Help From Family   IADLs Independent with driving;Independent with meal prep;Independent with medication management   Falls in the last 6 months 1 to 4  (2 falls)   Vocational Retired   Lifestyle   Autonomy Pt reports (I) with ADLs, IADLs, and functional mobility without AD at baseline. Pt +  and retired   Reciprocal Relationships family   Service to Others retired   ADL   Where Assessed Edge of bed   Eating Assistance 6  Modified independent   Grooming Assistance 6  Modified Independent   UB Bathing Assistance 6  Modified  Independent   LB Bathing Assistance 5  Supervision/Setup   UB Dressing Assistance 6  Modified independent   LB Dressing Assistance 5  Supervision/Setup   Toileting Assistance  5  Supervision/Setup   Functional Assistance 5  Supervision/Setup   Bed Mobility   Supine to Sit 5  Supervision   Additional items Increased time required;Verbal cues   Transfers   Sit to Stand 5  Supervision   Additional items Increased time required;Verbal cues   Stand to Sit 5  Supervision   Additional items Increased time required;Verbal cues   Additional Comments no AD   Functional Mobility   Functional Mobility 5  Supervision   Additional Comments Pt requires supervision for safety to ambulate household distances without AD   Balance   Static Sitting Good   Dynamic Sitting Good   Static Standing Fair +   Dynamic Standing Fair   Ambulatory Fair   Activity Tolerance   Activity Tolerance Patient tolerated treatment well   Medical Staff Made Aware PT   Nurse Made Aware RN Cleared   RUE Assessment   RUE Assessment WFL   LUE Assessment   LUE Assessment WFL   Hand Function   Gross Motor Coordination Functional   Fine Motor Coordination Functional   Cognition   Overall Cognitive Status WFL   Arousal/Participation Alert;Responsive;Cooperative   Attention Within functional limits   Orientation Level Oriented X4   Memory Within functional limits   Following Commands Follows all commands and directions without difficulty   Comments Pt agreeable to therapy, requires cues for precautions   Assessment   Prognosis Good   Assessment Pt is a 70 y/o female that was admitted to [facility] 1/17/2025 with a fall. Pt with spinal precautions and TLSO for comfort. Pt with active OT orders and activity orders. Pt  has a past medical history of ADHD, Allergic, Anemia, Anxiety, Arthritis, Cancer (HCC), Cellulitis of foot, Change in mental status, Chronic fatigue, Depression, Disease of thyroid gland, Diverticulitis of colon, PLAZA (dyspnea on exertion),  Fibromyalgia, Folliculitis, Fractures, GERD (gastroesophageal reflux disease), Headache(784.0), Inflammatory bowel disease, Memory loss, Non Hodgkin's lymphoma (HCC), Osteopenia, Osteoporosis, Raynaud's disease, Rheumatic disease, Scleroderma (HCC), Stomach disorder, Systemic sclerosis (HCC), Thyroid cancer (HCC), and Visual impairment. Pt lives alone in a one level apartment with elevator access, raised toilet with grab bars and walk in shower with grab bars and shower chair. Pt reports using no AD at baseline. Prior to admission pt (I) ADLs, IADLs, and functional mobility. Pt currently MOD IND to supervision to complete ADLs, functional transfers and functional mobility without AD. Pt supine in bed at begning of session, pt seated in bedside chair at end of session with alarm set and items within reach. The patient's raw score on the AM-PAC Daily Activity Inpatient Short Form is 21. A raw score of greater than or equal to 19 suggests the patient may benefit from discharge to home. Please refer to the recommendation of the Occupational Therapist for safe discharge planning. Pt appears to be functioning at/close to baseline, further OT services not indicated at this time. Will d/c OT services, please re-consult if OT needs arise.   Goals   Patient Goals to go home   Plan   OT Frequency Eval only   Discharge Recommendation   Rehab Resource Intensity Level, OT No post-acute rehabilitation needs   AM-PAC Daily Activity Inpatient   Lower Body Dressing 3   Bathing 3   Toileting 3   Upper Body Dressing 4   Grooming 4   Eating 4   Daily Activity Raw Score 21   Daily Activity Standardized Score (Calc for Raw Score >=11) 44.27   AM-PAC Applied Cognition Inpatient   Following a Speech/Presentation 3   Understanding Ordinary Conversation 4   Taking Medications 4   Remembering Where Things Are Placed or Put Away 4   Remembering List of 4-5 Errands 3   Taking Care of Complicated Tasks 3   Applied Cognition Raw Score 21   Applied  Cognition Standardized Score 44.3   End of Consult   Education Provided Yes   Patient Position at End of Consult Bedside chair;Bed/Chair alarm activated;All needs within reach   Nurse Communication Nurse aware of consult     CINDY Ruiz, OTR/L

## 2025-01-20 ENCOUNTER — TELEPHONE (OUTPATIENT)
Age: 70
End: 2025-01-20

## 2025-01-20 LAB
ERYTHROCYTE [DISTWIDTH] IN BLOOD BY AUTOMATED COUNT: 13.2 % (ref 11.6–15.1)
HCT VFR BLD AUTO: 37.5 % (ref 34.8–46.1)
HGB BLD-MCNC: 11.9 G/DL (ref 11.5–15.4)
MCH RBC QN AUTO: 28.5 PG (ref 26.8–34.3)
MCHC RBC AUTO-ENTMCNC: 31.7 G/DL (ref 31.4–37.4)
MCV RBC AUTO: 90 FL (ref 82–98)
PLATELET # BLD AUTO: 215 THOUSANDS/UL (ref 149–390)
PMV BLD AUTO: 9.6 FL (ref 8.9–12.7)
RBC # BLD AUTO: 4.18 MILLION/UL (ref 3.81–5.12)
WBC # BLD AUTO: 4.08 THOUSAND/UL (ref 4.31–10.16)

## 2025-01-20 PROCEDURE — 99223 1ST HOSP IP/OBS HIGH 75: CPT | Performed by: INTERNAL MEDICINE

## 2025-01-20 PROCEDURE — 85027 COMPLETE CBC AUTOMATED: CPT

## 2025-01-20 PROCEDURE — 99232 SBSQ HOSP IP/OBS MODERATE 35: CPT | Performed by: ANESTHESIOLOGY

## 2025-01-20 PROCEDURE — 99231 SBSQ HOSP IP/OBS SF/LOW 25: CPT | Performed by: SURGERY

## 2025-01-20 RX ORDER — POLYETHYLENE GLYCOL 3350 17 G/17G
17 POWDER, FOR SOLUTION ORAL DAILY PRN
Status: DISCONTINUED | OUTPATIENT
Start: 2025-01-20 | End: 2025-01-22

## 2025-01-20 RX ORDER — EZETIMIBE 10 MG/1
10 TABLET ORAL
Status: DISCONTINUED | OUTPATIENT
Start: 2025-01-20 | End: 2025-01-24 | Stop reason: HOSPADM

## 2025-01-20 RX ORDER — DESMOPRESSIN ACETATE 0.1 MG/1
0.2 TABLET ORAL
Status: DISCONTINUED | OUTPATIENT
Start: 2025-01-20 | End: 2025-01-24 | Stop reason: HOSPADM

## 2025-01-20 RX ORDER — BISACODYL 10 MG
10 SUPPOSITORY, RECTAL RECTAL DAILY PRN
Status: DISCONTINUED | OUTPATIENT
Start: 2025-01-20 | End: 2025-01-21

## 2025-01-20 RX ADMIN — HYDROXYCHLOROQUINE SULFATE 200 MG: 200 TABLET, FILM COATED ORAL at 08:54

## 2025-01-20 RX ADMIN — MYCOPHENOLIC ACID 360 MG: 180 TABLET, DELAYED RELEASE ORAL at 17:19

## 2025-01-20 RX ADMIN — PREGABALIN 75 MG: 75 CAPSULE ORAL at 17:19

## 2025-01-20 RX ADMIN — DOCUSATE SODIUM 100 MG: 100 CAPSULE, LIQUID FILLED ORAL at 08:52

## 2025-01-20 RX ADMIN — DEXTROAMPHETAMINE SACCHARATE, AMPHETAMINE ASPARTATE, DEXTROAMPHETAMINE SULFATE AND AMPHETAMINE SULFATE 15 MG: 2.5; 2.5; 2.5; 2.5 TABLET ORAL at 05:27

## 2025-01-20 RX ADMIN — METHOCARBAMOL 500 MG: 500 TABLET ORAL at 17:19

## 2025-01-20 RX ADMIN — PREGABALIN 75 MG: 75 CAPSULE ORAL at 08:52

## 2025-01-20 RX ADMIN — MYCOPHENOLIC ACID 360 MG: 180 TABLET, DELAYED RELEASE ORAL at 08:54

## 2025-01-20 RX ADMIN — FOLIC ACID 2 MG: 1 TABLET ORAL at 08:52

## 2025-01-20 RX ADMIN — METHOCARBAMOL 500 MG: 500 TABLET ORAL at 12:37

## 2025-01-20 RX ADMIN — METHOCARBAMOL 500 MG: 500 TABLET ORAL at 05:27

## 2025-01-20 RX ADMIN — ROPIVACAINE HYDROCHLORIDE: 5 INJECTION EPIDURAL; INFILTRATION; PERINEURAL at 23:33

## 2025-01-20 RX ADMIN — METHOCARBAMOL 500 MG: 500 TABLET ORAL at 23:33

## 2025-01-20 RX ADMIN — PANTOPRAZOLE SODIUM 40 MG: 40 TABLET, DELAYED RELEASE ORAL at 05:27

## 2025-01-20 RX ADMIN — ACETAMINOPHEN 650 MG: 325 TABLET, FILM COATED ORAL at 05:27

## 2025-01-20 RX ADMIN — HEPARIN SODIUM 5000 UNITS: 5000 INJECTION INTRAVENOUS; SUBCUTANEOUS at 12:37

## 2025-01-20 RX ADMIN — ACETAMINOPHEN 650 MG: 325 TABLET, FILM COATED ORAL at 23:33

## 2025-01-20 RX ADMIN — DIPHENHYDRAMINE HCL 12.5 MG: 25 TABLET ORAL at 22:02

## 2025-01-20 RX ADMIN — LEVOTHYROXINE SODIUM 150 MCG: 75 TABLET ORAL at 08:52

## 2025-01-20 RX ADMIN — EZETIMIBE 10 MG: 10 TABLET ORAL at 21:59

## 2025-01-20 RX ADMIN — ACETAMINOPHEN 650 MG: 325 TABLET, FILM COATED ORAL at 17:19

## 2025-01-20 RX ADMIN — Medication 1 TABLET: at 08:52

## 2025-01-20 RX ADMIN — BUPROPION HYDROCHLORIDE 300 MG: 150 TABLET, EXTENDED RELEASE ORAL at 08:52

## 2025-01-20 RX ADMIN — HEPARIN SODIUM 5000 UNITS: 5000 INJECTION INTRAVENOUS; SUBCUTANEOUS at 21:59

## 2025-01-20 RX ADMIN — TRAZODONE HYDROCHLORIDE 150 MG: 100 TABLET ORAL at 21:59

## 2025-01-20 RX ADMIN — HEPARIN SODIUM 5000 UNITS: 5000 INJECTION INTRAVENOUS; SUBCUTANEOUS at 05:27

## 2025-01-20 RX ADMIN — ACETAMINOPHEN 650 MG: 325 TABLET, FILM COATED ORAL at 12:37

## 2025-01-20 NOTE — PROGRESS NOTES
Progress Note - Trauma   Name: Mimi Biggs 69 y.o. female I MRN: [id number]  Unit/Bed#: [facility] 616-01 I Date of Admission: 1/17/2025   Date of Service: 1/20/2025 I Hospital Day: 3     Assessment & Plan  Liver laceration, grade II, without open wound into cavity  - Hemorrhage of a posterior right lobe cyst both internally and into the adjacent parenchyma and subcapsular space secondary to a grade 2 liver laceration.  - Serial abdominal exams  - Trend H/H   - Monitor Vitals  - Repeat scan + transfuse as needed for acute change in exam or rapid drop in Hgb  - Regular diet  - Pain control as below  - Surgical Oncology consultation obtained  - Recommendation for trend H/H, no acute surgical intervention required  Accidental fall from ladder  - Status post fall off ladder 1/16 and hit a dresser on the way down with the below noted injuries.  - No head strike, LOC, or use of AC/AP medications  - Fall precautions.  - Geriatric Medicine consultation for evaluation, medication review and recommendations.  - PT and OT evaluation and treatment as indicated.  - Case Management consultation for disposition planning.    Closed fracture of multiple ribs of right side  - Acute nondisplaced fractures of the right lateral seventh and eighth ribs, present on admission.  - Continue rib fracture protocol.  - Continue to encourage incentive spirometer use and adequate pulmonary hygiene.    - PIC score   - Appreciate APS evaluation and recommendations.  - Continue multimodal analgesic regimen.  - Supplemental oxygen via nasal cannula as needed to maintain saturations greater than or equal to 94%.  - PT and OT evaluation and treatment as indicated.  - Outpatient follow-up in the trauma clinic for re-evaluation in approximately 2 weeks.  - Multimodal pain control including Epidural pump  - APS consulted, recs appreciated    Closed wedge compression fracture of T12 vertebra (HCC)  T12 compression fracture, likely subacute as there is due  "appear to be subtle changes along the superior endplate on the recent prior study. There has been significant progression from the prior study and correlation with tenderness in this region is recommended.  - Pt w/ no new neurological deficits, ambulatory since the event  - Upright Xrays pending official read  - Appreciate Neurosurgical evaluation  - TSLO brace as needed for comfort  - Thoracolumbar spinal precautions  - Regular neuro exams  - Pain control as above  Bladder wall thickening  - CT CAP  \"Irregular bladder wall thickening without adjacent stranding. This is unlikely acute cystitis though was not present previously and clinical correlation for urinary tract symptoms recommended.\"  - UA pending    Bowel Regimen: Colace  VTE Prophylaxis:Heparin     Disposition: Continue current level of care, APS consultation for acute pain management     24 Hour Events : No acute overnight events  Subjective : Patient reports that with her pain pump, her pain is well-controlled, she is able to move much more easily    Objective :  Temp:  [98 °F (36.7 °C)-99 °F (37.2 °C)] 98.1 °F (36.7 °C)  HR:  [67-76] 70  BP: ()/(47-57) 99/49  Resp:  [16-17] 17  SpO2:  [93 %-97 %] 95 %  O2 Device: None (Room air)    I/O         01/18 0701  01/19 0700 01/19 0701  01/20 0700 01/20 0701  01/21 0700    P.O. 420 1824 240    I.V. (mL/kg) 36.5 (0.8) 249.3 (5.7)     Total Intake(mL/kg) 456.5 (10.4) 2073.3 (47.1) 240 (5.5)    Urine (mL/kg/hr) 1800 (1.7) 2350 (2.2) 500 (3.3)    Stool  0 0    Total Output 1800 2350 500    Net -1343.5 -276.8 -260           Unmeasured Urine Occurrence 1 x      Unmeasured Stool Occurrence  1 x 1 x          Lines/Drains/Airways       Active Status       Name Placement date Placement time Site Days    Epidural Catheter 01/18/25 01/18/25  1310  -- 1                  Physical Exam  Vitals and nursing note reviewed.   Constitutional:       General: She is not in acute distress.     Appearance: She is well-developed. " She is not ill-appearing or toxic-appearing.   HENT:      Head: Normocephalic and atraumatic.   Eyes:      Conjunctiva/sclera: Conjunctivae normal.   Cardiovascular:      Rate and Rhythm: Normal rate and regular rhythm.      Heart sounds: No murmur heard.  Pulmonary:      Effort: Pulmonary effort is normal. No respiratory distress.      Breath sounds: Normal breath sounds. No wheezing, rhonchi or rales.   Abdominal:      Palpations: Abdomen is soft.      Tenderness: There is no abdominal tenderness. There is no guarding or rebound.   Musculoskeletal:         General: Tenderness present. No swelling, deformity or signs of injury.      Cervical back: Neck supple.      Right lower leg: No edema.      Comments: Tender to palpation overlying the right lateral ribs   Skin:     General: Skin is warm and dry.      Capillary Refill: Capillary refill takes less than 2 seconds.      Coloration: Skin is not jaundiced.      Findings: No bruising, erythema or rash.   Neurological:      Mental Status: She is alert.   Psychiatric:         Mood and Affect: Mood normal.        PIC Score  PIC Pain Score: 3 (1/20/2025  6:52 AM)  PIC Incentive Spirometry Score: 3 (1/20/2025  4:00 AM)  PIC Cough Description: 2 (1/20/2025  4:00 AM)  PIC Total Score: 8 (1/20/2025  4:00 AM)       If the Total PIC Score </=5, did you consult APS and evaluate patient for further intervention?: yes      Pain:    Incentive Spirometry  Cough  3 = Controlled  4 = Above goal volume 3 = Strong  2 = Moderate  3 = Goal to alert volume 2 = Weak  1 = Severe  2 = Below alert volume 1 = Absent     1 = Unable to perform IS           Lab Results: I have reviewed the following results:  Recent Labs     01/18/25  0546 01/18/25  1413 01/19/25  0508   WBC 3.17*  --  4.38   HGB 11.0*   < > 11.3*   HCT 35.1   < > 36.2     --  209   SODIUM 140  --  138   K 4.3  --  4.7     --  103   CO2 29  --  31   BUN 15  --  14   CREATININE 0.77  --  0.86   GLUC 100  --  94   AST  23  --   --    ALT 31  --   --    ALB 3.7  --   --    TBILI 0.29  --   --    ALKPHOS 61  --   --    PTT 27  --   --    INR 1.04  --   --     < > = values in this interval not displayed.

## 2025-01-20 NOTE — CONSULTS
Consultation - Geriatric Medicine   Mimi Biggs 69 y.o. female MRN: [id number]  Unit/Bed#: [facility] 616-01 Encounter: [id number]      Assessment & Plan     Fall from stepladder   -reportedly mechanical fall day prior to admission   -(-) head strike (-) loss of consciousness  -injuries as outlined below  -Does not require use of assist device for ambulation at baseline  -endorses hx one additional mechanical fall in past six months   -high risk future falls due to age, hx of fall, deconditioning/debility and unfamiliar environment   -encourage good body mechanics and assist with all transfers  -keep personal items and call bell close to prevent reaching  -maintain environment free of fall hazards  -encourage appropriate footwear and adequate lighting at all times when out of bed  -consider home fall risk assessment and personal fall alert system on returning home  -PT and OT following     Multiple right sided rib fractures (7 and 8)  -s/p fall from step ladder as above   -noted on CT chest abd pelvis on admission   -currently saturating well on room air  -continue acute multimodal pain control - APS on consult   -encourage aggressive pulmonary toilet and ISS   -monitor resp status closely     T12 closed compression fracture   -noted on CT chest abd pelvis as outlined above, likely subacute per imaging report  -Nsx on consult, recommend TLSO brace as needed for comfort   -neurovascular checks per protocol  -continue acute multimodal pain control   -upright films completed, final read pending   -PT/OT following    Grade II liver laceration   -noted on admission imaging   -general surgery on consult and following, recommend close monitoring of H/H and serial abd exams, conservative management recommended at this time     Acute pain due to trauma   -continue acute multimodal pain control  Tylenol 650mg Q6H scheduled  Roxicodone 2.5mg Q4H PRN moderate pain  Roxicodone 5mg Q4H PRN severe pain  Dilaudid 0.2mg Q2H  PRN  -epidural analgesia in place as managed by APS, notes pruritus with epidural, consider slight increase in PRN benadryl dosing as long as not causing sedation or confusion   -encourage addition of non-pharmacologic pain treatment including ice and frequent repositioning as appropriate   -encourage bowel regimen to prevent and treat constipation due to increased risk with acute pain and opiate pain medications    Cognitive screening   -alert and oriented, denies memory or cognitive concerns   -reports independence with ADLs and iADLs at baseline  -MoCA 27/30 (4/6/22)  -maintained on levothyroxine chronically as o/p, B12   -at risk age related cognitive decline, encourage patient remain physically, socially, cognitively active and engaged to maintain cognitive acuity   -encourage use sensory assist devices such as glasses at all appropriate times to reduce risk of uncorrected sensory primary from contributing to isolation, confusion, encephalopathy and more precipitous cognitive decline    Hypothyroidism   -TSH WNL 3.42  -Continue home levothyroxine regimen and close outpatient follow-up with PCP for ongoing dose titration and medication management    Progressive scleroderma  -maintained on Plaquenil, mycophenolate, folic acid chronically as outpatient  -Continue close outpatient follow-up with PCP for ongoing management    Depression   -symptoms appear to be well controlled with home Wellbutrin, trazodone and xanax regimen   -although preferable to minimize xanax use in older adult population patient is on chronically as o/p and thus would not abruptly d/c due to risk withdrawal but do recommend hold parameters for oversedation   -patient reports excellent psychosocial support of neighbors and friends  -consider o/p referral to counseling/support group as part of comprehensive multimodal treatment approach   -continue close o/p f/u with PCP for ongoing monitoring and management     Impaired  Vision  -recommend use of corrective lenses at all appropriate times  -encourage adequate lighting and encourage use of assistance with ambulation  -keep personal belongings close to person to avoid reaching  -encourage appropriate footwear at all times  -Consider large font for printed materials provided to patient    Impaired mastication  -Requires use of dentures-encourage use all appropriate time  -ensure meal consistency appropriate for abilities  -continue aspiration precautions    Deconditioning/debility/frailty  -Clinical frailty scale stage V, mildly frail, progressive  -Multifactorial including age, progressive scleroderma, necrotizing myopathy, and multiple chronic medical co-morbidities in elderly individual with limited physiologic and metabolic reserves  -Continue optimization chronic conditions and address acute metabolic derangements as arise  -Encourage well-balanced nutritional intake  -Ensure underlying anxiety/mood/depression symptoms are well-controlled as may impact patient response to therapies as well as overall sense of wellbeing and quality of life  -Continue psychosocial support to patient and caregivers  -Continue to ensure that treatments and interventions align the patient's wishes and goals of care    Delirium precautions  -Patient is high risk of delirium due to age, fall, traumatic injuries, acute pain, hospitalization   -continue delirium precautions  -maintain normal sleep/wake cycle  -encourage overnight interruptions, group overnight vitals/labs/nursing checks as possible  -dim lights, close blinds and turn off tv to minimize stimulation and encourage sleep environment in evenings  -ensure that pain is well controlled   -monitor for fecal and urinary retention which may precipitate delirium  -encourage early mobilization and ambulation with assist when cleared to safely do so  -provide frequent reorientation and redirection as indicated and appropriate   -encourage family and  friends at the bedside to help help calm patient if anxious  -encourage hydration and nutrition   -redirect unwanted behaviors as first line    Home medication review     Xanax 0.25 Mg twice daily as needed   Adderall XR 30 Mg daily in morning  Adderall 10 Mg take 2 tabs daily at 2 PM  Dulcolax suppository daily as needed  Wellbutrin  Mg daily  Desmopressin 0.2 Mg daily at bedtime  Nexium 40 Mg daily  Zetia 10 Mg daily  Folic acid 2 Mg daily  Plaquenil 200 Mg daily  Levothyroxine 150 mcg daily  Mycophenolate 360 Mg BID  MiraLAX daily as needed  Lyrica 75 Mg twice daily  Trazodone 150 Mg at bedtime    PDMP check:  01/03/2025 01/03/2025 2 Alprazolam 0.25 Mg Tablet 60.00 30 Mi Ort [id number] Red (6591) 0 1.00 LME Comm Ins [place]   12/21/2024 12/20/2024 2 Dextroamp-Amphetamin 10 Mg Tab 60.00 30 Mi Ort [id number] Red (6591) 0  Comm Ins [place]   12/21/2024 12/20/2024 2 Dextroamp-Amphet Er 30 Mg Cap 30.00 30 Mi Ort [id number] Red (6591) 0  Comm Ins [place]   11/22/2024 11/12/2024 2 Dextroamp-Amphet Er 30 Mg Cap 30.00 30 Mi Ort [id number] Rit (0085) 0  Medicare [place]     Care coordination: rounded with Olga (RN)     History of Present Illness   Physician Requesting Consult: Antonio Bess MD  Reason for Consult / Principal Problem: Fall   Hx and PE limited by: N/A  Additional history obtained from: Chart review and patient evaluation    HPI: Mimi Biggs is a 69 y.o. year old female with thyroid cancer, progressive scleroderma, Sjogren's syndrome, pulm emphysema, insomnia, peripheral apathy, PAD, necrotizing myopathy, PAD, hypothyroidism, osteoporosis, and depression who is admitted to the Trauma service with mechanical fall from stepladder found to have multiple right sided rib fractures, T12 compression fracture and grade II liver laceration on admission imaging, she is being seen in consultation by Geriatrics for high risk developing delirium during hospitalization. Mimi is seen and examined at bedside where she is sitting resting, she  explains that she lost her balance on her stepstool when handing curtains at home and fell into the dresser with her right side. She denies head strike or loss of consciousness. She noted immediate soreness but did not feel she had any significant injuries and thus did not seek immediate medical attention however overnight the pain became severe prompting presentation to the ED where she was found to have above noted injuries. She reports since admission pain has been well controlled with epidural and current regimen and she is feeling otherwise in her usual state of health.     Prior to admission Mimi was residing home in her own apartment and notes being independent with ADLs and iADLs, denies memory or cognitive concerns. She denies use of assist device for ambulation at baseline or history of recurrent falls but does note one this past November which occurred when she was helping friends move and was carrying a box and the railing gave out causing her to fall, she notes mid to lower back pain since that time. She requires use of glasses and dentures.     Inpatient consult to Gerontology  Consult performed by: Meena Quesada DO  Consult ordered by: Eddie Murdock DO        Review of Systems   Constitutional: Negative.  Negative for appetite change, chills and fever.   HENT:  Positive for dental problem (dentures).    Eyes:  Positive for visual disturbance (glasses).   Respiratory: Negative.  Negative for shortness of breath.    Cardiovascular: Negative.    Gastrointestinal: Negative.  Negative for constipation.   Genitourinary: Negative.    Musculoskeletal:  Positive for back pain.        Right rib pain well controlled    Skin:         Diffuse itching    Neurological: Negative.  Negative for dizziness, light-headedness, numbness and headaches.   Hematological: Negative.    Psychiatric/Behavioral: Negative.  Negative for sleep disturbance.    All other systems reviewed and are negative.    Historical Information    Past Medical History:   Diagnosis Date    ADHD     Allergic 3-2020    Bactrim    Anemia 2018    Latest blood work OK    Anxiety Since diagnoised 2003    Intermittent related to my life trying to move. My illness    Arthritis     Very bad especially Right knee    Cancer (HCC) 7-    Cut the Lymph Node Out & thyroid out    Cellulitis of foot     Last assessed 10/24/16    Change in mental status     Last assessed 12/01/15    Chronic fatigue     Depression 2003    I am on meds. Feel good    Disease of thyroid gland     Cancer Surgical Removal total Thyroid    Diverticulitis of colon Last Egd    Diverticlum    PLAZA (dyspnea on exertion)     Last assessed 12/01/15    Fibromyalgia     Folliculitis     Last assessed 10/06/14    Fractures     GERD (gastroesophageal reflux disease)     Headache(784.0) 3-2020    In front of head    Inflammatory bowel disease 1990’s    Memory loss 2010    New disease neurogentic changes Necrotizing Myopathy    Non Hodgkin's lymphoma (HCC) 07/13/2012    Osteopenia     Osteoporosis 6-2019    Broke Pelvis -left and right pubic ramus,fractured Sacrum    Raynaud's disease     Rheumatic disease     Scleroderma (HCC)     Stomach disorder     Systemic sclerosis (HCC)     Thyroid cancer (HCC) 10/04/2018    Visual impairment 2021    Need new glasses     Past Surgical History:   Procedure Laterality Date    BACK SURGERY      BLADDER SURGERY      BONE MARROW BIOPSY      CARDIAC CATHETERIZATION      EYE SURGERY  Jan 1010    Correct angles in both eyes    HEMORRHOID SURGERY      HYSTERECTOMY  02/04/2004    KNEE SURGERY      LYMPH NODE BIOPSY  7-     Non Hodgkins Lymphoma    NASAL SEPTUM SURGERY      NECK SURGERY      OOPHORECTOMY Bilateral 02/04/2004    ROTATOR CUFF REPAIR      SHOULDER SURGERY      SPINE SURGERY  12-    Failed microdisectomy L5 S1    TENDON REPAIR      THYROIDECTOMY N/A 10/04/2018    Procedure: TOTAL THYROIDECTOMY;  Surgeon: Roger Ortiz MD;   Location: [facility];  Service: Surgical Oncology    TONSILLECTOMY      TOTAL THYROIDECTOMY      WRIST SURGERY       Social History   Social History     Substance and Sexual Activity   Alcohol Use Yes    Comment: I have a cocktail every month ,beer sometimes     Social History     Substance and Sexual Activity   Drug Use No     Social History     Tobacco Use   Smoking Status Former    Current packs/day: 0.25    Average packs/day: 0.3 packs/day for 5.2 years (1.3 ttl pk-yrs)    Types: Cigarettes   Smokeless Tobacco Never   Tobacco Comments    I quit cold turkey when I learned 2nd hand smoke     Family History:   Family History   Problem Relation Age of Onset    Arthritis Mother     Diabetes Mother     Hyperlipidemia Mother         Passed 10- Kidney- Heart Disease    Thyroid disease Mother         Benign lump removed    Autoimmune disease Mother     ADD / ADHD Mother     Osteoporosis Mother         1x week    Coronary artery disease Father     Hyperlipidemia Father          Massive Heart Attack    Arthritis Sister     Asthma Sister     Crohn's disease Sister     Autoimmune disease Sister     Depression Sister     Hyperlipidemia Sister         Very  bad Chrons Disease    Autoimmune disease Sister     Depression Sister     Hyperlipidemia Sister         Heart disease -Lupus like-Thyroid issues    Thyroid disease Sister         Seeing Endrocologist dosage for thyroid    Arthritis Sister         Having many issues this year    Autoimmune disease Sister     Diabetes Sister     Rheumatologic disease Sister     No Known Problems Daughter     No Known Problems Maternal Grandmother     No Known Problems Maternal Grandfather     No Known Problems Paternal Grandmother     No Known Problems Paternal Grandfather     Hyperlipidemia Brother         Heart Attacks,torn rotator and bicept . arthritis, knees    Autoimmune disease Brother     Hyperlipidemia Brother         Heart Attacks.bad kness,Arthrites    Autoimmune disease  Brother     Diabetes Brother     Ovarian cancer Maternal Aunt     Dementia Maternal Aunt             Cancer Maternal Aunt     No Known Problems Maternal Aunt     No Known Problems Maternal Aunt     Cancer Maternal Aunt     No Known Problems Maternal Aunt     No Known Problems Paternal Aunt     Cancer Maternal Aunt     Cancer Maternal Uncle     Cancer Maternal Uncle     Breast cancer Neg Hx     Endometrial cancer Neg Hx     Colon cancer Neg Hx     Breast cancer additional onset Neg Hx     BRCA 1/2 Neg Hx     BRCA2 Positive Neg Hx     BRCA2 Negative Neg Hx     BRCA1 Positive Neg Hx     BRCA1 Negative Neg Hx      Meds/Allergies   all current active meds have been reviewed    Allergies   Allergen Reactions    Thiazide-Type Diuretics Other (See Comments)     Hyponatremia    Duloxetine Other (See Comments)     Mental psychosis    Alendronate GI Intolerance, Myalgia, Nausea Only and Other (See Comments)    Revatio [Sildenafil] Other (See Comments)     mental status changes    Savella [Milnacipran] Other (See Comments)     Feeling out of it    Sulfamethoxazole-Trimethoprim Rash and GI Intolerance    Tedizolid Rash     Objective     Intake/Output Summary (Last 24 hours) at 2025 0754  Last data filed at 2025 0750  Gross per 24 hour   Intake 1989.1 ml   Output 2650 ml   Net -660.9 ml     Invasive Devices       Peripheral Intravenous Line  Duration             Peripheral IV 25 Right;Ventral (anterior) Forearm 2 days              Epidural Line  Duration             Epidural Catheter 25 1 day                  Physical Exam  Vitals and nursing note reviewed.   Constitutional:       General: She is not in acute distress.     Appearance: Normal appearance. She is not toxic-appearing.   HENT:      Head: Normocephalic.      Nose: Nose normal.      Mouth/Throat:      Mouth: Mucous membranes are moist.   Eyes:      General:         Right eye: No discharge.         Left eye: No discharge.       Conjunctiva/sclera: Conjunctivae normal.      Comments: Wearing glasses   Neck:      Comments: Trachea midline, phonation normal  Cardiovascular:      Rate and Rhythm: Normal rate and regular rhythm.   Pulmonary:      Effort: Pulmonary effort is normal. No respiratory distress.      Breath sounds: No wheezing.      Comments: Saturating well on room air   Abdominal:      General: There is no distension.      Palpations: Abdomen is soft.      Tenderness: There is no abdominal tenderness.   Musculoskeletal:      Cervical back: Neck supple.      Right lower leg: No edema.      Left lower leg: No edema.      Comments: Slightly reduced overall muscle mass    Skin:     General: Skin is warm and dry.   Neurological:      Mental Status: She is alert.      Comments: Awake and alert, oriented, answers questions appropriately   Psychiatric:         Mood and Affect: Mood normal.         Behavior: Behavior normal.       Lab Results:     I have personally reviewed pertinent lab results including the following:    Results from last 7 days   Lab Units 01/19/25  0508 01/18/25  1413 01/18/25  0546 01/17/25  1034   WBC Thousand/uL 4.38  --  3.17* 5.22   HEMOGLOBIN g/dL 11.3* 11.9 11.0* 11.3*   HEMATOCRIT % 36.2 36.2 35.1 34.7*   PLATELETS Thousands/uL 209  --  194 206   SEGS PCT % 41*  --   --  66   MONO PCT % 11  --   --  11   EOS PCT % 7*  --   --  4     Results from last 7 days   Lab Units 01/19/25  0508 01/18/25  0546 01/17/25  1034   POTASSIUM mmol/L 4.7 4.3 4.0   CHLORIDE mmol/L 103 105 102   CO2 mmol/L 31 29 27   BUN mg/dL 14 15 21   CREATININE mg/dL 0.86 0.77 0.77   CALCIUM mg/dL 8.9 8.6 8.9   ALK PHOS U/L  --  61 61   ALT U/L  --  31 36   AST U/L  --  23 31     I have personally reviewed the following imaging study reports in PACS:    1/17/25- CT chest abd pelvis     Therapies:   PT: following   OT: following     VTE Prophylaxis: Heparin    Code Status: Level 3 - DNAR and DNI  Advance Directive and Living Will:      Power of  :    POLST:      Family and Social Support: sisters    Goals of Care: recovery from acute injuries

## 2025-01-20 NOTE — PROGRESS NOTES
Progress Note - Acute Pain   Name: Mimi Biggs 69 y.o. female I MRN: [id number]  Unit/Bed#: [facility] 616-01 I Date of Admission: 1/17/2025   Date of Service: 1/20/2025 I Hospital Day: 3    Assessment & Plan  Liver laceration, grade II, without open wound into cavity    Accidental fall from ladder    Closed fracture of multiple ribs of right side  S/p Rt. 7th & 8th rib fractures   Epidural placed 1/18  Rib Fracture Evaluation:  Chest tube: NA  Respiratory Co-morbidities: General debility  SpO2:   SPO2 RA Rest      Flowsheet Row ED to Hosp-Admission (Current) from 1/17/2025 in [facility]   SpO2 95 %   SpO2 Activity At Rest   O2 Device None (Room air)   O2 Flow Rate --                                                                             Incentive Spirometer: Incentive Spirometry Achieved (mL): 2000 mL   Platelet Count:   Results from last 7 days   Lab Units 01/19/25  0508   PLATELETS Thousands/uL 209     Coags:   Results from last 7 days   Lab Units 01/18/25  0546   INR  1.04   PROTIME seconds 13.9     Home anticoagulants: None    VTE covered by:  heparin (porcine), Subcutaneous, 5,000 Units at 01/20/25 0527       Doing well with epidural. Discussed with patient a pause trial tomorrow AM to assess.    Plan:  Continue thoracic epidural 0.1% ropivacaine/2mcg/ml fentanyl @6/4/10/3  Continue PO oxycodone 2.5/5mg q4hr PRN for mod-severe pain, IV dilaudid 0.2mg q2hr PRN for breakthrough  Continue other MMA  PO Tylenol 650mg q6hr ruthie  Lidoderm  Lyrica 75mg BID  Robaxin 500mg q6hr ruthie  Encourage PT/OT, OOB      Closed wedge compression fracture of T12 vertebra (HCC)    Bladder wall thickening      APS will continue to follow. Please contact Acute Pain Service - via SecureChat from [phone] with additional questions or concerns. See SecureMinust or [employer] for additional contacts and after hours information.     Subjective   Mimi Biggs is a 69 y.o. female who presents after fall with Rt. Sided  rib fractures and other injuries. APS team consulted for rib fracture pain. A thoracic epidural was placed on 1/18 for post-traumatic analgesia.     Pain History  Current pain location(s):  Pain Score: 4  Pain Location/Orientation: Orientation: Right, Location: Rib Cage  Pain Scale: Pain Assessment Tool: 0-10  24 hour history: no acute events    Opioid requirement previous 24 hours: none  Meds/Allergies   all current active meds have been reviewed  Allergies   Allergen Reactions    Thiazide-Type Diuretics Other (See Comments)     Hyponatremia    Duloxetine Other (See Comments)     Mental psychosis    Alendronate GI Intolerance, Myalgia, Nausea Only and Other (See Comments)    Revatio [Sildenafil] Other (See Comments)     mental status changes    Savella [Milnacipran] Other (See Comments)     Feeling out of it    Sulfamethoxazole-Trimethoprim Rash and GI Intolerance    Tedizolid Rash     Objective :  Temp:  [98 °F (36.7 °C)-99 °F (37.2 °C)] 98.1 °F (36.7 °C)  HR:  [67-76] 70  BP: ()/(47-57) 99/49  Resp:  [16-17] 17  SpO2:  [93 %-97 %] 95 %  O2 Device: None (Room air)    Physical Exam  Constitutional:       General: She is not in acute distress.     Appearance: She is normal weight.   HENT:      Head: Normocephalic and atraumatic.      Nose: Nose normal.      Mouth/Throat:      Mouth: Mucous membranes are moist.   Cardiovascular:      Rate and Rhythm: Normal rate.   Chest:      Chest wall: Tenderness present.   Abdominal:      General: Abdomen is flat.   Musculoskeletal:         General: Normal range of motion.      Cervical back: Normal range of motion.   Skin:     General: Skin is warm.   Neurological:      General: No focal deficit present.      Mental Status: She is oriented to person, place, and time. Mental status is at baseline.   Psychiatric:         Mood and Affect: Mood normal.         Behavior: Behavior normal.         Thought Content: Thought content normal.         Judgment: Judgment normal.       Epidural: Site clean/dry/intact, no surrounding erythema/edema/induration, infusion functioning appropriately        Lab Results: I have reviewed the following results:  Estimated Creatinine Clearance: 42.3 mL/min (by C-G formula based on SCr of 0.86 mg/dL).  Lab Results   Component Value Date    WBC 4.38 01/19/2025    HGB 11.3 (L) 01/19/2025    HCT 36.2 01/19/2025     01/19/2025         Component Value Date/Time    K 4.7 01/19/2025 0508    K 4.7 12/23/2024 0849     01/19/2025 0508     12/23/2024 0849    CO2 31 01/19/2025 0508    CO2 32 (H) 12/23/2024 0849    BUN 14 01/19/2025 0508    BUN 16 12/23/2024 0849    CREATININE 0.86 01/19/2025 0508    CREATININE 0.73 12/23/2024 0849         Component Value Date/Time    CALCIUM 8.9 01/19/2025 0508    CALCIUM 9.2 12/23/2024 0849    ALKPHOS 61 01/18/2025 0546    ALKPHOS 117 12/23/2024 0849    AST 23 01/18/2025 0546    AST 15 12/23/2024 0849    ALT 31 01/18/2025 0546    ALT 13 12/23/2024 0849    TP 5.4 (L) 01/18/2025 0546    TP 5.7 (L) 12/23/2024 0849    ALB 3.7 01/18/2025 0546    ALB 4.1 12/23/2024 0849    ALB 4.2 06/26/2024 0839       Imaging Results Review: No pertinent imaging studies reviewed.  Other Study Results Review: No additional pertinent studies reviewed.

## 2025-01-20 NOTE — TELEPHONE ENCOUNTER
01/20/2025- PT IS IN [facility]  01/31/2025- Virtual 2 WK HFU W/ upright thoracolumbar x-rays W/ AP solo     CHAPARRO [facility]  Hi,  Can you please schedule this patient for a virtual 2-week hospital follow-up appointment with repeat upright thoracolumbar x-rays?  Appointment to be scheduled with an AP solo.  Thank you,  Laure

## 2025-01-20 NOTE — PLAN OF CARE
Problem: PAIN - ADULT  Goal: Verbalizes/displays adequate comfort level or baseline comfort level  Description: Interventions:  - Encourage patient to monitor pain and request assistance  - Assess pain using appropriate pain scale  - Administer analgesics based on type and severity of pain and evaluate response  - Implement non-pharmacological measures as appropriate and evaluate response  - Consider cultural and social influences on pain and pain management  - Notify physician/advanced practitioner if interventions unsuccessful or patient reports new pain  Outcome: Progressing     Problem: INFECTION - ADULT  Goal: Absence or prevention of progression during hospitalization  Description: INTERVENTIONS:  - Assess and monitor for signs and symptoms of infection  - Monitor lab/diagnostic results  - Monitor all insertion sites, i.e. indwelling lines, tubes, and drains  - Monitor endotracheal if appropriate and nasal secretions for changes in amount and color  - [place] appropriate cooling/warming therapies per order  - Administer medications as ordered  - Instruct and encourage patient and family to use good hand hygiene technique  - Identify and instruct in appropriate isolation precautions for identified infection/condition  Outcome: Progressing  Goal: Absence of fever/infection during neutropenic period  Description: INTERVENTIONS:  - Monitor WBC    Outcome: Progressing

## 2025-01-20 NOTE — ASSESSMENT & PLAN NOTE
S/p Rt. 7th & 8th rib fractures   Epidural placed 1/18  Rib Fracture Evaluation:  Chest tube: NA  Respiratory Co-morbidities: General debility  SpO2:   SPO2 RA Rest      Flowsheet Row ED to Hosp-Admission (Current) from 1/17/2025 in [facility] 6   SpO2 95 %   SpO2 Activity At Rest   O2 Device None (Room air)   O2 Flow Rate --                                                                             Incentive Spirometer: Incentive Spirometry Achieved (mL): 2000 mL   Platelet Count:   Results from last 7 days   Lab Units 01/19/25  0508   PLATELETS Thousands/uL 209     Coags:   Results from last 7 days   Lab Units 01/18/25  0546   INR  1.04   PROTIME seconds 13.9     Home anticoagulants: None    VTE covered by:  heparin (porcine), Subcutaneous, 5,000 Units at 01/20/25 0527       Doing well with epidural. Discussed with patient a pause trial tomorrow AM to assess.    Plan:  Continue thoracic epidural 0.1% ropivacaine/2mcg/ml fentanyl @6/4/10/3  Continue PO oxycodone 2.5/5mg q4hr PRN for mod-severe pain, IV dilaudid 0.2mg q2hr PRN for breakthrough  Continue other MMA  PO Tylenol 650mg q6hr ruthie  Lidoderm  Lyrica 75mg BID  Robaxin 500mg q6hr ruthie  Encourage PT/OT, OOB

## 2025-01-20 NOTE — PLAN OF CARE
Problem: PAIN - ADULT  Goal: Verbalizes/displays adequate comfort level or baseline comfort level  Description: Interventions:  - Encourage patient to monitor pain and request assistance  - Assess pain using appropriate pain scale  - Administer analgesics based on type and severity of pain and evaluate response  - Implement non-pharmacological measures as appropriate and evaluate response  - Consider cultural and social influences on pain and pain management  - Notify physician/advanced practitioner if interventions unsuccessful or patient reports new pain  Outcome: Progressing     Problem: INFECTION - ADULT  Goal: Absence or prevention of progression during hospitalization  Description: INTERVENTIONS:  - Assess and monitor for signs and symptoms of infection  - Monitor lab/diagnostic results  - Monitor all insertion sites, i.e. indwelling lines, tubes, and drains  - Monitor endotracheal if appropriate and nasal secretions for changes in amount and color  - [place] appropriate cooling/warming therapies per order  - Administer medications as ordered  - Instruct and encourage patient and family to use good hand hygiene technique  - Identify and instruct in appropriate isolation precautions for identified infection/condition  Outcome: Progressing  Goal: Absence of fever/infection during neutropenic period  Description: INTERVENTIONS:  - Monitor WBC    Outcome: Progressing     Problem: DISCHARGE PLANNING  Goal: Discharge to home or other facility with appropriate resources  Description: INTERVENTIONS:  - Identify barriers to discharge w/patient and caregiver  - Arrange for needed discharge resources and transportation as appropriate  - Identify discharge learning needs (meds, wound care, etc.)  - Arrange for interpretive services to assist at discharge as needed  - Refer to Case Management Department for coordinating discharge planning if the patient needs post-hospital services based on physician/advanced  practitioner order or complex needs related to functional status, cognitive ability, or social support system  Outcome: Progressing     Problem: Knowledge Deficit  Goal: Patient/family/caregiver demonstrates understanding of disease process, treatment plan, medications, and discharge instructions  Description: Complete learning assessment and assess knowledge base.  Interventions:  - Provide teaching at level of understanding  - Provide teaching via preferred learning methods  Outcome: Progressing     Problem: NEUROSENSORY - ADULT  Goal: Achieves stable or improved neurological status  Description: INTERVENTIONS  - Monitor and report changes in neurological status  - Monitor vital signs such as temperature, blood pressure, glucose, and any other labs ordered   - Initiate measures to prevent increased intracranial pressure  - Monitor for seizure activity and implement precautions if appropriate      Outcome: Progressing  Goal: Achieves maximal functionality and self care  Description: INTERVENTIONS  - Monitor swallowing and airway patency with patient fatigue and changes in neurological status  - Encourage and assist patient to increase activity and self care.   - Encourage visually impaired, hearing impaired and aphasic patients to use assistive/communication devices  Outcome: Progressing     Problem: RESPIRATORY - ADULT  Goal: Achieves optimal ventilation and oxygenation  Description: INTERVENTIONS:  - Assess for changes in respiratory status  - Assess for changes in mentation and behavior  - Position to facilitate oxygenation and minimize respiratory effort  - Oxygen administered by appropriate delivery if ordered  - Initiate smoking cessation education as indicated  - Encourage broncho-pulmonary hygiene including cough, deep breathe, Incentive Spirometry  - Assess the need for suctioning and aspirate as needed  - Assess and instruct to report SOB or any respiratory difficulty  - Respiratory Therapy support as  indicated  Outcome: Progressing     Problem: HEMATOLOGIC - ADULT  Goal: Maintains hematologic stability  Description: INTERVENTIONS  - Assess for signs and symptoms of bleeding or hemorrhage  - Monitor labs  - Administer supportive blood products/factors as ordered and appropriate  Outcome: Progressing     Problem: MUSCULOSKELETAL - ADULT  Goal: Maintain or return mobility to safest level of function  Description: INTERVENTIONS:  - Assess patient's ability to carry out ADLs; assess patient's baseline for ADL function and identify physical deficits which impact ability to perform ADLs (bathing, care of mouth/teeth, toileting, grooming, dressing, etc.)  - Assess/evaluate cause of self-care deficits   - Assess range of motion  - Assess patient's mobility  - Assess patient's need for assistive devices and provide as appropriate  - Encourage maximum independence but intervene and supervise when necessary  - Involve family in performance of ADLs  - Assess for home care needs following discharge   - Consider OT consult to assist with ADL evaluation and planning for discharge  - Provide patient education as appropriate  Outcome: Progressing  Goal: Maintain proper alignment of affected body part  Description: INTERVENTIONS:  - Support, maintain and protect limb and body alignment  - Provide patient/ family with appropriate education  Outcome: Progressing

## 2025-01-20 NOTE — CASE MANAGEMENT
Case Management Assessment & Discharge Planning Note    Patient name Mimi Biggs  Location [facility] 616/[facility] 616-01 MRN [id number]  : 1955 Date 2025       Current Admission Date: 2025  Current Admission Diagnosis:Liver laceration, grade II, without open wound into cavity   Patient Active Problem List    Diagnosis Date Noted Date Diagnosed    Accidental fall from ladder 2025     Liver laceration, grade II, without open wound into cavity 2025     Closed fracture of multiple ribs of right side 2025     Closed wedge compression fracture of T12 vertebra (HCC) 2025     Bladder wall thickening 2025     Closed fracture of left superior rim of pubis (formerly [facility]) 2024     Urge incontinence 2024     Rosacea 2024     B12 deficiency 2024     Peptic stricture of esophagus 2023     Attention deficit hyperactivity disorder (ADHD), predominantly inattentive type 2023     Post-nasal drip 2023     Primary insomnia 10/18/2022     Age-related osteoporosis without current pathological fracture 2022     High risk medication use 2022     Immunocompromised state (formerly [facility]) 2022     Hypogammaglobulinemia (formerly [facility]) 2021     PAD (peripheral artery disease) (formerly [facility]) 2021     Pulmonary emphysema (formerly [facility]) 2021     Necrotizing myopathy 2020     Vasomotor rhinitis 2019     Chronic pain 2019     Advanced care planning/counseling discussion 02/15/2019     Vitamin D deficiency 2018     Acquired hypothyroidism 2018     Osteoarthritis 10/17/2018     Raynaud's syndrome 10/17/2018     Rectal prolapse 10/17/2018     Sjogren's syndrome (HCC) 10/17/2018     Thyroid cancer (HCC) 10/17/2018     BRCA gene mutation positive 2018     Bilateral hand numbness 2018     Methotrexate, long term, current use 2018     Scleroderma progressive (HCC) 2018     Fecal soiling 2017     Post concussion syndrome  12/01/2015     Carpal tunnel syndrome of left wrist 09/14/2015     Carpal tunnel syndrome of right wrist 08/27/2015     Lumbar disc herniation 08/27/2015     Radiculopathy, lumbar region 08/27/2015     Herniated lumbar intervertebral disc 08/03/2015     Heel spur 06/03/2015     Dental disorder 06/02/2015     Fibromyalgia 02/27/2015     Bartholin gland cyst 07/17/2014     Ovarian cyst 07/17/2014     Hematuria 11/18/2013     Lichen planus 10/22/2013     Osteoarthritis of both knees 08/13/2013     Pain in joint of left shoulder 03/26/2013     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HCC) 12/07/2012     Allergic rhinitis 12/07/2012     Depression 12/07/2012     Esophageal reflux disease 12/07/2012     Hyperlipidemia 12/07/2012     Osteopenia 12/07/2012     Peripheral neuropathy 12/07/2012     Hearing loss 11/23/2012       LOS (days): 3  Geometric Mean LOS (GMLOS) (days): 4.4  Days to GMLOS:1.6     OBJECTIVE:    Risk of Unplanned Readmission Score: 11.35         Current admission status: Inpatient       Preferred Pharmacy:   [id number]  [place] - [address] [address]  Phone: [phone] Fax: [phone]    [facility] [address]  [place] - [address]  [address] [place] [address]  Phone: [phone] Fax: [phone]    [id number]  [place] - [address] [address]  Phone: [phone] Fax: [phone]    [id number] [address] [facility]  [address]  [facility] [address]  Phone: [phone] Fax: [phone]    Primary Care Provider: Patti Carolina DO    Primary Insurance: MEDICARE  Secondary Insurance: [place]    ASSESSMENT:  Active Health Care Proxies    There are no active Health Care Proxies on file.       Advance Directives  Primary Contact: Azalia Perera (Sister) [phone]  Readmission Root Cause  30 Day Readmission: No    Patient  Information  Admitted from:: Home  Mental Status: Alert  During Assessment patient was accompanied by: Not accompanied during assessment  Assessment information provided by:: Patient  Primary Caregiver: Self  Support Systems: Self, Family members  County [facility] Residence: [facility]  What [facility] do you live in?: [place]  Home entry access options. Select all that apply.: Elevator  Type of Current Residence: Apartment  Floor Level: 1  Upon entering residence, is there a bedroom on the main floor (no further steps)?: Yes  Upon entering residence, is there a bathroom on the main floor (no further steps)?: Yes  Living Arrangements: Lives Alone  Is patient a ?: No    Activities of Daily Living Prior to Admission  Functional Status: Independent  Completes ADLs independently?: Yes  Ambulates independently?: Yes  Does patient use assisted devices?: No  Does patient currently own DME?: No  Does patient have a history of Outpatient Therapy (PT/OT)?: Yes  Does the patient have a history of Short-Term Rehab?: No  Does patient have a history of HHC?: No    Means of Transportation  [facility] Self    DISCHARGE DETAILS:    Discharge planning discussed with:: patient  Freedom of Choice: Yes     CM contacted family/caregiver?: Yes  Were Treatment Team discharge recommendations reviewed with patient/caregiver?: Yes     Were patient/caregiver advised of the risks associated with not following Treatment Team discharge recommendations?: Yes    Contacts  Patient Contacts: Azalia Perera (Sister) [phone]  Relationship to Patient:: Family  Contact Method: Phone  Phone Number: [phone]  Reason/Outcome: Continuity of Care, Emergency Contact, Discharge Planning    Requested Home Health Care         Is the patient interested in HHC at discharge?: No    DME Referral Provided  Referral made for DME?: No    Treatment Team Recommendation: Home  Discharge Destination Plan:: Home      Pt was evaluated by OT/PT  and recommended for a home d/c with no needs.  Pt lives alone in an apartment which has 0STE and elevator access.   Pt owns no DME. Pt is independent at baseline.   Plan for d/c home when medically stable.

## 2025-01-21 LAB
ANION GAP SERPL CALCULATED.3IONS-SCNC: 4 MMOL/L (ref 4–13)
BUN SERPL-MCNC: 18 MG/DL (ref 5–25)
CALCIUM SERPL-MCNC: 9 MG/DL (ref 8.4–10.2)
CHLORIDE SERPL-SCNC: 99 MMOL/L (ref 96–108)
CO2 SERPL-SCNC: 31 MMOL/L (ref 21–32)
CREAT SERPL-MCNC: 0.91 MG/DL (ref 0.6–1.3)
ERYTHROCYTE [DISTWIDTH] IN BLOOD BY AUTOMATED COUNT: 13.1 % (ref 11.6–15.1)
GFR SERPL CREATININE-BSD FRML MDRD: 64 ML/MIN/1.73SQ M
GLUCOSE SERPL-MCNC: 116 MG/DL (ref 65–140)
HCT VFR BLD AUTO: 36.9 % (ref 34.8–46.1)
HGB BLD-MCNC: 11.8 G/DL (ref 11.5–15.4)
MCH RBC QN AUTO: 28.4 PG (ref 26.8–34.3)
MCHC RBC AUTO-ENTMCNC: 32 G/DL (ref 31.4–37.4)
MCV RBC AUTO: 89 FL (ref 82–98)
PLATELET # BLD AUTO: 215 THOUSANDS/UL (ref 149–390)
PMV BLD AUTO: 9.7 FL (ref 8.9–12.7)
POTASSIUM SERPL-SCNC: 4.3 MMOL/L (ref 3.5–5.3)
RBC # BLD AUTO: 4.15 MILLION/UL (ref 3.81–5.12)
SODIUM SERPL-SCNC: 134 MMOL/L (ref 135–147)
WBC # BLD AUTO: 4.33 THOUSAND/UL (ref 4.31–10.16)

## 2025-01-21 PROCEDURE — 99232 SBSQ HOSP IP/OBS MODERATE 35: CPT | Performed by: STUDENT IN AN ORGANIZED HEALTH CARE EDUCATION/TRAINING PROGRAM

## 2025-01-21 PROCEDURE — 80048 BASIC METABOLIC PNL TOTAL CA: CPT

## 2025-01-21 PROCEDURE — 99232 SBSQ HOSP IP/OBS MODERATE 35: CPT | Performed by: ANESTHESIOLOGY

## 2025-01-21 PROCEDURE — 85027 COMPLETE CBC AUTOMATED: CPT

## 2025-01-21 RX ORDER — BISACODYL 10 MG
10 SUPPOSITORY, RECTAL RECTAL DAILY PRN
Status: DISCONTINUED | OUTPATIENT
Start: 2025-01-21 | End: 2025-01-24 | Stop reason: HOSPADM

## 2025-01-21 RX ORDER — DIPHENHYDRAMINE HCL 25 MG
25 TABLET ORAL EVERY 8 HOURS PRN
Status: DISCONTINUED | OUTPATIENT
Start: 2025-01-21 | End: 2025-01-24 | Stop reason: HOSPADM

## 2025-01-21 RX ADMIN — TRAZODONE HYDROCHLORIDE 150 MG: 100 TABLET ORAL at 21:47

## 2025-01-21 RX ADMIN — HYDROXYCHLOROQUINE SULFATE 200 MG: 200 TABLET, FILM COATED ORAL at 12:31

## 2025-01-21 RX ADMIN — HEPARIN SODIUM 5000 UNITS: 5000 INJECTION INTRAVENOUS; SUBCUTANEOUS at 12:32

## 2025-01-21 RX ADMIN — MYCOPHENOLIC ACID 360 MG: 180 TABLET, DELAYED RELEASE ORAL at 18:05

## 2025-01-21 RX ADMIN — ACETAMINOPHEN 650 MG: 325 TABLET, FILM COATED ORAL at 12:31

## 2025-01-21 RX ADMIN — DEXTROAMPHETAMINE SACCHARATE, AMPHETAMINE ASPARTATE, DEXTROAMPHETAMINE SULFATE AND AMPHETAMINE SULFATE 15 MG: 2.5; 2.5; 2.5; 2.5 TABLET ORAL at 06:06

## 2025-01-21 RX ADMIN — HEPARIN SODIUM 5000 UNITS: 5000 INJECTION INTRAVENOUS; SUBCUTANEOUS at 06:28

## 2025-01-21 RX ADMIN — HEPARIN SODIUM 5000 UNITS: 5000 INJECTION INTRAVENOUS; SUBCUTANEOUS at 21:48

## 2025-01-21 RX ADMIN — DIPHENHYDRAMINE HCL 25 MG: 25 TABLET ORAL at 10:41

## 2025-01-21 RX ADMIN — FOLIC ACID 2 MG: 1 TABLET ORAL at 08:48

## 2025-01-21 RX ADMIN — DOCUSATE SODIUM 100 MG: 100 CAPSULE, LIQUID FILLED ORAL at 08:48

## 2025-01-21 RX ADMIN — OXYCODONE HYDROCHLORIDE 5 MG: 5 TABLET ORAL at 18:03

## 2025-01-21 RX ADMIN — LEVOTHYROXINE SODIUM 150 MCG: 75 TABLET ORAL at 06:06

## 2025-01-21 RX ADMIN — BUPROPION HYDROCHLORIDE 300 MG: 150 TABLET, EXTENDED RELEASE ORAL at 08:48

## 2025-01-21 RX ADMIN — PREGABALIN 75 MG: 75 CAPSULE ORAL at 08:48

## 2025-01-21 RX ADMIN — HYDROMORPHONE HYDROCHLORIDE 0.2 MG: 0.2 INJECTION, SOLUTION INTRAMUSCULAR; INTRAVENOUS; SUBCUTANEOUS at 16:24

## 2025-01-21 RX ADMIN — OXYCODONE HYDROCHLORIDE 5 MG: 5 TABLET ORAL at 13:38

## 2025-01-21 RX ADMIN — METHOCARBAMOL 500 MG: 500 TABLET ORAL at 18:05

## 2025-01-21 RX ADMIN — ACETAMINOPHEN 650 MG: 325 TABLET, FILM COATED ORAL at 18:05

## 2025-01-21 RX ADMIN — EZETIMIBE 10 MG: 10 TABLET ORAL at 21:47

## 2025-01-21 RX ADMIN — ACETAMINOPHEN 650 MG: 325 TABLET, FILM COATED ORAL at 06:06

## 2025-01-21 RX ADMIN — PANTOPRAZOLE SODIUM 40 MG: 40 TABLET, DELAYED RELEASE ORAL at 06:06

## 2025-01-21 RX ADMIN — PREGABALIN 75 MG: 75 CAPSULE ORAL at 18:05

## 2025-01-21 RX ADMIN — METHOCARBAMOL 500 MG: 500 TABLET ORAL at 06:06

## 2025-01-21 RX ADMIN — METHOCARBAMOL 500 MG: 500 TABLET ORAL at 12:31

## 2025-01-21 RX ADMIN — MYCOPHENOLIC ACID 360 MG: 180 TABLET, DELAYED RELEASE ORAL at 08:49

## 2025-01-21 RX ADMIN — OXYCODONE HYDROCHLORIDE 5 MG: 5 TABLET ORAL at 21:48

## 2025-01-21 RX ADMIN — DESMOPRESSIN ACETATE 0.2 MG: 0.1 TABLET ORAL at 21:49

## 2025-01-21 RX ADMIN — METHOCARBAMOL 500 MG: 500 TABLET ORAL at 23:55

## 2025-01-21 RX ADMIN — Medication 1 TABLET: at 08:48

## 2025-01-21 NOTE — PROGRESS NOTES
Progress Note - Trauma   Name: Mimi Biggs 69 y.o. female I MRN: [id number]  Unit/Bed#: [facility] 616-01 I Date of Admission: 1/17/2025   Date of Service: 1/21/2025 I Hospital Day: 4     Assessment & Plan  Liver laceration, grade II, without open wound into cavity  - Hemorrhage of a posterior right lobe cyst both internally and into the adjacent parenchyma and subcapsular space secondary to a grade 2 liver laceration.  - Serial abdominal exams  - Trend H/H   - Monitor Vitals  - Repeat scan + transfuse as needed for acute change in exam or rapid drop in Hgb  - Regular diet  - Pain control as below  - Surgical Oncology consultation obtained  - Recommendation for trend H/H, no acute surgical intervention required  Accidental fall from ladder  - Status post fall off ladder 1/16 and hit a dresser on the way down with the below noted injuries.  - No head strike, LOC, or use of AC/AP medications  - Fall precautions.  - Geriatric Medicine consultation for evaluation, medication review and recommendations.  - PT and OT evaluation and treatment as indicated.  - Case Management consultation for disposition planning.    Closed fracture of multiple ribs of right side  - Acute nondisplaced fractures of the right lateral seventh and eighth ribs, present on admission.  - Continue rib fracture protocol.  - Continue to encourage incentive spirometer use and adequate pulmonary hygiene.    - PIC score   - Appreciate APS evaluation and recommendations.  - Continue multimodal analgesic regimen.  - Supplemental oxygen via nasal cannula as needed to maintain saturations greater than or equal to 94%.  - PT and OT evaluation and treatment as indicated.  - Outpatient follow-up in the trauma clinic for re-evaluation in approximately 2 weeks.  - Multimodal pain control including Epidural pump  - APS consulted, recs appreciated    Closed wedge compression fracture of T12 vertebra (HCC)  T12 compression fracture, likely subacute as there is due  "appear to be subtle changes along the superior endplate on the recent prior study. There has been significant progression from the prior study and correlation with tenderness in this region is recommended.  - Pt w/ no new neurological deficits, ambulatory since the event  - Upright Xrays pending official read  - Appreciate Neurosurgical evaluation  - TSLO brace as needed for comfort  - Thoracolumbar spinal precautions  - Regular neuro exams  - Pain control as above  Bladder wall thickening  - CT CAP  \"Irregular bladder wall thickening without adjacent stranding. This is unlikely acute cystitis though was not present previously and clinical correlation for urinary tract symptoms recommended.\"  - UA 1/18 negative for evidence of infection    Bowel Regimen: Colace  VTE Prophylaxis:Heparin     Disposition: DC pending APS removal of epidural     24 Hour Events : No acute overnight events  Subjective : Patient reports that he is well-controlled, she is willing to try removal of her APS pump today, is looking forward to going home following pain control    Objective :  Temp:  [98 °F (36.7 °C)-98.9 °F (37.2 °C)] 98 °F (36.7 °C)  HR:  [66-75] 66  BP: ()/(49-95) 93/49  Resp:  [16-20] 17  SpO2:  [93 %-97 %] 97 %  O2 Device: None (Room air)    I/O         01/19 0701  01/20 0700 01/20 0701  01/21 0700 01/21 0701  01/22 0700    P.O. 1824 720 510    I.V. (mL/kg) 249.3 (5.7) 173.5 (3.9) 24.9 (0.6)    Total Intake(mL/kg) 2073.3 (47.1) 893.5 (20.3) 534.9 (12.2)    Urine (mL/kg/hr) 2350 (2.2) 500 (0.5)     Stool 0 0     Total Output 2350 500     Net -276.8 +393.5 +534.9           Unmeasured Stool Occurrence 1 x 1 x           Lines/Drains/Airways       Active Status       Name Placement date Placement time Site Days    Epidural Catheter 01/18/25 01/18/25  1310  -- 2                  Physical Exam  Vitals and nursing note reviewed.   Constitutional:       General: She is not in acute distress.     Appearance: She is " well-developed.   HENT:      Head: Normocephalic and atraumatic.   Eyes:      Conjunctiva/sclera: Conjunctivae normal.   Cardiovascular:      Rate and Rhythm: Normal rate and regular rhythm.      Heart sounds: No murmur heard.  Pulmonary:      Effort: Pulmonary effort is normal. No respiratory distress.      Breath sounds: Normal breath sounds.   Abdominal:      Palpations: Abdomen is soft.      Tenderness: There is no abdominal tenderness.   Musculoskeletal:         General: Tenderness present. No swelling.      Cervical back: Neck supple.      Comments: Tender to palpation of the right lateral ribs, some mild ecchymosis underlying the right lateral ribs   Skin:     General: Skin is warm and dry.      Capillary Refill: Capillary refill takes less than 2 seconds.      Findings: Bruising present.   Neurological:      Mental Status: She is alert.   Psychiatric:         Mood and Affect: Mood normal.        PIC Score  PIC Pain Score: 3 (1/21/2025  9:34 AM)  PIC Incentive Spirometry Score: 3 (1/20/2025  5:19 PM)  PIC Cough Description: 2 (1/20/2025  5:19 PM)  PIC Total Score: 8 (1/20/2025  5:19 PM)       If the Total PIC Score </=5, did you consult APS and evaluate patient for further intervention?: PIC > 5      Pain:    Incentive Spirometry  Cough  3 = Controlled  4 = Above goal volume 3 = Strong  2 = Moderate  3 = Goal to alert volume 2 = Weak  1 = Severe  2 = Below alert volume 1 = Absent     1 = Unable to perform IS           Lab Results: I have reviewed the following results:  Recent Labs     01/21/25  0442   WBC 4.33   HGB 11.8   HCT 36.9      SODIUM 134*   K 4.3   CL 99   CO2 31   BUN 18   CREATININE 0.91   GLUC 116            <<-----Click on this checkbox to enter Pre-Op Dx

## 2025-01-21 NOTE — ASSESSMENT & PLAN NOTE
S/p Rt. 7th & 8th rib fractures   Epidural placed 1/18  Rib Fracture Evaluation:  Chest tube: NA  Respiratory Co-morbidities: General debility  SpO2:   SPO2 RA Rest      Flowsheet Row ED to Hosp-Admission (Current) from 1/17/2025 in [facility] 6   SpO2 95 %   SpO2 Activity At Rest   O2 Device None (Room air)   O2 Flow Rate --                                                                             Incentive Spirometer: Incentive Spirometry Achieved (mL): 1750 mL   Platelet Count:   Results from last 7 days   Lab Units 01/21/25  0442   PLATELETS Thousands/uL 215     Coags:   Results from last 7 days   Lab Units 01/18/25  0546   INR  1.04   PROTIME seconds 13.9     Home anticoagulants: None    VTE covered by:  heparin (porcine), Subcutaneous, 5,000 Units at 01/21/25 0628       Doing well with epidural. Currently in a chair. Tolerating diet. We will do a pause trial on the epidural. Explained opioid medication that is available to her.    Plan:  Pause thoracic epidural 0.1% ropivacaine/2mcg/ml fentanyl @6/4/10/3, will potentially remove today if tolerates this  Continue PO oxycodone 2.5/5mg q4hr PRN for mod-severe pain, IV dilaudid 0.2mg q2hr PRN for breakthrough  Continue other MMA  PO Tylenol 650mg q6hr ruthie  Lidoderm  Lyrica 75mg BID  Robaxin 500mg q6hr ruthie  Encourage PT/OT, OOB

## 2025-01-21 NOTE — PROGRESS NOTES
Progress Note - Trauma   Name: Mimi Biggs 69 y.o. female I MRN: [id number]  Unit/Bed#: [facility] 616-01 I Date of Admission: 1/17/2025   Date of Service: 1/21/2025 I Hospital Day: 4     Assessment & Plan  Liver laceration, grade II, without open wound into cavity  - Hemorrhage of a posterior right lobe cyst both internally and into the adjacent parenchyma and subcapsular space secondary to a grade 2 liver laceration.  - Serial abdominal exams  - Trend H/H   - Monitor Vitals  - Repeat scan + transfuse as needed for acute change in exam or rapid drop in Hgb  - Regular diet  - Pain control as below  - Surgical Oncology consultation obtained  - Recommendation for trend H/H, no acute surgical intervention required  Accidental fall from ladder  - Status post fall off ladder 1/16 and hit a dresser on the way down with the below noted injuries.  - No head strike, LOC, or use of AC/AP medications  - Fall precautions.  - Geriatric Medicine consultation for evaluation, medication review and recommendations.  - PT and OT evaluation and treatment as indicated.  - Case Management consultation for disposition planning.    Closed fracture of multiple ribs of right side  - Acute nondisplaced fractures of the right lateral seventh and eighth ribs, present on admission.  - Continue rib fracture protocol.  - Continue to encourage incentive spirometer use and adequate pulmonary hygiene.    - PIC score   - Appreciate APS evaluation and recommendations.  - Continue multimodal analgesic regimen.  - Supplemental oxygen via nasal cannula as needed to maintain saturations greater than or equal to 94%.  - PT and OT evaluation and treatment as indicated.  - Outpatient follow-up in the trauma clinic for re-evaluation in approximately 2 weeks.  - Multimodal pain control including Epidural pump  - APS consulted, recs appreciated    Closed wedge compression fracture of T12 vertebra (HCC)  T12 compression fracture, likely subacute as there is due  "appear to be subtle changes along the superior endplate on the recent prior study. There has been significant progression from the prior study and correlation with tenderness in this region is recommended.  - Pt w/ no new neurological deficits, ambulatory since the event  - Upright Xrays pending official read  - Appreciate Neurosurgical evaluation  - TSLO brace as needed for comfort  - Thoracolumbar spinal precautions  - Regular neuro exams  - Pain control as above  Bladder wall thickening  - CT CAP  \"Irregular bladder wall thickening without adjacent stranding. This is unlikely acute cystitis though was not present previously and clinical correlation for urinary tract symptoms recommended.\"  - UA 1/18 negative for evidence of infection    VTE Prophylaxis: Heparin     Disposition: DC pending pain control w/ APS     TRAUMA TERTIARY SURVEY  Summary of Diagnosed Injuries: Patient has a grade 2 liver laceration, nondisplaced fractures of the right lateral seventh and eighth ribs, T12 compression fracture, and a liver hemangioma    Transfer from: [employer]    Mechanism of Injury:Fall     Chief Complaint: Right rib pain    24 Hour Events : No acute overnight events  Subjective : Patient reports feeling well, is ready to try removal of the APS pump, is ready to go home pending appropriate pain control    Objective :  Temp:  [98 °F (36.7 °C)-98.9 °F (37.2 °C)] 98 °F (36.7 °C)  HR:  [66-75] 66  BP: ()/(49-95) 93/49  Resp:  [16-20] 17  SpO2:  [93 %-97 %] 97 %  O2 Device: None (Room air)    I/O         01/19 0701  01/20 0700 01/20 0701  01/21 0700 01/21 0701  01/22 0700    P.O. 1824 720 510    I.V. (mL/kg) 249.3 (5.7) 173.5 (3.9)     Total Intake(mL/kg) 2073.3 (47.1) 893.5 (20.3) 510 (11.6)    Urine (mL/kg/hr) 2350 (2.2) 500 (0.5)     Stool 0 0     Total Output 2350 500     Net -276.8 +393.5 +510           Unmeasured Stool Occurrence 1 x 1 x           Lines/Drains/Airways       Active Status       Name Placement " date Placement time Site Days    Epidural Catheter 01/18/25 01/18/25  1310  -- 2                  Physical Exam  Vitals and nursing note reviewed.   Constitutional:       General: She is not in acute distress.     Appearance: She is well-developed.   HENT:      Head: Normocephalic and atraumatic.   Eyes:      Conjunctiva/sclera: Conjunctivae normal.   Cardiovascular:      Rate and Rhythm: Normal rate and regular rhythm.      Heart sounds: No murmur heard.  Pulmonary:      Effort: Pulmonary effort is normal. No respiratory distress.      Breath sounds: Normal breath sounds.   Abdominal:      Palpations: Abdomen is soft.      Tenderness: There is no abdominal tenderness.   Musculoskeletal:         General: Tenderness present. No swelling.      Cervical back: Neck supple.   Skin:     General: Skin is warm and dry.      Capillary Refill: Capillary refill takes less than 2 seconds.      Findings: Bruising present.      Comments: Bruising to the right lower ribs, no evidence of hematoma   Neurological:      Mental Status: She is alert.   Psychiatric:         Mood and Affect: Mood normal.          1. Before the illness or injury that brought you to the Emergency, did you need someone to help you on a regular basis? 0=No   2. Since the illness or injury that brought you to the Emergency, have you needed more help than usual to take care of yourself? 1=Yes   3. Have you been hospitalized for one or more nights during the past 6 months (excluding a stay in the Emergency Department)? 0=No   4. In general, do you see well? 1=No   5. In general, do you have serious problems with your memory? 0=No   6. Do you take more than three different medications everyday? 1=Yes   TOTAL   3     Did you order a geriatric consult if the score was 2 or greater?: yes     PIC Score  PIC Pain Score: 3 (1/21/2025  9:34 AM)  PIC Incentive Spirometry Score: 3 (1/20/2025  5:19 PM)  PIC Cough Description: 2 (1/20/2025  5:19 PM)  PIC Total Score: 8  (1/20/2025  5:19 PM)       If the Total PIC Score </=5, did you consult APS and evaluate patient for further intervention?: Pic > 5      Pain:    Incentive Spirometry  Cough  3 = Controlled  4 = Above goal volume 3 = Strong  2 = Moderate  3 = Goal to alert volume 2 = Weak  1 = Severe  2 = Below alert volume 1 = Absent     1 = Unable to perform IS          Lab Results: I have reviewed the following results:  Recent Labs     01/21/25  0442   WBC 4.33   HGB 11.8   HCT 36.9      SODIUM 134*   K 4.3   CL 99   CO2 31   BUN 18   CREATININE 0.91   GLUC 116            Keep the cast/splint/dressing clean and dry./Verbal/written post procedure instructions were given to patient/caregiver./Instructed patient/caregiver to follow-up with primary care physician./Elevate the injured extremity as instructed.

## 2025-01-21 NOTE — PLAN OF CARE
Problem: PAIN - ADULT  Goal: Verbalizes/displays adequate comfort level or baseline comfort level  Description: Interventions:  - Encourage patient to monitor pain and request assistance  - Assess pain using appropriate pain scale  - Administer analgesics based on type and severity of pain and evaluate response  - Implement non-pharmacological measures as appropriate and evaluate response  - Consider cultural and social influences on pain and pain management  - Notify physician/advanced practitioner if interventions unsuccessful or patient reports new pain  Outcome: Progressing     Problem: INFECTION - ADULT  Goal: Absence or prevention of progression during hospitalization  Description: INTERVENTIONS:  - Assess and monitor for signs and symptoms of infection  - Monitor lab/diagnostic results  - Monitor all insertion sites, i.e. indwelling lines, tubes, and drains  - Monitor endotracheal if appropriate and nasal secretions for changes in amount and color  - [place] appropriate cooling/warming therapies per order  - Administer medications as ordered  - Instruct and encourage patient and family to use good hand hygiene technique  - Identify and instruct in appropriate isolation precautions for identified infection/condition  Outcome: Progressing  Goal: Absence of fever/infection during neutropenic period  Description: INTERVENTIONS:  - Monitor WBC    Outcome: Progressing     Problem: DISCHARGE PLANNING  Goal: Discharge to home or other facility with appropriate resources  Description: INTERVENTIONS:  - Identify barriers to discharge w/patient and caregiver  - Arrange for needed discharge resources and transportation as appropriate  - Identify discharge learning needs (meds, wound care, etc.)  - Arrange for interpretive services to assist at discharge as needed  - Refer to Case Management Department for coordinating discharge planning if the patient needs post-hospital services based on physician/advanced  practitioner order or complex needs related to functional status, cognitive ability, or social support system  Outcome: Progressing

## 2025-01-21 NOTE — PROGRESS NOTES
Progress Note - Acute Pain   Name: Mimi Biggs 69 y.o. female I MRN: [id number]  Unit/Bed#: [facility] 616-01 I Date of Admission: 1/17/2025   Date of Service: 1/21/2025 I Hospital Day: 4    Assessment & Plan  Liver laceration, grade II, without open wound into cavity    Accidental fall from ladder    Closed fracture of multiple ribs of right side  S/p Rt. 7th & 8th rib fractures   Epidural placed 1/18  Rib Fracture Evaluation:  Chest tube: NA  Respiratory Co-morbidities: General debility  SpO2:   SPO2 RA Rest      Flowsheet Row ED to Hosp-Admission (Current) from 1/17/2025 in [facility]   SpO2 95 %   SpO2 Activity At Rest   O2 Device None (Room air)   O2 Flow Rate --                                                                             Incentive Spirometer: Incentive Spirometry Achieved (mL): 1750 mL   Platelet Count:   Results from last 7 days   Lab Units 01/21/25  0442   PLATELETS Thousands/uL 215     Coags:   Results from last 7 days   Lab Units 01/18/25  0546   INR  1.04   PROTIME seconds 13.9     Home anticoagulants: None    VTE covered by:  heparin (porcine), Subcutaneous, 5,000 Units at 01/21/25 0628       Doing well with epidural. Currently in a chair. Tolerating diet. We will do a pause trial on the epidural. Explained opioid medication that is available to her.    Plan:  Pause thoracic epidural 0.1% ropivacaine/2mcg/ml fentanyl @6/4/10/3, will potentially remove today if tolerates this  Continue PO oxycodone 2.5/5mg q4hr PRN for mod-severe pain, IV dilaudid 0.2mg q2hr PRN for breakthrough  Continue other MMA  PO Tylenol 650mg q6hr ruthie  Lidoderm  Lyrica 75mg BID  Robaxin 500mg q6hr ruthie  Encourage PT/OT, OOB      Closed wedge compression fracture of T12 vertebra (HCC)    Bladder wall thickening      APS will continue to follow. Please contact Acute Pain Service - via SecureChat from [phone] with additional questions or concerns. See SecureChat or Amion for additional contacts  and after hours information.     Subjective   Mimi Biggs is a 69 y.o. female who presents after fall with Rt. Sided rib fractures and other injuries. APS team consulted for rib fracture pain. A thoracic epidural was placed on 1/18 for post-traumatic analgesia.     Pain History  Current pain location(s):  Pain Score: 2  Pain Location/Orientation: Orientation: Right, Location: Rib Cage  Pain Scale: Pain Assessment Tool: 0-10  24 hour history: no acute events    Opioid requirement previous 24 hours: none  Meds/Allergies   all current active meds have been reviewed  Allergies   Allergen Reactions    Thiazide-Type Diuretics Other (See Comments)     Hyponatremia    Duloxetine Other (See Comments)     Mental psychosis    Alendronate GI Intolerance, Myalgia, Nausea Only and Other (See Comments)    Revatio [Sildenafil] Other (See Comments)     mental status changes    Savella [Milnacipran] Other (See Comments)     Feeling out of it    Sulfamethoxazole-Trimethoprim Rash and GI Intolerance    Tedizolid Rash     Objective :  Temp:  [98 °F (36.7 °C)-98.9 °F (37.2 °C)] 98 °F (36.7 °C)  HR:  [66-75] 66  BP: ()/(49-95) 93/49  Resp:  [16-20] 17  SpO2:  [93 %-97 %] 97 %  O2 Device: None (Room air)    Physical Exam  HENT:      Head: Normocephalic and atraumatic.      Nose: Nose normal.      Mouth/Throat:      Mouth: Mucous membranes are moist.   Pulmonary:      Effort: Pulmonary effort is normal.   Chest:      Chest wall: Tenderness present.   Abdominal:      General: Abdomen is flat.   Musculoskeletal:         General: Normal range of motion.      Cervical back: Normal range of motion.   Skin:     General: Skin is warm.   Neurological:      General: No focal deficit present.      Mental Status: She is alert and oriented to person, place, and time. Mental status is at baseline.   Psychiatric:         Mood and Affect: Mood normal.         Behavior: Behavior normal.         Thought Content: Thought content normal.          Judgment: Judgment normal.      Epidural: Site clean/dry/intact, no surrounding erythema/edema/induration, infusion functioning appropriately        Lab Results: I have reviewed the following results:  Estimated Creatinine Clearance: 40 mL/min (by C-G formula based on SCr of 0.91 mg/dL).  Lab Results   Component Value Date    WBC 4.33 01/21/2025    HGB 11.8 01/21/2025    HCT 36.9 01/21/2025     01/21/2025         Component Value Date/Time    K 4.3 01/21/2025 0442    K 4.7 12/23/2024 0849    CL 99 01/21/2025 0442     12/23/2024 0849    CO2 31 01/21/2025 0442    CO2 32 (H) 12/23/2024 0849    BUN 18 01/21/2025 0442    BUN 16 12/23/2024 0849    CREATININE 0.91 01/21/2025 0442    CREATININE 0.73 12/23/2024 0849         Component Value Date/Time    CALCIUM 9.0 01/21/2025 0442    CALCIUM 9.2 12/23/2024 0849    ALKPHOS 61 01/18/2025 0546    ALKPHOS 117 12/23/2024 0849    AST 23 01/18/2025 0546    AST 15 12/23/2024 0849    ALT 31 01/18/2025 0546    ALT 13 12/23/2024 0849    TP 5.4 (L) 01/18/2025 0546    TP 5.7 (L) 12/23/2024 0849    ALB 3.7 01/18/2025 0546    ALB 4.1 12/23/2024 0849    ALB 4.2 06/26/2024 0839       Imaging Results Review: No pertinent imaging studies reviewed.  Other Study Results Review: No additional pertinent studies reviewed.

## 2025-01-21 NOTE — PLAN OF CARE
Problem: PAIN - ADULT  Goal: Verbalizes/displays adequate comfort level or baseline comfort level  Description: Interventions:  - Encourage patient to monitor pain and request assistance  - Assess pain using appropriate pain scale  - Administer analgesics based on type and severity of pain and evaluate response  - Implement non-pharmacological measures as appropriate and evaluate response  - Consider cultural and social influences on pain and pain management  - Notify physician/advanced practitioner if interventions unsuccessful or patient reports new pain  Outcome: Progressing     Problem: INFECTION - ADULT  Goal: Absence or prevention of progression during hospitalization  Description: INTERVENTIONS:  - Assess and monitor for signs and symptoms of infection  - Monitor lab/diagnostic results  - Monitor all insertion sites, i.e. indwelling lines, tubes, and drains  - Monitor endotracheal if appropriate and nasal secretions for changes in amount and color  - [place] appropriate cooling/warming therapies per order  - Administer medications as ordered  - Instruct and encourage patient and family to use good hand hygiene technique  - Identify and instruct in appropriate isolation precautions for identified infection/condition  Outcome: Progressing  Goal: Absence of fever/infection during neutropenic period  Description: INTERVENTIONS:  - Monitor WBC    Outcome: Progressing     Problem: DISCHARGE PLANNING  Goal: Discharge to home or other facility with appropriate resources  Description: INTERVENTIONS:  - Identify barriers to discharge w/patient and caregiver  - Arrange for needed discharge resources and transportation as appropriate  - Identify discharge learning needs (meds, wound care, etc.)  - Arrange for interpretive services to assist at discharge as needed  - Refer to Case Management Department for coordinating discharge planning if the patient needs post-hospital services based on physician/advanced  practitioner order or complex needs related to functional status, cognitive ability, or social support system  Outcome: Progressing     Problem: Knowledge Deficit  Goal: Patient/family/caregiver demonstrates understanding of disease process, treatment plan, medications, and discharge instructions  Description: Complete learning assessment and assess knowledge base.  Interventions:  - Provide teaching at level of understanding  - Provide teaching via preferred learning methods  Outcome: Progressing     Problem: NEUROSENSORY - ADULT  Goal: Achieves stable or improved neurological status  Description: INTERVENTIONS  - Monitor and report changes in neurological status  - Monitor vital signs such as temperature, blood pressure, glucose, and any other labs ordered   - Initiate measures to prevent increased intracranial pressure  - Monitor for seizure activity and implement precautions if appropriate      Outcome: Progressing  Goal: Achieves maximal functionality and self care  Description: INTERVENTIONS  - Monitor swallowing and airway patency with patient fatigue and changes in neurological status  - Encourage and assist patient to increase activity and self care.   - Encourage visually impaired, hearing impaired and aphasic patients to use assistive/communication devices  Outcome: Progressing     Problem: RESPIRATORY - ADULT  Goal: Achieves optimal ventilation and oxygenation  Description: INTERVENTIONS:  - Assess for changes in respiratory status  - Assess for changes in mentation and behavior  - Position to facilitate oxygenation and minimize respiratory effort  - Oxygen administered by appropriate delivery if ordered  - Initiate smoking cessation education as indicated  - Encourage broncho-pulmonary hygiene including cough, deep breathe, Incentive Spirometry  - Assess the need for suctioning and aspirate as needed  - Assess and instruct to report SOB or any respiratory difficulty  - Respiratory Therapy support as  indicated  Outcome: Progressing     Problem: HEMATOLOGIC - ADULT  Goal: Maintains hematologic stability  Description: INTERVENTIONS  - Assess for signs and symptoms of bleeding or hemorrhage  - Monitor labs  - Administer supportive blood products/factors as ordered and appropriate  Outcome: Progressing     Problem: MUSCULOSKELETAL - ADULT  Goal: Maintain or return mobility to safest level of function  Description: INTERVENTIONS:  - Assess patient's ability to carry out ADLs; assess patient's baseline for ADL function and identify physical deficits which impact ability to perform ADLs (bathing, care of mouth/teeth, toileting, grooming, dressing, etc.)  - Assess/evaluate cause of self-care deficits   - Assess range of motion  - Assess patient's mobility  - Assess patient's need for assistive devices and provide as appropriate  - Encourage maximum independence but intervene and supervise when necessary  - Involve family in performance of ADLs  - Assess for home care needs following discharge   - Consider OT consult to assist with ADL evaluation and planning for discharge  - Provide patient education as appropriate  Outcome: Progressing  Goal: Maintain proper alignment of affected body part  Description: INTERVENTIONS:  - Support, maintain and protect limb and body alignment  - Provide patient/ family with appropriate education  Outcome: Progressing     Problem: Nutrition/Hydration-ADULT  Goal: Nutrient/Hydration intake appropriate for improving, restoring or maintaining nutritional needs  Description: Monitor and assess patient's nutrition/hydration status for malnutrition. Collaborate with interdisciplinary team and initiate plan and interventions as ordered.  Monitor patient's weight and dietary intake as ordered or per policy. Utilize nutrition screening tool and intervene as necessary. Determine patient's food preferences and provide high-protein, high-caloric foods as appropriate.     INTERVENTIONS:  - Monitor  oral intake, urinary output, labs, and treatment plans  - Assess nutrition and hydration status and recommend course of action  - Evaluate amount of meals eaten  - Assist patient with eating if necessary   - Allow adequate time for meals  - Recommend/ encourage appropriate diets, oral nutritional supplements, and vitamin/mineral supplements  - Order, calculate, and assess calorie counts as needed  - Recommend, monitor, and adjust tube feedings and TPN/PPN based on assessed needs  - Assess need for intravenous fluids  - Provide specific nutrition/hydration education as appropriate  - Include patient/family/caregiver in decisions related to nutrition  Outcome: Progressing

## 2025-01-21 NOTE — CASE MANAGEMENT
Case Management Discharge Planning Note    Patient name Mimi Biggs  Location [facility]-01 MRN [id number]  : 1955 Date 2025       Current Admission Date: 2025  Current Admission Diagnosis:Liver laceration, grade II, without open wound into cavity   Patient Active Problem List    Diagnosis Date Noted Date Diagnosed    Accidental fall from ladder 2025     Liver laceration, grade II, without open wound into cavity 2025     Closed fracture of multiple ribs of right side 2025     Closed wedge compression fracture of T12 vertebra (HCC) 2025     Bladder wall thickening 2025     Closed fracture of left superior rim of pubis (HCC) 2024     Urge incontinence 2024     Rosacea 2024     B12 deficiency 2024     Peptic stricture of esophagus 2023     Attention deficit hyperactivity disorder (ADHD), predominantly inattentive type 2023     Post-nasal drip 2023     Primary insomnia 10/18/2022     Age-related osteoporosis without current pathological fracture 2022     High risk medication use 2022     Immunocompromised state (HCC) 2022     Hypogammaglobulinemia (HCC) 2021     PAD (peripheral artery disease) ([facility]) 2021     Pulmonary emphysema ([facility]) 2021     Necrotizing myopathy 2020     Vasomotor rhinitis 2019     Chronic pain 2019     Advanced care planning/counseling discussion 02/15/2019     Vitamin D deficiency 2018     Acquired hypothyroidism 2018     Osteoarthritis 10/17/2018     Raynaud's syndrome 10/17/2018     Rectal prolapse 10/17/2018     Sjogren's syndrome (HCC) 10/17/2018     Thyroid cancer (HCC) 10/17/2018     BRCA gene mutation positive 2018     Bilateral hand numbness 2018     Methotrexate, long term, current use 2018     Scleroderma progressive (HCC) 2018     Fecal soiling 2017     Post concussion syndrome 2015      Carpal tunnel syndrome of left wrist 09/14/2015     Carpal tunnel syndrome of right wrist 08/27/2015     Lumbar disc herniation 08/27/2015     Radiculopathy, lumbar region 08/27/2015     Herniated lumbar intervertebral disc 08/03/2015     Heel spur 06/03/2015     Dental disorder 06/02/2015     Fibromyalgia 02/27/2015     Bartholin gland cyst 07/17/2014     Ovarian cyst 07/17/2014     Hematuria 11/18/2013     Lichen planus 10/22/2013     Osteoarthritis of both knees 08/13/2013     Pain in joint of left shoulder 03/26/2013     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HCC) 12/07/2012     Allergic rhinitis 12/07/2012     Depression 12/07/2012     Esophageal reflux disease 12/07/2012     Hyperlipidemia 12/07/2012     Osteopenia 12/07/2012     Peripheral neuropathy 12/07/2012     Hearing loss 11/23/2012       LOS (days): 4  Geometric Mean LOS (GMLOS) (days): 4.4  Days to GMLOS:0.5     OBJECTIVE:  Risk of Unplanned Readmission Score: 12.04         Current admission status: Inpatient   Preferred Pharmacy:   [id number]  [place] - [address] [address]  Phone: [phone] Fax: [phone]    [id number] [place] - [address]  [address] [place] [address]  Phone: [phone] Fax: [phone]    [id number]  [place] - [address] [address]  Phone: [phone] Fax: [phone]    [id number] - [place] - [address]  [address]  [facility] [address]  Phone: [phone] Fax: [phone]    Primary Care Provider: Patti Carolina DO    Primary Insurance: MEDICARE  Secondary Insurance: [facility]    DISCHARGE DETAILS:    CM met with pt to discuss d/c planning  Pt will likely d/c home tomorrow  Pt potentially may need transportation. Pt's agreeable to [employer], if she is unable to secure transportation tomorrow

## 2025-01-21 NOTE — ASSESSMENT & PLAN NOTE
"- CT CAP  \"Irregular bladder wall thickening without adjacent stranding. This is unlikely acute cystitis though was not present previously and clinical correlation for urinary tract symptoms recommended.\"  - UA 1/18 negative for evidence of infection  "

## 2025-01-22 LAB
ANION GAP SERPL CALCULATED.3IONS-SCNC: 9 MMOL/L (ref 4–13)
BUN SERPL-MCNC: 18 MG/DL (ref 5–25)
CALCIUM SERPL-MCNC: 8.8 MG/DL (ref 8.4–10.2)
CHLORIDE SERPL-SCNC: 102 MMOL/L (ref 96–108)
CO2 SERPL-SCNC: 25 MMOL/L (ref 21–32)
CREAT SERPL-MCNC: 0.64 MG/DL (ref 0.6–1.3)
ERYTHROCYTE [DISTWIDTH] IN BLOOD BY AUTOMATED COUNT: 13.2 % (ref 11.6–15.1)
GFR SERPL CREATININE-BSD FRML MDRD: 90 ML/MIN/1.73SQ M
GLUCOSE SERPL-MCNC: 80 MG/DL (ref 65–140)
HCT VFR BLD AUTO: 36.1 % (ref 34.8–46.1)
HGB BLD-MCNC: 11.7 G/DL (ref 11.5–15.4)
MCH RBC QN AUTO: 28.3 PG (ref 26.8–34.3)
MCHC RBC AUTO-ENTMCNC: 32.4 G/DL (ref 31.4–37.4)
MCV RBC AUTO: 87 FL (ref 82–98)
PLATELET # BLD AUTO: 231 THOUSANDS/UL (ref 149–390)
PMV BLD AUTO: 10.5 FL (ref 8.9–12.7)
POTASSIUM SERPL-SCNC: 4 MMOL/L (ref 3.5–5.3)
RBC # BLD AUTO: 4.13 MILLION/UL (ref 3.81–5.12)
SODIUM SERPL-SCNC: 136 MMOL/L (ref 135–147)
WBC # BLD AUTO: 3.98 THOUSAND/UL (ref 4.31–10.16)

## 2025-01-22 PROCEDURE — 85027 COMPLETE CBC AUTOMATED: CPT

## 2025-01-22 PROCEDURE — 99232 SBSQ HOSP IP/OBS MODERATE 35: CPT | Performed by: ANESTHESIOLOGY

## 2025-01-22 PROCEDURE — 80048 BASIC METABOLIC PNL TOTAL CA: CPT

## 2025-01-22 PROCEDURE — 99232 SBSQ HOSP IP/OBS MODERATE 35: CPT | Performed by: STUDENT IN AN ORGANIZED HEALTH CARE EDUCATION/TRAINING PROGRAM

## 2025-01-22 RX ORDER — METHOCARBAMOL 750 MG/1
750 TABLET, FILM COATED ORAL EVERY 6 HOURS SCHEDULED
Status: DISCONTINUED | OUTPATIENT
Start: 2025-01-22 | End: 2025-01-24 | Stop reason: HOSPADM

## 2025-01-22 RX ORDER — POLYETHYLENE GLYCOL 3350 17 G/17G
17 POWDER, FOR SOLUTION ORAL DAILY
Status: DISCONTINUED | OUTPATIENT
Start: 2025-01-22 | End: 2025-01-24 | Stop reason: HOSPADM

## 2025-01-22 RX ORDER — AMOXICILLIN 250 MG
1 CAPSULE ORAL
Status: DISCONTINUED | OUTPATIENT
Start: 2025-01-22 | End: 2025-01-24 | Stop reason: HOSPADM

## 2025-01-22 RX ORDER — OXYCODONE HYDROCHLORIDE 5 MG/1
5 TABLET ORAL EVERY 4 HOURS PRN
Refills: 0 | Status: DISCONTINUED | OUTPATIENT
Start: 2025-01-22 | End: 2025-01-24 | Stop reason: HOSPADM

## 2025-01-22 RX ADMIN — LIDOCAINE 5% 1 PATCH: 700 PATCH TOPICAL at 08:00

## 2025-01-22 RX ADMIN — METHOCARBAMOL 750 MG: 750 TABLET ORAL at 17:11

## 2025-01-22 RX ADMIN — Medication 1 TABLET: at 08:00

## 2025-01-22 RX ADMIN — EZETIMIBE 10 MG: 10 TABLET ORAL at 21:30

## 2025-01-22 RX ADMIN — HEPARIN SODIUM 5000 UNITS: 5000 INJECTION INTRAVENOUS; SUBCUTANEOUS at 14:41

## 2025-01-22 RX ADMIN — ACETAMINOPHEN 650 MG: 325 TABLET, FILM COATED ORAL at 05:50

## 2025-01-22 RX ADMIN — TRAZODONE HYDROCHLORIDE 150 MG: 100 TABLET ORAL at 21:29

## 2025-01-22 RX ADMIN — ACETAMINOPHEN 650 MG: 325 TABLET, FILM COATED ORAL at 11:07

## 2025-01-22 RX ADMIN — Medication 7.5 MG: at 15:34

## 2025-01-22 RX ADMIN — DEXTROAMPHETAMINE SACCHARATE, AMPHETAMINE ASPARTATE, DEXTROAMPHETAMINE SULFATE AND AMPHETAMINE SULFATE 15 MG: 2.5; 2.5; 2.5; 2.5 TABLET ORAL at 07:58

## 2025-01-22 RX ADMIN — DIPHENHYDRAMINE HCL 25 MG: 25 TABLET ORAL at 21:29

## 2025-01-22 RX ADMIN — PREGABALIN 75 MG: 75 CAPSULE ORAL at 08:00

## 2025-01-22 RX ADMIN — HYDROXYCHLOROQUINE SULFATE 200 MG: 200 TABLET, FILM COATED ORAL at 08:01

## 2025-01-22 RX ADMIN — OXYCODONE HYDROCHLORIDE 5 MG: 5 TABLET ORAL at 05:50

## 2025-01-22 RX ADMIN — POLYETHYLENE GLYCOL 3350 17 G: 17 POWDER, FOR SOLUTION ORAL at 08:00

## 2025-01-22 RX ADMIN — BUPROPION HYDROCHLORIDE 300 MG: 150 TABLET, EXTENDED RELEASE ORAL at 08:00

## 2025-01-22 RX ADMIN — HEPARIN SODIUM 5000 UNITS: 5000 INJECTION INTRAVENOUS; SUBCUTANEOUS at 05:50

## 2025-01-22 RX ADMIN — FOLIC ACID 2 MG: 1 TABLET ORAL at 08:00

## 2025-01-22 RX ADMIN — PANTOPRAZOLE SODIUM 40 MG: 40 TABLET, DELAYED RELEASE ORAL at 05:50

## 2025-01-22 RX ADMIN — METHOCARBAMOL 500 MG: 500 TABLET ORAL at 05:50

## 2025-01-22 RX ADMIN — SENNOSIDES AND DOCUSATE SODIUM 1 TABLET: 50; 8.6 TABLET ORAL at 21:29

## 2025-01-22 RX ADMIN — MYCOPHENOLIC ACID 360 MG: 180 TABLET, DELAYED RELEASE ORAL at 08:01

## 2025-01-22 RX ADMIN — PREGABALIN 75 MG: 75 CAPSULE ORAL at 17:11

## 2025-01-22 RX ADMIN — HYDROMORPHONE HYDROCHLORIDE 0.2 MG: 0.2 INJECTION, SOLUTION INTRAMUSCULAR; INTRAVENOUS; SUBCUTANEOUS at 07:56

## 2025-01-22 RX ADMIN — MYCOPHENOLIC ACID 360 MG: 180 TABLET, DELAYED RELEASE ORAL at 17:11

## 2025-01-22 RX ADMIN — Medication 7.5 MG: at 11:07

## 2025-01-22 RX ADMIN — Medication 7.5 MG: at 19:50

## 2025-01-22 RX ADMIN — ACETAMINOPHEN 650 MG: 325 TABLET, FILM COATED ORAL at 17:11

## 2025-01-22 RX ADMIN — LEVOTHYROXINE SODIUM 150 MCG: 75 TABLET ORAL at 05:50

## 2025-01-22 RX ADMIN — METHOCARBAMOL 750 MG: 750 TABLET ORAL at 11:07

## 2025-01-22 RX ADMIN — HEPARIN SODIUM 5000 UNITS: 5000 INJECTION INTRAVENOUS; SUBCUTANEOUS at 21:30

## 2025-01-22 NOTE — PLAN OF CARE
Problem: DISCHARGE PLANNING  Goal: Discharge to home or other facility with appropriate resources  Description: INTERVENTIONS:  - Identify barriers to discharge w/patient and caregiver  - Arrange for needed discharge resources and transportation as appropriate  - Identify discharge learning needs (meds, wound care, etc.)  - Arrange for interpretive services to assist at discharge as needed  - Refer to Case Management Department for coordinating discharge planning if the patient needs post-hospital services based on physician/advanced practitioner order or complex needs related to functional status, cognitive ability, or social support system  Outcome: Progressing     Problem: INFECTION - ADULT  Goal: Absence of fever/infection during neutropenic period  Description: INTERVENTIONS:  - Monitor WBC    Outcome: Progressing     Problem: Knowledge Deficit  Goal: Patient/family/caregiver demonstrates understanding of disease process, treatment plan, medications, and discharge instructions  Description: Complete learning assessment and assess knowledge base.  Interventions:  - Provide teaching at level of understanding  - Provide teaching via preferred learning methods  Outcome: Progressing     Problem: RESPIRATORY - ADULT  Goal: Achieves optimal ventilation and oxygenation  Description: INTERVENTIONS:  - Assess for changes in respiratory status  - Assess for changes in mentation and behavior  - Position to facilitate oxygenation and minimize respiratory effort  - Oxygen administered by appropriate delivery if ordered  - Initiate smoking cessation education as indicated  - Encourage broncho-pulmonary hygiene including cough, deep breathe, Incentive Spirometry  - Assess the need for suctioning and aspirate as needed  - Assess and instruct to report SOB or any respiratory difficulty  - Respiratory Therapy support as indicated  Outcome: Progressing     Problem: Nutrition/Hydration-ADULT  Goal: Nutrient/Hydration intake  appropriate for improving, restoring or maintaining nutritional needs  Description: Monitor and assess patient's nutrition/hydration status for malnutrition. Collaborate with interdisciplinary team and initiate plan and interventions as ordered.  Monitor patient's weight and dietary intake as ordered or per policy. Utilize nutrition screening tool and intervene as necessary. Determine patient's food preferences and provide high-protein, high-caloric foods as appropriate.     INTERVENTIONS:  - Monitor oral intake, urinary output, labs, and treatment plans  - Assess nutrition and hydration status and recommend course of action  - Evaluate amount of meals eaten  - Assist patient with eating if necessary   - Allow adequate time for meals  - Recommend/ encourage appropriate diets, oral nutritional supplements, and vitamin/mineral supplements  - Order, calculate, and assess calorie counts as needed  - Recommend, monitor, and adjust tube feedings and TPN/PPN based on assessed needs  - Assess need for intravenous fluids  - Provide specific nutrition/hydration education as appropriate  - Include patient/family/caregiver in decisions related to nutrition  Outcome: Progressing

## 2025-01-22 NOTE — ASSESSMENT & PLAN NOTE
"- CT CAP  \"Irregular bladder wall thickening without adjacent stranding. This is unlikely acute cystitis though was not present previously and clinical correlation for urinary tract symptoms recommended.\"  - UA 1/18 negative for evidence of infection  - Follow up with urology as op  "

## 2025-01-22 NOTE — ASSESSMENT & PLAN NOTE
- Hemorrhage of a posterior right lobe cyst both internally and into the adjacent parenchyma and subcapsular space secondary to a grade 2 liver laceration.  - Hgb stable around 11  - Hemodynamically stable  - Regular diet  - Pain control as below  - Surgical Oncology consultation appreciated  - Recommendation for trend H/H, no acute surgical intervention required

## 2025-01-22 NOTE — PLAN OF CARE
Problem: PAIN - ADULT  Goal: Verbalizes/displays adequate comfort level or baseline comfort level  Description: Interventions:  - Encourage patient to monitor pain and request assistance  - Assess pain using appropriate pain scale  - Administer analgesics based on type and severity of pain and evaluate response  - Implement non-pharmacological measures as appropriate and evaluate response  - Consider cultural and social influences on pain and pain management  - Notify physician/advanced practitioner if interventions unsuccessful or patient reports new pain  Outcome: Progressing     Problem: INFECTION - ADULT  Goal: Absence or prevention of progression during hospitalization  Description: INTERVENTIONS:  - Assess and monitor for signs and symptoms of infection  - Monitor lab/diagnostic results  - Monitor all insertion sites, i.e. indwelling lines, tubes, and drains  - Monitor endotracheal if appropriate and nasal secretions for changes in amount and color  - [place] appropriate cooling/warming therapies per order  - Administer medications as ordered  - Instruct and encourage patient and family to use good hand hygiene technique  - Identify and instruct in appropriate isolation precautions for identified infection/condition  Outcome: Progressing  Goal: Absence of fever/infection during neutropenic period  Description: INTERVENTIONS:  - Monitor WBC    Outcome: Progressing

## 2025-01-22 NOTE — PROGRESS NOTES
Progress Note - Trauma   Name: Mimi Biggs 69 y.o. female I MRN: [id number]  [facility] I Date of Admission: 1/17/2025   Date of Service: 1/22/2025 I Hospital Day: 5    Assessment & Plan  Liver laceration, grade II, without open wound into cavity  - Hemorrhage of a posterior right lobe cyst both internally and into the adjacent parenchyma and subcapsular space secondary to a grade 2 liver laceration.  - Hgb stable around 11  - Hemodynamically stable  - Regular diet  - Pain control as below  - Surgical Oncology consultation appreciated  - Recommendation for trend H/H, no acute surgical intervention required  Accidental fall from ladder  - Status post fall off ladder 1/16 and hit a dresser on the way down with the below noted injuries.  - No head strike, LOC, or use of AC/AP medications  - Fall precautions.  - Geriatric Medicine consultation for evaluation, medication review and recommendations.  - PT and OT evaluation and treatment as indicated.  - Case Management consultation for disposition planning.    Closed fracture of multiple ribs of right side  - Acute nondisplaced fractures of the right lateral seventh and eighth ribs, present on admission.  - Continue rib fracture protocol.  - Continue to encourage incentive spirometer use and adequate pulmonary hygiene.    - PIC score   - Appreciate APS evaluation and recommendations.   - epidural placed 1/18    - Epidural paused 1/21 for possible removal 1/22  - Continue multimodal analgesic regimen.  - Supplemental oxygen via nasal cannula as needed to maintain saturations greater than or equal to 94%.  - PT and OT evaluation and treatment as indicated.  - Outpatient follow-up in the trauma clinic for re-evaluation in approximately 2 weeks.  - Multimodal pain control including Epidural pump  - APS consulted, recs appreciated    Closed wedge compression fracture of T12 vertebra (HCC)  T12 compression fracture, likely subacute as there is due appear to be  "subtle changes along the superior endplate on the recent prior study. There has been significant progression from the prior study and correlation with tenderness in this region is recommended.  - Pt w/ no new neurological deficits, ambulatory since the event  - Upright Xrays reviewed  - Appreciate Neurosurgical evaluation  - TSLO brace as needed for comfort  - Thoracolumbar spinal precautions  - Regular neuro exams  - Pain control as above  Bladder wall thickening  - CT CAP  \"Irregular bladder wall thickening without adjacent stranding. This is unlikely acute cystitis though was not present previously and clinical correlation for urinary tract symptoms recommended.\"  - UA 1/18 negative for evidence of infection  - Follow up with urology as op    Bowel Regimen: Senokot S, Miralax  VTE Prophylaxis:VTE covered by:  heparin (porcine), Subcutaneous, 5,000 Units at 01/22/25 0550        Disposition: Continue med/surg status with ongoing pain management Medications reviewed. Continue current medications at prescribed doses.    24 Hour Events : No overnight events  Subjective : Patient reports her pain is currently intolerable. She was able to get up and get washed up in the bathroom and sit in the chair after a dose of IV dilaudid this am. She is tolerating her diet.     Objective :  Temp:  [97.5 °F (36.4 °C)-98.4 °F (36.9 °C)] 98.2 °F (36.8 °C)  HR:  [67-77] 73  BP: (118-137)/(58-64) 124/60  Resp:  [17-20] 20  SpO2:  [94 %-96 %] 95 %  O2 Device: None (Room air)    I/O         01/20 0701  01/21 0700 01/21 0701  01/22 0700 01/22 0701  01/23 0700    P.O. 720 970     I.V. (mL/kg) 173.5 (3.9) 24.9 (0.6)     Total Intake(mL/kg) 893.5 (20.3) 994.9 (22.6)     Urine (mL/kg/hr) 500 (0.5) 2400 (2.3)     Stool 0      Total Output 500 2400     Net +393.5 -1405.2            Unmeasured Stool Occurrence 1 x              Physical Exam  Vitals and nursing note reviewed.   Constitutional:       General: She is not in acute distress.     " Appearance: Normal appearance. She is not ill-appearing or toxic-appearing.   HENT:      Head: Normocephalic and atraumatic.      Nose: Nose normal. No congestion or rhinorrhea.      Mouth/Throat:      Mouth: Mucous membranes are moist.      Pharynx: Oropharynx is clear. No oropharyngeal exudate or posterior oropharyngeal erythema.   Eyes:      Extraocular Movements: Extraocular movements intact.      Conjunctiva/sclera: Conjunctivae normal.      Pupils: Pupils are equal, round, and reactive to light.   Cardiovascular:      Rate and Rhythm: Normal rate and regular rhythm.      Heart sounds: No murmur heard.     No friction rub. No gallop.   Pulmonary:      Effort: Pulmonary effort is normal.      Breath sounds: Normal breath sounds. No wheezing or rhonchi.   Abdominal:      General: Abdomen is flat. Bowel sounds are normal. There is no distension.      Palpations: Abdomen is soft.      Tenderness: There is no abdominal tenderness. There is no guarding or rebound.   Musculoskeletal:      Right shoulder: Normal.      Left shoulder: Normal.      Right upper arm: Normal.      Left upper arm: Normal.      Right elbow: Normal.      Left elbow: Normal.      Right forearm: Normal.      Left forearm: Normal.      Right wrist: Normal.      Left wrist: Normal.      Right hand: Normal.      Left hand: Normal.      Thoracic back: No deformity or tenderness.      Lumbar back: No deformity or tenderness.      Right hip: Normal.      Left hip: Normal.      Right upper leg: Normal.      Left upper leg: Normal.      Right knee: Normal.      Left knee: Normal.      Right lower leg: Normal.      Left lower leg: Normal.      Right ankle: Normal.      Left ankle: Normal.      Right foot: Normal.      Left foot: Normal.   Skin:     General: Skin is warm.      Capillary Refill: Capillary refill takes less than 2 seconds.   Neurological:      General: No focal deficit present.      Mental Status: She is alert and oriented to person, place,  and time.      Sensory: No sensory deficit.      Motor: No weakness.        PIC Score  PIC Pain Score: 1 (1/22/2025  5:50 AM)  PIC Incentive Spirometry Score: 3 (1/22/2025  4:00 AM)  PIC Cough Description: 3 (1/22/2025  4:00 AM)  PIC Total Score: 7 (1/22/2025  4:00 AM)       If the Total PIC Score </=5, did you consult APS and evaluate patient for further intervention?: yes      Pain:    Incentive Spirometry  Cough  3 = Controlled  4 = Above goal volume 3 = Strong  2 = Moderate  3 = Goal to alert volume 2 = Weak  1 = Severe  2 = Below alert volume 1 = Absent     1 = Unable to perform IS           Lab Results: I have reviewed the following results:  Recent Labs     01/22/25  0520   WBC 3.98*   HGB 11.7   HCT 36.1      SODIUM 136   K 4.0      CO2 25   BUN 18   CREATININE 0.64   GLUC 80

## 2025-01-22 NOTE — RESTORATIVE TECHNICIAN NOTE
Restorative Technician Note      Patient Name: Mimi Biggs     Restorative Tech Visit Date: 01/22/25  Note Type: Mobility  Patient Position Upon Consult: Bedside chair  Activity Performed: Ambulated  Assistive Device: Other (Comment) (No AD)  Patient Position at End of Consult: Bedside chair; All needs within reach    GARRY Christensen

## 2025-01-22 NOTE — ASSESSMENT & PLAN NOTE
T12 compression fracture, likely subacute as there is due appear to be subtle changes along the superior endplate on the recent prior study. There has been significant progression from the prior study and correlation with tenderness in this region is recommended.  - Pt w/ no new neurological deficits, ambulatory since the event  - Upright Xrays reviewed  - Appreciate Neurosurgical evaluation  - TSLO brace as needed for comfort  - Thoracolumbar spinal precautions  - Regular neuro exams  - Pain control as above

## 2025-01-22 NOTE — PROGRESS NOTES
Progress Note - Acute Pain   Name: Mimi Biggs 69 y.o. female I MRN: [id number]  Unit/Bed#: [facility] 616-01 I Date of Admission: 1/17/2025   Date of Service: 1/22/2025 I Hospital Day: 5    Assessment & Plan  Liver laceration, grade II, without open wound into cavity    Accidental fall from ladder    Closed fracture of multiple ribs of right side  S/p Rt. 7th & 8th rib fractures   Epidural placed 1/18  Rib Fracture Evaluation:  Chest tube: NA  Respiratory Co-morbidities: General debility  SpO2:   SPO2 RA Rest      Flowsheet Row ED to Hosp-Admission (Current) from 1/17/2025 in [facility] 6   SpO2 95 %   SpO2 Activity At Rest   O2 Device None (Room air)   O2 Flow Rate --                                                                             Incentive Spirometer: Incentive Spirometry Achieved (mL): 1500 mL   Platelet Count:   Results from last 7 days   Lab Units 01/22/25  0520   PLATELETS Thousands/uL 231     Coags:   Results from last 7 days   Lab Units 01/18/25  0546   INR  1.04   PROTIME seconds 13.9     Home anticoagulants: None    VTE covered by:  heparin (porcine), Subcutaneous, 5,000 Units at 01/22/25 0550           Plan:  Epidural removed on 01/21/25   Increase PO oxycodone 5/7.5 mg q4hr PRN for mod-severe pain,   Stop IV dilaudid 0.2mg q2hr PRN for breakthrough  Continue other MMA  PO Tylenol 650mg q6hr ruthie  Lidoderm  Lyrica 75mg BID  Robaxin 500mg q6hr ruthie  Encourage PT/OT, OOB      Closed wedge compression fracture of T12 vertebra (HCC)    Bladder wall thickening      APS will continue to follow. Please contact Acute Pain Service - via SecureChat from [phone] with additional questions or concerns. See [employer] or [employer] for additional contacts and after hours information.     Subjective   Mimi Biggs is a 69 y.o. female who presents after fall with Rt. Sided rib fractures and other injuries. APS team consulted for rib fracture pain. A thoracic epidural was placed on 1/18  for post-traumatic analgesia.     Pain History  Current pain location(s):  Pain Score: 8  Pain Location/Orientation: Orientation: Right, Location: Rib Cage  Pain Scale: Pain Assessment Tool: 0-10  24 hour history: Patient was seen sitting up in chair in mild distress. States that her pain got worse since removal of epidural with minimal benefit from analgesic regimen, she also does not want to go home until her pain is under better control. Discussed that we will plan to increase her oxycodone done for better analgesia and that she should avoid IV dilaudid in anticipation of possible discharge soon.     Opioid requirement previous 24 hours: IV dilaudid 0.2mg x1 + Oxy 5mg x4   Meds/Allergies   all current active meds have been reviewed  Allergies   Allergen Reactions    Thiazide-Type Diuretics Other (See Comments)     Hyponatremia    Duloxetine Other (See Comments)     Mental psychosis    Alendronate GI Intolerance, Myalgia, Nausea Only and Other (See Comments)    Revatio [Sildenafil] Other (See Comments)     mental status changes    Savella [Milnacipran] Other (See Comments)     Feeling out of it    Sulfamethoxazole-Trimethoprim Rash and GI Intolerance    Tedizolid Rash     Objective :  Temp:  [97.4 °F (36.3 °C)-98.4 °F (36.9 °C)] 97.4 °F (36.3 °C)  HR:  [66-77] 66  BP: (112-137)/(50-64) 112/50  Resp:  [17-20] 18  SpO2:  [94 %-96 %] 95 %  O2 Device: None (Room air)    Physical Exam  Vitals and nursing note reviewed.   HENT:      Head: Normocephalic and atraumatic.      Nose: Nose normal.      Mouth/Throat:      Mouth: Mucous membranes are moist.   Pulmonary:      Effort: Pulmonary effort is normal.   Chest:      Chest wall: Tenderness present.   Abdominal:      General: Abdomen is flat.   Musculoskeletal:         General: Normal range of motion.      Cervical back: Normal range of motion.   Skin:     General: Skin is warm.   Neurological:      General: No focal deficit present.      Mental Status: She is alert and  oriented to person, place, and time. Mental status is at baseline.   Psychiatric:         Mood and Affect: Mood normal.         Behavior: Behavior normal.         Thought Content: Thought content normal.         Judgment: Judgment normal.        Epidural site appears clean/dry/intact with no surrounding erythema/edema or induration       Lab Results: I have reviewed the following results:  Estimated Creatinine Clearance: 56.8 mL/min (by C-G formula based on SCr of 0.64 mg/dL).  Lab Results   Component Value Date    WBC 3.98 (L) 01/22/2025    HGB 11.7 01/22/2025    HCT 36.1 01/22/2025     01/22/2025         Component Value Date/Time    K 4.0 01/22/2025 0520    K 4.7 12/23/2024 0849     01/22/2025 0520     12/23/2024 0849    CO2 25 01/22/2025 0520    CO2 32 (H) 12/23/2024 0849    BUN 18 01/22/2025 0520    BUN 16 12/23/2024 0849    CREATININE 0.64 01/22/2025 0520    CREATININE 0.73 12/23/2024 0849         Component Value Date/Time    CALCIUM 8.8 01/22/2025 0520    CALCIUM 9.2 12/23/2024 0849    ALKPHOS 61 01/18/2025 0546    ALKPHOS 117 12/23/2024 0849    AST 23 01/18/2025 0546    AST 15 12/23/2024 0849    ALT 31 01/18/2025 0546    ALT 13 12/23/2024 0849    TP 5.4 (L) 01/18/2025 0546    TP 5.7 (L) 12/23/2024 0849    ALB 3.7 01/18/2025 0546    ALB 4.1 12/23/2024 0849    ALB 4.2 06/26/2024 0839

## 2025-01-22 NOTE — ASSESSMENT & PLAN NOTE
S/p Rt. 7th & 8th rib fractures   Epidural placed 1/18  Rib Fracture Evaluation:  Chest tube: NA  Respiratory Co-morbidities: General debility  SpO2:   SPO2 RA Rest      Flowsheet Row ED to Hosp-Admission (Current) from 1/17/2025 in [facility]   SpO2 95 %   SpO2 Activity At Rest   O2 Device None (Room air)   O2 Flow Rate --                                                                             Incentive Spirometer: Incentive Spirometry Achieved (mL): 1500 mL   Platelet Count:   Results from last 7 days   Lab Units 01/22/25  0520   PLATELETS Thousands/uL 231     Coags:   Results from last 7 days   Lab Units 01/18/25  0546   INR  1.04   PROTIME seconds 13.9     Home anticoagulants: None    VTE covered by:  heparin (porcine), Subcutaneous, 5,000 Units at 01/22/25 0550           Plan:  Epidural removed on 01/21/25   Increase PO oxycodone 5/7.5 mg q4hr PRN for mod-severe pain,   Stop IV dilaudid 0.2mg q2hr PRN for breakthrough  Continue other MMA  PO Tylenol 650mg q6hr ruthie  Lidoderm  Lyrica 75mg BID  Robaxin 500mg q6hr ruthie  Encourage PT/OT, OOB

## 2025-01-22 NOTE — CASE MANAGEMENT
Case Management Discharge Planning Note    Patient name Mimi Biggs  Location [facility] 616/[facility] 616-01 MRN [id number]  : 1955 Date 2025       Current Admission Date: 2025  Current Admission Diagnosis:Liver laceration, grade II, without open wound into cavity   Patient Active Problem List    Diagnosis Date Noted Date Diagnosed    Accidental fall from ladder 2025     Liver laceration, grade II, without open wound into cavity 2025     Closed fracture of multiple ribs of right side 2025     Closed wedge compression fracture of T12 vertebra (HCC) 2025     Bladder wall thickening 2025     Closed fracture of left superior rim of pubis (HCC) 2024     Urge incontinence 2024     Rosacea 2024     B12 deficiency 2024     Peptic stricture of esophagus 2023     Attention deficit hyperactivity disorder (ADHD), predominantly inattentive type 2023     Post-nasal drip 2023     Primary insomnia 10/18/2022     Age-related osteoporosis without current pathological fracture 2022     High risk medication use 2022     Immunocompromised state (HCC) 2022     Hypogammaglobulinemia (HCC) 2021     PAD (peripheral artery disease) ([facility]) 2021     Pulmonary emphysema ([facility]) 2021     Necrotizing myopathy 2020     Vasomotor rhinitis 2019     Chronic pain 2019     Advanced care planning/counseling discussion 02/15/2019     Vitamin D deficiency 2018     Acquired hypothyroidism 2018     Osteoarthritis 10/17/2018     Raynaud's syndrome 10/17/2018     Rectal prolapse 10/17/2018     Sjogren's syndrome (HCC) 10/17/2018     Thyroid cancer (HCC) 10/17/2018     BRCA gene mutation positive 2018     Bilateral hand numbness 2018     Methotrexate, long term, current use 2018     Scleroderma progressive (HCC) 2018     Fecal soiling 2017     Post concussion syndrome 2015      Carpal tunnel syndrome of left wrist 09/14/2015     Carpal tunnel syndrome of right wrist 08/27/2015     Lumbar disc herniation 08/27/2015     Radiculopathy, lumbar region 08/27/2015     Herniated lumbar intervertebral disc 08/03/2015     Heel spur 06/03/2015     Dental disorder 06/02/2015     Fibromyalgia 02/27/2015     Bartholin gland cyst 07/17/2014     Ovarian cyst 07/17/2014     Hematuria 11/18/2013     Lichen planus 10/22/2013     Osteoarthritis of both knees 08/13/2013     Pain in joint of left shoulder 03/26/2013     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HCC) 12/07/2012     Allergic rhinitis 12/07/2012     Depression 12/07/2012     Esophageal reflux disease 12/07/2012     Hyperlipidemia 12/07/2012     Osteopenia 12/07/2012     Peripheral neuropathy 12/07/2012     Hearing loss 11/23/2012       LOS (days): 5  Geometric Mean LOS (GMLOS) (days): 4.4  Days to GMLOS:-0.4     OBJECTIVE:  Risk of Unplanned Readmission Score: 12.2         Current admission status: Inpatient   Preferred Pharmacy:   [id number]  [place] - [address] [address]  Phone: [phone] Fax: [phone]    [facility] [address]  [place] - [address]  [address] [place] [address]  Phone: [phone] Fax: [phone]    [id number]  [place] - [address] [address]  Phone: [phone] Fax: [phone]    [id number] - [place] - [address]  [address]  [facility] [address]  Phone: [phone] Fax: [phone]    Primary Care Provider: Patti Carolina DO    Primary Insurance: MEDICARE  Secondary Insurance: [facility]    DISCHARGE DETAILS:    Pt will need transportation home upon d/c.   CM will follow up with her when she is written for d/c

## 2025-01-22 NOTE — TELEPHONE ENCOUNTER
1/22/25 - PT STILL IN HOSPITAL    01/20/2025- PT IS IN [facility]  01/31/2025- Virtual 2 WK HFU W/ upright thoracolumbar x-rays W/ AP solo     [facility]  Hi,  Can you please schedule this patient for a virtual 2-week hospital follow-up appointment with repeat upright thoracolumbar x-rays?  Appointment to be scheduled with an AP solo.  Thank you,  Laure

## 2025-01-22 NOTE — ASSESSMENT & PLAN NOTE
- Acute nondisplaced fractures of the right lateral seventh and eighth ribs, present on admission.  - Continue rib fracture protocol.  - Continue to encourage incentive spirometer use and adequate pulmonary hygiene.    - PIC score   - Appreciate APS evaluation and recommendations.   - epidural placed 1/18    - Epidural paused 1/21 for possible removal 1/22  - Continue multimodal analgesic regimen.  - Supplemental oxygen via nasal cannula as needed to maintain saturations greater than or equal to 94%.  - PT and OT evaluation and treatment as indicated.  - Outpatient follow-up in the trauma clinic for re-evaluation in approximately 2 weeks.  - Multimodal pain control including Epidural pump  - APS consulted, recs appreciated

## 2025-01-23 PROCEDURE — 99232 SBSQ HOSP IP/OBS MODERATE 35: CPT | Performed by: ANESTHESIOLOGY

## 2025-01-23 PROCEDURE — 99232 SBSQ HOSP IP/OBS MODERATE 35: CPT | Performed by: STUDENT IN AN ORGANIZED HEALTH CARE EDUCATION/TRAINING PROGRAM

## 2025-01-23 RX ORDER — ACETAMINOPHEN 325 MG/1
650 TABLET ORAL EVERY 6 HOURS SCHEDULED
Qty: 240 TABLET | Refills: 0 | Status: SHIPPED | OUTPATIENT
Start: 2025-01-23

## 2025-01-23 RX ORDER — METHOCARBAMOL 750 MG/1
750 TABLET, FILM COATED ORAL EVERY 6 HOURS PRN
Qty: 40 TABLET | Refills: 0 | Status: SHIPPED | OUTPATIENT
Start: 2025-01-23

## 2025-01-23 RX ORDER — METHOCARBAMOL 750 MG/1
750 TABLET, FILM COATED ORAL EVERY 6 HOURS PRN
Qty: 40 TABLET | Refills: 0 | Status: SHIPPED | OUTPATIENT
Start: 2025-01-23 | End: 2025-01-23

## 2025-01-23 RX ORDER — AMOXICILLIN 250 MG
1 CAPSULE ORAL
Qty: 30 TABLET | Refills: 0 | Status: SHIPPED | OUTPATIENT
Start: 2025-01-23

## 2025-01-23 RX ORDER — OXYCODONE HYDROCHLORIDE 5 MG/1
7.5 TABLET ORAL EVERY 6 HOURS PRN
Qty: 45 TABLET | Refills: 0 | Status: SHIPPED | OUTPATIENT
Start: 2025-01-23 | End: 2025-02-02

## 2025-01-23 RX ORDER — OXYCODONE HYDROCHLORIDE 5 MG/1
7.5 TABLET ORAL EVERY 6 HOURS PRN
Qty: 45 TABLET | Refills: 0 | Status: SHIPPED | OUTPATIENT
Start: 2025-01-23 | End: 2025-01-23

## 2025-01-23 RX ADMIN — Medication 7.5 MG: at 16:57

## 2025-01-23 RX ADMIN — PREGABALIN 75 MG: 75 CAPSULE ORAL at 17:01

## 2025-01-23 RX ADMIN — ACETAMINOPHEN 650 MG: 325 TABLET, FILM COATED ORAL at 00:11

## 2025-01-23 RX ADMIN — PANTOPRAZOLE SODIUM 40 MG: 40 TABLET, DELAYED RELEASE ORAL at 06:01

## 2025-01-23 RX ADMIN — METHOCARBAMOL 750 MG: 750 TABLET ORAL at 11:59

## 2025-01-23 RX ADMIN — HYDROXYCHLOROQUINE SULFATE 200 MG: 200 TABLET, FILM COATED ORAL at 09:15

## 2025-01-23 RX ADMIN — LIDOCAINE 5% 1 PATCH: 700 PATCH TOPICAL at 09:13

## 2025-01-23 RX ADMIN — DEXTROAMPHETAMINE SACCHARATE, AMPHETAMINE ASPARTATE, DEXTROAMPHETAMINE SULFATE AND AMPHETAMINE SULFATE 15 MG: 2.5; 2.5; 2.5; 2.5 TABLET ORAL at 09:13

## 2025-01-23 RX ADMIN — METHOCARBAMOL 750 MG: 750 TABLET ORAL at 00:11

## 2025-01-23 RX ADMIN — Medication 1 TABLET: at 09:20

## 2025-01-23 RX ADMIN — Medication 7.5 MG: at 21:11

## 2025-01-23 RX ADMIN — METHOCARBAMOL 750 MG: 750 TABLET ORAL at 17:36

## 2025-01-23 RX ADMIN — SENNOSIDES AND DOCUSATE SODIUM 1 TABLET: 50; 8.6 TABLET ORAL at 21:09

## 2025-01-23 RX ADMIN — FOLIC ACID 2 MG: 1 TABLET ORAL at 09:14

## 2025-01-23 RX ADMIN — TRAZODONE HYDROCHLORIDE 150 MG: 100 TABLET ORAL at 21:09

## 2025-01-23 RX ADMIN — HEPARIN SODIUM 5000 UNITS: 5000 INJECTION INTRAVENOUS; SUBCUTANEOUS at 13:28

## 2025-01-23 RX ADMIN — EZETIMIBE 10 MG: 10 TABLET ORAL at 21:10

## 2025-01-23 RX ADMIN — ACETAMINOPHEN 650 MG: 325 TABLET, FILM COATED ORAL at 11:59

## 2025-01-23 RX ADMIN — MYCOPHENOLIC ACID 360 MG: 180 TABLET, DELAYED RELEASE ORAL at 09:15

## 2025-01-23 RX ADMIN — ONDANSETRON 4 MG: 2 INJECTION INTRAMUSCULAR; INTRAVENOUS at 09:57

## 2025-01-23 RX ADMIN — PREGABALIN 75 MG: 75 CAPSULE ORAL at 09:14

## 2025-01-23 RX ADMIN — ACETAMINOPHEN 650 MG: 325 TABLET, FILM COATED ORAL at 06:02

## 2025-01-23 RX ADMIN — ACETAMINOPHEN 650 MG: 325 TABLET, FILM COATED ORAL at 23:18

## 2025-01-23 RX ADMIN — Medication 7.5 MG: at 06:02

## 2025-01-23 RX ADMIN — HEPARIN SODIUM 5000 UNITS: 5000 INJECTION INTRAVENOUS; SUBCUTANEOUS at 06:01

## 2025-01-23 RX ADMIN — ACETAMINOPHEN 650 MG: 325 TABLET, FILM COATED ORAL at 17:01

## 2025-01-23 RX ADMIN — Medication 7.5 MG: at 00:12

## 2025-01-23 RX ADMIN — METHOCARBAMOL 750 MG: 750 TABLET ORAL at 06:01

## 2025-01-23 RX ADMIN — Medication 7.5 MG: at 12:00

## 2025-01-23 RX ADMIN — METHOCARBAMOL 750 MG: 750 TABLET ORAL at 23:18

## 2025-01-23 RX ADMIN — HEPARIN SODIUM 5000 UNITS: 5000 INJECTION INTRAVENOUS; SUBCUTANEOUS at 21:09

## 2025-01-23 RX ADMIN — BUPROPION HYDROCHLORIDE 300 MG: 150 TABLET, EXTENDED RELEASE ORAL at 09:13

## 2025-01-23 RX ADMIN — MYCOPHENOLIC ACID 360 MG: 180 TABLET, DELAYED RELEASE ORAL at 17:01

## 2025-01-23 RX ADMIN — DIPHENHYDRAMINE HCL 25 MG: 25 TABLET ORAL at 17:36

## 2025-01-23 RX ADMIN — LEVOTHYROXINE SODIUM 150 MCG: 75 TABLET ORAL at 06:01

## 2025-01-23 NOTE — PROGRESS NOTES
Progress Note - Trauma   Name: Mimi Biggs 69 y.o. female I MRN: [id number]  [facility] I Date of Admission: 1/17/2025   Date of Service: 1/23/2025 I Hospital Day: 6    Assessment & Plan  Liver laceration, grade II, without open wound into cavity  - Hemorrhage of a posterior right lobe cyst both internally and into the adjacent parenchyma and subcapsular space secondary to a grade 2 liver laceration.  - Hgb stable around 11  - Hemodynamically stable  - Regular diet  - Pain control as below  - Surgical Oncology consultation appreciated  - Recommendation for trend H/H, no acute surgical intervention required  Accidental fall from ladder  - Status post fall off ladder 1/16 and hit a dresser on the way down with the below noted injuries.  - No head strike, LOC, or use of AC/AP medications  - Fall precautions.  - Geriatric Medicine consultation for evaluation, medication review and recommendations.  - PT and OT evaluation and treatment as indicated.  - Case Management consultation for disposition planning.    Closed fracture of multiple ribs of right side  - Acute nondisplaced fractures of the right lateral seventh and eighth ribs, present on admission.  - Continue rib fracture protocol.  - Continue to encourage incentive spirometer use and adequate pulmonary hygiene.    - PIC score   - Appreciate APS evaluation and recommendations.   - epidural placed 1/18    - Epidural paused 1/21 for possible removal 1/22  - Continue multimodal analgesic regimen.  - Supplemental oxygen via nasal cannula as needed to maintain saturations greater than or equal to 94%.  - PT and OT evaluation and treatment as indicated.  - Outpatient follow-up in the trauma clinic for re-evaluation in approximately 2 weeks.  - Multimodal pain control including Epidural pump  - APS consulted, recs appreciated    Closed wedge compression fracture of T12 vertebra (HCC)  T12 compression fracture, likely subacute as there is due appear to be  "subtle changes along the superior endplate on the recent prior study. There has been significant progression from the prior study and correlation with tenderness in this region is recommended.  - Pt w/ no new neurological deficits, ambulatory since the event  - Upright Xrays reviewed  - Appreciate Neurosurgical evaluation  - TSLO brace as needed for comfort  - Thoracolumbar spinal precautions  - Regular neuro exams  - Pain control as above  Bladder wall thickening  - CT CAP  \"Irregular bladder wall thickening without adjacent stranding. This is unlikely acute cystitis though was not present previously and clinical correlation for urinary tract symptoms recommended.\"  - UA 1/18 negative for evidence of infection  - Follow up with urology as op    Bowel Regimen: Miralax  VTE Prophylaxis:VTE covered by:  heparin (porcine), Subcutaneous, 5,000 Units at 01/23/25 1328        Disposition: Continue med/surg status with new onset nauses and vomitting Please contact the SecureChat role for the Trauma service with any questions/concerns.    24 Hour Events : Patient with nausea and emesis after breakfast  Subjective : Patient reports she feels very poorly she has nausea and \"generally very sore\" and does not feel safe going home.     Objective :  Temp:  [98.1 °F (36.7 °C)-98.5 °F (36.9 °C)] 98.3 °F (36.8 °C)  HR:  [69-82] 72  BP: (111-137)/(58-68) 120/58  Resp:  [15-23] 23  SpO2:  [93 %-97 %] 97 %  O2 Device: None (Room air)    I/O         01/21 0701  01/22 0700 01/22 0701  01/23 0700 01/23 0701  01/24 0700    P.O. 970 960     I.V. (mL/kg) 24.9 (0.6)      Total Intake(mL/kg) 994.9 (22.6) 960 (21.8)     Urine (mL/kg/hr) 2400 (2.3) 2250 (2.1)     Stool  0     Total Output 2400 2250     Net -1405.2 -1290            Unmeasured Urine Occurrence  3 x     Unmeasured Stool Occurrence  4 x     Unmeasured Emesis Occurrence   2 x            Physical Exam  Vitals and nursing note reviewed.   Constitutional:       General: She is not in " acute distress.     Appearance: Normal appearance. She is normal weight. She is not ill-appearing or toxic-appearing.   HENT:      Head: Normocephalic and atraumatic.      Right Ear: Tympanic membrane normal.      Left Ear: Tympanic membrane normal.      Nose: Nose normal. No congestion or rhinorrhea.      Mouth/Throat:      Mouth: Mucous membranes are moist.      Pharynx: Oropharynx is clear. No oropharyngeal exudate or posterior oropharyngeal erythema.   Eyes:      Extraocular Movements: Extraocular movements intact.      Conjunctiva/sclera: Conjunctivae normal.      Pupils: Pupils are equal, round, and reactive to light.   Cardiovascular:      Rate and Rhythm: Normal rate and regular rhythm.      Heart sounds: No murmur heard.     No friction rub. No gallop.   Pulmonary:      Effort: Pulmonary effort is normal.      Breath sounds: Normal breath sounds. No wheezing or rhonchi.   Abdominal:      General: Abdomen is flat. Bowel sounds are normal. There is no distension.      Palpations: Abdomen is soft.      Tenderness: There is no abdominal tenderness. There is no guarding or rebound.   Musculoskeletal:      Right shoulder: Normal.      Left shoulder: Normal.      Right upper arm: Normal.      Left upper arm: Normal.      Right elbow: Normal.      Left elbow: Normal.      Right forearm: Normal.      Left forearm: Normal.      Right wrist: Normal.      Left wrist: Normal.      Right hand: Normal.      Left hand: Normal.      Cervical back: Normal range of motion. No rigidity or tenderness.      Thoracic back: No deformity or tenderness.      Lumbar back: No deformity or tenderness.      Right hip: Normal.      Left hip: Normal.      Right upper leg: Normal.      Left upper leg: Normal.      Right knee: Normal.      Left knee: Normal.      Right lower leg: Normal.      Left lower leg: Normal.      Right ankle: Normal.      Left ankle: Normal.      Right foot: Normal.      Left foot: Normal.   Skin:     General: Skin  is warm and dry.      Capillary Refill: Capillary refill takes less than 2 seconds.   Neurological:      General: No focal deficit present.      Mental Status: She is alert and oriented to person, place, and time.      Sensory: No sensory deficit.      Motor: No weakness.        PIC Score  PIC Pain Score: 1 (1/23/2025 12:00 PM)  PIC Incentive Spirometry Score: 3 (1/23/2025 12:00 PM)  PIC Cough Description: 3 (1/23/2025 12:00 PM)  PIC Total Score: 7 (1/23/2025 12:00 PM)       If the Total PIC Score </=5, did you consult APS and evaluate patient for further intervention?: yes      Pain:    Incentive Spirometry  Cough  3 = Controlled  4 = Above goal volume 3 = Strong  2 = Moderate  3 = Goal to alert volume 2 = Weak  1 = Severe  2 = Below alert volume 1 = Absent     1 = Unable to perform IS           Lab Results: I have reviewed the following results:  Recent Labs     01/22/25  0520   WBC 3.98*   HGB 11.7   HCT 36.1      SODIUM 136   K 4.0      CO2 25   BUN 18   CREATININE 0.64   GLUC 80

## 2025-01-23 NOTE — PROGRESS NOTES
Progress Note - Acute Pain   Name: Mimi Biggs 69 y.o. female I MRN: [id number]  Unit/Bed#: [facility] 616-01 I Date of Admission: 1/17/2025   Date of Service: 1/23/2025 I Hospital Day: 6    Assessment & Plan  Liver laceration, grade II, without open wound into cavity    Accidental fall from ladder    Closed fracture of multiple ribs of right side  S/p Rt. 7th & 8th rib fractures   Epidural placed 1/18 and removed on 1/21  Rib Fracture Evaluation:  Chest tube: NA  Respiratory Co-morbidities: General debility  SpO2:   SPO2 RA Rest      Flowsheet Row ED to Hosp-Admission (Current) from 1/17/2025 in [facility] 6   SpO2 95 %   SpO2 Activity At Rest   O2 Device None (Room air)   O2 Flow Rate --                                                                             Incentive Spirometer: Incentive Spirometry Achieved (mL): 1250 mL   Platelet Count:   Results from last 7 days   Lab Units 01/22/25  0520   PLATELETS Thousands/uL 231     Coags:   Results from last 7 days   Lab Units 01/18/25  0546   INR  1.04   PROTIME seconds 13.9     Home anticoagulants: None    VTE covered by:  heparin (porcine), Subcutaneous, 5,000 Units at 01/23/25 0601           Plan:  Epidural removed on 01/21/25   Increase PO oxycodone 5/7.5 mg q4hr PRN for mod-severe pain,   Stop IV dilaudid 0.2mg q2hr PRN for breakthrough  Continue other MMA  PO Tylenol 650mg q6hr ruthie  Lidoderm  Lyrica 75mg BID  Robaxin 500mg q6hr ruthie  Encourage PT/OT, OOB      Closed wedge compression fracture of T12 vertebra (HCC)    Bladder wall thickening      APS will sign off at this time. Thank you for the consult. All opioids and other analgesics to be written at discretion of primary team. Please contact Acute Pain Service - via SecureChat from [phone] with additional questions or concerns. See [employer] or Chat[employer] for additional contacts and after hours information.    Subjective   Mimi Biggs is a 69 y.o. female who presents after fall with  Rt. Sided rib fractures and other injuries. APS team consulted for rib fracture pain. A thoracic epidural was placed on 1/18 for post-traumatic analgesia.     Pain History  Current pain location(s):  Pain Score: 5  Pain Location/Orientation: Orientation: Right, Location: Rib Cage  Pain Scale: Pain Assessment Tool: 0-10  24 hour history: Patient was seen resting in bed during morning rounds, appears to be in minimal distress. States that she does not feel well today due to nausea and vomiting as well as general malaise but does report that her pain is better than yesterday. IV dilaudid for breakthrough pain was discontinued yesterday. Discussed that she can continue to take pain medication appropriately and that is should it should improve with time as her rib fractures continue to heal.     Opioid requirement previous 24 hours: Oxy 7.5mg x5 + IV dilaudid 0.2mg x1   Meds/Allergies   all current active meds have been reviewed  Allergies   Allergen Reactions    Thiazide-Type Diuretics Other (See Comments)     Hyponatremia    Duloxetine Other (See Comments)     Mental psychosis    Alendronate GI Intolerance, Myalgia, Nausea Only and Other (See Comments)    Revatio [Sildenafil] Other (See Comments)     mental status changes    Savella [Milnacipran] Other (See Comments)     Feeling out of it    Sulfamethoxazole-Trimethoprim Rash and GI Intolerance    Tedizolid Rash     Objective :  Temp:  [98.1 °F (36.7 °C)-98.5 °F (36.9 °C)] 98.3 °F (36.8 °C)  HR:  [69-82] 72  BP: (111-137)/(58-68) 120/58  Resp:  [15-23] 23  SpO2:  [93 %-97 %] 97 %  O2 Device: None (Room air)    Physical Exam  Vitals and nursing note reviewed.   HENT:      Head: Normocephalic and atraumatic.      Nose: Nose normal.      Mouth/Throat:      Mouth: Mucous membranes are moist.   Pulmonary:      Effort: Pulmonary effort is normal.   Chest:      Chest wall: Tenderness present.   Abdominal:      General: Abdomen is flat.   Musculoskeletal:         General:  Normal range of motion.      Cervical back: Normal range of motion.   Skin:     General: Skin is warm.   Neurological:      General: No focal deficit present.      Mental Status: She is alert and oriented to person, place, and time. Mental status is at baseline.   Psychiatric:         Mood and Affect: Mood normal.         Behavior: Behavior normal.         Thought Content: Thought content normal.         Judgment: Judgment normal.            Lab Results: I have reviewed the following results:  Estimated Creatinine Clearance: 56.8 mL/min (by C-G formula based on SCr of 0.64 mg/dL).  Lab Results   Component Value Date    WBC 3.98 (L) 01/22/2025    HGB 11.7 01/22/2025    HCT 36.1 01/22/2025     01/22/2025         Component Value Date/Time    K 4.0 01/22/2025 0520    K 4.7 12/23/2024 0849     01/22/2025 0520     12/23/2024 0849    CO2 25 01/22/2025 0520    CO2 32 (H) 12/23/2024 0849    BUN 18 01/22/2025 0520    BUN 16 12/23/2024 0849    CREATININE 0.64 01/22/2025 0520    CREATININE 0.73 12/23/2024 0849         Component Value Date/Time    CALCIUM 8.8 01/22/2025 0520    CALCIUM 9.2 12/23/2024 0849    ALKPHOS 61 01/18/2025 0546    ALKPHOS 117 12/23/2024 0849    AST 23 01/18/2025 0546    AST 15 12/23/2024 0849    ALT 31 01/18/2025 0546    ALT 13 12/23/2024 0849    TP 5.4 (L) 01/18/2025 0546    TP 5.7 (L) 12/23/2024 0849    ALB 3.7 01/18/2025 0546    ALB 4.1 12/23/2024 0849    ALB 4.2 06/26/2024 0839

## 2025-01-23 NOTE — PLAN OF CARE
Problem: PAIN - ADULT  Goal: Verbalizes/displays adequate comfort level or baseline comfort level  Description: Interventions:  - Encourage patient to monitor pain and request assistance  - Assess pain using appropriate pain scale  - Administer analgesics based on type and severity of pain and evaluate response  - Implement non-pharmacological measures as appropriate and evaluate response  - Consider cultural and social influences on pain and pain management  - Notify physician/advanced practitioner if interventions unsuccessful or patient reports new pain  Outcome: Progressing     Problem: INFECTION - ADULT  Goal: Absence or prevention of progression during hospitalization  Description: INTERVENTIONS:  - Assess and monitor for signs and symptoms of infection  - Monitor lab/diagnostic results  - Monitor all insertion sites, i.e. indwelling lines, tubes, and drains  - Monitor endotracheal if appropriate and nasal secretions for changes in amount and color  - [place]alou appropriate cooling/warming therapies per order  - Administer medications as ordered  - Instruct and encourage patient and family to use good hand hygiene technique  - Identify and instruct in appropriate isolation precautions for identified infection/condition  Outcome: Progressing  Goal: Absence of fever/infection during neutropenic period  Description: INTERVENTIONS:  - Monitor WBC    Outcome: Progressing

## 2025-01-23 NOTE — RESTORATIVE TECHNICIAN NOTE
Restorative Technician Note      Patient Name: Mimi MILTON Kalyan     Pt deferred ambulation at this time due to nausea.    Nathalia Palacio BS

## 2025-01-23 NOTE — DISCHARGE INSTR - AVS FIRST PAGE
Traumatic Solid Organ Injury Discharge Instructions:    - Your accident or injury caused a laceration (cut) and /or bruising of your liver.  Bleeding may have occurred internally.  The bleeding stops as a clot begins to form within the injured area.  It is extremely important that you follow the instructions given to you by your doctors and nurses.  You must limit your physical activity as instructed or you risk disrupting the clot that has formed within your injured organ.  Serious internal bleeding may result.    Activity:  - Walking and normal light activities are encouraged. Normal daily activities including climbing steps are okay.  - Avoid lifting greater than 10 pounds, any strenuous activities and/or exercise, and contact sports for 4 weeks.  - Avoid driving and crowded places for 4 weeks    Return to work:    - You may return to work once you are cleared by the trauma service.    Diet:    - You may resume your normal diet.    Medications:    - You should continue your current medication regimen after discharge unless otherwise instructed. Please refer to your discharge medication list for further details.  - Please take the pain medications as directed.  - You are encouraged to use non-narcotic pain medications first and whenever possible. Reserve the use of narcotic pain medication for moderate to severe pain not controlled by non-narcotic medications.  - No driving while taking narcotic pain medications.  - You may become constipated, especially if taking pain medications. You may take any over the counter stool softeners or laxatives as needed. Examples: Milk of Magnesia, Colace, Senna.    Additional Instructions:  - If you have any questions or concerns after discharge please call the office.  - Call office or return to ER if fever greater than 101, chills, persistent nausea/vomiting, and/or worsening/uncontrollable pain.    Traumatic Rib Fracture Discharge Instructions:    Your rib fractures will take  time to heal. Rib fractures typically take at least 6-8 weeks to heal and may take longer.    Activity:  - Walking and normal light activities are encouraged. Normal daily activities including climbing steps are okay.  - Avoid lifting greater than 10 pounds, any strenuous activities and/or exercise, and contact sports until cleared by the trauma service.  - Continue using the incentive spirometer at least 10 times every hour while awake.    Return to work:    - You may return to work once you are cleared by the trauma service.    Medications:    - You should continue your current medication regimen after discharge unless otherwise instructed. Please refer to your discharge medication list for further details.  - Please take the pain medications as directed.  - You are encouraged to use non-narcotic pain medications first and whenever possible. Reserve the use of narcotic pain medication for moderate to severe pain not controlled by non-narcotic medications.  - No driving while taking narcotic pain medications.  - You may become constipated, especially if taking pain medications. You may take any over the counter stool softeners or laxatives as needed. Examples: Milk of Magnesia, Colace, Senna.    Additional Instructions:  - If you have any questions or concerns after discharge please call the office.  - Call office or return to ER if fever greater than 101, chills, worsening/uncontrollable pain, develop productive cough, increasing shortness of breath, and/or difficulty breathing.      Thoracic Spine Fracture Instructions  Wear TLSO brace as needed for comfort.   No heavy lifting, pushing or pulling > 5 pounds. No bending, twisting or crawling.   No strenuous physical activity.   No working or driving.   Follow up with neurosurgery in 2 weeks with repeat xrays of the spine prior to f/u appt.

## 2025-01-23 NOTE — ASSESSMENT & PLAN NOTE
S/p Rt. 7th & 8th rib fractures   Epidural placed 1/18 and removed on 1/21  Rib Fracture Evaluation:  Chest tube: NA  Respiratory Co-morbidities: General debility  SpO2:   SPO2 RA Rest      Flowsheet Row ED to Hosp-Admission (Current) from 1/17/2025 in [facility] 6   SpO2 95 %   SpO2 Activity At Rest   O2 Device None (Room air)   O2 Flow Rate --                                                                             Incentive Spirometer: Incentive Spirometry Achieved (mL): 1250 mL   Platelet Count:   Results from last 7 days   Lab Units 01/22/25  0520   PLATELETS Thousands/uL 231     Coags:   Results from last 7 days   Lab Units 01/18/25  0546   INR  1.04   PROTIME seconds 13.9     Home anticoagulants: None    VTE covered by:  heparin (porcine), Subcutaneous, 5,000 Units at 01/23/25 0601           Plan:  Epidural removed on 01/21/25   Increase PO oxycodone 5/7.5 mg q4hr PRN for mod-severe pain,   Stop IV dilaudid 0.2mg q2hr PRN for breakthrough  Continue other MMA  PO Tylenol 650mg q6hr ruthie  Lidoderm  Lyrica 75mg BID  Robaxin 500mg q6hr ruthie  Encourage PT/OT, OOB

## 2025-01-23 NOTE — INCIDENTAL FINDINGS
The following findings require follow up:  Radiographic finding   Finding: Irregular bladder wall thickening   Follow up required: Urology follow up   Follow up should be done within 1 month(s)      Incidental finding results were discussed with the Patient by Ondina Morris PA-C on 01/23/25.   They expressed understanding and all questions answered.    I personally discussed these findings with the patient.  She understands that this finding could represent an early finding of bladder cancer.  She understands the recommendation to follow-up with urology and that delaying diagnosis could delay her care and worsen her prognosis.

## 2025-01-23 NOTE — ASSESSMENT & PLAN NOTE
- Status post fall off ladder 1/16 and hit a dresser on the way down with the below noted injuries.  - No head strike, LOC, or use of AC/AP medications  - Fall precautions.  - Geriatric Medicine consultation for evaluation, medication review and recommendations.  - PT and OT evaluation and treatment as indicated.  - Case Management consultation for disposition planning. D/C home today

## 2025-01-23 NOTE — PLAN OF CARE
Problem: PAIN - ADULT  Goal: Verbalizes/displays adequate comfort level or baseline comfort level  Description: Interventions:  - Encourage patient to monitor pain and request assistance  - Assess pain using appropriate pain scale  - Administer analgesics based on type and severity of pain and evaluate response  - Implement non-pharmacological measures as appropriate and evaluate response  - Consider cultural and social influences on pain and pain management  - Notify physician/advanced practitioner if interventions unsuccessful or patient reports new pain  Outcome: Progressing     Problem: INFECTION - ADULT  Goal: Absence or prevention of progression during hospitalization  Description: INTERVENTIONS:  - Assess and monitor for signs and symptoms of infection  - Monitor lab/diagnostic results  - Monitor all insertion sites, i.e. indwelling lines, tubes, and drains  - Monitor endotracheal if appropriate and nasal secretions for changes in amount and color  - [place] appropriate cooling/warming therapies per order  - Administer medications as ordered  - Instruct and encourage patient and family to use good hand hygiene technique  - Identify and instruct in appropriate isolation precautions for identified infection/condition  Outcome: Progressing  Goal: Absence of fever/infection during neutropenic period  Description: INTERVENTIONS:  - Monitor WBC    Outcome: Progressing     Problem: DISCHARGE PLANNING  Goal: Discharge to home or other facility with appropriate resources  Description: INTERVENTIONS:  - Identify barriers to discharge w/patient and caregiver  - Arrange for needed discharge resources and transportation as appropriate  - Identify discharge learning needs (meds, wound care, etc.)  - Arrange for interpretive services to assist at discharge as needed  - Refer to Case Management Department for coordinating discharge planning if the patient needs post-hospital services based on physician/advanced  practitioner order or complex needs related to functional status, cognitive ability, or social support system  Outcome: Progressing     Problem: Knowledge Deficit  Goal: Patient/family/caregiver demonstrates understanding of disease process, treatment plan, medications, and discharge instructions  Description: Complete learning assessment and assess knowledge base.  Interventions:  - Provide teaching at level of understanding  - Provide teaching via preferred learning methods  Outcome: Progressing     Problem: NEUROSENSORY - ADULT  Goal: Achieves stable or improved neurological status  Description: INTERVENTIONS  - Monitor and report changes in neurological status  - Monitor vital signs such as temperature, blood pressure, glucose, and any other labs ordered   - Initiate measures to prevent increased intracranial pressure  - Monitor for seizure activity and implement precautions if appropriate      Outcome: Progressing  Goal: Achieves maximal functionality and self care  Description: INTERVENTIONS  - Monitor swallowing and airway patency with patient fatigue and changes in neurological status  - Encourage and assist patient to increase activity and self care.   - Encourage visually impaired, hearing impaired and aphasic patients to use assistive/communication devices  Outcome: Progressing

## 2025-01-23 NOTE — DISCHARGE SUMMARY
Discharge Summary - Trauma   Name: Mimi Biggs 69 y.o. female I MRN: [id number]  [facility] Date of Admission: 1/17/2025   Date of Service: 1/24/2025 I Hospital Day: 7    Assessment & Plan  Liver laceration, grade II, without open wound into cavity  - Hemorrhage of a posterior right lobe cyst both internally and into the adjacent parenchyma and subcapsular space secondary to a grade 2 liver laceration.  - Hgb stable around 11  - Hemodynamically stable  - Regular diet  - Pain control as below  - Surgical Oncology consultation appreciated  - Recommendation for trend H/H, no acute surgical intervention required  Accidental fall from ladder  - Status post fall off ladder 1/16 and hit a dresser on the way down with the below noted injuries.  - No head strike, LOC, or use of AC/AP medications  - Fall precautions.  - Geriatric Medicine consultation for evaluation, medication review and recommendations.  - PT and OT evaluation and treatment as indicated.  - Case Management consultation for disposition planning. D/C home today    Closed fracture of multiple ribs of right side  - Acute nondisplaced fractures of the right lateral seventh and eighth ribs, present on admission.  - Continue rib fracture protocol.  - Continue to encourage incentive spirometer use and adequate pulmonary hygiene.    - PIC score   - Appreciate APS evaluation and recommendations.   - epidural placed 1/18    - Epidural removed 1/22  - Continue multimodal analgesic regimen.  - Supplemental oxygen via nasal cannula as needed to maintain saturations greater than or equal to 94%.  - PT and OT evaluation and treatment as indicated.  - Outpatient follow-up in the trauma clinic for re-evaluation in approximately 2 weeks.  - Multimodal pain control including Epidural pump  - APS consulted, recs appreciated    Closed wedge compression fracture of T12 vertebra (HCC)  T12 compression fracture, likely subacute as there is due appear to be subtle  "changes along the superior endplate on the recent prior study. There has been significant progression from the prior study and correlation with tenderness in this region is recommended.  - Pt w/ no new neurological deficits, ambulatory since the event  - Upright Xrays reviewed  - Appreciate Neurosurgical evaluation  - TSLO brace as needed for comfort  - Thoracolumbar spinal precautions  - Regular neuro exams  - Pain control as above  Bladder wall thickening  - CT CAP  \"Irregular bladder wall thickening without adjacent stranding. This is unlikely acute cystitis though was not present previously and clinical correlation for urinary tract symptoms recommended.\"  - UA 1/18 negative for evidence of infection  - Follow up with urology as op    Admission Date: 1/17/2025 1528  Discharge Date: 01/24/25  Admitting Diagnosis: T12 compression fracture, initial encounter ([facility]) [S22.080A]  Liver laceration, grade II, without open wound into cavity, initial encounter [S36.115A]  Closed wedge compression fracture of T12 vertebra, initial encounter ([facility]) [S22.080A]  Closed fracture of multiple ribs of right side, initial encounter [S22.41XA]  Unspecified multiple injuries, initial encounter [T07.XXXA]  Discharge Diagnosis:   Medical Problems       Resolved Problems  Date Reviewed: 11/26/2024   None         HPI: From H&P by Dr. Murdock on 1/17/25: \"Mimi Biggs is a 69 y.o. female who presents to the [facility] emergency department following a fall at home yesterday, patient reports she was ambulating down a stepladder, reports she caught her foot on the second step, she fell onto her right side, striking her right ribs on a dresser, reports she did not fall to the ground or strike her head, reports immediate pain in the right ribs, no history of blood thinner use, no loss of consciousness, does not take aspirin at baseline, reports overnight she tolerated at home, but the pain did not improve causing her to present to the emergency " "department, on presentation to the [facility] emergency department she received a CT which demonstrated a hemorrhage from a previous hepatic cyst as well as a grade 2 liver laceration, trauma team at [facility] was consulted and the patient was transferred for further evaluation. \"    Procedures Performed: No orders of the defined types were placed in this encounter.      Summary of Hospital Course:  Patient is a 70 yo female who presented after falling from a step ladder. She was found to have right 7th and 8th rib fractures with a grade II liver laceration and hemorrhage of a preexisting hepatic cyst, as well as an acute T12 compression fracture. She was admitted to the med/surg floor and her hgb was closely monitored. She was evaluated by the acute pain service and an epidural catheter was placed. Her hgb remained stable throughout her hospitalization and her epidural was removed on 1/22. Her pain was well controlled with oral medications and she cleared PT/OT for discharge to home. On 1/24 she was tolerating oral pain medication and she was medically stable for discharge to home.           Significant Findings, Care, Treatment and Services Provided: right 7th and 8th rib fractures with grade II liver laceration and hemorrhage of preexisting hepatic cyst. T12 compression fracture.     Complications: none    Today she is feeling better. She is tolerating breakfast. No N/V. She feels a little abdominal discomfort and feels that her hands and feet are itchy. EKG and labs were sent and all were in normal limits. She is reassured. She received a dose of xanax for anxiety which she takes at home as needed with improvement. She is motivated to go home today       Discharge Day Visit / Exam:   /63   Pulse 75   Temp 98.2 °F (36.8 °C)   Resp 17   Ht 5' (1.524 m)   Wt 44 kg (97 lb)   SpO2 98%   BMI 18.94 kg/m²   Physical Exam  Vitals and nursing note reviewed.   Constitutional:       General: She is not " in acute distress.     Appearance: Normal appearance. She is normal weight. She is not ill-appearing or toxic-appearing.   HENT:      Head: Normocephalic and atraumatic.      Nose: Nose normal. No congestion or rhinorrhea.      Mouth/Throat:      Mouth: Mucous membranes are moist.      Pharynx: Oropharynx is clear. No oropharyngeal exudate or posterior oropharyngeal erythema.   Eyes:      Extraocular Movements: Extraocular movements intact.      Conjunctiva/sclera: Conjunctivae normal.      Pupils: Pupils are equal, round, and reactive to light.   Cardiovascular:      Rate and Rhythm: Normal rate and regular rhythm.      Heart sounds: No murmur heard.     No friction rub. No gallop.   Pulmonary:      Effort: Pulmonary effort is normal.      Breath sounds: Normal breath sounds. No wheezing or rhonchi.   Abdominal:      General: Abdomen is flat. Bowel sounds are normal. There is no distension.      Palpations: Abdomen is soft.      Tenderness: There is no abdominal tenderness. There is no guarding or rebound.   Musculoskeletal:      Right shoulder: Normal.      Left shoulder: Normal.      Right upper arm: Normal.      Left upper arm: Normal.      Right elbow: Normal.      Left elbow: Normal.      Right forearm: Normal.      Left forearm: Normal.      Right wrist: Normal.      Left wrist: Normal.      Right hand: Normal.      Left hand: Normal.      Cervical back: Normal range of motion. No rigidity or tenderness.      Thoracic back: No deformity or tenderness.      Lumbar back: No deformity or tenderness.      Right hip: Normal.      Left hip: Normal.      Right upper leg: Normal.      Left upper leg: Normal.      Right knee: Normal.      Left knee: Normal.      Right lower leg: Normal.      Left lower leg: Normal.      Right ankle: Normal.      Left ankle: Normal.      Right foot: Normal.      Left foot: Normal.   Skin:     General: Skin is warm and dry.      Capillary Refill: Capillary refill takes less than 2  seconds.   Neurological:      General: No focal deficit present.      Mental Status: She is alert and oriented to person, place, and time.      Sensory: No sensory deficit.      Motor: No weakness.          Discussion with Family: Patient declined call to .     Condition at Discharge: good       Discharge instructions/Information to patient and family:   See After Visit Summary (AVS) for information provided to patient and family.      Provisions for Follow-Up Care:  See after visit summary for information related to follow-up care and any pertinent home health orders.      PCP: Patti Carolina,     Disposition: Home    Planned Readmission: No     Discharge Medications:  See after visit summary for reconciled discharge medications provided to patient and family.      Discharge Statement:  I have spent a total time of 25 minutes in caring for this patient on the day of the visit/encounter.

## 2025-01-23 NOTE — ASSESSMENT & PLAN NOTE
- Acute nondisplaced fractures of the right lateral seventh and eighth ribs, present on admission.  - Continue rib fracture protocol.  - Continue to encourage incentive spirometer use and adequate pulmonary hygiene.    - PIC score   - Appreciate APS evaluation and recommendations.   - epidural placed 1/18    - Epidural removed 1/22  - Continue multimodal analgesic regimen.  - Supplemental oxygen via nasal cannula as needed to maintain saturations greater than or equal to 94%.  - PT and OT evaluation and treatment as indicated.  - Outpatient follow-up in the trauma clinic for re-evaluation in approximately 2 weeks.  - Multimodal pain control including Epidural pump  - APS consulted, recs appreciated

## 2025-01-24 VITALS
BODY MASS INDEX: 19.04 KG/M2 | HEART RATE: 75 BPM | DIASTOLIC BLOOD PRESSURE: 63 MMHG | HEIGHT: 60 IN | WEIGHT: 97 LBS | TEMPERATURE: 98.2 F | RESPIRATION RATE: 17 BRPM | SYSTOLIC BLOOD PRESSURE: 123 MMHG | OXYGEN SATURATION: 98 %

## 2025-01-24 LAB
ALBUMIN SERPL BCG-MCNC: 4.2 G/DL (ref 3.5–5)
ALP SERPL-CCNC: 71 U/L (ref 34–104)
ALT SERPL W P-5'-P-CCNC: 36 U/L (ref 7–52)
ANION GAP SERPL CALCULATED.3IONS-SCNC: 6 MMOL/L (ref 4–13)
AST SERPL W P-5'-P-CCNC: 33 U/L (ref 13–39)
ATRIAL RATE: 77 BPM
BASOPHILS # BLD AUTO: 0.07 THOUSANDS/ΜL (ref 0–0.1)
BASOPHILS NFR BLD AUTO: 2 % (ref 0–1)
BILIRUB SERPL-MCNC: 0.35 MG/DL (ref 0.2–1)
BUN SERPL-MCNC: 16 MG/DL (ref 5–25)
CALCIUM SERPL-MCNC: 9.4 MG/DL (ref 8.4–10.2)
CARDIAC TROPONIN I PNL SERPL HS: <2 NG/L (ref ?–50)
CHLORIDE SERPL-SCNC: 98 MMOL/L (ref 96–108)
CO2 SERPL-SCNC: 32 MMOL/L (ref 21–32)
CREAT SERPL-MCNC: 0.78 MG/DL (ref 0.6–1.3)
EOSINOPHIL # BLD AUTO: 0.3 THOUSAND/ΜL (ref 0–0.61)
EOSINOPHIL NFR BLD AUTO: 7 % (ref 0–6)
ERYTHROCYTE [DISTWIDTH] IN BLOOD BY AUTOMATED COUNT: 13.2 % (ref 11.6–15.1)
GFR SERPL CREATININE-BSD FRML MDRD: 77 ML/MIN/1.73SQ M
GLUCOSE SERPL-MCNC: 91 MG/DL (ref 65–140)
HCT VFR BLD AUTO: 41.1 % (ref 34.8–46.1)
HGB BLD-MCNC: 13.1 G/DL (ref 11.5–15.4)
IMM GRANULOCYTES # BLD AUTO: 0.03 THOUSAND/UL (ref 0–0.2)
IMM GRANULOCYTES NFR BLD AUTO: 1 % (ref 0–2)
LIPASE SERPL-CCNC: 49 U/L (ref 11–82)
LYMPHOCYTES # BLD AUTO: 1.4 THOUSANDS/ΜL (ref 0.6–4.47)
LYMPHOCYTES NFR BLD AUTO: 31 % (ref 14–44)
MAGNESIUM SERPL-MCNC: 2 MG/DL (ref 1.9–2.7)
MCH RBC QN AUTO: 28.2 PG (ref 26.8–34.3)
MCHC RBC AUTO-ENTMCNC: 31.9 G/DL (ref 31.4–37.4)
MCV RBC AUTO: 89 FL (ref 82–98)
MONOCYTES # BLD AUTO: 0.52 THOUSAND/ΜL (ref 0.17–1.22)
MONOCYTES NFR BLD AUTO: 11 % (ref 4–12)
NEUTROPHILS # BLD AUTO: 2.23 THOUSANDS/ΜL (ref 1.85–7.62)
NEUTS SEG NFR BLD AUTO: 48 % (ref 43–75)
NRBC BLD AUTO-RTO: 0 /100 WBCS
P AXIS: 54 DEGREES
PHOSPHATE SERPL-MCNC: 4 MG/DL (ref 2.3–4.1)
PLATELET # BLD AUTO: 255 THOUSANDS/UL (ref 149–390)
PMV BLD AUTO: 9.2 FL (ref 8.9–12.7)
POTASSIUM SERPL-SCNC: 4.4 MMOL/L (ref 3.5–5.3)
PR INTERVAL: 184 MS
PROT SERPL-MCNC: 6.4 G/DL (ref 6.4–8.4)
QRS AXIS: -47 DEGREES
QRSD INTERVAL: 80 MS
QT INTERVAL: 370 MS
QTC INTERVAL: 419 MS
RBC # BLD AUTO: 4.64 MILLION/UL (ref 3.81–5.12)
SODIUM SERPL-SCNC: 136 MMOL/L (ref 135–147)
T WAVE AXIS: 44 DEGREES
VENTRICULAR RATE: 77 BPM
WBC # BLD AUTO: 4.55 THOUSAND/UL (ref 4.31–10.16)

## 2025-01-24 PROCEDURE — 93005 ELECTROCARDIOGRAM TRACING: CPT

## 2025-01-24 PROCEDURE — 83690 ASSAY OF LIPASE: CPT | Performed by: PHYSICIAN ASSISTANT

## 2025-01-24 PROCEDURE — 93010 ELECTROCARDIOGRAM REPORT: CPT | Performed by: INTERNAL MEDICINE

## 2025-01-24 PROCEDURE — 99238 HOSP IP/OBS DSCHRG MGMT 30/<: CPT | Performed by: PHYSICIAN ASSISTANT

## 2025-01-24 PROCEDURE — 83735 ASSAY OF MAGNESIUM: CPT | Performed by: PHYSICIAN ASSISTANT

## 2025-01-24 PROCEDURE — 80053 COMPREHEN METABOLIC PANEL: CPT | Performed by: PHYSICIAN ASSISTANT

## 2025-01-24 PROCEDURE — 84484 ASSAY OF TROPONIN QUANT: CPT | Performed by: PHYSICIAN ASSISTANT

## 2025-01-24 PROCEDURE — 84100 ASSAY OF PHOSPHORUS: CPT | Performed by: PHYSICIAN ASSISTANT

## 2025-01-24 PROCEDURE — 85025 COMPLETE CBC W/AUTO DIFF WBC: CPT | Performed by: PHYSICIAN ASSISTANT

## 2025-01-24 RX ADMIN — ONDANSETRON 4 MG: 2 INJECTION INTRAMUSCULAR; INTRAVENOUS at 11:14

## 2025-01-24 RX ADMIN — HEPARIN SODIUM 5000 UNITS: 5000 INJECTION INTRAVENOUS; SUBCUTANEOUS at 05:56

## 2025-01-24 RX ADMIN — DEXTROAMPHETAMINE SACCHARATE, AMPHETAMINE ASPARTATE, DEXTROAMPHETAMINE SULFATE AND AMPHETAMINE SULFATE 15 MG: 2.5; 2.5; 2.5; 2.5 TABLET ORAL at 09:08

## 2025-01-24 RX ADMIN — Medication 7.5 MG: at 05:52

## 2025-01-24 RX ADMIN — ACETAMINOPHEN 650 MG: 325 TABLET, FILM COATED ORAL at 11:08

## 2025-01-24 RX ADMIN — ACETAMINOPHEN 650 MG: 325 TABLET, FILM COATED ORAL at 05:52

## 2025-01-24 RX ADMIN — BUPROPION HYDROCHLORIDE 300 MG: 150 TABLET, EXTENDED RELEASE ORAL at 09:08

## 2025-01-24 RX ADMIN — Medication 7.5 MG: at 11:08

## 2025-01-24 RX ADMIN — PREGABALIN 75 MG: 75 CAPSULE ORAL at 09:08

## 2025-01-24 RX ADMIN — POLYETHYLENE GLYCOL 3350 17 G: 17 POWDER, FOR SOLUTION ORAL at 09:08

## 2025-01-24 RX ADMIN — ALPRAZOLAM 0.25 MG: 0.25 TABLET ORAL at 10:10

## 2025-01-24 RX ADMIN — HYDROXYCHLOROQUINE SULFATE 200 MG: 200 TABLET, FILM COATED ORAL at 09:09

## 2025-01-24 RX ADMIN — LIDOCAINE 5% 1 PATCH: 700 PATCH TOPICAL at 09:08

## 2025-01-24 RX ADMIN — PANTOPRAZOLE SODIUM 40 MG: 40 TABLET, DELAYED RELEASE ORAL at 05:52

## 2025-01-24 RX ADMIN — FOLIC ACID 2 MG: 1 TABLET ORAL at 09:08

## 2025-01-24 RX ADMIN — MYCOPHENOLIC ACID 360 MG: 180 TABLET, DELAYED RELEASE ORAL at 09:09

## 2025-01-24 RX ADMIN — Medication 1 TABLET: at 09:13

## 2025-01-24 RX ADMIN — METHOCARBAMOL 750 MG: 750 TABLET ORAL at 11:08

## 2025-01-24 RX ADMIN — LEVOTHYROXINE SODIUM 150 MCG: 75 TABLET ORAL at 05:52

## 2025-01-24 RX ADMIN — METHOCARBAMOL 750 MG: 750 TABLET ORAL at 05:52

## 2025-01-24 NOTE — PLAN OF CARE
Problem: PAIN - ADULT  Goal: Verbalizes/displays adequate comfort level or baseline comfort level  Description: Interventions:  - Encourage patient to monitor pain and request assistance  - Assess pain using appropriate pain scale  - Administer analgesics based on type and severity of pain and evaluate response  - Implement non-pharmacological measures as appropriate and evaluate response  - Consider cultural and social influences on pain and pain management  - Notify physician/advanced practitioner if interventions unsuccessful or patient reports new pain  Outcome: Progressing     Problem: INFECTION - ADULT  Goal: Absence or prevention of progression during hospitalization  Description: INTERVENTIONS:  - Assess and monitor for signs and symptoms of infection  - Monitor lab/diagnostic results  - Monitor all insertion sites, i.e. indwelling lines, tubes, and drains  - Monitor endotracheal if appropriate and nasal secretions for changes in amount and color  - [place] appropriate cooling/warming therapies per order  - Administer medications as ordered  - Instruct and encourage patient and family to use good hand hygiene technique  - Identify and instruct in appropriate isolation precautions for identified infection/condition  Outcome: Progressing  Goal: Absence of fever/infection during neutropenic period  Description: INTERVENTIONS:  - Monitor WBC    Outcome: Progressing     Problem: DISCHARGE PLANNING  Goal: Discharge to home or other facility with appropriate resources  Description: INTERVENTIONS:  - Identify barriers to discharge w/patient and caregiver  - Arrange for needed discharge resources and transportation as appropriate  - Identify discharge learning needs (meds, wound care, etc.)  - Arrange for interpretive services to assist at discharge as needed  - Refer to Case Management Department for coordinating discharge planning if the patient needs post-hospital services based on physician/advanced  practitioner order or complex needs related to functional status, cognitive ability, or social support system  Outcome: Progressing     Problem: Knowledge Deficit  Goal: Patient/family/caregiver demonstrates understanding of disease process, treatment plan, medications, and discharge instructions  Description: Complete learning assessment and assess knowledge base.  Interventions:  - Provide teaching at level of understanding  - Provide teaching via preferred learning methods  Outcome: Progressing

## 2025-01-24 NOTE — PLAN OF CARE
Problem: PAIN - ADULT  Goal: Verbalizes/displays adequate comfort level or baseline comfort level  Description: Interventions:  - Encourage patient to monitor pain and request assistance  - Assess pain using appropriate pain scale  - Administer analgesics based on type and severity of pain and evaluate response  - Implement non-pharmacological measures as appropriate and evaluate response  - Consider cultural and social influences on pain and pain management  - Notify physician/advanced practitioner if interventions unsuccessful or patient reports new pain  Outcome: Progressing     Problem: INFECTION - ADULT  Goal: Absence or prevention of progression during hospitalization  Description: INTERVENTIONS:  - Assess and monitor for signs and symptoms of infection  - Monitor lab/diagnostic results  - Monitor all insertion sites, i.e. indwelling lines, tubes, and drains  - Monitor endotracheal if appropriate and nasal secretions for changes in amount and color  - [place] appropriate cooling/warming therapies per order  - Administer medications as ordered  - Instruct and encourage patient and family to use good hand hygiene technique  - Identify and instruct in appropriate isolation precautions for identified infection/condition  Outcome: Progressing  Goal: Absence of fever/infection during neutropenic period  Description: INTERVENTIONS:  - Monitor WBC    Outcome: Progressing

## 2025-01-27 ENCOUNTER — NURSE TRIAGE (OUTPATIENT)
Age: 70
End: 2025-01-27

## 2025-01-27 ENCOUNTER — TRANSITIONAL CARE MANAGEMENT (OUTPATIENT)
Dept: FAMILY MEDICINE CLINIC | Facility: CLINIC | Age: 70
End: 2025-01-27

## 2025-01-27 DIAGNOSIS — F90.0 ATTENTION DEFICIT HYPERACTIVITY DISORDER (ADHD), PREDOMINANTLY INATTENTIVE TYPE: ICD-10-CM

## 2025-01-27 DIAGNOSIS — S32.512D CLOSED FRACTURE OF SUPERIOR RAMUS OF LEFT PUBIS WITH ROUTINE HEALING, SUBSEQUENT ENCOUNTER: Primary | ICD-10-CM

## 2025-01-27 DIAGNOSIS — Z91.89 HIGH RISK FOR READMISSION: Primary | ICD-10-CM

## 2025-01-27 RX ORDER — NAPROXEN 500 MG/1
500 TABLET ORAL 2 TIMES DAILY PRN
Qty: 30 TABLET | Refills: 0 | Status: SHIPPED | OUTPATIENT
Start: 2025-01-27

## 2025-01-27 RX ORDER — DEXTROAMPHETAMINE SACCHARATE, AMPHETAMINE ASPARTATE MONOHYDRATE, DEXTROAMPHETAMINE SULFATE AND AMPHETAMINE SULFATE 7.5; 7.5; 7.5; 7.5 MG/1; MG/1; MG/1; MG/1
30 CAPSULE, EXTENDED RELEASE ORAL EVERY MORNING
Qty: 30 CAPSULE | Refills: 0 | Status: SHIPPED | OUTPATIENT
Start: 2025-01-27

## 2025-01-27 RX ORDER — DEXTROAMPHETAMINE SACCHARATE, AMPHETAMINE ASPARTATE, DEXTROAMPHETAMINE SULFATE AND AMPHETAMINE SULFATE 2.5; 2.5; 2.5; 2.5 MG/1; MG/1; MG/1; MG/1
20 TABLET ORAL SEE ADMIN INSTRUCTIONS
Qty: 60 TABLET | Refills: 0 | Status: SHIPPED | OUTPATIENT
Start: 2025-01-27

## 2025-01-27 NOTE — TELEPHONE ENCOUNTER
Attempted to reach Mimi to schedule her TCM appointment with Dr. Carolina. Patient didn't answer/ left a voicemail.

## 2025-01-27 NOTE — TELEPHONE ENCOUNTER
Pt called and since she does not drive and she does not have a ride for Xrays til after 1/31/25, appt moved to 2/10/25 pt states she can get xray in that time frame.

## 2025-01-27 NOTE — TELEPHONE ENCOUNTER
01/27/2025- PT WAS DISCHARGED HOME. CALLED PT AND LEFT MESSAGE ON MACHINE CONFIRMING 01/31/2025 VIRTUAL APT W/ X-RAY NEEDED PRIOR. PT WAS INFORMED THAT THE X-RAY WAS A WALK IN SERVICE AND DID NOT NEED TO BE SCHEDULED.

## 2025-01-27 NOTE — TELEPHONE ENCOUNTER
1/27/25 - PT DISCHARGED TO HOME  1/31/25 Virtual 2 WK HFU W/ upright thoracolumbar x-rays W/ AP solo     01/24/2025- PT STILL IN HOSPITAL

## 2025-01-27 NOTE — TELEPHONE ENCOUNTER
"Patient was admitted to [facility]-1/17/25 after being seen at [facility] post a fall from a ladder. Patient fractured ribs on her R chest and lacerated her liver. Patient was discharged from the hospital on 1/24/25. Patient was prescribed acetaminophen and oxycodone 7.5 mg at D/C. Patient does not want to take the oxycodone. Patient is asking for Naproxen 500 mg 1 tab 2x day. Please send to [facility] in [place]. Patient has pain in her ribs Patient is scheduled for a routine visit on 2/11/25. Patient will need a TCM visit. Patient can be reached at [phone].  Reason for Disposition   MODERATE pain (e.g., interferes with normal activities) and High-risk adult (e.g., age > 60 years, osteoporosis, chronic steroid use)    Answer Assessment - Initial Assessment Questions  1. MECHANISM: \"How did the injury happen?\"      Fell from a step ladder  2. ONSET: \"When did the injury happen?\" (.e.g., minutes, hours, days ago)      1/17/25  3. LOCATION: \"Where on the chest is the injury located?\"      R chest  4. APPEARANCE: \"What does the injury look like?\"      closed  5. BLEEDING: \"Is there any bleeding now?\" If Yes, ask: \"How long has it been bleeding?\"      Internal bleeding  6. SEVERITY: \"Any difficulty with breathing?\"      No breathing issues  7. SIZE: For cuts, bruises, or swelling, ask: \"How large is it?\" (e.g., inches or centimeters)      N/A  8. PAIN: \"Is there pain?\" If Yes, ask: \"How bad is the pain?\" (e.g., Scale 0-10; none, mild, moderate, severe)      Yes-moderate pain  9. TETANUS: For any breaks in the skin, ask: \"When was the last tetanus booster?\"      N/A  10. PREGNANCY: \"Is there any chance you are pregnant?\" \"When was your last menstrual period?\"        N/A    Protocols used: Chest Injury-Adult-[place]    "

## 2025-01-29 ENCOUNTER — PATIENT OUTREACH (OUTPATIENT)
Dept: CASE MANAGEMENT | Facility: OTHER | Age: 70
End: 2025-01-29

## 2025-01-29 NOTE — PROGRESS NOTES
Incoming call from pt who reports that she is managing fairly well, has family support and is doing her best to manage the pain related to her injuries. She states that she has all of the medications ordered at time of discharge and has been trying to reduce her muscle relaxer as well as pain medication slowly. Pt reports that prior to her fall from ladder, she was dedicated to doing squats and going to the gym so that she doesn't become physically dependent upon others as she ages. Provided praise at her positive attitude and ambition.  Follow up appts as listed and discussed   1/31 PCP TCM  2/4 f/u with trauma surg  2/6 [facility] hem/onc for bladder wall thickening  2/10 neurosurgery   She plans on obtaining repeat spine Xray as well as labs on 1/31 while she is out for appt.   Mimi was very appreciative of her care while inpt and the follow up but did decline the need for care management.

## 2025-01-29 NOTE — PROGRESS NOTES
Courtney referral on pt admitted to [facility] then transferred to \[facility]\"" 1/17-1/24 for fall from a ladder on 1/16 in which she fractured right 7th and 8th ribs and lacerated her liver. Pt also fx T12 vertebrae.Chart reviewed.  Roque placed to preferred number, pt's sister. Message left requesting call back. Will place reminder for second outreach if not heard back.

## 2025-01-31 ENCOUNTER — APPOINTMENT (OUTPATIENT)
Dept: LAB | Facility: CLINIC | Age: 70
End: 2025-01-31
Payer: MEDICARE

## 2025-01-31 ENCOUNTER — OFFICE VISIT (OUTPATIENT)
Dept: FAMILY MEDICINE CLINIC | Facility: CLINIC | Age: 70
End: 2025-01-31
Payer: MEDICARE

## 2025-01-31 ENCOUNTER — APPOINTMENT (OUTPATIENT)
Dept: RADIOLOGY | Facility: CLINIC | Age: 70
End: 2025-01-31
Payer: MEDICARE

## 2025-01-31 VITALS
SYSTOLIC BLOOD PRESSURE: 137 MMHG | DIASTOLIC BLOOD PRESSURE: 84 MMHG | OXYGEN SATURATION: 98 % | BODY MASS INDEX: 19.72 KG/M2 | WEIGHT: 100.97 LBS | HEART RATE: 93 BPM
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
## 2025-01-31 DIAGNOSIS — E03.9 ACQUIRED HYPOTHYROIDISM: ICD-10-CM

## 2025-01-31 DIAGNOSIS — N32.89 BLADDER WALL THICKENING: ICD-10-CM

## 2025-01-31 DIAGNOSIS — S36.115D LIVER LACERATION, GRADE II, WITHOUT OPEN WOUND INTO CAVITY, SUBSEQUENT ENCOUNTER: Primary | ICD-10-CM

## 2025-01-31 DIAGNOSIS — E55.9 VITAMIN D DEFICIENCY: ICD-10-CM

## 2025-01-31 DIAGNOSIS — E78.2 MIXED HYPERLIPIDEMIA: ICD-10-CM

## 2025-01-31 DIAGNOSIS — S22.41XD CLOSED FRACTURE OF MULTIPLE RIBS OF RIGHT SIDE WITH ROUTINE HEALING, SUBSEQUENT ENCOUNTER: ICD-10-CM

## 2025-01-31 DIAGNOSIS — S22.080D CLOSED WEDGE COMPRESSION FRACTURE OF T12 VERTEBRA WITH ROUTINE HEALING, SUBSEQUENT ENCOUNTER: ICD-10-CM

## 2025-01-31 DIAGNOSIS — E53.8 B12 DEFICIENCY: ICD-10-CM

## 2025-01-31 DIAGNOSIS — S22.080A T12 COMPRESSION FRACTURE, INITIAL ENCOUNTER (HCC): ICD-10-CM

## 2025-01-31 LAB
25(OH)D3 SERPL-MCNC: 61.6 NG/ML (ref 30–100)
ALBUMIN SERPL BCG-MCNC: 4.4 G/DL (ref 3.5–5)
ALP SERPL-CCNC: 108 U/L (ref 34–104)
ALT SERPL W P-5'-P-CCNC: 17 U/L (ref 7–52)
ANION GAP SERPL CALCULATED.3IONS-SCNC: 7 MMOL/L (ref 4–13)
AST SERPL W P-5'-P-CCNC: 18 U/L (ref 13–39)
BASOPHILS # BLD AUTO: 0.07 THOUSANDS/ΜL (ref 0–0.1)
BASOPHILS NFR BLD AUTO: 2 % (ref 0–1)
BILIRUB SERPL-MCNC: 0.45 MG/DL (ref 0.2–1)
BUN SERPL-MCNC: 21 MG/DL (ref 5–25)
CALCIUM SERPL-MCNC: 9.5 MG/DL (ref 8.4–10.2)
CHLORIDE SERPL-SCNC: 101 MMOL/L (ref 96–108)
CO2 SERPL-SCNC: 28 MMOL/L (ref 21–32)
CREAT SERPL-MCNC: 0.76 MG/DL (ref 0.6–1.3)
EOSINOPHIL # BLD AUTO: 0.22 THOUSAND/ΜL (ref 0–0.61)
EOSINOPHIL NFR BLD AUTO: 5 % (ref 0–6)
ERYTHROCYTE [DISTWIDTH] IN BLOOD BY AUTOMATED COUNT: 13.2 % (ref 11.6–15.1)
GFR SERPL CREATININE-BSD FRML MDRD: 79 ML/MIN/1.73SQ M
GLUCOSE SERPL-MCNC: 92 MG/DL (ref 65–140)
HCT VFR BLD AUTO: 38.8 % (ref 34.8–46.1)
HGB BLD-MCNC: 12.4 G/DL (ref 11.5–15.4)
IMM GRANULOCYTES # BLD AUTO: 0.02 THOUSAND/UL (ref 0–0.2)
IMM GRANULOCYTES NFR BLD AUTO: 1 % (ref 0–2)
LYMPHOCYTES # BLD AUTO: 1.32 THOUSANDS/ΜL (ref 0.6–4.47)
LYMPHOCYTES NFR BLD AUTO: 30 % (ref 14–44)
MCH RBC QN AUTO: 28 PG (ref 26.8–34.3)
MCHC RBC AUTO-ENTMCNC: 32 G/DL (ref 31.4–37.4)
MCV RBC AUTO: 88 FL (ref 82–98)
MONOCYTES # BLD AUTO: 0.48 THOUSAND/ΜL (ref 0.17–1.22)
MONOCYTES NFR BLD AUTO: 11 % (ref 4–12)
NEUTROPHILS # BLD AUTO: 2.27 THOUSANDS/ΜL (ref 1.85–7.62)
NEUTS SEG NFR BLD AUTO: 51 % (ref 43–75)
NRBC BLD AUTO-RTO: 0 /100 WBCS
PLATELET # BLD AUTO: 288 THOUSANDS/UL (ref 149–390)
PMV BLD AUTO: 10.3 FL (ref 8.9–12.7)
POTASSIUM SERPL-SCNC: 4.5 MMOL/L (ref 3.5–5.3)
PROT SERPL-MCNC: 6.5 G/DL (ref 6.4–8.4)
RBC # BLD AUTO: 4.43 MILLION/UL (ref 3.81–5.12)
SODIUM SERPL-SCNC: 136 MMOL/L (ref 135–147)
T4 FREE SERPL-MCNC: 1.49 NG/DL (ref 0.61–1.12)
TSH SERPL DL<=0.05 MIU/L-ACNC: 0.26 UIU/ML (ref 0.45–4.5)
VIT B12 SERPL-MCNC: 964 PG/ML (ref 180–914)
WBC # BLD AUTO: 4.38 THOUSAND/UL (ref 4.31–10.16)

## 2025-01-31 PROCEDURE — 36415 COLL VENOUS BLD VENIPUNCTURE: CPT

## 2025-01-31 PROCEDURE — 80053 COMPREHEN METABOLIC PANEL: CPT

## 2025-01-31 PROCEDURE — 85025 COMPLETE CBC W/AUTO DIFF WBC: CPT

## 2025-01-31 PROCEDURE — 84439 ASSAY OF FREE THYROXINE: CPT

## 2025-01-31 PROCEDURE — 99495 TRANSJ CARE MGMT MOD F2F 14D: CPT | Performed by: FAMILY MEDICINE

## 2025-01-31 PROCEDURE — 82607 VITAMIN B-12: CPT

## 2025-01-31 PROCEDURE — 84443 ASSAY THYROID STIM HORMONE: CPT

## 2025-01-31 PROCEDURE — 82306 VITAMIN D 25 HYDROXY: CPT

## 2025-01-31 PROCEDURE — 72080 X-RAY EXAM THORACOLMB 2/> VW: CPT

## 2025-01-31 NOTE — ASSESSMENT & PLAN NOTE
"- CT CAP  \"Irregular bladder wall thickening without adjacent stranding. This is unlikely acute cystitis though was not present previously and clinical correlation for urinary tract symptoms recommended.\"  - UA 1/18 negative for evidence of infection  - Follow up with urology as op - she sees an out of network urologist            "

## 2025-01-31 NOTE — ASSESSMENT & PLAN NOTE
- Status post fall off ladder 1/16 and hit a dresser on the way down with the below noted injuries.  - No head strike, LOC, or use of AC/AP medications  - Fall precautions.  - Geriatric Medicine consultation for evaluation, medication review and recommendations.  - PT and OT evaluation and treatment as indicated.  - Case Management consultation for disposition planning.    Patient ambulating well at this time, continue outpatient PT

## 2025-01-31 NOTE — ASSESSMENT & PLAN NOTE
T12 compression fracture, likely subacute as there is due appear to be subtle changes along the superior endplate on the recent prior study. There has been significant progression from the prior study and correlation with tenderness in this region is recommended.  - Pt w/ no new neurological deficits, ambulatory since the event  - Upright Xrays reviewed  - Appreciate Neurosurgical evaluation  - TSLO brace as needed for comfort  - Thoracolumbar spinal precautions    She is doing well, continue outpatient PT  Follow up with neurosx as scheduled outpatient     Orders:    Ambulatory Referral to Physical Therapy; Future

## 2025-01-31 NOTE — ASSESSMENT & PLAN NOTE
- Hemorrhage of a posterior right lobe cyst both internally and into the adjacent parenchyma and subcapsular space secondary to a grade 2 liver laceration.  - Hgb wnl at 13.1 at dsicharge  - Hemodynamically stable  - Regular diet  - Surgical Oncology consultation appreciated  - Recommendation for trend H/H, no acute surgical intervention required    Patient has tele visit follow up with surgery out patient coming up

## 2025-01-31 NOTE — ASSESSMENT & PLAN NOTE
- Acute nondisplaced fractures of the right lateral seventh and eighth ribs, present on admission.  - Continue rib fracture protocol.  - Continue to encourage incentive spirometer use and adequate pulmonary hygiene.    - PIC score   - Appreciate APS evaluation and recommendations.   - epidural placed 1/18    - Epidural paused 1/21 for possible removal 1/22  - Continue multimodal analgesic regimen.  - Supplemental oxygen via nasal cannula as needed to maintain saturations greater than or equal to 94%.  - PT and OT evaluation and treatment as indicated.  - Outpatient follow-up in the trauma clinic for re-evaluation in approximately 2 weeks.  - Multimodal pain control including Epidural pump  - APS consulted, recs appreciated    Pain wise patient is now doing well and starting to wean herself off the oxy   Follow up with trauma surg as schedule outpatient       Orders:    Ambulatory Referral to Physical Therapy; Future

## 2025-01-31 NOTE — PROGRESS NOTES
Transition of Care Visit  Name: Mimi Biggs      : 1955      MRN: [id number]  Encounter Provider: Patti Carolina DO  Encounter Date: 2025   Encounter department: [facility] PRIMARY CARE    Assessment & Plan  Liver laceration, grade II, without open wound into cavity, subsequent encounter  - Hemorrhage of a posterior right lobe cyst both internally and into the adjacent parenchyma and subcapsular space secondary to a grade 2 liver laceration.  - Hgb wnl at 13.1 at dsicharge  - Hemodynamically stable  - Regular diet  - Surgical Oncology consultation appreciated  - Recommendation for trend H/H, no acute surgical intervention required    Patient has tele visit follow up with surgery out patient coming up          Fall from ladder, subsequent encounter  - Status post fall off ladder  and hit a dresser on the way down with the below noted injuries.  - No head strike, LOC, or use of AC/AP medications  - Fall precautions.  - Geriatric Medicine consultation for evaluation, medication review and recommendations.  - PT and OT evaluation and treatment as indicated.  - Case Management consultation for disposition planning.    Patient ambulating well at this time, continue outpatient PT        Closed fracture of multiple ribs of right side with routine healing, subsequent encounter  - Acute nondisplaced fractures of the right lateral seventh and eighth ribs, present on admission.  - Continue rib fracture protocol.  - Continue to encourage incentive spirometer use and adequate pulmonary hygiene.    - PIC score   - Appreciate APS evaluation and recommendations.   - epidural placed     - Epidural paused  for possible removal   - Continue multimodal analgesic regimen.  - Supplemental oxygen via nasal cannula as needed to maintain saturations greater than or equal to 94%.  - PT and OT evaluation and treatment as indicated.  - Outpatient follow-up in the trauma clinic for  "re-evaluation in approximately 2 weeks.  - Multimodal pain control including Epidural pump  - APS consulted, recs appreciated    Pain wise patient is now doing well and starting to wean herself off the oxy   Follow up with trauma surg as schedule outpatient       Orders:    Ambulatory Referral to Physical Therapy; Future    Closed wedge compression fracture of T12 vertebra with routine healing, subsequent encounter  T12 compression fracture, likely subacute as there is due appear to be subtle changes along the superior endplate on the recent prior study. There has been significant progression from the prior study and correlation with tenderness in this region is recommended.  - Pt w/ no new neurological deficits, ambulatory since the event  - Upright Xrays reviewed  - Appreciate Neurosurgical evaluation  - TSLO brace as needed for comfort  - Thoracolumbar spinal precautions    She is doing well, continue outpatient PT  Follow up with neurosx as scheduled outpatient     Orders:    Ambulatory Referral to Physical Therapy; Future    Bladder wall thickening  - CT CAP  \"Irregular bladder wall thickening without adjacent stranding. This is unlikely acute cystitis though was not present previously and clinical correlation for urinary tract symptoms recommended.\"  - UA 1/18 negative for evidence of infection  - Follow up with urology as op - she sees an out of network urologist              Depression Screening and Follow-up Plan: Patient was screened for depression during today's encounter. They screened negative with a PHQ-9 score of 2.        Return for Next scheduled follow up.      History of Present Illness     Transitional Care Management Review:   Mimi Biggs is a 69 y.o. female here for TCM follow up.     During the TCM phone call patient stated:  TCM Call       Date and time call was made  1/27/2025 12:32 PM    Hospital care reviewed  Records reviewed    Patient was hospitial[facility] at  [facility]" "Comment  [facility]    Date of Admission  01/17/25    Date of discharge  01/24/25    Diagnosis  Trauma Broken ribs and Laceration of liver    Disposition  Home    Current Symptoms  Incisional pain    Incisional pain severity  Mild    Incisional pain onset  Ongoing          TCM Call       Post hospital issues  Reduced activity    Should patient be enrolled in anticoag monitoring?  No    Scheduled for follow up?  Yes    Patients specialists  Urologist    Did you obtain your prescribed medications  Yes    Do you need help managing your prescriptions or medications  No    Is transportation to your appointment needed  Yes    Specify why  Medications do not allow her to drive, or she drives and doesnt take her medications    I have advised the patient to call PCP with any new or worsening symptoms  Aide LANGSTON    Living Arrangements  Alone    Are you recieving any outpatient services  No    Are you recieving home care services  No    Are you using any community resources  No    Current waiver services  No    Have you fallen in the last 12 months  No    Interperter language line needed  No          HPI  Discharge hospital course  \" Patient is a 68 yo female who presented after falling from a step ladder. She was found to have right 7th and 8th rib fractures with a grade II liver laceration and hemorrhage of a preexisting hepatic cyst, as well as an acute T12 compression fracture. She was admitted to the med/surg floor and her hgb was closely monitored. She was evaluated by the acute pain service and an epidural catheter was placed. Her hgb remained stable throughout her hospitalization and her epidural was removed on 1/22. Her pain was well controlled with oral medications and she cleared PT/OT for discharge to home. On 1/24 she was tolerating oral pain medication and she was medically stable for discharge to home. \"    1/31 PCP TCM  2/4 f/u with trauma surg  2/6 [facility] hem/onc - patient wants to discuss " with Dr. Ross regarding the bladder wall thickening as well and hold of urology until she sees him first.   2/10 neurosurgery   She plans on obtaining repeat spine Xray as well as labs on 1/31 while she is out for appt.    Currently patient reports she is doing well. She is ambulating without use of assistive device. She is weaning off oxy and switching to naproxen as needed.     Review of Systems   Constitutional:  Negative for appetite change, chills, diaphoresis, fever and unexpected weight change.   Eyes:  Negative for visual disturbance.   Respiratory:  Negative for shortness of breath.    Cardiovascular:  Negative for chest pain and leg swelling.   Gastrointestinal:  Negative for constipation and diarrhea.   Endocrine: Negative for polydipsia and polyuria.   Genitourinary:  Negative for frequency.   Musculoskeletal:  Positive for back pain.   Neurological:  Negative for dizziness, light-headedness and headaches.     Objective   /84 (BP Location: Right arm, Patient Position: Sitting, Cuff Size: Standard)   Pulse 93   Wt 45.8 kg (100 lb 15.5 oz)   SpO2 98%   BMI 19.72 kg/m²     Physical Exam  Vitals reviewed.   Constitutional:       General: She is not in acute distress.     Appearance: She is not ill-appearing.   HENT:      Head: Normocephalic and atraumatic.   Cardiovascular:      Rate and Rhythm: Normal rate.   Pulmonary:      Effort: Pulmonary effort is normal.   Musculoskeletal:      Cervical back: Neck supple.   Neurological:      General: No focal deficit present.      Mental Status: She is alert.      Sensory: No sensory deficit.      Motor: No weakness.      Gait: Gait normal.   Psychiatric:         Mood and Affect: Mood normal.         Behavior: Behavior normal.       Medications have been reviewed by provider in current encounter

## 2025-02-10 ENCOUNTER — TELEMEDICINE (OUTPATIENT)
Age: 70
End: 2025-02-10
Payer: MEDICARE

## 2025-02-10 DIAGNOSIS — F33.9 RECURRENT DEPRESSION (HCC): ICD-10-CM

## 2025-02-10 DIAGNOSIS — M51.26 HERNIATED LUMBAR INTERVERTEBRAL DISC: ICD-10-CM

## 2025-02-10 DIAGNOSIS — M54.16 RADICULOPATHY, LUMBAR REGION: ICD-10-CM

## 2025-02-10 DIAGNOSIS — F41.8 SITUATIONAL ANXIETY: ICD-10-CM

## 2025-02-10 DIAGNOSIS — C82.90 FOLLICULAR LYMPHOMA, UNSPECIFIED FOLLICULAR LYMPHOMA TYPE, UNSPECIFIED BODY REGION (HCC): ICD-10-CM

## 2025-02-10 DIAGNOSIS — M33.20 POLYMYOSITIS (HCC): ICD-10-CM

## 2025-02-10 DIAGNOSIS — S22.080A CLOSED WEDGE COMPRESSION FRACTURE OF T12 VERTEBRA (HCC): Primary | ICD-10-CM

## 2025-02-10 DIAGNOSIS — J43.9 PULMONARY EMPHYSEMA, UNSPECIFIED EMPHYSEMA TYPE (HCC): ICD-10-CM

## 2025-02-10 DIAGNOSIS — F32.A DEPRESSION, UNSPECIFIED DEPRESSION TYPE: ICD-10-CM

## 2025-02-10 DIAGNOSIS — F98.8 ATTENTION DEFICIT DISORDER, UNSPECIFIED HYPERACTIVITY PRESENCE: ICD-10-CM

## 2025-02-10 DIAGNOSIS — F51.01 PRIMARY INSOMNIA: ICD-10-CM

## 2025-02-10 PROCEDURE — 99215 OFFICE O/P EST HI 40 MIN: CPT | Performed by: PHYSICIAN ASSISTANT

## 2025-02-10 RX ORDER — FOLIC ACID 1 MG/1
2 TABLET ORAL DAILY
Qty: 180 TABLET | Refills: 1 | Status: SHIPPED | OUTPATIENT
Start: 2025-02-10

## 2025-02-10 NOTE — ASSESSMENT & PLAN NOTE
"Pt presents to the  neurosurgery office today via virtual appt for her hospital follow-up appt in regard to a T12 compression fracture.   Pt initially presented to the hospital on  after a fall off a step ladder striking her R side on her dresser   Noted to have a mild SEP comp fx of T12   Also with R sided rib fractures and splenic laceration   Pt was managed conservatively with a TLSO brace as needed give most of her pain was from her fractured ribs and plan for close outpt follow-up   Pt presents today for follow-up in regard to her T12 comp fx   She is approximately 4 weeks out of injury   Pt reports some ongoing low back pain, L sided > R side   Pt will take naproxen and a muscle relaxer as needed with relief   This pain has been present since a fall in November last year, in addition to some chronic LBP as well   Denies any radicular pain, weakness, N/T, BBI, urinary retention, saddle anesthesia, balance difficulty   Pt has been compliant with her TLSO brace which she does feel helps her pain     Imagin2025 upright x-rays: T8 compression fracture as seen on prior exam with persistent scoliotic deformity as described. No new compression fracture seen.   T12 fracture difficult to appreciate. Overall aligment well maintained   2025 upright x-rays: Unchanged mild T12 compression fracture. Mild chronic T8 compression deformit  2025 CT a/p: There is a T12 compression fracture with fracture line visualized in the superior endplate. Subtle changes were present on the prior CT and this is likely subacute. There is a chronic compression fracture of T8.     Plan:   Pt encouraged to continue to closely monitor her neurological exam and sx   Pt educated on \"red flag\" s/sx to monitor for including worsening pain, weakness, radicular pain, N/T, balance trouble, BBI, urinary retention, etc.   Imaging reviewed at length with the pt   Repeat upright x-rays with overall stable findings. Difficult to " appreciate T12 comp fx. No appreciated significant loss of height or kyphosis at that level. Alignment stable.   Recommend ongoing conservative management at this time   TLSO brace to be worn when OOB or HOB > 45 deg, as needed for pain   Maintain thoracolumbar spinal precautions  No bending, twisting, lifting > 5-10lbs   no driving when in the brace  Continue current pain medication regimen as prescribed   Pt encouraged to continue to work with PT as prescribed per PCP and within the above-noted activity restrictions  Will plan to follow-up the patient again in approximately 4 weeks with repeat upright thoracolumbar x-rays  Should patient have persistent pain or any new radicular pain, can consider further evaluation with an MRI lumbar spine given large   Appt to be scheduled with an AP solo   Pt encouraged to continue to follow her PCP in regards to workup and management of possible underlying osteoporosis  Pt agreeable to the above noted plan   All questions answered at this time   Pt encouraged to call the office with any further questions or concerns or should they experience any worsening sx

## 2025-02-10 NOTE — PROGRESS NOTES
Virtual Regular Visit  Name: Mimi Biggs      : 1955      MRN: [id number]  Encounter Provider: Laure You PA-C  Encounter Date: 2/10/2025   Encounter department: [facility]    Verification of patient location:  Patient is located at Home in the following state in which I hold an active license [place] :  Assessment & Plan  Closed wedge compression fracture of T12 vertebra (HCC)  Pt presents to the  neurosurgery office today via virtual appt for her hospital follow-up appt in regard to a T12 compression fracture.   Pt initially presented to the hospital on  after a fall off a step ladder striking her R side on her dresser   Noted to have a mild SEP comp fx of T12   Also with R sided rib fractures and splenic laceration   Pt was managed conservatively with a TLSO brace as needed give most of her pain was from her fractured ribs and plan for close outpt follow-up   Pt presents today for follow-up in regard to her T12 comp fx   She is approximately 4 weeks out of injury   Pt reports some ongoing low back pain, L sided > R side   Pt will take naproxen and a muscle relaxer as needed with relief   This pain has been present since a fall in November last year, in addition to some chronic LBP as well   Denies any radicular pain, weakness, N/T, BBI, urinary retention, saddle anesthesia, balance difficulty   Pt has been compliant with her TLSO brace which she does feel helps her pain     Imagin2025 upright x-rays: T8 compression fracture as seen on prior exam with persistent scoliotic deformity as described. No new compression fracture seen.   T12 fracture difficult to appreciate. Overall aligment well maintained   2025 upright x-rays: Unchanged mild T12 compression fracture. Mild chronic T8 compression deformit  2025 CT a/p: There is a T12 compression fracture with fracture line visualized in the superior endplate. Subtle changes were present  "on the prior CT and this is likely subacute. There is a chronic compression fracture of T8.     Plan:   Pt encouraged to continue to closely monitor her neurological exam and sx   Pt educated on \"red flag\" s/sx to monitor for including worsening pain, weakness, radicular pain, N/T, balance trouble, BBI, urinary retention, etc.   Imaging reviewed at length with the pt   Repeat upright x-rays with overall stable findings. Difficult to appreciate T12 comp fx. No appreciated significant loss of height or kyphosis at that level. Alignment stable.   Recommend ongoing conservative management at this time   TLSO brace to be worn when OOB or HOB > 45 deg, as needed for pain   Maintain thoracolumbar spinal precautions  No bending, twisting, lifting > 5-10lbs   no driving when in the brace  Continue current pain medication regimen as prescribed   Pt encouraged to continue to work with PT as prescribed per PCP and within the above-noted activity restrictions  Will plan to follow-up the patient again in approximately 4 weeks with repeat upright thoracolumbar x-rays  Should patient have persistent pain or any new radicular pain, can consider further evaluation with an MRI lumbar spine given large   Appt to be scheduled with an AP solo   Pt encouraged to continue to follow her PCP in regards to workup and management of possible underlying osteoporosis  Pt agreeable to the above noted plan   All questions answered at this time   Pt encouraged to call the office with any further questions or concerns or should they experience any worsening sx    Encounter provider Laure You PA-C    The patient was identified by name and date of birth. Mimi Biggs was informed that this is a telemedicine visit and that the visit is being conducted through the Epic Embedded platform. She agrees to proceed..  My office door was closed. No one else was in the room.  She acknowledged consent and understanding of privacy and security of " the video platform. The patient has agreed to participate and understands they can discontinue the visit at any time.    Patient is aware this is a billable service.     History of Present Illness     Pt is a 68yo F with a PMH significant for follicular lymphoma, MGUS, autoimmune necrotizing myopathy, scleroderma, Raynaud's, Sjogren's, HLD, hypothyroidism, and fibromyalgia who presents to the [facility] neurosurgery office today for her hospital follow-up in regards to a T12 compression fracture.  Patient was admitted to the hospital on 1/17/2025 after a mechanical fall.  Patient states she was on a stepladder in her house tripped and fell, striking her right side on a dresser.  Patient underwent trauma evaluation and workup and was noted to have a mild T12 superior endplate compression fracture as well as multiple right-sided rib fractures and a splenic laceration.  Patient was admitted to the hospital on the trauma service for ongoing care and management.  Neurosurgery was consulted for this mild T12 compression fracture.  Patient was ultimately managed conservatively with a TLSO brace to be worn as needed given her multiple rib fractures in rib pain in the hospital.  Patient was advised to follow-up as an outpatient for close monitoring.    Patient presents today for her follow-up appointment.  She is approximately 3-4 weeks out from injury.  Patient states she is overall been doing well but does admit to some ongoing low back pain.  She admits to a history of chronic low back pain but this was acutely worsened after a fall in November and has been persistent since this most recent fall in January.  Patient states she has been taking naproxen and occasional Robaxin with relief of her pain.  Patient states she continues to be active around her house and is looking forward to starting physical therapy next week.  Patient denies any radicular pain, weakness, numbness, bowel/bladder incontinence, saddle anesthesia,  balance difficulty, additional falls or injuries.  Patient has been compliant with her TLSO brace.        Review of Systems   Musculoskeletal:  Positive for back pain.   All other systems reviewed and are negative.      Objective   There were no vitals taken for this visit.    Physical Exam  (Limited exam given virtual nature of appt)   General appearance: alert, appears stated age, cooperative and no distress  Head: Normocephalic, without obvious abnormality, atraumatic  Eyes: eyes open spont, tracking appropriately   Lungs: non labored breathing  Neurologic:   Mental status: Alert, oriented x3, thought content appropriate  Cranial nerves: face appears symmetric   Motor: moving all extremities     Visit Time  Total Visit Duration: 40

## 2025-02-11 ENCOUNTER — RX ONLY (RX ONLY)
Age: 70
End: 2025-02-11

## 2025-02-11 DIAGNOSIS — E03.9 ACQUIRED HYPOTHYROIDISM: ICD-10-CM

## 2025-02-11 RX ORDER — BUPROPION HYDROCHLORIDE 300 MG/1
300 TABLET ORAL EVERY MORNING
Qty: 90 TABLET | Refills: 1 | Status: SHIPPED | OUTPATIENT
Start: 2025-02-11

## 2025-02-11 RX ORDER — PREGABALIN 75 MG/1
75 CAPSULE ORAL 2 TIMES DAILY
Qty: 180 CAPSULE | Refills: 1 | OUTPATIENT
Start: 2025-02-11 | End: 2025-08-10

## 2025-02-11 RX ORDER — PREGABALIN 75 MG/1
75 CAPSULE ORAL 2 TIMES DAILY
Qty: 180 CAPSULE | Refills: 1 | Status: SHIPPED | OUTPATIENT
Start: 2025-02-11 | End: 2025-08-10

## 2025-02-11 RX ORDER — DOXYCYCLINE HYCLATE 50 MG/1
500 CAPSULE ORAL 2 TIMES DAILY
Qty: 600 CAPSULE | Refills: 0 | OUTPATIENT
Start: 2025-02-11

## 2025-02-11 RX ORDER — DOXYCYCLINE HYCLATE 50 MG/1
50MG CAPSULE, GELATIN COATED ORAL BID
Qty: 28 | Refills: 0 | Status: ERX

## 2025-02-12 RX ORDER — LEVOTHYROXINE SODIUM 150 UG/1
150 TABLET ORAL DAILY
Qty: 90 TABLET | Refills: 1 | Status: SHIPPED | OUTPATIENT
Start: 2025-02-12

## 2025-02-16 PROBLEM — S36.115A LIVER LACERATION, GRADE II, WITHOUT OPEN WOUND INTO CAVITY: Status: RESOLVED | Noted: 2025-01-17 | Resolved: 2025-02-16

## 2025-02-19 ENCOUNTER — TELEPHONE (OUTPATIENT)
Age: 70
End: 2025-02-19

## 2025-02-19 NOTE — TELEPHONE ENCOUNTER
Pt called to reschedule 2/27/25 2 week follow up as she was still too sick with Flu to get Xrays, her next available date is 3/7/25 when she is off from work so she will wait until the appt for 4 weeks  already scheduled for 3/11/25.  Pt will get xrays prior

## 2025-02-25 DIAGNOSIS — F90.0 ATTENTION DEFICIT HYPERACTIVITY DISORDER (ADHD), PREDOMINANTLY INATTENTIVE TYPE: ICD-10-CM

## 2025-02-25 DIAGNOSIS — S32.512D CLOSED FRACTURE OF SUPERIOR RAMUS OF LEFT PUBIS WITH ROUTINE HEALING, SUBSEQUENT ENCOUNTER: ICD-10-CM

## 2025-02-25 RX ORDER — NAPROXEN 500 MG/1
500 TABLET ORAL 2 TIMES DAILY PRN
Qty: 30 TABLET | Refills: 0 | Status: SHIPPED | OUTPATIENT
Start: 2025-02-25

## 2025-02-26 RX ORDER — DEXTROAMPHETAMINE SACCHARATE, AMPHETAMINE ASPARTATE MONOHYDRATE, DEXTROAMPHETAMINE SULFATE AND AMPHETAMINE SULFATE 7.5; 7.5; 7.5; 7.5 MG/1; MG/1; MG/1; MG/1
30 CAPSULE, EXTENDED RELEASE ORAL EVERY MORNING
Qty: 30 CAPSULE | Refills: 0 | Status: SHIPPED | OUTPATIENT
Start: 2025-02-26

## 2025-02-26 RX ORDER — DEXTROAMPHETAMINE SACCHARATE, AMPHETAMINE ASPARTATE, DEXTROAMPHETAMINE SULFATE AND AMPHETAMINE SULFATE 2.5; 2.5; 2.5; 2.5 MG/1; MG/1; MG/1; MG/1
20 TABLET ORAL SEE ADMIN INSTRUCTIONS
Qty: 60 TABLET | Refills: 0 | Status: SHIPPED | OUTPATIENT
Start: 2025-02-26

## 2025-02-26 NOTE — TELEPHONE ENCOUNTER
Pt called to reschedule in office visit per notes 2/25/25, appt is 3/7/25 at 11:15 am with Cara in [facility] office, address provided and pt will obtain xrays prior to visit.

## 2025-03-04 ENCOUNTER — APPOINTMENT (OUTPATIENT)
Dept: URBAN - NONMETROPOLITAN AREA CLINIC 4 | Facility: CLINIC | Age: 70
Setting detail: DERMATOLOGY
End: 2025-03-04

## 2025-03-04 ENCOUNTER — APPOINTMENT (OUTPATIENT)
Dept: RADIOLOGY | Facility: CLINIC | Age: 70
End: 2025-03-04
Payer: MEDICARE

## 2025-03-04 ENCOUNTER — TELEPHONE (OUTPATIENT)
Age: 70
End: 2025-03-04

## 2025-03-04 DIAGNOSIS — L72.8 OTHER FOLLICULAR CYSTS OF THE SKIN AND SUBCUTANEOUS TISSUE: ICD-10-CM

## 2025-03-04 DIAGNOSIS — L30.1 DYSHIDROSIS [POMPHOLYX]: ICD-10-CM | Status: WELL CONTROLLED

## 2025-03-04 DIAGNOSIS — L57.0 ACTINIC KERATOSIS: ICD-10-CM

## 2025-03-04 DIAGNOSIS — L71.8 OTHER ROSACEA: ICD-10-CM

## 2025-03-04 DIAGNOSIS — S22.080A CLOSED WEDGE COMPRESSION FRACTURE OF T12 VERTEBRA (HCC): ICD-10-CM

## 2025-03-04 PROBLEM — D48.5 NEOPLASM OF UNCERTAIN BEHAVIOR OF SKIN: Status: ACTIVE | Noted: 2025-03-04

## 2025-03-04 PROCEDURE — ? TREATMENT REGIMEN

## 2025-03-04 PROCEDURE — ? DEFER

## 2025-03-04 PROCEDURE — ? PRESCRIPTION MEDICATION MANAGEMENT

## 2025-03-04 PROCEDURE — ? PRESCRIPTION

## 2025-03-04 PROCEDURE — 99214 OFFICE O/P EST MOD 30 MIN: CPT | Mod: 25

## 2025-03-04 PROCEDURE — 72080 X-RAY EXAM THORACOLMB 2/> VW: CPT

## 2025-03-04 PROCEDURE — ? LIQUID NITROGEN

## 2025-03-04 PROCEDURE — ? COUNSELING

## 2025-03-04 PROCEDURE — ? PHOTO-DOCUMENTATION

## 2025-03-04 PROCEDURE — 17003 DESTRUCT PREMALG LES 2-14: CPT

## 2025-03-04 PROCEDURE — 17000 DESTRUCT PREMALG LESION: CPT

## 2025-03-04 RX ORDER — CLOBETASOL PROPIONATE 0.5 MG/G
0.05% CREAM TOPICAL BID
Qty: 60 | Refills: 2 | Status: ERX | COMMUNITY
Start: 2025-03-04

## 2025-03-04 RX ADMIN — CLOBETASOL PROPIONATE 0.05%: 0.5 CREAM TOPICAL at 00:00

## 2025-03-04 ASSESSMENT — LOCATION SIMPLE DESCRIPTION DERM
LOCATION SIMPLE: LEFT CHEEK
LOCATION SIMPLE: RIGHT CHEEK
LOCATION SIMPLE: LEFT ZYGOMA
LOCATION SIMPLE: LEFT TEMPLE
LOCATION SIMPLE: LEFT EAR
LOCATION SIMPLE: LEFT HAND
LOCATION SIMPLE: RIGHT HAND
LOCATION SIMPLE: RIGHT ZYGOMA

## 2025-03-04 ASSESSMENT — LOCATION DETAILED DESCRIPTION DERM
LOCATION DETAILED: LEFT ANTIHELIX
LOCATION DETAILED: LEFT INFERIOR MEDIAL MALAR CHEEK
LOCATION DETAILED: LEFT CENTRAL TEMPLE
LOCATION DETAILED: RIGHT MEDIAL ZYGOMA
LOCATION DETAILED: RIGHT CENTRAL ZYGOMA
LOCATION DETAILED: RIGHT RADIAL DORSAL HAND
LOCATION DETAILED: RIGHT SUPERIOR CENTRAL MALAR CHEEK
LOCATION DETAILED: LEFT CENTRAL ZYGOMA
LOCATION DETAILED: RIGHT INFERIOR MEDIAL MALAR CHEEK
LOCATION DETAILED: LEFT ULNAR DORSAL HAND
LOCATION DETAILED: LEFT CENTRAL MALAR CHEEK

## 2025-03-04 ASSESSMENT — TOTAL NUMBER OF LESIONS: # OF LESIONS?: 6

## 2025-03-04 ASSESSMENT — LOCATION ZONE DERM
LOCATION ZONE: FACE
LOCATION ZONE: EAR
LOCATION ZONE: HAND

## 2025-03-04 ASSESSMENT — ITCH NUMERIC RATING SCALE: HOW SEVERE IS YOUR ITCHING?: 0

## 2025-03-04 ASSESSMENT — SEVERITY ASSESSMENT OVERALL AMONG ALL PATIENTS: IN YOUR EXPERIENCE, AMONG ALL PATIENTS YOU HAVE SEEN WITH THIS CONDITION, HOW SEVERE IS THIS PATIENT'S CONDITION?: MILD

## 2025-03-04 ASSESSMENT — SEVERITY ASSESSMENT 2020: SEVERITY 2020: ALMOST CLEAR

## 2025-03-04 ASSESSMENT — BSA RASH: BSA RASH: 2

## 2025-03-04 ASSESSMENT — PAIN INTENSITY VAS
HOW INTENSE IS YOUR PAIN 0 BEING NO PAIN, 10 BEING THE MOST SEVERE PAIN POSSIBLE?: NO PAIN
HOW INTENSE IS YOUR PAIN 0 BEING NO PAIN, 10 BEING THE MOST SEVERE PAIN POSSIBLE?: 1/10 PAIN

## 2025-03-04 NOTE — PROCEDURE: LIQUID NITROGEN
Application Tool (Optional): Cry-AC
Duration Of Freeze Thaw-Cycle (Seconds): 3
Show Aperture Variable?: Yes
Number Of Freeze-Thaw Cycles: 1 freeze-thaw cycle
Render Note In Bullet Format When Appropriate: No
Detail Level: Zone
Consent: The patient's consent was obtained including but not limited to risks of crusting, scabbing, blistering, scarring, darker or lighter pigmentary change, recurrence, incomplete removal and infection.
Post-Care Instructions: I reviewed with the patient in detail post-care instructions. Patient is to wear sunprotection, and avoid picking at any of the treated lesions. Pt may apply Vaseline to crusted or scabbing areas.

## 2025-03-04 NOTE — PROCEDURE: PRESCRIPTION MEDICATION MANAGEMENT
Continue Regimen: Metronidazole 1% topical gel AA BID , Doxycycline 50mg BID
Detail Level: Zone
Render In Strict Bullet Format?: No
Continue Regimen: Clobetasol 0.05% topical cream AA on hands BID .

## 2025-03-04 NOTE — PROCEDURE: DEFER
X Size Of Lesion In Cm (Optional): 0
Detail Level: Detailed
Procedure To Be Performed At Next Visit: Shave Removal
Introduction Text (Please End With A Colon): Defer:

## 2025-03-04 NOTE — TELEPHONE ENCOUNTER
New Patient    Appointment Scheduling  What office location does the patient prefer?:[place]   What is the reason for the patient's appointment?:Patient called stating she was to schedule appointment for abnormal ct scan she had in January. It stated  bladder wall thickening.   Have patient records been requested?:  If No, are the records showing in Epic:     Appointment Details  Date:03/11/25  Time:    940 am   Location: [place]    Provider:  Coco SHEARER       HISTORY    Has the patient had any previous Urologist(s)?:no   Was the patient seen in the ED?:  Has the patient had any outside testing done?:  Does patient have Imaging/Lab Results:  Have you had Cancer > Thyroid cancer.     INSURANCE   Have you confirmed Patient's insurance? Medicare/ [employer]   Is the insurance accepted?  y  Is the insurance active?y

## 2025-03-07 ENCOUNTER — OFFICE VISIT (OUTPATIENT)
Age: 70
End: 2025-03-07
Payer: MEDICARE

## 2025-03-07 ENCOUNTER — TELEPHONE (OUTPATIENT)
Age: 70
End: 2025-03-07

## 2025-03-07 VITALS
BODY MASS INDEX: 19.44 KG/M2 | HEART RATE: 89 BPM | DIASTOLIC BLOOD PRESSURE: 70 MMHG | WEIGHT: 99 LBS | HEIGHT: 60 IN | OXYGEN SATURATION: 98 % | SYSTOLIC BLOOD PRESSURE: 128 MMHG | RESPIRATION RATE: 20 BRPM | TEMPERATURE: 98.7 F

## 2025-03-07 DIAGNOSIS — S22.080D CLOSED WEDGE COMPRESSION FRACTURE OF T12 VERTEBRA WITH ROUTINE HEALING, SUBSEQUENT ENCOUNTER: Primary | ICD-10-CM

## 2025-03-07 PROCEDURE — 99214 OFFICE O/P EST MOD 30 MIN: CPT | Performed by: NURSE PRACTITIONER

## 2025-03-07 NOTE — ASSESSMENT & PLAN NOTE
Presents for follow up evaluation of T12 compression fracture, chronic T8 fracture  S/p fall from ladder 1/17/25.  Has not been using TLSO brace.  C/o pain to left low back/hip/buttocks  Exam is non-focal.    Imaging:  Thoracolumbar x-rays, 3/4/25: Redemonstration of T8 compression fracture. No acute abnormality. Mild spondylotic changes    Plan:  Reviewed imaging with patient.  Discussed stability of findings.  Unclear etiology of ongoing pain, likely multifactorial.  Follow up as needed.

## 2025-03-07 NOTE — TELEPHONE ENCOUNTER
Pt called in to make sure her apt that is next week is cx as she was in office today.  This RN stated that this RN will ensure it is addressed.      At same time DAVID Rivera was in the process of canceling. --Issue addressed

## 2025-03-07 NOTE — PROGRESS NOTES
Name: Mimi Biggs      : 1955      MRN: [id number]  Encounter Provider: JANKI Love  Encounter Date: 3/7/2025   Encounter department: [facility]  :  Assessment & Plan  Closed wedge compression fracture of T12 vertebra with routine healing, subsequent encounter  Presents for follow up evaluation of T12 compression fracture, chronic T8 fracture  S/p fall from ladder 25.  Has not been using TLSO brace.  C/o pain to left low back/hip/buttocks  Exam is non-focal.    Imaging:  Thoracolumbar x-rays, 3/4/25: Redemonstration of T8 compression fracture. No acute abnormality. Mild spondylotic changes    Plan:  Reviewed imaging with patient.  Discussed stability of findings.  Unclear etiology of ongoing pain, likely multifactorial.  Follow up as needed.             History of Present Illness     Mimi Biggs is a 69 y.o. female who presents     With past medical history of depression, osteoporosis, scleroderma, non-Hodgkin's lymphoma, thyroid cancer, who presents for follow-up evaluation for acute T12 fracture.  She sustained a fall off of a ladder on 114 with the above noted injury.  She also fractured some ribs and sustained a liver laceration.  She has been given a TLSO brace for pain as needed for comfort but has not been using this.  She takes naproxen which is helpful for her pain.    She continues to endorse significant pain to the left side of her back and flank.  She states pain is worse when she goes from sitting to standing position.  Pain occasionally radiates into her buttocks.  She denies radiation down her leg.  She is able to ambulate.  She denies numbness or weakness.         Review of Systems   Musculoskeletal:  Positive for back pain (Pain is in miday back wrost when standing), gait problem (off balance) and myalgias (ribs).   Psychiatric/Behavioral:  Positive for sleep disturbance.    All other systems reviewed and are negative.    I have  personally reviewed the MA's review of systems and made changes as necessary.    Past Medical History   Past Medical History:   Diagnosis Date    ADHD     Allergic 3-2020    Bactrim    Anemia 2018    Latest blood work OK    Anxiety Since diagnoised 2003    Intermittent related to my life trying to move. My illness    Arthritis     Very bad especially Right knee    Cancer (HCC) 7-    Cut the Lymph Node Out & thyroid out    Cellulitis of foot     Last assessed 10/24/16    Change in mental status     Last assessed 12/01/15    Chronic fatigue     Depression 2003    I am on meds. Feel good    Disease of thyroid gland     Cancer Surgical Removal total Thyroid    Diverticulitis of colon Last Egd    Diverticlum    PLAZA (dyspnea on exertion)     Last assessed 12/01/15    Fibromyalgia     Folliculitis     Last assessed 10/06/14    Fractures     GERD (gastroesophageal reflux disease)     Headache(784.0) 3-2020    In front of head    Inflammatory bowel disease 1990’s    Memory loss 2010    New disease neurogentic changes Necrotizing Myopathy    Non Hodgkin's lymphoma (HCC) 07/13/2012    Osteopenia     Osteoporosis 6-2019    Broke Pelvis -left and right pubic ramus,fractured Sacrum    Raynaud's disease     Rheumatic disease     Scleroderma (HCC)     Stomach disorder     Systemic sclerosis (HCC)     Thyroid cancer (HCC) 10/04/2018    Visual impairment 2021    Need new glasses     Past Surgical History:   Procedure Laterality Date    BACK SURGERY      BLADDER SURGERY      BONE MARROW BIOPSY      CARDIAC CATHETERIZATION      EYE SURGERY  Jan 1010    Correct angles in both eyes    HEMORRHOID SURGERY      HYSTERECTOMY  02/04/2004    KNEE SURGERY      LYMPH NODE BIOPSY  7-     Non Hodgkins Lymphoma    NASAL SEPTUM SURGERY      NECK SURGERY      OOPHORECTOMY Bilateral 02/04/2004    ROTATOR CUFF REPAIR      SHOULDER SURGERY      SPINE SURGERY  12-    Failed microdisectomy L5 S1    TENDON REPAIR       THYROIDECTOMY N/A 10/04/2018    Procedure: TOTAL THYROIDECTOMY;  Surgeon: Roger Ortiz MD;  Location: [facility];  Service: Surgical Oncology    TONSILLECTOMY      TOTAL THYROIDECTOMY      WRIST SURGERY       Family History   Problem Relation Age of Onset    Arthritis Mother     Diabetes Mother     Hyperlipidemia Mother         Passed 10- Kidney- Heart Disease    Thyroid disease Mother         Benign lump removed    Autoimmune disease Mother     ADD / ADHD Mother     Osteoporosis Mother         1x week    Coronary artery disease Father     Hyperlipidemia Father          Massive Heart Attack    Arthritis Sister     Asthma Sister     Crohn's disease Sister     Autoimmune disease Sister     Depression Sister     Hyperlipidemia Sister         Very  bad Chrons Disease    Autoimmune disease Sister     Depression Sister     Hyperlipidemia Sister         Heart disease -Lupus like-Thyroid issues    Thyroid disease Sister         Seeing Endrocologist dosage for thyroid    Arthritis Sister         Having many issues this year    Autoimmune disease Sister     Diabetes Sister     Rheumatologic disease Sister     No Known Problems Daughter     No Known Problems Maternal Grandmother     No Known Problems Maternal Grandfather     No Known Problems Paternal Grandmother     No Known Problems Paternal Grandfather     Hyperlipidemia Brother         Heart Attacks,torn rotator and bicept . arthritis, knees    Autoimmune disease Brother     Hyperlipidemia Brother         Heart Attacks.[place],Arthrites    Autoimmune disease Brother     Diabetes Brother     Ovarian cancer Maternal Aunt     Dementia Maternal Aunt             Cancer Maternal Aunt     No Known Problems Maternal Aunt     No Known Problems Maternal Aunt     Cancer Maternal Aunt     No Known Problems Maternal Aunt     No Known Problems Paternal Aunt     Cancer Maternal Aunt     Cancer Maternal Uncle     Cancer Maternal Uncle     Breast cancer Neg Hx      Endometrial cancer Neg Hx     Colon cancer Neg Hx     Breast cancer additional onset Neg Hx     BRCA 1/2 Neg Hx     BRCA2 Positive Neg Hx     BRCA2 Negative Neg Hx     BRCA1 Positive Neg Hx     BRCA1 Negative Neg Hx      she reports that she has quit smoking. Her smoking use included cigarettes. She has a 1.3 pack-year smoking history. She has never used smokeless tobacco. She reports current alcohol use. She reports that she does not use drugs.  Current Outpatient Medications   Medication Instructions    acetaminophen (TYLENOL) 650 mg, Oral, Every 6 hours scheduled    ALPRAZolam (XANAX) 0.25 mg, Oral, 2 times daily PRN    amphetamine-dextroamphetamine (ADDERALL XR, 30MG,) 30 MG 24 hr capsule 30 mg, Oral, Every morning    amphetamine-dextroamphetamine (ADDERALL, 10MG,) 10 mg tablet 20 mg, Oral, See admin instructions, Take 20mg daily in the afternoon around 2pm.    buPROPion (WELLBUTRIN XL) 300 mg, Oral, Every morning    calcium citrate-vitamin D 315 mg-5 mcg tablet 1 tablet, Oral, Daily    Docusate Calcium (STOOL SOFTENER PO) Take by mouth    doxycycline hyclate (VIBRAMYCIN) 500 mg, 2 times daily    esomeprazole (NEXIUM) 40 mg, Oral, Every morning    ezetimibe (ZETIA) 10 mg, Oral, Daily    folic acid (FOLVITE) 2 mg, Oral, Daily    hydroxychloroquine (PLAQUENIL) 200 mg, Oral, Daily    levothyroxine 150 mcg, Oral, Daily    MAGNESIUM PO Take by mouth Resveratrol, berberine & quercetin    methocarbamol (ROBAXIN) 750 mg, Oral, Every 6 hours PRN    mycophenolate (MYFORTIC) 360 MG TBEC TAKE 1 TABLET IN THE MORNING AND 1 TABLET AT NIGHT    naproxen (NAPROSYN) 500 mg, Oral, 2 times daily PRN, (with meals)    polyethylene glycol (MIRALAX) 17 g, Oral, Daily PRN    pregabalin (LYRICA) 75 mg, Oral, 2 times daily    senna-docusate sodium (SENOKOT S) 8.6-50 mg per tablet 1 tablet, Oral, Daily at bedtime    traZODone (DESYREL) 150 mg, Oral, Daily at bedtime     Allergies   Allergen Reactions    Thiazide-Type Diuretics Other (See  Comments)     Hyponatremia    Duloxetine Other (See Comments)     Mental psychosis    Alendronate GI Intolerance, Myalgia, Nausea Only and Other (See Comments)    Revatio [Sildenafil] Other (See Comments)     mental status changes    Savella [Milnacipran] Other (See Comments)     Feeling out of it    Sulfamethoxazole-Trimethoprim Rash and GI Intolerance    Tedizolid Rash      Objective   /70 (BP Location: Right arm, Patient Position: Sitting, Cuff Size: Standard)   Pulse 89   Temp 98.7 °F (37.1 °C) (Temporal)   Resp 20   Ht 5' (1.524 m)   Wt 44.9 kg (99 lb)   SpO2 98%   BMI 19.33 kg/m²     Physical Exam  Constitutional:       Appearance: She is well-developed.   HENT:      Head: Normocephalic and atraumatic.   Eyes:      General: Lids are normal.      Extraocular Movements: Extraocular movements intact.      Pupils: Pupils are equal, round, and reactive to light.   Pulmonary:      Effort: Pulmonary effort is normal.   Abdominal:      Palpations: Abdomen is soft.   Musculoskeletal:         General: Normal range of motion.      Cervical back: Normal range of motion and neck supple.   Skin:     General: Skin is warm and dry.   Neurological:      General: No focal deficit present.      Mental Status: She is alert and oriented to person, place, and time. Mental status is at baseline.      Motor: Motor strength is normal.      Neurological Exam  Mental Status  Alert. Oriented to person, place, and time.    Cranial Nerves  CN II: Visual acuity is normal. Visual fields full to confrontation.  CN III, IV, VI: Extraocular movements intact bilaterally. Normal lids and orbits bilaterally. Pupils equal round and reactive to light bilaterally.  CN V: Facial sensation is normal.  CN VII: Full and symmetric facial movement.  CN VIII: Hearing is normal.  CN IX, X: Palate elevates symmetrically. Normal gag reflex.  CN XI: Shoulder shrug strength is normal.  CN XII: Tongue midline without atrophy or  fasciculations.    Motor   Strength is 5/5 throughout all four extremities.      Radiology Results Review: I personally reviewed the following image studies in PACS and associated radiology reports: xray(s). My interpretation of the radiology images/reports is: as above.    Administrative Statements   I have spent a total time of 30 minutes in caring for this patient on the day of the visit/encounter including Diagnostic results, Risks and benefits of tx options, Instructions for management, Patient and family education, Importance of tx compliance, Risk factor reductions, Impressions, Counseling / Coordination of care, Documenting in the medical record, Reviewing/placing orders in the medical record (including tests, medications, and/or procedures), and Obtaining or reviewing history  .

## 2025-03-10 DIAGNOSIS — F51.01 PRIMARY INSOMNIA: ICD-10-CM

## 2025-03-10 NOTE — ASSESSMENT & PLAN NOTE
-CT chest abdomen pelvis with contrast 1/17/25 demonstrated- Irregular bladder wall thickening without adjacent stranding. UA at that time negative for signs of infection, or blood.  -Patient denies any family history of bladder cancer.  -Unable to provide urine sample today.  Urine micro and culture ordered to be completed outpatient.  -We discussed completing a workup with cystoscopy in office with MD to assess the inner bladder wall   -All questions answered patient amenable to the plan.  Orders:    Urinalysis with microscopic; Future    Urine culture; Future    
no

## 2025-03-10 NOTE — PROGRESS NOTES
Name: Mimi Biggs      : 1955      MRN: [id number]  Encounter Provider: JANKI Unger  Encounter Date: 3/11/2025   Encounter department: [facility] UROLOGY [place]  :  Assessment & Plan  Bladder wall thickening  -CT chest abdomen pelvis with contrast 25 demonstrated- Irregular bladder wall thickening without adjacent stranding. UA at that time negative for signs of infection, or blood.  -Patient denies any family history of bladder cancer.  -Unable to provide urine sample today.  Urine micro and culture ordered to be completed outpatient.  -We discussed completing a workup with cystoscopy in office with MD to assess the inner bladder wall   -All questions answered patient amenable to the plan.  Orders:    Urinalysis with microscopic; Future    Urine culture; Future    Other urinary incontinence  -Nighttime urinary urgency and incontinence.  Previously managed with desmopressin, however patient did experience hyponatremia requiring hospitalization.  -Will attempt to manage with OAB medications.  Discussed anticholinergics versus beta 3 agonist.  We will start initial therapy with oxybutynin.  Side effect profile discussed including dizziness, confusion, dry eyes, dry mouth, constipation, and retention.  She will call the office and stop the medication if she experiences significant side effects.  -Stay well-hydrated with plenty of water, limit bladder irritants, and stop fluids 2 hours prior to bedtime.  Avoid constipation.  -Follow-up in 3 months to assess symptoms.  Orders:    Urinalysis with microscopic; Future    Urine culture; Future    oxybutynin (DITROPAN) 5 mg tablet; Take 1 tablet (5 mg total) by mouth 2 (two) times a day        History of Present Illness   Mimi Biggs is a 69 y.o. female who presents as a new patient for bladder wall thickening found on recent CT imaging. Pt was recently hospitalized 2025 for liver laceration after a fall from a ladder.  CT at that  "time demonstrated irregular bladder wall thickening without adjacent stranding.  UA obtained at that time was negative for evidence of infection.  Today patient states she is doing well.  She currently denies any symptoms of a UTI including frequency, dysuria, pelvic pain or feelings of incomplete bladder emptying.  Denies any blood in the urine.  She does state that she has experienced urge incontinence at night for several years.  This was previously managed with desmopressin that was prescribed by her urogynecologist at [place].  She did experience hyponatremia requiring hospitalization so this was discontinued.  She now states that she continues with incontinence at night, she has no symptoms during the day.  She does have a history of bladder prolapse with suburethral sling placement in 2006.  Denies any family history of kidney cancer or bladder cancer.  She does have a personal history of non-Hodgkin's lymphoma and thyroid cancer. Previous smoker quit many many years ago.     Review of Systems   Constitutional:  Negative for chills and fever.   Genitourinary:  Positive for urgency. Negative for dysuria, frequency, hematuria and pelvic pain.          Objective   There were no vitals taken for this visit.    Physical Exam  Constitutional:       General: She is not in acute distress.     Appearance: She is not toxic-appearing.   HENT:      Head: Normocephalic and atraumatic.   Eyes:      Conjunctiva/sclera: Conjunctivae normal.   Cardiovascular:      Rate and Rhythm: Normal rate.   Pulmonary:      Effort: Pulmonary effort is normal.   Abdominal:      Palpations: Abdomen is soft.   Skin:     General: Skin is warm and dry.   Neurological:      Mental Status: She is alert and oriented to person, place, and time.   Psychiatric:         Mood and Affect: Mood normal.         Thought Content: Thought content normal.          Results   No results found for: \"PSA\"  Lab Results   Component Value Date    CALCIUM 9.2 " 02/05/2025    K 4.2 02/05/2025    CO2 29 02/05/2025     02/05/2025    BUN 16 02/05/2025    CREATININE 0.78 02/05/2025     Lab Results   Component Value Date    WBC 4.38 01/31/2025    HGB 12.4 01/31/2025    HCT 38.8 01/31/2025    MCV 88 01/31/2025     01/31/2025       Office Urine Dip  No results found for this or any previous visit (from the past hour).

## 2025-03-11 ENCOUNTER — OFFICE VISIT (OUTPATIENT)
Dept: UROLOGY | Facility: MEDICAL CENTER | Age: 70
End: 2025-03-11
Payer: MEDICARE

## 2025-03-11 VITALS
DIASTOLIC BLOOD PRESSURE: 72 MMHG | HEIGHT: 60 IN | SYSTOLIC BLOOD PRESSURE: 126 MMHG | WEIGHT: 101 LBS | BODY MASS INDEX: 19.83 KG/M2

## 2025-03-11 DIAGNOSIS — N32.89 BLADDER WALL THICKENING: Primary | ICD-10-CM

## 2025-03-11 DIAGNOSIS — N39.498 OTHER URINARY INCONTINENCE: ICD-10-CM

## 2025-03-11 PROCEDURE — 99203 OFFICE O/P NEW LOW 30 MIN: CPT

## 2025-03-11 RX ORDER — OXYBUTYNIN CHLORIDE 5 MG/1
5 TABLET ORAL 2 TIMES DAILY
Qty: 60 TABLET | Refills: 0 | Status: SHIPPED | OUTPATIENT
Start: 2025-03-11

## 2025-03-11 RX ORDER — ALPRAZOLAM 0.25 MG
0.25 TABLET ORAL 2 TIMES DAILY PRN
Qty: 60 TABLET | Refills: 0 | Status: SHIPPED | OUTPATIENT
Start: 2025-03-11

## 2025-03-12 ENCOUNTER — APPOINTMENT (OUTPATIENT)
Dept: LAB | Facility: CLINIC | Age: 70
End: 2025-03-12
Payer: MEDICARE

## 2025-03-12 DIAGNOSIS — N39.498 OTHER URINARY INCONTINENCE: ICD-10-CM

## 2025-03-12 DIAGNOSIS — N32.89 BLADDER WALL THICKENING: ICD-10-CM

## 2025-03-12 DIAGNOSIS — E78.5 HYPERLIPIDEMIA, UNSPECIFIED HYPERLIPIDEMIA TYPE: ICD-10-CM

## 2025-03-12 DIAGNOSIS — R30.0 DYSURIA: ICD-10-CM

## 2025-03-12 LAB
BACTERIA UR QL AUTO: NORMAL /HPF
BILIRUB UR QL STRIP: NEGATIVE
CHOLEST SERPL-MCNC: 191 MG/DL (ref ?–200)
CLARITY UR: CLEAR
COLOR UR: YELLOW
GLUCOSE UR STRIP-MCNC: NEGATIVE MG/DL
HDLC SERPL-MCNC: 69 MG/DL
HGB UR QL STRIP.AUTO: NEGATIVE
KETONES UR STRIP-MCNC: NEGATIVE MG/DL
LDLC SERPL CALC-MCNC: 108 MG/DL (ref 0–100)
LEUKOCYTE ESTERASE UR QL STRIP: NEGATIVE
NITRITE UR QL STRIP: NEGATIVE
NON-SQ EPI CELLS URNS QL MICRO: NORMAL /HPF
PH UR STRIP.AUTO: 6.5 [PH]
PROT UR STRIP-MCNC: NEGATIVE MG/DL
RBC #/AREA URNS AUTO: NORMAL /HPF
SP GR UR STRIP.AUTO: 1.01 (ref 1–1.03)
TRIGL SERPL-MCNC: 71 MG/DL (ref ?–150)
UROBILINOGEN UR STRIP-ACNC: <2 MG/DL
WBC #/AREA URNS AUTO: NORMAL /HPF

## 2025-03-12 PROCEDURE — 87086 URINE CULTURE/COLONY COUNT: CPT

## 2025-03-12 PROCEDURE — 81001 URINALYSIS AUTO W/SCOPE: CPT

## 2025-03-13 ENCOUNTER — RESULTS FOLLOW-UP (OUTPATIENT)
Dept: UROLOGY | Facility: AMBULATORY SURGERY CENTER | Age: 70
End: 2025-03-13

## 2025-03-13 LAB — BACTERIA UR CULT: NORMAL

## 2025-03-13 NOTE — PROCEDURE: MEDICATION COUNSELING
82M with AF on eliquis, CAD, HTN, HLD, CVA, venous sinus thrombosis, central retinal artery occlusion admitted with obstructing kidney stone with hydronephrosis, UTI, ARIANA    Kidney stone  IVF  npo  for cysto today  urology following  medically optimized for procedure    CAD/AF/HTN/HLD/CVA  recently found to have af and started on eliquis  continue norvasc, asa, toprol, zetia, lipitor  eliquis on hold for procedure 82M with AF on eliquis, CAD, HTN, HLD, CVA, venous sinus thrombosis, central retinal artery occlusion admitted with obstructing kidney stone with hydronephrosis, UTI, ARIANA    Kidney stone  IVF  npo  for cysto today  urology following  medically optimized for procedure    CAD/AF/HTN/HLD/CVA  recently found to have af and started on eliquis  continue norvasc, asa, toprol, zetia, lipitor  eliquis on hold for procedure  cardio following    ARIANA on CKD  avoid nephrotoxins  hold aldactone  nephrology following Xolair Pregnancy And Lactation Text: This medication is Pregnancy Category B and is considered safe during pregnancy. This medication is excreted in breast milk.

## 2025-03-20 ENCOUNTER — APPOINTMENT (OUTPATIENT)
Dept: URBAN - NONMETROPOLITAN AREA CLINIC 4 | Facility: CLINIC | Age: 70
Setting detail: DERMATOLOGY
End: 2025-03-20

## 2025-03-20 DIAGNOSIS — L72.8 OTHER FOLLICULAR CYSTS OF THE SKIN AND SUBCUTANEOUS TISSUE: ICD-10-CM

## 2025-03-20 PROBLEM — D48.5 NEOPLASM OF UNCERTAIN BEHAVIOR OF SKIN: Status: ACTIVE | Noted: 2025-03-20

## 2025-03-20 PROCEDURE — 11310 SHAVE SKIN LESION 0.5 CM/<: CPT

## 2025-03-20 PROCEDURE — ? SHAVE REMOVAL

## 2025-03-20 PROCEDURE — ? COUNSELING

## 2025-03-20 PROCEDURE — ? TREATMENT REGIMEN

## 2025-03-20 ASSESSMENT — LOCATION ZONE DERM: LOCATION ZONE: EAR

## 2025-03-20 ASSESSMENT — LOCATION SIMPLE DESCRIPTION DERM: LOCATION SIMPLE: LEFT EAR

## 2025-03-20 ASSESSMENT — LOCATION DETAILED DESCRIPTION DERM: LOCATION DETAILED: LEFT ANTIHELIX

## 2025-03-20 NOTE — PROCEDURE: SHAVE REMOVAL
Medical Necessity Information: It is in your best interest to select a reason for this procedure from the list below. All of these items fulfill various CMS LCD requirements except the new and changing color options.
Medical Necessity Clause: This procedure was medically necessary because the lesion that was treated was:
Lab: 6
Lab Facility: 3
Detail Level: Detailed
Was A Bandage Applied: Yes
Size Of Lesion In Cm (Required): 0.4
X Size Of Lesion In Cm (Optional): 0
Depth Of Shave: dermis
Biopsy Method: Dermablade
Anesthesia Type: 1% lidocaine with epinephrine
Anesthesia Volume In Cc: 1
Hemostasis: Aluminum Chloride and Electrocautery
Wound Care: Petrolatum
Path Notes (To The Dermatopathologist): Please check margins
Render Path Notes In Note?: No
Consent was obtained from the patient. The risks and benefits to therapy were discussed in detail. Specifically, the risks of infection, scarring, bleeding, prolonged wound healing, incomplete removal, allergy to anesthesia, nerve injury and recurrence were addressed. Prior to the procedure, the treatment site was clearly identified and confirmed by the patient. All components of Universal Protocol/PAUSE Rule completed.
Post-Care Instructions: I reviewed with the patient in detail post-care instructions. Patient is to keep the biopsy site dry overnight, and then apply bacitracin twice daily until healed. Patient may apply hydrogen peroxide soaks to remove any crusting.
Notification Instructions: Patient will be notified of pathology results. However, patient instructed to call the office if not contacted within 2 weeks.
Billing Type: Third-Party Bill

## 2025-03-21 ENCOUNTER — OFFICE VISIT (OUTPATIENT)
Dept: FAMILY MEDICINE CLINIC | Facility: CLINIC | Age: 70
End: 2025-03-21
Payer: MEDICARE

## 2025-03-21 ENCOUNTER — APPOINTMENT (OUTPATIENT)
Dept: LAB | Facility: CLINIC | Age: 70
End: 2025-03-21
Payer: MEDICARE

## 2025-03-21 VITALS
DIASTOLIC BLOOD PRESSURE: 61 MMHG | OXYGEN SATURATION: 96 % | HEART RATE: 77 BPM | TEMPERATURE: 97.1 F | WEIGHT: 102.29 LBS | SYSTOLIC BLOOD PRESSURE: 131 MMHG | BODY MASS INDEX: 19.98 KG/M2

## 2025-03-21 DIAGNOSIS — F90.0 ATTENTION DEFICIT HYPERACTIVITY DISORDER (ADHD), PREDOMINANTLY INATTENTIVE TYPE: ICD-10-CM

## 2025-03-21 DIAGNOSIS — E53.8 B12 DEFICIENCY: ICD-10-CM

## 2025-03-21 DIAGNOSIS — M17.11 PRIMARY OSTEOARTHRITIS OF RIGHT KNEE: ICD-10-CM

## 2025-03-21 DIAGNOSIS — N39.41 URGE INCONTINENCE: ICD-10-CM

## 2025-03-21 DIAGNOSIS — E78.2 MIXED HYPERLIPIDEMIA: ICD-10-CM

## 2025-03-21 DIAGNOSIS — L71.9 ROSACEA: ICD-10-CM

## 2025-03-21 DIAGNOSIS — K21.9 GASTROESOPHAGEAL REFLUX DISEASE, UNSPECIFIED WHETHER ESOPHAGITIS PRESENT: ICD-10-CM

## 2025-03-21 DIAGNOSIS — E55.9 VITAMIN D DEFICIENCY: ICD-10-CM

## 2025-03-21 DIAGNOSIS — M15.0 PRIMARY OSTEOARTHRITIS INVOLVING MULTIPLE JOINTS: ICD-10-CM

## 2025-03-21 DIAGNOSIS — M85.80 OSTEOPENIA, UNSPECIFIED LOCATION: ICD-10-CM

## 2025-03-21 DIAGNOSIS — D84.9 IMMUNOCOMPROMISED STATE (HCC): ICD-10-CM

## 2025-03-21 DIAGNOSIS — J30.89 NON-SEASONAL ALLERGIC RHINITIS, UNSPECIFIED TRIGGER: Primary | ICD-10-CM

## 2025-03-21 DIAGNOSIS — E03.9 ACQUIRED HYPOTHYROIDISM: ICD-10-CM

## 2025-03-21 DIAGNOSIS — M34.0 SCLERODERMA PROGRESSIVE (HCC): ICD-10-CM

## 2025-03-21 DIAGNOSIS — A60.00 GENITAL HERPES SIMPLEX, UNSPECIFIED SITE: ICD-10-CM

## 2025-03-21 DIAGNOSIS — C82.90 FOLLICULAR LYMPHOMA, UNSPECIFIED FOLLICULAR LYMPHOMA TYPE, UNSPECIFIED BODY REGION (HCC): ICD-10-CM

## 2025-03-21 DIAGNOSIS — M35.00 SJOGREN'S SYNDROME, WITH UNSPECIFIED ORGAN INVOLVEMENT (HCC): ICD-10-CM

## 2025-03-21 DIAGNOSIS — C73 THYROID CANCER (HCC): ICD-10-CM

## 2025-03-21 PROBLEM — J43.9 PULMONARY EMPHYSEMA (HCC): Status: RESOLVED | Noted: 2021-02-05 | Resolved: 2025-03-21

## 2025-03-21 LAB
T4 FREE SERPL-MCNC: 1.7 NG/DL (ref 0.61–1.12)
TSH SERPL DL<=0.05 MIU/L-ACNC: 0.02 UIU/ML (ref 0.45–4.5)

## 2025-03-21 PROCEDURE — 99214 OFFICE O/P EST MOD 30 MIN: CPT | Performed by: FAMILY MEDICINE

## 2025-03-21 PROCEDURE — 36415 COLL VENOUS BLD VENIPUNCTURE: CPT

## 2025-03-21 PROCEDURE — 84439 ASSAY OF FREE THYROXINE: CPT

## 2025-03-21 PROCEDURE — 20610 DRAIN/INJ JOINT/BURSA W/O US: CPT | Performed by: FAMILY MEDICINE

## 2025-03-21 PROCEDURE — 84443 ASSAY THYROID STIM HORMONE: CPT

## 2025-03-21 RX ORDER — LIDOCAINE HYDROCHLORIDE 10 MG/ML
4 INJECTION, SOLUTION EPIDURAL; INFILTRATION; INTRACAUDAL; PERINEURAL
Status: COMPLETED | OUTPATIENT
Start: 2025-03-21 | End: 2025-03-21

## 2025-03-21 RX ORDER — VALACYCLOVIR HYDROCHLORIDE 1 G/1
1000 TABLET, FILM COATED ORAL 2 TIMES DAILY
Qty: 10 TABLET | Refills: 2 | Status: SHIPPED | OUTPATIENT
Start: 2025-03-21 | End: 2025-03-26

## 2025-03-21 RX ORDER — DOXYCYCLINE HYCLATE 50 MG/1
500 CAPSULE ORAL 2 TIMES DAILY
Status: CANCELLED | OUTPATIENT
Start: 2025-03-21

## 2025-03-21 RX ORDER — DOXYCYCLINE 50 MG/1
50 TABLET ORAL 2 TIMES DAILY
Qty: 168 TABLET | Refills: 1 | Status: SHIPPED | OUTPATIENT
Start: 2025-03-21 | End: 2025-06-13

## 2025-03-21 RX ORDER — TRIAMCINOLONE ACETONIDE 40 MG/ML
40 INJECTION, SUSPENSION INTRA-ARTICULAR; INTRAMUSCULAR
Status: COMPLETED | OUTPATIENT
Start: 2025-03-21 | End: 2025-03-21

## 2025-03-21 RX ADMIN — LIDOCAINE HYDROCHLORIDE 4 ML: 10 INJECTION, SOLUTION EPIDURAL; INFILTRATION; INTRACAUDAL; PERINEURAL at 11:20

## 2025-03-21 RX ADMIN — TRIAMCINOLONE ACETONIDE 40 MG: 40 INJECTION, SUSPENSION INTRA-ARTICULAR; INTRAMUSCULAR at 11:20

## 2025-03-21 NOTE — ASSESSMENT & PLAN NOTE
Follows with rheumatology for AI condition and med mgmt   follow up any specialist recommendations

## 2025-03-21 NOTE — ASSESSMENT & PLAN NOTE
stable, no current issues with this.  On doxycycline followed by derm     Orders:    doxycycline (ADOXA) 50 MG tablet; Take 1 tablet (50 mg total) by mouth 2 (two) times a day

## 2025-03-21 NOTE — ASSESSMENT & PLAN NOTE
Chronic  Stable on Adderall XR 30mg daily, plus 10mg BID   Controlled  Csa on file, UDS and PDMP consistent with Rx

## 2025-03-21 NOTE — ASSESSMENT & PLAN NOTE
Chronic, multiple joints but primary knee  We reviewed her Xray results from 2/2023: Severe tricompartmental osteoarthritis as evidenced by joint space narrowing, osteophyte formation and subchondral sclerosis.   Continue regular exercises  Patient gets occasional right knee steroid inejctions, asking for one today, last one was 8.2024

## 2025-03-21 NOTE — PROGRESS NOTES
Name: Mimi Biggs      : 1955      MRN: [id number]  Encounter Provider: Patti Carolina DO  Encounter Date: 3/21/2025   Encounter department: [facility] PRIMARY CARE  :  Assessment & Plan  Non-seasonal allergic rhinitis, unspecified trigger  Chronic, stable  Continue Flonase, zyrtec          Gastroesophageal reflux disease, unspecified whether esophagitis present  Overall waxes and wanes.  She is following closely with GI.  Recently having some worsening nausea and vomiting, though this does seem to be improving.  I recommend as needed Zofran for now and to see her GI as planned.  Continue with Nexium daily.         Thyroid cancer (HCC)  s/p thyroidectomy in 2018 - per path report - papillary carcinoma.    Mgmt of hypothyroidism as below         Acquired hypothyroidism  Chronic  She is s/p thyroidectomy due to Ca  Recent US is OK.    She is currently taking levothyroxine 150mcg daily.   TSH recently was low with elevated T4   Patient to discuss with her endo as well    Orders:    TSH, 3rd generation with Free T4 reflex; Future    Osteopenia, unspecified location  Make sure to take calcium and vitamin-D supplementations-fall prevention.  Being managed by rheumatologist  On [facility] with Rheum        Primary osteoarthritis involving multiple joints  Chronic, multiple joints but primary knee  We reviewed her Xray results from 2023: Severe tricompartmental osteoarthritis as evidenced by joint space narrowing, osteophyte formation and subchondral sclerosis.   Continue regular exercises  Patient gets occasional right knee steroid inejctions, asking for one today, last one was           Urge incontinence  Chronic, follows with urology.            Attention deficit hyperactivity disorder (ADHD), predominantly inattentive type  Chronic  Stable on Adderall XR 30mg daily, plus 10mg BID   Controlled  Csa on file, UDS and PDMP consistent with Rx          Follicular lymphoma,  unspecified follicular lymphoma type, unspecified body region (HCC)  Follows with oncology and stable, follows annually          Sjogren's syndrome, with unspecified organ involvement (HCC)  Follows with rheumatology         Scleroderma progressive (HCC)  Chronic, follows with rheum in [place] as well as with [facility] per rheumatology         Vitamin D deficiency  Chronic, stable  Reviewed recent labs 1/31/25 and vit D wnl  Continue to monitor           Rosacea  stable, no current issues with this.  On doxycycline followed by derm     Orders:    doxycycline (ADOXA) 50 MG tablet; Take 1 tablet (50 mg total) by mouth 2 (two) times a day    Immunocompromised state (HCC)  Follows with rheumatology for AI condition and med mgmt   follow up any specialist recommendations            Mixed hyperlipidemia    Lab Results   Component Value Date    CHOLESTEROL 191 03/12/2025    TRIG 71 03/12/2025    HDL 69 03/12/2025    LDLCALC 108 (H) 03/12/2025     Chronic, stable  Continue Zetia          B12 deficiency  Now B12 is too high  Decrease to every other day   Continue to monitor         Genital herpes simplex, unspecified site  Refill valtrex, patient takes only when flare but good to have on hand when it occurs.   Orders:    valACYclovir (VALTREX) 1,000 mg tablet; Take 1 tablet (1,000 mg total) by mouth 2 (two) times a day for 5 days      Return in about 3 months (around 6/21/2025) for Follow up .      Large joint arthrocentesis: R knee  [place] Protocol:  procedure performed by consultantConsent: Verbal consent obtained.  Risks and benefits: risks, benefits and alternatives were discussed  Consent given by: patient  Timeout called at: 3/21/2025 11:43 AM.  Patient identity confirmed: verbally with patient  Supporting Documentation  Indications: pain   Procedure Details  Location: knee - R knee  Needle size: 25 G  Ultrasound guidance: no  Approach: anterolateral  Medications administered: 4 mL lidocaine (PF) 1 %; 40 mg  triamcinolone acetonide 40 mg/mL    Patient tolerance: patient tolerated the procedure well with no immediate complications  Dressing:  Sterile dressing applied             History of Present Illness     Chief Complaint   Patient presents with    Follow-up     3 month follow up. Wants a injection in her right knee       HPI    Patient presents for follow up of multiple chronic health conditions. We reviewed her labs from January and March. She mainly complains of right knee pain starting to flare up again and interested in steroid injection today. Continues with chronic back pain from fracture and fall last year and admits this is slowly improving with PT and regular activity.     Otherwise doing well and continues to follow with all of her specialist.     Review of Systems   Constitutional:  Negative for appetite change, chills, diaphoresis, fever and unexpected weight change.   Eyes:  Negative for visual disturbance.   Respiratory:  Negative for shortness of breath.    Cardiovascular:  Negative for chest pain and leg swelling.   Gastrointestinal:  Negative for constipation and diarrhea.   Endocrine: Negative for polydipsia and polyuria.   Genitourinary:  Negative for frequency.   Musculoskeletal:  Positive for arthralgias and back pain.   Neurological:  Negative for dizziness, light-headedness and headaches.       Objective   /61 (BP Location: Right arm, Patient Position: Sitting)   Pulse 77   Temp (!) 97.1 °F (36.2 °C) (Tympanic)   Wt 46.4 kg (102 lb 4.7 oz)   SpO2 96%   BMI 19.98 kg/m²      Physical Exam  Vitals reviewed.   Constitutional:       General: She is not in acute distress.     Appearance: She is normal weight. She is not ill-appearing.   HENT:      Head: Normocephalic and atraumatic.   Cardiovascular:      Rate and Rhythm: Normal rate.   Pulmonary:      Effort: Pulmonary effort is normal.   Musculoskeletal:         General: Tenderness (right knee medial JL tenderness) present.      Right  lower leg: No edema.      Left lower leg: No edema.   Skin:     General: Skin is warm.   Neurological:      General: No focal deficit present.      Mental Status: She is alert.   Psychiatric:         Mood and Affect: Mood normal.         Behavior: Behavior normal.         Thought Content: Thought content normal.

## 2025-03-21 NOTE — ASSESSMENT & PLAN NOTE
Lab Results   Component Value Date    CHOLESTEROL 191 03/12/2025    TRIG 71 03/12/2025    HDL 69 03/12/2025    LDLCALC 108 (H) 03/12/2025     Chronic, stable  Continue Zetia

## 2025-03-21 NOTE — ASSESSMENT & PLAN NOTE
Chronic  She is s/p thyroidectomy due to Ca  Recent US is OK.    She is currently taking levothyroxine 150mcg daily.   TSH recently was low with elevated T4   Patient to discuss with her endo as well    Orders:    TSH, 3rd generation with Free T4 reflex; Future

## 2025-03-21 NOTE — ASSESSMENT & PLAN NOTE
s/p thyroidectomy in 2018 - per path report - papillary carcinoma.    Mgmt of hypothyroidism as below

## 2025-03-21 NOTE — ASSESSMENT & PLAN NOTE
Make sure to take calcium and vitamin-D supplementations-fall prevention.  Being managed by rheumatologist  On [facility] with Rheum

## 2025-03-25 ENCOUNTER — RESULTS FOLLOW-UP (OUTPATIENT)
Dept: FAMILY MEDICINE CLINIC | Facility: CLINIC | Age: 70
End: 2025-03-25

## 2025-03-25 DIAGNOSIS — E03.9 ACQUIRED HYPOTHYROIDISM: ICD-10-CM

## 2025-03-25 RX ORDER — LEVOTHYROXINE SODIUM 125 UG/1
125 TABLET ORAL DAILY
Qty: 90 TABLET | Refills: 1 | Status: SHIPPED | OUTPATIENT
Start: 2025-03-25

## 2025-03-27 DIAGNOSIS — F90.0 ATTENTION DEFICIT HYPERACTIVITY DISORDER (ADHD), PREDOMINANTLY INATTENTIVE TYPE: ICD-10-CM

## 2025-03-27 RX ORDER — DEXTROAMPHETAMINE SACCHARATE, AMPHETAMINE ASPARTATE MONOHYDRATE, DEXTROAMPHETAMINE SULFATE AND AMPHETAMINE SULFATE 7.5; 7.5; 7.5; 7.5 MG/1; MG/1; MG/1; MG/1
30 CAPSULE, EXTENDED RELEASE ORAL EVERY MORNING
Qty: 30 CAPSULE | Refills: 0 | Status: SHIPPED | OUTPATIENT
Start: 2025-03-27

## 2025-03-27 RX ORDER — DEXTROAMPHETAMINE SACCHARATE, AMPHETAMINE ASPARTATE, DEXTROAMPHETAMINE SULFATE AND AMPHETAMINE SULFATE 2.5; 2.5; 2.5; 2.5 MG/1; MG/1; MG/1; MG/1
20 TABLET ORAL SEE ADMIN INSTRUCTIONS
Qty: 60 TABLET | Refills: 0 | Status: SHIPPED | OUTPATIENT
Start: 2025-03-27

## 2025-03-28 ENCOUNTER — RX ONLY (RX ONLY)
Age: 70
End: 2025-03-28

## 2025-03-28 RX ORDER — DOXYCYCLINE HYCLATE 50 MG/1
50MG CAPSULE, GELATIN COATED ORAL BID
Qty: 30 | Refills: 1 | Status: ERX

## 2025-04-08 DIAGNOSIS — F51.01 PRIMARY INSOMNIA: ICD-10-CM

## 2025-04-09 ENCOUNTER — PROCEDURE VISIT (OUTPATIENT)
Dept: UROLOGY | Facility: MEDICAL CENTER | Age: 70
End: 2025-04-09
Payer: MEDICARE

## 2025-04-09 VITALS
RESPIRATION RATE: 19 BRPM | SYSTOLIC BLOOD PRESSURE: 112 MMHG | OXYGEN SATURATION: 98 % | WEIGHT: 97 LBS | HEIGHT: 61 IN | BODY MASS INDEX: 18.31 KG/M2 | HEART RATE: 90 BPM | DIASTOLIC BLOOD PRESSURE: 60 MMHG

## 2025-04-09 DIAGNOSIS — N32.89 BLADDER WALL THICKENING: Primary | ICD-10-CM

## 2025-04-09 DIAGNOSIS — N39.498 OTHER URINARY INCONTINENCE: ICD-10-CM

## 2025-04-09 LAB
SL AMB  POCT GLUCOSE, UA: ABNORMAL
SL AMB LEUKOCYTE ESTERASE,UA: ABNORMAL
SL AMB POCT BILIRUBIN,UA: ABNORMAL
SL AMB POCT BLOOD,UA: ABNORMAL
SL AMB POCT CLARITY,UA: CLEAR
SL AMB POCT COLOR,UA: YELLOW
SL AMB POCT KETONES,UA: ABNORMAL
SL AMB POCT NITRITE,UA: ABNORMAL
SL AMB POCT PH,UA: 6
SL AMB POCT SPECIFIC GRAVITY,UA: 1.01
SL AMB POCT URINE PROTEIN: ABNORMAL
SL AMB POCT UROBILINOGEN: 0.2

## 2025-04-09 PROCEDURE — 99213 OFFICE O/P EST LOW 20 MIN: CPT | Performed by: UROLOGY

## 2025-04-09 PROCEDURE — 81003 URINALYSIS AUTO W/O SCOPE: CPT | Performed by: UROLOGY

## 2025-04-09 PROCEDURE — 52000 CYSTOURETHROSCOPY: CPT | Performed by: UROLOGY

## 2025-04-09 RX ORDER — CLOBETASOL PROPIONATE 0.5 MG/G
CREAM TOPICAL
COMMUNITY
Start: 2025-03-04

## 2025-04-09 RX ORDER — SOLIFENACIN SUCCINATE 5 MG/1
5 TABLET, FILM COATED ORAL DAILY
Qty: 30 TABLET | Refills: 6 | Status: SHIPPED | OUTPATIENT
Start: 2025-04-09

## 2025-04-09 RX ORDER — TRAZODONE HYDROCHLORIDE 150 MG/1
150 TABLET ORAL
Qty: 90 TABLET | Refills: 1 | OUTPATIENT
Start: 2025-04-09

## 2025-04-09 NOTE — PROGRESS NOTES
HISTORY:    Further evaluation of bladder wall thickening and nocturnal incontinence.    She leaks most nights, not all.  She wears diapers.  Also wakes up 1 or 2 times at night.    She denies any daytime urgency or urinary problems    She had been on desmopressin for the nocturia, but it gave her electrolyte disturbances and it was stopped.    We prescribed oxybutynin, did not help at all, so she is stopping that.    She describes a muscle wasting disease, possibly that is related to her nighttime leakage.  She does pelvic floor therapy and that has not helped much yet     Cystoscopy     Date/Time  4/9/2025 11:00 AM     Performed by  Matt Bro MD   Authorized by  Matt Bro MD         Procedure Details:  Procedure type: cystoscopy    Patient tolerance: Patient tolerated the procedure well with no immediate complications    Additional Procedure Details:     Patient presents for cystoscopy.  I described the procedure, answered any questions, and we discussed potential risks and complications.  Patient expressed understanding, and signed an informed consent document.  The patient was carefully placed in lithotomy position on the examining table.  The urethra was prepped with sterile disinfectant.  The 15 [place] flexible cystoscope was passed in the urethra with the following findings:    Urethra: Recessed but not stenotic     Bladder: Moderate trabeculation, no lesion tumor stones    Residual urine estimated to be minimal.    The patient tolerated the procedure well and was escorted from the examining table.             ASSESSMENT / PLAN:    1.  Nothing found of significance regarding the bladder wall thickening, it is a variant of normal.    2.  It could be her muscle condition is why her sphincter is not keeping her bladder open at nighttime.    Will try solifenacin 5 mg each evening before bed    If that does not help in 1 month we will either increase to 10 mg each time, or switch to a different OAB  "med    Follow-up 4 months      Review of Systems      Objective:     Physical Exam  Genitourinary:     Comments: Urethra recessed but not stenotic          No results found for: \"PSA\"]  BUN   Date Value Ref Range Status   02/05/2025 16 7 - 25 mg/dL Final     Creatinine   Date Value Ref Range Status   02/05/2025 0.78 0.40 - 1.10 mg/dL Final     No components found for: \"CBC\"    Patient Active Problem List   Diagnosis    Bilateral hand numbness    Methotrexate, long term, current use    Scleroderma progressive (HCC)    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[facility])    BRCA gene mutation positive    Allergic rhinitis    Bartholin gland cyst    Carpal tunnel syndrome of left wrist    Carpal tunnel syndrome of right wrist    Dental disorder    Depression    Esophageal reflux disease    Fecal soiling    Fibromyalgia    Hearing loss    Heel spur    Hematuria    Herniated lumbar intervertebral disc    Hyperlipidemia    Lichen planus    Lumbar disc herniation    Osteoarthritis    Osteoarthritis of both knees    Osteopenia    Ovarian cyst    Pain in joint of left shoulder    Peripheral neuropathy    Post concussion syndrome    Radiculopathy, lumbar region    Raynaud's syndrome    Rectal prolapse    Sjogren's syndrome ([facility])    Thyroid cancer ([facility])    Vitamin D deficiency    Acquired hypothyroidism    Advanced care planning/counseling discussion    Chronic pain    Vasomotor rhinitis    Necrotizing myopathy    Hypogammaglobulinemia (HCC)    PAD (peripheral artery disease) ([facility])    High risk medication use    Immunocompromised state (HCC)    Age-related osteoporosis without current pathological fracture    Primary insomnia    Post-nasal drip    Peptic stricture of esophagus    Attention deficit hyperactivity disorder (ADHD), predominantly inattentive type    Rosacea    B12 deficiency    Urge incontinence    Closed fracture of left superior rim of pubis (HCC)    Accidental fall from ladder    Closed fracture of multiple ribs of right side    Closed " wedge compression fracture of T12 vertebra (HCC)    Bladder wall thickening        Patient ID: Mimi Biggs is a 69 y.o. female.      Current Outpatient Medications:     acetaminophen (TYLENOL) 325 mg tablet, Take 2 tablets (650 mg total) by mouth every 6 (six) hours, Disp: 240 tablet, Rfl: 0    ALPRAZolam (XANAX) 0.25 mg tablet, Take 1 tablet (0.25 mg total) by mouth 2 (two) times a day as needed for anxiety, Disp: 60 tablet, Rfl: 0    amphetamine-dextroamphetamine (ADDERALL XR, 30MG,) 30 MG 24 hr capsule, Take 1 capsule (30 mg total) by mouth every morning Max Daily Amount: 30 mg, Disp: 30 capsule, Rfl: 0    amphetamine-dextroamphetamine (ADDERALL, 10MG,) 10 mg tablet, Take 2 tablets (20 mg total) by mouth see administration instructions Take 20mg daily in the afternoon around 2pm., Disp: 60 tablet, Rfl: 0    buPROPion (WELLBUTRIN XL) 300 mg 24 hr tablet, Take 1 tablet (300 mg total) by mouth every morning, Disp: 90 tablet, Rfl: 1    calcium citrate-vitamin D 315 mg-5 mcg tablet, Take 1 tablet by mouth daily, Disp: 90 tablet, Rfl: 0    clobetasol (TEMOVATE) 0.05 % cream, , Disp: , Rfl:     Docusate Calcium (STOOL SOFTENER PO), Take by mouth, Disp: , Rfl:     doxycycline hyclate (VIBRAMYCIN) 50 mg capsule, Take 500 mg by mouth 2 (two) times a day, Disp: , Rfl:     esomeprazole (NexIUM) 40 MG capsule, Take 1 capsule (40 mg total) by mouth every morning, Disp: 90 capsule, Rfl: 0    ezetimibe (ZETIA) 10 mg tablet, Take 1 tablet (10 mg total) by mouth daily, Disp: 90 tablet, Rfl: 3    folic acid (FOLVITE) 1 mg tablet, Take 2 tablets (2 mg total) by mouth daily, Disp: 180 tablet, Rfl: 1    hydroxychloroquine (PLAQUENIL) 200 mg tablet, Take 1 tablet (200 mg total) by mouth daily, Disp: 90 tablet, Rfl: 1    MAGNESIUM PO, Take by mouth Resveratrol, berberine & quercetin, Disp: , Rfl:     mycophenolate (MYFORTIC) 360 MG TBEC, TAKE 1 TABLET IN THE MORNING AND 1 TABLET AT NIGHT, Disp: 180 tablet, Rfl: 2    naproxen  (NAPROSYN) 500 mg tablet, Take 1 tablet (500 mg total) by mouth 2 (two) times a day as needed for mild pain (with meals), Disp: 30 tablet, Rfl: 0    polyethylene glycol (MIRALAX) 17 g packet, Take 17 g by mouth daily as needed (constipation), Disp: 10 each, Rfl: 0    pregabalin (LYRICA) 75 mg capsule, Take 1 capsule (75 mg total) by mouth 2 (two) times a day, Disp: 180 capsule, Rfl: 1    senna-docusate sodium (SENOKOT S) 8.6-50 mg per tablet, Take 1 tablet by mouth daily at bedtime, Disp: 30 tablet, Rfl: 0    solifenacin (VESICARE) 5 mg tablet, Take 1 tablet (5 mg total) by mouth daily, Disp: 30 tablet, Rfl: 6    traZODone (DESYREL) 150 mg tablet, Take 1 tablet (150 mg total) by mouth daily at bedtime, Disp: 90 tablet, Rfl: 1    valACYclovir (VALTREX) 1,000 mg tablet, Take 1 tablet (1,000 mg total) by mouth 2 (two) times a day for 5 days, Disp: 10 tablet, Rfl: 2    doxycycline (ADOXA) 50 MG tablet, Take 1 tablet (50 mg total) by mouth 2 (two) times a day (Patient not taking: Reported on 4/9/2025), Disp: 168 tablet, Rfl: 1    levothyroxine 125 mcg tablet, Take 1 tablet (125 mcg total) by mouth daily, Disp: 90 tablet, Rfl: 1    methocarbamol (ROBAXIN) 750 mg tablet, Take 1 tablet (750 mg total) by mouth every 6 (six) hours as needed for muscle spasms (Patient not taking: Reported on 3/21/2025), Disp: 40 tablet, Rfl: 0

## 2025-04-27 DIAGNOSIS — K22.2 PEPTIC STRICTURE OF ESOPHAGUS: ICD-10-CM

## 2025-04-27 DIAGNOSIS — A60.00 GENITAL HERPES SIMPLEX, UNSPECIFIED SITE: ICD-10-CM

## 2025-04-27 DIAGNOSIS — E78.5 HYPERLIPIDEMIA, UNSPECIFIED HYPERLIPIDEMIA TYPE: ICD-10-CM

## 2025-04-27 DIAGNOSIS — F51.01 PRIMARY INSOMNIA: ICD-10-CM

## 2025-04-27 DIAGNOSIS — F90.0 ATTENTION DEFICIT HYPERACTIVITY DISORDER (ADHD), PREDOMINANTLY INATTENTIVE TYPE: ICD-10-CM

## 2025-04-27 DIAGNOSIS — G72.89 NECROTIZING MYOPATHY: ICD-10-CM

## 2025-04-27 DIAGNOSIS — L71.9 ROSACEA: ICD-10-CM

## 2025-04-28 RX ORDER — VALACYCLOVIR HYDROCHLORIDE 1 G/1
1000 TABLET, FILM COATED ORAL 2 TIMES DAILY
Qty: 10 TABLET | Refills: 0 | Status: SHIPPED | OUTPATIENT
Start: 2025-04-28 | End: 2025-05-03

## 2025-04-28 RX ORDER — DOXYCYCLINE 50 MG/1
50 TABLET ORAL 2 TIMES DAILY
Qty: 168 TABLET | Refills: 0 | Status: SHIPPED | OUTPATIENT
Start: 2025-04-28 | End: 2025-07-21

## 2025-04-28 RX ORDER — ESOMEPRAZOLE MAGNESIUM 40 MG/1
40 CAPSULE, DELAYED RELEASE ORAL EVERY MORNING
Qty: 90 CAPSULE | Refills: 0 | Status: SHIPPED | OUTPATIENT
Start: 2025-04-28

## 2025-04-28 RX ORDER — DEXTROAMPHETAMINE SACCHARATE, AMPHETAMINE ASPARTATE MONOHYDRATE, DEXTROAMPHETAMINE SULFATE AND AMPHETAMINE SULFATE 7.5; 7.5; 7.5; 7.5 MG/1; MG/1; MG/1; MG/1
30 CAPSULE, EXTENDED RELEASE ORAL EVERY MORNING
Qty: 30 CAPSULE | Refills: 0 | Status: SHIPPED | OUTPATIENT
Start: 2025-04-28

## 2025-04-28 RX ORDER — DEXTROAMPHETAMINE SACCHARATE, AMPHETAMINE ASPARTATE, DEXTROAMPHETAMINE SULFATE AND AMPHETAMINE SULFATE 2.5; 2.5; 2.5; 2.5 MG/1; MG/1; MG/1; MG/1
20 TABLET ORAL SEE ADMIN INSTRUCTIONS
Qty: 60 TABLET | Refills: 0 | Status: SHIPPED | OUTPATIENT
Start: 2025-04-28

## 2025-04-28 RX ORDER — ALPRAZOLAM 0.25 MG
0.25 TABLET ORAL 2 TIMES DAILY PRN
Qty: 60 TABLET | Refills: 0 | Status: SHIPPED | OUTPATIENT
Start: 2025-04-28

## 2025-04-28 RX ORDER — MYCOPHENOLIC ACID 360 MG/1
TABLET, DELAYED RELEASE ORAL
Qty: 180 TABLET | Refills: 1 | Status: SHIPPED | OUTPATIENT
Start: 2025-04-28

## 2025-04-29 RX ORDER — EZETIMIBE 10 MG/1
10 TABLET ORAL DAILY
Qty: 90 TABLET | Refills: 1 | Status: SHIPPED | OUTPATIENT
Start: 2025-04-29

## 2025-05-12 ENCOUNTER — PATIENT MESSAGE (OUTPATIENT)
Dept: FAMILY MEDICINE CLINIC | Facility: CLINIC | Age: 70
End: 2025-05-12

## 2025-05-12 DIAGNOSIS — A60.00 GENITAL HERPES SIMPLEX, UNSPECIFIED SITE: ICD-10-CM

## 2025-05-12 RX ORDER — VALACYCLOVIR HYDROCHLORIDE 1 G/1
1000 TABLET, FILM COATED ORAL 2 TIMES DAILY
Qty: 10 TABLET | Refills: 0 | Status: SHIPPED | OUTPATIENT
Start: 2025-05-12 | End: 2025-05-17

## 2025-05-23 DIAGNOSIS — C82.90 FOLLICULAR LYMPHOMA, UNSPECIFIED FOLLICULAR LYMPHOMA TYPE, UNSPECIFIED BODY REGION (HCC): ICD-10-CM

## 2025-05-23 DIAGNOSIS — F41.8 SITUATIONAL ANXIETY: ICD-10-CM

## 2025-05-23 DIAGNOSIS — M33.20 POLYMYOSITIS (HCC): ICD-10-CM

## 2025-05-23 DIAGNOSIS — F33.9 RECURRENT DEPRESSION (HCC): ICD-10-CM

## 2025-05-23 DIAGNOSIS — M51.26 HERNIATED LUMBAR INTERVERTEBRAL DISC: ICD-10-CM

## 2025-05-23 DIAGNOSIS — M54.16 RADICULOPATHY, LUMBAR REGION: ICD-10-CM

## 2025-05-23 DIAGNOSIS — F32.A DEPRESSION, UNSPECIFIED DEPRESSION TYPE: ICD-10-CM

## 2025-05-23 DIAGNOSIS — F98.8 ATTENTION DEFICIT DISORDER: ICD-10-CM

## 2025-05-23 DIAGNOSIS — J43.9 PULMONARY EMPHYSEMA, UNSPECIFIED EMPHYSEMA TYPE (HCC): ICD-10-CM

## 2025-05-23 DIAGNOSIS — F51.01 PRIMARY INSOMNIA: ICD-10-CM

## 2025-05-25 RX ORDER — FOLIC ACID 1 MG/1
2000 TABLET ORAL DAILY
Qty: 180 TABLET | Refills: 1 | Status: SHIPPED | OUTPATIENT
Start: 2025-05-25

## 2025-05-25 RX ORDER — PREGABALIN 75 MG/1
75 CAPSULE ORAL 2 TIMES DAILY
Qty: 180 CAPSULE | Refills: 1 | Status: SHIPPED | OUTPATIENT
Start: 2025-05-25 | End: 2025-11-21

## 2025-05-27 ENCOUNTER — PATIENT MESSAGE (OUTPATIENT)
Dept: FAMILY MEDICINE CLINIC | Facility: CLINIC | Age: 70
End: 2025-05-27

## 2025-05-27 DIAGNOSIS — F90.0 ATTENTION DEFICIT HYPERACTIVITY DISORDER (ADHD), PREDOMINANTLY INATTENTIVE TYPE: ICD-10-CM

## 2025-05-27 DIAGNOSIS — A60.00 GENITAL HERPES SIMPLEX, UNSPECIFIED SITE: ICD-10-CM

## 2025-05-27 RX ORDER — DEXTROAMPHETAMINE SACCHARATE, AMPHETAMINE ASPARTATE MONOHYDRATE, DEXTROAMPHETAMINE SULFATE AND AMPHETAMINE SULFATE 7.5; 7.5; 7.5; 7.5 MG/1; MG/1; MG/1; MG/1
30 CAPSULE, EXTENDED RELEASE ORAL EVERY MORNING
Qty: 30 CAPSULE | Refills: 0 | Status: SHIPPED | OUTPATIENT
Start: 2025-05-27

## 2025-05-27 RX ORDER — VALACYCLOVIR HYDROCHLORIDE 1 G/1
1000 TABLET, FILM COATED ORAL 2 TIMES DAILY
Qty: 10 TABLET | Refills: 0 | Status: SHIPPED | OUTPATIENT
Start: 2025-05-27 | End: 2025-06-01

## 2025-05-27 RX ORDER — DEXTROAMPHETAMINE SACCHARATE, AMPHETAMINE ASPARTATE, DEXTROAMPHETAMINE SULFATE AND AMPHETAMINE SULFATE 2.5; 2.5; 2.5; 2.5 MG/1; MG/1; MG/1; MG/1
20 TABLET ORAL SEE ADMIN INSTRUCTIONS
Qty: 60 TABLET | Refills: 0 | Status: SHIPPED | OUTPATIENT
Start: 2025-05-27

## 2025-05-27 RX ORDER — BUPROPION HYDROCHLORIDE 300 MG/1
300 TABLET ORAL EVERY MORNING
Qty: 90 TABLET | Refills: 1 | Status: SHIPPED | OUTPATIENT
Start: 2025-05-27

## 2025-06-10 ENCOUNTER — NURSE TRIAGE (OUTPATIENT)
Age: 70
End: 2025-06-10

## 2025-06-10 DIAGNOSIS — A60.00 GENITAL HERPES SIMPLEX, UNSPECIFIED SITE: ICD-10-CM

## 2025-06-10 RX ORDER — VALACYCLOVIR HYDROCHLORIDE 500 MG/1
500 TABLET, FILM COATED ORAL DAILY
Qty: 90 TABLET | Refills: 1 | Status: SHIPPED | OUTPATIENT
Start: 2025-06-14 | End: 2025-12-11

## 2025-06-10 RX ORDER — VALACYCLOVIR HYDROCHLORIDE 1 G/1
1000 TABLET, FILM COATED ORAL 2 TIMES DAILY
Qty: 10 TABLET | Refills: 0 | Status: SHIPPED | OUTPATIENT
Start: 2025-06-10 | End: 2025-06-15

## 2025-06-10 NOTE — TELEPHONE ENCOUNTER
"REASON FOR CONVERSATION: Medication Problem    SYMPTOMS: NA    OTHER HEALTH INFORMATION: NA    PROTOCOL DISPOSITION: Home Care    CARE ADVICE PROVIDED: NA    PRACTICE FOLLOW-UP: No follow up needed.  Medication sent to pharmacy.    Reason for Disposition   Prescription prescribed recently is not at pharmacy and triager has access to patient's EMR and prescription is recorded in the EMR    Answer Assessment - Initial Assessment Questions  1. NAME of MEDICINE: \"What medicine(s) are you calling about?\"      valACYclovir (VALTREX) 500 mg tablet  valACYclovir (VALTREX) 1,000 mg tablet  2. QUESTION: \"What is your question?\" (e.g., double dose of medicine, side effect)      Patient states that pharmacy did not receive prescriptions   3. PRESCRIBER: \"Who prescribed the medicine?\" Reason: if prescribed by specialist, call should be referred to that group.      Dr Carolina    Protocols used: Medication Question Call-Adult-[place]    " Please order labs for 4-12 appointment. Thanks, Shirley OTERO

## 2025-06-18 DIAGNOSIS — M33.20 POLYMYOSITIS (HCC): ICD-10-CM

## 2025-06-18 DIAGNOSIS — F41.8 SITUATIONAL ANXIETY: ICD-10-CM

## 2025-06-18 DIAGNOSIS — G72.89 NECROTIZING MYOPATHY: ICD-10-CM

## 2025-06-18 DIAGNOSIS — J43.9 PULMONARY EMPHYSEMA, UNSPECIFIED EMPHYSEMA TYPE (HCC): ICD-10-CM

## 2025-06-18 DIAGNOSIS — M35.1 MCTD (MIXED CONNECTIVE TISSUE DISEASE) (HCC): Primary | ICD-10-CM

## 2025-06-18 DIAGNOSIS — C82.90 FOLLICULAR LYMPHOMA, UNSPECIFIED FOLLICULAR LYMPHOMA TYPE, UNSPECIFIED BODY REGION (HCC): ICD-10-CM

## 2025-06-18 DIAGNOSIS — M32.9 SYSTEMIC LUPUS ERYTHEMATOSUS, UNSPECIFIED SLE TYPE, UNSPECIFIED ORGAN INVOLVEMENT STATUS (HCC): ICD-10-CM

## 2025-06-18 DIAGNOSIS — F51.01 PRIMARY INSOMNIA: ICD-10-CM

## 2025-06-18 DIAGNOSIS — F32.A DEPRESSION, UNSPECIFIED DEPRESSION TYPE: ICD-10-CM

## 2025-06-18 RX ORDER — MYCOPHENOLIC ACID 360 MG/1
TABLET, DELAYED RELEASE ORAL
Qty: 180 TABLET | Refills: 2 | Status: SHIPPED | OUTPATIENT
Start: 2025-06-18

## 2025-06-18 RX ORDER — HYDROXYCHLOROQUINE SULFATE 200 MG/1
200 TABLET, FILM COATED ORAL DAILY
Qty: 90 TABLET | Refills: 2 | Status: SHIPPED | OUTPATIENT
Start: 2025-06-18 | End: 2026-03-15

## 2025-06-23 ENCOUNTER — APPOINTMENT (OUTPATIENT)
Dept: LAB | Facility: CLINIC | Age: 70
End: 2025-06-23
Payer: MEDICARE

## 2025-06-23 ENCOUNTER — OFFICE VISIT (OUTPATIENT)
Dept: FAMILY MEDICINE CLINIC | Facility: CLINIC | Age: 70
End: 2025-06-23
Payer: MEDICARE

## 2025-06-23 VITALS
HEART RATE: 85 BPM | DIASTOLIC BLOOD PRESSURE: 66 MMHG | WEIGHT: 100.53 LBS | SYSTOLIC BLOOD PRESSURE: 149 MMHG | OXYGEN SATURATION: 98 % | BODY MASS INDEX: 18.99 KG/M2

## 2025-06-23 DIAGNOSIS — J30.89 NON-SEASONAL ALLERGIC RHINITIS, UNSPECIFIED TRIGGER: Primary | ICD-10-CM

## 2025-06-23 DIAGNOSIS — F90.0 ATTENTION DEFICIT HYPERACTIVITY DISORDER (ADHD), PREDOMINANTLY INATTENTIVE TYPE: ICD-10-CM

## 2025-06-23 DIAGNOSIS — N39.41 URGE INCONTINENCE: ICD-10-CM

## 2025-06-23 DIAGNOSIS — M35.1 MCTD (MIXED CONNECTIVE TISSUE DISEASE) (HCC): ICD-10-CM

## 2025-06-23 DIAGNOSIS — M34.0 SCLERODERMA PROGRESSIVE (HCC): ICD-10-CM

## 2025-06-23 DIAGNOSIS — C73 THYROID CANCER (HCC): ICD-10-CM

## 2025-06-23 DIAGNOSIS — L71.9 ROSACEA: ICD-10-CM

## 2025-06-23 DIAGNOSIS — E53.8 B12 DEFICIENCY: ICD-10-CM

## 2025-06-23 DIAGNOSIS — M15.0 PRIMARY OSTEOARTHRITIS INVOLVING MULTIPLE JOINTS: ICD-10-CM

## 2025-06-23 DIAGNOSIS — K21.9 GASTROESOPHAGEAL REFLUX DISEASE, UNSPECIFIED WHETHER ESOPHAGITIS PRESENT: ICD-10-CM

## 2025-06-23 DIAGNOSIS — C82.90 FOLLICULAR LYMPHOMA, UNSPECIFIED FOLLICULAR LYMPHOMA TYPE, UNSPECIFIED BODY REGION (HCC): ICD-10-CM

## 2025-06-23 DIAGNOSIS — M35.00 SJOGREN'S SYNDROME, WITH UNSPECIFIED ORGAN INVOLVEMENT (HCC): ICD-10-CM

## 2025-06-23 DIAGNOSIS — E03.9 ACQUIRED HYPOTHYROIDISM: ICD-10-CM

## 2025-06-23 DIAGNOSIS — M85.80 OSTEOPENIA, UNSPECIFIED LOCATION: ICD-10-CM

## 2025-06-23 DIAGNOSIS — F51.01 PRIMARY INSOMNIA: ICD-10-CM

## 2025-06-23 DIAGNOSIS — E55.9 VITAMIN D DEFICIENCY: ICD-10-CM

## 2025-06-23 DIAGNOSIS — E78.2 MIXED HYPERLIPIDEMIA: ICD-10-CM

## 2025-06-23 PROBLEM — R15.1 FECAL SOILING: Status: RESOLVED | Noted: 2017-08-07 | Resolved: 2025-06-23

## 2025-06-23 PROBLEM — R20.0 BILATERAL HAND NUMBNESS: Status: RESOLVED | Noted: 2018-05-01 | Resolved: 2025-06-23
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
## 2025-06-23 LAB
25(OH)D3 SERPL-MCNC: 51.6 NG/ML (ref 30–100)
ALBUMIN SERPL BCG-MCNC: 4.5 G/DL (ref 3.5–5)
ALP SERPL-CCNC: 63 U/L (ref 34–104)
ALT SERPL W P-5'-P-CCNC: 17 U/L (ref 7–52)
ANION GAP SERPL CALCULATED.3IONS-SCNC: 6 MMOL/L (ref 4–13)
AST SERPL W P-5'-P-CCNC: 17 U/L (ref 13–39)
BACTERIA UR QL AUTO: NORMAL /HPF
BASOPHILS # BLD AUTO: 0.06 THOUSANDS/ÂΜL (ref 0–0.1)
BASOPHILS NFR BLD AUTO: 1 % (ref 0–1)
BILIRUB SERPL-MCNC: 0.54 MG/DL (ref 0.2–1)
BILIRUB UR QL STRIP: NEGATIVE
BUN SERPL-MCNC: 11 MG/DL (ref 5–25)
C3 SERPL-MCNC: 101 MG/DL (ref 87–200)
C4 SERPL-MCNC: 15 MG/DL (ref 19–52)
CALCIUM SERPL-MCNC: 9.4 MG/DL (ref 8.4–10.2)
CHLORIDE SERPL-SCNC: 100 MMOL/L (ref 96–108)
CK SERPL-CCNC: 83 U/L (ref 26–192)
CLARITY UR: CLEAR
CO2 SERPL-SCNC: 30 MMOL/L (ref 21–32)
COLOR UR: NORMAL
CREAT SERPL-MCNC: 0.76 MG/DL (ref 0.6–1.3)
CREAT UR-MCNC: 24.6 MG/DL
CRP SERPL QL: <1 MG/L
EOSINOPHIL # BLD AUTO: 0.17 THOUSAND/ÂΜL (ref 0–0.61)
EOSINOPHIL NFR BLD AUTO: 4 % (ref 0–6)
ERYTHROCYTE [DISTWIDTH] IN BLOOD BY AUTOMATED COUNT: 15.8 % (ref 11.6–15.1)
ERYTHROCYTE [SEDIMENTATION RATE] IN BLOOD: 5 MM/HOUR (ref 0–29)
GFR SERPL CREATININE-BSD FRML MDRD: 79 ML/MIN/1.73SQ M
GLUCOSE P FAST SERPL-MCNC: 93 MG/DL (ref 65–99)
GLUCOSE UR STRIP-MCNC: NEGATIVE MG/DL
HCT VFR BLD AUTO: 40.7 % (ref 34.8–46.1)
HGB BLD-MCNC: 13.6 G/DL (ref 11.5–15.4)
HGB UR QL STRIP.AUTO: NEGATIVE
IMM GRANULOCYTES # BLD AUTO: 0.02 THOUSAND/UL (ref 0–0.2)
IMM GRANULOCYTES NFR BLD AUTO: 1 % (ref 0–2)
KETONES UR STRIP-MCNC: NEGATIVE MG/DL
LEUKOCYTE ESTERASE UR QL STRIP: NEGATIVE
LYMPHOCYTES # BLD AUTO: 1.38 THOUSANDS/ÂΜL (ref 0.6–4.47)
LYMPHOCYTES NFR BLD AUTO: 33 % (ref 14–44)
MCH RBC QN AUTO: 29.6 PG (ref 26.8–34.3)
MCHC RBC AUTO-ENTMCNC: 33.4 G/DL (ref 31.4–37.4)
MCV RBC AUTO: 89 FL (ref 82–98)
MONOCYTES # BLD AUTO: 0.43 THOUSAND/ÂΜL (ref 0.17–1.22)
MONOCYTES NFR BLD AUTO: 10 % (ref 4–12)
NEUTROPHILS # BLD AUTO: 2.15 THOUSANDS/ÂΜL (ref 1.85–7.62)
NEUTS SEG NFR BLD AUTO: 51 % (ref 43–75)
NITRITE UR QL STRIP: NEGATIVE
NON-SQ EPI CELLS URNS QL MICRO: NORMAL /HPF
NRBC BLD AUTO-RTO: 0 /100 WBCS
PH UR STRIP.AUTO: 6.5 [PH]
PLATELET # BLD AUTO: 259 THOUSANDS/UL (ref 149–390)
PMV BLD AUTO: 10.7 FL (ref 8.9–12.7)
POTASSIUM SERPL-SCNC: 4.2 MMOL/L (ref 3.5–5.3)
PROT SERPL-MCNC: 6.8 G/DL (ref 6.4–8.4)
PROT UR STRIP-MCNC: NEGATIVE MG/DL
PROT UR-MCNC: <4 MG/DL
RBC # BLD AUTO: 4.59 MILLION/UL (ref 3.81–5.12)
RBC #/AREA URNS AUTO: NORMAL /HPF
SODIUM SERPL-SCNC: 136 MMOL/L (ref 135–147)
SP GR UR STRIP.AUTO: 1.01 (ref 1–1.03)
T4 FREE SERPL-MCNC: 1.33 NG/DL (ref 0.61–1.12)
TSH SERPL DL<=0.05 MIU/L-ACNC: 0.14 UIU/ML (ref 0.45–4.5)
UROBILINOGEN UR STRIP-ACNC: <2 MG/DL
VIT B12 SERPL-MCNC: 711 PG/ML (ref 180–914)
WBC # BLD AUTO: 4.21 THOUSAND/UL (ref 4.31–10.16)
WBC #/AREA URNS AUTO: NORMAL /HPF

## 2025-06-23 PROCEDURE — 82306 VITAMIN D 25 HYDROXY: CPT

## 2025-06-23 PROCEDURE — 80053 COMPREHEN METABOLIC PANEL: CPT | Performed by: INTERNAL MEDICINE

## 2025-06-23 PROCEDURE — 84156 ASSAY OF PROTEIN URINE: CPT

## 2025-06-23 PROCEDURE — 82550 ASSAY OF CK (CPK): CPT | Performed by: INTERNAL MEDICINE

## 2025-06-23 PROCEDURE — 82085 ASSAY OF ALDOLASE: CPT | Performed by: INTERNAL MEDICINE

## 2025-06-23 PROCEDURE — 82607 VITAMIN B-12: CPT

## 2025-06-23 PROCEDURE — 84443 ASSAY THYROID STIM HORMONE: CPT

## 2025-06-23 PROCEDURE — G2211 COMPLEX E/M VISIT ADD ON: HCPCS | Performed by: FAMILY MEDICINE

## 2025-06-23 PROCEDURE — 82570 ASSAY OF URINE CREATININE: CPT

## 2025-06-23 PROCEDURE — 99214 OFFICE O/P EST MOD 30 MIN: CPT | Performed by: FAMILY MEDICINE

## 2025-06-23 PROCEDURE — 36415 COLL VENOUS BLD VENIPUNCTURE: CPT | Performed by: INTERNAL MEDICINE

## 2025-06-23 PROCEDURE — 86160 COMPLEMENT ANTIGEN: CPT | Performed by: INTERNAL MEDICINE

## 2025-06-23 PROCEDURE — 84439 ASSAY OF FREE THYROXINE: CPT

## 2025-06-23 PROCEDURE — 85652 RBC SED RATE AUTOMATED: CPT | Performed by: INTERNAL MEDICINE

## 2025-06-23 PROCEDURE — 85025 COMPLETE CBC W/AUTO DIFF WBC: CPT | Performed by: INTERNAL MEDICINE

## 2025-06-23 PROCEDURE — 81001 URINALYSIS AUTO W/SCOPE: CPT

## 2025-06-23 PROCEDURE — 86140 C-REACTIVE PROTEIN: CPT | Performed by: INTERNAL MEDICINE

## 2025-06-23 RX ORDER — DEXTROAMPHETAMINE SACCHARATE, AMPHETAMINE ASPARTATE, DEXTROAMPHETAMINE SULFATE AND AMPHETAMINE SULFATE 2.5; 2.5; 2.5; 2.5 MG/1; MG/1; MG/1; MG/1
20 TABLET ORAL SEE ADMIN INSTRUCTIONS
Qty: 60 TABLET | Refills: 0 | Status: SHIPPED | OUTPATIENT
Start: 2025-06-23

## 2025-06-23 RX ORDER — DEXTROAMPHETAMINE SACCHARATE, AMPHETAMINE ASPARTATE MONOHYDRATE, DEXTROAMPHETAMINE SULFATE AND AMPHETAMINE SULFATE 7.5; 7.5; 7.5; 7.5 MG/1; MG/1; MG/1; MG/1
30 CAPSULE, EXTENDED RELEASE ORAL EVERY MORNING
Qty: 30 CAPSULE | Refills: 0 | Status: SHIPPED | OUTPATIENT
Start: 2025-06-23

## 2025-06-23 RX ORDER — DOXYCYCLINE HYCLATE 50 MG/1
50 CAPSULE ORAL 2 TIMES DAILY PRN
Qty: 60 CAPSULE | Refills: 1 | Status: SHIPPED | OUTPATIENT
Start: 2025-06-23 | End: 2025-08-22

## 2025-06-23 RX ORDER — ALPRAZOLAM 0.25 MG
0.25 TABLET ORAL 2 TIMES DAILY PRN
Qty: 60 TABLET | Refills: 0 | Status: SHIPPED | OUTPATIENT
Start: 2025-06-23

## 2025-06-23 NOTE — ASSESSMENT & PLAN NOTE
Chronic, multiple joints but primary knee  We reviewed her Xray results from 2/2023: Severe tricompartmental osteoarthritis as evidenced by joint space narrowing, osteophyte formation and subchondral sclerosis.   Continue regular exercises  Patient gets occasional right knee steroid inejctions

## 2025-06-23 NOTE — ASSESSMENT & PLAN NOTE
Now B12 is too high  Decrease to every other day   Continue to monitor    Orders:    Vitamin B12; Future

## 2025-06-23 NOTE — ASSESSMENT & PLAN NOTE
Chronic, follows with rheum in [place] as well as with [facility] per rheumatology            never used

## 2025-06-23 NOTE — ASSESSMENT & PLAN NOTE
stable, no current issues with this.  On doxycycline followed by derm - only takes as needed     Orders:    doxycycline hyclate (VIBRAMYCIN) 50 mg capsule; Take 1 capsule (50 mg total) by mouth 2 (two) times a day as needed (Rosacea outbreaks)

## 2025-06-23 NOTE — PROGRESS NOTES
Name: Mimi Biggs      : 1955      MRN: [id number]  Encounter Provider: Patti Carolina DO  Encounter Date: 2025   Encounter department: [facility] PRIMARY CARE  :  Assessment & Plan  Non-seasonal allergic rhinitis, unspecified trigger  Chronic, stable  Continue Flonase, zyrtec            Gastroesophageal reflux disease, unspecified whether esophagitis present  Overall waxes and wanes.  She is following closely with GI. I recommend to see her GI as planned.  Continue with Nexium daily.           Thyroid cancer (HCC)  s/p thyroidectomy in 2018 - per path report - papillary carcinoma.    Mgmt of hypothyroidism as below           Acquired hypothyroidism  Lab Results   Component Value Date    [id number] 0.024 (L) 2025    FREET4 1.70 (H) 2025     Chronic  She is s/p thyroidectomy due to Ca  Recent US is OK.    She is currently taking levothyroxine 125mcg daily.   TSH recently was low with elevated T4 dose was lowered to 125  Recheck at this time    Orders:    TSH, 3rd generation with Free T4 reflex; Future      Osteopenia, unspecified location  Make sure to take calcium and vitamin-D supplementations-fall prevention.  Being managed by rheumatologist  On [facility] with Rheum          Primary osteoarthritis involving multiple joints  Chronic, multiple joints but primary knee  We reviewed her Xray results from 2023: Severe tricompartmental osteoarthritis as evidenced by joint space narrowing, osteophyte formation and subchondral sclerosis.   Continue regular exercises  Patient gets occasional right knee steroid inejctions           Urge incontinence  Chronic, follows with urology.              Attention deficit hyperactivity disorder (ADHD), predominantly inattentive type  Chronic  Stable on Adderall XR 30mg daily, plus 10mg BID   Controlled  Csa on file, UDS and PDMP consistent with Rx     Orders:    amphetamine-dextroamphetamine (ADDERALL XR, 30MG,) 30 MG 24 hr  capsule; Take 1 capsule (30 mg total) by mouth every morning Max Daily Amount: 30 mg    amphetamine-dextroamphetamine (ADDERALL, 10MG,) 10 mg tablet; Take 2 tablets (20 mg total) by mouth see administration instructions Take 20mg daily in the afternoon around 2pm.      Follicular lymphoma, unspecified follicular lymphoma type, unspecified body region (HCC)  Follows with oncology and stable, follows annually            Sjogren's syndrome, with unspecified organ involvement (HCC)  Follows with rheumatology           Scleroderma progressive (HCC)  Chronic, follows with rheum in [place] as well as with [facility] per rheumatology           Vitamin D deficiency  Chronic, stable  Reviewed recent labs 1/31/25 and vit D wnl  Continue to monitor      Orders:    Vitamin D 25 hydroxy; Future      Rosacea  stable, no current issues with this.  On doxycycline followed by derm - only takes as needed     Orders:    doxycycline hyclate (VIBRAMYCIN) 50 mg capsule; Take 1 capsule (50 mg total) by mouth 2 (two) times a day as needed (Rosacea outbreaks)      Mixed hyperlipidemia    Lab Results   Component Value Date    CHOLESTEROL 191 03/12/2025    TRIG 71 03/12/2025    HDL 69 03/12/2025    LDLCALC 108 (H) 03/12/2025     Chronic, stable  Continue Zetia            B12 deficiency  Now B12 is too high  Decrease to every other day   Continue to monitor    Orders:    Vitamin B12; Future      Primary insomnia    Orders:    ALPRAZolam (XANAX) 0.25 mg tablet; Take 1 tablet (0.25 mg total) by mouth 2 (two) times a day as needed for anxiety      Return in about 5 months (around 11/12/2025) for AWV.       History of Present Illness   Chief Complaint   Patient presents with    Follow-up     return in about 3 months for follow up     Patient has a bug bite on her leg and wants me to take a look to make sure it is not tick. She also reports she is seeing her rheum who recently ordered a bunch of tests for her increasing fatigue and muscle  aches. This is still pending, she will go for this today. She reports her pharmacy has been struggling due to increase business from [employer] closing. She needs refills sooner because the pharmacy is taking longer to fill. She needs her adderall, xanax, and her levothyroxine. She is okay with waiting for the levo refill until her tsh comes back today. She had her evenity bone shot and tolerated it well. This is for her osteopenia/osteoporosis.     The bite and bug she showed me appear to be spider without sign of infection. Reassured patient.       HPI  Review of Systems   Constitutional:  Positive for fatigue. Negative for appetite change, chills, diaphoresis, fever and unexpected weight change.   Eyes:  Negative for visual disturbance.   Respiratory:  Negative for shortness of breath.    Cardiovascular:  Negative for chest pain and leg swelling.   Gastrointestinal:  Negative for constipation and diarrhea.   Endocrine: Negative for polydipsia and polyuria.   Genitourinary:  Negative for frequency.   Musculoskeletal:  Positive for arthralgias.   Skin:         Bug bite   Neurological:  Negative for dizziness, light-headedness and headaches.       Objective   /66 (BP Location: Right arm, Patient Position: Sitting, Cuff Size: Standard)   Pulse 85   Wt 45.6 kg (100 lb 8.5 oz)   SpO2 98%   BMI 18.99 kg/m²      Physical Exam  Vitals reviewed.   Constitutional:       General: She is not in acute distress.     Appearance: Normal appearance. She is not ill-appearing.   HENT:      Head: Normocephalic and atraumatic.   Neck:      Vascular: No carotid bruit.     Cardiovascular:      Rate and Rhythm: Normal rate and regular rhythm.      Heart sounds: Normal heart sounds.   Pulmonary:      Effort: Pulmonary effort is normal.      Breath sounds: Normal breath sounds.     Musculoskeletal:      Cervical back: Neck supple.     Skin:     General: Skin is warm.     Neurological:      General: No focal deficit present.       Mental Status: She is alert.      Sensory: No sensory deficit.      Motor: No weakness.      Gait: Gait normal.     Psychiatric:         Mood and Affect: Mood normal.         Behavior: Behavior normal.

## 2025-06-23 NOTE — ASSESSMENT & PLAN NOTE
Lab Results   Component Value Date    [id number] 0.024 (L) 03/21/2025    FREET4 1.70 (H) 03/21/2025     Chronic  She is s/p thyroidectomy due to Ca  Recent US is OK.    She is currently taking levothyroxine 125mcg daily.   TSH recently was low with elevated T4 dose was lowered to 125  Recheck at this time    Orders:    TSH, 3rd generation with Free T4 reflex; Future

## 2025-06-23 NOTE — ASSESSMENT & PLAN NOTE
Overall waxes and wanes.  She is following closely with GI. I recommend to see her GI as planned.  Continue with Nexium daily.

## 2025-06-23 NOTE — ASSESSMENT & PLAN NOTE
Chronic, stable  Reviewed recent labs 1/31/25 and vit D wnl  Continue to monitor      Orders:    Vitamin D 25 hydroxy; Future

## 2025-06-23 NOTE — ASSESSMENT & PLAN NOTE
Chronic  Stable on Adderall XR 30mg daily, plus 10mg BID   Controlled  Csa on file, UDS and PDMP consistent with Rx     Orders:    amphetamine-dextroamphetamine (ADDERALL XR, 30MG,) 30 MG 24 hr capsule; Take 1 capsule (30 mg total) by mouth every morning Max Daily Amount: 30 mg    amphetamine-dextroamphetamine (ADDERALL, 10MG,) 10 mg tablet; Take 2 tablets (20 mg total) by mouth see administration instructions Take 20mg daily in the afternoon around 2pm.

## 2025-06-24 ENCOUNTER — PATIENT MESSAGE (OUTPATIENT)
Dept: FAMILY MEDICINE CLINIC | Facility: CLINIC | Age: 70
End: 2025-06-24

## 2025-06-24 DIAGNOSIS — E03.9 ACQUIRED HYPOTHYROIDISM: Primary | ICD-10-CM

## 2025-06-25 LAB — ALDOLASE SERPL-CCNC: 2.6 U/L (ref 3.3–10.3)

## 2025-06-26 ENCOUNTER — OFFICE VISIT (OUTPATIENT)
Dept: CARDIOLOGY CLINIC | Facility: HOSPITAL | Age: 70
End: 2025-06-26
Payer: MEDICARE

## 2025-06-26 ENCOUNTER — APPOINTMENT (OUTPATIENT)
Dept: LAB | Facility: HOSPITAL | Age: 70
End: 2025-06-26
Attending: FAMILY MEDICINE
Payer: MEDICARE

## 2025-06-26 ENCOUNTER — TELEPHONE (OUTPATIENT)
Age: 70
End: 2025-06-26

## 2025-06-26 VITALS
BODY MASS INDEX: 18.92 KG/M2 | HEART RATE: 85 BPM | DIASTOLIC BLOOD PRESSURE: 68 MMHG | HEIGHT: 61 IN | SYSTOLIC BLOOD PRESSURE: 134 MMHG | WEIGHT: 100.2 LBS | OXYGEN SATURATION: 96 %

## 2025-06-26 DIAGNOSIS — E78.2 MIXED HYPERLIPIDEMIA: ICD-10-CM

## 2025-06-26 DIAGNOSIS — C82.90 FOLLICULAR LYMPHOMA, UNSPECIFIED FOLLICULAR LYMPHOMA TYPE, UNSPECIFIED BODY REGION (HCC): ICD-10-CM

## 2025-06-26 DIAGNOSIS — I73.9 PAD (PERIPHERAL ARTERY DISEASE) (HCC): Primary | ICD-10-CM

## 2025-06-26 DIAGNOSIS — E03.9 ACQUIRED HYPOTHYROIDISM: ICD-10-CM

## 2025-06-26 DIAGNOSIS — I77.9 CAROTID ARTERY DISEASE, UNSPECIFIED LATERALITY, UNSPECIFIED TYPE (HCC): ICD-10-CM

## 2025-06-26 DIAGNOSIS — C82.90 FOLLICULAR LYMPHOMA, UNSPECIFIED FOLLICULAR LYMPHOMA TYPE, UNSPECIFIED BODY REGION (HCC): Primary | ICD-10-CM

## 2025-06-26 DIAGNOSIS — R93.1 AGATSTON CORONARY ARTERY CALCIUM SCORE LESS THAN 100: ICD-10-CM

## 2025-06-26 DIAGNOSIS — E61.1 IRON DEFICIENCY: ICD-10-CM

## 2025-06-26 LAB
IRON SATN MFR SERPL: 14 % (ref 15–50)
IRON SERPL-MCNC: 69 UG/DL (ref 50–212)
T3FREE SERPL-MCNC: 2.7 PG/ML (ref 2.5–3.9)
T4 FREE SERPL-MCNC: 1.16 NG/DL (ref 0.61–1.12)
TIBC SERPL-MCNC: 480.2 UG/DL (ref 250–450)
TRANSFERRIN SERPL-MCNC: 343 MG/DL (ref 203–362)
TSH SERPL DL<=0.05 MIU/L-ACNC: 0.12 UIU/ML (ref 0.45–4.5)
UIBC SERPL-MCNC: 411 UG/DL (ref 155–355)

## 2025-06-26 PROCEDURE — 99214 OFFICE O/P EST MOD 30 MIN: CPT | Performed by: INTERNAL MEDICINE

## 2025-06-26 PROCEDURE — 83540 ASSAY OF IRON: CPT

## 2025-06-26 PROCEDURE — 84481 FREE ASSAY (FT-3): CPT

## 2025-06-26 PROCEDURE — 83550 IRON BINDING TEST: CPT

## 2025-06-26 PROCEDURE — 84443 ASSAY THYROID STIM HORMONE: CPT

## 2025-06-26 PROCEDURE — 84439 ASSAY OF FREE THYROXINE: CPT

## 2025-06-26 PROCEDURE — 36415 COLL VENOUS BLD VENIPUNCTURE: CPT

## 2025-06-26 NOTE — TELEPHONE ENCOUNTER
Patient called in regards to go to get blood work done this afternoon and would like provider to place and order for her to get her iron levels checked. Patient would like a call back once lab order has been placed.

## 2025-06-26 NOTE — TELEPHONE ENCOUNTER
Attempted to reach Mimi to let her know that Dr. Carolina ordered the Iron blood test for her. Left a VM.

## 2025-06-26 NOTE — PROGRESS NOTES
Cardiology Follow up    Mimi Biggs  [id number]  1955  [facility] [address] [address]      There are no diagnoses linked to this encounter.    I had the pleasure of seeing Mimi Biggs for a consultation regarding a FH of vascular disease and atherosclerosis requested by     History of the Presenting Illness, Discussion/Summary and my Plan are as follows:::    Mimi is a pleasant 70-year-old without hypertension, diabetes, or tobacco use.  She has a history of follicular lymphoma treated with lymph node resection, no chemotherapy or radiation, she does have dyslipidemia and was on a statin till 2019 at which time she was diagnosed with immune mediated necrotizing myopathy at [facility] based on biopsy and since then has not been on a statin-was asked to discontinue.  In addition she also has limited scleroderma, chronic fatigue/fibromyalgia.    Family history-mother in her 50s, dad  in his 60s of a massive heart attack, both parents were smokers, both of her brothers had coronary artery disease although not premature in onset, both of them were non-smokers.  Because of her family history, she underwent coronary angiography in  that did not demonstrate any obstructive disease.      She saw me for cardiac evaluation with her family history, also occasionally gets some epigastric pain.  However she is active in the gym, walks 2 miles, 3 miles on the elliptical, does free weights and machines without any limitations.    In terms of diet, overall low in butter, cheese and meat, was having ice cream every night but has completely cut down now.    Medications-she is not on any antihypertensive medications medications, she is on hydroxychloroquine, mycophenolate and trazodone for her rheumatologic issues.    She also had a neck CT recently that showed  atherosclerosis also prompting this visit.    In terms of ischemic evaluation she also had a nuclear stress test in 2019 without ischemia and a coronary CTA in 2021 ([facility]) that had a calcium score of 0 without any obstructive coronary artery disease.  She repeated a CTA in June 2024 that showed a calcium score of 15, 50th percentile, with moderate nonobstructive disease, tried ezetimibe after that, felt that she might have had hair loss but unclear, currently tolerating it well.    Cardiac exam is unremarkable.    Plan:    Family history of premature coronary disease, epigastric pain and fatigue:   Negative CTA for obstructive disease  See above for 2024 CTA results    In terms of ischemic evaluation, she did have a negative coronary CTA in 2021, negative nuclear stress test in 2019 and negative coronary angiography in 2012.    Dyslipidemia: Most recently LDL in the 150s, goal would be less than 70 in the setting of carotid disease and increasing coronary calcification although still not at a CAD equivalent range.  She is tolerating Zetia well but we need further reduction, options including Nexletol, PCSK9 inhibitors and inclisiran were discussed, she would like to avoid self injections but understands that inclisiran would be potent enough but does not have clinical data yet.  We will try to have this authorized.,  She is not a candidate for statins,    Atherosclerosis in the neck: Incidentally detected while undergoing a neck CAT scan for pain, carotid Dopplers and September 2024 showed 50-69% left and no disease in the right, this is compared to  negative carotid Dopplers in 2022.    Rheumatologic issues: Including limited scleroderma and immune mediated necrotizing myopathy: She follows at [facility], she is on mycophenolate, hydroxychloroquine and trazodone  Trazodone and hydroxychloroquine can cause QT prolongation, her QT interval is normal at 423 corrected QT  Mycophenolate can cause elevated blood  pressures, her blood pressures are normal. She is also on Adderall.  None of these should cause any dyslipidemia  No identifiable secondary causes with normal TSH, CMP, urinalysis  Echo at next visit to assess pulm pressures    Follow-up in about 6 months         Latest Reference Range & Units 02/03/23 08:51 10/18/23 12:05 01/31/24 11:48 07/24/24 08:31  No meds 03/12/25 08:18  Zetia alone   Cholesterol See Comment mg/dL 226 (H) 236 (H) 243 (H) 212 (H) 191   Triglycerides See Comment mg/dL 82 81 102 97 71   HDL >=50 mg/dL 68 66 72  69   HDL CHOLESTEROL LIPOPROTEIN 23 - 92 mg/dL    68    Non-HDL Cholesterol mg/dl   171     LDL Calculated 0 - 100 mg/dL 142 (H) 154 (H) 151 (H) 125 108 (H)   NON HDL CHOL. (LDL+VLDL) <160 mg/dL    144    Chol/HDL Ratio     3.1    APOLIPOPROTEIN B <90 mg/dL   115 (H)     (H): Data is abnormally high          FINDINGS:     CORONARY CALCIUM SCORE: 15     Calcium score PERCENTILE of age, race, and gender matched database participants in the Multi-Ethnic Study of Atherosclerosis (HARRIS) trial: 50th percentile     CORONARY ARTERY ANATOMY: Coronary arteries arise in normal position. There is right coronary artery dominance. There is typical bifurcation of the left main coronary artery into left anterior descending and left circumflex coronary arteries.     LEFT MAIN: No atherosclerotic plaque or vascular stenosis.     LAD AND DIAGONAL BRANCHES: Focal mixed noncalcified and calcified atherosclerotic plaque in the proximal left anterior descending coronary artery just beyond the origin of the large caliber first diagonal branch results in mild to moderate 35-45 %   atherosclerotic stenosis best depicted on image 21 of series 405 and in cross-section on image 26 of series 406. No other potentially significant atherosclerotic lesions.     LCX: No atherosclerotic plaque or vascular stenosis.     RCA:  No atherosclerotic plaque or vascular stenosis.     CARDIAC STRUCTURES:  Valves: Normal CT appearance  of cardiac valves.  Pericardium: Normal pericardial contour without pericardial effusion, thickening, or calcifications.  Myocardium: No abnormal myocardial thinning, myocardial thickening, or myocardial calcification.     GREAT VESSELS: Visualized thoracic aorta and central pulmonary arterial tree are within normal limits for the patient's age.     EXTRACARDIAC FINDINGS: Cyst in the posterior dome of the right hepatic lobe. No significant findings identified on limited small field of view low radiation dose noncontrast images of the chest at the level of the heart.     IMPRESSION:     Solitary mixed calcified and noncalcified atherosclerotic lesion in the proximal left anterior descending coronary artery, but no potentially flow-limiting greater than 50% atherosclerotic stenosis present.     CAD-RADS 2     - Degree of maximal coronary stenosis = 25 - 49%  - Mild nonobstructive coronary artery disease.     Management recommendations:  - Consider nonatherosclerotic causes of chest pain.  - Consider preventative therapy and risk factor modification, particularly for patients with nonobstructive plaque in multiple segments.     Latest Reference Range & Units 08/13/19 03:54 02/03/23 08:51 10/18/23 12:05 01/31/24 11:48   Cholesterol See Comment mg/dL 141 226 (H) 236 (H) 243 (H)   Triglycerides See Comment mg/dL 45 82 81 102   HDL >=50 mg/dL 68 (H) 68 66 72   Non-HDL Cholesterol mg/dl    171   LDL Calculated 0 - 100 mg/dL 64 142 (H) 154 (H) 151 (H)   APOLIPOPROTEIN B <90 mg/dL    115 (H)   (H): Data is abnormally high     Latest Reference Range & Units 09/06/23 14:07 10/18/23 12:05 01/31/24 11:48   TSH, POC 0.45 - 5.33 uIU/mL 0.02 (L) (E)     TSH 3RD GENERATON 0.450 - 4.500 uIU/mL  0.981 3.831   (L): Data is abnormally low  (E): External lab result  No diagnosis found.  Patient Active Problem List   Diagnosis    Methotrexate, long term, current use    Scleroderma progressive (HCC)    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HCC)    BRCA gene  mutation positive    Allergic rhinitis    Bartholin gland cyst    Carpal tunnel syndrome of left wrist    Carpal tunnel syndrome of right wrist    Dental disorder    Depression    Esophageal reflux disease    Fibromyalgia    Hearing loss    Heel spur    Herniated lumbar intervertebral disc    Hyperlipidemia    Lichen planus    Lumbar disc herniation    Osteoarthritis    Osteoarthritis of both knees    Osteopenia    Ovarian cyst    Pain in joint of left shoulder    Peripheral neuropathy    Post concussion syndrome    Radiculopathy, lumbar region    Raynaud's syndrome    Rectal prolapse    Sjogren's syndrome ([facility])    Thyroid cancer ([facility])    Vitamin D deficiency    Acquired hypothyroidism    Advanced care planning/counseling discussion    Chronic pain    Vasomotor rhinitis    Necrotizing myopathy    Hypogammaglobulinemia ([facility])    PAD (peripheral artery disease) ([facility])    High risk medication use    Immunocompromised state ([facility])    Age-related osteoporosis without current pathological fracture    Primary insomnia    Post-nasal drip    Peptic stricture of esophagus    Attention deficit hyperactivity disorder (ADHD), predominantly inattentive type    Rosacea    B12 deficiency    Urge incontinence    Closed fracture of left superior rim of pubis ([facility])    Closed fracture of multiple ribs of right side    Closed wedge compression fracture of T12 vertebra ([facility])    Bladder wall thickening     Past Medical History:   Diagnosis Date    ADHD     Allergic 3-2020    Bactrim    Anemia 2018    Latest blood work OK    Anxiety Since diagnoised 2003    Intermittent related to my life trying to move. My illness    Arthritis     Very bad especially Right knee    Cancer ([facility]) 7-    Cut the Lymph Node Out & thyroid out    Cellulitis of foot     Last assessed 10/24/16    Change in mental status     Last assessed 12/01/15    Chronic fatigue     Depression 2003    I am on meds. Feel good    Disease of thyroid gland     Cancer  Surgical Removal total Thyroid    Diverticulitis of colon Last Egd    Diverticlum    PLAZA (dyspnea on exertion)     Last assessed 12/01/15    Fibromyalgia     Folliculitis     Last assessed 10/06/14    Fractures     GERD (gastroesophageal reflux disease)     Headache(784.0) 3-2020    In front of head    Inflammatory bowel disease 1990’s    Memory loss 2010    New disease neurogentic changes Necrotizing Myopathy    Non Hodgkin's lymphoma (HCC) 07/13/2012    Osteopenia     Osteoporosis 6-2019    Broke Pelvis -left and right pubic ramus,fractured Sacrum    Raynaud's disease     Rheumatic disease     Scleroderma (HCC)     Stomach disorder     Systemic sclerosis (HCC)     Thyroid cancer (HCC) 10/04/2018    Visual impairment 2021    Need new glasses     Social History     Socioeconomic History    Marital status:      Spouse name: Not on file    Number of children: Not on file    Years of education: Not on file    Highest education level: Associate degree: occupational, technical, or vocational program   Occupational History    Not on file   Tobacco Use    Smoking status: Former     Current packs/day: 0.25     Average packs/day: 0.3 packs/day for 5.2 years (1.3 ttl pk-yrs)     Types: Cigarettes    Smokeless tobacco: Never    Tobacco comments:     I quit cold turkey when I learned 2nd hand smoke   Vaping Use    Vaping status: Never Used   Substance and Sexual Activity    Alcohol use: Yes     Comment: I have a cocktail every month ,beer sometimes    Drug use: No    Sexual activity: Not Currently     Partners: Male     Birth control/protection: Abstinence     Comment: Had a hysterectomy   Other Topics Concern    Not on file   Social History Narrative    Not on file     Social Drivers of Health     Financial Resource Strain: Low Risk  (11/28/2024)    Received from [facility]    Overall Financial Resource Strain (CARDIA)     Difficulty of Paying Living Expenses: Not very hard   Food Insecurity: No Food  Insecurity (1/17/2025)    Nursing - Inadequate Food Risk Classification     Worried About Running Out of Food in the Last Year: Never true     Ran Out of Food in the Last Year: Never true     Ran Out of Food in the Last Year: Never true   Transportation Needs: No Transportation Needs (1/17/2025)    Nursing - Transportation Risk Classification     Lack of Transportation: Not on file     Lack of Transportation: No   Physical Activity: Sufficiently Active (7/21/2021)    Exercise Vital Sign     Days of Exercise per Week: 3 days     Minutes of Exercise per Session: 60 min   Stress: Stress Concern Present (7/17/2024)    Received from [facility]    [employer] [place] [employer] - Occupational Stress Questionnaire     Feeling of Stress : To some extent   Social Connections: Feeling Somewhat Isolated (7/17/2024)    Received from [facility]    OASIS : Social Isolation     How often do you feel lonely or isolated from those around you?: Sometimes   Intimate Partner Violence: Unknown (1/17/2025)    Nursing IPS     Feels Physically and Emotionally Safe: Not on file     Physically Hurt by Someone: Not on file     Humiliated or Emotionally Abused by Someone: Not on file     Physically Hurt by Someone: No     Hurt or Threatened by Someone: No   Housing Stability: Unknown (1/17/2025)    Nursing: Inadequate Housing Risk Classification     Has Housing: Not on file     Worried About Losing Housing: Not on file     Unable to Get Utilities: Not on file     Unable to Pay for Housing in the Last Year: No     Has Housing: No      Family History   Problem Relation Name Age of Onset    Arthritis Mother Carissa Scherer     Diabetes Mother Carissa Scherer     Hyperlipidemia Mother Carissa Scherer         Passed 10- Kidney- Heart Disease    Thyroid disease Mother Carissa Scherer         Benign lump removed    Autoimmune disease Mother Carissa Scherer     ADD / ADHD Mother Carissa Scherer      Osteoporosis Mother Carissa Scherer         1x week    Coronary artery disease Father Rudy Scherer     Hyperlipidemia Father Rudy Scherer          Massive Heart Attack    Arthritis Sister Natanael     Asthma Sister Natanael     Crohn's disease Sister Natanael     Autoimmune disease Sister Natanael     Depression Sister Azalia O’cathy     Hyperlipidemia Sister Azalia O’cathy         Very  bad Chrons Disease    Autoimmune disease Sister Azalia O’cathy     Depression Sister Kaylah Dunn     Hyperlipidemia Sister Kaylah Dunn         Heart disease -Lupus like-Thyroid issues    Thyroid disease Sister Kaylah Dunn         Seeing Endrocologist dosage for thyroid    Arthritis Sister Kaylah Dunn         Having many issues this year    Autoimmune disease Sister Kaylah Dunn     Diabetes Sister Kaylah Dunn     Rheumatologic disease Sister Kaylah Dunn     No Known Problems Daughter      No Known Problems Maternal Grandmother      No Known Problems Maternal Grandfather      No Known Problems Paternal Grandmother      No Known Problems Paternal Grandfather      Hyperlipidemia Brother Choco         Heart Attacks,torn rotator and bicept . arthritis, knees    Autoimmune disease Brother Choco     Hyperlipidemia Brother Natanael Ahujasteden         Heart Attacks.bad kness,Arthrites    Autoimmune disease Brother Natanael Dakosty     Diabetes Brother Natanael Dakosty     Ovarian cancer Maternal Aunt Debby Monk     Dementia Maternal Aunt Debby Monk             Cancer Maternal Aunt Candie     No Known Problems Maternal Aunt      No Known Problems Maternal Aunt      Cancer Maternal Aunt Shani     No Known Problems Maternal Aunt      No Known Problems Paternal Aunt      Cancer Maternal Aunt Ro     Cancer Maternal Uncle Kameron     Cancer Maternal Uncle Micah     Breast cancer Neg Hx      Endometrial cancer Neg Hx      Colon cancer Neg Hx      Breast cancer additional onset Neg Hx      BRCA 1/2 Neg Hx      BRCA2 Positive Neg Hx      BRCA2  Negative Neg Hx      BRCA1 Positive Neg Hx      BRCA1 Negative Neg Hx       Past Surgical History:   Procedure Laterality Date    BACK SURGERY      BLADDER SURGERY      BONE MARROW BIOPSY      CARDIAC CATHETERIZATION      EYE SURGERY  Jan 1010    Correct angles in both eyes    HEMORRHOID SURGERY      HYSTERECTOMY  02/04/2004    KNEE SURGERY      LYMPH NODE BIOPSY  7-     Non Hodgkins Lymphoma    NASAL SEPTUM SURGERY      NECK SURGERY      OOPHORECTOMY Bilateral 02/04/2004    ROTATOR CUFF REPAIR      SHOULDER SURGERY      SPINE SURGERY  11-    Failed microdisectomy L5 S1    TENDON REPAIR      THYROIDECTOMY N/A 10/04/2018    Procedure: TOTAL THYROIDECTOMY;  Surgeon: Roger Ortiz MD;  Location: [facility];  Service: Surgical Oncology    TONSILLECTOMY      TOTAL THYROIDECTOMY      WRIST SURGERY         Current Outpatient Medications:     acetaminophen (TYLENOL) 325 mg tablet, Take 2 tablets (650 mg total) by mouth every 6 (six) hours, Disp: 240 tablet, Rfl: 0    ALPRAZolam (XANAX) 0.25 mg tablet, Take 1 tablet (0.25 mg total) by mouth 2 (two) times a day as needed for anxiety, Disp: 60 tablet, Rfl: 0    amphetamine-dextroamphetamine (ADDERALL XR, 30MG,) 30 MG 24 hr capsule, Take 1 capsule (30 mg total) by mouth every morning Max Daily Amount: 30 mg, Disp: 30 capsule, Rfl: 0    amphetamine-dextroamphetamine (ADDERALL, 10MG,) 10 mg tablet, Take 2 tablets (20 mg total) by mouth see administration instructions Take 20mg daily in the afternoon around 2pm., Disp: 60 tablet, Rfl: 0    buPROPion (WELLBUTRIN XL) 300 mg 24 hr tablet, TAKE 1 TABLET (300 MG TOTAL) BY MOUTH EVERY MORNING, Disp: 90 tablet, Rfl: 1    calcium citrate-vitamin D 315 mg-5 mcg tablet, Take 1 tablet by mouth daily, Disp: 90 tablet, Rfl: 0    clobetasol (TEMOVATE) 0.05 % cream, , Disp: , Rfl:     Docusate Calcium (STOOL SOFTENER PO), Take by mouth, Disp: , Rfl:     doxycycline hyclate (VIBRAMYCIN) 50 mg capsule, Take 1 capsule (50  mg total) by mouth 2 (two) times a day as needed (Rosacea outbreaks), Disp: 60 capsule, Rfl: 1    esomeprazole (NexIUM) 40 MG capsule, Take 1 capsule (40 mg total) by mouth every morning, Disp: 90 capsule, Rfl: 0    ezetimibe (ZETIA) 10 mg tablet, Take 1 tablet (10 mg total) by mouth daily, Disp: 90 tablet, Rfl: 1    folic acid (FOLVITE) 1 mg tablet, TAKE 2 TABLETS (2 MG TOTAL) BY MOUTH DAILY, Disp: 180 tablet, Rfl: 1    hydroxychloroquine (PLAQUENIL) 200 mg tablet, Take 1 tablet (200 mg total) by mouth daily, Disp: 90 tablet, Rfl: 2    levothyroxine 125 mcg tablet, Take 1 tablet (125 mcg total) by mouth daily, Disp: 90 tablet, Rfl: 1    MAGNESIUM PO, Take by mouth Resveratrol, berberine & quercetin, Disp: , Rfl:     mycophenolate (MYFORTIC) 360 MG TBEC, TAKE 1 TABLET IN THE MORNING AND 1 TABLET AT NIGHT, Disp: 180 tablet, Rfl: 2    naproxen (NAPROSYN) 500 mg tablet, Take 1 tablet (500 mg total) by mouth 2 (two) times a day as needed for mild pain (with meals), Disp: 30 tablet, Rfl: 0    polyethylene glycol (MIRALAX) 17 g packet, Take 17 g by mouth daily as needed (constipation), Disp: 10 each, Rfl: 0    pregabalin (LYRICA) 75 mg capsule, TAKE 1 CAPSULE (75 MG TOTAL) BY MOUTH 2 (TWO) TIMES A DAY, Disp: 180 capsule, Rfl: 1    senna-docusate sodium (SENOKOT S) 8.6-50 mg per tablet, Take 1 tablet by mouth daily at bedtime, Disp: 30 tablet, Rfl: 0    solifenacin (VESICARE) 5 mg tablet, Take 1 tablet (5 mg total) by mouth daily, Disp: 30 tablet, Rfl: 6    traZODone (DESYREL) 150 mg tablet, Take 1 tablet (150 mg total) by mouth daily at bedtime, Disp: 90 tablet, Rfl: 1    valACYclovir (VALTREX) 1,000 mg tablet, Take 1 tablet (1,000 mg total) by mouth 2 (two) times a day for 5 days, Disp: 10 tablet, Rfl: 0    valACYclovir (VALTREX) 500 mg tablet, Take 1 tablet (500 mg total) by mouth daily Do not start before June 14, 2025., Disp: 90 tablet, Rfl: 1  Allergies   Allergen Reactions    Thiazide-Type Diuretics Other (See  "Comments)     Hyponatremia    Duloxetine Other (See Comments)     Mental psychosis    Alendronate GI Intolerance, Myalgia, Nausea Only and Other (See Comments)    Revatio [Sildenafil] Other (See Comments)     mental status changes    Savella [Milnacipran] Other (See Comments)     Feeling out of it    Sulfamethoxazole-Trimethoprim Rash and GI Intolerance    Tedizolid Rash     Vitals:    06/26/25 1401   BP: 134/68   Pulse: 85   SpO2: 96%   Weight: 45.5 kg (100 lb 3.2 oz)   Height: 5' 1\" (1.549 m)         Imaging: CT soft tissue neck w contrast    Result Date: 3/11/2024  Narrative: CT NECK WITH CONTRAST INDICATION:   R53.81: Other malaise R53.83: Other fatigue R61: Generalized hyperhidrosis R63.0: Anorexia R59.0: Localized enlarged lymph nodes K11.1: Hypertrophy of salivary gland. History of thyroid cancer and lymphoma. COMPARISON: 2/3/2023; 2/26/2024 TECHNIQUE:  Axial, sagittal, and coronal 2D reformatted images were created from the axial source data and submitted for interpretation. Radiation dose length product (DLP) for this visit:  276 mGy-cm .  This examination, like all CT scans performed in the [facility], was performed utilizing techniques to minimize radiation dose exposure, including the use of iterative reconstruction and automated exposure control. IV Contrast:  85 mL of iohexol (OMNIPAQUE) IMAGE QUALITY:  Diagnostic. FINDINGS: VISUALIZED BRAIN PARENCHYMA:  No acute intracranial pathology of the visualized brain parenchyma. VISUALIZED ORBITS: Normal visualized orbits. PARANASAL SINUSES: Normal visualized paranasal sinuses. NASAL CAVITY AND NASOPHARYNX:  Normal. SUPRAHYOID NECK:  Normal oral cavity, tongue base, tonsillar fossa and epiglottis. INFRAHYOID NECK:  Aryepiglottic folds and piriform sinuses are normal.  Normal glottis and subglottic airway. THYROID GLAND: Normal thyroid gland is not identified. PAROTID AND SUBMANDIBULAR GLANDS:  Normal. LYMPH NODES:  No pathologic or enlarged " adenopathy. VASCULAR STRUCTURES: Atherosclerotic calcifications noted. THORACIC INLET:  Lung apices and upper mediastinum are unremarkable. BONY STRUCTURES: Anterior fusion hardware C5-C7 noted. Orthopedic hardware well seated within bone. Mild spondylotic changes.     Impression: No suspicious lymphadenopathy or mass. Workstation performed: [id number]     US head neck soft tissue    Result Date: 3/4/2024  Narrative: Head and neck ULTRASOUND INDICATION: R53.81: Other malaise R53.83: Other fatigue R11.0: Nausea R61: Generalized hyperhidrosis R63.0: Anorexia R59.0: Localized enlarged lymph nodes. Palpable abnormality near 2; suspected adenopathy; history of thyroid cancer and lymphoma COMPARISON: Study from 9/11/2023 of the thyroid bed; CT from 2/3/2023 of the neck TECHNIQUE: Real-time ultrasound of the submandibular region in the area of palpable concern on the right and left was performed with a linear transducer with both volumetric sweeps and still imaging techniques. FINDINGS: Adjacent to the right submandibular gland there is a 0.7 x 0.2 x 0.5 cm lymph node. A small echogenic center is demonstrated. There is an 0.8 x 0.3 x 0.9 cm node near the left submandibular gland with an echogenic center. The submandibular glands are homogeneous in symmetric in appearance although there may be some ductal prominence noted bilaterally with each duct reaching approximately 2 mm. The prior CT of the neck did not show any obvious dilatation of the ducts although the ducts were barely visible closer to 1 mm in dimension.. This suggests a possible new finding.     Impression: Small lymph nodes are noted bilaterally in the submandibular region but only reaching 3 mm in short axis. These may not be palpable. There is mild prominence of the submandibular gland ducts. Correlation with tumor markers is advised as well as any salivary symptoms. Follow-up CT or MRI should be considered given the patient's history. The study was marked  "in [employer] for significant notification. Workstation performed: [id number]       Review of Systems:  Review of Systems   Constitutional:  Positive for fatigue. Negative for activity change, appetite change, chills, diaphoresis, fever and unexpected weight change.   Cardiovascular: Negative.         Epigastric pain, not with exertion   Genitourinary: Negative.    Musculoskeletal:  Positive for arthralgias.   Hematological: Negative.        Physical Exam:    /68   Pulse 85   Ht 5' 1\" (1.549 m)   Wt 45.5 kg (100 lb 3.2 oz)   SpO2 96%   BMI 18.93 kg/m²   Physical Exam  Constitutional:       General: She is not in acute distress.     Appearance: She is not ill-appearing, toxic-appearing or diaphoretic.   HENT:      Nose: Nose normal. No congestion or rhinorrhea.   Neck:      Vascular: No carotid bruit.     Cardiovascular:      Pulses: Normal pulses.      Heart sounds: No murmur heard.     No friction rub. No gallop.   Pulmonary:      Effort: Pulmonary effort is normal. No respiratory distress.      Breath sounds: No stridor. No wheezing or rhonchi.     Musculoskeletal:         General: No swelling, tenderness, deformity or signs of injury. Normal range of motion.      Cervical back: Normal range of motion. No rigidity or tenderness.   Lymphadenopathy:      Cervical: No cervical adenopathy.     Skin:     General: Skin is warm.      Coloration: Skin is not jaundiced or pale.      Findings: No bruising or erythema.     Neurological:      Mental Status: She is alert.            This note was completed in part utilizing [employer] direct voice recognition software.   Grammatical errors, random word insertion, spelling mistakes, occasional wrong word or \"sound-alike\" substitutions and incomplete sentences may be an occasional consequence of the system secondary to software limitations, ambient noise and hardware issues. At the time of dictation, efforts were made to edit, clarify and /or correct errors.  Please " read the chart carefully and recognize, using context, where substitutions have occurred.  If you have any questions or concerns about the context, text or information contained within the body of this dictation, please contact myself, the provider, for further clarification.

## 2025-06-27 ENCOUNTER — DOCUMENTATION (OUTPATIENT)
Dept: CARDIOLOGY CLINIC | Facility: HOSPITAL | Age: 70
End: 2025-06-27

## 2025-06-27 NOTE — PROGRESS NOTES
Faxed Le[facility] Start Form to the [facility] @ [phone].      Also notified P/A Team that it may require a P/A.

## 2025-07-01 RX ORDER — LEVOTHYROXINE SODIUM 112 UG/1
112 TABLET ORAL DAILY
Qty: 90 TABLET | Refills: 0 | Status: SHIPPED | OUTPATIENT
Start: 2025-07-01 | End: 2025-07-03 | Stop reason: SDUPTHER

## 2025-07-01 RX ORDER — FERROUS SULFATE 324(65)MG
324 TABLET, DELAYED RELEASE (ENTERIC COATED) ORAL
Qty: 90 TABLET | Refills: 1 | Status: SHIPPED | OUTPATIENT
Start: 2025-07-01 | End: 2025-07-03 | Stop reason: SDUPTHER

## 2025-07-01 NOTE — TELEPHONE ENCOUNTER
"Spoke with Mimi apodaca relayed the message per Dr. Carolina \"Please let patient know her iron is low. She will need to start taking a daily iron supplement. Let me know if she would like a script for this. Additionally, her Thyroid is still overactive meaning we should consider a lower dose of levothyroxine. I recommend 112mcg daily. Let me know if she is ok with this and I will send it in. Also, we will then recheck thyroid in 6 weeks after starting the new dose.\" Patient verbalized understanding and will like Dr. Carolina to send both script to her pharmacy.       "

## 2025-07-02 ENCOUNTER — PATIENT MESSAGE (OUTPATIENT)
Dept: FAMILY MEDICINE CLINIC | Facility: CLINIC | Age: 70
End: 2025-07-02

## 2025-07-02 DIAGNOSIS — E61.1 IRON DEFICIENCY: ICD-10-CM

## 2025-07-02 DIAGNOSIS — A60.00 GENITAL HERPES SIMPLEX, UNSPECIFIED SITE: ICD-10-CM

## 2025-07-02 DIAGNOSIS — M17.0 PRIMARY OSTEOARTHRITIS OF BOTH KNEES: Primary | ICD-10-CM

## 2025-07-02 DIAGNOSIS — E03.9 ACQUIRED HYPOTHYROIDISM: ICD-10-CM

## 2025-07-02 DIAGNOSIS — F51.01 PRIMARY INSOMNIA: ICD-10-CM

## 2025-07-02 LAB — DSDNA IGG SERPL IA-ACNC: 4.8 IU/ML (ref ?–15)

## 2025-07-03 RX ORDER — TRAZODONE HYDROCHLORIDE 150 MG/1
150 TABLET ORAL
Qty: 90 TABLET | Refills: 1 | Status: SHIPPED | OUTPATIENT
Start: 2025-07-03

## 2025-07-03 RX ORDER — FERROUS SULFATE 324(65)MG
324 TABLET, DELAYED RELEASE (ENTERIC COATED) ORAL
Qty: 90 TABLET | Refills: 1 | Status: SHIPPED | OUTPATIENT
Start: 2025-07-03

## 2025-07-03 RX ORDER — VALACYCLOVIR HYDROCHLORIDE 500 MG/1
500 TABLET, FILM COATED ORAL 2 TIMES DAILY
Qty: 180 TABLET | Refills: 0 | Status: SHIPPED | OUTPATIENT
Start: 2025-07-03 | End: 2025-12-30

## 2025-07-03 RX ORDER — MELOXICAM 15 MG/1
15 TABLET ORAL DAILY PRN
Qty: 90 TABLET | Refills: 0 | Status: SHIPPED | OUTPATIENT
Start: 2025-07-03

## 2025-07-03 RX ORDER — LEVOTHYROXINE SODIUM 112 UG/1
112 TABLET ORAL DAILY
Qty: 90 TABLET | Refills: 0 | Status: SHIPPED | OUTPATIENT
Start: 2025-07-03

## 2025-07-15 ENCOUNTER — OFFICE VISIT (OUTPATIENT)
Dept: FAMILY MEDICINE CLINIC | Facility: CLINIC | Age: 70
End: 2025-07-15
Payer: MEDICARE

## 2025-07-15 VITALS
HEART RATE: 82 BPM | BODY MASS INDEX: 19.16 KG/M2 | DIASTOLIC BLOOD PRESSURE: 64 MMHG | WEIGHT: 101.41 LBS | SYSTOLIC BLOOD PRESSURE: 134 MMHG | OXYGEN SATURATION: 100 %

## 2025-07-15 DIAGNOSIS — S01.319A: Primary | ICD-10-CM

## 2025-07-15 PROCEDURE — G2211 COMPLEX E/M VISIT ADD ON: HCPCS | Performed by: FAMILY MEDICINE

## 2025-07-15 PROCEDURE — 99213 OFFICE O/P EST LOW 20 MIN: CPT | Performed by: FAMILY MEDICINE

## 2025-07-15 RX ORDER — HYDROCORTISONE 25 MG/G
OINTMENT TOPICAL 2 TIMES DAILY
Qty: 20 G | Refills: 0 | Status: SHIPPED | OUTPATIENT
Start: 2025-07-15

## 2025-07-21 ENCOUNTER — PATIENT MESSAGE (OUTPATIENT)
Dept: FAMILY MEDICINE CLINIC | Facility: CLINIC | Age: 70
End: 2025-07-21

## 2025-07-22 DIAGNOSIS — F90.0 ATTENTION DEFICIT HYPERACTIVITY DISORDER (ADHD), PREDOMINANTLY INATTENTIVE TYPE: ICD-10-CM

## 2025-07-22 RX ORDER — DEXTROAMPHETAMINE SACCHARATE, AMPHETAMINE ASPARTATE MONOHYDRATE, DEXTROAMPHETAMINE SULFATE AND AMPHETAMINE SULFATE 7.5; 7.5; 7.5; 7.5 MG/1; MG/1; MG/1; MG/1
30 CAPSULE, EXTENDED RELEASE ORAL EVERY MORNING
Qty: 30 CAPSULE | Refills: 0 | Status: SHIPPED | OUTPATIENT
Start: 2025-07-22

## 2025-07-22 RX ORDER — DEXTROAMPHETAMINE SACCHARATE, AMPHETAMINE ASPARTATE, DEXTROAMPHETAMINE SULFATE AND AMPHETAMINE SULFATE 2.5; 2.5; 2.5; 2.5 MG/1; MG/1; MG/1; MG/1
20 TABLET ORAL SEE ADMIN INSTRUCTIONS
Qty: 60 TABLET | Refills: 0 | Status: SHIPPED | OUTPATIENT
Start: 2025-07-22

## 2025-07-30 ENCOUNTER — OFFICE VISIT (OUTPATIENT)
Dept: RHEUMATOLOGY | Facility: CLINIC | Age: 70
End: 2025-07-30
Payer: MEDICARE

## 2025-07-30 VITALS
SYSTOLIC BLOOD PRESSURE: 128 MMHG | DIASTOLIC BLOOD PRESSURE: 76 MMHG | WEIGHT: 116 LBS | HEIGHT: 61 IN | BODY MASS INDEX: 21.9 KG/M2

## 2025-07-30 DIAGNOSIS — M81.0 AGE-RELATED OSTEOPOROSIS WITHOUT CURRENT PATHOLOGICAL FRACTURE: ICD-10-CM

## 2025-07-30 DIAGNOSIS — G72.89 NECROTIZING MYOPATHY: ICD-10-CM

## 2025-07-30 DIAGNOSIS — M32.9 SYSTEMIC LUPUS ERYTHEMATOSUS, UNSPECIFIED SLE TYPE, UNSPECIFIED ORGAN INVOLVEMENT STATUS (HCC): ICD-10-CM

## 2025-07-30 DIAGNOSIS — M34.9 LIMITED SCLERODERMA (HCC): ICD-10-CM

## 2025-07-30 DIAGNOSIS — Z79.899 HIGH RISK MEDICATION USE: ICD-10-CM

## 2025-07-30 DIAGNOSIS — S22.080D CLOSED WEDGE COMPRESSION FRACTURE OF T12 VERTEBRA WITH ROUTINE HEALING, SUBSEQUENT ENCOUNTER: ICD-10-CM

## 2025-07-30 DIAGNOSIS — S22.41XD CLOSED FRACTURE OF MULTIPLE RIBS OF RIGHT SIDE WITH ROUTINE HEALING, SUBSEQUENT ENCOUNTER: ICD-10-CM

## 2025-07-30 DIAGNOSIS — M35.1 MCTD (MIXED CONNECTIVE TISSUE DISEASE) (HCC): Primary | ICD-10-CM

## 2025-07-30 DIAGNOSIS — M17.11 OSTEOARTHRITIS OF RIGHT KNEE, UNSPECIFIED OSTEOARTHRITIS TYPE: ICD-10-CM

## 2025-07-30 PROCEDURE — 20610 DRAIN/INJ JOINT/BURSA W/O US: CPT | Performed by: INTERNAL MEDICINE

## 2025-07-30 PROCEDURE — 99215 OFFICE O/P EST HI 40 MIN: CPT | Performed by: INTERNAL MEDICINE

## 2025-07-30 RX ADMIN — LIDOCAINE HYDROCHLORIDE 1 ML: 10 INJECTION, SOLUTION INFILTRATION; PERINEURAL at 11:00

## 2025-07-30 RX ADMIN — TRIAMCINOLONE ACETONIDE 40 MG: 40 INJECTION, SUSPENSION INTRA-ARTICULAR; INTRAMUSCULAR at 11:00

## 2025-08-13 ENCOUNTER — APPOINTMENT (OUTPATIENT)
Dept: URBAN - NONMETROPOLITAN AREA CLINIC 4 | Facility: CLINIC | Age: 70
Setting detail: DERMATOLOGY
End: 2025-08-13

## 2025-08-13 DIAGNOSIS — L57.0 ACTINIC KERATOSIS: ICD-10-CM | Status: INADEQUATELY CONTROLLED

## 2025-08-13 PROCEDURE — ? FULL BODY SKIN EXAM - DECLINED

## 2025-08-13 PROCEDURE — ? TREATMENT REGIMEN

## 2025-08-13 PROCEDURE — ? PHOTO-DOCUMENTATION

## 2025-08-13 PROCEDURE — ? LIQUID NITROGEN

## 2025-08-13 PROCEDURE — ? COUNSELING

## 2025-08-13 ASSESSMENT — LOCATION DETAILED DESCRIPTION DERM
LOCATION DETAILED: LEFT DISTAL DORSAL FOREARM
LOCATION DETAILED: PERIUMBILICAL SKIN
LOCATION DETAILED: RIGHT SUPERIOR LATERAL MALAR CHEEK
LOCATION DETAILED: RIGHT CENTRAL ZYGOMA
LOCATION DETAILED: LEFT PROXIMAL DORSAL FOREARM
LOCATION DETAILED: LEFT LATERAL PROXIMAL PRETIBIAL REGION

## 2025-08-13 ASSESSMENT — LOCATION SIMPLE DESCRIPTION DERM
LOCATION SIMPLE: LEFT FOREARM
LOCATION SIMPLE: ABDOMEN
LOCATION SIMPLE: LEFT PRETIBIAL REGION
LOCATION SIMPLE: RIGHT ZYGOMA
LOCATION SIMPLE: RIGHT CHEEK

## 2025-08-13 ASSESSMENT — LOCATION ZONE DERM
LOCATION ZONE: LEG
LOCATION ZONE: ARM
LOCATION ZONE: FACE
LOCATION ZONE: TRUNK

## 2025-08-20 ENCOUNTER — PATIENT MESSAGE (OUTPATIENT)
Dept: RHEUMATOLOGY | Facility: CLINIC | Age: 70
End: 2025-08-20

## 2025-08-20 ENCOUNTER — TELEPHONE (OUTPATIENT)
Dept: RHEUMATOLOGY | Facility: CLINIC | Age: 70
End: 2025-08-20

## 2025-08-20 DIAGNOSIS — F51.01 PRIMARY INSOMNIA: ICD-10-CM

## 2025-08-20 DIAGNOSIS — F90.0 ATTENTION DEFICIT HYPERACTIVITY DISORDER (ADHD), PREDOMINANTLY INATTENTIVE TYPE: ICD-10-CM

## 2025-08-21 ENCOUNTER — TELEPHONE (OUTPATIENT)
Age: 70
End: 2025-08-21

## 2025-08-21 DIAGNOSIS — M54.16 RADICULOPATHY, LUMBAR REGION: ICD-10-CM

## 2025-08-21 DIAGNOSIS — M51.26 HERNIATED LUMBAR INTERVERTEBRAL DISC: ICD-10-CM

## 2025-08-21 RX ORDER — TRIAMCINOLONE ACETONIDE 40 MG/ML
40 INJECTION, SUSPENSION INTRA-ARTICULAR; INTRAMUSCULAR
Status: COMPLETED | OUTPATIENT
Start: 2025-07-30 | End: 2025-07-30

## 2025-08-21 RX ORDER — ALPRAZOLAM 0.25 MG
0.25 TABLET ORAL 2 TIMES DAILY PRN
Qty: 60 TABLET | Refills: 0 | Status: SHIPPED | OUTPATIENT
Start: 2025-08-21

## 2025-08-21 RX ORDER — DEXTROAMPHETAMINE SACCHARATE, AMPHETAMINE ASPARTATE, DEXTROAMPHETAMINE SULFATE AND AMPHETAMINE SULFATE 2.5; 2.5; 2.5; 2.5 MG/1; MG/1; MG/1; MG/1
20 TABLET ORAL SEE ADMIN INSTRUCTIONS
Qty: 60 TABLET | Refills: 0 | Status: SHIPPED | OUTPATIENT
Start: 2025-08-21

## 2025-08-21 RX ORDER — PREGABALIN 75 MG/1
75 CAPSULE ORAL 2 TIMES DAILY
Qty: 180 CAPSULE | Refills: 1 | Status: SHIPPED | OUTPATIENT
Start: 2025-08-21 | End: 2026-02-17

## 2025-08-21 RX ORDER — LIDOCAINE HYDROCHLORIDE 10 MG/ML
1 INJECTION, SOLUTION INFILTRATION; PERINEURAL
Status: COMPLETED | OUTPATIENT
Start: 2025-07-30 | End: 2025-07-30

## 2025-08-21 RX ORDER — DEXTROAMPHETAMINE SACCHARATE, AMPHETAMINE ASPARTATE MONOHYDRATE, DEXTROAMPHETAMINE SULFATE AND AMPHETAMINE SULFATE 7.5; 7.5; 7.5; 7.5 MG/1; MG/1; MG/1; MG/1
30 CAPSULE, EXTENDED RELEASE ORAL EVERY MORNING
Qty: 30 CAPSULE | Refills: 0 | Status: SHIPPED | OUTPATIENT
Start: 2025-08-21

## (undated) DEVICE — SUT VICRYL 3-0 SH 27 IN J416H

## (undated) DEVICE — PACK UNIVERSAL NECK

## (undated) DEVICE — MINOR PROCEDURE DRAPE: Brand: CONVERTORS

## (undated) DEVICE — SURGICEL 4 X 8

## (undated) DEVICE — NEEDLE 25G X 1 1/2

## (undated) DEVICE — CHLORAPREP HI-LITE 26ML ORANGE

## (undated) DEVICE — 3M™ STERI-STRIP™ COMPOUND BENZOIN TINCTURE 40 BAGS/CARTON 4 CARTONS/CASE C1544: Brand: 3M™ STERI-STRIP™

## (undated) DEVICE — SUT MONOCRYL 5-0 P-3 18 IN Y493G

## (undated) DEVICE — ELECTRODE BLADE MOD E-Z CLEAN 2.5IN 6.4CM -0012M

## (undated) DEVICE — GLOVE INDICATOR PI UNDERGLOVE SZ 7 BLUE

## (undated) DEVICE — INTENDED FOR TISSUE SEPARATION, AND OTHER PROCEDURES THAT REQUIRE A SHARP SURGICAL BLADE TO PUNCTURE OR CUT.: Brand: BARD-PARKER SAFETY BLADES SIZE 15, STERILE

## (undated) DEVICE — INTENDED FOR TISSUE SEPARATION, AND OTHER PROCEDURES THAT REQUIRE A SHARP SURGICAL BLADE TO PUNCTURE OR CUT.: Brand: BARD-PARKER ® CARBON RIB-BACK BLADES

## (undated) DEVICE — SUT SILK 3-0 18 IN A184H

## (undated) DEVICE — 3000CC GUARDIAN II: Brand: GUARDIAN

## (undated) DEVICE — SUT VICRYL 4-0 PS-2 27 IN J426H

## (undated) DEVICE — SUT SILK 2-0 SH 30 IN K833H

## (undated) DEVICE — SUT VICRYL 3-0 18 IN J110T

## (undated) DEVICE — LIGACLIP MCA MULTIPLE CLIP APPLIERS, 20 SMALL CLIPS: Brand: LIGACLIP

## (undated) DEVICE — SUT SILK 2-0 18 IN A185H

## (undated) DEVICE — DRAPE SURGIKIT SADDLE BAG

## (undated) DEVICE — ALL PURPOSE SPONGES,NONWOVEN, 4 PLY: Brand: CURITY

## (undated) DEVICE — SPONGE LAP 18 X 18 IN STRL RFD

## (undated) DEVICE — COVER ROLL 8 IN X 2 YD STRETCH TAPE

## (undated) DEVICE — GLOVE SRG BIOGEL ECLIPSE 7

## (undated) DEVICE — 3M™ STERI-STRIP™ REINFORCED ADHESIVE SKIN CLOSURES, R1546, 1/4 IN X 4 IN (6 MM X 100 MM), 10 STRIPS/ENVELOPE: Brand: 3M™ STERI-STRIP™

## (undated) DEVICE — PLUMEPEN PRO 10FT

## (undated) DEVICE — GAUZE SPONGES,16 PLY: Brand: CURITY

## (undated) DEVICE — BIPOLAR CORD DISP

## (undated) DEVICE — SURGICEL SNOW 1 X 2 IN